# Patient Record
Sex: MALE | Employment: OTHER | URBAN - METROPOLITAN AREA
[De-identification: names, ages, dates, MRNs, and addresses within clinical notes are randomized per-mention and may not be internally consistent; named-entity substitution may affect disease eponyms.]

---

## 2017-01-24 DIAGNOSIS — G43.009 MIGRAINE WITHOUT AURA AND WITHOUT STATUS MIGRAINOSUS, NOT INTRACTABLE: ICD-10-CM

## 2017-01-24 DIAGNOSIS — G23.1 PSP (PROGRESSIVE SUPRANUCLEAR PALSY) (H): Primary | ICD-10-CM

## 2017-01-25 RX ORDER — OLMESARTAN MEDOXOMIL 20 MG/1
TABLET ORAL
Qty: 45 TABLET | Refills: 0 | Status: SHIPPED | OUTPATIENT
Start: 2017-01-25 | End: 2017-02-01

## 2017-01-26 ENCOUNTER — OFFICE VISIT (OUTPATIENT)
Dept: NEUROLOGY | Facility: CLINIC | Age: 67
End: 2017-01-26

## 2017-01-26 ENCOUNTER — OFFICE VISIT (OUTPATIENT)
Dept: NEUROPSYCHOLOGY | Facility: CLINIC | Age: 67
End: 2017-01-26

## 2017-01-26 ENCOUNTER — INFUSION THERAPY VISIT (OUTPATIENT)
Dept: INFUSION THERAPY | Facility: CLINIC | Age: 67
End: 2017-01-26
Attending: PSYCHIATRY & NEUROLOGY
Payer: COMMERCIAL

## 2017-01-26 VITALS — HEIGHT: 71 IN | BODY MASS INDEX: 27.3 KG/M2 | WEIGHT: 195 LBS

## 2017-01-26 DIAGNOSIS — G23.1 PSP (PROGRESSIVE SUPRANUCLEAR PALSY) (H): Primary | ICD-10-CM

## 2017-01-26 DIAGNOSIS — Z00.6 EXAMINATION OF PARTICIPANT IN CLINICAL TRIAL: Primary | ICD-10-CM

## 2017-01-26 DIAGNOSIS — G23.1 PSP (PROGRESSIVE SUPRANUCLEAR PALSY) (H): ICD-10-CM

## 2017-01-26 DIAGNOSIS — R13.10 DYSPHAGIA, UNSPECIFIED TYPE: ICD-10-CM

## 2017-01-26 PROCEDURE — 96365 THER/PROPH/DIAG IV INF INIT: CPT | Mod: ZF

## 2017-01-26 NOTE — PROGRESS NOTES
Research visit.     Answers for HPI/ROS submitted by the patient on 1/25/2017   General Symptoms: No  Skin Symptoms: No  HENT Symptoms: No  EYE SYMPTOMS: No  HEART SYMPTOMS: No  LUNG SYMPTOMS: No  INTESTINAL SYMPTOMS: No  URINARY SYMPTOMS: No  REPRODUCTIVE SYMPTOMS: No  SKELETAL SYMPTOMS: No  BLOOD SYMPTOMS: No  NERVOUS SYSTEM SYMPTOMS: No  MENTAL HEALTH SYMPTOMS: No

## 2017-01-26 NOTE — PROGRESS NOTES
Nursing Note  Jimmy Patrick presents today to [facility] for:   Chief Complaint   Patient presents with     Infusion     study [id number] (IDS# [id number])     During today's [facility] appointment, orders from Dr. Rani Bates  were completed.  Frequency: monthly    Progress note:  Patient identification verified by name and date of birth.  Assessment completed.  Vitals recorded in Doc Flowsheets.  Patient was provided with education regarding infusion and possible side effects.  Patient verbalized understanding.      needed: No  Premedications: were not ordered.  Infusion Rates: infusion given over approximately 1 hour. Started at 1215 and stopped at 1315  Approximate appt length: 1.15  Labs: were NOT DRAWN  Vascular access: peripheral IV placed today.  Treatment Conditions: None  Patient tolerated infusion: well.    Discharge Plan:   Follow up plan of care with: ongoing infusions at [facility].  Discharge instructions were reviewed with patient.  Patient/representative verbalized understanding of discharge instructions and all questions answered.  Patient discharged from [facility] in stable condition.    Trina Finch RN        /80 mmHg  Pulse 78  Temp(Src) 97  F (36.1  C) (Tympanic)  Resp 18  SpO2 99%

## 2017-01-26 NOTE — Clinical Note
1/26/2017       RE: Jimmy Patrick  [address]  [facility] [place] [facility] [place] [address]     Dear Colleague,    Thank you for referring your patient, Jimmy Patrick, to the [facility] NEUROLOGY at [facility]. Please see a copy of my visit note below.      Research visit.     Answers for HPI/ROS submitted by the patient on 1/25/2017   General Symptoms: No  Skin Symptoms: No  HENT Symptoms: No  EYE SYMPTOMS: No  HEART SYMPTOMS: No  LUNG SYMPTOMS: No  INTESTINAL SYMPTOMS: No  URINARY SYMPTOMS: No  REPRODUCTIVE SYMPTOMS: No  SKELETAL SYMPTOMS: No  BLOOD SYMPTOMS: No  NERVOUS SYSTEM SYMPTOMS: No  MENTAL HEALTH SYMPTOMS: No        Again, thank you for allowing me to participate in the care of your patient.      Sincerely,    Paulo Saravia MD

## 2017-01-26 NOTE — Clinical Note
1/26/2017      RE: Jimmy Patrick  [address]  [place] [address]         Research visit.     Answers for HPI/ROS submitted by the patient on 1/25/2017   General Symptoms: No  Skin Symptoms: No  HENT Symptoms: No  EYE SYMPTOMS: No  HEART SYMPTOMS: No  LUNG SYMPTOMS: No  INTESTINAL SYMPTOMS: No  URINARY SYMPTOMS: No  REPRODUCTIVE SYMPTOMS: No  SKELETAL SYMPTOMS: No  BLOOD SYMPTOMS: No  NERVOUS SYSTEM SYMPTOMS: No  MENTAL HEALTH SYMPTOMS: No          Paulo Saravia MD

## 2017-01-27 ENCOUNTER — MYC MEDICAL ADVICE (OUTPATIENT)
Dept: CARDIOLOGY | Facility: CLINIC | Age: 67
End: 2017-01-27

## 2017-01-27 DIAGNOSIS — G43.009 MIGRAINE WITHOUT AURA AND WITHOUT STATUS MIGRAINOSUS, NOT INTRACTABLE: ICD-10-CM

## 2017-01-27 DIAGNOSIS — G23.1 PSP (PROGRESSIVE SUPRANUCLEAR PALSY) (H): Primary | ICD-10-CM

## 2017-01-27 DIAGNOSIS — I10 HTN (HYPERTENSION): ICD-10-CM

## 2017-01-27 LAB — INTERPRETATION ECG - MUSE: NORMAL

## 2017-01-30 NOTE — PROGRESS NOTES
The participant completed the neuropsychology evaluation for the study with [facility] number [id number]. This included one hour of psychometrist time.    Measures administered:    Repeatable Battery for the Assessment of Neuropsychological Status (RBANS)  Phonemic Fluency  Letter Number Sequencing Test  Color Trails Test

## 2017-02-01 ENCOUNTER — TELEPHONE (OUTPATIENT)
Dept: CARDIOLOGY | Facility: CLINIC | Age: 67
End: 2017-02-01

## 2017-02-01 PROBLEM — I10 HTN (HYPERTENSION): Status: ACTIVE | Noted: 2017-02-01

## 2017-02-01 RX ORDER — OLMESARTAN MEDOXOMIL 20 MG/1
20 TABLET ORAL DAILY
Qty: 90 TABLET | Refills: 3 | Status: SHIPPED | OUTPATIENT
Start: 2017-02-01 | End: 2017-02-21

## 2017-02-01 NOTE — TELEPHONE ENCOUNTER
Patient calling with questions about My Chart message from this morning. He states that in the past he had been taking a whole tablet of Benicar and it was not enough. Asked him to monitor his BP and pulse for a week or so and call or send readings by My Chart and Dr Gutiérrez will decide if another medication is needed. Asked him to also check with [facility]. It shows received in his chart but want him to check since he is using it for the first time.

## 2017-02-02 VITALS
DIASTOLIC BLOOD PRESSURE: 82 MMHG | HEART RATE: 78 BPM | RESPIRATION RATE: 18 BRPM | OXYGEN SATURATION: 99 % | TEMPERATURE: 97 F | SYSTOLIC BLOOD PRESSURE: 161 MMHG

## 2017-02-14 DIAGNOSIS — I10 HTN (HYPERTENSION): ICD-10-CM

## 2017-02-14 LAB
ANION GAP SERPL CALCULATED.3IONS-SCNC: 9 MMOL/L (ref 3–14)
BUN SERPL-MCNC: 16 MG/DL (ref 7–30)
CALCIUM SERPL-MCNC: 8.8 MG/DL (ref 8.5–10.1)
CHLORIDE SERPL-SCNC: 102 MMOL/L (ref 94–109)
CO2 SERPL-SCNC: 31 MMOL/L (ref 20–32)
CREAT SERPL-MCNC: 1 MG/DL (ref 0.66–1.25)
GFR SERPL CREATININE-BSD FRML MDRD: 75 ML/MIN/1.7M2
GLUCOSE SERPL-MCNC: 108 MG/DL (ref 70–99)
POTASSIUM SERPL-SCNC: 4 MMOL/L (ref 3.4–5.3)
SODIUM SERPL-SCNC: 142 MMOL/L (ref 133–144)

## 2017-02-14 PROCEDURE — 80048 BASIC METABOLIC PNL TOTAL CA: CPT | Performed by: INTERNAL MEDICINE

## 2017-02-14 PROCEDURE — 36415 COLL VENOUS BLD VENIPUNCTURE: CPT | Performed by: INTERNAL MEDICINE

## 2017-02-17 ENCOUNTER — TELEPHONE (OUTPATIENT)
Dept: CARDIOLOGY | Facility: CLINIC | Age: 67
End: 2017-02-17

## 2017-02-17 ENCOUNTER — TELEPHONE (OUTPATIENT)
Dept: NURSING | Facility: CLINIC | Age: 67
End: 2017-02-17

## 2017-02-17 DIAGNOSIS — G43.009 MIGRAINE WITHOUT AURA AND WITHOUT STATUS MIGRAINOSUS, NOT INTRACTABLE: ICD-10-CM

## 2017-02-17 DIAGNOSIS — G23.1 PSP (PROGRESSIVE SUPRANUCLEAR PALSY) (H): ICD-10-CM

## 2017-02-17 NOTE — TELEPHONE ENCOUNTER
[facility] Call Center    Phone Message    Name of Caller: Jimmy    Phone Number: Home number on file [phone] (home)    Best time to return call: any    May a detailed message be left on voicemail: yes    Relation to patient: Self    Reason for Call: Other: Patient states he is returning a results phone call. Please advise.     Action Taken: Message routed to:  Adult Clinics: Cardiology p [phone]

## 2017-02-17 NOTE — TELEPHONE ENCOUNTER
"Called and spoke with patient. Informed of lab results. He says swelling is getting better.   Pulse is generally in the 80's, rises during the day. BP is \"troublesome\"  140/90's. BP lower in the mornings, around 130/80's. Takes Benicar with dinner. No other medications for blood pressure.  Is worried that his BP is too high and seems interested in increasing the Benicar.  Advised him I would send the message to Dr Gutiérrez and call him with recommendations.   "

## 2017-02-21 ENCOUNTER — OFFICE VISIT (OUTPATIENT)
Dept: PEDIATRICS | Facility: CLINIC | Age: 67
End: 2017-02-21
Payer: COMMERCIAL

## 2017-02-21 VITALS
HEART RATE: 84 BPM | TEMPERATURE: 97.9 F | OXYGEN SATURATION: 98 % | HEIGHT: 71 IN | SYSTOLIC BLOOD PRESSURE: 130 MMHG | BODY MASS INDEX: 27.3 KG/M2 | WEIGHT: 195 LBS | DIASTOLIC BLOOD PRESSURE: 83 MMHG

## 2017-02-21 DIAGNOSIS — G23.1 PSP (PROGRESSIVE SUPRANUCLEAR PALSY) (H): Primary | ICD-10-CM

## 2017-02-21 PROCEDURE — 99213 OFFICE O/P EST LOW 20 MIN: CPT | Performed by: INTERNAL MEDICINE

## 2017-02-21 RX ORDER — OLMESARTAN MEDOXOMIL 20 MG/1
40 TABLET ORAL DAILY
Qty: 90 TABLET | Refills: 3 | COMMUNITY
Start: 2017-02-21 | End: 2017-06-13

## 2017-02-21 NOTE — TELEPHONE ENCOUNTER
Date: 2/21/2017    Time of Call: 2:34 PM       Ordering provider: Dr Gutiérrez    Order:  He can increase it to 40 if he wishes. Watch for orthostatic symptoms.     Order received by: ANN MARIE Soria     Follow-up/additional notes: Left message on personal voice mail. Told him to watch for dizziness or lightheadedness especially with changing positions. Asked him to monitor his BP and call back or send a message on My Chart.

## 2017-02-21 NOTE — PROGRESS NOTES
SUBJECTIVE:                                                    Jimmy Patrick is a 66 year old male who presents to clinic today for the following health issues:      New Patient/Transfer of Care    Pt. Is present today to establish care with a primary provider.    HPI: This patient recently moved here from [place] and is on a research study forhis  progressive supranuclearpalsy    PMH:   Patient Active Problem List   Diagnosis     Transient paralysis of limb     Migraine without aura     Speech disturbance     PSP (progressive supranuclear palsy) (H)     Fluttering heart     Leg swelling     Varicose veins     Headache     Memory loss     Urinary urgency     Blurred vision     Double vision     Tinnitus     Sinus disorder     Epistaxis     DISK (aka Displacement of intervertebral disc, site unspecified, without myelopathy)     HTN (hypertension)     Past Surgical History   Procedure Laterality Date     H ablation atrial flutter         Social History   Substance Use Topics     Smoking status: Never Smoker     Smokeless tobacco: Not on file     Alcohol use No     Family History   Problem Relation Age of Onset     CANCER Father          Current Outpatient Prescriptions   Medication Sig Dispense Refill     olmesartan (BENICAR) 20 MG tablet Take 1 tablet (20 mg) by mouth daily 90 tablet 3     amantadine (SYMMETREL) 100 MG capsule Take 1 capsule (100 mg) by mouth 3 times daily 6am,11am and 4pm 270 capsule 3     ZOLMitriptan (ZOMIG) 5 MG tablet Take 1 tablet (5 mg) by mouth at onset of headache for migraine 30 tablet 5     adapalene (DIFFERIN) 0.1 % gel As per patient. 45 g 11     nortriptyline (PAMELOR) 10 MG capsule Take 3 capsules (30 mg) by mouth At Bedtime 270 capsule 3     acetaminophen-caffeine (EXCEDRIN TENSION HEADACHE) 500-65 MG TABS Take 1 tablet by mouth every 6 hours as needed for mild pain       rivastigmine (EXELON) 4.6 MG/24HR Place 1 patch onto the skin daily 90 patch 3     Multiple  "Vitamins-Minerals (CENTRUM SILVER) per tablet Take 1 tablet by mouth daily 30 tablet      aspirin (BABY ASPIRIN) 81 MG chewable tablet Take 81 mg by mouth daily        cholecalciferol (VITAMIN D-3 SUPER STRENGTH) 2000 UNITS tablet Take 1 tablet by mouth        COMPRESSION STOCKINGS 2 each daily (Patient not taking: Reported on 2/21/2017) 4 each 3     Allergies   Allergen Reactions     Other [Seasonal Allergies]      environmental     Codeine Other (See Comments) and Rash     GI upset       ROS:  C: NEGATIVE for fever, chills, change in weight  E/M: NEGATIVE for ear, mouth and throat problems  R: NEGATIVE for significant cough or SOB  CV: NEGATIVE for chest pain, palpitations or peripheral edema    OBJECTIVE:                                                    /83 (BP Location: Left arm, Patient Position: Chair, Cuff Size: Adult Regular)  Pulse 84  Temp 97.9  F (36.6  C) (Tympanic)  Ht 5' 11\" (1.803 m)  Wt 195 lb (88.5 kg)  SpO2 98%  BMI 27.2 kg/m2  Body mass index is 27.2 kg/(m^2).     GENERAL: frail and elderly  NECK: no adenopathy, no asymmetry, masses, or scars and thyroid normal to palpation  RESP: lungs clear to auscultation - no rales, rhonchi or wheezes  CV: regular rates and rhythm, normal S1 S2, no S3 or S4 and no murmur, click or rub    Diagnostic Test Results:  none      ASSESSMENT/PLAN:                                                    PSP (progressive supranuclear palsy) (H)    Management per neurology. He is currently on a research study            Will call or return to clinic if worsening or symptoms not improving as discussed.      Manuel Cain MD  [facility]    "

## 2017-02-21 NOTE — MR AVS SNAPSHOT
After Visit Summary   2/21/2017    Jimmy Patrick    MRN: [id number]           Patient Information     Date Of Birth          1950        Visit Information        Provider Department      2/21/2017 10:40 AM Manuel Cain MD [facility]        Today's Diagnoses     PSP (progressive supranuclear palsy) (H)    -  1       Follow-ups after your visit        Your next 10 appointments already scheduled     Feb 23, 2017 10:30 AM CST   (Arrive by 10:15 AM)   Return Movement Disorder with Paulo Saravia MD   [facility] Neurology ([facility])    [address] [place] [address]  [address] [facility] [id number] [phone]            Feb 23, 2017 11:00 AM CST   LAB with UC LAB   [facility] Lab ([facility])    [address] [place] [address]  [address] [facility] [id number] [phone]           Patient must bring picture ID.  Patient should be prepared to give a urine specimen  Please do not eat 10-12 hours before your appointment if you are coming in fasting for labs on lipids, cholesterol, or glucose (sugar).  Pregnant women should follow their Care Team instructions. Water with medications is okay. Do not drink coffee or other fluids.   If you have concerns about taking  your medications, please ask at office or if scheduling via [employer], send a message by clicking on Secure Messaging, Message Your Care Team.            Feb 23, 2017 12:00 PM CST   Infusion 60 with UC SPEC INFUSION, UC 50 ATC   [facility] Special[facility] Procedure ([facility])    [address] [facility]  [address] [facility] [id number] [phone]            Feb 28, 2017  2:00 PM CST   RETURN NEURO with Umberto Villafana MD   [facility] ([facility])    [address]  [address] [facility] [address]  [place] [address]   [phone]            Mar 23, 2017 10:30 AM CDT   (Arrive by 10:15  AM)   Return Movement Disorder with Paulo Saravia MD   [facility] Neurology ([facility])    [address] [place] [address]  [address] [facility] [id number] [phone]            Mar 23, 2017 11:00 AM CDT   LAB with UC LAB   [facility] Lab ([facility])    [address] [place] [id number] [phone]           Patient must bring picture ID.  Patient should be prepared to give a urine specimen  Please do not eat 10-12 hours before your appointment if you are coming in fasting for labs on lipids, cholesterol, or glucose (sugar).  Pregnant women should follow their Care Team instructions. Water with medications is okay. Do not drink coffee or other fluids.   If you have concerns about taking  your medications, please ask at office or if scheduling via [employer], send a message by clicking on Secure Messaging, Message Your Care Team.            Mar 23, 2017 12:00 PM CDT   Infusion 60 with UC SPEC INFUSION, UC 50 ATC   [facility] ([facility])    [address] [place] [id number] [phone]            Apr 05, 2017  8:00 AM CDT   Return Visit with Clint Gutiérrez MD   [facility] ([facility]    [address] [place] [address] [phone]              Who to contact     If you have questions or need follow up information about today's clinic visit or your schedule please contact [facility] directly at [phone].  Normal or non-critical lab and imaging results will be communicated to you by MyChart, letter or phone within 4 business days after the clinic has received the results. If you do not hear from us within 7 days, please contact the clinic through MyChart or phone. If you have a critical or abnormal lab result, we will notify you by phone as soon as  "possible.  Submit refill requests through [employer] or call your pharmacy and they will forward the refill request to us. Please allow 3 business days for your refill to be completed.          Additional Information About Your Visit        [employer] Information     [employer] gives you secure access to your electronic health record. If you see a primary care provider, you can also send messages to your care team and make appointments. If you have questions, please call your primary care clinic.  If you do not have a primary care provider, please call [phone] and they will assist you.      [employer] is an electronic gateway that provides easy, online access to your medical records. With [employer], you can request a clinic appointment, read your test results, renew a prescription or communicate with your care team.     To access your existing account, please contact your [facility] or call [phone] for assistance.        Care EveryWhere ID     This is your Care EveryWhere ID. This could be used by other organizations to access your [place] medical records  [id number]        Your Vitals Were     Pulse Temperature Height Pulse Oximetry BMI (Body Mass Index)       84 97.9  F (36.6  C) (Tympanic) 5' 11\" (1.803 m) 98% 27.2 kg/m2        Blood Pressure from Last 3 Encounters:   02/21/17 130/83   01/26/17 161/82   12/29/16 139/90    Weight from Last 3 Encounters:   02/21/17 195 lb (88.5 kg)   01/26/17 195 lb (88.5 kg)   12/19/16 194 lb 4.8 oz (88.1 kg)              Today, you had the following     No orders found for display       Primary Care Provider    [facility]       No address on file        Thank you!     Thank you for choosing [facility]  for your care. Our goal is always to provide you with excellent care. Hearing back from our patients is one way we can continue to improve our services. Please take a few minutes to complete the written survey " that you may receive in the mail after your visit with us. Thank you!             Your Updated Medication List - Protect others around you: Learn how to safely use, store and throw away your medicines at www.disposemymeds.org.          This list is accurate as of: 2/21/17 11:17 AM.  Always use your most recent med list.                   Brand Name Dispense Instructions for use    acetaminophen-caffeine 500-65 MG Tabs    EXCEDRIN TENSION HEADACHE     Take 1 tablet by mouth every 6 hours as needed for mild pain       adapalene 0.1 % gel    DIFFERIN    45 g    As per patient.       amantadine 100 MG capsule    SYMMETREL    270 capsule    Take 1 capsule (100 mg) by mouth 3 times daily 6am,11am and 4pm       BABY ASPIRIN 81 MG chewable tablet   Generic drug:  aspirin      Take 81 mg by mouth daily       CENTRUM SILVER per tablet     30 tablet    Take 1 tablet by mouth daily       COMPRESSION STOCKINGS     4 each    2 each daily       nortriptyline 10 MG capsule    PAMELOR    270 capsule    Take 3 capsules (30 mg) by mouth At Bedtime       olmesartan 20 MG tablet    BENICAR    90 tablet    Take 1 tablet (20 mg) by mouth daily       rivastigmine 4.6 MG/24HR 24 hr patch    EXELON    90 patch    Place 1 patch onto the skin daily       Vitamin D-3 Super Strength 2000 UNITS tablet   Generic drug:  cholecalciferol      Take 1 tablet by mouth       ZOLMitriptan 5 MG tablet    ZOMIG    30 tablet    Take 1 tablet (5 mg) by mouth at onset of headache for migraine

## 2017-02-21 NOTE — NURSING NOTE
"Chief Complaint   Patient presents with     Establish Care       Initial /83 (BP Location: Left arm, Patient Position: Chair, Cuff Size: Adult Regular)  Pulse 84  Temp 97.9  F (36.6  C) (Tympanic)  Ht 5' 11\" (1.803 m)  Wt 195 lb (88.5 kg)  SpO2 98%  BMI 27.2 kg/m2 Estimated body mass index is 27.2 kg/(m^2) as calculated from the following:    Height as of this encounter: 5' 11\" (1.803 m).    Weight as of this encounter: 195 lb (88.5 kg).  Medication Reconciliation: complete       Rukhsana Acosta CMA    "

## 2017-02-23 ENCOUNTER — OFFICE VISIT (OUTPATIENT)
Dept: NEUROLOGY | Facility: CLINIC | Age: 67
End: 2017-02-23

## 2017-02-23 ENCOUNTER — INFUSION THERAPY VISIT (OUTPATIENT)
Dept: INFUSION THERAPY | Facility: CLINIC | Age: 67
End: 2017-02-23
Attending: PSYCHIATRY & NEUROLOGY
Payer: COMMERCIAL

## 2017-02-23 VITALS
OXYGEN SATURATION: 99 % | SYSTOLIC BLOOD PRESSURE: 130 MMHG | DIASTOLIC BLOOD PRESSURE: 72 MMHG | TEMPERATURE: 98 F | HEART RATE: 83 BPM | RESPIRATION RATE: 18 BRPM

## 2017-02-23 DIAGNOSIS — G23.1 PSP (PROGRESSIVE SUPRANUCLEAR PALSY) (H): Primary | ICD-10-CM

## 2017-02-23 DIAGNOSIS — G43.009 MIGRAINE WITHOUT AURA AND WITHOUT STATUS MIGRAINOSUS, NOT INTRACTABLE: ICD-10-CM

## 2017-02-23 DIAGNOSIS — G23.1 PSP (PROGRESSIVE SUPRANUCLEAR PALSY) (H): ICD-10-CM

## 2017-02-23 PROCEDURE — 96365 THER/PROPH/DIAG IV INF INIT: CPT

## 2017-02-23 RX ORDER — AMANTADINE HYDROCHLORIDE 100 MG/1
100 CAPSULE, GELATIN COATED ORAL 3 TIMES DAILY
Qty: 270 CAPSULE | Refills: 3 | Status: SHIPPED | OUTPATIENT
Start: 2017-02-23 | End: 2018-01-01

## 2017-02-23 RX ORDER — ZOLMITRIPTAN 5 MG/1
5 TABLET, FILM COATED ORAL
Qty: 30 TABLET | Refills: 5 | Status: SHIPPED | OUTPATIENT
Start: 2017-02-23 | End: 2018-01-01

## 2017-02-23 RX ORDER — RIVASTIGMINE 4.6 MG/24H
1 PATCH, EXTENDED RELEASE TRANSDERMAL DAILY
Qty: 90 PATCH | Refills: 3 | Status: SHIPPED | OUTPATIENT
Start: 2017-02-23 | End: 2017-02-23

## 2017-02-23 RX ORDER — RIVASTIGMINE 4.6 MG/24H
1 PATCH, EXTENDED RELEASE TRANSDERMAL DAILY
Qty: 90 PATCH | Refills: 3 | COMMUNITY
Start: 2017-02-23 | End: 2018-01-01

## 2017-02-23 RX ORDER — RIVASTIGMINE 4.6 MG/24H
1 PATCH, EXTENDED RELEASE TRANSDERMAL DAILY
Qty: 30 PATCH | Refills: 11 | Status: SHIPPED | OUTPATIENT
Start: 2017-02-23 | End: 2017-02-23

## 2017-02-23 RX ADMIN — Medication 2100 MG: at 12:27

## 2017-02-23 NOTE — MR AVS SNAPSHOT
After Visit Summary   2/23/2017    Jimmy Patrick    MRN: [id number]           Patient Information     Date Of Birth          1950        Visit Information        Provider Department      2/23/2017 12:00 PM UC 50 ATC; UC SPEC INFUSION [facility]        Today's Diagnoses     PSP (progressive supranuclear palsy) (H)    -  1       Follow-ups after your visit        Your next 10 appointments already scheduled     Feb 28, 2017  2:00 PM CST   RETURN NEURO with Umberto Villafana MD   [facility] Clinic ([facility])    [address]  [address]  [facility]  [facility] [address]   [phone]            Mar 01, 2017  3:00 PM CST   New Visit with Paulo Tellez MD   [facility] ([facility])    [address] [place] [address] [id number] [phone]            Mar 23, 2017 10:30 AM CDT   (Arrive by 10:15 AM)   Return Movement Disorder with Paulo Saravia MD   [facility] Neurology [facility])    [address] [place] [address] [id number] [phone]            Mar 23, 2017 11:00 AM CDT   LAB with HAI LAB   [facility])    [address] [place] [address] [id number] [phone]           Patient must bring picture ID.  Patient should be prepared to give a urine specimen  Please do not eat 10-12 hours before your appointment if you are coming in fasting for labs on lipids, cholesterol, or glucose (sugar).  Pregnant women should follow their Care Team instructions. Water with medications is okay. Do not drink coffee or other fluids.   If you have concerns about taking  your medications, please ask at office or if scheduling via [employer], send a message by clicking on Secure Messaging, Message Your Care Team.            Mar 23, 2017 12:00 PM CDT   Infusion 60 with UC SPEC INFUSION, UC 50  ATC   [facility] Specialty [facility] Procedure ([facility])    [address] [place] [address]  2nd [facility] [phone] [phone]            Apr 05, 2017  8:00 AM CDT   Return Visit with Clint Gutiérrez MD   [facility] ([facility])    [address] [place] [address]   [phone]            Apr 20, 2017  9:30 AM CDT   (Arrive by 9:15 AM)   Return Movement Disorder with Paulo Saravia MD   [facility] Neurology ([facility])    [address] [place] [address]  3rd [facility] [phone]            Apr 20, 2017 10:30 AM CDT   (Arrive by 10:15 AM)   ecg with  CV EKG   [facility] Care ([facility])    [address] [place] [address]  [phone]               Apr 20, 2017 11:00 AM CDT   LAB with  LAB   [facility] Lab ([facility])    [address] [place] [address] [id number] [phone]           Patient must bring picture ID.  Patient should be prepared to give a urine specimen  Please do not eat 10-12 hours before your appointment if you are coming in fasting for labs on lipids, cholesterol, or glucose (sugar).  Pregnant women should follow their Care Team instructions. Water with medications is okay. Do not drink coffee or other fluids.   If you have concerns about taking  your medications, please ask at office or if scheduling via trinket, send a message by clicking on Secure Messaging, Message Your Care Team.              Who to contact     If you have questions or need follow up information about today's clinic visit or your schedule please contact [facility] SPECIALTY AND PROCEDURE directly at [phone].  Normal or non-critical lab and imaging results will be communicated to you by MyChart, letter or phone within 4 business days after the clinic has received the results.  If you do not hear from us within 7 days, please contact the clinic through [employer] or phone. If you have a critical or abnormal lab result, we will notify you by phone as soon as possible.  Submit refill requests through [employer] or call your pharmacy and they will forward the refill request to us. Please allow 3 business days for your refill to be completed.          Additional Information About Your Visit        [employer] Information     [employer] gives you secure access to your electronic health record. If you see a primary care provider, you can also send messages to your care team and make appointments. If you have questions, please call your primary care clinic.  If you do not have a primary care provider, please call [phone] and they will assist you.        Care EveryWhere ID     This is your Care EveryWhere ID. This could be used by other organizations to access your [facility] medical records  [id number]        Your Vitals Were     Pulse Temperature Respirations Pulse Oximetry          83 98  F (36.7  C) (Oral) 18 99%         Blood Pressure from Last 3 Encounters:   02/23/17 130/72   02/21/17 130/83   01/26/17 161/82    Weight from Last 3 Encounters:   02/21/17 88.5 kg (195 lb)   01/26/17 88.5 kg (195 lb)   12/19/16 88.1 kg (194 lb 4.8 oz)              We Performed the Following     MD Instruction for Therapy Plan          Today's Medication Changes          These changes are accurate as of: 2/23/17  4:30 PM.  If you have any questions, ask your nurse or doctor.               Start taking these medicines.        Dose/Directions    EXELON 4.6 MG/24HR 24 hr patch   Used for:  PSP (progressive supranuclear palsy) (H)   Generic drug:  rivastigmine   Started by:  Paulo Saravia MD        Dose:  1 patch   Place 1 patch onto the skin daily   Quantity:  90 patch   Refills:  3            Where to get your medicines      These medications were sent to [facility] [address]  [address] [address]     Phone:  [phone]     amantadine 100 MG capsule    ZOLMitriptan 5 MG tablet                Primary Care Provider    [facility]       No address on file        Thank you!     Thank you for choosing [facility] SPECIALTY [facility] PROCEDURE  for your care. Our goal is always to provide you with excellent care. Hearing back from our patients is one way we can continue to improve our services. Please take a few minutes to complete the written survey that you may receive in the mail after your visit with us. Thank you!             Your Updated Medication List - Protect others around you: Learn how to safely use, store and throw away your medicines at www.disposemymeds.org.          This list is accurate as of: 2/23/17  4:30 PM.  Always use your most recent med list.                   Brand Name Dispense Instructions for use    acetaminophen-caffeine 500-65 MG Tabs    EXCEDRIN TENSION HEADACHE     Take 1 tablet by mouth every 6 hours as needed for mild pain       adapalene 0.1 % gel    DIFFERIN    45 g    As per patient.       amantadine 100 MG capsule    SYMMETREL    270 capsule    Take 1 capsule (100 mg) by mouth 3 times daily 6am,11am and 4pm       BABY ASPIRIN 81 MG chewable tablet   Generic drug:  aspirin      Take 81 mg by mouth daily       BENICAR 20 MG tablet   Generic drug:  olmesartan     90 tablet    Take 2 tablets (40 mg) by mouth daily       CENTRUM SILVER per tablet     30 tablet    Take 1 tablet by mouth daily       COMPRESSION STOCKINGS     4 each    2 each daily       EXELON 4.6 MG/24HR 24 hr patch   Generic drug:  rivastigmine     90 patch    Place 1 patch onto the skin daily       nortriptyline 10 MG capsule    PAMELOR    270 capsule    Take 3 capsules (30 mg) by mouth At Bedtime       Vitamin D-3 Super Strength 2000 UNITS tablet   Generic drug:  cholecalciferol      Take 1 tablet by mouth        ZOLMitriptan 5 MG tablet    ZOMIG    30 tablet    Take 1 tablet (5 mg) by mouth at onset of headache for migraine

## 2017-02-23 NOTE — Clinical Note
2/23/2017       RE: Jimmy Patrick  [address]  [place] [address]     Dear Colleague,    Thank you for referring your patient, Jimmy Patrick, to the [facility] NEUROLOGY at [facility]. Please see a copy of my visit note below.    No notes on file    Again, thank you for allowing me to participate in the care of your patient.      Sincerely,    Paulo Saravia MD

## 2017-02-23 NOTE — PROGRESS NOTES
Research visit      Answers for HPI/ROS submitted by the patient on 2/15/2017   General Symptoms: No  Skin Symptoms: No  HENT Symptoms: No  EYE SYMPTOMS: No  HEART SYMPTOMS: No  LUNG SYMPTOMS: No  INTESTINAL SYMPTOMS: No  URINARY SYMPTOMS: No  REPRODUCTIVE SYMPTOMS: No  SKELETAL SYMPTOMS: No  BLOOD SYMPTOMS: No  NERVOUS SYSTEM SYMPTOMS: No  MENTAL HEALTH SYMPTOMS: No

## 2017-02-23 NOTE — PROGRESS NOTES
Nursing Note  Jimmy Patrick [facility]:   Chief Complaint   Patient presents with     Infusion     study [id number] (IDS# [id number]) IV infusion     During [facility]'s [facility] appointment, orders from Dr. Rani Bates  were completed.  Frequency: monthly    Progress note:  Patient identification verified by name and date of birth.  Assessment completed.  Vitals recorded in Doc Flowsheets.  Patient was provided with education regarding infusion and possible side effects.  Patient verbalized understanding.      needed: No  Premedications: were not ordered.  Infusion Rates: infusion given over approximately 1 hour.  Approximate appt length: 1.5   Labs: were not drawn this visit`  Vascular access: peripheral IV placed today.  Treatment Conditions: None  Patient tolerated infusion: well.    Discharge Plan:   Follow up plan of care with: ongoing infusions at [facility].  Discharge instructions were reviewed with patient.  Patient/representative verbalized understanding of discharge instructions and all questions answered.  [facility] discharged [facility] in stable condition.    Trina Barroso RN    Administrations This Visit     study [id number] (IDS# [id number]) IV infusion 2,100 mg 210 mL     Admin Date Action Dose Rate Route Administered By          02/23/2017 New Bag 2100 mg 210 mL/hr Intravenous Trina Barroso RN                         /72  Pulse 83  Temp 98  F (36.7  C) (Oral)  Resp 18  SpO2 99%

## 2017-02-23 NOTE — MR AVS SNAPSHOT
After Visit Summary   2/23/2017    Jimmy Patrick    MRN: [id number]           Patient Information     Date Of Birth          1950        Visit Information        Provider Department      2/23/2017 10:30 AM Paulo Saravia MD [facility] Neurology        Today's Diagnoses     PSP (progressive supranuclear palsy)        Migraine without aura and without status migrainosus, not intractable           Follow-ups after your visit        Follow-up notes from your care team     Return if symptoms worsen or fail to improve.      Your next 10 appointments already scheduled     Feb 23, 2017 11:00 AM CST   LAB with UC LAB   [facility] Lab ([facility])    [address] [place] [address] [id number] [phone]           Patient must bring picture ID.  Patient should be prepared to give a urine specimen  Please do not eat 10-12 hours before your appointment if you are coming in fasting for labs on lipids, cholesterol, or glucose (sugar).  Pregnant women should follow their Care Team instructions. Water with medications is okay. Do not drink coffee or other fluids.   If you have concerns about taking  your medications, please ask at office or if scheduling via barcoo, send a message by clicking on Secure Messaging, Message Your Care Team.            Feb 23, 2017 12:00 PM CST   Infusion 60 with UC SPEC INFUSION, UC 50 ATC   [facility] Special[facility] Procedure ([facility])    [address] [place] [address] [phone] [phone]            Feb 28, 2017  2:00 PM CST   RETURN NEURO with Umberto Villafana MD   Eye Clinic ([facility])    [address]  [address] 9a  [facility] [address]   [phone]            Mar 01, 2017  3:00 PM CST   New Visit with Paulo Tellez MD   [facility] ([facility])    [address] [place] [address]  N  [facility] [address]   [phone]            Mar 23, 2017 10:30 AM CDT   (Arrive by 10:15 AM)   Return Movement Disorder with aPulo Saravia MD   [facility] Neurology ([facility])    [address] [place] [address]  3rd [facility] [address] [phone]            Mar 23, 2017 11:00 AM CDT   LAB with UC LAB   [facility] Lab ([facility])    [address] [place] [address]  1st [facility] [id number] [phone]           Patient must bring picture ID.  Patient should be prepared to give a urine specimen  Please do not eat 10-12 hours before your appointment if you are coming in fasting for labs on lipids, cholesterol, or glucose (sugar).  Pregnant women should follow their Care Team instructions. Water with medications is okay. Do not drink coffee or other fluids.   If you have concerns about taking  your medications, please ask at office or if scheduling via [employer], send a message by clicking on Secure Messaging, Message Your Care Team.            Mar 23, 2017 12:00 PM CDT   Infusion 60 with UC SPEC INFUSION, UC 50 ATC   [facility] ([facility])    [address] [place] [address]  2nd [facility] [address] [phone]            Apr 05, 2017  8:00 AM CDT   Return Visit with Clint Gutiérrez MD   [facility])    [address] [place] [id number] [phone]              Who to contact     Please call your clinic at [phone] to:    Ask questions about your health    Make or cancel appointments    Discuss your medicines    Learn about your test results    Speak to your doctor   If you have compliments or concerns about an experience at your clinic, or if you wish to file a complaint, please contact [facility] Patient Relations at [phone] or email us at  [email or web address]         Additional Information About Your Visit        [email or web address] Information     [employer] gives you secure access to your electronic health record. If you see a primary care provider, you can also send messages to your care team and make appointments. If you have questions, please call your primary care clinic.  If you do not have a primary care provider, please call [phone] and they will assist you.      [employer] is an electronic gateway that provides easy, online access to your medical records. With [employer], you can request a clinic appointment, read your test results, renew a prescription or communicate with your care team.     To access your existing account, please contact your [facility] or call [phone] for assistance.        Care EveryWhere ID     This is your Care EveryWhere ID. This could be used by other organizations to access your [facility] medical records  [id number]         Blood Pressure from Last 3 Encounters:   02/21/17 130/83   01/26/17 161/82   12/29/16 139/90    Weight from Last 3 Encounters:   02/21/17 88.5 kg (195 lb)   01/26/17 88.5 kg (195 lb)   12/19/16 88.1 kg (194 lb 4.8 oz)              Today, you had the following     No orders found for display         Today's Medication Changes          These changes are accurate as of: 2/23/17 10:47 AM.  If you have any questions, ask your nurse or doctor.               Start taking these medicines.        Dose/Directions    EXELON 4.6 MG/24HR 24 hr patch   Used for:  PSP (progressive supranuclear palsy) (H)   Generic drug:  rivastigmine   Started by:  Paulo Saravia MD        Dose:  1 patch   Place 1 patch onto the skin daily   Quantity:  90 patch   Refills:  3            Where to get your medicines      These medications were sent to [facility] [address]  [address] [facility] [address]     Phone:  [phone]      amantadine 100 MG capsule    ZOLMitriptan 5 MG tablet                Primary Care Provider    [facility]       No address on file        Thank you!     Thank you for choosing [facility] NEUROLOGY  for your care. Our goal is always to provide you with excellent care. Hearing back from our patients is one way we can continue to improve our services. Please take a few minutes to complete the written survey that you may receive in the mail after your visit with us. Thank you!             Your Updated Medication List - Protect others around you: Learn how to safely use, store and throw away your medicines at www.disposemymeds.org.          This list is accurate as of: 2/23/17 10:47 AM.  Always use your most recent med list.                   Brand Name Dispense Instructions for use    acetaminophen-caffeine 500-65 MG Tabs    EXCEDRIN TENSION HEADACHE     Take 1 tablet by mouth every 6 hours as needed for mild pain       adapalene 0.1 % gel    DIFFERIN    45 g    As per patient.       amantadine 100 MG capsule    SYMMETREL    270 capsule    Take 1 capsule (100 mg) by mouth 3 times daily 6am,11am and 4pm       BABY ASPIRIN 81 MG chewable tablet   Generic drug:  aspirin      Take 81 mg by mouth daily       BENICAR 20 MG tablet   Generic drug:  olmesartan     90 tablet    Take 2 tablets (40 mg) by mouth daily       CENTRUM SILVER per tablet     30 tablet    Take 1 tablet by mouth daily       COMPRESSION STOCKINGS     4 each    2 each daily       EXELON 4.6 MG/24HR 24 hr patch   Generic drug:  rivastigmine     90 patch    Place 1 patch onto the skin daily       nortriptyline 10 MG capsule    PAMELOR    270 capsule    Take 3 capsules (30 mg) by mouth At Bedtime       Vitamin D-3 Super Strength 2000 UNITS tablet   Generic drug:  cholecalciferol      Take 1 tablet by mouth       ZOLMitriptan 5 MG tablet    ZOMIG    30 tablet    Take 1 tablet (5 mg) by mouth at onset of headache for migraine

## 2017-02-28 ENCOUNTER — OFFICE VISIT (OUTPATIENT)
Dept: OPHTHALMOLOGY | Facility: CLINIC | Age: 67
End: 2017-02-28
Attending: OPHTHALMOLOGY
Payer: COMMERCIAL

## 2017-02-28 DIAGNOSIS — H53.2 DIPLOPIA: Primary | ICD-10-CM

## 2017-02-28 DIAGNOSIS — G23.1 PSP (PROGRESSIVE SUPRANUCLEAR PALSY) (H): ICD-10-CM

## 2017-02-28 PROCEDURE — 92060 SENSORIMOTOR EXAMINATION: CPT | Mod: ZF | Performed by: OPHTHALMOLOGY

## 2017-02-28 PROCEDURE — 99215 OFFICE O/P EST HI 40 MIN: CPT | Mod: ZF

## 2017-02-28 ASSESSMENT — REFRACTION_MANIFEST
OS_SPHERE: -8.00
OS_AXIS: 005
OD_CYLINDER: +0.50
OS_CYLINDER: +1.25
OS_ADD: +2.75
OD_AXIS: 140
OD_ADD: +2.75
OD_SPHERE: -6.25

## 2017-02-28 ASSESSMENT — CUP TO DISC RATIO
OD_RATIO: 0.1
OS_RATIO: 0.1

## 2017-02-28 ASSESSMENT — VISUAL ACUITY
OS_CC+: -2
OD_CC: 20/25
METHOD: SNELLEN - LINEAR
OD_CC+: -2
CORRECTION_TYPE: GLASSES
OS_CC: 20/25

## 2017-02-28 ASSESSMENT — EXTERNAL EXAM - RIGHT EYE: OD_EXAM: NORMAL

## 2017-02-28 ASSESSMENT — TONOMETRY
IOP_METHOD: TONOPEN
OD_IOP_MMHG: 15
OS_IOP_MMHG: 16

## 2017-02-28 ASSESSMENT — CONF VISUAL FIELD
OD_NORMAL: 1
OS_NORMAL: 1

## 2017-02-28 ASSESSMENT — EXTERNAL EXAM - LEFT EYE: OS_EXAM: NORMAL

## 2017-02-28 ASSESSMENT — SLIT LAMP EXAM - LIDS
COMMENTS: PTOSIS, MEIBOMIAN GLAND DYSFUNCTION
COMMENTS: PTOSIS, MEIBOMIAN GLAND DYSFUNCTION

## 2017-02-28 NOTE — PROGRESS NOTES
Assessment & Plan     Jimmy Patrick is a 66 year old male with the following diagnoses:   1. Diplopia    2. PSP (progressive supranuclear palsy) (H)       Mr. Henning reports that he has difficulty going downstairs. He currently has progressive glasses for distance and another pair of glasses for reading.     I believe part of his complaint is that he is using the lower part of the lens (reading) when he is looking down added to that balance issues and eye motility deficit secondary to PSD.     We discussed different options, and he decided to try bifocals and I think this is reasonable.     Follow up as needed for worsening symptoms.         Attending Physician Attestation:  I have seen and examined this patient.  I have confirmed and edited as necessary the chief complaint(s), history of present illness, review of systems, relevant history, and examination findings as documented by others.  I have personally reviewed the relevant tests, images, and reports as documented above.  I have confirmed and edited as necessary the assessment and plan and agree with this note.  - Umberto Villafana MD 4:10 PM 2/28/2017

## 2017-02-28 NOTE — LETTER
2017         RE:  :  MRN: Jimmy Patrick  1950  [id number]     Dear Dr. Saravia,    Your patient, Jimmy Patrick, returned for neuro-ophthalmic follow up. My assessment and plan are below.  For further details, please see my attached clinic note.          Assessment & Plan     Jimmy Patrick is a 66 year old male with the following diagnoses:   1. Diplopia    2. PSP (progressive supranuclear palsy) (H)       Mr. Henning reports that he has difficulty going downstairs. He currently has progressive glasses for distance and another pair of glasses for reading.     I believe part of his complaint is that he is using the lower part of the lens (reading) when he is looking down added to that balance issues and eye motility deficit secondary to PSD.     We discussed different options, and he decided to try bifocals and I think this is reasonable.     Follow up as needed for worsening symptoms.        Again, thank you for allowing me to participate in the care of your patient.      Sincerely,    Umberto Villafana MD  Professor, Neuro-Ophthalmology  Department of Ophthalmology and Visual Neurosciences  [facility]      CC: Paulo Saravia MD  [facility]  [address]  [facility] [address]  VIA In Basket     Emperatriz Candelaria, PhD [address] [facility] [address]  [facility] [address]  VIA In Basket     Jade Peterson RN  VIA In Basket

## 2017-02-28 NOTE — MR AVS SNAPSHOT
After Visit Summary   2/28/2017    Jimmy Patrick    MRN: [id number]           Patient Information     Date Of Birth          1950        Visit Information        Provider Department      2/28/2017 2:00 PM Umberto [facility]af[facility]        Today's Diagnoses     Diplopia    -  1    PSP (progressive supranuclear palsy) (H)           Follow-ups after your visit        Your next 10 appointments already scheduled     Mar 01, 2017  3:00 PM CST   New Visit with Paulo Tellez MD   [facility])    [address] [place] [phone]            Mar 23, 2017 10:30 AM CDT   (Arrive by 10:15 AM)   Return Movement Disorder with Paulo Saravia MD   [facility] Neurology [facility])    [address] [place] [address] [id number] [phone]            Mar 23, 2017 11:00 AM CDT   LAB with UC LAB   [facility] Lab ([facility])    [address] [place] [id number] [phone]           Patient must bring picture ID.  Patient should be prepared to give a urine specimen  Please do not eat 10-12 hours before your appointment if you are coming in fasting for labs on lipids, cholesterol, or glucose (sugar).  Pregnant women should follow their Care Team instructions. Water with medications is okay. Do not drink coffee or other fluids.   If you have concerns about taking  your medications, please ask at office or if scheduling via [employer], send a message by clicking on Secure Messaging, Message Your Care Team.            Mar 23, 2017 12:00 PM CDT   Infusion 60 with UC SPEC INFUSION, UC 50 ATC   [facility] Specialty [facility] Procedure ([facility])    [address] [place] [address] [phone]            Apr 05, 2017  8:00 AM CDT   Return Visit with Clint Juárez  MD Marla   [facility]    [id number] [place] [phone]            Apr 20, 2017  9:30 AM CDT   (Arrive by 9:15 AM)   Return Movement Disorder with Paulo Saravia MD   [facility] Neurology ([facility])    [address] [place] [address]  3rd [facility] [phone]            Apr 20, 2017 10:30 AM CDT   (Arrive by 10:15 AM)   ecg with  CV EKG   [facility] Heart Care ([facility])    [address] [place] [address]  [phone]               Apr 20, 2017 11:00 AM CDT   LAB with  LAB   [facility] Lab ([facility])    [address] [place] [address] [phone]           Patient must bring picture ID.  Patient should be prepared to give a urine specimen  Please do not eat 10-12 hours before your appointment if you are coming in fasting for labs on lipids, cholesterol, or glucose (sugar).  Pregnant women should follow their Care Team instructions. Water with medications is okay. Do not drink coffee or other fluids.   If you have concerns about taking  your medications, please ask at office or if scheduling via [employer], send a message by clicking on Secure Messaging, Message Your Care Team.            Apr 20, 2017 12:00 PM CDT   Infusion 60 with  SPEC INFUSION   [facility] [place] Specialty and Procedure ([facility])    [address] [place] [address]  2nd [facility] [address] [phone]              Who to contact     Please call your clinic at [phone] to:    Ask questions about your health    Make or cancel appointments    Discuss your medicines    Learn about your test results    Speak to your doctor   If you have compliments or concerns about an experience at your clinic, or if you wish to file a complaint, please contact [facility] Patient  Relations at [phone] or email us at [email or web address]         Additional Information About Your Visit        [employer] Information     [employer] gives you secure access to your electronic health record. If you see a primary care provider, you can also send messages to your care team and make appointments. If you have questions, please call your primary care clinic.  If you do not have a primary care provider, please call [phone] and they will assist you.      [employer] is an electronic gateway that provides easy, online access to your medical records. With [employer], you can request a clinic appointment, read your test results, renew a prescription or communicate with your care team.     To access your existing account, please contact your [facility] or call [phone] for assistance.        Care EveryWhere ID     This is your Care EveryWhere ID. This could be used by other organizations to access your [facility] medical records  [id number]         Blood Pressure from Last 3 Encounters:   02/23/17 130/72   02/21/17 130/83   01/26/17 161/82    Weight from Last 3 Encounters:   02/21/17 88.5 kg (195 lb)   01/26/17 88.5 kg (195 lb)   12/19/16 88.1 kg (194 lb 4.8 oz)              We Performed the Following     Sensorimotor        Primary Care Provider    [facility]       No address on file        Thank you!     Thank you for choosing [facility]  for your care. Our goal is always to provide you with excellent care. Hearing back from our patients is one way we can continue to improve our services. Please take a few minutes to complete the written survey that you may receive in the mail after your visit with us. Thank you!             Your Updated Medication List - Protect others around you: Learn how to safely use, store and throw away your medicines at www.disposemymeds.org.          This list is accurate as of: 2/28/17  4:11 PM.  Always use your  most recent med list.                   Brand Name Dispense Instructions for use    acetaminophen-caffeine 500-65 MG Tabs    EXCEDRIN TENSION HEADACHE     Take 1 tablet by mouth every 6 hours as needed for mild pain       adapalene 0.1 % gel    DIFFERIN    45 g    As per patient.       amantadine 100 MG capsule    SYMMETREL    270 capsule    Take 1 capsule (100 mg) by mouth 3 times daily 6am,11am and 4pm       BABY ASPIRIN 81 MG chewable tablet   Generic drug:  aspirin      Take 81 mg by mouth daily       BENICAR 20 MG tablet   Generic drug:  olmesartan     90 tablet    Take 2 tablets (40 mg) by mouth daily       CENTRUM SILVER per tablet     30 tablet    Take 1 tablet by mouth daily       COMPRESSION STOCKINGS     4 each    2 each daily       EXELON 4.6 MG/24HR 24 hr patch   Generic drug:  rivastigmine     90 patch    Place 1 patch onto the skin daily       nortriptyline 10 MG capsule    PAMELOR    270 capsule    Take 3 capsules (30 mg) by mouth At Bedtime       Vitamin D-3 Super Strength 2000 UNITS tablet   Generic drug:  cholecalciferol      Take 1 tablet by mouth       ZOLMitriptan 5 MG tablet    ZOMIG    30 tablet    Take 1 tablet (5 mg) by mouth at onset of headache for migraine

## 2017-03-01 ENCOUNTER — OFFICE VISIT (OUTPATIENT)
Dept: DERMATOLOGY | Facility: CLINIC | Age: 67
End: 2017-03-01
Payer: COMMERCIAL

## 2017-03-01 DIAGNOSIS — D22.70 MULTIPLE BENIGN NEVI OF UPPER AND LOWER EXTREMITIES, AND TRUNK: ICD-10-CM

## 2017-03-01 DIAGNOSIS — D22.60 MULTIPLE BENIGN NEVI OF UPPER AND LOWER EXTREMITIES, AND TRUNK: ICD-10-CM

## 2017-03-01 DIAGNOSIS — D18.01 CHERRY ANGIOMA: ICD-10-CM

## 2017-03-01 DIAGNOSIS — L70.0 ACNE VULGARIS: Primary | ICD-10-CM

## 2017-03-01 DIAGNOSIS — L82.1 SK (SEBORRHEIC KERATOSIS): ICD-10-CM

## 2017-03-01 DIAGNOSIS — D23.71 DERMATOFIBROMA OF LOWER LEG, RIGHT: ICD-10-CM

## 2017-03-01 DIAGNOSIS — L72.0 EIC (EPIDERMAL INCLUSION CYST): ICD-10-CM

## 2017-03-01 DIAGNOSIS — L11.1 GROVER'S DISEASE: ICD-10-CM

## 2017-03-01 DIAGNOSIS — D23.9 CELLULAR BLUE NEVUS: ICD-10-CM

## 2017-03-01 DIAGNOSIS — D22.5 MULTIPLE BENIGN NEVI OF UPPER AND LOWER EXTREMITIES, AND TRUNK: ICD-10-CM

## 2017-03-01 PROCEDURE — 99203 OFFICE O/P NEW LOW 30 MIN: CPT | Performed by: DERMATOLOGY

## 2017-03-01 RX ORDER — ADAPALENE 45 G/G
GEL TOPICAL
Qty: 135 G | Refills: 3 | Status: SHIPPED | OUTPATIENT
Start: 2017-03-01 | End: 2018-01-01

## 2017-03-01 ASSESSMENT — PAIN SCALES - GENERAL: PAINLEVEL: NO PAIN (0)

## 2017-03-01 NOTE — PATIENT INSTRUCTIONS
Differin 0.1% gel is available over the counter now. YOu can definitely find it online on [employer], [employer], [employer]. IF the insurance company pushes back, it is available online.

## 2017-03-01 NOTE — MR AVS SNAPSHOT
After Visit Summary   3/1/2017    Jimmy Patrick    MRN: [id number]           Patient Information     Date Of Birth          1950        Visit Information        Provider Department      3/1/2017 3:00 PM Paulo Tellez MD [facility]        Today's Diagnoses     Acne vulgaris    -  1    Cherry angioma        Sean's disease        SK (seborrheic keratosis)        EIC (epidermal inclusion cyst)        Multiple benign nevi of upper and lower extremities, and trunk        Cellular blue nevus          Care Instructions    Differin 0.1% gel is available over the counter now. YOu can definitely find it online on [employer], [employer], [employer]. IF the insurance company pushes back, it is available online.        Follow-ups after your visit        Your next 10 appointments already scheduled     Mar 23, 2017 10:30 AM CDT   (Arrive by 10:15 AM)   Return Movement Disorder with Paulo Saravia MD   [facility] Neurology ([facility])    [address] [place] [address] [phone]            Mar 23, 2017 11:00 AM CDT   LAB with  LAB   [facility] Lab ([facility])    [address] [place] [phone]           Patient must bring picture ID.  Patient should be prepared to give a urine specimen  Please do not eat 10-12 hours before your appointment if you are coming in fasting for labs on lipids, cholesterol, or glucose (sugar).  Pregnant women should follow their Care Team instructions. Water with medications is okay. Do not drink coffee or other fluids.   If you have concerns about taking  your medications, please ask at office or if scheduling via [employer], send a message by clicking on Secure Messaging, Message Your Care Team.            Mar 23, 2017 12:00 PM CDT   Infusion 60 with  SPEC INFUSION, UC 50 ATC   [facility] Specialty and  Procedure ([facility])    [address] [place] [address]  2nd [facility] [phone]            Apr 05, 2017  8:00 AM CDT   Return Visit with Clint Gutiérrez MD   [facility] ([facility])    [id number] [place] [phone]   [phone]            Apr 20, 2017  9:30 AM CDT   (Arrive by 9:15 AM)   Return Movement Disorder with Paulo Saravia MD   [facility] Neurology ([facility])    [address] [place] [phone]            Apr 20, 2017 10:30 AM CDT   (Arrive by 10:15 AM)   ecg with  CV EKG   [facility] ([facility])    [address] [place] [address]  [phone]               Apr 20, 2017 11:00 AM CDT   LAB with  LAB   [facility] Lab ([facility])    [address] [place] [phone]           Patient must bring picture ID.  Patient should be prepared to give a urine specimen  Please do not eat 10-12 hours before your appointment if you are coming in fasting for labs on lipids, cholesterol, or glucose (sugar).  Pregnant women should follow their Care Team instructions. Water with medications is okay. Do not drink coffee or other fluids.   If you have concerns about taking  your medications, please ask at office or if scheduling via [employer], send a message by clicking on Secure Messaging, Message Your Care Team.            Apr 20, 2017 12:00 PM CDT   Infusion 60 with UC SPEC INFUSION, UC 50 ATC   [facility] ([facility])    [address] [place] [phone]              Who to contact     If you have questions or need follow up information about today's clinic visit or your schedule please contact [facility] directly at  [phone].  Normal or non-critical lab and imaging results will be communicated to you by [employer], letter or phone within 4 business days after the clinic has received the results. If you do not hear from us within 7 days, please contact the clinic through [employer] or phone. If you have a critical or abnormal lab result, we will notify you by phone as soon as possible.  Submit refill requests through eReceipts or call your pharmacy and they will forward the refill request to us. Please allow 3 business days for your refill to be completed.          Additional Information About Your Visit        [employer] Information     eReceipts gives you secure access to your electronic health record. If you see a primary care provider, you can also send messages to your care team and make appointments. If you have questions, please call your primary care clinic.  If you do not have a primary care provider, please call [phone] and they will assist you.      eReceipts is an electronic gateway that provides easy, online access to your medical records. With eReceipts, you can request a clinic appointment, read your test results, renew a prescription or communicate with your care team.     To access your existing account, please contact your [facility] or call [phone] for assistance.        Care EveryWhere ID     This is your Care EveryWhere ID. This could be used by other organizations to access your [facility] medical records  [id number]         Blood Pressure from Last 3 Encounters:   02/23/17 130/72   02/21/17 130/83   01/26/17 161/82    Weight from Last 3 Encounters:   02/21/17 195 lb (88.5 kg)   01/26/17 195 lb (88.5 kg)   12/19/16 194 lb 4.8 oz (88.1 kg)              Today, you had the following     No orders found for display         Today's Medication Changes          These changes are accurate as of: 3/1/17  3:36 PM.  If you have any questions, ask your nurse or doctor.               These  medicines have changed or have updated prescriptions.        Dose/Directions    * adapalene 0.1 % gel   Commonly known as:  DIFFERIN   This may have changed:  Another medication with the same name was added. Make sure you understand how and when to take each.        As per patient.   Quantity:  45 g   Refills:  11       * adapalene 0.1 % gel   Commonly known as:  DIFFERIN   This may have changed:  You were already taking a medication with the same name, and this prescription was added. Make sure you understand how and when to take each.   Used for:  Acne vulgaris        Apply to the face at night.   Quantity:  135 g   Refills:  3       * Notice:  This list has 2 medication(s) that are the same as other medications prescribed for you. Read the directions carefully, and ask your doctor or other care provider to review them with you.         Where to get your medicines      Call your pharmacy to confirm that your medication is ready for pickup. It may take up to 24 hours for them to receive the prescription. If the prescription is not ready within 3 business days, please contact your clinic or your provider.     We will let you know when these medications are ready. If you don't hear back within 3 business days, please contact us.     adapalene 0.1 % gel                Primary Care Provider    [facility]       No address on file        Thank you!     Thank you for choosing [facility]  for your care. Our goal is always to provide you with excellent care. Hearing back from our patients is one way we can continue to improve our services. Please take a few minutes to complete the written survey that you may receive in the mail after your visit with us. Thank you!             Your Updated Medication List - Protect others around you: Learn how to safely use, store and throw away your medicines at www.disposemymeds.org.          This list is accurate as of: 3/1/17  3:36 PM.  Always  use your most recent med list.                   Brand Name Dispense Instructions for use    acetaminophen-caffeine 500-65 MG Tabs    EXCEDRIN TENSION HEADACHE     Take 1 tablet by mouth every 6 hours as needed for mild pain       * adapalene 0.1 % gel    DIFFERIN    45 g    As per patient.       * adapalene 0.1 % gel    DIFFERIN    135 g    Apply to the face at night.       amantadine 100 MG capsule    SYMMETREL    270 capsule    Take 1 capsule (100 mg) by mouth 3 times daily 6am,11am and 4pm       BABY ASPIRIN 81 MG chewable tablet   Generic drug:  aspirin      Take 81 mg by mouth daily       BENICAR 20 MG tablet   Generic drug:  olmesartan     90 tablet    Take 2 tablets (40 mg) by mouth daily       CENTRUM SILVER per tablet     30 tablet    Take 1 tablet by mouth daily       COMPRESSION STOCKINGS     4 each    2 each daily       EXELON 4.6 MG/24HR 24 hr patch   Generic drug:  rivastigmine     90 patch    Place 1 patch onto the skin daily       nortriptyline 10 MG capsule    PAMELOR    270 capsule    Take 3 capsules (30 mg) by mouth At Bedtime       Vitamin D-3 Super Strength 2000 UNITS tablet   Generic drug:  cholecalciferol      Take 1 tablet by mouth       ZOLMitriptan 5 MG tablet    ZOMIG    30 tablet    Take 1 tablet (5 mg) by mouth at onset of headache for migraine       * Notice:  This list has 2 medication(s) that are the same as other medications prescribed for you. Read the directions carefully, and ask your doctor or other care provider to review them with you.

## 2017-03-01 NOTE — PROGRESS NOTES
[facility] Dermatology Note      Dermatology Problem List:  1. Acne vulgaris: previously prescribed. Unclear if insurance will cover. 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    Last TBSE: 3/1/17    Encounter Date: Mar 1, 2017    CC:  Chief Complaint   Patient presents with     Derm Problem     skin check, 2-3 bumps on back, and left forearm and acne renew adaplene script          History of Present Illness:  Mr. Jimmy Patrick is a 66 year old male who presents for evaluation of lump on back and acne. Pt presents with his wife. Today, the pt reports they would like refill of Adapalene 0.1% gel. Pt uses the adapalene BID. He is concerned for a few moles on his back. No additional skin concerns.     Pt had a hx of [place]'s [place] state. He got a topical medicine and the rash cleared. Pt says he doesn't have any of those issues now. He has a hx of Progressive Suprnuclear Palsy (PSP).    Pt previously seen Dr. Florian See once but changed insurances so they changed derm doctors.    Past Medical History:   Patient Active Problem List   Diagnosis     Transient paralysis of limb     Migraine without aura     Speech disturbance     PSP (progressive supranuclear palsy) (H)     Fluttering heart     Leg swelling     Varicose veins     Headache     Memory loss     Urinary urgency     Blurred vision     Double vision     Tinnitus     Sinus disorder     Epistaxis     DISK (aka Displacement of intervertebral disc, site unspecified, without myelopathy)     HTN (hypertension)     Impairment of balance     Signs and symptoms involving cognition     Fatigue     Migraine without status migrainosus, not intractable     Palpitations     Parkinsonism (H)     Progressive supranuclear ophthalmoplegia (H)     Speech dysfunction     Past Medical History   Diagnosis Date     Blurred vision 8/11/2014      DISK (aka Displacement of intervertebral disc, site unspecified, without myelopathy) 8/11/2014     Displacement of cervical intervertebral disc without myelopathy (HERNIATED DISK) 8/11/2014     Double vision 8/11/2014     Epistaxis 8/11/2014     Fluttering heart 8/11/2014     Headache 8/11/2014     Leg swelling 8/11/2014     Memory loss 8/11/2014     PSP (progressive supranuclear palsy) (H) 8/11/2014     Sinus disorder 8/11/2014     Tinnitus 8/11/2014     Urinary urgency 8/11/2014     Varicose veins 8/11/2014     Past Surgical History   Procedure Laterality Date     H ablation atrial flutter         Social History:  The patient is retired. The patient denies use of tanning beds. He denies consumption of alcoholic beverages and is a nonsmoker.     Family History:  There is no family history of skin cancer. There is a family hx of eczema and Hay fever.     Medications:  Current Outpatient Prescriptions   Medication Sig Dispense Refill     amantadine (SYMMETREL) 100 MG capsule Take 1 capsule (100 mg) by mouth 3 times daily 6am,11am and 4pm 270 capsule 3     ZOLMitriptan (ZOMIG) 5 MG tablet Take 1 tablet (5 mg) by mouth at onset of headache for migraine 30 tablet 5     rivastigmine (EXELON) 4.6 MG/24HR 24 hr patch Place 1 patch onto the skin daily 90 patch 3     olmesartan (BENICAR) 20 MG tablet Take 2 tablets (40 mg) by mouth daily 90 tablet 3     adapalene (DIFFERIN) 0.1 % gel As per patient. 45 g 11     nortriptyline (PAMELOR) 10 MG capsule Take 3 capsules (30 mg) by mouth At Bedtime 270 capsule 3     acetaminophen-caffeine (EXCEDRIN TENSION HEADACHE) 500-65 MG TABS Take 1 tablet by mouth every 6 hours as needed for mild pain       COMPRESSION STOCKINGS 2 each daily 4 each 3     Multiple Vitamins-Minerals (CENTRUM SILVER) per tablet Take 1 tablet by mouth daily 30 tablet      aspirin (BABY ASPIRIN) 81 MG chewable tablet Take 81 mg by mouth daily        cholecalciferol (VITAMIN D-3 SUPER STRENGTH) 2000 UNITS tablet  Take 1 tablet by mouth          Allergies   Allergen Reactions     Other [Seasonal Allergies]      environmental     Codeine Other (See Comments) and Rash     GI upset       Review of Systems:  -Skin/Heme New Pt: The patient denies frequent sun exposure. The patient denies excessive scarring or problems healing except as per HPI. The patient denies excessive bleeding. Pt had a mole removed.   -Constitutional: The patient is otherwise feeling well.     Physical exam:  Vitals: There were no vitals taken for this visit.  GEN: This is a well-nourished, well developed male in no acute distress  NEURO: Alert and oriented  PSYCH: in a pleasant mood, appropriate affect  SKIN: Total skin excluding the undergarment areas was performed. The exam included the head/face, neck, both arms, chest, back, abdomen, both legs, digits and/or nails.   -Multiple regular brown pigmented macules and papules are identified on the back.   -There are bright red dome shaped papules scattered on the back.   -There are waxy stuck on tan to brown papules on the back and arms.  -Cyst on the posterior neck about 1 cm in diameter and on the back.   -Scattered pink minute papule on upper chest and upper back.   -There are b/l bronze colored lacy patches on the legs   -On the right distal popliteal flavia is a firm pink papule with positive dimple sign.   -Blue nevus on the left lower eyelid   -No other lesions of concern on areas examined.   Extremities: 1+ pitting edema on bilateral legs     Impression/Plan:  1. Acne vulgaris: pt is pretty clear today. Warned that new insurance may not cover retinoid. Differin 0.1% gel is now OTC. Will order but likely won't be covered. Pt should use it qhs instead of BID.    Continue Adapalene (DIFFERIN) 0.1% gel qhs.  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 Not symptomatic. No tx requested.  3. Cysts of skin back and posterior neck: Benign nature, no treatment   4. Benign Neoplasms including the seborrheic keratosis,  Cherry angiomas, Dermatofibroma     Benign nature was discussed. No further intervention required at this time.   5. Blue nevus on the left lower eyelid: Benign nature discussed.   6. Multiple clinically benign nevi on the back    Benign nature was discussed. No further intervention required at this time.   7. Xerosis of skin    Discussed consistent and daily use of gentle moisturizers, Preferable cream rather than lotion.       Follow-up in 2 years, earlier for new or changing lesions.       Staff Involved:  Scribe/Staff      Scribe Disclosure:   I, Anastacio Webb, am serving as a scribe to document services personally performed by Dr. Paulo Tellez, based on data collection and the provider's statements to me.     Provider Disclosure:   I have reviewed the documentation recorded by the scribe and have edited it as needed. I have personally performed the services documented here and the documentation accurately represents those services and the decisions made by me.     Paulo Tellez MD, MS    Department of Dermatology  [facility]: Phone: [phone], Fax:[phone]  [facility]: Phone: [phone], Fax: [phone]

## 2017-03-01 NOTE — LETTER
3/1/2017       RE: Jimmy Patrick  [address] [address]     Dear Colleague,    Thank you for referring your patient, Jimmy Patrick, to the [facility]. Please see a copy of my visit note below.    [facility] Dermatology Note      Dermatology Problem List:  1. Acne vulgaris: previously prescribed. Unclear if insurance will cover. 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    Last TBSE: 3/1/17    Encounter Date: Mar 1, 2017    CC:  Chief Complaint   Patient presents with     Derm Problem     skin check, 2-3 bumps on back, and left forearm and acne renew adaplene script          History of Present Illness:  Mr. Jimmy Patrick is a 66 year old male who presents for evaluation of lump on back and acne. Pt presents with his wife. Today, the pt reports they would like refill of Adapalene 0.1% gel. Pt uses the adapalene BID. He is concerned for a few moles on his back. No additional skin concerns.     Pt had a hx of [place]'s diagnosed previously out a state. He got a topical medicine and the rash cleared. Pt says he doesn't have any of those issues now. He has a hx of Progressive Suprnuclear Palsy (PSP).    Pt previously seen Dr. Florian See once but changed insurances so they changed derm doctors.    Past Medical History:   Patient Active Problem List   Diagnosis     Transient paralysis of limb     Migraine without aura     Speech disturbance     PSP (progressive supranuclear palsy) (H)     Fluttering heart     Leg swelling     Varicose veins     Headache     Memory loss     Urinary urgency     Blurred vision     Double vision     Tinnitus     Sinus disorder     Epistaxis     DISK (aka Displacement of intervertebral disc, site unspecified, without myelopathy)     HTN (hypertension)     Impairment of balance      Signs and symptoms involving cognition     Fatigue     Migraine without status migrainosus, not intractable     Palpitations     Parkinsonism (H)     Progressive supranuclear ophthalmoplegia (H)     Speech dysfunction     Past Medical History   Diagnosis Date     Blurred vision 8/11/2014     DISK (aka Displacement of intervertebral disc, site unspecified, without myelopathy) 8/11/2014     Displacement of cervical intervertebral disc without myelopathy (HERNIATED DISK) 8/11/2014     Double vision 8/11/2014     Epistaxis 8/11/2014     Fluttering heart 8/11/2014     Headache 8/11/2014     Leg swelling 8/11/2014     Memory loss 8/11/2014     PSP (progressive supranuclear palsy) (H) 8/11/2014     Sinus disorder 8/11/2014     Tinnitus 8/11/2014     Urinary urgency 8/11/2014     Varicose veins 8/11/2014     Past Surgical History   Procedure Laterality Date     H ablation atrial flutter         Social History:  The patient is retired. The patient denies use of tanning beds. He denies consumption of alcoholic beverages and is a nonsmoker.     Family History:  There is no family history of skin cancer. There is a family hx of eczema and Hay fever.     Medications:  Current Outpatient Prescriptions   Medication Sig Dispense Refill     amantadine (SYMMETREL) 100 MG capsule Take 1 capsule (100 mg) by mouth 3 times daily 6am,11am and 4pm 270 capsule 3     ZOLMitriptan (ZOMIG) 5 MG tablet Take 1 tablet (5 mg) by mouth at onset of headache for migraine 30 tablet 5     rivastigmine (EXELON) 4.6 MG/24HR 24 hr patch Place 1 patch onto the skin daily 90 patch 3     olmesartan (BENICAR) 20 MG tablet Take 2 tablets (40 mg) by mouth daily 90 tablet 3     adapalene (DIFFERIN) 0.1 % gel As per patient. 45 g 11     nortriptyline (PAMELOR) 10 MG capsule Take 3 capsules (30 mg) by mouth At Bedtime 270 capsule 3     acetaminophen-caffeine (EXCEDRIN TENSION HEADACHE) 500-65 MG TABS Take 1 tablet by mouth every 6 hours as needed for mild pain        COMPRESSION STOCKINGS 2 each daily 4 each 3     Multiple Vitamins-Minerals (CENTRUM SILVER) per tablet Take 1 tablet by mouth daily 30 tablet      aspirin (BABY ASPIRIN) 81 MG chewable tablet Take 81 mg by mouth daily        cholecalciferol (VITAMIN D-3 SUPER STRENGTH) 2000 UNITS tablet Take 1 tablet by mouth          Allergies   Allergen Reactions     Other [Seasonal Allergies]      environmental     Codeine Other (See Comments) and Rash     GI upset       Review of Systems:  -Skin/Heme New Pt: The patient denies frequent sun exposure. The patient denies excessive scarring or problems healing except as per HPI. The patient denies excessive bleeding. Pt had a mole removed.   -Constitutional: The patient is otherwise feeling well.     Physical exam:  Vitals: There were no vitals taken for this visit.  GEN: This is a well-nourished, well developed male in no acute distress  NEURO: Alert and oriented  PSYCH: in a pleasant mood, appropriate affect  SKIN: Total skin excluding the undergarment areas was performed. The exam included the head/face, neck, both arms, chest, back, abdomen, both legs, digits and/or nails.   -Multiple regular brown pigmented macules and papules are identified on the back.   -There are bright red dome shaped papules scattered on the back.   -There are waxy stuck on tan to brown papules on the back and arms.  -Cyst on the posterior neck about 1 cm in diameter and on the back.   -Scattered pink minute papule on upper chest and upper back.   -There are b/l bronze colored lacy patches on the legs   -On the right distal popliteal flavia is a firm pink papule with positive dimple sign.   -Blue nevus on the left lower eyelid   -No other lesions of concern on areas examined.   Extremities: 1+ pitting edema on bilateral legs     Impression/Plan:  1. Acne vulgaris: pt is pretty clear today. Warned that new insurance may not cover retinoid. Differin 0.1% gel is now OTC. Will order but likely won't be  covered. Pt should use it qhs instead of BID.    Continue Adapalene (DIFFERIN) 0.1% gel qhs.  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 Not symptomatic. No tx requested.  3. Cysts of skin back and posterior neck: Benign nature, no treatment   4. Benign Neoplasms including the seborrheic keratosis, Cherry angiomas, Dermatofibroma     Benign nature was discussed. No further intervention required at this time.   5. Blue nevus on the left lower eyelid: Benign nature discussed.   6. Multiple clinically benign nevi on the back    Benign nature was discussed. No further intervention required at this time.   7. Xerosis of skin    Discussed consistent and daily use of gentle moisturizers, Preferable cream rather than lotion.       Follow-up in 2 years, earlier for new or changing lesions.       Staff Involved:  Scribe/Staff      Scribe Disclosure:   I, Anastacio Webb, am serving as a scribe to document services personally performed by Dr. Paulo Tellez, based on data collection and the provider's statements to me.     Provider Disclosure:   I have reviewed the documentation recorded by the scribe and have edited it as needed. I have personally performed the services documented here and the documentation accurately represents those services and the decisions made by me.     Paulo Tellez MD, MS    Department of Dermatology  [facility]: Phone: [phone], Fax:[phone]  [facility]: Phone: [phone], Fax: [phone]

## 2017-03-01 NOTE — NURSING NOTE
Dermatology Rooming Note    Jimmy Patrick's goals for this visit include:   Chief Complaint   Patient presents with     Derm Problem     skin check, 2-3 bumps on back, and left forearm and acne renew adaplene script        Is a scribe okay for this visit:YES    Are records needed for this visit(If yes, obtain release of information): No,      Vitals: There were no vitals taken for this visit.    Referring Provider:  Referred Self, MD  No address on file

## 2017-03-23 ENCOUNTER — INFUSION THERAPY VISIT (OUTPATIENT)
Dept: INFUSION THERAPY | Facility: CLINIC | Age: 67
End: 2017-03-23
Attending: PSYCHIATRY & NEUROLOGY
Payer: COMMERCIAL

## 2017-03-23 ENCOUNTER — OFFICE VISIT (OUTPATIENT)
Dept: NEUROLOGY | Facility: CLINIC | Age: 67
End: 2017-03-23

## 2017-03-23 VITALS
DIASTOLIC BLOOD PRESSURE: 77 MMHG | HEART RATE: 85 BPM | SYSTOLIC BLOOD PRESSURE: 134 MMHG | OXYGEN SATURATION: 99 % | TEMPERATURE: 96.2 F | RESPIRATION RATE: 18 BRPM

## 2017-03-23 DIAGNOSIS — F80.0 ARTICULATION DISORDER: ICD-10-CM

## 2017-03-23 DIAGNOSIS — G23.1 PSP (PROGRESSIVE SUPRANUCLEAR PALSY) (H): Primary | ICD-10-CM

## 2017-03-23 PROCEDURE — 96365 THER/PROPH/DIAG IV INF INIT: CPT

## 2017-03-23 RX ADMIN — Medication 2100 MG: at 12:27

## 2017-03-23 NOTE — PROGRESS NOTES
Nursing Note  Jimmy Patrick [facility] for:   Chief Complaint   Patient presents with     Infusion     study [id number] (IDS# [id number]) IV infusion 2,100 mg      During today's [facility] appointment, orders from Dr. Rani Bates  were completed.  Frequency: monthly    Progress note:  Patient identification verified by name and date of birth.  Assessment completed.  Vitals recorded in Doc Flowsheets.  Patient was provided with education regarding infusion and possible side effects.  Patient verbalized understanding.      needed: No  Premedications: were not ordered.  Infusion Rates: infusion given over approximately 1 hour.  Approximate appt length: 1.25 hours   Labs: were not drawn this visit  Vascular access: peripheral IV placed today.  Treatment Conditions: None  Patient tolerated infusion: well.    Discharge Plan:   Follow up plan of care with: ongoing infusions at [facility].  Discharge instructions were reviewed with patient.  Patient/representative verbalized understanding of discharge instructions and all questions answered.  Patient discharged from [facility] in stable condition.    Trina Barroso RN    Administrations This Visit     study [id number] (IDS# [id number]) IV infusion 2,100 mg 210 mL     Admin Date Action Dose Rate Route Administered By          03/23/2017 New Bag 2100 mg 210 mL/hr Intravenous Trina Barroso RN                         /80  Pulse 86  Temp 96.2  F (35.7  C) (Oral)  Resp 18  SpO2 99%

## 2017-03-23 NOTE — PROGRESS NOTES
Research visit.   Answers for HPI/ROS submitted by the patient on 3/22/2017   General Symptoms: No  Skin Symptoms: No  HENT Symptoms: No  EYE SYMPTOMS: No  HEART SYMPTOMS: No  LUNG SYMPTOMS: No  INTESTINAL SYMPTOMS: No  URINARY SYMPTOMS: No  REPRODUCTIVE SYMPTOMS: No  SKELETAL SYMPTOMS: No  BLOOD SYMPTOMS: No  NERVOUS SYSTEM SYMPTOMS: No  MENTAL HEALTH SYMPTOMS: No

## 2017-03-23 NOTE — MR AVS SNAPSHOT
After Visit Summary   3/23/2017    Jimmy Patrick    MRN: [id number]           Patient Information     Date Of Birth          1950        Visit Information        Provider Department      3/23/2017 12:00 PM UC 50 ATC; UC SPEC INFUSION [facility]        Today's Diagnoses     PSP (progressive supranuclear palsy) (H)    -  1       Follow-ups after your visit        Your next 10 appointments already scheduled     Apr 05, 2017  8:00 AM CDT   Return Visit with Clint Gutiérrez MD   [facility]    [address] [place] [id number]   [phone]            Apr 20, 2017  9:30 AM CDT   (Arrive by 9:15 AM)   Return Movement Disorder with Paulo Saravia MD   [facility])    [address] [place] [address] [phone]            Apr 20, 2017 10:30 AM CDT   (Arrive by 10:15 AM)   ecg with  CV EKG   [facility] ([facility])    [address] [place] [address]  [phone]               Apr 20, 2017 11:00 AM CDT   LAB with  LAB   [facility] ([facility])    [address] [place] [id number] [phone]           Patient must bring picture ID.  Patient should be prepared to give a urine specimen  Please do not eat 10-12 hours before your appointment if you are coming in fasting for labs on lipids, cholesterol, or glucose (sugar).  Pregnant women should follow their Care Team instructions. Water with medications is okay. Do not drink coffee or other fluids.   If you have concerns about taking  your medications, please ask at office or if scheduling via [employer], send a message by clicking on Secure Messaging, Message Your Care Team.            Apr 20, 2017 12:00 PM CDT   Infusion 60 with UC SPEC INFUSION, UC 50 ATC   [facility] ([facility])    [address]  2nd [address]  [place] [address]   [phone]            May 16, 2017 11:00 AM CDT   Infusion 60 with UC SPEC INFUSION, UC 49 ATC   [facility] Specialty and Procedure ([facility])    [address]  [address]nd Floor  [place] [address]   [phone]            Wallace 15, 2017 10:30 AM CDT   (Arrive by 10:15 AM)   Return Movement Disorder with Paulo Saravia MD   [facility] ([facility])    [address]  3rd Floor  [place] [address]   [phone]            Wallace 15, 2017 11:00 AM CDT   Infusion 60 with UC SPEC INFUSION   [facility] Procedure ([facility])    [address]  [address] [facility] [address]   [phone]              Who to contact     If you have questions or need follow up information about today's clinic visit or your schedule please contact [facility] SPECIALTY AND PROCEDURE directly at [phone].  Normal or non-critical lab and imaging results will be communicated to you by MyChart, letter or phone within 4 business days after the clinic has received the results. If you do not hear from us within 7 days, please contact the clinic through [email or web address] or phone. If you have a critical or abnormal lab result, we will notify you by phone as soon as possible.  Submit refill requests through [employer] or call your pharmacy and they will forward the refill request to us. Please allow 3 business days for your refill to be completed.          Additional Information About Your Visit        [email or web address] Information     [employer] gives you secure access to your electronic health record. If you see a primary care provider, you can also send messages to your care team and make appointments. If you have questions,  please call your primary care clinic.  If you do not have a primary care provider, please call [phone] and they will assist you.        Care EveryWhere ID     This is your Care EveryWhere ID. This could be used by other organizations to access your [facility] medical records  [id number]        Your Vitals Were     Pulse Temperature Respirations Pulse Oximetry          85 96.2  F (35.7  C) (Oral) 18 99%         Blood Pressure from Last 3 Encounters:   03/23/17 134/77   02/23/17 130/72   02/21/17 130/83    Weight from Last 3 Encounters:   02/21/17 88.5 kg (195 lb)   01/26/17 88.5 kg (195 lb)   12/19/16 88.1 kg (194 lb 4.8 oz)              We Performed the Following     MD Instruction for Therapy Plan        Primary Care Provider    [facility]       No address on file        Thank you!     Thank you for choosing [facility]ED[facility]  for your care. Our goal is always to provide you with excellent care. Hearing back from our patients is one way we can continue to improve our services. Please take a few minutes to complete the written survey that you may receive in the mail after your visit with us. Thank you!             Your Updated Medication List - Protect others around you: Learn how to safely use, store and throw away your medicines at www.disposemymeds.org.          This list is accurate as of: 3/23/17  3:11 PM.  Always use your most recent med list.                   Brand Name Dispense Instructions for use    acetaminophen-caffeine 500-65 MG Tabs    EXCEDRIN TENSION HEADACHE     Take 1 tablet by mouth every 6 hours as needed for mild pain       * adapalene 0.1 % gel    DIFFERIN    45 g    As per patient.       * adapalene 0.1 % gel    DIFFERIN    135 g    Apply to the face at night.       amantadine 100 MG capsule    SYMMETREL    270 capsule    Take 1 capsule (100 mg) by mouth 3 times daily 6am,11am and 4pm       BABY ASPIRIN 81 MG chewable  tablet   Generic drug:  aspirin      Take 81 mg by mouth daily       BENICAR 20 MG tablet   Generic drug:  olmesartan     90 tablet    Take 2 tablets (40 mg) by mouth daily       CENTRUM SILVER per tablet     30 tablet    Take 1 tablet by mouth daily       COMPRESSION STOCKINGS     4 each    2 each daily       EXELON 4.6 MG/24HR 24 hr patch   Generic drug:  rivastigmine     90 patch    Place 1 patch onto the skin daily       nortriptyline 10 MG capsule    PAMELOR    270 capsule    Take 3 capsules (30 mg) by mouth At Bedtime       Vitamin D-3 Super Strength 2000 UNITS tablet   Generic drug:  cholecalciferol      Take 1 tablet by mouth       ZOLMitriptan 5 MG tablet    ZOMIG    30 tablet    Take 1 tablet (5 mg) by mouth at onset of headache for migraine       * Notice:  This list has 2 medication(s) that are the same as other medications prescribed for you. Read the directions carefully, and ask your doctor or other care provider to review them with you.

## 2017-03-23 NOTE — MR AVS SNAPSHOT
After Visit Summary   3/23/2017    Jimmy Patrick    MRN: [id number]           Patient Information     Date Of Birth          1950        Visit Information        Provider Department      3/23/2017 10:30 AM Paulo Saravia MD [facility]        Today's Diagnoses     PSP (progressive supranuclear palsy) (H)    -  1       Follow-ups after your visit        Follow-up notes from your care team     Return if symptoms worsen or fail to improve.      Your next 10 appointments already scheduled     Mar 23, 2017 10:30 AM CDT   (Arrive by 10:15 AM)   Return Movement Disorder with Paulo Saravia MD   [facility] ([facility])    [address] [place] [address]  3rd [facility] [id number] [phone]            Mar 23, 2017 11:00 AM CDT   LAB with UC LAB   [facility] ([facility])    [address] [place] [address] [id number] [phone]           Patient must bring picture ID.  Patient should be prepared to give a urine specimen  Please do not eat 10-12 hours before your appointment if you are coming in fasting for labs on lipids, cholesterol, or glucose (sugar).  Pregnant women should follow their Care Team instructions. Water with medications is okay. Do not drink coffee or other fluids.   If you have concerns about taking  your medications, please ask at office or if scheduling via [employer], send a message by clicking on Secure Messaging, Message Your Care Team.            Mar 23, 2017 12:00 PM CDT   Infusion 60 with UC SPEC INFUSION, UC 50 ATC   [facility] ([facility])    [address] [place] [address]  2nd [facility] [id number] [phone]            Apr 05, 2017  8:00 AM CDT   Return Visit with Clint Gutiérrez MD   [facility] ([facility])    [id number] [place] [phone]    [phone]            Apr 20, 2017  9:30 AM CDT   (Arrive by 9:15 AM)   Return Movement Disorder with Paulo Saravia MD   [facility] ([facility])    [address] [place] [phone]            Apr 20, 2017 10:30 AM CDT   (Arrive by 10:15 AM)   ecg with  CV EKG   [facility] ([facility])    [address] [place] [address]  [phone]               Apr 20, 2017 11:00 AM CDT   LAB with  LAB   [facility] ([facility])    [address] [place] [id number] [phone]           Patient must bring picture ID.  Patient should be prepared to give a urine specimen  Please do not eat 10-12 hours before your appointment if you are coming in fasting for labs on lipids, cholesterol, or glucose (sugar).  Pregnant women should follow their Care Team instructions. Water with medications is okay. Do not drink coffee or other fluids.   If you have concerns about taking  your medications, please ask at office or if scheduling via [employer], send a message by clicking on Secure Messaging, Message Your Care Team.            Apr 20, 2017 12:00 PM CDT   Infusion 60 with  SPEC INFUSION, UC 50 ATC   [facility] ([facility])    [address] [place] [address] [phone]              Who to contact     Please call your clinic at [phone] to:    Ask questions about your health    Make or cancel appointments    Discuss your medicines    Learn about your test results    Speak to your doctor   If you have compliments or concerns about an experience at your clinic, or if you wish to file a complaint, please contact [facility] Patient Relations at [phone] or email us at [email or web address]         Additional Information About Your  Visit        [employer] Information     NER[employer] gives you secure access to your electronic health record. If you see a primary care provider, you can also send messages to your care team and make appointments. If you have questions, please call your primary care clinic.  If you do not have a primary care provider, please call [phone] and they will assist you.      NERI is an electronic gateway that provides easy, online access to your medical records. With [employer], you can request a clinic appointment, read your test results, renew a prescription or communicate with your care team.     To access your existing account, please contact your [facility] or call [phone] for assistance.        Care EveryWhere ID     This is your Care EveryWhere ID. This could be used by other organizations to access your [place] medical records  [id number]         Blood Pressure from Last 3 Encounters:   02/23/17 130/72   02/21/17 130/83   01/26/17 161/82    Weight from Last 3 Encounters:   02/21/17 88.5 kg (195 lb)   01/26/17 88.5 kg (195 lb)   12/19/16 88.1 kg (194 lb 4.8 oz)              Today, you had the following     No orders found for display       Primary Care Provider    [facility]       No address on file        Thank you!     Thank you for choosing [facility]  for your care. Our goal is always to provide you with excellent care. Hearing back from our patients is one way we can continue to improve our services. Please take a few minutes to complete the written survey that you may receive in the mail after your visit with us. Thank you!             Your Updated Medication List - Protect others around you: Learn how to safely use, store and throw away your medicines at www.disposemymeds.org.          This list is accurate as of: 3/23/17 10:27 AM.  Always use your most recent med list.                   Brand Name Dispense Instructions for use     acetaminophen-caffeine 500-65 MG Tabs    EXCEDRIN TENSION HEADACHE     Take 1 tablet by mouth every 6 hours as needed for mild pain       * adapalene 0.1 % gel    DIFFERIN    45 g    As per patient.       * adapalene 0.1 % gel    DIFFERIN    135 g    Apply to the face at night.       amantadine 100 MG capsule    SYMMETREL    270 capsule    Take 1 capsule (100 mg) by mouth 3 times daily 6am,11am and 4pm       BABY ASPIRIN 81 MG chewable tablet   Generic drug:  aspirin      Take 81 mg by mouth daily       BENICAR 20 MG tablet   Generic drug:  olmesartan     90 tablet    Take 2 tablets (40 mg) by mouth daily       CENTRUM SILVER per tablet     30 tablet    Take 1 tablet by mouth daily       COMPRESSION STOCKINGS     4 each    2 each daily       EXELON 4.6 MG/24HR 24 hr patch   Generic drug:  rivastigmine     90 patch    Place 1 patch onto the skin daily       nortriptyline 10 MG capsule    PAMELOR    270 capsule    Take 3 capsules (30 mg) by mouth At Bedtime       Vitamin D-3 Super Strength 2000 UNITS tablet   Generic drug:  cholecalciferol      Take 1 tablet by mouth       ZOLMitriptan 5 MG tablet    ZOMIG    30 tablet    Take 1 tablet (5 mg) by mouth at onset of headache for migraine       * Notice:  This list has 2 medication(s) that are the same as other medications prescribed for you. Read the directions carefully, and ask your doctor or other care provider to review them with you.

## 2017-04-04 ENCOUNTER — TRANSFERRED RECORDS (OUTPATIENT)
Dept: HEALTH INFORMATION MANAGEMENT | Facility: CLINIC | Age: 67
End: 2017-04-04

## 2017-04-05 ENCOUNTER — OFFICE VISIT (OUTPATIENT)
Dept: CARDIOLOGY | Facility: CLINIC | Age: 67
End: 2017-04-05
Payer: COMMERCIAL

## 2017-04-05 ENCOUNTER — TELEPHONE (OUTPATIENT)
Dept: CARDIOLOGY | Facility: CLINIC | Age: 67
End: 2017-04-05

## 2017-04-05 VITALS
HEART RATE: 92 BPM | OXYGEN SATURATION: 100 % | WEIGHT: 196.5 LBS | BODY MASS INDEX: 27.41 KG/M2 | DIASTOLIC BLOOD PRESSURE: 81 MMHG | SYSTOLIC BLOOD PRESSURE: 130 MMHG

## 2017-04-05 DIAGNOSIS — I10 ESSENTIAL HYPERTENSION: Primary | ICD-10-CM

## 2017-04-05 DIAGNOSIS — R00.2 PALPITATIONS: ICD-10-CM

## 2017-04-05 DIAGNOSIS — Z86.79 S/P ABLATION OF ATRIAL FLUTTER: ICD-10-CM

## 2017-04-05 DIAGNOSIS — Z98.890 S/P ABLATION OF ATRIAL FLUTTER: ICD-10-CM

## 2017-04-05 PROCEDURE — 99214 OFFICE O/P EST MOD 30 MIN: CPT | Performed by: INTERNAL MEDICINE

## 2017-04-05 RX ORDER — OLMESARTAN MEDOXOMIL 40 MG/1
40 TABLET ORAL DAILY
Qty: 30 TABLET | Refills: 0 | Status: SHIPPED | OUTPATIENT
Start: 2017-04-05 | End: 2017-06-13 | Stop reason: DRUGHIGH

## 2017-04-05 RX ORDER — OLMESARTAN MEDOXOMIL 40 MG/1
40 TABLET ORAL DAILY
Qty: 30 TABLET | Refills: 0 | Status: SHIPPED | OUTPATIENT
Start: 2017-04-05 | End: 2017-04-05

## 2017-04-05 ASSESSMENT — PAIN SCALES - GENERAL: PAINLEVEL: NO PAIN (0)

## 2017-04-05 NOTE — MR AVS SNAPSHOT
After Visit Summary   4/5/2017    Jimmy Patrick    MRN: [id number]           Patient Information     Date Of Birth          1950        Visit Information        Provider Department      4/5/2017 8:00 AM Clint Gutiérrez MD [facility]        Today's Diagnoses     Essential hypertension    -  1      Care Instructions      The following is a summary of your office visit:    Medications started today:none    Medications stopped today:none    Medication dose change: none    Nurse contact information: Amy Reyes RN  Cardiology Care Coordinator  [phone] Phone  [phone] Fax    Appointments made today: 6 month follow up    Patient instructions:none      If you have had any blood work, imaging or other testing completed we will be in touch within 1-2 weeks regarding the results. If you have any questions, concerns or need to schedule a follow up, please contact us at [phone]. If you are needing refills please contact your pharmacy. For urgent after hour care please call the [facility] at [phone] or the [facility] at [phone] and ask to speak to the cardiologist on call.    It was a pleasure meeting with you today. Please let us know if there is anything else we can do for you so that we can be sure you are leaving completely satisfied with your care experience.     Your Cardiology Team at [facility]  RN Care Coordinator: Amy Raymond                  Follow-ups after your visit        Your next 10 appointments already scheduled     Apr 20, 2017  9:30 AM CDT   (Arrive by 9:15 AM)   Return Movement Disorder with Paulo Saravia MD   [facility] ([facility])    [address] [place] [phone]            Apr 20, 2017 10:30 AM CDT   (Arrive by 10:15 AM)   ecg with  CV EKG   [facility] ([facility])    [address] [place] [address]  [phone]               Apr 20, 2017 11:00 AM CDT   LAB with UC LAB   [facility] ([facility])    [address] [place] [phone]           Patient must bring picture ID.  Patient should be prepared to give a urine specimen  Please do not eat 10-12 hours before your appointment if you are coming in fasting for labs on lipids, cholesterol, or glucose (sugar).  Pregnant women should follow their Care Team instructions. Water with medications is okay. Do not drink coffee or other fluids.   If you have concerns about taking  your medications, please ask at office or if scheduling via [employer], send a message by clicking on Secure Messaging, Message Your Care Team.            Apr 20, 2017 12:00 PM CDT   Infusion 60 with UC SPEC INFUSION, UC 50 ATC   [facility] ([facility])    [address] [place] [phone]            May 16, 2017 11:00 AM CDT   Infusion 60 with UC SPEC INFUSION, UC 49 ATC   [facility] ([facility])    [address] [place] [phone]            Wallace 15, 2017 10:30 AM CDT   (Arrive by 10:15 AM)   Return Movement Disorder with Paulo Saravia MD   [facility] ([facility])    [address] [place] [phone]            Wallace 15, 2017 11:00 AM CDT   Infusion 60 with UC SPEC INFUSION, UC 47 ATC   [facility] ([facility])    [address] [place] [phone]              Who to contact     If you have questions or need follow up information about today's clinic visit or your schedule  please contact [facility] directly at [phone].  Normal or non-critical lab and imaging results will be communicated to you by [employer], letter or phone within 4 business days after the clinic has received the results. If you do not hear from us within 7 days, please contact the clinic through [employer] or phone. If you have a critical or abnormal lab result, we will notify you by phone as soon as possible.  Submit refill requests through [employer] or call your pharmacy and they will forward the refill request to us. Please allow 3 business days for your refill to be completed.          Additional Information About Your Visit        [employer] Information     [employer] gives you secure access to your electronic health record. If you see a primary care provider, you can also send messages to your care team and make appointments. If you have questions, please call your primary care clinic.  If you do not have a primary care provider, please call [phone] and they will assist you.      [employer] is an electronic gateway that provides easy, online access to your medical records. With [employer], you can request a clinic appointment, read your test results, renew a prescription or communicate with your care team.     To access your existing account, please contact your [facility] or call [phone] for assistance.        Care EveryWhere ID     This is your Care EveryWhere ID. This could be used by other organizations to access your [place] medical records  [id number]        Your Vitals Were     Pulse Pulse Oximetry BMI (Body Mass Index)             92 100% 27.41 kg/m2          Blood Pressure from Last 3 Encounters:   04/05/17 130/81   03/23/17 134/77   02/23/17 130/72    Weight from Last 3 Encounters:   04/05/17 89.1 kg (196 lb 8 oz)   02/21/17 88.5 kg (195 lb)   01/26/17 88.5 kg (195 lb)              Today, you had the following     No orders found for display          Today's Medication Changes          These changes are accurate as of: 4/5/17  8:34 AM.  If you have any questions, ask your nurse or doctor.               These medicines have changed or have updated prescriptions.        Dose/Directions    * BENICAR 20 MG tablet   This may have changed:  Another medication with the same name was added. Make sure you understand how and when to take each.   Used for:  PSP (progressive supranuclear palsy) (H), Migraine without aura and without status migrainosus, not intractable   Generic drug:  olmesartan   Changed by:  Clint Gutiérrez MD        Dose:  40 mg   Take 2 tablets (40 mg) by mouth daily   Quantity:  90 tablet   Refills:  3       * olmesartan 40 MG tablet   Commonly known as:  BENICAR   This may have changed:  You were already taking a medication with the same name, and this prescription was added. Make sure you understand how and when to take each.   Used for:  Essential hypertension   Changed by:  Clint Gutiérrez MD        Dose:  40 mg   Take 1 tablet (40 mg) by mouth daily   Quantity:  30 tablet   Refills:  0       * Notice:  This list has 2 medication(s) that are the same as other medications prescribed for you. Read the directions carefully, and ask your doctor or other care provider to review them with you.         Where to get your medicines      Some of these will need a paper prescription and others can be bought over the counter.  Ask your nurse if you have questions.     Bring a paper prescription for each of these medications     olmesartan 40 MG tablet                Primary Care Provider    [facility]       No address on file        Thank you!     Thank you for choosing [facility]  for your care. Our goal is always to provide you with excellent care. Hearing back from our patients is one way we can continue to improve our services. Please take a few minutes to complete the written survey that you may  receive in the mail after your visit with us. Thank you!             Your Updated Medication List - Protect others around you: Learn how to safely use, store and throw away your medicines at www.disposemymeds.org.          This list is accurate as of: 4/5/17  8:34 AM.  Always use your most recent med list.                   Brand Name Dispense Instructions for use    acetaminophen-caffeine 500-65 MG Tabs    EXCEDRIN TENSION HEADACHE     Take 1 tablet by mouth every 6 hours as needed for mild pain       adapalene 0.1 % gel    DIFFERIN    135 g    Apply to the face at night.       amantadine 100 MG capsule    SYMMETREL    270 capsule    Take 1 capsule (100 mg) by mouth 3 times daily 6am,11am and 4pm       BABY ASPIRIN 81 MG chewable tablet   Generic drug:  aspirin      Take 81 mg by mouth daily       * BENICAR 20 MG tablet   Generic drug:  olmesartan     90 tablet    Take 2 tablets (40 mg) by mouth daily       * olmesartan 40 MG tablet    BENICAR    30 tablet    Take 1 tablet (40 mg) by mouth daily       CENTRUM SILVER per tablet     30 tablet    Take 1 tablet by mouth daily       COMPRESSION STOCKINGS     4 each    2 each daily       EXELON 4.6 MG/24HR 24 hr patch   Generic drug:  rivastigmine     90 patch    Place 1 patch onto the skin daily       nortriptyline 10 MG capsule    PAMELOR    270 capsule    Take 3 capsules (30 mg) by mouth At Bedtime       Vitamin D-3 Super Strength 2000 UNITS tablet   Generic drug:  cholecalciferol      Take 1 tablet by mouth       ZOLMitriptan 5 MG tablet    ZOMIG    30 tablet    Take 1 tablet (5 mg) by mouth at onset of headache for migraine       * Notice:  This list has 2 medication(s) that are the same as other medications prescribed for you. Read the directions carefully, and ask your doctor or other care provider to review them with you.

## 2017-04-05 NOTE — NURSING NOTE
"Jimmy Patrick's goals for this visit include:   Chief Complaint   Patient presents with     RECHECK     Follow up afib       He requests these members of his care team be copied on today's visit information: PCP    [facility]    Referring Provider:  No referring provider defined for this encounter.    Chief Complaint   Patient presents with     RECHECK     Follow up afib       Initial /81 (BP Location: Left arm, Patient Position: Chair, Cuff Size: Adult Regular)  Pulse 92  Wt 89.1 kg (196 lb 8 oz)  SpO2 100%  BMI 27.41 kg/m2 Estimated body mass index is 27.41 kg/(m^2) as calculated from the following:    Height as of 2/21/17: 1.803 m (5' 11\").    Weight as of this encounter: 89.1 kg (196 lb 8 oz).  Medication Reconciliation: complete    Medication Refills: Edin Mayen, [facility]    "

## 2017-04-05 NOTE — LETTER
4/5/2017      RE: Jimmy Patrick  [address] [address]       Dear Colleague,    Thank you for the opportunity to participate in the care of your patient, Jimmy Patrick, at the [facility] at [facility]. Please see a copy of my visit note below.          Clinical Cardiac Electrophysiology    Chief Complaint: atrial flutter    HPI: I was happy to see Mr. Patrick in consultation for the above at the request of Dr. Saravia.    Mr. Patrick recently moved to [place] from [place]. He reports a history of atrial flutter treated with ablation in 2013. He had palpitations associated with the atrial flutter. He reports no history of atrial fibrillation. He was on oral anticoagulation for approximately a year and then switched to an aspirin. He reports no palpitations since the ablation.     He takes diltiazem for hypertension and Benicar, primarily for his migraines.     [id number]: he reports no significant palpitations. He feels his PSP has gotten a little worse.    [id number]: I usually see Mr. Patrick in  but due to scheduling issues he was seen in [place] today. He reports doing well from a cardiac standpoint. He has had no palpitations. He denies any recent change in his exertional abilities, no chest pain/discomfort, no unusual dyspnea on exertion, no syncope, near syncope and no increase in ankle edema.    [id number] Interval history: He reports no palpitations. He has no heart concerns today except his BP. His resting BP measurements at home are below his goal of 140/90 but he is concerned that his BP goes up when he exercises. He does have some lightheadedness when he stands. He has not had syncope.     He denies any chest pain/discomfort, increased dyspnea on exertion or symptoms of heart failure.    Current Outpatient Prescriptions   Medication Sig Dispense Refill     adapalene (DIFFERIN) 0.1 % gel Apply to the face at  night. 135 g 3     amantadine (SYMMETREL) 100 MG capsule Take 1 capsule (100 mg) by mouth 3 times daily 6am,11am and 4pm 270 capsule 3     ZOLMitriptan (ZOMIG) 5 MG tablet Take 1 tablet (5 mg) by mouth at onset of headache for migraine 30 tablet 5     rivastigmine (EXELON) 4.6 MG/24HR 24 hr patch Place 1 patch onto the skin daily 90 patch 3     olmesartan (BENICAR) 20 MG tablet Take 2 tablets (40 mg) by mouth daily 90 tablet 3     nortriptyline (PAMELOR) 10 MG capsule Take 3 capsules (30 mg) by mouth At Bedtime 270 capsule 3     acetaminophen-caffeine (EXCEDRIN TENSION HEADACHE) 500-65 MG TABS Take 1 tablet by mouth every 6 hours as needed for mild pain       Multiple Vitamins-Minerals (CENTRUM SILVER) per tablet Take 1 tablet by mouth daily 30 tablet      aspirin (BABY ASPIRIN) 81 MG chewable tablet Take 81 mg by mouth daily        cholecalciferol (VITAMIN D-3 SUPER STRENGTH) 2000 UNITS tablet Take 1 tablet by mouth        COMPRESSION STOCKINGS 2 each daily (Patient not taking: Reported on 4/5/2017) 4 each 3       Past Medical History:   Diagnosis Date     Blurred vision 8/11/2014     DISK (aka Displacement of intervertebral disc, site unspecified, without myelopathy) 8/11/2014     Displacement of cervical intervertebral disc without myelopathy (HERNIATED DISK) 8/11/2014     Double vision 8/11/2014     Epistaxis 8/11/2014     Fluttering heart 8/11/2014     Headache 8/11/2014     Leg swelling 8/11/2014     Memory loss 8/11/2014     PSP (progressive supranuclear palsy) (H) 8/11/2014     Sinus disorder 8/11/2014     Tinnitus 8/11/2014     Urinary urgency 8/11/2014     Varicose veins 8/11/2014       Past Surgical History:   Procedure Laterality Date     H ABLATION ATRIAL FLUTTER         Family History   Problem Relation Age of Onset     CANCER Father        Social History   Substance Use Topics     Smoking status: Never Smoker     Smokeless tobacco: Not on file     Alcohol use No       Allergies   Allergen Reactions      Other [Seasonal Allergies]      environmental     Codeine Other (See Comments) and Rash     GI upset         ROS:  he has not had any recent falls, urgency but no incontinence, no constipation, the remaining ten system review is negative     Physical Examination:  Vitals: /81 (BP Location: Left arm, Patient Position: Chair, Cuff Size: Adult Regular)  Pulse 92  Wt 89.1 kg (196 lb 8 oz)  SpO2 100%  BMI 27.41 kg/m2  BMI= Body mass index is 27.41 kg/(m^2).    GENERAL APPEARANCE: thin, alert and no distress  HEENT: no icterus  NECK: JVP is not visible  CHEST: lungs clear to auscultation  CARDIOVASCULAR: regular rhythm, normal S1S2, no murmur or gallop, precordium quiet  EXTREMITIES: mild ankle edema      Laboratory:    Results for KARUNA NELSON (MRN [id number]) as of 2017 12:24   Ref. Range 2017 10:37   Sodium Latest Ref Range: 133 - 144 mmol/L 142   Potassium Latest Ref Range: 3.4 - 5.3 mmol/L 4.0   Chloride Latest Ref Range: 94 - 109 mmol/L 102   Carbon Dioxide Latest Ref Range: 20 - 32 mmol/L 31   Urea Nitrogen Latest Ref Range: 7 - 30 mg/dL 16   Creatinine Latest Ref Range: 0.66 - 1.25 mg/dL 1.00   GFR Estimate Latest Ref Range: >60 mL/min/1.7m2 75       Previous studies reviewed today:    [facility]  Echocardiography Laboratory  [address] [place] [address]        Name: KARUNA NELSON  MRN: [id number]  : 1950  Study Date: 2016 03:08 PM  Age: 65 yrs  Gender: Male  Patient Location: [facility]  Reason For Study: , Essential (primary) hypertension, Edema, unspecified  Ordering Physician: SARTHAK BYRD  Referring Physician: SARTHAK BYRD  Performed By: Lakisha Bunn     BSA: 2.1 m2  Height: 72 in  Weight: 188 lb  HR: 79  BP: 132/82 mmHg  ______________________________________________________________________________        Procedure  Echocardiogram with two-dimensional, color and spectral Doppler  performed.  ______________________________________________________________________________     Interpretation Summary     Global and regional left ventricular function is normal with an EF of 55-60%.  Global right ventricular function is normal.    Assessment and recommendations:    1) S/P ablation for atrial flutter - no clinical recurrence    2) Hypertension - at goal, we discussed that it is normal for BP to increase with exercise.    3) Ankle edema - normal systolic function, diastolic dysfunction noted on echocardiogram, unable to tolerate compression stockings. We discussed adding a diuretic but he is concerned about his difficulty drinking and risk of increased orthostatic dizziness.    - elevation when possible  - ACE wraps are sometimes tolerated better than compression stockings if he wishes to try that      I appreciate the chance to help with Mr. Patrick's care. Please let me know if you have any questions or concerns.    Clint Gutiérrez MD    Please do not hesitate to contact me if you have any questions/concerns.     Sincerely,     Clint Gutiérrez MD

## 2017-04-05 NOTE — PROGRESS NOTES
Clinical Cardiac Electrophysiology    Chief Complaint: atrial flutter    HPI: I was happy to see Mr. Patrick in consultation for the above at the request of Dr. Saravia.    Mr. Patrick recently moved to [place] from [place]. He reports a history of atrial flutter treated with ablation in 2013. He had palpitations associated with the atrial flutter. He reports no history of atrial fibrillation. He was on oral anticoagulation for approximately a year and then switched to an aspirin. He reports no palpitations since the ablation.     He takes diltiazem for hypertension and Benicar, primarily for his migraines.     [id number]: he reports no significant palpitations. He feels his PSP has gotten a little worse.    [id number]: I usually see Mr. Patrick in  but due to scheduling issues he was seen in [place] today. He reports doing well from a cardiac standpoint. He has had no palpitations. He denies any recent change in his exertional abilities, no chest pain/discomfort, no unusual dyspnea on exertion, no syncope, near syncope and no increase in ankle edema.    [id number] Interval history: He reports no palpitations. He has no heart concerns today except his BP. His resting BP measurements at home are below his goal of 140/90 but he is concerned that his BP goes up when he exercises. He does have some lightheadedness when he stands. He has not had syncope.     He denies any chest pain/discomfort, increased dyspnea on exertion or symptoms of heart failure.    Current Outpatient Prescriptions   Medication Sig Dispense Refill     adapalene (DIFFERIN) 0.1 % gel Apply to the face at night. 135 g 3     amantadine (SYMMETREL) 100 MG capsule Take 1 capsule (100 mg) by mouth 3 times daily 6am,11am and 4pm 270 capsule 3     ZOLMitriptan (ZOMIG) 5 MG tablet Take 1 tablet (5 mg) by mouth at onset of headache for migraine 30 tablet 5     rivastigmine (EXELON) 4.6 MG/24HR 24 hr patch Place 1 patch onto the skin daily  90 patch 3     olmesartan (BENICAR) 20 MG tablet Take 2 tablets (40 mg) by mouth daily 90 tablet 3     nortriptyline (PAMELOR) 10 MG capsule Take 3 capsules (30 mg) by mouth At Bedtime 270 capsule 3     acetaminophen-caffeine (EXCEDRIN TENSION HEADACHE) 500-65 MG TABS Take 1 tablet by mouth every 6 hours as needed for mild pain       Multiple Vitamins-Minerals (CENTRUM SILVER) per tablet Take 1 tablet by mouth daily 30 tablet      aspirin (BABY ASPIRIN) 81 MG chewable tablet Take 81 mg by mouth daily        cholecalciferol (VITAMIN D-3 SUPER STRENGTH) 2000 UNITS tablet Take 1 tablet by mouth        COMPRESSION STOCKINGS 2 each daily (Patient not taking: Reported on 4/5/2017) 4 each 3       Past Medical History:   Diagnosis Date     Blurred vision 8/11/2014     DISK (aka Displacement of intervertebral disc, site unspecified, without myelopathy) 8/11/2014     Displacement of cervical intervertebral disc without myelopathy (HERNIATED DISK) 8/11/2014     Double vision 8/11/2014     Epistaxis 8/11/2014     Fluttering heart 8/11/2014     Headache 8/11/2014     Leg swelling 8/11/2014     Memory loss 8/11/2014     PSP (progressive supranuclear palsy) (H) 8/11/2014     Sinus disorder 8/11/2014     Tinnitus 8/11/2014     Urinary urgency 8/11/2014     Varicose veins 8/11/2014       Past Surgical History:   Procedure Laterality Date     H ABLATION ATRIAL FLUTTER         Family History   Problem Relation Age of Onset     CANCER Father        Social History   Substance Use Topics     Smoking status: Never Smoker     Smokeless tobacco: Not on file     Alcohol use No       Allergies   Allergen Reactions     Other [Seasonal Allergies]      environmental     Codeine Other (See Comments) and Rash     GI upset         ROS:  he has not had any recent falls, urgency but no incontinence, no constipation, the remaining ten system review is negative [id number]    Physical Examination:  Vitals: /81 (BP Location: Left arm, Patient  Position: Chair, Cuff Size: Adult Regular)  Pulse 92  Wt 89.1 kg (196 lb 8 oz)  SpO2 100%  BMI 27.41 kg/m2  BMI= Body mass index is 27.41 kg/(m^2).    GENERAL APPEARANCE: thin, alert and no distress  HEENT: no icterus  NECK: JVP is not visible  CHEST: lungs clear to auscultation  CARDIOVASCULAR: regular rhythm, normal S1S2, no murmur or gallop, precordium quiet  EXTREMITIES: mild ankle edema      Laboratory:    Results for KARUNA NELSON (MRN [id number]) as of 2017 12:24   Ref. Range 2017 10:37   Sodium Latest Ref Range: 133 - 144 mmol/L 142   Potassium Latest Ref Range: 3.4 - 5.3 mmol/L 4.0   Chloride Latest Ref Range: 94 - 109 mmol/L 102   Carbon Dioxide Latest Ref Range: 20 - 32 mmol/L 31   Urea Nitrogen Latest Ref Range: 7 - 30 mg/dL 16   Creatinine Latest Ref Range: 0.66 - 1.25 mg/dL 1.00   GFR Estimate Latest Ref Range: >60 mL/min/1.7m2 75       Previous studies reviewed today:    [facility]  Echocardiography Laboratory  [address] [place] [address]        Name: KARUNA NELSON  MRN: [id number]  : 1950  Study Date: 2016 03:08 PM  Age: 65 yrs  Gender: Male  Patient Location: [facility]  Reason For Study: , Essential (primary) hypertension, Edema, unspecified  Ordering Physician: SARTHAK BYRD  Referring Physician: SARTHAK BYRD  Performed By: Lakisha Bunn     BSA: 2.1 m2  Height: 72 in  Weight: 188 lb  HR: 79  BP: 132/82 mmHg  ______________________________________________________________________________        Procedure  Echocardiogram with two-dimensional, color and spectral Doppler performed.  ______________________________________________________________________________     Interpretation Summary     Global and regional left ventricular function is normal with an EF of 55-60%.  Global right ventricular function is normal.    Assessment and recommendations:    1) S/P ablation for atrial flutter - no clinical recurrence    2)  Hypertension - at goal, we discussed that it is normal for BP to increase with exercise.    3) Ankle edema - normal systolic function, diastolic dysfunction noted on echocardiogram, unable to tolerate compression stockings. We discussed adding a diuretic but he is concerned about his difficulty drinking and risk of increased orthostatic dizziness.    - elevation when possible  - ACE wraps are sometimes tolerated better than compression stockings if he wishes to try that      I appreciate the chance to help with Mr. Patrick's care. Please let me know if you have any questions or concerns.    Clint Gutiérrez MD

## 2017-04-05 NOTE — PATIENT INSTRUCTIONS
The following is a summary of your office visit:    Medications started today:none    Medications stopped today:none    Medication dose change: none    Nurse contact information: Amy Reyes RN  Cardiology Care Coordinator  [phone] Phone  [phone] Fax    Appointments made today: 6 month follow up    Patient instructions:none      If you have had any blood work, imaging or other testing completed we will be in touch within 1-2 weeks regarding the results. If you have any questions, concerns or need to schedule a follow up, please contact us at [phone]. If you are needing refills please contact your pharmacy. For urgent after hour care please call the [facility] at [phone] or the [facility] at [phone] and ask to speak to the cardiologist on call.    It was a pleasure meeting with you today. Please let us know if there is anything else we can do for you so that we can be sure you are leaving completely satisfied with your care experience.     Your Cardiology Team at [facility]  RN Care Coordinator: Amy  CMA: Mandi

## 2017-04-05 NOTE — TELEPHONE ENCOUNTER
Message routed to Cardiology/Dr. Clint Gutiérrez. Regarding olmesartan medication and dosage.    Thuy Grant CMA

## 2017-04-05 NOTE — TELEPHONE ENCOUNTER
Spoke to pharmacist at [facility] and informed patient can take 20 mg twice a day instead of 40 mg daily.

## 2017-04-05 NOTE — TELEPHONE ENCOUNTER
[facility] Call Center    Phone Message    Name of Caller: Suraj    Phone Number: Other phone number:  [phone]*    Best time to return call: ANY    May a detailed message be left on voicemail: yes    Relation to patient: Pharmacy    Reason for Call: Other: Patient states he takes one 20mg tablet of olmesartan (BENICAR) 40 MG tablet [[id number]] (Order [id number])    in morning and one at night they just need a verbal to confirm     Action Taken: Message routed to:  Primary Care p [phone]

## 2017-04-17 DIAGNOSIS — G43.009 MIGRAINE WITHOUT AURA AND WITHOUT STATUS MIGRAINOSUS, NOT INTRACTABLE: ICD-10-CM

## 2017-04-17 DIAGNOSIS — G43.009 MIGRAINE WITHOUT AURA AND WITHOUT STATUS MIGRAINOSUS, NOT INTRACTABLE: Primary | ICD-10-CM

## 2017-04-17 RX ORDER — NORTRIPTYLINE HCL 10 MG
30 CAPSULE ORAL AT BEDTIME
Qty: 270 CAPSULE | Refills: 0 | Status: SHIPPED | OUTPATIENT
Start: 2017-04-17 | End: 2018-01-01

## 2017-04-17 RX ORDER — NORTRIPTYLINE HCL 10 MG
30 CAPSULE ORAL AT BEDTIME
Qty: 270 CAPSULE | Refills: 1 | OUTPATIENT
Start: 2017-04-17

## 2017-04-20 ENCOUNTER — INFUSION THERAPY VISIT (OUTPATIENT)
Dept: INFUSION THERAPY | Facility: CLINIC | Age: 67
End: 2017-04-20
Attending: PSYCHIATRY & NEUROLOGY
Payer: COMMERCIAL

## 2017-04-20 ENCOUNTER — OFFICE VISIT (OUTPATIENT)
Dept: NEUROLOGY | Facility: CLINIC | Age: 67
End: 2017-04-20

## 2017-04-20 ENCOUNTER — OFFICE VISIT (OUTPATIENT)
Dept: NEUROPSYCHOLOGY | Facility: CLINIC | Age: 67
End: 2017-04-20

## 2017-04-20 VITALS
SYSTOLIC BLOOD PRESSURE: 138 MMHG | HEART RATE: 84 BPM | DIASTOLIC BLOOD PRESSURE: 75 MMHG | TEMPERATURE: 98 F | RESPIRATION RATE: 18 BRPM

## 2017-04-20 DIAGNOSIS — G23.1 PSP (PROGRESSIVE SUPRANUCLEAR PALSY) (H): ICD-10-CM

## 2017-04-20 DIAGNOSIS — Z00.6 EXAMINATION OF PARTICIPANT IN CLINICAL TRIAL: Primary | ICD-10-CM

## 2017-04-20 DIAGNOSIS — G23.1 PSP (PROGRESSIVE SUPRANUCLEAR PALSY) (H): Primary | ICD-10-CM

## 2017-04-20 PROCEDURE — 96365 THER/PROPH/DIAG IV INF INIT: CPT

## 2017-04-20 RX ADMIN — Medication 2100 MG: at 12:38

## 2017-04-20 NOTE — PATIENT INSTRUCTIONS
Dear Jimmy Patrick    Thank you for choosing [facility] ([facility]) for your infusion.  The following information is a summary of our appointment as well as important reminders.           We look forward in seeing you on your next appointment here at [facility].  Please don t hesitate to call us at [phone] to reschedule any of your appointments or to speak with one of the [facility] registered nurses.  It was a pleasure taking care of you today.    Sincerely,  Debi Zamora, ANN MARIE  [facility]  [address] [place] [address]  [id number]  Phone:  [phone]

## 2017-04-20 NOTE — PROGRESS NOTES
Research  Visit  Answers for HPI/ROS submitted by the patient on 4/18/2017   General Symptoms: No  Skin Symptoms: No  HENT Symptoms: No  EYE SYMPTOMS: No  HEART SYMPTOMS: No  LUNG SYMPTOMS: No  INTESTINAL SYMPTOMS: No  URINARY SYMPTOMS: No  REPRODUCTIVE SYMPTOMS: No  SKELETAL SYMPTOMS: No  BLOOD SYMPTOMS: No  NERVOUS SYSTEM SYMPTOMS: No  MENTAL HEALTH SYMPTOMS: No

## 2017-04-20 NOTE — PROGRESS NOTES
Nursing Note  Jimmy Patrick [facility] for:   Chief Complaint   Patient presents with     Infusion     Study infusion     During today's [facility] appointment, orders from Dr. Paulo Saravia were completed.  Frequency: monthly    Progress note:  Patient identification verified by name and date of birth.  Assessment completed.  Vitals recorded in Doc Flowsheets.  Patient was provided with education regarding infusion and possible side effects.  Patient verbalized understanding.      needed: No  Premedications: were not ordered.  Infusion Rates: infusion given over approximately 1 hour.  Approximate Infusion length:1 hours.   Labs: were not ordered for this appointment.  Vascular access: peripheral IV placed today.  Treatment Conditions: non-applicable.  Patient tolerated infusion: well.        Discharge Plan:   Follow up plan of care with: ongoing infusions at [facility].  Discharge instructions were reviewed with patient.  Patient/representative verbalized understanding of discharge instructions and all questions answered.  Patient discharged from [facility] in stable condition.    Debi Zamora RN    Administrations This Visit     study [id number] (IDS# [id number]) IV infusion 2,100 mg 210 mL     Admin Date Action Dose Rate Route Administered By          04/20/2017 New Bag 2100 mg 210 mL/hr Intravenous Debi Zamora RN                         BP (!) 154/99  Pulse 81  Temp 98  F (36.7  C) (Tympanic)  Resp 18

## 2017-04-20 NOTE — LETTER
4/20/2017      RE: Jimmy Patrick  [address]  [facility] [address]       Research  Visit  Answers for HPI/ROS submitted by the patient on 4/18/2017   General Symptoms: No  Skin Symptoms: No  HENT Symptoms: No  EYE SYMPTOMS: No  HEART SYMPTOMS: No  LUNG SYMPTOMS: No  INTESTINAL SYMPTOMS: No  URINARY SYMPTOMS: No  REPRODUCTIVE SYMPTOMS: No  SKELETAL SYMPTOMS: No  BLOOD SYMPTOMS: No  NERVOUS SYSTEM SYMPTOMS: No  MENTAL HEALTH SYMPTOMS: No      Paulo Saravia MD

## 2017-04-20 NOTE — Clinical Note
4/20/2017       RE: Jimmy Patrick  [address]  [place] [address]     Dear Colleague,    Thank you for referring your patient, Jimmy Patrick, to the [facility]UR[facility] at [facility]. Please see a copy of my visit note below.    Research  Visit  Answers for HPI/ROS submitted by the patient on 4/18/2017   General Symptoms: No  Skin Symptoms: No  HENT Symptoms: No  EYE SYMPTOMS: No  HEART SYMPTOMS: No  LUNG SYMPTOMS: No  INTESTINAL SYMPTOMS: No  URINARY SYMPTOMS: No  REPRODUCTIVE SYMPTOMS: No  SKELETAL SYMPTOMS: No  BLOOD SYMPTOMS: No  NERVOUS SYSTEM SYMPTOMS: No  MENTAL HEALTH SYMPTOMS: No      Again, thank you for allowing me to participate in the care of your patient.      Sincerely,    Paulo Saravia MD

## 2017-04-20 NOTE — MR AVS SNAPSHOT
After Visit Summary   4/20/2017    Jimmy Patrick    MRN: [id number]           Patient Information     Date Of Birth          1950        Visit Information        Provider Department      4/20/2017 12:00 PM UC 50 ATC; UC SPEC INFUSION [facility] Specialty and Procedure        Today's Diagnoses     PSP (progressive supranuclear palsy) (H)    -  1      Care Instructions    Dear Jimmy Patrick    Thank you for choosing [facility] ([facility]) for your infusion.  The following information is a summary of our appointment as well as important reminders.           We look forward in seeing you on your next appointment here at [facility].  Please don t hesitate to call us at [phone] to reschedule any of your appointments or to speak with one of the [facility] registered nurses.  It was a pleasure taking care of you today.    Sincerely,  Debi Zamora, RN  [facility]  Specialty Infusion & Procedure Center  [address] [place] [address]  [address]  Phone:  [phone]          Follow-ups after your visit        Your next 10 appointments already scheduled     May 16, 2017 11:00 AM CDT   Infusion 60 with UC SPEC INFUSION, UC 49 ATC   [facility] Specialty and Procedure ([facility] [place])    [address] [place] [address]  2nd [facility] [address] [phone]            Wallace 15, 2017 10:30 AM CDT   (Arrive by 10:15 AM)   Return Movement Disorder with Paulo Saravia MD   [facility] Neurology ([facility] [place])    [address] [place] [address]  3rd [facility] [address] [phone]            Wallace 15, 2017 11:00 AM CDT   Infusion 60 with UC SPEC INFUSION, UC 47 ATC   [facility] [place] Specialty and Procedure ([facility])    [address] [place] [address]  2nd [facility] [address]    [phone]            Jul 13, 2017 10:30 AM CDT   (Arrive by 10:15 AM)   ecg with  CV EKG   [facility] Heart Care ([facility])    [address] [place] [address]  [phone]               Jul 13, 2017 11:00 AM CDT   LAB with UC LAB   [facility] Lab ([facility])    [address] [place] [id number] [phone]           Patient must bring picture ID.  Patient should be prepared to give a urine specimen  Please do not eat 10-12 hours before your appointment if you are coming in fasting for labs on lipids, cholesterol, or glucose (sugar).  Pregnant women should follow their Care Team instructions. Water with medications is okay. Do not drink coffee or other fluids.   If you have concerns about taking  your medications, please ask at office or if scheduling via [employer], send a message by clicking on Secure Messaging, Message Your Care Team.            Jul 13, 2017 12:00 PM CDT   Infusion 60 with UC SPEC INFUSION, UC 50 ATC   [facility] Specialty and Procedure ([facility])    [address] [place] [phone] [phone]            Aug 10, 2017 10:30 AM CDT   (Arrive by 10:15 AM)   Return Movement Disorder with Paulo Saravia MD   [facility] Neurology ([facility])    [address] [place] [id number] [phone]              Who to contact     If you have questions or need follow up information about today's clinic visit or your schedule please contact [facility] SPECIALTY AND PROCEDURE directly at [phone].  Normal or non-critical lab and imaging results will be communicated to you by MyChart, letter or phone within 4 business days after the clinic has received the results. If you do not hear from us within 7 days, please contact the clinic through MyChart or phone. If you have a  critical or abnormal lab result, we will notify you by phone as soon as possible.  Submit refill requests through [employer] or call your pharmacy and they will forward the refill request to us. Please allow 3 business days for your refill to be completed.          Additional Information About Your Visit        [employer] Information     [employer] gives you secure access to your electronic health record. If you see a primary care provider, you can also send messages to your care team and make appointments. If you have questions, please call your primary care clinic.  If you do not have a primary care provider, please call [phone] and they will assist you.        Care EveryWhere ID     This is your Care EveryWhere ID. This could be used by other organizations to access your [place] medical records  [id number]        Your Vitals Were     Pulse Temperature Respirations             81 98  F (36.7  C) (Tympanic) 18          Blood Pressure from Last 3 Encounters:   04/20/17 (!) 154/99   04/05/17 130/81   03/23/17 134/77    Weight from Last 3 Encounters:   04/05/17 89.1 kg (196 lb 8 oz)   02/21/17 88.5 kg (195 lb)   01/26/17 88.5 kg (195 lb)              Today, you had the following     No orders found for display       Primary Care Provider    [facility]       No address on file        Thank you!     Thank you for choosing [facility] SPECIALTY AND PROCEDURE  for your care. Our goal is always to provide you with excellent care. Hearing back from our patients is one way we can continue to improve our services. Please take a few minutes to complete the written survey that you may receive in the mail after your visit with us. Thank you!             Your Updated Medication List - Protect others around you: Learn how to safely use, store and throw away your medicines at www.disposemymeds.org.          This list is accurate as of: 4/20/17  1:36 PM.  Always use your most recent med  list.                   Brand Name Dispense Instructions for use    acetaminophen-caffeine 500-65 MG Tabs    EXCEDRIN TENSION HEADACHE     Take 1 tablet by mouth every 6 hours as needed for mild pain       adapalene 0.1 % gel    DIFFERIN    135 g    Apply to the face at night.       amantadine 100 MG capsule    SYMMETREL    270 capsule    Take 1 capsule (100 mg) by mouth 3 times daily 6am,11am and 4pm       BABY ASPIRIN 81 MG chewable tablet   Generic drug:  aspirin      Take 81 mg by mouth daily       * BENICAR 20 MG tablet   Generic drug:  olmesartan     90 tablet    Take 2 tablets (40 mg) by mouth daily       * olmesartan 40 MG tablet    BENICAR    30 tablet    Take 1 tablet (40 mg) by mouth daily       CENTRUM SILVER per tablet     30 tablet    Take 1 tablet by mouth daily       COMPRESSION STOCKINGS     4 each    2 each daily       EXELON 4.6 MG/24HR 24 hr patch   Generic drug:  rivastigmine     90 patch    Place 1 patch onto the skin daily       * nortriptyline 10 MG capsule    PAMELOR    90 capsule    Take 3 capsules (30 mg) by mouth At Bedtime       * nortriptyline 10 MG capsule    PAMELOR    270 capsule    Take 3 capsules (30 mg) by mouth At Bedtime       Vitamin D-3 Super Strength 2000 UNITS tablet   Generic drug:  cholecalciferol      Take 1 tablet by mouth       ZOLMitriptan 5 MG tablet    ZOMIG    30 tablet    Take 1 tablet (5 mg) by mouth at onset of headache for migraine       * Notice:  This list has 4 medication(s) that are the same as other medications prescribed for you. Read the directions carefully, and ask your doctor or other care provider to review them with you.

## 2017-04-20 NOTE — MR AVS SNAPSHOT
After Visit Summary   4/20/2017    Jimmy Patrick    MRN: [id number]           Patient Information     Date Of Birth          1950        Visit Information        Provider Department      4/20/2017 9:30 AM Paulo Saravia MD [facility] Neurology        Today's Diagnoses     PSP (progressive supranuclear palsy) (H)    -  1       Follow-ups after your visit        Follow-up notes from your care team     Return if symptoms worsen or fail to improve.      Your next 10 appointments already scheduled     Apr 20, 2017 11:00 AM CDT   LAB with UC LAB   [facility] Lab ([facility])    [address] [place] [phone] [phone]           Patient must bring picture ID.  Patient should be prepared to give a urine specimen  Please do not eat 10-12 hours before your appointment if you are coming in fasting for labs on lipids, cholesterol, or glucose (sugar).  Pregnant women should follow their Care Team instructions. Water with medications is okay. Do not drink coffee or other fluids.   If you have concerns about taking  your medications, please ask at office or if scheduling via [employer], send a message by clicking on Secure Messaging, Message Your Care Team.            Apr 20, 2017 12:00 PM CDT   Infusion 60 with UC SPEC INFUSION, UC 50 ATC   [facility] Specialty and Procedure ([facility])    [address] [place] [phone] [phone]            May 16, 2017 11:00 AM CDT   Infusion 60 with UC SPEC INFUSION, UC 49 ATC   [facility] Specialty and Procedure ([facility])    [address] [place] [phone] [phone]            Wallace 15, 2017 10:30 AM CDT   (Arrive by 10:15 AM)   Return Movement Disorder with Paulo Saravia MD   [facility] Neurology ([facility])    [address] [place] [address]  [address]  [address] [facility] [address] [phone]            Wallace 15, 2017 11:00 AM CDT   Infusion 60 with UC SPEC INFUSION, UC 47 ATC   [facility] Specialty and Procedure ([facility])    [address] [facility] [address]  [address] [facility] [address] [phone]            Jul 13, 2017 10:30 AM CDT   (Arrive by 10:15 AM)   ecg with UC CV EKG   [facility] Heart Care ([facility])    [address]  3rd [facility]  [address]               Jul 13, 2017 11:00 AM CDT   LAB with UC LAB   [facility] Lab ([facility])    [address]  [address] [facility] [id number] [phone]           Patient must bring picture ID.  Patient should be prepared to give a urine specimen  Please do not eat 10-12 hours before your appointment if you are coming in fasting for labs on lipids, cholesterol, or glucose (sugar).  Pregnant women should follow their Care Team instructions. Water with medications is okay. Do not drink coffee or other fluids.   If you have concerns about taking  your medications, please ask at office or if scheduling via [employer], send a message by clicking on Secure Messaging, Message Your Care Team.              Who to contact     Please call your clinic at [phone] to:    Ask questions about your health    Make or cancel appointments    Discuss your medicines    Learn about your test results    Speak to your doctor   If you have compliments or concerns about an experience at your clinic, or if you wish to file a complaint, please contact [facility] Patient Relations at [phone] or email us at [email or web address]         Additional Information About Your Visit        [employer] Information     [employer] gives you secure access to your electronic health record. If you see a primary care provider, you can also send messages to your care team and make  appointments. If you have questions, please call your primary care clinic.  If you do not have a primary care provider, please call [phone] and they will assist you.      [employer] is an electronic gateway that provides easy, online access to your medical records. With [employer], you can request a clinic appointment, read your test results, renew a prescription or communicate with your care team.     To access your existing account, please contact your [facility] or call [phone] for assistance.        Care EveryWhere ID     This is your Care EveryWhere ID. This could be used by other organizations to access your [place] medical records  [id number]         Blood Pressure from Last 3 Encounters:   04/05/17 130/81   03/23/17 134/77   02/23/17 130/72    Weight from Last 3 Encounters:   04/05/17 89.1 kg (196 lb 8 oz)   02/21/17 88.5 kg (195 lb)   01/26/17 88.5 kg (195 lb)              Today, you had the following     No orders found for display       Primary Care Provider    [facility]       No address on file        Thank you!     Thank you for choosing [facility] NEUROLOGY  for your care. Our goal is always to provide you with excellent care. Hearing back from our patients is one way we can continue to improve our services. Please take a few minutes to complete the written survey that you may receive in the mail after your visit with us. Thank you!             Your Updated Medication List - Protect others around you: Learn how to safely use, store and throw away your medicines at www.disposemymeds.org.          This list is accurate as of: 4/20/17 10:30 AM.  Always use your most recent med list.                   Brand Name Dispense Instructions for use    acetaminophen-caffeine 500-65 MG Tabs    EXCEDRIN TENSION HEADACHE     Take 1 tablet by mouth every 6 hours as needed for mild pain       adapalene 0.1 % gel    DIFFERIN    135 g    Apply to the face at  night.       amantadine 100 MG capsule    SYMMETREL    270 capsule    Take 1 capsule (100 mg) by mouth 3 times daily 6am,11am and 4pm       BABY ASPIRIN 81 MG chewable tablet   Generic drug:  aspirin      Take 81 mg by mouth daily       * BENICAR 20 MG tablet   Generic drug:  olmesartan     90 tablet    Take 2 tablets (40 mg) by mouth daily       * olmesartan 40 MG tablet    BENICAR    30 tablet    Take 1 tablet (40 mg) by mouth daily       CENTRUM SILVER per tablet     30 tablet    Take 1 tablet by mouth daily       COMPRESSION STOCKINGS     4 each    2 each daily       EXELON 4.6 MG/24HR 24 hr patch   Generic drug:  rivastigmine     90 patch    Place 1 patch onto the skin daily       * nortriptyline 10 MG capsule    PAMELOR    90 capsule    Take 3 capsules (30 mg) by mouth At Bedtime       * nortriptyline 10 MG capsule    PAMELOR    270 capsule    Take 3 capsules (30 mg) by mouth At Bedtime       Vitamin D-3 Super Strength 2000 UNITS tablet   Generic drug:  cholecalciferol      Take 1 tablet by mouth       ZOLMitriptan 5 MG tablet    ZOMIG    30 tablet    Take 1 tablet (5 mg) by mouth at onset of headache for migraine       * Notice:  This list has 4 medication(s) that are the same as other medications prescribed for you. Read the directions carefully, and ask your doctor or other care provider to review them with you.

## 2017-04-20 NOTE — MR AVS SNAPSHOT
After Visit Summary   4/20/2017    Jimmy Patrick    MRN: [id number]           Patient Information     Date Of Birth          1950        Visit Information        Provider Department      4/20/2017 8:00 AM Emperatriz Candelaria, PhD [facility] Neuropsychology        Today's Diagnoses     Examination of participant in clinical trial    -  1       Follow-ups after your visit        Your next 10 appointments already scheduled     May 16, 2017 11:00 AM CDT   Infusion 60 with UC SPEC INFUSION, UC 49 ATC   [facility] Specialty and Procedure ([facility])    [address] [address]   [phone]            Wallace 15, 2017 10:30 AM CDT   (Arrive by 10:15 AM)   Return Movement Disorder with Paulo Saravia MD   [facility] Neurology ([facility])    [address] [place] [address]   [phone]            Wallace 15, 2017 11:00 AM CDT   Infusion 60 with UC SPEC INFUSION, UC 47 ATC   [facility] Specialty and Procedure ([facility])    [address] [place] [address]   [phone]            Jul 13, 2017 10:30 AM CDT   (Arrive by 10:15 AM)   ecg with  CV EKG   [facility] [place] X[facility] ([facility])    [address] [place] [address]   [phone]            Jul 13, 2017 11:00 AM CDT   LAB with  LAB   [facility] Lab ([facility])    [address] [place] [address]   [phone]           Patient must bring picture ID.  Patient should be prepared to give a urine specimen  Please do not eat 10-12 hours before your appointment if you are coming in fasting for labs on lipids, cholesterol, or glucose (sugar).  Pregnant women should follow their Care Team instructions. Water with medications is  okay. Do not drink coffee or other fluids.   If you have concerns about taking  your medications, please ask at office or if scheduling via [employer], send a message by clicking on Secure Messaging, Message Your Care Team.            Jul 13, 2017 12:00 PM CDT   Infusion 60 with UC SPEC INFUSION, UC 50 ATC   [facility] [place] Specialty and Procedure ([facility] [place])    [address] [facility] [address]  2nd Floor  [facility] [address] [phone]            Aug 10, 2017 10:30 AM CDT   (Arrive by 10:15 AM)   Return Movement Disorder with Paulo Saravia MD   [facility] Neurology ([facility])    [address] [facility]  3rd Floor  [facility] [address] [phone]              Who to contact     Please call your clinic at [phone] to:    Ask questions about your health    Make or cancel appointments    Discuss your medicines    Learn about your test results    Speak to your doctor   If you have compliments or concerns about an experience at your clinic, or if you wish to file a complaint, please contact [facility] Patient Relations at [phone] or email us at [email or web address]         Additional Information About Your Visit        [employer] Information     [employer] gives you secure access to your electronic health record. If you see a primary care provider, you can also send messages to your care team and make appointments. If you have questions, please call your primary care clinic.  If you do not have a primary care provider, please call [phone] and they will assist you.      [employer] is an electronic gateway that provides easy, online access to your medical records. With [employer], you can request a clinic appointment, read your test results, renew a prescription or communicate with your care team.     To access your existing account, please contact your [facility] or call  [phone] for assistance.        Care EveryWhere ID     This is your Care EveryWhere ID. This could be used by other organizations to access your [place] medical records  [id number]         Blood Pressure from Last 3 Encounters:   04/20/17 138/75   04/05/17 130/81   03/23/17 134/77    Weight from Last 3 Encounters:   04/05/17 89.1 kg (196 lb 8 oz)   02/21/17 88.5 kg (195 lb)   01/26/17 88.5 kg (195 lb)              We Performed the Following     NEUROPSYCH TESTING BY [facility]        Primary Care Provider    [facility]       No address on file        Thank you!     Thank you for choosing [facility] NEUROPSYCHOLOGY  for your care. Our goal is always to provide you with excellent care. Hearing back from our patients is one way we can continue to improve our services. Please take a few minutes to complete the written survey that you may receive in the mail after your visit with us. Thank you!             Your Updated Medication List - Protect others around you: Learn how to safely use, store and throw away your medicines at www.disposemymeds.org.          This list is accurate as of: 4/20/17 11:59 PM.  Always use your most recent med list.                   Brand Name Dispense Instructions for use    acetaminophen-caffeine 500-65 MG Tabs    EXCEDRIN TENSION HEADACHE     Take 1 tablet by mouth every 6 hours as needed for mild pain       adapalene 0.1 % gel    DIFFERIN    135 g    Apply to the face at night.       amantadine 100 MG capsule    SYMMETREL    270 capsule    Take 1 capsule (100 mg) by mouth 3 times daily 6am,11am and 4pm       BABY ASPIRIN 81 MG chewable tablet   Generic drug:  aspirin      Take 81 mg by mouth daily       * BENICAR 20 MG tablet   Generic drug:  olmesartan     90 tablet    Take 2 tablets (40 mg) by mouth daily       * olmesartan 40 MG tablet    BENICAR    30 tablet    Take 1 tablet (40 mg) by mouth daily       CENTRUM SILVER per tablet     30 tablet    Take 1 tablet by  mouth daily       COMPRESSION STOCKINGS     4 each    2 each daily       EXELON 4.6 MG/24HR 24 hr patch   Generic drug:  rivastigmine     90 patch    Place 1 patch onto the skin daily       * nortriptyline 10 MG capsule    PAMELOR    90 capsule    Take 3 capsules (30 mg) by mouth At Bedtime       * nortriptyline 10 MG capsule    PAMELOR    270 capsule    Take 3 capsules (30 mg) by mouth At Bedtime       Vitamin D-3 Super Strength 2000 UNITS tablet   Generic drug:  cholecalciferol      Take 1 tablet by mouth       ZOLMitriptan 5 MG tablet    ZOMIG    30 tablet    Take 1 tablet (5 mg) by mouth at onset of headache for migraine       * Notice:  This list has 4 medication(s) that are the same as other medications prescribed for you. Read the directions carefully, and ask your doctor or other care provider to review them with you.

## 2017-04-21 LAB — INTERPRETATION ECG - MUSE: NORMAL

## 2017-04-28 NOTE — PROGRESS NOTES
The participant completed the neuropsychology evaluation for the study with [facility] number [id number]. This included one hour of psychometrist time.    Measures administered:    Repeatable Battery for the Assessment of Neuropsychological Status (RBANS)  Phonemic Fluency  Letter Number Sequencing Test  Color Trails Test

## 2017-05-10 ENCOUNTER — TELEPHONE (OUTPATIENT)
Dept: NEUROLOGY | Facility: CLINIC | Age: 67
End: 2017-05-10

## 2017-05-10 DIAGNOSIS — G23.1 PSP (PROGRESSIVE SUPRANUCLEAR PALSY) (H): Primary | ICD-10-CM

## 2017-05-10 DIAGNOSIS — R90.89 ABNORMAL BRAIN MRI: ICD-10-CM

## 2017-05-10 DIAGNOSIS — G93.9 BRAIN LESION: ICD-10-CM

## 2017-05-10 NOTE — TELEPHONE ENCOUNTER
Left message for patient to call back to either me or imaging scheduling and get appt for MRI scheduled this week (see message below).    Darla Severin-Brown, LPN

## 2017-05-10 NOTE — TELEPHONE ENCOUNTER
----- Message from Victorina Mathews sent at 5/10/2017 12:30 PM CDT -----  Regarding: RE: brain mri  Hello,    I contacted the family. We would like to have him get the MRI THIS WEEK.    He is scheduled to get an infusion on Tuesday 16May, so need the results before that. Machelle, can you please work with the family in scheduling this?      ----- Message -----     From: Paulo Saravia MD     Sent: 5/10/2017   7:05 AM       To: Darla Severin-Brown, LPN, Victorina Mathews  Subject: brain mri                                        Will probably need a brain mri scheduled with and without contrast for this [place] patient.    Had a possible abnormal research brain mri    Victorina Mathews will be calling patient and family but will need a clinical scan set up and i put in a future order    Here is the research read     Left hemicerebellum, tentorium, and transverse sinus, no adjacent mass effect. May represent a small meningioma versus cavernoma versus normal variant arachnoid granulation. Recommend MRI with contrast and coronal planes.

## 2017-05-11 ENCOUNTER — TELEPHONE (OUTPATIENT)
Dept: NEUROLOGY | Facility: CLINIC | Age: 67
End: 2017-05-11

## 2017-05-11 DIAGNOSIS — F40.240 CLAUSTROPHOBIA: Primary | ICD-10-CM

## 2017-05-11 DIAGNOSIS — G23.1 PSP (PROGRESSIVE SUPRANUCLEAR PALSY) (H): Primary | ICD-10-CM

## 2017-05-11 RX ORDER — LORAZEPAM 1 MG/1
TABLET ORAL
Qty: 2 TABLET | Refills: 0 | Status: SHIPPED | OUTPATIENT
Start: 2017-05-11 | End: 2017-07-28

## 2017-05-11 RX ORDER — DIAZEPAM 5 MG
TABLET ORAL
Qty: 3 TABLET | Refills: 0 | Status: SHIPPED | OUTPATIENT
Start: 2017-05-11 | End: 2017-07-28

## 2017-05-11 NOTE — TELEPHONE ENCOUNTER
Dr Saravia,  Please writer a letter of medical necessity in regards to the Diazepam. The insurance company is denying this request.  Beba Morton RN

## 2017-05-11 NOTE — TELEPHONE ENCOUNTER
ELECTRONIC PRIOR AUTHORIZATION DENIED    Medication: diazepam (VALIUM) 5 MG tablet- EPA Denied    Denial Date: 5/11/2017    Denial Rational: Called plan @ [phone] and rep states diagnosis is not within the coverage criteria.  If you feel there is additional information related to this case that might affect this decision and an appeal is desired, please submit a written letter of medical necessity to our PA Team.          Appeal Information:    Plan is faxing over the denial letter with the appeal information

## 2017-05-12 ENCOUNTER — RADIANT APPOINTMENT (OUTPATIENT)
Dept: MRI IMAGING | Facility: CLINIC | Age: 67
End: 2017-05-12
Attending: PSYCHIATRY & NEUROLOGY
Payer: COMMERCIAL

## 2017-05-12 DIAGNOSIS — R90.89 ABNORMAL BRAIN MRI: ICD-10-CM

## 2017-05-12 DIAGNOSIS — G23.1 PSP (PROGRESSIVE SUPRANUCLEAR PALSY) (H): ICD-10-CM

## 2017-05-12 DIAGNOSIS — G93.9 BRAIN LESION: ICD-10-CM

## 2017-05-12 LAB
CREAT BLD-MCNC: 1.1 MG/DL (ref 0.66–1.25)
GFR SERPL CREATININE-BSD FRML MDRD: 67 ML/MIN/1.7M2

## 2017-05-12 PROCEDURE — 82565 ASSAY OF CREATININE: CPT | Performed by: FAMILY MEDICINE

## 2017-05-12 PROCEDURE — 36415 COLL VENOUS BLD VENIPUNCTURE: CPT | Performed by: FAMILY MEDICINE

## 2017-05-12 PROCEDURE — A9585 GADOBUTROL INJECTION: HCPCS | Performed by: RADIOLOGY

## 2017-05-12 PROCEDURE — 70553 MRI BRAIN STEM W/O & W/DYE: CPT | Performed by: RADIOLOGY

## 2017-05-12 RX ORDER — GADOBUTROL 604.72 MG/ML
10 INJECTION INTRAVENOUS ONCE
Status: COMPLETED | OUTPATIENT
Start: 2017-05-12 | End: 2017-05-12

## 2017-05-12 RX ADMIN — GADOBUTROL 9 ML: 604.72 INJECTION INTRAVENOUS at 14:27

## 2017-05-12 NOTE — TELEPHONE ENCOUNTER
Per Dr. Saravia:    See if they can get ativan   Should be covered instead of valium   He needs it now for his mri scan not after i write a letter.   See if the  [facility] can give it free if his insurance does not cover.   It is 1 or 2 tabs.        Ativan not covered either.    I called [facility] and they are not able to give valium for free but are able to cash it for 5 dollars.  Verbal RX given    Pt informed and will arrive 1/2 hr early to take medication.    Darla Severin-Brown, LPN

## 2017-05-16 ENCOUNTER — INFUSION THERAPY VISIT (OUTPATIENT)
Dept: INFUSION THERAPY | Facility: CLINIC | Age: 67
End: 2017-05-16
Attending: PSYCHIATRY & NEUROLOGY
Payer: COMMERCIAL

## 2017-05-16 VITALS
DIASTOLIC BLOOD PRESSURE: 83 MMHG | OXYGEN SATURATION: 98 % | TEMPERATURE: 97.2 F | RESPIRATION RATE: 18 BRPM | HEART RATE: 79 BPM | SYSTOLIC BLOOD PRESSURE: 150 MMHG

## 2017-05-16 DIAGNOSIS — G23.1 PSP (PROGRESSIVE SUPRANUCLEAR PALSY) (H): Primary | ICD-10-CM

## 2017-05-16 DIAGNOSIS — R13.10 DYSPHAGIA, UNSPECIFIED TYPE: ICD-10-CM

## 2017-05-16 RX ADMIN — Medication 2100 MG: at 11:17

## 2017-05-16 NOTE — PATIENT INSTRUCTIONS
Dear Jimmy Patrick    Thank you for choosing [facility] ([facility]) for your infusion.  The following information is a summary of our appointment as well as important reminders.           We look forward in seeing you on your next appointment here at [facility].  Please don t hesitate to call us at [phone] to reschedule any of your appointments or to speak with one of the [facility] registered nurses.  It was a pleasure taking care of you today.    Sincerely,  Goldie Worley RN  [facility]  Specialty Infusion & Procedure Center  [address] [place] [address]  [address]  Phone:  [phone]

## 2017-05-16 NOTE — PROGRESS NOTES
Nursing Note  Jimmy Patrick [facility] for:   Chief Complaint   Patient presents with     Infusion     study drug     During today's [facility] appointment, orders from Dr. Paulo Saravia were completed.  Frequency: monthly    Progress note:  Patient identification verified by name and date of birth.  Assessment completed.  Vitals recorded in Doc Flowsheets.  Patient was provided with education regarding infusion and possible side effects.  Patient verbalized understanding.      needed: No  Premedications: were not ordered.  Infusion Rates: infusion given over approximately 1 hour.  Approximate Infusion length:1 hours.   Labs: were not ordered for this appointment.  Vascular access: peripheral IV placed today.  Treatment Conditions: non-applicable.  Patient tolerated infusion: well.    Administrations This Visit     study [id number] (IDS# [id number]) IV infusion 2,100 mg 210 mL     Admin Date Action Dose Rate Route Administered By          05/16/2017 New Bag 2100 mg 210 mL/hr Intravenous Goldie Worley RN                           Discharge Plan:   Follow up plan of care with: ongoing infusions at [facility].  Discharge instructions were reviewed with patient.  Patient/representative verbalized understanding of discharge instructions and all questions answered.  Patient discharged from [facility] in stable condition.    Goldie Worley RN        /90 (BP Location: Right arm)  Pulse 76  Temp 97.2  F (36.2  C) (Oral)  Resp 18  SpO2 98%

## 2017-05-16 NOTE — MR AVS SNAPSHOT
After Visit Summary   5/16/2017    Jimmy Patrick    MRN: [id number]           Patient Information     Date Of Birth          1950        Visit Information        Provider Department      5/16/2017 11:15 AM UC 49 ATC; UC SPEC INFUSION [facility] Specialty and Procedure        Today's Diagnoses     PSP (progressive supranuclear palsy) (H)    -  1      Care Instructions    Dear Jimmy Patrick    Thank you for choosing [facility] ([facility] for your infusion.  The following information is a summary of our appointment as well as important reminders.           We look forward in seeing you on your next appointment here at [facility].  Please don t hesitate to call us at [phone] to reschedule any of your appointments or to speak with one of the [facility] registered nurses.  It was a pleasure taking care of you today.    Sincerely,  Goldie Worley, RN  [facility]  Specialty Infusion & Procedure Center  [address] [place] [address]  [address]  Phone:  [phone]          Follow-ups after your visit        Your next 10 appointments already scheduled     Wallace 15, 2017 10:30 AM CDT   (Arrive by 10:15 AM)   Return Movement Disorder with Paulo Saravia MD   [facility] Neurology ([facility] [place])    [address]  3rd Floor  [facility] [address] [phone]            Wallace 15, 2017 11:00 AM CDT   Infusion 60 with UC SPEC INFUSION, UC 47 ATC   [facility] Specialty and Procedure ([facility] [place])    [address]  2nd Floor  [facility] [address] [phone]            Jul 13, 2017 10:30 AM CDT   (Arrive by 10:15 AM)   ecg with UC CV EKG   [facility] [place] Xray ([facility])    [address]  1st Floor  [facility] [address] [phone]            Jul 13,  2017 11:00 AM CDT   LAB with UC LAB   [facility] Lab ([facility])    [address] [place] [id number] [phone]           Patient must bring picture ID.  Patient should be prepared to give a urine specimen  Please do not eat 10-12 hours before your appointment if you are coming in fasting for labs on lipids, cholesterol, or glucose (sugar).  Pregnant women should follow their Care Team instructions. Water with medications is okay. Do not drink coffee or other fluids.   If you have concerns about taking  your medications, please ask at office or if scheduling via [employer], send a message by clicking on Secure Messaging, Message Your Care Team.            Jul 13, 2017 12:00 PM CDT   Infusion 60 with UC SPEC INFUSION, UC 50 ATC   [facility] Specialty and Procedure ([facility])    [address] [place] [id number] [phone]            Aug 10, 2017 10:30 AM CDT   (Arrive by 10:15 AM)   Return Movement Disorder with Paulo Saravia MD   [facility] Neurology ([facility])    [address] [place] [id number] [phone]            Aug 10, 2017 11:00 AM CDT   Infusion 60 with UC SPEC INFUSION, UC 49 ATC   [facility] Specialty and Procedure ([facility])    [address] [place] [id number] [phone]              Future tests that were ordered for you today     Open Future Orders        Priority Expected Expires Ordered    PHYSICAL THERAPY REFERRAL Routine  5/16/2018 5/16/2017    SPEECH THERAPY REFERRAL Routine  5/16/2018 5/16/2017            Who to contact     If you have questions or need follow up information about today's clinic visit or your schedule please contact [facility] SPECIALTY AND PROCEDURE directly at [phone].  Normal or  non-critical lab and imaging results will be communicated to you by [employer], letter or phone within 4 business days after the clinic has received the results. If you do not hear from us within 7 days, please contact the clinic through [employer] or phone. If you have a critical or abnormal lab result, we will notify you by phone as soon as possible.  Submit refill requests through [employer] or call your pharmacy and they will forward the refill request to us. Please allow 3 business days for your refill to be completed.          Additional Information About Your Visit        [employer] Information     [employer] gives you secure access to your electronic health record. If you see a primary care provider, you can also send messages to your care team and make appointments. If you have questions, please call your primary care clinic.  If you do not have a primary care provider, please call [phone] and they will assist you.        Care EveryWhere ID     This is your Care EveryWhere ID. This could be used by other organizations to access your [place] medical records  [id number]        Your Vitals Were     Pulse Temperature Respirations Pulse Oximetry          79 97.2  F (36.2  C) (Oral) 18 98%         Blood Pressure from Last 3 Encounters:   05/16/17 150/83   04/20/17 138/75   04/05/17 130/81    Weight from Last 3 Encounters:   04/05/17 89.1 kg (196 lb 8 oz)   02/21/17 88.5 kg (195 lb)   01/26/17 88.5 kg (195 lb)              Today, you had the following     No orders found for display       Primary Care Provider    [facility]       No address on file        Thank you!     Thank you for choosing [facility] SPECIALTY AND PROCEDURE  for your care. Our goal is always to provide you with excellent care. Hearing back from our patients is one way we can continue to improve our services. Please take a few minutes to complete the written survey that you may receive in the mail after  your visit with us. Thank you!             Your Updated Medication List - Protect others around you: Learn how to safely use, store and throw away your medicines at www.disposemymeds.org.          This list is accurate as of: 5/16/17 12:32 PM.  Always use your most recent med list.                   Brand Name Dispense Instructions for use    acetaminophen-caffeine 500-65 MG Tabs    EXCEDRIN TENSION HEADACHE     Take 1 tablet by mouth every 6 hours as needed for mild pain       adapalene 0.1 % gel    DIFFERIN    135 g    Apply to the face at night.       amantadine 100 MG capsule    SYMMETREL    270 capsule    Take 1 capsule (100 mg) by mouth 3 times daily 6am,11am and 4pm       BABY ASPIRIN 81 MG chewable tablet   Generic drug:  aspirin      Take 81 mg by mouth daily       * BENICAR 20 MG tablet   Generic drug:  olmesartan     90 tablet    Take 2 tablets (40 mg) by mouth daily       * olmesartan 40 MG tablet    BENICAR    30 tablet    Take 1 tablet (40 mg) by mouth daily       CENTRUM SILVER per tablet     30 tablet    Take 1 tablet by mouth daily       COMPRESSION STOCKINGS     4 each    2 each daily       diazepam 5 MG tablet    VALIUM    3 tablet    Take 1 tablet 30 minutes prior to the mri scan. May repeat x 1.       EXELON 4.6 MG/24HR 24 hr patch   Generic drug:  rivastigmine     90 patch    Place 1 patch onto the skin daily       LORazepam 1 MG tablet    ATIVAN    2 tablet    Take 30 minutes prior to MRI scan. May repeat x 1 if needed but 1mg should be enough.  Do not operate a vehicle after taking this medication       * nortriptyline 10 MG capsule    PAMELOR    90 capsule    Take 3 capsules (30 mg) by mouth At Bedtime       * nortriptyline 10 MG capsule    PAMELOR    270 capsule    Take 3 capsules (30 mg) by mouth At Bedtime       Vitamin D-3 Super Strength 2000 UNITS tablet   Generic drug:  cholecalciferol      Take 1 tablet by mouth       ZOLMitriptan 5 MG tablet    ZOMIG    30 tablet    Take 1 tablet (5  mg) by mouth at onset of headache for migraine       * Notice:  This list has 4 medication(s) that are the same as other medications prescribed for you. Read the directions carefully, and ask your doctor or other care provider to review them with you.

## 2017-05-16 NOTE — MR AVS SNAPSHOT
After Visit Summary   5/16/2017    Jimmy Patrick    MRN: [id number]           Patient Information     Date Of Birth          1950        Visit Information        Provider Department      5/16/2017 11:00 AM  49 ATC; [facility] Specialty and Procedure        Today's Diagnoses     PSP (progressive supranuclear palsy) (H)    -  1    Dysphagia, unspecified type           Follow-ups after your visit        Additional Services     PHYSICAL THERAPY REFERRAL       *This therapy referral will be filtered to a centralized scheduling office at [facility] and the patient will receive a call to schedule an appointment at a [facility] location most convenient for them. *     [facility] provides Physical Therapy evaluation and treatment and many specialty services across the [facility] system.  If requesting a specialty program, please choose from the list below.    If you have not heard from the scheduling office within 2 business days, please call [phone] for all locations, with the exception of [place], please call [phone].  Treatment: Evaluation & Treatment  Special Instructions/Modalities: evalaute and treat  Special Programs: None    Please be aware that coverage of these services is subject to the terms and limitations of your health insurance plan.  Call member services at your health plan with any benefit or coverage questions.      **Note to Provider:  If you are referring outside of [facility] for the therapy appointment, please list the name of the location in the  special instructions  above, print the referral and give to the patient to schedule the appointment.            SPEECH THERAPY REFERRAL       *This therapy referral will be filtered to a centralized scheduling office at [facility] and the patient will receive a call to schedule an appointment at a [facility] location most  convenient for them. *     [facility] provides Speech Therapy evaluation and treatment and many specialty services across the [facility] system.  If requesting a specialty program, please choose from the list below.  If you have not heard from the scheduling office within 2 business days, please call [phone] for all locations, with the exception of [place], please call [phone].       Treatment: Evaluation & Treatment  Speech Treatment Diagnosis: Problems With Voice Production and swallowing  Special Instructions: evaluate and treat  Special Programs: Voice and video swallow study    Please be aware that coverage of these services is subject to the terms and limitations of your health insurance plan.  Call member services at your health plan with any benefit or coverage questions.      **Note to Provider:  If you are referring outside of [facility] for the therapy appointment, please list the name of the location in the  special instructions  above, print the referral and give to the patient to schedule the appointment.                  Your next 10 appointments already scheduled     May 16, 2017 11:00 AM CDT   Infusion 60 with UC SPEC INFUSION, UC 49 ATC   [facility] Specialty and Procedure ([facility])    [address] [place] [address]   [phone]            Wallace 15, 2017 10:30 AM CDT   (Arrive by 10:15 AM)   Return Movement Disorder with Paulo Saravia MD   [facility] Neurology ([facility])    [address] [place] [id number]   [phone]            Wallace 15, 2017 11:00 AM CDT   Infusion 60 with UC SPEC INFUSION, UC 47 ATC   [facility] Specialty and Procedure ([facility])    [address] [place] [address]   [phone]            Jul 13, 2017 10:30 AM CDT   (Arrive by 10:15 AM)    ecg with  CV EKG   [facility] [place] Xray ([facility])    [address]  [address]st [facility] [id number] [phone]            Jul 13, 2017 11:00 AM CDT   LAB with UC LAB   [facility] Lab ([facility])    [address] [place] [address]  [address] [facility] [id number] [phone]           Patient must bring picture ID.  Patient should be prepared to give a urine specimen  Please do not eat 10-12 hours before your appointment if you are coming in fasting for labs on lipids, cholesterol, or glucose (sugar).  Pregnant women should follow their Care Team instructions. Water with medications is okay. Do not drink coffee or other fluids.   If you have concerns about taking  your medications, please ask at office or if scheduling via [employer], send a message by clicking on Secure Messaging, Message Your Care Team.            Jul 13, 2017 12:00 PM CDT   Infusion 60 with  SPEC INFUSION, UC 50 ATC   [facility] Specialty and Procedure ([facility])    [address] [place] [address]  [address] [facility] [id number] [phone]            Aug 10, 2017 10:30 AM CDT   (Arrive by 10:15 AM)   Return Movement Disorder with Paulo Saravia MD   [facility] Neurology ([facility])    [address] [place] [address]  [address]rd [facility] [id number] [phone]              Future tests that were ordered for you today     Open Future Orders        Priority Expected Expires Ordered    PHYSICAL THERAPY REFERRAL Routine  5/16/2018 5/16/2017    SPEECH THERAPY REFERRAL Routine  5/16/2018 5/16/2017            Who to contact     If you have questions or need follow up information about today's clinic visit or your schedule please contact [facility] SPECIALTY AND PROCEDURE directly at [phone].  Normal or non-critical lab and imaging results will be communicated to you by [employer],  letter or phone within 4 business days after the clinic has received the results. If you do not hear from us within 7 days, please contact the clinic through [employer] or phone. If you have a critical or abnormal lab result, we will notify you by phone as soon as possible.  Submit refill requests through [employer] or call your pharmacy and they will forward the refill request to us. Please allow 3 business days for your refill to be completed.          Additional Information About Your Visit        [employer] [employer] Information     [employer] gives you secure access to your electronic health record. If you see a primary care provider, you can also send messages to your care team and make appointments. If you have questions, please call your primary care clinic.  If you do not have a primary care provider, please call [phone] and they will assist you.        Care EveryWhere ID     This is your Care EveryWhere ID. This could be used by other organizations to access your [place] medical records  [id number]         Blood Pressure from Last 3 Encounters:   04/20/17 138/75   04/05/17 130/81   03/23/17 134/77    Weight from Last 3 Encounters:   04/05/17 89.1 kg (196 lb 8 oz)   02/21/17 88.5 kg (195 lb)   01/26/17 88.5 kg (195 lb)               Primary Care Provider    [facility]       No address on file        Thank you!     Thank you for choosing [facility] SPECIALTY AND PROCEDURE  for your care. Our goal is always to provide you with excellent care. Hearing back from our patients is one way we can continue to improve our services. Please take a few minutes to complete the written survey that you may receive in the mail after your visit with us. Thank you!             Your Updated Medication List - Protect others around you: Learn how to safely use, store and throw away your medicines at www.disposemymeds.org.          This list is accurate as of: 5/16/17 10:10 AM.  Always use your  most recent med list.                   Brand Name Dispense Instructions for use    acetaminophen-caffeine 500-65 MG Tabs    EXCEDRIN TENSION HEADACHE     Take 1 tablet by mouth every 6 hours as needed for mild pain       adapalene 0.1 % gel    DIFFERIN    135 g    Apply to the face at night.       amantadine 100 MG capsule    SYMMETREL    270 capsule    Take 1 capsule (100 mg) by mouth 3 times daily 6am,11am and 4pm       BABY ASPIRIN 81 MG chewable tablet   Generic drug:  aspirin      Take 81 mg by mouth daily       * BENICAR 20 MG tablet   Generic drug:  olmesartan     90 tablet    Take 2 tablets (40 mg) by mouth daily       * olmesartan 40 MG tablet    BENICAR    30 tablet    Take 1 tablet (40 mg) by mouth daily       CENTRUM SILVER per tablet     30 tablet    Take 1 tablet by mouth daily       COMPRESSION STOCKINGS     4 each    2 each daily       diazepam 5 MG tablet    VALIUM    3 tablet    Take 1 tablet 30 minutes prior to the mri scan. May repeat x 1.       EXELON 4.6 MG/24HR 24 hr patch   Generic drug:  rivastigmine     90 patch    Place 1 patch onto the skin daily       LORazepam 1 MG tablet    ATIVAN    2 tablet    Take 30 minutes prior to MRI scan. May repeat x 1 if needed but 1mg should be enough.  Do not operate a vehicle after taking this medication       * nortriptyline 10 MG capsule    PAMELOR    90 capsule    Take 3 capsules (30 mg) by mouth At Bedtime       * nortriptyline 10 MG capsule    PAMELOR    270 capsule    Take 3 capsules (30 mg) by mouth At Bedtime       Vitamin D-3 Super Strength 2000 UNITS tablet   Generic drug:  cholecalciferol      Take 1 tablet by mouth       ZOLMitriptan 5 MG tablet    ZOMIG    30 tablet    Take 1 tablet (5 mg) by mouth at onset of headache for migraine       * Notice:  This list has 4 medication(s) that are the same as other medications prescribed for you. Read the directions carefully, and ask your doctor or other care provider to review them with you.

## 2017-05-16 NOTE — PROGRESS NOTES
Pt/speech ordered  Had brain mri that shows unchanged meningioma  Study visit today  Paulo mora md

## 2017-05-17 DIAGNOSIS — G23.1 PSP (PROGRESSIVE SUPRANUCLEAR PALSY) (H): Primary | ICD-10-CM

## 2017-05-18 DIAGNOSIS — G23.1 PSP (PROGRESSIVE SUPRANUCLEAR PALSY) (H): Primary | ICD-10-CM

## 2017-05-18 DIAGNOSIS — D32.9 MENINGIOMA (H): ICD-10-CM

## 2017-06-13 DIAGNOSIS — G43.009 MIGRAINE WITHOUT AURA AND WITHOUT STATUS MIGRAINOSUS, NOT INTRACTABLE: ICD-10-CM

## 2017-06-13 DIAGNOSIS — G23.1 PSP (PROGRESSIVE SUPRANUCLEAR PALSY) (H): ICD-10-CM

## 2017-06-13 RX ORDER — OLMESARTAN MEDOXOMIL 20 MG/1
40 TABLET ORAL DAILY
Qty: 60 TABLET | Refills: 3 | Status: SHIPPED | OUTPATIENT
Start: 2017-06-13 | End: 2017-06-13

## 2017-06-13 RX ORDER — OLMESARTAN MEDOXOMIL 20 MG/1
40 TABLET ORAL DAILY
Qty: 180 TABLET | Refills: 3 | Status: SHIPPED | OUTPATIENT
Start: 2017-06-13 | End: 2018-01-01

## 2017-06-13 NOTE — TELEPHONE ENCOUNTER
Pt requested 20 mg tablet scripts be sent for benicar (2 tab daily)  Resent- [facility], 90 day supply to [employer] [facility]

## 2017-06-15 ENCOUNTER — OFFICE VISIT (OUTPATIENT)
Dept: NEUROLOGY | Facility: CLINIC | Age: 67
End: 2017-06-15

## 2017-06-15 ENCOUNTER — INFUSION THERAPY VISIT (OUTPATIENT)
Dept: INFUSION THERAPY | Facility: CLINIC | Age: 67
End: 2017-06-15
Attending: PSYCHIATRY & NEUROLOGY
Payer: COMMERCIAL

## 2017-06-15 VITALS
OXYGEN SATURATION: 98 % | RESPIRATION RATE: 14 BRPM | DIASTOLIC BLOOD PRESSURE: 91 MMHG | SYSTOLIC BLOOD PRESSURE: 150 MMHG | HEART RATE: 80 BPM | TEMPERATURE: 98.2 F

## 2017-06-15 VITALS
WEIGHT: 195 LBS | HEIGHT: 72 IN | BODY MASS INDEX: 26.41 KG/M2 | DIASTOLIC BLOOD PRESSURE: 90 MMHG | HEART RATE: 93 BPM | SYSTOLIC BLOOD PRESSURE: 156 MMHG | OXYGEN SATURATION: 100 %

## 2017-06-15 DIAGNOSIS — G23.1 PSP (PROGRESSIVE SUPRANUCLEAR PALSY) (H): Primary | ICD-10-CM

## 2017-06-15 PROCEDURE — 96365 THER/PROPH/DIAG IV INF INIT: CPT

## 2017-06-15 RX ADMIN — Medication 2100 MG: at 11:28

## 2017-06-15 ASSESSMENT — PAIN SCALES - GENERAL: PAINLEVEL: NO PAIN (0)

## 2017-06-15 NOTE — PATIENT INSTRUCTIONS
Dear Jimmy Patrick    Thank you for choosing [facility] ([facility]) for your infusion.  The following information is a summary of our appointment as well as important reminders.          We look forward in seeing you on your next appointment here at [facility].  Please don t hesitate to call us at [phone] to reschedule any of your appointments or to speak with one of the [facility] registered nurses.  It was a pleasure taking care of you today.    Sincerely,  Debi Zamora, ANN MARIE  [facility]  Specialty Infusion & Procedure Center  [address] [place] [address]  [address]  Phone:  [phone]

## 2017-06-15 NOTE — PROGRESS NOTES
Answers for HPI/ROS submitted by the patient on 6/14/2017   General Symptoms: No  Skin Symptoms: No  HENT Symptoms: No  EYE SYMPTOMS: No  HEART SYMPTOMS: No  LUNG SYMPTOMS: No  INTESTINAL SYMPTOMS: No  URINARY SYMPTOMS: No  REPRODUCTIVE SYMPTOMS: No  SKELETAL SYMPTOMS: No  BLOOD SYMPTOMS: No  NERVOUS SYSTEM SYMPTOMS: No  MENTAL HEALTH SYMPTOMS: No

## 2017-06-15 NOTE — LETTER
6/15/2017      RE: Jimmy Patrick  [address] [facility] [address]       Answers for HPI/ROS submitted by the patient on 6/14/2017   General Symptoms: No  Skin Symptoms: No  HENT Symptoms: No  EYE SYMPTOMS: No  HEART SYMPTOMS: No  LUNG SYMPTOMS: No  INTESTINAL SYMPTOMS: No  URINARY SYMPTOMS: No  REPRODUCTIVE SYMPTOMS: No  SKELETAL SYMPTOMS: No  BLOOD SYMPTOMS: No  NERVOUS SYSTEM SYMPTOMS: No  MENTAL HEALTH SYMPTOMS: No      Paulo Saravia MD

## 2017-06-15 NOTE — PROGRESS NOTES
Nursing Note  Jimmy Patrick [facility] for:   Chief Complaint   Patient presents with     Infusion     Study Med infusion     During today's [facility] appointment, orders from Dr. Paulo Saravia were completed.  Frequency: monthly    Progress note:  Patient identification verified by name and date of birth.  Assessment completed.  Vitals recorded in Doc Flowsheets.  Patient was provided with education regarding infusion and possible side effects.  Patient verbalized understanding.      needed: No  Premedications: were not ordered.  Infusion Rates: infusion given over approximately 1 hour.  Approximate Infusion length:1 hours.   Labs: were not ordered for this appointment.  Vascular access: peripheral IV placed today.  Treatment Conditions: non-applicable.  Patient tolerated infusion: well.          Discharge Plan:   Follow up plan of care with: ongoing infusions at [facility].  Discharge instructions were reviewed with patient.  Patient/representative verbalized understanding of discharge instructions and all questions answered.  Patient discharged [facility] in stable condition.    Debi Zamora RN    Administrations This Visit     study [id number] (IDS# [id number]) IV infusion 2,100 mg 210 mL     Admin Date Action Dose Rate Route Administered By          06/15/2017 New Bag 2100 mg 210 mL/hr Intravenous Debi Zamora RN                         /90  Pulse 82  Temp 98.2  F (36.8  C) (Tympanic)  Resp 14  SpO2 98%

## 2017-06-15 NOTE — MR AVS SNAPSHOT
After Visit Summary   6/15/2017    Jimmy Patrick    MRN: [id number]           Patient Information     Date Of Birth          1950        Visit Information        Provider Department      6/15/2017 10:30 AM Paulo Saravia MD [facility]        Today's Diagnoses     PSP (progressive supranuclear palsy) (H)    -  1      Care Instructions    Research visit          Follow-ups after your visit        Your next 10 appointments already scheduled     Jun 26, 2017 11:00 AM CDT   Video Swallow with SH SLP OP VFSS   [facility] ([facility])    [address]  [facility] [id number] [phone]            Jun 26, 2017 11:00 AM CDT   XR VIDEO SPEECH EVALUATION WITH ESOPHAGRAM with SHXR5   [facility] ([facility])    [address] [id number] [phone]           PPlease bring a list of your current medicines to your exam. (Include vitamins, minerals and over-the-counter medicines.) Leave your valuables at home.  Tell the doctor if there is a chance you could be pregnant.  Adults: You do not need to do anything special for this exam.  Pediatric Nothing by mouth 3 hours prior.  Please call the Imaging Department at your exam site with any questions.            Jul 13, 2017  9:30 AM CDT   (Arrive by 9:15 AM)   Return Movement Disorder with Paulo Saravia MD   [facility] ([facility])    [address] [place] [address]  3rd [facility] [address] [phone]            Jul 13, 2017 10:30 AM CDT   (Arrive by 10:15 AM)   ecg with  CV EKG   [facility] [place] [facility] ([facility])    [address] [place] [address] [phone]            Jul 13, 2017 11:00 AM CDT   LAB with  LAB   [facility])    [address] [place] [address]    [phone]           Patient must bring picture ID.  Patient should be prepared to give a urine specimen  Please do not eat 10-12 hours before your appointment if you are coming in fasting for labs on lipids, cholesterol, or glucose (sugar).  Pregnant women should follow their Care Team instructions. Water with medications is okay. Do not drink coffee or other fluids.   If you have concerns about taking  your medications, please ask at office or if scheduling via [employer], send a message by clicking on Secure Messaging, Message Your Care Team.            Jul 13, 2017 12:00 PM CDT   Infusion 60 with UC SPEC INFUSION, UC 50 ATC   [facility] Specialty and Procedure ([facility])    [address]  [address]nd [facility] [address] [phone]            Aug 10, 2017 10:30 AM CDT   (Arrive by 10:15 AM)   Return Movement Disorder with Paulo Saravia MD   [facility] Neurology ([facility])    [address] [place] [address] [phone]            Aug 10, 2017 11:00 AM CDT   Infusion 60 with UC SPEC INFUSION, UC 49 ATC   [facility] Special[facility] Procedure ([facility])    [address] [address] [phone]              Who to contact     Please call your clinic at [phone] to:    Ask questions about your health    Make or cancel appointments    Discuss your medicines    Learn about your test results    Speak to your doctor   If you have compliments or concerns about an experience at your clinic, or if you wish to file a complaint, please contact [facility] Patient Relations at [phone] or email us at [email or web address]         Additional Information About Your Visit        [employer] Information     [employer] gives you secure access to your electronic health record. If you see a  primary care provider, you can also send messages to your care team and make appointments. If you have questions, please call your primary care clinic.  If you do not have a primary care provider, please call [phone] and they will assist you.      [employer] is an electronic gateway that provides easy, online access to your medical records. With [employer], you can request a clinic appointment, read your test results, renew a prescription or communicate with your care team.     To access your existing account, please contact your [facility] or call [phone] for assistance.        Care EveryWhere ID     This is your Care EveryWhere ID. This could be used by other organizations to access your [facility] medical records  [id number]        Your Vitals Were     Pulse Height Pulse Oximetry BMI (Body Mass Index)          93 1.829 m (6') 100% 26.45 kg/m2         Blood Pressure from Last 3 Encounters:   06/15/17 152/90   06/15/17 156/90   05/16/17 150/83    Weight from Last 3 Encounters:   06/15/17 88.5 kg (195 lb)   04/05/17 89.1 kg (196 lb 8 oz)   02/21/17 88.5 kg (195 lb)              Today, you had the following     No orders found for display       Primary Care Provider    [facility]       No address on file        Thank you!     Thank you for choosing [facility] NEUROLOGY  for your care. Our goal is always to provide you with excellent care. Hearing back from our patients is one way we can continue to improve our services. Please take a few minutes to complete the written survey that you may receive in the mail after your visit with us. Thank you!             Your Updated Medication List - Protect others around you: Learn how to safely use, store and throw away your medicines at www.disposemymeds.org.          This list is accurate as of: 6/15/17 11:45 AM.  Always use your most recent med list.                   Brand Name Dispense Instructions for use     acetaminophen-caffeine 500-65 MG Tabs    EXCEDRIN TENSION HEADACHE     Take 1 tablet by mouth every 6 hours as needed for mild pain       adapalene 0.1 % gel    DIFFERIN    135 g    Apply to the face at night.       amantadine 100 MG capsule    SYMMETREL    270 capsule    Take 1 capsule (100 mg) by mouth 3 times daily 6am,11am and 4pm       BABY ASPIRIN 81 MG chewable tablet   Generic drug:  aspirin      Take 81 mg by mouth daily       CENTRUM SILVER per tablet     30 tablet    Take 1 tablet by mouth daily       COMPRESSION STOCKINGS     4 each    2 each daily       diazepam 5 MG tablet    VALIUM    3 tablet    Take 1 tablet 30 minutes prior to the mri scan. May repeat x 1.       EXELON 4.6 MG/24HR 24 hr patch   Generic drug:  rivastigmine     90 patch    Place 1 patch onto the skin daily       LORazepam 1 MG tablet    ATIVAN    2 tablet    Take 30 minutes prior to MRI scan. May repeat x 1 if needed but 1mg should be enough.  Do not operate a vehicle after taking this medication       * nortriptyline 10 MG capsule    PAMELOR    90 capsule    Take 3 capsules (30 mg) by mouth At Bedtime       * nortriptyline 10 MG capsule    PAMELOR    270 capsule    Take 3 capsules (30 mg) by mouth At Bedtime       olmesartan 20 MG tablet    BENICAR    180 tablet    Take 2 tablets (40 mg) by mouth daily       Vitamin D-3 Super Strength 2000 UNITS tablet   Generic drug:  cholecalciferol      Take 1 tablet by mouth       ZOLMitriptan 5 MG tablet    ZOMIG    30 tablet    Take 1 tablet (5 mg) by mouth at onset of headache for migraine       * Notice:  This list has 2 medication(s) that are the same as other medications prescribed for you. Read the directions carefully, and ask your doctor or other care provider to review them with you.

## 2017-06-15 NOTE — MR AVS SNAPSHOT
After Visit Summary   6/15/2017    Jimmy Patrick    MRN: [id number]           Patient Information     Date Of Birth          1950        Visit Information        Provider Department      6/15/2017 11:00 AM UC 47 ATC; UC SPEC [facility] [place] Special[facility] Procedure        Today's Diagnoses     PSP (progressive supranuclear palsy) (H)    -  1      Care Instructions    Dear Jimmy Patrick    Thank you for choosing [facility] ([facility]) for your infusion.  The following information is a summary of our appointment as well as important reminders.          We look forward in seeing you on your next appointment here at [facility].  Please don t hesitate to call us at [phone] to reschedule any of your appointments or to speak with one of the [facility] registered nurses.  It was a pleasure taking care of you today.    Sincerely,  Debi Zamora, RN  [facility]  [facility]  [address] [place]  [address]  Phone:  [phone]            Follow-ups after your visit        Your next 10 appointments already scheduled     Jun 26, 2017 11:00 AM CDT   Video Swallow with SH SLP OP VFSS   [facility] ([facility])    [address] [place] [address] [address]  [facility] [id number] [phone]            Jun 26, 2017 11:00 AM CDT   XR VIDEO SPEECH EVALUATION WITH ESOPHAGRAM with SHXR5   [facility] ([facility])    [address] [facility] [id number] [phone]           PPlease bring a list of your current medicines to your exam. (Include vitamins, minerals and over-the-counter medicines.) Leave your valuables at home.  Tell the doctor if there is a chance you could be pregnant.  Adults: You do not need to do anything special for this exam.  Pediatric Nothing by mouth 3 hours prior.   Please call the Imaging Department at your exam site with any questions.            Jul 13, 2017  9:30 AM CDT   (Arrive by 9:15 AM)   Return Movement Disorder with Paulo Saravia MD   [facility] Neurology ([facility])    [address] [place] [address]  [address] [facility] [address]   [phone]            Jul 13, 2017 10:30 AM CDT   (Arrive by 10:15 AM)   ecg with  CV EKG   [facility] [place] [facility] ([facility])    [address] [place] [address] [phone]            Jul 13, 2017 11:00 AM CDT   LAB with  LAB   [facility] Lab ([facility])    [address] [place] [id number] [phone]           Patient must bring picture ID.  Patient should be prepared to give a urine specimen  Please do not eat 10-12 hours before your appointment if you are coming in fasting for labs on lipids, cholesterol, or glucose (sugar).  Pregnant women should follow their Care Team instructions. Water with medications is okay. Do not drink coffee or other fluids.   If you have concerns about taking  your medications, please ask at office or if scheduling via [employer], send a message by clicking on Secure Messaging, Message Your Care Team.            Jul 13, 2017 12:00 PM CDT   Infusion 60 with UC SPEC INFUSION, UC 50 ATC   [facility] [place] Specialty and Procedure ([facility])    [address] [place] [address]   [phone]            Aug 10, 2017 10:30 AM CDT   (Arrive by 10:15 AM)   Return Movement Disorder with Paulo Saravia MD   [facility] Neurology ([facility])    [address] [facility]  [address] [facility] [address]   [phone]            Aug 10, 2017 11:00 AM CDT   Infusion 60 with UC SPEC INFUSION, UC 49 ATC   [facility] Specialty and Procedure ([facility])    [address] [facility]  [address] [facility] [address] [phone]              Who to contact     If you have questions or need follow up information about today's clinic visit or your schedule please contact [facility] SPECIALTY AND PROCEDURE directly at [phone].  Normal or non-critical lab and imaging results will be communicated to you by MyChart, letter or phone within 4 business days after the clinic has received the results. If you do not hear from us within 7 days, please contact the clinic through [employer] or phone. If you have a critical or abnormal lab result, we will notify you by phone as soon as possible.  Submit refill requests through [employer] or call your pharmacy and they will forward the refill request to us. Please allow 3 business days for your refill to be completed.          Additional Information About Your Visit        [employer] Information     [employer] gives you secure access to your electronic health record. If you see a primary care provider, you can also send messages to your care team and make appointments. If you have questions, please call your primary care clinic.  If you do not have a primary care provider, please call [phone] and they will assist you.        Care EveryWhere ID     This is your Care EveryWhere ID. This could be used by other organizations to access your [place] medical records  [id number]        Your Vitals Were     Pulse Temperature Respirations Pulse Oximetry          80 98.2  F (36.8  C) (Tympanic) 14 98%         Blood Pressure from Last 3 Encounters:   06/15/17 (!) 150/91   06/15/17 156/90   05/16/17 150/83    Weight from Last 3 Encounters:   06/15/17 88.5 kg (195 lb)   04/05/17 89.1 kg (196 lb 8 oz)   02/21/17 88.5 kg (195 lb)              Today, you had the following     No orders found for display       Primary Care Provider    [facility]       No address on file        Thank  you!     Thank you for choosing [facility] SPECIALTY AND PROCEDURE  for your care. Our goal is always to provide you with excellent care. Hearing back from our patients is one way we can continue to improve our services. Please take a few minutes to complete the written survey that you may receive in the mail after your visit with us. Thank you!             Your Updated Medication List - Protect others around you: Learn how to safely use, store and throw away your medicines at www.disposemymeds.org.          This list is accurate as of: 6/15/17  1:01 PM.  Always use your most recent med list.                   Brand Name Dispense Instructions for use    acetaminophen-caffeine 500-65 MG Tabs    EXCEDRIN TENSION HEADACHE     Take 1 tablet by mouth every 6 hours as needed for mild pain       adapalene 0.1 % gel    DIFFERIN    135 g    Apply to the face at night.       amantadine 100 MG capsule    SYMMETREL    270 capsule    Take 1 capsule (100 mg) by mouth 3 times daily 6am,11am and 4pm       BABY ASPIRIN 81 MG chewable tablet   Generic drug:  aspirin      Take 81 mg by mouth daily       CENTRUM SILVER per tablet     30 tablet    Take 1 tablet by mouth daily       COMPRESSION STOCKINGS     4 each    2 each daily       diazepam 5 MG tablet    VALIUM    3 tablet    Take 1 tablet 30 minutes prior to the mri scan. May repeat x 1.       EXELON 4.6 MG/24HR 24 hr patch   Generic drug:  rivastigmine     90 patch    Place 1 patch onto the skin daily       LORazepam 1 MG tablet    ATIVAN    2 tablet    Take 30 minutes prior to MRI scan. May repeat x 1 if needed but 1mg should be enough.  Do not operate a vehicle after taking this medication       * nortriptyline 10 MG capsule    PAMELOR    90 capsule    Take 3 capsules (30 mg) by mouth At Bedtime       * nortriptyline 10 MG capsule    PAMELOR    270 capsule    Take 3 capsules (30 mg) by mouth At Bedtime       olmesartan 20 MG tablet    BENICAR    180  tablet    Take 2 tablets (40 mg) by mouth daily       Vitamin D-3 Super Strength 2000 UNITS tablet   Generic drug:  cholecalciferol      Take 1 tablet by mouth       ZOLMitriptan 5 MG tablet    ZOMIG    30 tablet    Take 1 tablet (5 mg) by mouth at onset of headache for migraine       * Notice:  This list has 2 medication(s) that are the same as other medications prescribed for you. Read the directions carefully, and ask your doctor or other care provider to review them with you.

## 2017-06-15 NOTE — Clinical Note
6/15/2017       RE: Jimmy Patrick  [address]  [facility] [address]     Dear Colleague,    Thank you for referring your patient, Jimmy Patrick, to the [facility] NEUROLOGY at [facility]. Please see a copy of my visit note below.    Answers for HPI/ROS submitted by the patient on 6/14/2017   General Symptoms: No  Skin Symptoms: No  HENT Symptoms: No  EYE SYMPTOMS: No  HEART SYMPTOMS: No  LUNG SYMPTOMS: No  INTESTINAL SYMPTOMS: No  URINARY SYMPTOMS: No  REPRODUCTIVE SYMPTOMS: No  SKELETAL SYMPTOMS: No  BLOOD SYMPTOMS: No  NERVOUS SYSTEM SYMPTOMS: No  MENTAL HEALTH SYMPTOMS: No      Again, thank you for allowing me to participate in the care of your patient.      Sincerely,    Paulo Saravia MD

## 2017-06-21 ENCOUNTER — CARE COORDINATION (OUTPATIENT)
Dept: CARDIOLOGY | Facility: CLINIC | Age: 67
End: 2017-06-21

## 2017-06-21 DIAGNOSIS — I10 HTN (HYPERTENSION): Primary | ICD-10-CM

## 2017-06-21 RX ORDER — AMLODIPINE BESYLATE 2.5 MG/1
2.5 TABLET ORAL DAILY
Qty: 90 TABLET | Refills: 3 | Status: SHIPPED | OUTPATIENT
Start: 2017-06-21 | End: 2018-01-01

## 2017-06-21 NOTE — PROGRESS NOTES
Date: 6/21/2017    Time of Call: 11:50 AM     Diagnosis:  hypertension     [ TORB ] Ordering provider: Dr Gutiérrez  Order:   Start amlodipine 2.5 mg daily at hs     Order received by: balbina de santiago rn      Follow-up/additional notes: order placed, script sent msg sent to pt

## 2017-06-26 ENCOUNTER — HOSPITAL ENCOUNTER (OUTPATIENT)
Dept: SPEECH THERAPY | Facility: CLINIC | Age: 67
Discharge: HOME | End: 2017-06-26
Attending: PSYCHIATRY & NEUROLOGY | Admitting: PSYCHIATRY & NEUROLOGY
Payer: COMMERCIAL

## 2017-06-26 ENCOUNTER — HOSPITAL ENCOUNTER (OUTPATIENT)
Dept: GENERAL RADIOLOGY | Facility: CLINIC | Age: 67
End: 2017-06-26
Attending: PSYCHIATRY & NEUROLOGY
Payer: COMMERCIAL

## 2017-06-26 DIAGNOSIS — R47.02 DYSPHASIA: ICD-10-CM

## 2017-06-26 PROCEDURE — G8998 SWALLOW D/C STATUS: HCPCS | Mod: GN,CN | Performed by: SPEECH-LANGUAGE PATHOLOGIST

## 2017-06-26 PROCEDURE — 74230 X-RAY XM SWLNG FUNCJ C+: CPT

## 2017-06-26 PROCEDURE — G8996 SWALLOW CURRENT STATUS: HCPCS | Mod: GN,CN | Performed by: SPEECH-LANGUAGE PATHOLOGIST

## 2017-06-26 PROCEDURE — 40000211 ZZHC STATISTIC SLP  DEPARTMENT VISIT: Performed by: SPEECH-LANGUAGE PATHOLOGIST

## 2017-06-26 PROCEDURE — 92611 MOTION FLUOROSCOPY/SWALLOW: CPT | Mod: GN | Performed by: SPEECH-LANGUAGE PATHOLOGIST

## 2017-06-26 PROCEDURE — G8997 SWALLOW GOAL STATUS: HCPCS | Mod: GN,CN | Performed by: SPEECH-LANGUAGE PATHOLOGIST

## 2017-06-26 RX ORDER — BARIUM SULFATE 400 MG/ML
30 SUSPENSION ORAL ONCE
Status: COMPLETED | OUTPATIENT
Start: 2017-06-26 | End: 2017-06-26

## 2017-06-26 RX ADMIN — BARIUM SULFATE 20 ML: 400 SUSPENSION ORAL at 11:24

## 2017-06-26 RX ADMIN — BARIUM SULFATE 10 ML: 400 SUSPENSION ORAL at 11:24

## 2017-06-26 NOTE — PROGRESS NOTES
[facility]          OUTPATIENT SWALLOW  EVALUATION  PLAN OF TREATMENT FOR OUTPATIENT REHABILITATION  (COMPLETE FOR INITIAL CLAIMS ONLY)  Patient's Last Name, First Name, M.I.  YOB: 1950  Jimmy Patrick     Provider's Name   [facility]   Medical Record No.  [id number]     Start of Care Date:  06/26/17   Onset Date:  04/25/14 (Diagnosis)   Type:     ___PT   ____OT  ___X_SLP Medical Diagnosis:  Dysphagia     Treatment Diagnosis:    Visits from SOC:  1     _________________________________________________________________________________  Plan of Treatment/Functional Goals:                           Goals   1.                             2.                             3.                             4.                             5.                            6.                              7.                              8.                                      Yenny Arredondo, SLP       I CERTIFY THE NEED FOR THESE SERVICES FURNISHED UNDER        THIS PLAN OF TREATMENT AND WHILE UNDER MY CARE     (Physician co-signature of this document indicates review and certification of the therapy plan).                  Certification date from: 06/26/17 Certification date to: 06/26/17          Referring Physician: Dr. Paulo Saravia    Initial Assessment        See Epic Evaluation Start Of Care Date: 06/26/17

## 2017-06-26 NOTE — PROGRESS NOTES
06/26/17 1452   General Information   Type Of Visit Initial   Start Of Care Date 06/26/17   Referring Physician Dr. Paulo Saravia   Orders Other   Orders Comment Video swallow study   Medical Diagnosis Dysphagia   Onset Of Illness/injury Or Date Of Surgery 04/25/14  (Diagnosis)   Precautions/limitations No Known Precautions/limitations   Pertinent History of Current Problem/OT: Additional Occupational Profile Info Patient is a very pleasant 66 year old male, who was referred for a video swallow study by Dr. Paulo Saravia MD, secondary to increased difficulty swallowing, due to progressive supra nuclear palsy. He is currently being followed by speech therapy at the [facility].     Respiratory Status Room air   Prior Level Of Function Swallowing   Prior Level Of Function Comment Consumes a soft regular diet with thin liquids at home.    Patient/family Goals To continue an oral diet as tolerated.    General Information Comments Patient is here today with his wife. His speech is dysarthric with hypophonia in conversation.    VFSS Evaluation   VFSS Additional Documentation Yes   VFSS Eval: Radiology   Radiologist Dr. Rodrigues   Views Taken left lateral   Physical Location of Procedure [facility]   VFSS Eval: Thin Liquid Texture Trial   Mode of Presentation, Thin Liquid cup;self-fed   Order of Presentation 1   Preparatory Phase Poor bolus control;Holding   Oral Phase, Thin Liquid Poor AP movement;Premature pharyngeal entry   Pharyngeal Phase, Thin Liquid Delayed swallow reflex;Residue in valleculae   Rosenbek's Penetration Aspiration Scale: Thin Liquid Trial Results 8 - contrast passes glottis, visible subglottic residue remains, absent patient response (aspiration)   Response to Aspiration absent response, silent aspiration   Diagnostic Statement Manuel aspiration before and during the swallow due to poor coordination, delay and no epiglottic inversion.    VFSS Eval: Nectar Thick Liquid Texture Trial   Mode of  Presentation, Nectar cup;self-fed   Order of Presentation 2 3   Preparatory Phase Holding;Poor bolus control   Oral Phase, Nectar Poor AP movement;Premature pharyngeal entry   Pharyngeal Phase, Nectar Delayed swallow reflex;Residue in valleculae   Rosenbek's Penetration Aspiration Scale: Nectar-Thick Liquid Trial Results 8 - contrast passes glottis, visible subglottic residue remains, absent patient response (aspiration)   Response to Aspiration, Nectar absent response, silent aspiration   Successful Strategies Trialed During Procedure, Nectar chin tuck  (Not effective.)   Diagnostic Statement Deep laryngeal penetration during the swallow vai the cup and silent aspiration with a chin tuck.    VFSS Eval: Honey Thick Texture Trial   Mode of Presentation, Honey cup;self-fed   Order of Presentation 4 5   Preparatory Phase Holding;Poor bolus control   Oral Phase, Honey Poor AP movement;Residue in oral cavity;Premature pharyngeal entry   Pharyngeal Phase, Honey Residue in valleculae;Delayed swallow reflex   Rosenbek's Penetration Aspiration Scale: Honey Trial Results 5 - contrast contacts vocal cords, visible residue remains (penetration)   Response to Aspiration absent response, silent aspiration   Diagnostic Statement Deep laryngeal penetation to the bottom of the cords with eventual aspiration after on the residue.    VFSS Eval: Puree Solid Texture Trial   Mode of Presentation, Puree spoon;self-fed   Order of Presentation 6   Preparatory Phase Holding;Poor bolus control   Oral Phase, Puree Poor AP movement;Residue in oral cavity;Premature pharyngeal entry   Pharyngeal Phase, Puree Delayed swallow reflex;Residue in valleculae;Pharyngeal wall coating   Rosenbek's Penetration Aspiration Scale: Puree Food Trial Results 3 - contrast remains above the vocal cords, visible residue remains (penetration)   Diagnostic Statement Mild to moderate laryngeal penetration into the vestibule. A cue to cough hard expelled the contrast  out but seconda swallow did not clear the residue. Line of extension for the pyriforms to the valleculae.    Swallow Eval: Clinical Impressions   Skilled Criteria for Therapy Intervention Skilled criteria met.  Treatment indicated.   Functional Assessment Scale (FAS) 1   Dysphagia Outcome Severity Scale (ANTHONY) Level 1 - ANTHONY   Diet texture recommendations NPO  (However, patient wishes to continue PO at this time.)   Recommended Feeding/Eating Techniques alternate between small bites and sips of food/liquid;maintain upright posture during/after eating for 30 mins;small sips/bites;check mouth frequently for oral residue/pocketing   Anticipated Discharge Disposition home w/ outpatient services   Risks and Benefits of Treatment have been explained. Yes   Patient, family and/or staff in agreement with Plan of Care Yes   Clinical Impression Comments Mr. Patrick presents with severe oral and pharyngeal dysphagia, due to progressive supra nuclear palsy. Deficits include; holding of the bolus, incoordination of the timing of the swallow, premature entry, delay and limited to no epiglottic inversion and contraction. These dysfunctions resulted in holding of the bolus with thin liquids. There was laryngeal elevation before the swallow ( incomplete swallow) and then the bolus would move posteriorly and spill into the pharynx resulting in clarissa silent aspiration before and during the swallow. The same occurred with nectar thick liquids by the cup, but with deep laryngeal penetration of the vestibule. A chin tuck resulted in clarissa silent aspiration. Honey thick liquids there was deep laryngeal penetration to the cords with no cough response and eventual aspiration on the residue material. Reduced AP movement of the bolus with pudding, premature entry, very limited to no epiglottic inversion with moderate residue that spilled into the laryngeal vestibule. Cue to cough hard he was able to expel it from the vestibule and swallow,  but did not clear. There was contrast that remained in front of the open airway and on the post pharyngeal wall. Mr. Patrick is at a high risk for aspiration due his current deficits. Education was provided after the study to Mr. Patrick and his wife regarding the results. Recommend: 1. NPO and consider alternate means of nutrition and hydration. 2. Consult MD regarding his wishes for TF verses oral intake despite aspiration. 3. If he wishes to continue PO intake, then nectar thick liquids by spoon and pureed textures implementing a swallow hard, cough swallow due to silent aspiration. Free water protocol to maintain hydration. 4. Follow up with his speech therapist at [facility] as an OP.    Total Session Time   Total Session Time 45   Total Evaluation Time 45   Therapy Certification   Certification date from 06/26/17   Certification date to 06/26/17   Medical Diagnosis Dysphagia   Certification I certify the need for these services furnished under this plan of treatment and while under my care.  (Physician co-signature of this document indicates review and certification of the therapy plan).   SLP Medicare Only G-code   G-code Swallowing   Swallowing   Swallowing:  Current Status , Goal , Discharge -Hjjz Only-Modifier the same for all G-codes CN: 100% impairment   Swallowing: Current  & Discharge Modifier Rationale-Eval Only Clinical judgement with PO trials.

## 2017-07-10 DIAGNOSIS — G43.009 MIGRAINE WITHOUT AURA AND WITHOUT STATUS MIGRAINOSUS, NOT INTRACTABLE: ICD-10-CM

## 2017-07-10 RX ORDER — NORTRIPTYLINE HCL 10 MG
30 CAPSULE ORAL AT BEDTIME
Qty: 270 CAPSULE | Refills: 2 | Status: SHIPPED | OUTPATIENT
Start: 2017-07-10 | End: 2017-10-18

## 2017-07-13 ENCOUNTER — OFFICE VISIT (OUTPATIENT)
Dept: NEUROPSYCHOLOGY | Facility: CLINIC | Age: 67
End: 2017-07-13

## 2017-07-13 ENCOUNTER — OFFICE VISIT (OUTPATIENT)
Dept: NEUROLOGY | Facility: CLINIC | Age: 67
End: 2017-07-13

## 2017-07-13 ENCOUNTER — INFUSION THERAPY VISIT (OUTPATIENT)
Dept: INFUSION THERAPY | Facility: CLINIC | Age: 67
End: 2017-07-13
Attending: PSYCHIATRY & NEUROLOGY
Payer: COMMERCIAL

## 2017-07-13 VITALS
DIASTOLIC BLOOD PRESSURE: 87 MMHG | HEART RATE: 85 BPM | RESPIRATION RATE: 16 BRPM | TEMPERATURE: 97.7 F | SYSTOLIC BLOOD PRESSURE: 147 MMHG | OXYGEN SATURATION: 100 %

## 2017-07-13 DIAGNOSIS — Z00.6 EXAMINATION OF PARTICIPANT IN CLINICAL TRIAL: Primary | ICD-10-CM

## 2017-07-13 DIAGNOSIS — G23.1 PSP (PROGRESSIVE SUPRANUCLEAR PALSY) (H): Primary | ICD-10-CM

## 2017-07-13 DIAGNOSIS — G23.1 PSP (PROGRESSIVE SUPRANUCLEAR PALSY) (H): ICD-10-CM

## 2017-07-13 PROCEDURE — 96365 THER/PROPH/DIAG IV INF INIT: CPT

## 2017-07-13 RX ADMIN — Medication 2100 MG: at 12:28

## 2017-07-13 ASSESSMENT — ENCOUNTER SYMPTOMS
DIZZINESS: 1
EYE IRRITATION: 1
NUMBNESS: 1
MEMORY LOSS: 1
SPUTUM PRODUCTION: 1
COUGH: 1
COUGH DISTURBING SLEEP: 1
DYSURIA: 0
HEMATURIA: 0
SYNCOPE: 0
PALPITATIONS: 0
EYE WATERING: 1
HEADACHES: 1
PARALYSIS: 0
HEMOPTYSIS: 0
TACHYCARDIA: 0
RECTAL BLEEDING: 0
FLANK PAIN: 0
SLEEP DISTURBANCES DUE TO BREATHING: 0
CLAUDICATION: 1
TINGLING: 0
LOSS OF CONSCIOUSNESS: 0
DISTURBANCES IN COORDINATION: 1
SPEECH CHANGE: 1
LEG SWELLING: 1
EYE REDNESS: 1
JAUNDICE: 0
DOUBLE VISION: 1
HEARTBURN: 0
INSOMNIA: 1
WEAKNESS: 1
DIARRHEA: 0
DEPRESSION: 1
SEIZURES: 0
EYE PAIN: 0
BOWEL INCONTINENCE: 0
ABDOMINAL PAIN: 0
VOMITING: 0
RECTAL PAIN: 0
HYPOTENSION: 0
DIFFICULTY URINATING: 1
RESPIRATORY PAIN: 0
EXERCISE INTOLERANCE: 0
TREMORS: 0
DECREASED CONCENTRATION: 1
NERVOUS/ANXIOUS: 1
DYSPNEA ON EXERTION: 1
CONSTIPATION: 1
ORTHOPNEA: 0
PANIC: 0
LIGHT-HEADEDNESS: 0
POSTURAL DYSPNEA: 0
BLOATING: 1
WHEEZING: 0
LEG PAIN: 1
SHORTNESS OF BREATH: 1
SNORES LOUDLY: 0
BLOOD IN STOOL: 0
HYPERTENSION: 1
NAUSEA: 0

## 2017-07-13 NOTE — MR AVS SNAPSHOT
After Visit Summary   7/13/2017    Jimmy Patrick    MRN: [id number]           Patient Information     Date Of Birth          1950        Visit Information        Provider Department      7/13/2017 9:30 AM Paulo Saravia MD [facility]        Today's Diagnoses     PSP (progressive supranuclear palsy) (H)    -  1      Care Instructions    research          Follow-ups after your visit        Your next 10 appointments already scheduled     Jul 13, 2017 10:30 AM CDT   (Arrive by 10:15 AM)   ecg with  CV EKG   [facility] [place] [facility] ([facility])    [address] [place] [address]  1st [facility] [id number] [phone]            Jul 13, 2017 11:00 AM CDT   LAB with  LAB   [facility] ([facility])    [address] [place] [id number] [phone]           Patient must bring picture ID.  Patient should be prepared to give a urine specimen  Please do not eat 10-12 hours before your appointment if you are coming in fasting for labs on lipids, cholesterol, or glucose (sugar).  Pregnant women should follow their Care Team instructions. Water with medications is okay. Do not drink coffee or other fluids.   If you have concerns about taking  your medications, please ask at office or if scheduling via [employer], send a message by clicking on Secure Messaging, Message Your Care Team.            Jul 13, 2017 12:00 PM CDT   Infusion 60 with  SPEC INFUSION, UC 50 ATC   [facility] ([facility])    [address] [place] [address]  [address] [facility] [id number] [phone]            Jul 19, 2017  8:15 AM CDT   (Arrive by 8:00 AM)   RETURN NEURO with Umberto Villafana MD   [facility] ([facility])    [address] [place] [address]  4th [facility] [id number] [phone]            Jul 28, 2017   3:20 PM CDT   PHYSICAL with Evelina Morse MD   [facility]    [id number] [place] [id number]   [phone]            Aug 10, 2017 10:30 AM CDT   (Arrive by 10:15 AM)   Return Movement Disorder with Paulo Saravia MD   [facility] ([facility] [place])    [address]  3rd Floor  [facility] [id number] [phone]            Aug 10, 2017 11:00 AM CDT   Infusion 60 with UC SPEC INFUSION, UC 49 ATC   [facility] [place] [facility] Procedure ([facility] [place])    [address]  2nd Floor  [facility] [id number] [phone]            Sep 07, 2017 10:30 AM CDT   (Arrive by 10:15 AM)   Return Movement Disorder with Paulo Saravia MD   [facility] ([facility] [place])    [address]  [address]rd [facility] [id number] [phone]              Who to contact     Please call your clinic at [phone] to:    Ask questions about your health    Make or cancel appointments    Discuss your medicines    Learn about your test results    Speak to your doctor   If you have compliments or concerns about an experience at your clinic, or if you wish to file a complaint, please contact [facility] Patient Relations at [phone] or email us at [email or web address]         Additional Information About Your Visit        [email or web address] Information     [employer] gives you secure access to your electronic health record. If you see a primary care provider, you can also send messages to your care team and make appointments. If you have questions, please call your primary care clinic.  If you do not have a primary care provider, please call [phone] and they will assist you.      [employer] is an electronic gateway that provides easy, online access to your medical records. With [employer], you can request a clinic  appointment, read your test results, renew a prescription or communicate with your care team.     To access your existing account, please contact your [facility] or call [phone] for assistance.        Care EveryWhere ID     This is your Care EveryWhere ID. This could be used by other organizations to access your [facility] medical records  [id number]         Blood Pressure from Last 3 Encounters:   06/15/17 (!) 150/91   06/15/17 156/90   05/16/17 150/83    Weight from Last 3 Encounters:   06/15/17 88.5 kg (195 lb)   04/05/17 89.1 kg (196 lb 8 oz)   02/21/17 88.5 kg (195 lb)              Today, you had the following     No orders found for display       Primary Care Provider    [facility]       No address on file        Equal Access to Services     LEDY STEELE : Hadii robin Granado, waosmar luqadaha, qaybta kaalmada higinio, gely heller . So [facility] [phone].    ATENCIÓN: Si habla español, tiene a langford disposición servicios gratuitos de asistencia lingüística. Jeroame al [phone].    We comply with applicable federal civil rights laws and [place] laws. We do not discriminate on the basis of race, color, national origin, age, disability sex, sexual orientation or gender identity.            Thank you!     Thank you for choosing [facility]  for your care. Our goal is always to provide you with excellent care. Hearing back from our patients is one way we can continue to improve our services. Please take a few minutes to complete the written survey that you may receive in the mail after your visit with us. Thank you!             Your Updated Medication List - Protect others around you: Learn how to safely use, store and throw away your medicines at www.disposemymeds.org.          This list is accurate as of: 7/13/17 10:18 AM.  Always use your most recent med list.                   Brand Name Dispense  Instructions for use Diagnosis    acetaminophen-caffeine 500-65 MG Tabs    EXCEDRIN TENSION HEADACHE     Take 1 tablet by mouth every 6 hours as needed for mild pain        adapalene 0.1 % gel    DIFFERIN    135 g    Apply to the face at night.    Acne vulgaris       amantadine 100 MG capsule    SYMMETREL    270 capsule    Take 1 capsule (100 mg) by mouth 3 times daily 6am,11am and 4pm    PSP (progressive supranuclear palsy) (H)       amLODIPine 2.5 MG tablet    NORVASC    90 tablet    Take 1 tablet (2.5 mg) by mouth daily In evening.    HTN (hypertension)       BABY ASPIRIN 81 MG chewable tablet   Generic drug:  aspirin      Take 81 mg by mouth daily        CENTRUM SILVER per tablet     30 tablet    Take 1 tablet by mouth daily        COMPRESSION STOCKINGS     4 each    2 each daily    Edema, Diastolic dysfunction       diazepam 5 MG tablet    VALIUM    3 tablet    Take 1 tablet 30 minutes prior to the mri scan. May repeat x 1.    PSP (progressive supranuclear palsy) (H)       EXELON 4.6 MG/24HR 24 hr patch   Generic drug:  rivastigmine     90 patch    Place 1 patch onto the skin daily    PSP (progressive supranuclear palsy) (H)       LORazepam 1 MG tablet    ATIVAN    2 tablet    Take 30 minutes prior to MRI scan. May repeat x 1 if needed but 1mg should be enough.  Do not operate a vehicle after taking this medication    Claustrophobia       * nortriptyline 10 MG capsule    PAMELOR    270 capsule    Take 3 capsules (30 mg) by mouth At Bedtime    Migraine without aura and without status migrainosus, not intractable       * nortriptyline 10 MG capsule    PAMELOR    270 capsule    Take 3 capsules (30 mg) by mouth At Bedtime    Migraine without aura and without status migrainosus, not intractable       olmesartan 20 MG tablet    BENICAR    180 tablet    Take 2 tablets (40 mg) by mouth daily    PSP (progressive supranuclear palsy) (H), Migraine without aura and without status migrainosus, not intractable       Vitamin  D-3 Super Strength 2000 UNITS tablet   Generic drug:  cholecalciferol      Take 1 tablet by mouth        ZOLMitriptan 5 MG tablet    ZOMIG    30 tablet    Take 1 tablet (5 mg) by mouth at onset of headache for migraine    PSP (progressive supranuclear palsy) (H), Migraine without aura and without status migrainosus, not intractable       * Notice:  This list has 2 medication(s) that are the same as other medications prescribed for you. Read the directions carefully, and ask your doctor or other care provider to review them with you.

## 2017-07-13 NOTE — PROGRESS NOTES
Research visit  Answers for HPI/ROS submitted by the patient on 7/13/2017   General Symptoms: No  Skin Symptoms: No  HENT Symptoms: No  EYE SYMPTOMS: Yes  HEART SYMPTOMS: Yes  LUNG SYMPTOMS: Yes  INTESTINAL SYMPTOMS: Yes  URINARY SYMPTOMS: Yes  REPRODUCTIVE SYMPTOMS: No  SKELETAL SYMPTOMS: No  BLOOD SYMPTOMS: No  NERVOUS SYSTEM SYMPTOMS: Yes  MENTAL HEALTH SYMPTOMS: Yes  Eye pain: No  Vision loss: Yes  Dry eyes: Yes  Watery eyes: Yes  Eye bulging: No  Double vision: Yes  Flashing of lights: No  Spots: No  Floaters: Yes  Redness: Yes  Crossed eyes: No  Tunnel Vision: No  Yellowing of eyes: No  Eye irritation: Yes  Cough: Yes  Sputum or phlegm: Yes  Coughing up blood: No  Difficulty breating or shortness of breath: Yes  Snoring: No  Wheezing: No  Difficulty breathing on exertion: Yes  Respiratory pain: No  Nighttime Cough: Yes  Difficulty breathing when lying flat: No  Chest pain or pressure: No  Fast or irregular heartbeat: No  Pain in legs with walking: Yes  Swelling in feet or ankles: Yes  Trouble breathing while lying down: No  Fingers or Toes appear blue: Yes  High blood pressure: Yes  Low blood pressure: No  Fainting: No  Murmurs: No  Chest pain on exertion: Yes  Chest pain at rest: No  Cramping pain in leg during exercise: Yes  Pacemaker: No  Varicose veins: No  Edema or swelling: Yes  Fast heart beat: No  Wake up at night with shortness of breath: No  Heart flutters: No  Light-headedness: No  Exercise intolerance: No  Heart burn or indigestion: No  Nausea: No  Vomiting: No  Abdominal pain: No  Bloating: Yes  Constipation: Yes  Diarrhea: No  Blood in stool: No  Black stools: No  Rectal or Anal pain: No  Fecal incontinence: No  Rectal bleeding: No  Yellowing of skin or eyes: No  Vomit with blood: No  Change in stools: No  Hemorrhoids: No  Trouble holding urine or incontinence: Yes  Pain or burning: No  Trouble starting or stopping: Yes  Increased frequency of urination: No  Blood in urine: No  Decreased  frequency of urination: No  Frequent nighttime urination: No  Flank pain: No  Difficulty emptying bladder: Yes  Trouble with coordination: Yes  Dizziness or trouble with balance: Yes  Fainting or black-out spells: No  Memory loss: Yes  Headache: Yes  Seizures: No  Speech problems: Yes  Tingling: No  Tremor: No  Weakness: Yes  Difficulty walking: Yes  Paralysis: No  Numbness: Yes  Nervous or Anxious: Yes  Depression: Yes  Trouble sleeping: Yes  Trouble thinking or concentrating: Yes  Mood changes: No  Panic attacks: No

## 2017-07-13 NOTE — MR AVS SNAPSHOT
After Visit Summary   7/13/2017    Jimmy Patrick    MRN: [id number]           Patient Information     Date Of Birth          1950        Visit Information        Provider Department      7/13/2017 8:00 AM Emperatriz Candelaria, PhD [facility] Neuropsychology        Today's Diagnoses     Examination of participant in clinical trial    -  1       Follow-ups after your visit        Your next 10 appointments already scheduled     Jul 28, 2017  3:20 PM CDT   PHYSICAL with Evelina Morse MD   [facility] ([facility]    [address] [place] [address]   [phone]            Aug 10, 2017 10:30 AM CDT   (Arrive by 10:15 AM)   Return Movement Disorder with Paulo Saravia MD   [facility] Neurology ([facility])    [address] [place] [address]  3rd [facility] [id number] [phone]            Aug 10, 2017 11:00 AM CDT   Infusion 60 with UC SPEC INFUSION, UC 49 ATC   [facility] ([facility])    [address] [place] [id number] [phone]            Sep 07, 2017 10:30 AM CDT   (Arrive by 10:15 AM)   Return Movement Disorder with Paulo Saravia MD   [facility] Neurology ([facility])    [address] [place] [address]  3rd [facility] [id number] [phone]            Sep 07, 2017 11:00 AM CDT   Infusion 60 with UC SPEC INFUSION, UC 41 ATC   [facility] ([facility])    [address] [place] [address]  2nd [facility] [id number] [phone]            Oct 04, 2017  8:00 AM CDT   (Arrive by 7:45 AM)   XR LUMBAR PUNCTURE SPINAL TAP DIAGNOSTIC with UCXR3, UC IMAGING NURSE, UC NEURO RAD   [facility] Xray ([facility])    [address] [place] [address]    [phone]           Follow your care team s guidelines for eating and drinking.  You may need to stop taking certain medicines before this exam. If so, we will tell you in advance.  Tell your care team:   If you have any allergies.   If there s a chance you may be pregnant.              Who to contact     Please call your clinic at [phone] to:    Ask questions about your health    Make or cancel appointments    Discuss your medicines    Learn about your test results    Speak to your doctor   If you have compliments or concerns about an experience at your clinic, or if you wish to file a complaint, please contact [facility] Patient Relations at [phone] or email us at [email or web address]         Additional Information About Your Visit        [employer] Information     [employer] gives you secure access to your electronic health record. If you see a primary care provider, you can also send messages to your care team and make appointments. If you have questions, please call your primary care clinic.  If you do not have a primary care provider, please call [phone] and they will assist you.      [employer] is an electronic gateway that provides easy, online access to your medical records. With [employer], you can request a clinic appointment, read your test results, renew a prescription or communicate with your care team.     To access your existing account, please contact your [facility] or call [phone] for assistance.        Care EveryWhere ID     This is your Care EveryWhere ID. This could be used by other organizations to access your [place] medical records  [id number]         Blood Pressure from Last 3 Encounters:   07/13/17 147/87   06/15/17 (!) 150/91   06/15/17 156/90    Weight from Last 3 Encounters:   06/15/17 88.5 kg (195 lb)   04/05/17 89.1 kg (196 lb 8 oz)   02/21/17 88.5 kg (195 lb)              We Performed the Following      NEUROPSYCH TESTING BY [facility]        Primary Care Provider    [facility]       No address on file        Equal Access to Services     LEDY IVETTE : Hadii aad ku hadgrantlaurie Randaali, wacésarda antadaha, qaybta kalisandroda allyhimajocelynn, gely vivar rachaelharry toscanonilsa malissanilessri lynch. So [facility] [phone].    ATENCIÓN: Si habla español, tiene a langford disposición servicios gratuitos de asistencia lingüística. Kiel al [phone].    We comply with applicable federal civil rights laws and [place] laws. We do not discriminate on the basis of race, color, national origin, age, disability sex, sexual orientation or gender identity.            Thank you!     Thank you for choosing [facility] NEUROPSYCHOLOGY  for your care. Our goal is always to provide you with excellent care. Hearing back from our patients is one way we can continue to improve our services. Please take a few minutes to complete the written survey that you may receive in the mail after your visit with us. Thank you!             Your Updated Medication List - Protect others around you: Learn how to safely use, store and throw away your medicines at www.disposemymeds.org.          This list is accurate as of: 7/13/17 11:59 PM.  Always use your most recent med list.                   Brand Name Dispense Instructions for use Diagnosis    acetaminophen-caffeine 500-65 MG Tabs    EXCEDRIN TENSION HEADACHE     Take 1 tablet by mouth every 6 hours as needed for mild pain        adapalene 0.1 % gel    DIFFERIN    135 g    Apply to the face at night.    Acne vulgaris       amantadine 100 MG capsule    SYMMETREL    270 capsule    Take 1 capsule (100 mg) by mouth 3 times daily 6am,11am and 4pm    PSP (progressive supranuclear palsy) (H)       amLODIPine 2.5 MG tablet    NORVASC    90 tablet    Take 1 tablet (2.5 mg) by mouth daily In evening.    HTN (hypertension)       BABY ASPIRIN 81 MG chewable tablet   Generic drug:  aspirin      Take 81 mg by mouth daily         CENTRUM SILVER per tablet     30 tablet    Take 1 tablet by mouth daily        COMPRESSION STOCKINGS     4 each    2 each daily    Edema, Diastolic dysfunction       diazepam 5 MG tablet    VALIUM    3 tablet    Take 1 tablet 30 minutes prior to the mri scan. May repeat x 1.    PSP (progressive supranuclear palsy) (H)       EXELON 4.6 MG/24HR 24 hr patch   Generic drug:  rivastigmine     90 patch    Place 1 patch onto the skin daily    PSP (progressive supranuclear palsy) (H)       LORazepam 1 MG tablet    ATIVAN    2 tablet    Take 30 minutes prior to MRI scan. May repeat x 1 if needed but 1mg should be enough.  Do not operate a vehicle after taking this medication    Claustrophobia       * nortriptyline 10 MG capsule    PAMELOR    270 capsule    Take 3 capsules (30 mg) by mouth At Bedtime    Migraine without aura and without status migrainosus, not intractable       * nortriptyline 10 MG capsule    PAMELOR    270 capsule    Take 3 capsules (30 mg) by mouth At Bedtime    Migraine without aura and without status migrainosus, not intractable       olmesartan 20 MG tablet    BENICAR    180 tablet    Take 2 tablets (40 mg) by mouth daily    PSP (progressive supranuclear palsy) (H), Migraine without aura and without status migrainosus, not intractable       Vitamin D-3 Super Strength 2000 UNITS tablet   Generic drug:  cholecalciferol      Take 1 tablet by mouth        ZOLMitriptan 5 MG tablet    ZOMIG    30 tablet    Take 1 tablet (5 mg) by mouth at onset of headache for migraine    PSP (progressive supranuclear palsy) (H), Migraine without aura and without status migrainosus, not intractable       * Notice:  This list has 2 medication(s) that are the same as other medications prescribed for you. Read the directions carefully, and ask your doctor or other care provider to review them with you.

## 2017-07-13 NOTE — PATIENT INSTRUCTIONS
Received awake and oriented x 1, pleasantly confused, [place], follows simple command, due med given and offered fluids, call light within reach, bed alarm in placed, o2 in placed, q 2 turn, kept skin dry and clean from incontinence, needs attended, will continue to monitor.   research

## 2017-07-13 NOTE — PROGRESS NOTES
Nursing Note  Jimmy Patrick [facility] for:   Chief Complaint   Patient presents with     Infusion     study [id number] (IDS# [id number]) IV infusion     During today's [facility] appointment, orders from Dr. Paulo Saravia were completed.  Frequency: monthly    Progress note:  Patient identification verified by name and date of birth.  Assessment completed.  Vitals recorded in Doc Flowsheets.  Patient was provided with education regarding infusion and possible side effects.  Patient verbalized understanding.      needed: No  Premedications: were not ordered.  Infusion Rates: infusion given over approximately 1 hour.  Approximate Infusion length:1 hours.   Labs: were not ordered for this appointment.  Vascular access: peripheral IV placed today.  Treatment Conditions: non-applicable.  Patient tolerated infusion: well.      Discharge Plan:   Follow up plan of care with: ongoing infusions at [facility].  Discharge instructions were reviewed with patient.  Patient/representative verbalized understanding of discharge instructions and all questions answered.  Patient discharged from [facility] in stable condition.    Trina Barroso RN    Administrations This Visit     study [id number] (IDS# [id number]) IV infusion 2,100 mg 210 mL     Admin Date Action Dose Rate Route Administered By          07/13/2017 New Bag 2100 mg 210 mL/hr Intravenous Trina Barroso RN                         /89  Pulse 75  Temp 97.7  F (36.5  C) (Tympanic)  Resp 16  SpO2 100%

## 2017-07-14 LAB — INTERPRETATION ECG - MUSE: NORMAL

## 2017-07-19 ENCOUNTER — OFFICE VISIT (OUTPATIENT)
Dept: OPHTHALMOLOGY | Facility: CLINIC | Age: 67
End: 2017-07-19

## 2017-07-19 DIAGNOSIS — H04.123 DRY EYE SYNDROME, BILATERAL: Primary | ICD-10-CM

## 2017-07-19 DIAGNOSIS — G23.1 PSP (PROGRESSIVE SUPRANUCLEAR PALSY) (H): ICD-10-CM

## 2017-07-19 RX ORDER — CYCLOSPORINE 0.5 MG/ML
1 EMULSION OPHTHALMIC 2 TIMES DAILY
Qty: 1 BOX | Refills: 11 | Status: SHIPPED | OUTPATIENT
Start: 2017-07-19 | End: 2017-10-18

## 2017-07-19 RX ORDER — CYCLOSPORINE 0.5 MG/ML
1 EMULSION OPHTHALMIC 2 TIMES DAILY
Qty: 3 BOX | Refills: 3 | Status: SHIPPED | OUTPATIENT
Start: 2017-07-19 | End: 2017-07-19

## 2017-07-19 RX ORDER — CYCLOSPORINE 0.5 MG/ML
1 EMULSION OPHTHALMIC 2 TIMES DAILY
Qty: 3 BOX | Refills: 3 | Status: SHIPPED | OUTPATIENT
Start: 2017-07-19 | End: 2017-10-18

## 2017-07-19 ASSESSMENT — TONOMETRY
OS_IOP_MMHG: 14
OD_IOP_MMHG: 14
IOP_METHOD: ICARE

## 2017-07-19 ASSESSMENT — REFRACTION_MANIFEST
OD_SPHERE: -6.25
OS_CYLINDER: +1.25
OS_SPHERE: -8.00
OD_AXIS: 140
OS_ADD: +2.75
OD_CYLINDER: +0.50
OS_AXIS: 005
OD_ADD: +2.75

## 2017-07-19 ASSESSMENT — REFRACTION_WEARINGRX
SPECS_TYPE: BI-FOCAL
OD_SPHERE: -6.25
SPECS_TYPE: PAL
OS_ADD: +2.75
OS_ADD: +2.75
OS_CYLINDER: +1.25
OS_AXIS: 164
OS_SPHERE: -8.00
OD_AXIS: 136
OS_SPHERE: -8.75
OS_CYLINDER: +1.75
OD_ADD: +2.75
OD_AXIS: 140
OS_AXIS: 005
OD_CYLINDER: +1.75
OD_SPHERE: -7.00
OD_ADD: +2.75
OD_VPRISM: 3 BU
OD_CYLINDER: +0.50

## 2017-07-19 ASSESSMENT — VISUAL ACUITY
OD_CC+: +2
METHOD: SNELLEN - LINEAR
OD_CC: J1+
OD_CC: 20/40
OS_CC: J1+
OS_CC: 20/40
OS_CC+: +2
CORRECTION_TYPE: GLASSES

## 2017-07-19 ASSESSMENT — SLIT LAMP EXAM - LIDS
COMMENTS: PTOSIS, MEIBOMIAN GLAND DYSFUNCTION
COMMENTS: PTOSIS, MEIBOMIAN GLAND DYSFUNCTION

## 2017-07-19 ASSESSMENT — EXTERNAL EXAM - LEFT EYE: OS_EXAM: NORMAL

## 2017-07-19 ASSESSMENT — CONF VISUAL FIELD
METHOD: COUNTING FINGERS
OD_NORMAL: 1
OS_NORMAL: 1

## 2017-07-19 ASSESSMENT — EXTERNAL EXAM - RIGHT EYE: OD_EXAM: NORMAL

## 2017-07-19 ASSESSMENT — CUP TO DISC RATIO
OS_RATIO: 0.1
OD_RATIO: 0.1

## 2017-07-19 NOTE — PATIENT INSTRUCTIONS
You are sensitive to the light.  There is generally no known cause for this.  There is a specialized tint called FL-41 that blocks a certain wavelength of light known to cause light sensitivity. Your eyeglass lenses can be tinted with this at just about any eyeglass store but not all stores carry this tint.  You should call before visiting the store.  The store may try to substitute with a tint that they have in stock, but I would recommend against it.  There is also a company that will sell FL-41 tinted lenses online at [employer] or [employer].      Generally speaking, when you are at home, try not to wear sunglasses inside, especially the evenings.  The more you wear them, the less tolerant of light you become.  This does not apply to the FL-41 lenses since they are not that dark.  There are also smart LED light bulbs that can be controlled from your smart phone.  They can be made any color and any brightness.  You may find that there is a particular color and brightness that helps you.      There are tinted contact lenses.  These can be made using the FL-41 tint or another color.  They are very custom made and so they tend to cost hundreds of dollars.

## 2017-07-19 NOTE — MR AVS SNAPSHOT
After Visit Summary   7/19/2017    Jimmy Patrick    MRN: [id number]           Patient Information     Date Of Birth          1950        Visit Information        Provider Department      7/19/2017 8:15 AM Umberto Villafana MD [facility] Ophthalmology        Today's Diagnoses     Dry eye syndrome, bilateral    -  1      Care Instructions    You are sensitive to the light.  There is generally no known cause for this.  There is a specialized tint called FL-41 that blocks a certain wavelength of light known to cause light sensitivity. Your eyeglass lenses can be tinted with this at just about any eyeglass store but not all stores carry this tint.  You should call before visiting the store.  The store may try to substitute with a tint that they have in stock, but I would recommend against it.  There is also a company that will sell FL-41 tinted lenses online at [employer] or [employer].      Generally speaking, when you are at home, try not to wear sunglasses inside, especially the evenings.  The more you wear them, the less tolerant of light you become.  This does not apply to the FL-41 lenses since they are not that dark.  There are also smart LED light bulbs that can be controlled from your smart phone.  They can be made any color and any brightness.  You may find that there is a particular color and brightness that helps you.      There are tinted contact lenses.  These can be made using the FL-41 tint or another color.  They are very custom made and so they tend to cost hundreds of dollars.                Follow-ups after your visit        Your next 10 appointments already scheduled     Jul 28, 2017  3:20 PM CDT   PHYSICAL with Evelina Morse MD   [facility] ([facility])    [address] [place] [address]   [phone]            Aug 10, 2017 10:30 AM CDT   (Arrive by 10:15 AM)   Return Movement Disorder with Paulo Olivas  MD Rani   [facility] Neurology ([facility])    [address] [facility] [address]  3rd Floor  [facility] [address]   [phone]            Aug 10, 2017 11:00 AM CDT   Infusion 60 with UC SPEC INFUSION, UC 49 ATC   [facility] ([facility])    [address]  2nd Floor  [facility] [address]   [phone]            Sep 07, 2017 10:30 AM CDT   (Arrive by 10:15 AM)   Return Movement Disorder with Paulo Saravia MD   [facility]rology ([facility])    [address]  3rd Floor  [facility] [address]   [phone]            Sep 07, 2017 11:00 AM CDT   Infusion 60 with UC SPEC INFUSION, UC 41 ATC   [facility] ([facility])    [address]  2nd Floor  [facility] [address]   [phone]            Oct 04, 2017  8:00 AM CDT   (Arrive by 7:45 AM)   XR LUMBAR PUNCTURE SPINAL TAP DIAGNOSTIC with HAIXR3, HAI IMAGING NURSE, HAI NEURO RAD   [facility] [place] X[facility] ([facility])    [address] [place] [address]  1st Floor  [facility] [address] [phone]           Follow your care team s guidelines for eating and drinking.  You may need to stop taking certain medicines before this exam. If so, we will tell you in advance.  Tell your care team:   If you have any allergies.   If there s a chance you may be pregnant.              Who to contact     Please call your clinic at [phone] to:    Ask questions about your health    Make or cancel appointments    Discuss your medicines    Learn about your test results    Speak to your doctor   If you have compliments or concerns about an experience at your clinic, or if you wish to file a complaint, please contact [facility] Patient Relations at [phone] or email us at [email or web address]          Additional Information About Your Visit        [employer] Information     [employer] gives you secure access to your electronic health record. If you see a primary care provider, you can also send messages to your care team and make appointments. If you have questions, please call your primary care clinic.  If you do not have a primary care provider, please call [phone] and they will assist you.      [employer] is an electronic gateway that provides easy, online access to your medical records. With [employer], you can request a clinic appointment, read your test results, renew a prescription or communicate with your care team.     To access your existing account, please contact your [facility] or call [phone] for assistance.        Care EveryWhere ID     This is your Care EveryWhere ID. This could be used by other organizations to access your [place] medical records  [id number]         Blood Pressure from Last 3 Encounters:   07/13/17 147/87   06/15/17 (!) 150/91   06/15/17 156/90    Weight from Last 3 Encounters:   06/15/17 88.5 kg (195 lb)   04/05/17 89.1 kg (196 lb 8 oz)   02/21/17 88.5 kg (195 lb)              Today, you had the following     No orders found for display         Today's Medication Changes          These changes are accurate as of: 7/19/17  9:16 AM.  If you have any questions, ask your nurse or doctor.               Start taking these medicines.        Dose/Directions    * cycloSPORINE 0.05 % ophthalmic emulsion   Commonly known as:  RESTASIS   Used for:  Dry eye syndrome, bilateral   Started by:  Umberto Villafana MD        Dose:  1 drop   Place 1 drop into both eyes 2 times daily   Quantity:  1 Box   Refills:  11       * cycloSPORINE 0.05 % ophthalmic emulsion   Commonly known as:  RESTASIS   Used for:  Dry eye syndrome, bilateral   Started by:  Umberto Villafana MD        Dose:  1 drop   Place 1 drop into both eyes 2 times daily   Quantity:  3 Box    Refills:  3       * Notice:  This list has 2 medication(s) that are the same as other medications prescribed for you. Read the directions carefully, and ask your doctor or other care provider to review them with you.         Where to get your medicines      These medications were sent to [id number] - [facility] [address] [address]  [address] [address], [facility] [address]     Phone:  [phone]     cycloSPORINE 0.05 % ophthalmic emulsion         Some of these will need a paper prescription and others can be bought over the counter.  Ask your nurse if you have questions.     Bring a paper prescription for each of these medications     cycloSPORINE 0.05 % ophthalmic emulsion                Primary Care Provider    [facility]       No address on file        Equal Access to Services     LEDY STEELE : Berny Granado, waosmar garcia, qazulma kaalmajocelynn sylvester, gely lynch. So [facility] [phone].    ATENCIÓN: Si habla español, tiene a langford disposición servicios gratuitos de asistencia lingüística. LlThe [facility] [phone].    We comply with applicable federal civil rights laws and [place] laws. We do not discriminate on the basis of race, color, national origin, age, disability sex, sexual orientation or gender identity.            Thank you!     Thank you for choosing [facility] OPHTHALMOLOGY  for your care. Our goal is always to provide you with excellent care. Hearing back from our patients is one way we can continue to improve our services. Please take a few minutes to complete the written survey that you may receive in the mail after your visit with us. Thank you!             Your Updated Medication List - Protect others around you: Learn how to safely use, store and throw away your medicines at www.disposemymeds.org.          This list is accurate as of: 7/19/17  9:16 AM.  Always use your most recent med list.                    Brand Name Dispense Instructions for use Diagnosis    acetaminophen-caffeine 500-65 MG Tabs    EXCEDRIN TENSION HEADACHE     Take 1 tablet by mouth every 6 hours as needed for mild pain        adapalene 0.1 % gel    DIFFERIN    135 g    Apply to the face at night.    Acne vulgaris       amantadine 100 MG capsule    SYMMETREL    270 capsule    Take 1 capsule (100 mg) by mouth 3 times daily 6am,11am and 4pm    PSP (progressive supranuclear palsy) (H)       amLODIPine 2.5 MG tablet    NORVASC    90 tablet    Take 1 tablet (2.5 mg) by mouth daily In evening.    HTN (hypertension)       BABY ASPIRIN 81 MG chewable tablet   Generic drug:  aspirin      Take 81 mg by mouth daily        CENTRUM SILVER per tablet     30 tablet    Take 1 tablet by mouth daily        COMPRESSION STOCKINGS     4 each    2 each daily    Edema, Diastolic dysfunction       * cycloSPORINE 0.05 % ophthalmic emulsion    RESTASIS    1 Box    Place 1 drop into both eyes 2 times daily    Dry eye syndrome, bilateral       * cycloSPORINE 0.05 % ophthalmic emulsion    RESTASIS    3 Box    Place 1 drop into both eyes 2 times daily    Dry eye syndrome, bilateral       diazepam 5 MG tablet    VALIUM    3 tablet    Take 1 tablet 30 minutes prior to the mri scan. May repeat x 1.    PSP (progressive supranuclear palsy) (H)       EXELON 4.6 MG/24HR 24 hr patch   Generic drug:  rivastigmine     90 patch    Place 1 patch onto the skin daily    PSP (progressive supranuclear palsy) (H)       LORazepam 1 MG tablet    ATIVAN    2 tablet    Take 30 minutes prior to MRI scan. May repeat x 1 if needed but 1mg should be enough.  Do not operate a vehicle after taking this medication    Claustrophobia       * nortriptyline 10 MG capsule    PAMELOR    270 capsule    Take 3 capsules (30 mg) by mouth At Bedtime    Migraine without aura and without status migrainosus, not intractable       * nortriptyline 10 MG capsule    PAMELOR    270 capsule    Take 3 capsules (30  mg) by mouth At Bedtime    Migraine without aura and without status migrainosus, not intractable       olmesartan 20 MG tablet    BENICAR    180 tablet    Take 2 tablets (40 mg) by mouth daily    PSP (progressive supranuclear palsy) (H), Migraine without aura and without status migrainosus, not intractable       Vitamin D-3 Super Strength 2000 UNITS tablet   Generic drug:  cholecalciferol      Take 1 tablet by mouth        ZOLMitriptan 5 MG tablet    ZOMIG    30 tablet    Take 1 tablet (5 mg) by mouth at onset of headache for migraine    PSP (progressive supranuclear palsy) (H), Migraine without aura and without status migrainosus, not intractable       * Notice:  This list has 4 medication(s) that are the same as other medications prescribed for you. Read the directions carefully, and ask your doctor or other care provider to review them with you.

## 2017-07-19 NOTE — PROGRESS NOTES
Assessment & Plan     Jimmy Patrick is a 66 year old male with the following diagnoses:   1. Dry eye syndrome, bilateral    2. PSP (progressive supranuclear palsy) (H)       He has progressive supranuclear palsy and this looks like it has worsened.  He has monocular double vision secondary to Dry eye syndrome.  Will give restasis and continue artificial tear drops.  He has significant photophobia. Will ask him to use FL-41 tint.  He asks about tinted windshield. We discussed it and he will wait until after he tries the FL-41 tint.             Attending Physician Attestation:  Complete documentation of historical and exam elements from today's encounter can be found in the full encounter summary report (not reduplicated in this progress note).  I personally obtained the chief complaint(s) and history of present illness.  I confirmed and edited as necessary the review of systems, past medical/surgical history, family history, social history, and examination findings as documented by others; and I examined the patient myself.  I personally reviewed the relevant tests, images, and reports as documented above.  I formulated and edited as necessary the assessment and plan and discussed the findings and management plan with the patient and family. - Umberto Villafana MD

## 2017-07-19 NOTE — NURSING NOTE
Chief Complaints and History of Present Illnesses   Patient presents with     Follow Up For     Diplopia     HPI    Affected eye(s):  Both   Symptoms:     Blurred vision   Double vision      Frequency:  Constant       Do you have eye pain now?:  No      Comments:  Pt states here to follow up- feels gls are not working as well. Vision is not very clear, most trouble with seeing stairs. Pt states continues to have double vision- Prism was only put on reading gls and when wearing those there is no double vision. No pain in eyes.    Mark Rivera COT 8:04 AM July 19, 2017

## 2017-07-21 NOTE — PROGRESS NOTES
The participant completed the neuropsychology evaluation for the study with [facility] number [id number]. This included one hour of psychometrist time.    Measures administered:    MoCA  Repeatable Battery for the Assessment of Neuropsychological Status (RBANS)  Phonemic Fluency  Letter Number Sequencing Test  Color Trails Test

## 2017-07-24 ENCOUNTER — TELEPHONE (OUTPATIENT)
Dept: NEUROLOGY | Facility: CLINIC | Age: 67
End: 2017-07-24

## 2017-07-24 DIAGNOSIS — G20.A1 PARALYSIS AGITANS (H): Primary | ICD-10-CM

## 2017-07-24 DIAGNOSIS — G23.1 PSP (PROGRESSIVE SUPRANUCLEAR PALSY) (H): ICD-10-CM

## 2017-07-24 DIAGNOSIS — G23.1 PSP (PROGRESSIVE SUPRANUCLEAR PALSY) (H): Primary | ICD-10-CM

## 2017-07-24 DIAGNOSIS — F80.0 ARTICULATION DISORDER: ICD-10-CM

## 2017-07-24 NOTE — TELEPHONE ENCOUNTER
[facility] Call Center    Phone Message    Name of Caller: TE MIMS    Phone Number: FAX: [phone]    Best time to return call: Please fax order for OT    May a detailed message be left on voicemail: yes    Reason for Call: Other: Need OT order faxed     Action Taken: Message routed to:  Adult Clinics: Neurology p [id number]

## 2017-07-25 NOTE — TELEPHONE ENCOUNTER
The pt is wanting to go to the [facility] program at [facility]. Request for orders faxed to Dr Saravia to review and sign.  Beba Morton RN

## 2017-07-28 ENCOUNTER — OFFICE VISIT (OUTPATIENT)
Dept: PEDIATRICS | Facility: CLINIC | Age: 67
End: 2017-07-28
Payer: COMMERCIAL

## 2017-07-28 VITALS
SYSTOLIC BLOOD PRESSURE: 134 MMHG | BODY MASS INDEX: 26.49 KG/M2 | OXYGEN SATURATION: 97 % | DIASTOLIC BLOOD PRESSURE: 80 MMHG | WEIGHT: 195.6 LBS | TEMPERATURE: 96.6 F | HEIGHT: 72 IN | HEART RATE: 84 BPM

## 2017-07-28 DIAGNOSIS — R47.9 SPEECH DYSFUNCTION: ICD-10-CM

## 2017-07-28 DIAGNOSIS — Z86.79 S/P ABLATION OF ATRIAL FLUTTER: ICD-10-CM

## 2017-07-28 DIAGNOSIS — Z11.59 NEED FOR HEPATITIS C SCREENING TEST: ICD-10-CM

## 2017-07-28 DIAGNOSIS — Z98.890 S/P ABLATION OF ATRIAL FLUTTER: ICD-10-CM

## 2017-07-28 DIAGNOSIS — I10 ESSENTIAL HYPERTENSION: ICD-10-CM

## 2017-07-28 DIAGNOSIS — Z23 NEED FOR PROPHYLACTIC VACCINATION AGAINST STREPTOCOCCUS PNEUMONIAE (PNEUMOCOCCUS): ICD-10-CM

## 2017-07-28 DIAGNOSIS — G43.009 MIGRAINE WITHOUT AURA AND WITHOUT STATUS MIGRAINOSUS, NOT INTRACTABLE: ICD-10-CM

## 2017-07-28 DIAGNOSIS — Z00.00 ROUTINE GENERAL MEDICAL EXAMINATION AT A HEALTH CARE FACILITY: Primary | ICD-10-CM

## 2017-07-28 DIAGNOSIS — Z13.6 CARDIOVASCULAR SCREENING; LDL GOAL LESS THAN 160: ICD-10-CM

## 2017-07-28 DIAGNOSIS — G20.C PARKINSONISM, UNSPECIFIED PARKINSONISM TYPE (H): ICD-10-CM

## 2017-07-28 DIAGNOSIS — Z23 NEED FOR PROPHYLACTIC VACCINATION WITH TETANUS-DIPHTHERIA (TD): ICD-10-CM

## 2017-07-28 DIAGNOSIS — Z13.6 SCREENING FOR ABDOMINAL AORTIC ANEURYSM: ICD-10-CM

## 2017-07-28 DIAGNOSIS — Z13.1 SCREENING FOR DIABETES MELLITUS (DM): ICD-10-CM

## 2017-07-28 DIAGNOSIS — G23.1 PROGRESSIVE SUPRANUCLEAR OPHTHALMOPLEGIA (H): ICD-10-CM

## 2017-07-28 DIAGNOSIS — Z12.11 SCREEN FOR COLON CANCER: ICD-10-CM

## 2017-07-28 DIAGNOSIS — Z12.5 SCREENING FOR PROSTATE CANCER: ICD-10-CM

## 2017-07-28 PROCEDURE — G0439 PPPS, SUBSEQ VISIT: HCPCS | Performed by: FAMILY MEDICINE

## 2017-07-28 NOTE — PROGRESS NOTES
SUBJECTIVE:   Jimmy Patrick is a 66 year old male who presents for Preventive Visit.    Are you in the first 12 months of your Medicare Part B coverage?  No    Annual Exam:  Getting at least 3 servings of Calcium per day:: Yes  Bi-annual eye exam:: Yes  Dental care twice a year:: Yes  Sleep apnea or symptoms of sleep apnea:: Daytime drowsiness  Diet:: Regular (no restrictions)  Frequency of exercise:: 6-7 days/week  Taking medications regularly:: Yes  Medication side effects:: None  Additional concerns today:: YES  PHQ-2 Score: 3  Duration of exercise:: Greater than 60 minutes    COGNITIVE SCREEN  1) Repeat 3 items (Banana, Sunrise, Chair)    2) Clock draw: NORMAL  3) 3 item recall: Recalls 3 objects  Results: NORMAL clock, 1-2 items recalled: COGNITIVE IMPAIRMENT LESS LIKELY    Mini-CogTM Copyright JANAY Amaya. Licensed by the author for use in [facility]; reprinted with permission (rosanne@[email or web address]). All rights reserved.                  Reviewed and updated as needed this visit by clinical staff  Allergies  Meds         Reviewed and updated as needed this visit by Provider        Social History   Substance Use Topics     Smoking status: Never Smoker     Smokeless tobacco: Not on file     Alcohol use No       The patient does not drink >3 drinks per day nor >7 drinks per week.    Today's PHQ-2 Score:   PHQ-2 ( 1999 [employer]) 7/28/2017 7/28/2017   Q1: Little interest or pleasure in doing things 0 2   Q2: Feeling down, depressed or hopeless 0 1   PHQ-2 Score 0 3   Q1: Little interest or pleasure in doing things - -   Q2: Feeling down, depressed or hopeless - -   PHQ-2 Score - -         Do you feel safe in your environment - Yes    Do you have a Health Care Directive?: Yes: Patient states has Advance Directive and will bring in a copy to clinic.    Current providers sharing in care for this patient include:   Patient Care Team:  [facility]  [facility]  (Clinic)  Paulo Saravia MD as Referring Physician (Neurology)  Emperatriz Candelaria, PhD LP as MD (Psychology)  Jade Peterson, RN as Nurse Coordinator (Neurology)      Hearing impairment: No    Ability to successfully perform activities of daily living: No, needs assistance with: transportation, bathing and medications     Fall risk:  Fallen 2 or more times in the past year?: Yes  Any fall with injury in the past year?: No  Timed Up and Go Test (>13.5 is fall risk; contact physician) : 6      Home safety:  none identified      The following health maintenance items are reviewed in Epic and correct as of today:  Health Maintenance   Topic Date Due     LIPID MONITORING Q1 YEAR  11/17/1951     MICROALBUMIN Q1 YEAR  11/17/1951     MIGRAINE ACTION PLAN  11/17/1968     TETANUS IMMUNIZATION (SYSTEM ASSIGNED)  11/17/1968     HEPATITIS C SCREENING  11/17/1968     COLON CANCER SCREEN (SYSTEM ASSIGNED)  11/17/2000     ADVANCE DIRECTIVE PLANNING Q5 YRS  11/17/2005     FALL RISK ASSESSMENT  11/17/2015     PNEUMOCOCCAL (1 of 2 - PCV13) 11/17/2015     AORTIC ANEURYSM SCREENING (SYSTEM ASSIGNED)  11/17/2015     INFLUENZA VACCINE (SYSTEM ASSIGNED)  09/01/2017     BMP Q1 YR  02/14/2018     Labs reviewed in EPIC  BP Readings from Last 3 Encounters:   07/28/17 134/80   07/13/17 147/87   06/15/17 (!) 150/91    Wt Readings from Last 3 Encounters:   07/28/17 195 lb 9.6 oz (88.7 kg)   06/15/17 195 lb (88.5 kg)   04/05/17 196 lb 8 oz (89.1 kg)                  Patient Active Problem List   Diagnosis     Transient paralysis of limb     Migraine without aura     Speech disturbance     PSP (progressive supranuclear palsy) (H)     Fluttering heart     Leg swelling     Varicose veins     Headache     Memory loss     Urinary urgency     Blurred vision     Double vision     Tinnitus     Sinus disorder     Epistaxis     DISK (aka Displacement of intervertebral disc, site unspecified, without myelopathy)     Impairment of balance     Signs  and symptoms involving cognition     Fatigue     Migraine without status migrainosus, not intractable     Palpitations     Parkinsonism (H)     Progressive supranuclear ophthalmoplegia (H)     Speech dysfunction     Cellular blue nevus     S/P ablation of atrial flutter     Migraine without aura and without status migrainosus, not intractable     Parkinsonism, unspecified Parkinsonism type (H)     Essential hypertension     CARDIOVASCULAR SCREENING; LDL GOAL LESS THAN 160     Past Surgical History:   Procedure Laterality Date     H ABLATION ATRIAL FLUTTER         Social History   Substance Use Topics     Smoking status: Never Smoker     Smokeless tobacco: Not on file     Alcohol use No     Family History   Problem Relation Age of Onset     CANCER Father          Current Outpatient Prescriptions   Medication Sig Dispense Refill     cycloSPORINE (RESTASIS) 0.05 % ophthalmic emulsion Place 1 drop into both eyes 2 times daily 1 Box 11     cycloSPORINE (RESTASIS) 0.05 % ophthalmic emulsion Place 1 drop into both eyes 2 times daily 3 Box 3     nortriptyline (PAMELOR) 10 MG capsule Take 3 capsules (30 mg) by mouth At Bedtime 270 capsule 2     amLODIPine (NORVASC) 2.5 MG tablet Take 1 tablet (2.5 mg) by mouth daily In evening. 90 tablet 3     olmesartan (BENICAR) 20 MG tablet Take 2 tablets (40 mg) by mouth daily 180 tablet 3     nortriptyline (PAMELOR) 10 MG capsule Take 3 capsules (30 mg) by mouth At Bedtime 270 capsule 0     adapalene (DIFFERIN) 0.1 % gel Apply to the face at night. 135 g 3     amantadine (SYMMETREL) 100 MG capsule Take 1 capsule (100 mg) by mouth 3 times daily 6am,11am and 4pm 270 capsule 3     rivastigmine (EXELON) 4.6 MG/24HR 24 hr patch Place 1 patch onto the skin daily 90 patch 3     acetaminophen-caffeine (EXCEDRIN TENSION HEADACHE) 500-65 MG TABS Take 1 tablet by mouth every 6 hours as needed for mild pain       COMPRESSION STOCKINGS 2 each daily 4 each 3     Multiple Vitamins-Minerals (CENTRUM  SILVER) per tablet Take 1 tablet by mouth daily 30 tablet      aspirin (BABY ASPIRIN) 81 MG chewable tablet Take 81 mg by mouth daily        cholecalciferol (VITAMIN D-3 SUPER STRENGTH) 2000 UNITS tablet Take 1 tablet by mouth        ZOLMitriptan (ZOMIG) 5 MG tablet Take 1 tablet (5 mg) by mouth at onset of headache for migraine (Patient not taking: Reported on 7/28/2017) 30 tablet 5     Allergies   Allergen Reactions     Other [Seasonal Allergies]      environmental     Codeine Other (See Comments) and Rash     GI upset     Recent Labs   Lab Test  05/12/17   1410  02/14/17   1037   CR   --   1.00   GFRESTIMATED  67  75   GFRESTBLACK  81  >90   GFR Calc     POTASSIUM   --   4.0              Pneumonia Vaccine:Adults age 65+ who received Pneumovax (PPSV23) at 65 years or older: Should be given PCV13 > 1 year after their most recent PPSV23    ROS:  C: NEGATIVE for fever, chills, change in weight  INTEGUMENTARY/SKIN: NEGATIVE for worrisome rashes, moles or lesions  EYES: Blurred vision and double vision from underlying PSP  E/M: NEGATIVE for ear, mouth and throat problems  R: NEGATIVE for significant cough or SOB  CV: NEGATIVE for chest pain, palpitations or peripheral edema  CV: History of hypertension  GI: History of swallowing difficulty from PSP  : negative for dysuria, hematuria, decreased urinary stream, erectile dysfunction  MUSCULOSKELETAL: NEGATIVE for significant arthralgias or myalgia  NEURO: History of parkinsonism  ENDOCRINE: NEGATIVE for temperature intolerance, skin/hair changes  HEME/ALLERGY/IMMUNE: NEGATIVE for bleeding problems  PSYCHIATRIC: NEGATIVE for changes in mood or affect    OBJECTIVE:   /80  Pulse 84  Temp 96.6  F (35.9  C) (Oral)  Ht 6' (1.829 m)  Wt 195 lb 9.6 oz (88.7 kg)  SpO2 97%  BMI 26.53 kg/m2 Estimated body mass index is 26.53 kg/(m^2) as calculated from the following:    Height as of this encounter: 6' (1.829 m).    Weight as of this encounter: 195 lb  9.6 oz (88.7 kg).  EXAM:   GENERAL: healthy, alert and no distress  EYES: Eyes grossly normal to inspection, PERRL and conjunctivae and sclerae normal  HENT: ear canals and TM's normal, nose and mouth without ulcers or lesions  NECK: no adenopathy, no asymmetry, masses, or scars and thyroid normal to palpation  RESP: lungs clear to auscultation - no rales, rhonchi or wheezes  CV: regular rate and rhythm, normal S1 S2, no S3 or S4, no murmur, click or rub, no peripheral edema and peripheral pulses strong  ABDOMEN: soft, nontender, no hepatosplenomegaly, no masses and bowel sounds normal   (male): normal male genitalia without lesions or urethral discharge, no hernia  RECTAL: normal sphincter tone, no rectal masses, prostate normal size, smooth, nontender without nodules or masses  MS: no gross musculoskeletal defects noted, Grade 1+ nonpitting edema of both lower legs  SKIN: no suspicious lesions or rashes  NEURO: Normal strength and tone, sensory exam grossly normal, mentation intact and speech -slow  PSYCH: mentation appears normal, affect normal/bright    ASSESSMENT / PLAN:   1. Routine general medical examination at a health care facility  : Discussed on regular exercises, healthy eating, self testicular exams  and routine dental checks.  - Fecal colorectal cancer screen FIT; Future  - US aorta medicare aaa screening; Future  - TDAP VACCINE (ADACEL); Future  - PNEUMOCOCCAL CONJ VACCINE 13 VALENT IM; Future  - Prostate spec antigen screen; Future    2. Screen for colon cancer    - Fecal colorectal cancer screen FIT; Future    3. Need for hepatitis C screening test    - **Hepatitis C Screen Reflex to RNA FUTURE anytime; Future    4. Need for prophylactic vaccination against Streptococcus pneumoniae (pneumococcus)  Patient will check with neurologist before getting immunizations due to the current enrollment with the  clinical drug trial for his underlying neurological disorder    - PNEUMOCOCCAL CONJ VACCINE 13  VALENT IM; Future    5. Need for prophylactic vaccination with tetanus-diphtheria (TD)  as above    - TDAP VACCINE (ADACEL); Future    6. Screening for abdominal aortic aneurysm    -  aorta medicare aaa screening; Future    7. Migraine without aura and without status migrainosus, not intractable  Patient is on nortriptyline 10:30 milligrams daily for migraine prevention and Zomig for episodic migraine headaches    8. S/P ablation of atrial flutter  Is currently doing well    9. Speech dysfunction  On speech therapy    10. Progressive supranuclear ophthalmoplegia (H)  Continue neurology follow-up as scheduled    11. Parkinsonism, unspecified Parkinsonism type (H)  as above      12. Essential hypertension  Blood pressure is at goal,on amlodipine 2.5 mg daily,Benicar 40 mg daily  Will follow low salt diet, weight loss and regular exercises.    - CBC with platelets differential; Future  - **Comprehensive metabolic panel FUTURE anytime; Future  - **Albumin Random Urine Quant FUTURE anytime; Future  - TSH with free T4 reflex; Future    13. CARDIOVASCULAR SCREENING; LDL GOAL LESS THAN 160    - **Lipid panel reflex to direct LDL FUTURE anytime; Future    14. Screening for diabetes mellitus (DM)    - **Comprehensive metabolic panel FUTURE anytime; Future  - JUST IN CASE; Future    15. Screening for prostate cancer    - Prostate spec antigen screen; Future    End of Life Planning:  Patient currently has an advanced directive: No.  I have verified the patient's ablity to prepare an advanced directive/make health care decisions.  Literature was provided to assist patient in preparing an advanced directive.    COUNSELING:  Reviewed preventive health counseling, as reflected in patient instructions  Special attention given to:       Consider AAA screening for ages 65-75 and smoking history       Regular exercise       Healthy diet/nutrition       Vision screening       Hearing screening       Dental care       Hepatitis C  screening       Colon cancer screening       Prostate cancer screening       Osteoporosis Prevention/Bone Health       The ASCVD Risk score ([place]hu HORTON Jr, et al., 2013) failed to calculate for the following reasons:    Cannot find a previous HDL lab    Cannot find a previous total cholesterol lab        Estimated body mass index is 26.53 kg/(m^2) as calculated from the following:    Height as of this encounter: 6' (1.829 m).    Weight as of this encounter: 195 lb 9.6 oz (88.7 kg).     reports that he has never smoked. He does not have any smokeless tobacco history on file.        Appropriate preventive services were discussed with this patient, including applicable screening as appropriate for cardiovascular disease, diabetes, osteopenia/osteoporosis, and glaucoma.  As appropriate for age/gender, discussed screening for colorectal cancer, prostate cancer, breast cancer, and cervical cancer. Checklist reviewing preventive services available has been given to the patient.    Reviewed patients plan of care and provided an AVS. The Intermediate Care Plan ( asthma action plan, low back pain action plan, and migraine action plan) for Jimmy meets the Care Plan requirement. This Care Plan has been established and reviewed with the Patient.    Counseling Resources:  ATP IV Guidelines  Pooled Cohorts Equation Calculator  Breast Cancer Risk Calculator  FRAX Risk Assessment  ICSI Preventive Guidelines  Dietary Guidelines for Americans, 2010  USDA's MyPlate  ASA Prophylaxis  Lung CA Screening    Evelina Morse MD  [facility] CenterAnswers for HPI/ROS submitted by the patient on 7/28/2017     Chart documentation done in part with Dragon Voice recognition Software. Although reviewed after completion, some word and grammatical error may remain.

## 2017-07-28 NOTE — PATIENT INSTRUCTIONS
Get the FIT kit form lab today  Schedule for fasting labs in 1 week  Check with your neurologist before getting the immunisations   Schedule for AAA screening ultrasound      Preventive Health Recommendations:       Male Ages 65 and over    Yearly exam:             See your health care provider every year in order to  o   Review health changes.   o   Discuss preventive care.    o   Review your medicines if your doctor has prescribed any.    Talk with your health care provider about whether you should have a test to screen for prostate cancer (PSA).    Every 3 years, have a diabetes test (fasting glucose). If you are at risk for diabetes, you should have this test more often.    Every 5 years, have a cholesterol test. Have this test more often if you are at risk for high cholesterol or heart disease.     Every 10 years, have a colonoscopy. Or, have a yearly FIT test (stool test). These exams will check for colon cancer.    Talk to with your health care provider about screening for Abdominal Aortic Aneurysm if you have a family history of AAA or have a history of smoking.  Shots:     Get a flu shot each year.     Get a tetanus shot every 10 years.     Talk to your doctor about your pneumonia vaccines. There are now two you should receive - Pneumovax (PPSV 23) and Prevnar (PCV 13).    Talk to your doctor about a shingles vaccine.     Talk to your doctor about the hepatitis B vaccine.  Nutrition:     Eat at least 5 servings of fruits and vegetables each day.     Eat whole-grain bread, whole-wheat pasta and brown rice instead of white grains and rice.     Talk to your doctor about Calcium and Vitamin D.   Lifestyle    Exercise for at least 150 minutes a week (30 minutes a day, 5 days a week). This will help you control your weight and prevent disease.     Limit alcohol to one drink per day.     No smoking.     Wear sunscreen to prevent skin cancer.     See your dentist every six months for an exam and cleaning.     See  your eye doctor every 1 to 2 years to screen for conditions such as glaucoma, macular degeneration and cataracts.

## 2017-07-28 NOTE — NURSING NOTE
Chief Complaint   Patient presents with     Physical       Initial /80  Pulse 84  Temp 96.6  F (35.9  C) (Oral)  Ht 6' (1.829 m)  Wt 195 lb 9.6 oz (88.7 kg)  SpO2 97%  BMI 26.53 kg/m2 Estimated body mass index is 26.53 kg/(m^2) as calculated from the following:    Height as of this encounter: 6' (1.829 m).    Weight as of this encounter: 195 lb 9.6 oz (88.7 kg).  Medication Reconciliation: complete     Rukhsana Acosta, CMA

## 2017-07-28 NOTE — MR AVS SNAPSHOT
After Visit Summary   7/28/2017    Jimmy Patrick    MRN: [id number]           Patient Information     Date Of Birth          1950        Visit Information        Provider Department      7/28/2017 3:20 PM Evelina Morse MD [facility]        Today's Diagnoses     Routine general medical examination at a health care facility    -  1    Screen for colon cancer        Need for hepatitis C screening test        Need for prophylactic vaccination against Streptococcus pneumoniae (pneumococcus)        Need for prophylactic vaccination with tetanus-diphtheria (TD)        Screening for abdominal aortic aneurysm          Care Instructions      Get the FIT kit form lab today  Schedule for fasting labs in 1 week  Check with your neurologist before getting the immunisations   Schedule for AAA screening ultrasound      Preventive Health Recommendations:       Male Ages 65 and over    Yearly exam:             See your health care provider every year in order to  o   Review health changes.   o   Discuss preventive care.    o   Review your medicines if your doctor has prescribed any.    Talk with your health care provider about whether you should have a test to screen for prostate cancer (PSA).    Every 3 years, have a diabetes test (fasting glucose). If you are at risk for diabetes, you should have this test more often.    Every 5 years, have a cholesterol test. Have this test more often if you are at risk for high cholesterol or heart disease.     Every 10 years, have a colonoscopy. Or, have a yearly FIT test (stool test). These exams will check for colon cancer.    Talk to with your health care provider about screening for Abdominal Aortic Aneurysm if you have a family history of AAA or have a history of smoking.  Shots:     Get a flu shot each year.     Get a tetanus shot every 10 years.     Talk to your doctor about your pneumonia vaccines. There are now two you should receive -  Pneumovax (PPSV 23) and Prevnar (PCV 13).    Talk to your doctor about a shingles vaccine.     Talk to your doctor about the hepatitis B vaccine.  Nutrition:     Eat at least 5 servings of fruits and vegetables each day.     Eat whole-grain bread, whole-wheat pasta and brown rice instead of white grains and rice.     Talk to your doctor about Calcium and Vitamin D.   Lifestyle    Exercise for at least 150 minutes a week (30 minutes a day, 5 days a week). This will help you control your weight and prevent disease.     Limit alcohol to one drink per day.     No smoking.     Wear sunscreen to prevent skin cancer.     See your dentist every six months for an exam and cleaning.     See your eye doctor every 1 to 2 years to screen for conditions such as glaucoma, macular degeneration and cataracts.          Follow-ups after your visit        Your next 10 appointments already scheduled     Aug 10, 2017 10:30 AM CDT   (Arrive by 10:15 AM)   Return Movement Disorder with Paulo Saravia MD   [facility] Neurology ([facility])    [address] [place] [address]   [phone]            Aug 10, 2017 11:00 AM CDT   Infusion 60 with UC SPEC INFUSION, UC 49 ATC   [facility] ([facility])    [address] [place] [address]   [phone]            Sep 07, 2017 10:30 AM CDT   (Arrive by 10:15 AM)   Return Movement Disorder with Paulo Saravia MD   [facility] Neurology ([facility])    [address] [place] [address]   [phone]            Sep 07, 2017 11:00 AM CDT   Infusion 60 with UC SPEC INFUSION, UC 41 ATC   [facility] ([facility])    [address] [place] [address]   [phone]            Oct 04, 2017  8:00 AM CDT    (Arrive by 7:45 AM)   XR LUMBAR PUNCTURE SPINAL TAP DIAGNOSTIC with HAIXR3, HAI IMAGING NURSE, UC NEURO RAD   [facility]ray ([facility])    [address]  [address] [facility] [address] [phone]           Follow your care team s guidelines for eating and drinking.  You may need to stop taking certain medicines before this exam. If so, we will tell you in advance.  Tell your care team:   If you have any allergies.   If there s a chance you may be pregnant.            Oct 04, 2017  9:00 AM CDT   Return Visit with Clint Gutiérrez MD   [facility] ([facility])    [address] [address] [place] [address] [phone]              Future tests that were ordered for you today     Open Future Orders        Priority Expected Expires Ordered    US aorta medicare aaa screening Radiology After Discharge  10/26/2017 7/28/2017    Fecal colorectal cancer screen FIT Routine 8/18/2017 10/20/2017 7/28/2017            Who to contact     If you have questions or need follow up information about today's clinic visit or your schedule please contact [facility] directly at [phone].  Normal or non-critical lab and imaging results will be communicated to you by MyChart, letter or phone within 4 business days after the clinic has received the results. If you do not hear from us within 7 days, please contact the clinic through [employer] or phone. If you have a critical or abnormal lab result, we will notify you by phone as soon as possible.  Submit refill requests through [employer] or call your pharmacy and they will forward the refill request to us. Please allow 3 business days for your refill to be completed.          Additional Information About Your Visit        [employer] Information     [employer] gives you secure access to your electronic health record. If you see a primary care provider, you can also send messages to  your care team and make appointments. If you have questions, please call your primary care clinic.  If you do not have a primary care provider, please call [phone] and they will assist you.      anywayanyday is an electronic gateway that provides easy, online access to your medical records. With anywayanyday, you can request a clinic appointment, read your test results, renew a prescription or communicate with your care team.     To access your existing account, please contact your [facility] or call [phone] for assistance.        Care EveryWhere ID     This is your Care EveryWhere ID. This could be used by other organizations to access your [place] medical records  [id number]        Your Vitals Were     Pulse Temperature Height Pulse Oximetry BMI (Body Mass Index)       84 96.6  F (35.9  C) (Oral) 6' (1.829 m) 97% 26.53 kg/m2        Blood Pressure from Last 3 Encounters:   07/28/17 134/80   07/13/17 147/87   06/15/17 (!) 150/91    Weight from Last 3 Encounters:   07/28/17 195 lb 9.6 oz (88.7 kg)   06/15/17 195 lb (88.5 kg)   04/05/17 196 lb 8 oz (89.1 kg)               Primary Care Provider    [facility]       No address on file        Equal Access to Services     LEDY STEELE AH: Hadii robin ku hadasho Soomaali, waaxda luqadaha, qaybta kaalmada adeegyada, waxay idiin hayemmanueln ally lynch. So [facility] [phone].    ATENCIÓN: Si habla español, tiene a langford disposición servicios gratuitos de asistencia lingüística. Llame al [phone].    We comply with applicable federal civil rights laws and [place] laws. We do not discriminate on the basis of race, color, national origin, age, disability sex, sexual orientation or gender identity.            Thank you!     Thank you for choosing [facility]  for your care. Our goal is always to provide you with excellent care. Hearing back from our patients is one way we can continue to improve our  services. Please take a few minutes to complete the written survey that you may receive in the mail after your visit with us. Thank you!             Your Updated Medication List - Protect others around you: Learn how to safely use, store and throw away your medicines at www.disposemymeds.org.          This list is accurate as of: 7/28/17  4:20 PM.  Always use your most recent med list.                   Brand Name Dispense Instructions for use Diagnosis    acetaminophen-caffeine 500-65 MG Tabs    EXCEDRIN TENSION HEADACHE     Take 1 tablet by mouth every 6 hours as needed for mild pain        adapalene 0.1 % gel    DIFFERIN    135 g    Apply to the face at night.    Acne vulgaris       amantadine 100 MG capsule    SYMMETREL    270 capsule    Take 1 capsule (100 mg) by mouth 3 times daily 6am,11am and 4pm    PSP (progressive supranuclear palsy) (H)       amLODIPine 2.5 MG tablet    NORVASC    90 tablet    Take 1 tablet (2.5 mg) by mouth daily In evening.    HTN (hypertension)       BABY ASPIRIN 81 MG chewable tablet   Generic drug:  aspirin      Take 81 mg by mouth daily        CENTRUM SILVER per tablet     30 tablet    Take 1 tablet by mouth daily        COMPRESSION STOCKINGS     4 each    2 each daily    Edema, Diastolic dysfunction       * cycloSPORINE 0.05 % ophthalmic emulsion    RESTASIS    1 Box    Place 1 drop into both eyes 2 times daily    Dry eye syndrome, bilateral       * cycloSPORINE 0.05 % ophthalmic emulsion    RESTASIS    3 Box    Place 1 drop into both eyes 2 times daily    Dry eye syndrome, bilateral       EXELON 4.6 MG/24HR 24 hr patch   Generic drug:  rivastigmine     90 patch    Place 1 patch onto the skin daily    PSP (progressive supranuclear palsy) (H)       * nortriptyline 10 MG capsule    PAMELOR    270 capsule    Take 3 capsules (30 mg) by mouth At Bedtime    Migraine without aura and without status migrainosus, not intractable       * nortriptyline 10 MG capsule    PAMELOR    270 capsule     Take 3 capsules (30 mg) by mouth At Bedtime    Migraine without aura and without status migrainosus, not intractable       olmesartan 20 MG tablet    BENICAR    180 tablet    Take 2 tablets (40 mg) by mouth daily    PSP (progressive supranuclear palsy) (H), Migraine without aura and without status migrainosus, not intractable       Vitamin D-3 Super Strength 2000 UNITS tablet   Generic drug:  cholecalciferol      Take 1 tablet by mouth        ZOLMitriptan 5 MG tablet    ZOMIG    30 tablet    Take 1 tablet (5 mg) by mouth at onset of headache for migraine    PSP (progressive supranuclear palsy) (H), Migraine without aura and without status migrainosus, not intractable       * Notice:  This list has 4 medication(s) that are the same as other medications prescribed for you. Read the directions carefully, and ask your doctor or other care provider to review them with you.

## 2017-08-07 DIAGNOSIS — G23.1 PSP (PROGRESSIVE SUPRANUCLEAR PALSY) (H): Primary | ICD-10-CM

## 2017-08-10 ENCOUNTER — INFUSION THERAPY VISIT (OUTPATIENT)
Dept: INFUSION THERAPY | Facility: CLINIC | Age: 67
End: 2017-08-10
Attending: PSYCHIATRY & NEUROLOGY
Payer: COMMERCIAL

## 2017-08-10 ENCOUNTER — OFFICE VISIT (OUTPATIENT)
Dept: NEUROLOGY | Facility: CLINIC | Age: 67
End: 2017-08-10

## 2017-08-10 VITALS — HEIGHT: 72 IN | BODY MASS INDEX: 26.49 KG/M2 | WEIGHT: 195.55 LBS

## 2017-08-10 VITALS
SYSTOLIC BLOOD PRESSURE: 146 MMHG | RESPIRATION RATE: 18 BRPM | DIASTOLIC BLOOD PRESSURE: 83 MMHG | OXYGEN SATURATION: 98 % | TEMPERATURE: 96.3 F | HEART RATE: 81 BPM

## 2017-08-10 DIAGNOSIS — G23.1 PSP (PROGRESSIVE SUPRANUCLEAR PALSY) (H): Primary | ICD-10-CM

## 2017-08-10 PROCEDURE — 96365 THER/PROPH/DIAG IV INF INIT: CPT

## 2017-08-10 RX ADMIN — Medication 2100 MG: at 11:24

## 2017-08-10 NOTE — LETTER
8/10/2017       RE: Jimmy Patrick  [address]  [place] [address]     Dear Colleague,    Thank you for referring your patient, Jimmy Patrick, to the [facility] NEUROLOGY at [facility]. Please see a copy of my visit note below.    No notes on file    Again, thank you for allowing me to participate in the care of your patient.      Sincerely,    Paulo Saravia MD

## 2017-08-10 NOTE — MR AVS SNAPSHOT
After Visit Summary   8/10/2017    Jimmy Patrick    MRN: [id number]           Patient Information     Date Of Birth          1950        Visit Information        Provider Department      8/10/2017 10:30 AM Paulo Saravia MD [facility]        Today's Diagnoses     PSP (progressive supranuclear palsy) (H)    -  1       Follow-ups after your visit        Follow-up notes from your care team     Return if symptoms worsen or fail to improve.      Your next 10 appointments already scheduled     Sep 07, 2017 10:30 AM CDT   (Arrive by 10:15 AM)   Return Movement Disorder with Paulo Saravia MD   [facility]rology ([email or web address] [place])    [address]  3rd Floor  [place] [facility] [address]   [phone]            Sep 07, 2017 11:00 AM CDT   Infusion 60 with UC SPEC INFUSION, UC 41 ATC   [facility] [place] [facility] ([facility])    [address]  2nd Floor  [facility] [id number] [phone]            Oct 04, 2017  8:00 AM CDT   (Arrive by 7:45 AM)   XR LUMBAR PUNCTURE SPINAL TAP DIAGNOSTIC with UCXR3, UC IMAGING NURSE, UC NEURO RAD   [facility] ([facility])    [address]  1st Floor  [facility] [id number] [phone]           Follow your care team s guidelines for eating and drinking.  You may need to stop taking certain medicines before this exam. If so, we will tell you in advance.  Tell your care team:   If you have any allergies.   If there s a chance you may be pregnant.            Oct 04, 2017  9:00 AM CDT   Return Visit with Clint Gutiérrez MD   [facility] ([facility])    [address] [place] [address]   [phone]            Oct 05, 2017  9:30 AM CDT   (Arrive by 9:15 AM)   Return Movement Disorder with Paulo Saravia MD   [facility] ([email or web address])    [address]  3rd [facility] [address] [phone]            Oct 05, 2017 10:30 AM CDT   (Arrive by 10:15 AM)   ecg with  CV EKG   [facility] Xray ([facility])    [address] [place] [address]  [address]st [facility] [address] [phone]            Oct 05, 2017 11:00 AM CDT   LAB with  LAB   [facility] ([facility])    [address] [place] [id number] [phone]           Patient must bring picture ID. Patient should be prepared to give a urine specimen  Please do not eat 10-12 hours before your appointment if you are coming in fasting for labs on lipids, cholesterol, or glucose (sugar). Pregnant women should follow their Care Team instructions. Water with medications is okay. Do not drink coffee or other fluids. If you have concerns about taking  your medications, please ask at office or if scheduling via LOAG, send a message by clicking on Secure Messaging, Message Your Care Team.              Who to contact     Please call your clinic at [phone] to:    Ask questions about your health    Make or cancel appointments    Discuss your medicines    Learn about your test results    Speak to your doctor   If you have compliments or concerns about an experience at your clinic, or if you wish to file a complaint, please contact [facility] Patient Relations at [phone] or email us at [email or web address]         Additional Information About Your Visit        LOAG Information     LOAG gives you secure access to your electronic health record. If you see a primary care provider, you can also send messages to your care team and make appointments. If you have questions, please call your primary care clinic.  If you do not have a primary care provider, please call [phone] and they will assist you.      LOAG is an electronic  gateway that provides easy, online access to your medical records. With [employer], you can request a clinic appointment, read your test results, renew a prescription or communicate with your care team.     To access your existing account, please contact your [facility] or call [phone] for assistance.        Care EveryWhere ID     This is your Care EveryWhere ID. This could be used by other organizations to access your [place] medical records  [id number]        Your Vitals Were     Height BMI (Body Mass Index)                1.829 m (6') 26.52 kg/m2           Blood Pressure from Last 3 Encounters:   07/28/17 134/80   07/13/17 147/87   06/15/17 (!) 150/91    Weight from Last 3 Encounters:   08/10/17 88.7 kg (195 lb 8.8 oz)   07/28/17 88.7 kg (195 lb 9.6 oz)   06/15/17 88.5 kg (195 lb)              Today, you had the following     No orders found for display       Primary Care Provider    [facility]       No address on file        Equal Access to Services     LEDY STEELE : Hadii robin ku hadasho Soomaali, waaxda luqadaha, qaybta kaalmada adeegyajocelynn, gely heller . So [facility] [phone].    ATENCIÓN: Si habla español, tiene a langford disposición servicios gratuitos de asistencia lingüística. Llame al [phone].    We comply with applicable federal civil rights laws and [place] laws. We do not discriminate on the basis of race, color, national origin, age, disability sex, sexual orientation or gender identity.            Thank you!     Thank you for choosing [facility] NEUROLOGY  for your care. Our goal is always to provide you with excellent care. Hearing back from our patients is one way we can continue to improve our services. Please take a few minutes to complete the written survey that you may receive in the mail after your visit with us. Thank you!             Your Updated Medication List - Protect others around you: Learn how to  safely use, store and throw away your medicines at www.disposemymeds.org.          This list is accurate as of: 8/10/17 11:07 AM.  Always use your most recent med list.                   Brand Name Dispense Instructions for use Diagnosis    acetaminophen-caffeine 500-65 MG Tabs    EXCEDRIN TENSION HEADACHE     Take 1 tablet by mouth every 6 hours as needed for mild pain        adapalene 0.1 % gel    DIFFERIN    135 g    Apply to the face at night.    Acne vulgaris       amantadine 100 MG capsule    SYMMETREL    270 capsule    Take 1 capsule (100 mg) by mouth 3 times daily 6am,11am and 4pm    PSP (progressive supranuclear palsy) (H)       amLODIPine 2.5 MG tablet    NORVASC    90 tablet    Take 1 tablet (2.5 mg) by mouth daily In evening.    HTN (hypertension)       BABY ASPIRIN 81 MG chewable tablet   Generic drug:  aspirin      Take 81 mg by mouth daily        CENTRUM SILVER per tablet     30 tablet    Take 1 tablet by mouth daily        COMPRESSION STOCKINGS     4 each    2 each daily    Edema, Diastolic dysfunction       * cycloSPORINE 0.05 % ophthalmic emulsion    RESTASIS    1 Box    Place 1 drop into both eyes 2 times daily    Dry eye syndrome, bilateral       * cycloSPORINE 0.05 % ophthalmic emulsion    RESTASIS    3 Box    Place 1 drop into both eyes 2 times daily    Dry eye syndrome, bilateral       EXELON 4.6 MG/24HR 24 hr patch   Generic drug:  rivastigmine     90 patch    Place 1 patch onto the skin daily    PSP (progressive supranuclear palsy) (H)       * nortriptyline 10 MG capsule    PAMELOR    270 capsule    Take 3 capsules (30 mg) by mouth At Bedtime    Migraine without aura and without status migrainosus, not intractable       * nortriptyline 10 MG capsule    PAMELOR    270 capsule    Take 3 capsules (30 mg) by mouth At Bedtime    Migraine without aura and without status migrainosus, not intractable       olmesartan 20 MG tablet    BENICAR    180 tablet    Take 2 tablets (40 mg) by mouth daily     PSP (progressive supranuclear palsy) (H), Migraine without aura and without status migrainosus, not intractable       Vitamin D-3 Super Strength 2000 UNITS tablet   Generic drug:  cholecalciferol      Take 1 tablet by mouth        ZOLMitriptan 5 MG tablet    ZOMIG    30 tablet    Take 1 tablet (5 mg) by mouth at onset of headache for migraine    PSP (progressive supranuclear palsy) (H), Migraine without aura and without status migrainosus, not intractable       * Notice:  This list has 4 medication(s) that are the same as other medications prescribed for you. Read the directions carefully, and ask your doctor or other care provider to review them with you.

## 2017-08-10 NOTE — MR AVS SNAPSHOT
After Visit Summary   8/10/2017    Jimmy Patrick    MRN: [id number]           Patient Information     Date Of Birth          1950        Visit Information        Provider Department      8/10/2017 11:00 AM UC 49 ATC; UC SPEC INFUSION [facility] Procedure        Today's Diagnoses     PSP (progressive supranuclear palsy) (H)    -  1       Follow-ups after your visit        Your next 10 appointments already scheduled     Sep 07, 2017 10:30 AM CDT   (Arrive by 10:15 AM)   Return Movement Disorder with Paulo Saravia MD   [facility] Neurology ([facility])    [address] [place] [address]  3rd [facility] [id number] [phone]            Sep 07, 2017 11:00 AM CDT   Infusion 60 with UC SPEC INFUSION, UC 41 ATC   [facility] Specialty and Procedure ([facility])    [address] [place] [address]  2nd Floor  [facility] [id number] [phone]            Oct 04, 2017  8:00 AM CDT   (Arrive by 7:45 AM)   XR LUMBAR PUNCTURE SPINAL TAP DIAGNOSTIC with HAIXR3, HAI IMAGING NURSE, UC NEURO RAD   [facility] Xray ([facility])    [address] [place] [address]  1st Floor  [facility] [id number] [phone]           Follow your care team s guidelines for eating and drinking.  You may need to stop taking certain medicines before this exam. If so, we will tell you in advance.  Tell your care team:   If you have any allergies.   If there s a chance you may be pregnant.            Oct 04, 2017  9:00 AM CDT   Return Visit with Clint Gutiérrez MD   [facility] ([facility])    [address] [place] [address]   [phone]            Oct 05, 2017  9:30 AM CDT   (Arrive by 9:15 AM)   Return Movement Disorder with Paulo Saravia MD   [facility] ([facility])    [address] [place] [address]  [facility] [id number] [phone]            Oct 05, 2017 10:30 AM CDT   (Arrive by 10:15 AM)   ecg with  CV EKG   [facility] ([facility])    [address] [address] [phone]            Oct 05, 2017 11:00 AM CDT   LAB with  LAB   [facility] ([facility])    [address] [place] [id number] [phone]           Patient must bring picture ID. Patient should be prepared to give a urine specimen  Please do not eat 10-12 hours before your appointment if you are coming in fasting for labs on lipids, cholesterol, or glucose (sugar). Pregnant women should follow their Care Team instructions. Water with medications is okay. Do not drink coffee or other fluids. If you have concerns about taking  your medications, please ask at office or if scheduling via [employer], send a message by clicking on Secure Messaging, Message Your Care Team.              Who to contact     If you have questions or need follow up information about today's clinic visit or your schedule please contact [facility] SPECIALTY AND PROCEDURE directly at [phone].  Normal or non-critical lab and imaging results will be communicated to you by [employer], letter or phone within 4 business days after the clinic has received the results. If you do not hear from us within 7 days, please contact the clinic through [employer] or phone. If you have a critical or abnormal lab result, we will notify you by phone as soon as possible.  Submit refill requests through [employer]d or call your pharmacy and they will forward the refill request to us. Please allow 3 business days for your refill to be completed.          Additional Information About Your Visit        SyMynd Information     [employer] gives you secure access to your electronic health record. If you see a primary care provider, you can also  send messages to your care team and make appointments. If you have questions, please call your primary care clinic.  If you do not have a primary care provider, please call [phone] and they will assist you.        Care EveryWhere ID     This is your Care EveryWhere ID. This could be used by other organizations to access your [place] medical records  [id number]        Your Vitals Were     Pulse Temperature Respirations Pulse Oximetry          81 96.3  F (35.7  C) (Oral) 18 98%         Blood Pressure from Last 3 Encounters:   08/10/17 146/83   07/28/17 134/80   07/13/17 147/87    Weight from Last 3 Encounters:   08/10/17 88.7 kg (195 lb 8.8 oz)   07/28/17 88.7 kg (195 lb 9.6 oz)   06/15/17 88.5 kg (195 lb)              Today, you had the following     No orders found for display       Primary Care Provider    [facility]       No address on file        Equal Access to Services     LEDY STEELE : Hadii aad ku hadasho Sojovanny, waaxda luqadaha, qaybta kaalmada adenilsayajocelynn, gely heller . So [facility] [phone].    ATENCIÓN: Si habla español, tiene a langford disposición servicios gratuitos de asistencia lingüística. Llame al [phone].    We comply with applicable federal civil rights laws and [place] laws. We do not discriminate on the basis of race, color, national origin, age, disability sex, sexual orientation or gender identity.            Thank you!     Thank you for choosing [facility] SPECIALTY AND PROCEDURE  for your care. Our goal is always to provide you with excellent care. Hearing back from our patients is one way we can continue to improve our services. Please take a few minutes to complete the written survey that you may receive in the mail after your visit with us. Thank you!             Your Updated Medication List - Protect others around you: Learn how to safely use, store and throw away your medicines at  www.disposemymeds.org.          This list is accurate as of: 8/10/17  1:03 PM.  Always use your most recent med list.                   Brand Name Dispense Instructions for use Diagnosis    acetaminophen-caffeine 500-65 MG Tabs    EXCEDRIN TENSION HEADACHE     Take 1 tablet by mouth every 6 hours as needed for mild pain        adapalene 0.1 % gel    DIFFERIN    135 g    Apply to the face at night.    Acne vulgaris       amantadine 100 MG capsule    SYMMETREL    270 capsule    Take 1 capsule (100 mg) by mouth 3 times daily 6am,11am and 4pm    PSP (progressive supranuclear palsy) (H)       amLODIPine 2.5 MG tablet    NORVASC    90 tablet    Take 1 tablet (2.5 mg) by mouth daily In evening.    HTN (hypertension)       BABY ASPIRIN 81 MG chewable tablet   Generic drug:  aspirin      Take 81 mg by mouth daily        CENTRUM SILVER per tablet     30 tablet    Take 1 tablet by mouth daily        COMPRESSION STOCKINGS     4 each    2 each daily    Edema, Diastolic dysfunction       * cycloSPORINE 0.05 % ophthalmic emulsion    RESTASIS    1 Box    Place 1 drop into both eyes 2 times daily    Dry eye syndrome, bilateral       * cycloSPORINE 0.05 % ophthalmic emulsion    RESTASIS    3 Box    Place 1 drop into both eyes 2 times daily    Dry eye syndrome, bilateral       EXELON 4.6 MG/24HR 24 hr patch   Generic drug:  rivastigmine     90 patch    Place 1 patch onto the skin daily    PSP (progressive supranuclear palsy) (H)       * nortriptyline 10 MG capsule    PAMELOR    270 capsule    Take 3 capsules (30 mg) by mouth At Bedtime    Migraine without aura and without status migrainosus, not intractable       * nortriptyline 10 MG capsule    PAMELOR    270 capsule    Take 3 capsules (30 mg) by mouth At Bedtime    Migraine without aura and without status migrainosus, not intractable       olmesartan 20 MG tablet    BENICAR    180 tablet    Take 2 tablets (40 mg) by mouth daily    PSP (progressive supranuclear palsy) (H), Migraine  without aura and without status migrainosus, not intractable       Vitamin D-3 Super Strength 2000 UNITS tablet   Generic drug:  cholecalciferol      Take 1 tablet by mouth        ZOLMitriptan 5 MG tablet    ZOMIG    30 tablet    Take 1 tablet (5 mg) by mouth at onset of headache for migraine    PSP (progressive supranuclear palsy) (H), Migraine without aura and without status migrainosus, not intractable       * Notice:  This list has 4 medication(s) that are the same as other medications prescribed for you. Read the directions carefully, and ask your doctor or other care provider to review them with you.

## 2017-08-10 NOTE — PROGRESS NOTES
Nursing Note  Jimmy Patrick [facility]:   Chief Complaint   Patient presents with     Infusion     BMS study     During today's [facility] appointment, orders from Dr. Paulo Saravia were completed.  Frequency: monthly    Patient reports feeling the same as last month however he feels he is moving slower.     Progress note:  Patient identification verified by name and date of birth.  Assessment completed.  Vitals recorded in Doc Flowsheets.  Patient was provided with education regarding infusion and possible side effects.  Patient verbalized understanding.      needed: No  Premedications: were not ordered.  Infusion Rates: infusion given over approximately 1 hour.  Approximate Infusion length:1 hours.   Labs: were not ordered for this appointment.  Vascular access: peripheral IV placed today.  Treatment Conditions: non-applicable.  Patient tolerated infusion: well.    Administrations This Visit     study [id number] (IDS# [id number]) IV infusion 2,100 mg 210 mL     Admin Date Action Dose Rate Route Administered By          08/10/2017 New Bag 2100 mg 210 mL/hr Intravenous Goldie Worley, ANN MARIE                           Discharge Plan:   Follow up plan of care with: ongoing infusions at [facility].  Discharge instructions were reviewed with patient.  Patient/representative verbalized understanding of discharge instructions and all questions answered.  Patient discharged [facility] in stable condition.    Goldie Worley RN        /88  Pulse 80  Temp 96.3  F (35.7  C) (Oral)  Resp 18  SpO2 98%

## 2017-08-10 NOTE — LETTER
8/10/2017      RE: Jimmy Patrick  [address] [facility] [place] [facility] [address]       Research visit  Paulo Saravia MD

## 2017-08-11 ENCOUNTER — CARE COORDINATION (OUTPATIENT)
Dept: CARDIOLOGY | Facility: CLINIC | Age: 67
End: 2017-08-11

## 2017-08-11 NOTE — PROGRESS NOTES
Date: 8/11/2017    Time of Call: 11:31 AM     Diagnosis:  HTN, see my chart message. Pt b/p prior to and after infusion >140/90     [ TORB ] Ordering provider: Dr Gutiérrez   Order:   If b/p at home are running <140/90- do not change anything  If b/p at home are running > 140/90- increase amlodipine to 5 mg at hs.     Order received by: balbina de santiago rn      Follow-up/additional notes:   My chart message sent in follow up

## 2017-09-07 ENCOUNTER — OFFICE VISIT (OUTPATIENT)
Dept: NEUROLOGY | Facility: CLINIC | Age: 67
End: 2017-09-07

## 2017-09-07 ENCOUNTER — INFUSION THERAPY VISIT (OUTPATIENT)
Dept: INFUSION THERAPY | Facility: CLINIC | Age: 67
End: 2017-09-07
Attending: PSYCHIATRY & NEUROLOGY
Payer: COMMERCIAL

## 2017-09-07 VITALS
TEMPERATURE: 96.6 F | HEART RATE: 79 BPM | RESPIRATION RATE: 18 BRPM | SYSTOLIC BLOOD PRESSURE: 140 MMHG | DIASTOLIC BLOOD PRESSURE: 79 MMHG

## 2017-09-07 VITALS — WEIGHT: 197 LBS | BODY MASS INDEX: 26.72 KG/M2

## 2017-09-07 DIAGNOSIS — G23.1 PSP (PROGRESSIVE SUPRANUCLEAR PALSY) (H): Primary | ICD-10-CM

## 2017-09-07 DIAGNOSIS — R20.9 DISTURBANCE OF SKIN SENSATION: ICD-10-CM

## 2017-09-07 RX ADMIN — Medication 2100 MG: at 11:20

## 2017-09-07 ASSESSMENT — PAIN SCALES - GENERAL: PAINLEVEL: NO PAIN (0)

## 2017-09-07 NOTE — MR AVS SNAPSHOT
After Visit Summary   9/7/2017    Jimmy Patrick    MRN: [id number]           Patient Information     Date Of Birth          1950        Visit Information        Provider Department      9/7/2017 10:30 AM Paulo Saravia MD [facility] Neurology        Today's Diagnoses     PSP (progressive supranuclear palsy) (H)    -  1       Follow-ups after your visit        Your next 10 appointments already scheduled     Sep 07, 2017 10:30 AM CDT   (Arrive by 10:15 AM)   Return Movement Disorder with Paulo Saravia MD   [facility] Neurology ([facility])    [address] [facility]  3rd Floor  [facility] [address]   [phone]            Sep 07, 2017 11:00 AM CDT   Infusion 60 with  SPEC INFUSION, UC 41 ATC   [facility] [place] Special[facility] ([facility])    [address] [place] [address]  2nd Floor  [facility] [address]   [phone]            Oct 04, 2017  8:00 AM CDT   (Arrive by 7:45 AM)   XR LUMBAR PUNCTURE SPINAL TAP DIAGNOSTIC with HAIXR3,  IMAGING NURSE,  NEURO RAD   [facility] [place] [facility] ([facility])    [address] [place] [address]  1st Floor  [facility] [address] [phone]           For nerve root injection, please send or bring copies of any MRIs or other scans you have had.  Bring a list of your current medicines to your exam. (Include vitamins, minerals and over-the-counter medicines.) Leave your valuables at home.  Plan to have someone drive you home afterward.  Stop taking the following medicines (but talk to your doctor first):   If you take blood thinners, you may need to stop taking them a few days before treatment. Talk to your doctor before stopping these medicines.Stop taking Coumadin (warfarin) 3 days before treatment. Restart the day after treatment.   If you take Plavix, Ticlid, Pletal or Persantine, please ask your doctor if you should stop these medicines. You may  need extra tests on the morning of your scan.   If you take Xarelto (Rivaroxaban), you may need to stop taking it 24 hours before treatment. Talk to your doctor before stopping this medicine. Restart the day after treatment.  You may take your other medicines as normal.  Stop all food and drink (including water) 3 hours before your test or treatment.  Please tell the doctor:   If you are allergic to X-ray dye (contrast fluid).   If you may be pregnant.  Injections take about 30 to 45 minutes. Most people spend up to 2 hours in the clinic or hospital.  Please call the Imaging Department at your exam site with any questions.            Oct 04, 2017  9:00 AM CDT   Return Visit with Clint Gutiérrez MD   [facility] ([facility])    [address] [place] [address]   [phone]            Oct 05, 2017  8:00 AM CDT   (Arrive by 7:45 AM)   Cognitive Assessment with Emperatriz Candelaria, PhD [facility] Neuropsychology ([facility])    [address] [place] [address] [phone]            Oct 05, 2017  9:30 AM CDT   (Arrive by 9:15 AM)   Return Movement Disorder with Paulo Saravia MD   [facility] Neurology ([facility])    [address] [place] [address] [phone]            Oct 05, 2017 10:30 AM CDT   (Arrive by 10:15 AM)   ecg with  CV EKG   [facility] Xray ([facility])    [address] [place] [address] [phone]              Who to contact     Please call your clinic at [phone] to:    Ask questions about your health    Make or cancel appointments    Discuss your medicines    Learn about your test results    Speak to your doctor   If you have compliments or concerns about an experience at your clinic, or if you wish to file a complaint, please contact [facility]  Physicians Patient Relations at [phone] or email us at [email or web address]         Additional Information About Your Visit        Hyl[employer] Information     [employer] gives you secure access to your electronic health record. If you see a primary care provider, you can also send messages to your care team and make appointments. If you have questions, please call your primary care clinic.  If you do not have a primary care provider, please call [phone] and they will assist you.      [employer] is an electronic gateway that provides easy, online access to your medical records. With [employer], you can request a clinic appointment, read your test results, renew a prescription or communicate with your care team.     To access your existing account, please contact your [facility] or call [phone] for assistance.        Care EveryWhere ID     This is your Care EveryWhere ID. This could be used by other organizations to access your [place] medical records  [id number]        Your Vitals Were     BMI (Body Mass Index)                   26.72 kg/m2            Blood Pressure from Last 3 Encounters:   08/10/17 146/83   07/28/17 134/80   07/13/17 147/87    Weight from Last 3 Encounters:   09/07/17 89.4 kg (197 lb)   08/10/17 88.7 kg (195 lb 8.8 oz)   07/28/17 88.7 kg (195 lb 9.6 oz)              Today, you had the following     No orders found for display       Primary Care Provider    [facility]       No address on file        Equal Access to Services     LEDY STEELE : Hadii robin smitho Sojovanny, waaxda luqadaha, qaybta kaalmada higinio, gely lynch. So [facility] [phone].    ATENCIÓN: Si habla español, tiene a langford disposición servicios gratuitos de asistencia lingüística. Llame al [phone].    We comply with applicable federal civil rights laws and [place] laws. We do not discriminate on the basis of race, color,  national origin, age, disability sex, sexual orientation or gender identity.            Thank you!     Thank you for choosing [facility] NEUROLOGY  for your care. Our goal is always to provide you with excellent care. Hearing back from our patients is one way we can continue to improve our services. Please take a few minutes to complete the written survey that you may receive in the mail after your visit with us. Thank you!             Your Updated Medication List - Protect others around you: Learn how to safely use, store and throw away your medicines at www.disposemymeds.org.          This list is accurate as of: 9/7/17 10:26 AM.  Always use your most recent med list.                   Brand Name Dispense Instructions for use Diagnosis    acetaminophen-caffeine 500-65 MG Tabs    EXCEDRIN TENSION HEADACHE     Take 1 tablet by mouth every 6 hours as needed for mild pain        adapalene 0.1 % gel    DIFFERIN    135 g    Apply to the face at night.    Acne vulgaris       amantadine 100 MG capsule    SYMMETREL    270 capsule    Take 1 capsule (100 mg) by mouth 3 times daily 6am,11am and 4pm    PSP (progressive supranuclear palsy) (H)       amLODIPine 2.5 MG tablet    NORVASC    90 tablet    Take 1 tablet (2.5 mg) by mouth daily In evening.    HTN (hypertension)       BABY ASPIRIN 81 MG chewable tablet   Generic drug:  aspirin      Take 81 mg by mouth daily        CENTRUM SILVER per tablet     30 tablet    Take 1 tablet by mouth daily        COMPRESSION STOCKINGS     4 each    2 each daily    Edema, Diastolic dysfunction       * cycloSPORINE 0.05 % ophthalmic emulsion    RESTASIS    1 Box    Place 1 drop into both eyes 2 times daily    Dry eye syndrome, bilateral       * cycloSPORINE 0.05 % ophthalmic emulsion    RESTASIS    3 Box    Place 1 drop into both eyes 2 times daily    Dry eye syndrome, bilateral       EXELON 4.6 MG/24HR 24 hr patch   Generic drug:  rivastigmine     90 patch    Place 1 patch onto the skin daily     PSP (progressive supranuclear palsy) (H)       * nortriptyline 10 MG capsule    PAMELOR    270 capsule    Take 3 capsules (30 mg) by mouth At Bedtime    Migraine without aura and without status migrainosus, not intractable       * nortriptyline 10 MG capsule    PAMELOR    270 capsule    Take 3 capsules (30 mg) by mouth At Bedtime    Migraine without aura and without status migrainosus, not intractable       olmesartan 20 MG tablet    BENICAR    180 tablet    Take 2 tablets (40 mg) by mouth daily    PSP (progressive supranuclear palsy) (H), Migraine without aura and without status migrainosus, not intractable       Vitamin D-3 Super Strength 2000 UNITS tablet   Generic drug:  cholecalciferol      Take 1 tablet by mouth        ZOLMitriptan 5 MG tablet    ZOMIG    30 tablet    Take 1 tablet (5 mg) by mouth at onset of headache for migraine    PSP (progressive supranuclear palsy) (H), Migraine without aura and without status migrainosus, not intractable       * Notice:  This list has 4 medication(s) that are the same as other medications prescribed for you. Read the directions carefully, and ask your doctor or other care provider to review them with you.

## 2017-09-07 NOTE — MR AVS SNAPSHOT
After Visit Summary   9/7/2017    Jimmy Patrick    MRN: [id number]           Patient Information     Date Of Birth          1950        Visit Information        Provider Department      9/7/2017 11:00 AM HAI 41 ATC;  SPEC INFUSION [facility] Specialty and Procedure        Today's Diagnoses     PSP (progressive supranuclear palsy) (H)    -  1       Follow-ups after your visit        Your next 10 appointments already scheduled     Oct 04, 2017  8:00 AM CDT   (Arrive by 7:45 AM)   XR LUMBAR PUNCTURE SPINAL TAP DIAGNOSTIC with HAIXR3, HAI IMAGING NURSE,  NEURO [facility] Xray ([facility])    [address]  1st Floor  [facility] [address] [phone]           For nerve root injection, please send or bring copies of any MRIs or other scans you have had.  Bring a list of your current medicines to your exam. (Include vitamins, minerals and over-the-counter medicines.) Leave your valuables at home.  Plan to have someone drive you home afterward.  Stop taking the following medicines (but talk to your doctor first):   If you take blood thinners, you may need to stop taking them a few days before treatment. Talk to your doctor before stopping these medicines.Stop taking Coumadin (warfarin) 3 days before treatment. Restart the day after treatment.   If you take Plavix, Ticlid, Pletal or Persantine, please ask your doctor if you should stop these medicines. You may need extra tests on the morning of your scan.   If you take Xarelto (Rivaroxaban), you may need to stop taking it 24 hours before treatment. Talk to your doctor before stopping this medicine. Restart the day after treatment.  You may take your other medicines as normal.  Stop all food and drink (including water) 3 hours before your test or treatment.  Please tell the doctor:   If you are allergic to X-ray dye (contrast fluid).   If you may be pregnant.   Injections take about 30 to 45 minutes. Most people spend up to 2 hours in the clinic or hospital.  Please call the Imaging Department at your exam site with any questions.            Oct 04, 2017  9:00 AM CDT   Return Visit with Clint Gutiérrez MD   [facility] ([facility])    [id number] [place] [address]   [phone]            Oct 05, 2017  8:00 AM CDT   (Arrive by 7:45 AM)   Cognitive Assessment with Emperatriz Candelaria, PhD [facility] Neuropsychology ([facility])    [address] [address] [phone]            Oct 05, 2017  9:30 AM CDT   (Arrive by 9:15 AM)   Return Movement Disorder with Paulo Saravia MD   [facility] ([facility])    [address] [place] [address] [phone]            Oct 05, 2017 10:30 AM CDT   (Arrive by 10:15 AM)   ecg with  CV EKG   [facility] [place] [facility] ([facility])    [address] [place] [address] [phone]            Oct 05, 2017 11:00 AM CDT   LAB with  LAB   [facility] ([facility])    [address] [place] [address] [phone]           Patient must bring picture ID. Patient should be prepared to give a urine specimen  Please do not eat 10-12 hours before your appointment if you are coming in fasting for labs on lipids, cholesterol, or glucose (sugar). Pregnant women should follow their Care Team instructions. Water with medications is okay. Do not drink coffee or other fluids. If you have concerns about taking  your medications, please ask at office or if scheduling via [employer], send a message by clicking on Secure Messaging, Message Your Care Team.            Oct 05, 2017 12:00 PM CDT   Infusion 60 with UC SPEC INFUSION, UC 50 ATC   [facility] Specialty [facility] Procedure ([facility])    [address]  2nd Floor  [facility] [address] [phone]              Future tests that were ordered for you today     Open Future Orders        Priority Expected Expires Ordered    Vitamin B12 Routine  9/7/2018 9/7/2017    Methylmalonic acid Routine  9/7/2018 9/7/2017    Folate RBC Routine  9/7/2018 9/7/2017    Paraneoplastic antibody Routine  9/7/2018 9/7/2017    Erythrocyte sedimentation rate auto Routine  9/7/2018 9/7/2017    CRP inflammation Routine  9/7/2018 9/7/2017    Glucose Routine  9/7/2018 9/7/2017    EMG Routine  9/7/2018 9/7/2017            Who to contact     If you have questions or need follow up information about today's clinic visit or your schedule please contact [facility] [place] SPECIALTY AND PROCEDURE directly at [phone].  Normal or non-critical lab and imaging results will be communicated to you by [employer], letter or phone within 4 business days after the clinic has received the results. If you do not hear from us within 7 days, please contact the clinic through Yipp[employer] or phone. If you have a critical or abnormal lab result, we will notify you by phone as soon as possible.  Submit refill requests through [employer] or call your pharmacy and they will forward the refill request to us. Please allow 3 business days for your refill to be completed.          Additional Information About Your Visit        [employer] Information     [employer] gives you secure access to your electronic health record. If you see a primary care provider, you can also send messages to your care team and make appointments. If you have questions, please call your primary care clinic.  If you do not have a primary care provider, please call [phone] and they will assist you.        Care EveryWhere ID     This is your Care EveryWhere ID. This could be used by other organizations to access your [facility]  records  [id number]        Your Vitals Were     Pulse Temperature Respirations             79 96.6  F (35.9  C) (Tympanic) 18          Blood Pressure from Last 3 Encounters:   09/07/17 140/79   08/10/17 146/83   07/28/17 134/80    Weight from Last 3 Encounters:   09/07/17 89.4 kg (197 lb)   08/10/17 88.7 kg (195 lb 8.8 oz)   07/28/17 88.7 kg (195 lb 9.6 oz)              Today, you had the following     No orders found for display       Primary Care Provider    [facility]       No address on file        Equal Access to Services     MATEO STEELE : Hadii robin Granado, emerita garcia, azam kaalmajocelynn sylvester, gely heller . So [facility] [phone].    ATENCIÓN: Si habla español, tiene a langford disposición servicios gratuitos de asistencia lingüística. Llame al [phone].    We comply with applicable federal civil rights laws and [place] laws. We do not discriminate on the basis of race, color, national origin, age, disability sex, sexual orientation or gender identity.            Thank you!     Thank you for choosing [facility] SPECIALTY AND PROCEDURE  for your care. Our goal is always to provide you with excellent care. Hearing back from our patients is one way we can continue to improve our services. Please take a few minutes to complete the written survey that you may receive in the mail after your visit with us. Thank you!             Your Updated Medication List - Protect others around you: Learn how to safely use, store and throw away your medicines at www.disposemymeds.org.          This list is accurate as of: 9/7/17  2:32 PM.  Always use your most recent med list.                   Brand Name Dispense Instructions for use Diagnosis    acetaminophen-caffeine 500-65 MG Tabs    EXCEDRIN TENSION HEADACHE     Take 1 tablet by mouth every 6 hours as needed for mild pain        adapalene 0.1 % gel    DIFFERIN    135 g    Apply  to the face at night.    Acne vulgaris       amantadine 100 MG capsule    SYMMETREL    270 capsule    Take 1 capsule (100 mg) by mouth 3 times daily 6am,11am and 4pm    PSP (progressive supranuclear palsy) (H)       amLODIPine 2.5 MG tablet    NORVASC    90 tablet    Take 1 tablet (2.5 mg) by mouth daily In evening.    HTN (hypertension)       BABY ASPIRIN 81 MG chewable tablet   Generic drug:  aspirin      Take 81 mg by mouth daily        CENTRUM SILVER per tablet     30 tablet    Take 1 tablet by mouth daily        COMPRESSION STOCKINGS     4 each    2 each daily    Edema, Diastolic dysfunction       * cycloSPORINE 0.05 % ophthalmic emulsion    RESTASIS    1 Box    Place 1 drop into both eyes 2 times daily    Dry eye syndrome, bilateral       * cycloSPORINE 0.05 % ophthalmic emulsion    RESTASIS    3 Box    Place 1 drop into both eyes 2 times daily    Dry eye syndrome, bilateral       EXELON 4.6 MG/24HR 24 hr patch   Generic drug:  rivastigmine     90 patch    Place 1 patch onto the skin daily    PSP (progressive supranuclear palsy) (H)       * nortriptyline 10 MG capsule    PAMELOR    270 capsule    Take 3 capsules (30 mg) by mouth At Bedtime    Migraine without aura and without status migrainosus, not intractable       * nortriptyline 10 MG capsule    PAMELOR    270 capsule    Take 3 capsules (30 mg) by mouth At Bedtime    Migraine without aura and without status migrainosus, not intractable       olmesartan 20 MG tablet    BENICAR    180 tablet    Take 2 tablets (40 mg) by mouth daily    PSP (progressive supranuclear palsy) (H), Migraine without aura and without status migrainosus, not intractable       Vitamin D-3 Super Strength 2000 UNITS tablet   Generic drug:  cholecalciferol      Take 1 tablet by mouth        ZOLMitriptan 5 MG tablet    ZOMIG    30 tablet    Take 1 tablet (5 mg) by mouth at onset of headache for migraine    PSP (progressive supranuclear palsy) (H), Migraine without aura and without status  migrainosus, not intractable       * Notice:  This list has 4 medication(s) that are the same as other medications prescribed for you. Read the directions carefully, and ask your doctor or other care provider to review them with you.

## 2017-09-07 NOTE — LETTER
9/7/2017       RE: Jimmy Patrick  [address]  [place] [facility] [place] [address]     Dear Colleague,    Thank you for referring your patient, Jimmy Patrick, to the [facility] NEUROLOGY at [facility]. Please see a copy of my visit note below.    Research visit      Numbness in toes  Blood work ordered  emg future study placed.       Again, thank you for allowing me to participate in the care of your patient.      Sincerely,    Paulo Saravia MD

## 2017-09-07 NOTE — PROGRESS NOTES
Nursing Note  Jimmy Patrick [facility] for:   Chief Complaint   Patient presents with     Infusion     study [id number] (IDS# [id number]) IV infusion 2,100 mg      During today's [facility] appointment, orders from Dr. Paulo Saravia were completed.  Frequency: monthly    Progress note:  Patient identification verified by name and date of birth.  Assessment completed.  Vitals recorded in Doc Flowsheets.  Patient was provided with education regarding infusion and possible side effects.  Patient verbalized understanding.      needed: No  Premedications: were not ordered.  Infusion Rates: infusion given over approximately 1 hour.  Approximate Infusion length:1 hours.   Labs: were not ordered for this appointment.  Vascular access: peripheral IV placed today.  Treatment Conditions: non-applicable.  Patient tolerated infusion: well.      Discharge Plan:   Follow up plan of care with: ongoing infusions at [facility].  Discharge instructions were reviewed with patient.  Patient/representative verbalized understanding of discharge instructions and all questions answered.  Patient discharged from [facility] in stable condition.    Trina Barroso RN    Administrations This Visit     study [id number] (IDS# [id number]) IV infusion 2,100 mg 210 mL     Admin Date Action Dose Rate Route Administered By          09/07/2017 New Bag 2100 mg 210 mL/hr Intravenous Trina Barroso RN                         /79  Pulse 79  Temp 96.6  F (35.9  C) (Tympanic)  Resp 18

## 2017-09-25 PROCEDURE — 82274 ASSAY TEST FOR BLOOD FECAL: CPT | Performed by: FAMILY MEDICINE

## 2017-09-29 DIAGNOSIS — Z00.00 ROUTINE GENERAL MEDICAL EXAMINATION AT A HEALTH CARE FACILITY: ICD-10-CM

## 2017-09-29 DIAGNOSIS — Z12.11 SCREEN FOR COLON CANCER: ICD-10-CM

## 2017-09-29 LAB — HEMOCCULT STL QL IA: NEGATIVE

## 2017-10-02 NOTE — PROGRESS NOTES
Hi Raymond,  Your stool test results for colon cancer screening is negative, this is reassuring.   Let us recheck this next year.   Let me know if you have any questions. Take care.  Evelina Morse MD

## 2017-10-02 NOTE — PROGRESS NOTES
Research visit    Long term care requirements consistently would need 2/6 of the following to qualify.    Eating  Dressing  Bathing  toileting  Continence  Transferring     He has developed difficulties in the various domains that require prompting to transfer safely - has to be reminded to get up and down in chairs. He has also had problems with dressing. He needs monitoring of his eating because of his choking risk. He does not have incontinence problems. He can bathe with intermittent attention for safety.   [facility] having long term care involvement as they require 3 months of impairment before their services kick in  Paulo mora md   October 5, 2017

## 2017-10-04 ENCOUNTER — RESULTS ONLY (OUTPATIENT)
Dept: LAB | Age: 67
End: 2017-10-04

## 2017-10-04 LAB
APPEARANCE CSF: CLEAR
COLOR CSF: COLORLESS
GLUCOSE CSF-MCNC: 61 MG/DL (ref 40–70)
PROT CSF-MCNC: 46 MG/DL (ref 15–60)
RBC # CSF MANUAL: 7 /UL (ref 0–2)
TUBE # CSF: 3 #
WBC # CSF MANUAL: 1 /UL (ref 0–5)

## 2017-10-05 ENCOUNTER — OFFICE VISIT (OUTPATIENT)
Dept: NEUROLOGY | Facility: CLINIC | Age: 67
End: 2017-10-05

## 2017-10-05 ENCOUNTER — OFFICE VISIT (OUTPATIENT)
Dept: NEUROPSYCHOLOGY | Facility: CLINIC | Age: 67
End: 2017-10-05

## 2017-10-05 ENCOUNTER — INFUSION THERAPY VISIT (OUTPATIENT)
Dept: INFUSION THERAPY | Facility: CLINIC | Age: 67
End: 2017-10-05
Attending: PSYCHIATRY & NEUROLOGY
Payer: COMMERCIAL

## 2017-10-05 VITALS
SYSTOLIC BLOOD PRESSURE: 157 MMHG | RESPIRATION RATE: 16 BRPM | DIASTOLIC BLOOD PRESSURE: 86 MMHG | HEART RATE: 83 BPM | TEMPERATURE: 97.9 F | OXYGEN SATURATION: 100 %

## 2017-10-05 VITALS
HEART RATE: 82 BPM | SYSTOLIC BLOOD PRESSURE: 145 MMHG | BODY MASS INDEX: 26.29 KG/M2 | DIASTOLIC BLOOD PRESSURE: 86 MMHG | HEIGHT: 72 IN | WEIGHT: 194.1 LBS

## 2017-10-05 DIAGNOSIS — G23.1 PSP (PROGRESSIVE SUPRANUCLEAR PALSY) (H): ICD-10-CM

## 2017-10-05 DIAGNOSIS — G23.1 PSP (PROGRESSIVE SUPRANUCLEAR PALSY) (H): Primary | ICD-10-CM

## 2017-10-05 DIAGNOSIS — Z00.6 EXAMINATION OF PARTICIPANT IN CLINICAL TRIAL: Primary | ICD-10-CM

## 2017-10-05 DIAGNOSIS — R20.9 DISTURBANCE OF SKIN SENSATION: ICD-10-CM

## 2017-10-05 LAB
CRP SERPL-MCNC: <2.9 MG/L (ref 0–8)
ERYTHROCYTE [SEDIMENTATION RATE] IN BLOOD BY WESTERGREN METHOD: 2 MM/H (ref 0–20)
GLUCOSE SERPL-MCNC: 92 MG/DL (ref 70–99)
VIT B12 SERPL-MCNC: 699 PG/ML (ref 193–986)

## 2017-10-05 PROCEDURE — 96365 THER/PROPH/DIAG IV INF INIT: CPT

## 2017-10-05 RX ADMIN — Medication 2100 MG: at 12:25

## 2017-10-05 ASSESSMENT — PAIN SCALES - GENERAL: PAINLEVEL: NO PAIN (0)

## 2017-10-05 NOTE — MR AVS SNAPSHOT
After Visit Summary   10/5/2017    Jimmy Patrick    MRN: [id number]           Patient Information     Date Of Birth          1950        Visit Information        Provider Department      10/5/2017 12:00 PM UC 50 ATC; UC SPEC INFUSION [facility] Specialty and Procedure        Today's Diagnoses     PSP (progressive supranuclear palsy) (H)    -  1       Follow-ups after your visit        Your next 10 appointments already scheduled     Oct 18, 2017  2:00 PM CDT   Return Visit with Clint Gutiérrez MD   [facility] ([facility])    [address] [place] [address]   [phone]            Nov 02, 2017 10:30 AM CDT   (Arrive by 10:15 AM)   Return Movement Disorder with Paulo Saravia MD   [facility]rology ([facility])    [address] [place] [address]   [phone]            Nov 02, 2017 12:00 PM CDT   Infusion 60 with UC SPEC INFUSION, UC 50 ATC   [facility] Specialty and Procedure ([facility])    [address] [place] [address]   [phone]            Nov 30, 2017 10:30 AM CST   (Arrive by 10:15 AM)   Return Movement Disorder with Paulo Saravia MD   [facility] ([facility])    [address] [place] [id number] [phone]            Nov 30, 2017 12:00 PM CST   Infusion 60 with UC SPEC INFUSION, UC 50 ATC   [facility] Specialty and Procedure ([facility])    [address] [place] [address]   [phone]            Dec 21, 2017  8:00 AM CST   (Arrive by 7:45 AM)   Cognitive Assessment with Emperatriz Candelaria, PhD [facility] Neuropsychology ([facility])    [address] [place] [address]    [phone]            Dec 21, 2017  9:30 AM CST   (Arrive by 9:15 AM)   Return Movement Disorder with Paulo Saravia MD   [facility] ([facility])    [address]  3rd Floor  [facility] [address] [phone]            Dec 21, 2017 10:40 AM CST   (Arrive by 10:25 AM)   ecg with  CV EKG   [facility] [place] [facility] ([facility] [place])    [address]  1st Floor  [facility] [address] [phone]              Who to contact     If you have questions or need follow up information about today's clinic visit or your schedule please contact [facility] SPECIALTY AND PROCEDURE directly at [phone].  Normal or non-critical lab and imaging results will be communicated to you by [employer], letter or phone within 4 business days after the clinic has received the results. If you do not hear from us within 7 days, please contact the clinic through [employer] or phone. If you have a critical or abnormal lab result, we will notify you by phone as soon as possible.  Submit refill requests through [employer] or call your pharmacy and they will forward the refill request to us. Please allow 3 business days for your refill to be completed.          Additional Information About Your Visit        [employer] Information     [employer] gives you secure access to your electronic health record. If you see a primary care provider, you can also send messages to your care team and make appointments. If you have questions, please call your primary care clinic.  If you do not have a primary care provider, please call [phone] and they will assist you.        Care EveryWhere ID     This is your Care EveryWhere ID. This could be used by other organizations to access your [facility] medical records  [id number]        Your Vitals Were     Pulse Temperature Respirations Pulse Oximetry          83 97.9  F (36.6  C) (Oral) 16 100%         Blood  Pressure from Last 3 Encounters:   10/05/17 157/86   10/05/17 145/86   10/04/17 (!) 166/96    Weight from Last 3 Encounters:   10/05/17 88 kg (194 lb 1.6 oz)   09/07/17 89.4 kg (197 lb)   08/10/17 88.7 kg (195 lb 8.8 oz)              Today, you had the following     No orders found for display       Primary Care Provider Office Phone # Fax #    Evelina Morse [phone]       [address]  [facility] [place] [facility] [address]        Equal Access to Services     [facility]: Hadii aad ku hadasho Soomaali, waaxda luqadaha, qaybta kaalmada adenilsayada, gely heller . So [facility] [place] [phone].    ATENCIÓN: Si habla español, tiene a langford disposición servicios gratuitos de asistencia lingüística. Ll[facility] [phone].    We comply with applicable federal civil rights laws and [place] laws. We do not discriminate on the basis of race, color, national origin, age, disability, sex, sexual orientation, or gender identity.            Thank you!     Thank you for choosing [facility] SPECIALTY AND PROCEDURE  for your care. Our goal is always to provide you with excellent care. Hearing back from our patients is one way we can continue to improve our services. Please take a few minutes to complete the written survey that you may receive in the mail after your visit with us. Thank you!             Your Updated Medication List - Protect others around you: Learn how to safely use, store and throw away your medicines at www.disposemymeds.org.          This list is accurate as of: 10/5/17  2:23 PM.  Always use your most recent med list.                   Brand Name Dispense Instructions for use Diagnosis    acetaminophen-caffeine 500-65 MG Tabs    EXCEDRIN TENSION HEADACHE     Take 1 tablet by mouth every 6 hours as needed for mild pain        adapalene 0.1 % gel    DIFFERIN    135 g    Apply to the face at night.    Acne vulgaris       amantadine 100 MG capsule    SYMMETREL     270 capsule    Take 1 capsule (100 mg) by mouth 3 times daily 6am,11am and 4pm    PSP (progressive supranuclear palsy) (H)       amLODIPine 2.5 MG tablet    NORVASC    90 tablet    Take 1 tablet (2.5 mg) by mouth daily In evening.    HTN (hypertension)       BABY ASPIRIN 81 MG chewable tablet   Generic drug:  aspirin      Take 81 mg by mouth daily        CENTRUM SILVER per tablet     30 tablet    Take 1 tablet by mouth daily        COMPRESSION STOCKINGS     4 each    2 each daily    Edema, Diastolic dysfunction       * cycloSPORINE 0.05 % ophthalmic emulsion    RESTASIS    1 Box    Place 1 drop into both eyes 2 times daily    Dry eye syndrome, bilateral       * cycloSPORINE 0.05 % ophthalmic emulsion    RESTASIS    3 Box    Place 1 drop into both eyes 2 times daily    Dry eye syndrome, bilateral       EXELON 4.6 MG/24HR 24 hr patch   Generic drug:  rivastigmine     90 patch    Place 1 patch onto the skin daily    PSP (progressive supranuclear palsy) (H)       * nortriptyline 10 MG capsule    PAMELOR    270 capsule    Take 3 capsules (30 mg) by mouth At Bedtime    Migraine without aura and without status migrainosus, not intractable       * nortriptyline 10 MG capsule    PAMELOR    270 capsule    Take 3 capsules (30 mg) by mouth At Bedtime    Migraine without aura and without status migrainosus, not intractable       olmesartan 20 MG tablet    BENICAR    180 tablet    Take 2 tablets (40 mg) by mouth daily    PSP (progressive supranuclear palsy) (H), Migraine without aura and without status migrainosus, not intractable       Vitamin D-3 Super Strength 2000 UNITS tablet   Generic drug:  cholecalciferol      Take 1 tablet by mouth        ZOLMitriptan 5 MG tablet    ZOMIG    30 tablet    Take 1 tablet (5 mg) by mouth at onset of headache for migraine    PSP (progressive supranuclear palsy) (H), Migraine without aura and without status migrainosus, not intractable       * Notice:  This list has 4 medication(s) that are  the same as other medications prescribed for you. Read the directions carefully, and ask your doctor or other care provider to review them with you.

## 2017-10-05 NOTE — MR AVS SNAPSHOT
After Visit Summary   10/5/2017    Jimmy Patrick    MRN: [id number]           Patient Information     Date Of Birth          1950        Visit Information        Provider Department      10/5/2017 9:30 AM Paulo Saravia MD [facility] Neurology        Today's Diagnoses     PSP (progressive supranuclear palsy) (H)    -  1       Follow-ups after your visit        Your next 10 appointments already scheduled     Oct 05, 2017 10:30 AM CDT   (Arrive by 10:15 AM)   ecg with  CV EKG   [facility] [place] [facility] ([facility])    [address] [place] [address] [id number] [phone]            Oct 05, 2017 11:00 AM CDT   LAB with  LAB   [facility] Lab ([facility])    [address] [place] [address] [id number] [phone]           Patient must bring picture ID. Patient should be prepared to give a urine specimen  Please do not eat 10-12 hours before your appointment if you are coming in fasting for labs on lipids, cholesterol, or glucose (sugar). Pregnant women should follow their Care Team instructions. Water with medications is okay. Do not drink coffee or other fluids. If you have concerns about taking  your medications, please ask at office or if scheduling via Cubiclehart, send a message by clicking on Secure Messaging, Message Your Care Team.            Oct 05, 2017 12:00 PM CDT   Infusion 60 with  SPEC INFUSION, UC 50 ATC   [facility] Special[facility] ([facility])    [address] [place] [address] [id number] [phone]            Oct 18, 2017  2:00 PM CDT   Return Visit with Clint Gutiérrez MD   [facility] ([facility])    [address] [place] [address]   [phone]            Nov 02, 2017 10:30 AM CDT   (Arrive by 10:15 AM)   Return Movement Disorder with Paulo Olivas  MD Rani   [facility] Neurology ([facility])    [address] [facility] [address]  [address]rd [facility] [address]   [phone]            Nov 02, 2017 12:00 PM CDT   Infusion 60 with UC SPEC INFUSION, UC 50 ATC   [facility] Specialty and Procedure ([facility])    [address]  [address] [facility] [address]   [phone]            Nov 30, 2017 10:30 AM CST   (Arrive by 10:15 AM)   Return Movement Disorder with Paulo Saravia MD   [facility] Neurology ([facility])    [address] [facility]  [address]rd [facility] [address] [phone]            Nov 30, 2017 12:00 PM CST   Infusion 60 with UC SPEC INFUSION   [facility] Specialty and Procedure ([facility])    [address] [address] [phone]              Who to contact     Please call your clinic at [phone] to:    Ask questions about your health    Make or cancel appointments    Discuss your medicines    Learn about your test results    Speak to your doctor   If you have compliments or concerns about an experience at your clinic, or if you wish to file a complaint, please contact [facility] Patient Relations at [phone] or email us at [email or web address]         Additional Information About Your Visit        Blueleafhart Information     [employer] gives you secure access to your electronic health record. If you see a primary care provider, you can also send messages to your care team and make appointments. If you have questions, please call your primary care clinic.  If you do not have a primary care provider, please call [phone] and they will assist you.      M[employer] is an electronic gateway that provides easy, online access to your medical records. With [employer], you can request a clinic appointment, read your test  results, renew a prescription or communicate with your care team.     To access your existing account, please contact your [facility] or call [phone] for assistance.        Care EveryWhere ID     This is your Care EveryWhere ID. This could be used by other organizations to access your [place] medical records  [id number]        Your Vitals Were     Pulse Height BMI (Body Mass Index)             82 1.829 m (6') 26.32 kg/m2          Blood Pressure from Last 3 Encounters:   10/05/17 145/86   10/04/17 (!) 166/96   09/07/17 140/79    Weight from Last 3 Encounters:   10/05/17 88 kg (194 lb 1.6 oz)   09/07/17 89.4 kg (197 lb)   08/10/17 88.7 kg (195 lb 8.8 oz)              Today, you had the following     No orders found for display       Primary Care Provider Office Phone # Fax #    Evelina Morse [phone]       [address]  [facility] [address]        Equal Access to Services     [facility]: Hadii aad ku hadasho Soomaali, waaxda luqadaha, qaybta kaalmada adeegyada, waxay cb haysindhu heller . So [facility] [phone].    ATENCIÓN: Si habla español, tiene a langford disposición servicios gratuitos de asistencia lingüística. Llame al [phone].    We comply with applicable federal civil rights laws and [place] laws. We do not discriminate on the basis of race, color, national origin, age, disability, sex, sexual orientation, or gender identity.            Thank you!     Thank you for choosing [facility] NEUROLOGY  for your care. Our goal is always to provide you with excellent care. Hearing back from our patients is one way we can continue to improve our services. Please take a few minutes to complete the written survey that you may receive in the mail after your visit with us. Thank you!             Your Updated Medication List - Protect others around you: Learn how to safely use, store and throw away your medicines at www.disposemymeds.org.           This list is accurate as of: 10/5/17 10:15 AM.  Always use your most recent med list.                   Brand Name Dispense Instructions for use Diagnosis    acetaminophen-caffeine 500-65 MG Tabs    EXCEDRIN TENSION HEADACHE     Take 1 tablet by mouth every 6 hours as needed for mild pain        adapalene 0.1 % gel    DIFFERIN    135 g    Apply to the face at night.    Acne vulgaris       amantadine 100 MG capsule    SYMMETREL    270 capsule    Take 1 capsule (100 mg) by mouth 3 times daily 6am,11am and 4pm    PSP (progressive supranuclear palsy) (H)       amLODIPine 2.5 MG tablet    NORVASC    90 tablet    Take 1 tablet (2.5 mg) by mouth daily In evening.    HTN (hypertension)       BABY ASPIRIN 81 MG chewable tablet   Generic drug:  aspirin      Take 81 mg by mouth daily        CENTRUM SILVER per tablet     30 tablet    Take 1 tablet by mouth daily        COMPRESSION STOCKINGS     4 each    2 each daily    Edema, Diastolic dysfunction       * cycloSPORINE 0.05 % ophthalmic emulsion    RESTASIS    1 Box    Place 1 drop into both eyes 2 times daily    Dry eye syndrome, bilateral       * cycloSPORINE 0.05 % ophthalmic emulsion    RESTASIS    3 Box    Place 1 drop into both eyes 2 times daily    Dry eye syndrome, bilateral       EXELON 4.6 MG/24HR 24 hr patch   Generic drug:  rivastigmine     90 patch    Place 1 patch onto the skin daily    PSP (progressive supranuclear palsy) (H)       * nortriptyline 10 MG capsule    PAMELOR    270 capsule    Take 3 capsules (30 mg) by mouth At Bedtime    Migraine without aura and without status migrainosus, not intractable       * nortriptyline 10 MG capsule    PAMELOR    270 capsule    Take 3 capsules (30 mg) by mouth At Bedtime    Migraine without aura and without status migrainosus, not intractable       olmesartan 20 MG tablet    BENICAR    180 tablet    Take 2 tablets (40 mg) by mouth daily    PSP (progressive supranuclear palsy) (H), Migraine without aura and without  status migrainosus, not intractable       Vitamin D-3 Super Strength 2000 UNITS tablet   Generic drug:  cholecalciferol      Take 1 tablet by mouth        ZOLMitriptan 5 MG tablet    ZOMIG    30 tablet    Take 1 tablet (5 mg) by mouth at onset of headache for migraine    PSP (progressive supranuclear palsy) (H), Migraine without aura and without status migrainosus, not intractable       * Notice:  This list has 4 medication(s) that are the same as other medications prescribed for you. Read the directions carefully, and ask your doctor or other care provider to review them with you.

## 2017-10-05 NOTE — MR AVS SNAPSHOT
After Visit Summary   10/5/2017    Jimmy Patrick    MRN: [id number]           Patient Information     Date Of Birth          1950        Visit Information        Provider Department      10/5/2017 8:00 AM Emperatriz Candelaria, PhD LP [facility] Neuropsychology        Today's Diagnoses     Examination of participant in clinical trial    -  1       Follow-ups after your visit        Your next 10 appointments already scheduled     Oct 18, 2017  2:00 PM CDT   Return Visit with Clint Gutiérrez MD   [facility] ([facility])    [address] [place] [address]   [phone]            Nov 02, 2017 10:30 AM CDT   (Arrive by 10:15 AM)   Return Movement Disorder with Paulo Saravia MD   [facility] Neurology ([facility])    [address] [place] [address]   [phone]            Nov 02, 2017 12:00 PM CDT   Infusion 60 with UC SPEC INFUSION, UC 50 ATC   [facility] Specialty and Procedure ([facility])    [address] [place] [address]   [phone]            Nov 30, 2017 10:30 AM CST   (Arrive by 10:15 AM)   Return Movement Disorder with Paulo Saravia MD   [facility] Neurology ([facility])    [address] [place] [address]   [phone]            Nov 30, 2017 12:00 PM CST   Infusion 60 with UC SPEC INFUSION, UC 50 ATC   [facility] Specialty and Procedure ([facility])    [address] [place] [address]   [phone]            Dec 21, 2017  8:00 AM CST   (Arrive by 7:45 AM)   Cognitive Assessment with Emperatriz Candelaria, PhD [facility] Neuropsychology ([facility])    [address] [place] [address]   [phone]            Dec  21, 2017  9:30 AM CST   (Arrive by 9:15 AM)   Return Movement Disorder with Paulo Saravia MD   [facility] Neurology ([facility] [place])    [address]  3rd Floor  [facility] [id number] [phone]            Dec 21, 2017 10:40 AM CST   (Arrive by 10:25 AM)   ecg with  CV EKG   [facility] Xray ([facility] [place])    [address]  1st Floor  [facility] [phone] [phone]              Who to contact     Please call your clinic at [phone] to:    Ask questions about your health    Make or cancel appointments    Discuss your medicines    Learn about your test results    Speak to your doctor   If you have compliments or concerns about an experience at your clinic, or if you wish to file a complaint, please contact [facility] Patient Relations at [phone] or email us at [email or web address]         Additional Information About Your Visit        enVeridhart Information     [employer] gives you secure access to your electronic health record. If you see a primary care provider, you can also send messages to your care team and make appointments. If you have questions, please call your primary care clinic.  If you do not have a primary care provider, please call [phone] and they will assist you.      [employer] is an electronic gateway that provides easy, online access to your medical records. With [employer], you can request a clinic appointment, read your test results, renew a prescription or communicate with your care team.     To access your existing account, please contact your [facility] or call [phone] for assistance.        Care EveryWhere ID     This is your Care EveryWhere ID. This could be used by other organizations to access your [place] medical records  [id number]         Blood Pressure from Last 3 Encounters:   10/05/17 157/86   10/05/17 145/86    10/04/17 (!) 166/96    Weight from Last 3 Encounters:   10/05/17 88 kg (194 lb 1.6 oz)   09/07/17 89.4 kg (197 lb)   08/10/17 88.7 kg (195 lb 8.8 oz)              We Performed the Following     NEUROPSYCH TESTING BY [facility]        Primary Care Provider Office Phone # Fax #    Evelina Morse [phone] [address]  [facility] [address]        Equal Access to Services     [facility] : Hadii aad ku hadasho Soomaali, waaxda luqadaha, qaybta kaalmada adeegyada, waxay idiin hayaan adeeg vinicio heller . So [facility] [phone].    ATENCIÓN: Si habla español, tiene a langford disposición servicios gratuitos de asistencia lingüística. Llame al [phone].    We comply with applicable federal civil rights laws and [place] laws. We do not discriminate on the basis of race, color, national origin, age, disability, sex, sexual orientation, or gender identity.            Thank you!     Thank you for choosing [facility] NEUROPSYCHOLOGY  for your care. Our goal is always to provide you with excellent care. Hearing back from our patients is one way we can continue to improve our services. Please take a few minutes to complete the written survey that you may receive in the mail after your visit with us. Thank you!             Your Updated Medication List - Protect others around you: Learn how to safely use, store and throw away your medicines at www.disposemymeds.org.          This list is accurate as of: 10/5/17 11:59 PM.  Always use your most recent med list.                   Brand Name Dispense Instructions for use Diagnosis    acetaminophen-caffeine 500-65 MG Tabs    EXCEDRIN TENSION HEADACHE     Take 1 tablet by mouth every 6 hours as needed for mild pain        adapalene 0.1 % gel    DIFFERIN    135 g    Apply to the face at night.    Acne vulgaris       amantadine 100 MG capsule    SYMMETREL    270 capsule    Take 1 capsule (100 mg) by mouth 3 times daily 6am,11am and 4pm    PSP (progressive  supranuclear palsy) (H)       amLODIPine 2.5 MG tablet    NORVASC    90 tablet    Take 1 tablet (2.5 mg) by mouth daily In evening.    HTN (hypertension)       BABY ASPIRIN 81 MG chewable tablet   Generic drug:  aspirin      Take 81 mg by mouth daily        CENTRUM SILVER per tablet     30 tablet    Take 1 tablet by mouth daily        COMPRESSION STOCKINGS     4 each    2 each daily    Edema, Diastolic dysfunction       * cycloSPORINE 0.05 % ophthalmic emulsion    RESTASIS    1 Box    Place 1 drop into both eyes 2 times daily    Dry eye syndrome, bilateral       * cycloSPORINE 0.05 % ophthalmic emulsion    RESTASIS    3 Box    Place 1 drop into both eyes 2 times daily    Dry eye syndrome, bilateral       EXELON 4.6 MG/24HR 24 hr patch   Generic drug:  rivastigmine     90 patch    Place 1 patch onto the skin daily    PSP (progressive supranuclear palsy) (H)       * nortriptyline 10 MG capsule    PAMELOR    270 capsule    Take 3 capsules (30 mg) by mouth At Bedtime    Migraine without aura and without status migrainosus, not intractable       * nortriptyline 10 MG capsule    PAMELOR    270 capsule    Take 3 capsules (30 mg) by mouth At Bedtime    Migraine without aura and without status migrainosus, not intractable       olmesartan 20 MG tablet    BENICAR    180 tablet    Take 2 tablets (40 mg) by mouth daily    PSP (progressive supranuclear palsy) (H), Migraine without aura and without status migrainosus, not intractable       Vitamin D-3 Super Strength 2000 UNITS tablet   Generic drug:  cholecalciferol      Take 1 tablet by mouth        ZOLMitriptan 5 MG tablet    ZOMIG    30 tablet    Take 1 tablet (5 mg) by mouth at onset of headache for migraine    PSP (progressive supranuclear palsy) (H), Migraine without aura and without status migrainosus, not intractable       * Notice:  This list has 4 medication(s) that are the same as other medications prescribed for you. Read the directions carefully, and ask your doctor  or other care provider to review them with you.

## 2017-10-05 NOTE — PROGRESS NOTES
Nursing Note  Jimmy Patrick [facility] for:   Chief Complaint   Patient presents with     Infusion     study [id number] (IDS# [id number]) IV infusion 2,100 mg 210 mL     During today's [facility] appointment, orders from Dr. Paluo Saravia were completed.  Frequency: monthly    Progress note:  Patient identification verified by name and date of birth.  Assessment completed.  Vitals recorded in Doc Flowsheets.  Patient was provided with education regarding infusion and possible side effects.  Patient verbalized understanding.      needed: No  Premedications: were not ordered.  Infusion Rates: infusion given over approximately 1 hour.  Approximate Infusion length:1 hours.   Labs: were not ordered for this appointment.  Vascular access: peripheral IV placed today.  Treatment Conditions: non-applicable.  Patient tolerated infusion: well.      Discharge Plan:   Follow up plan of care with: ongoing infusions at [facility].  Discharge instructions were reviewed with patient.  Patient/representative verbalized understanding of discharge instructions and all questions answered.  Patient discharged from [facility] in stable condition.    Trina Barroso RN    Administrations This Visit     study [id number] (IDS# [id number]) IV infusion 2,100 mg 210 mL     Admin Date Action Dose Rate Route Administered By          10/05/2017 New Bag 2100 mg 210 mL/hr Intravenous Trina Barroso RN                         /89  Pulse 91  Temp 97.9  F (36.6  C) (Oral)  Resp 16  SpO2 100%

## 2017-10-05 NOTE — LETTER
10/5/2017      RE: Jimmy Patrick  [address]  [place] [address]         Research visit    Long term care requirements consistently would need 2/6 of the following to qualify.    Eating  Dressing  Bathing  toileting  Continence  Transferring     He has developed difficulties in the various domains that require prompting to transfer safely - has to be reminded to get up and down in chairs. He has also had problems with dressing. He needs monitoring of his eating because of his choking risk. He does not have incontinence problems. He can bathe with intermittent attention for safety.   [place] having long term care involvement as they require 3 months of impairment before their services kick in  Paulo mora md   October 5, 2017

## 2017-10-06 LAB
FOLATE RBC-MCNC: NORMAL NG/ML
HCT VFR BLD CALC: 49.5 %
INTERPRETATION ECG - MUSE: NORMAL

## 2017-10-06 NOTE — PROGRESS NOTES
The participant completed the neuropsychology evaluation for the study with [facility] number [id number]. This included one hour of psychometrist time.    Measures administered:    MoCA  Repeatable Battery for the Assessment of Neuropsychological Status (RBANS)  Phonemic Fluency  Letter Number Sequencing Test  Color Trails Test

## 2017-10-07 LAB — METHYLMALONATE SERPL-SCNC: 0.19 UMOL/L (ref 0–0.4)

## 2017-10-10 LAB — PNP ABY SERUM: NORMAL

## 2017-10-18 ENCOUNTER — OFFICE VISIT (OUTPATIENT)
Dept: CARDIOLOGY | Facility: CLINIC | Age: 67
End: 2017-10-18
Attending: INTERNAL MEDICINE
Payer: COMMERCIAL

## 2017-10-18 VITALS
SYSTOLIC BLOOD PRESSURE: 144 MMHG | BODY MASS INDEX: 26.64 KG/M2 | OXYGEN SATURATION: 98 % | DIASTOLIC BLOOD PRESSURE: 84 MMHG | HEART RATE: 87 BPM | WEIGHT: 196.4 LBS

## 2017-10-18 DIAGNOSIS — I10 ESSENTIAL HYPERTENSION: ICD-10-CM

## 2017-10-18 DIAGNOSIS — I48.3 TYPICAL ATRIAL FLUTTER (H): ICD-10-CM

## 2017-10-18 PROCEDURE — 99213 OFFICE O/P EST LOW 20 MIN: CPT | Performed by: INTERNAL MEDICINE

## 2017-10-18 RX ORDER — HYDROCHLOROTHIAZIDE 12.5 MG/1
12.5 TABLET ORAL DAILY
Qty: 30 TABLET | Refills: 3 | Status: SHIPPED | OUTPATIENT
Start: 2017-10-18 | End: 2018-01-01

## 2017-10-18 NOTE — PROGRESS NOTES
Clinical Cardiac Electrophysiology    Chief Complaint: atrial flutter    HPI: I was happy to see Mr. Patrick in consultation for the above at the request of Dr. Saravia.    Mr. Patrick recently moved to [place] from [place]. He reports a history of atrial flutter treated with ablation in 2013. He had palpitations associated with the atrial flutter. He reports no history of atrial fibrillation. He was on oral anticoagulation for approximately a year and then switched to an aspirin. He reports no palpitations since the ablation.     He takes diltiazem for hypertension and Benicar, primarily for his migraines.     [id number]: he reports no significant palpitations. He feels his PSP has gotten a little worse.    [id number]: I usually see Mr. Patrick in  but due to scheduling issues he was seen in [place] today. He reports doing well from a cardiac standpoint. He has had no palpitations. He denies any recent change in his exertional abilities, no chest pain/discomfort, no unusual dyspnea on exertion, no syncope, near syncope and no increase in ankle edema.    [id number]: He reports no palpitations. He has no heart concerns today except his BP. His resting BP measurements at home are below his goal of 140/90 but he is concerned that his BP goes up when he exercises. He does have some lightheadedness when he stands. He has not had syncope.     He denies any chest pain/discomfort, increased dyspnea on exertion or symptoms of heart failure.    [id number] Interval History: He had his BP machine checked with us today and it is measuring a bit lower than ours. The edema is about the same. His PSP is slowly progressing.    Current Outpatient Prescriptions   Medication Sig Dispense Refill     amLODIPine (NORVASC) 2.5 MG tablet Take 1 tablet (2.5 mg) by mouth daily In evening. 90 tablet 3     olmesartan (BENICAR) 20 MG tablet Take 2 tablets (40 mg) by mouth daily 180 tablet 3     nortriptyline (PAMELOR) 10 MG  capsule Take 3 capsules (30 mg) by mouth At Bedtime 270 capsule 0     adapalene (DIFFERIN) 0.1 % gel Apply to the face at night. 135 g 3     amantadine (SYMMETREL) 100 MG capsule Take 1 capsule (100 mg) by mouth 3 times daily 6am,11am and 4pm 270 capsule 3     ZOLMitriptan (ZOMIG) 5 MG tablet Take 1 tablet (5 mg) by mouth at onset of headache for migraine 30 tablet 5     rivastigmine (EXELON) 4.6 MG/24HR 24 hr patch Place 1 patch onto the skin daily 90 patch 3     acetaminophen-caffeine (EXCEDRIN TENSION HEADACHE) 500-65 MG TABS Take 1 tablet by mouth every 6 hours as needed for mild pain       Multiple Vitamins-Minerals (CENTRUM SILVER) per tablet Take 1 tablet by mouth daily 30 tablet      aspirin (BABY ASPIRIN) 81 MG chewable tablet Take 81 mg by mouth daily        cholecalciferol (VITAMIN D-3 SUPER STRENGTH) 2000 UNITS tablet Take 1 tablet by mouth        [DISCONTINUED] nortriptyline (PAMELOR) 10 MG capsule Take 3 capsules (30 mg) by mouth At Bedtime 270 capsule 2       Past Medical History:   Diagnosis Date     Blurred vision 8/11/2014     DISK (aka Displacement of intervertebral disc, site unspecified, without myelopathy) 8/11/2014     Displacement of cervical intervertebral disc without myelopathy (HERNIATED DISK) 8/11/2014     Double vision 8/11/2014     Epistaxis 8/11/2014     Fluttering heart 8/11/2014     Headache 8/11/2014     Leg swelling 8/11/2014     Memory loss 8/11/2014     PSP (progressive supranuclear palsy) (H) 8/11/2014     Sinus disorder 8/11/2014     Tinnitus 8/11/2014     Urinary urgency 8/11/2014     Varicose veins 8/11/2014       Past Surgical History:   Procedure Laterality Date     H ABLATION ATRIAL FLUTTER         Family History   Problem Relation Age of Onset     CANCER Father        Social History   Substance Use Topics     Smoking status: Never Smoker     Smokeless tobacco: Never Used     Alcohol use No       Allergies   Allergen Reactions     Other [Seasonal Allergies]       environmental     Codeine Other (See Comments) and Rash     GI upset         ROS:  he has not had any recent falls, urgency but no incontinence, no constipation, the remaining ten system review is negative [id number]/woa    Physical Examination:  Vitals: /84 (BP Location: Left arm, Patient Position: Chair, Cuff Size: Adult Regular)  Pulse 87  Wt 89.1 kg (196 lb 6.4 oz)  SpO2 98%  BMI 26.64 kg/m2  BMI= Body mass index is 26.64 kg/(m^2).    GENERAL APPEARANCE: thin, alert and no distress  HEENT: no icterus  NECK: JVP is not visible  CHEST: lungs clear to auscultation  CARDIOVASCULAR: regular rhythm, normal S1S2, no murmur or gallop, precordium quiet  EXTREMITIES: mild ankle edema      Laboratory:    BMP ordered.    Previous studies reviewed today:    [facility]  Echocardiography Laboratory  [address]  [place] [address]        Name: KARUNA NELSON  MRN: [id number]  : 1950  Study Date: 2016 03:08 PM  Age: 65 yrs  Gender: Male  Patient Location: [facility]  Reason For Study: , Essential (primary) hypertension, Edema, unspecified  Ordering Physician: SARTHAK BYRD  Referring Physician: SARTHAK BYRD  Performed By: Lakisha Bunn     BSA: 2.1 m2  Height: 72 in  Weight: 188 lb  HR: 79  BP: 132/82 mmHg  ______________________________________________________________________________        Procedure  Echocardiogram with two-dimensional, color and spectral Doppler performed.  ______________________________________________________________________________     Interpretation Summary     Global and regional left ventricular function is normal with an EF of 55-60%.  Global right ventricular function is normal.    Assessment and recommendations:    1) S/P ablation for atrial flutter - no clinical recurrence    2) Hypertension - BP is above goal    - will add a low dose of HCTZ  - BMP 7-10 days     3) Ankle edema -    - elevation when possible      I appreciate the  chance to help with Mr. Patrick's care. Please let me know if you have any questions or concerns.    Clint Gutiérrez MD

## 2017-10-18 NOTE — MR AVS SNAPSHOT
After Visit Summary   10/18/2017    Jimmy Patrick    MRN: [id number]           Patient Information     Date Of Birth          1950        Visit Information        Provider Department      10/18/2017 2:00 PM Clint Gutiérrez MD [facility]        Today's Diagnoses     Essential hypertension        Typical atrial flutter (H)           Follow-ups after your visit        Follow-up notes from your care team     Return in about 3 months (around 1/18/2018).      Your next 10 appointments already scheduled     Nov 02, 2017 10:30 AM CDT   (Arrive by 10:15 AM)   Return Movement Disorder with Paulo Saravia MD   [facility] Neurology ([facility])    [address]  3rd Floor  [facility] [id number] [phone]            Nov 02, 2017 12:00 PM CDT   Infusion 60 with UC SPEC INFUSION, UC 50 ATC   [facility] Advanced Treatment [place] [facility] ([facility])    [address]  2nd Floor  [facility] [id number] [phone]            Nov 03, 2017 10:00 AM CDT   LAB with LAB FIRST FLOOR [facility] ([facility])    [address] [place] [id number] [phone]           Patient must bring picture ID. Patient should be prepared to give a urine specimen  Please do not eat 10-12 hours before your appointment if you are coming in fasting for labs on lipids, cholesterol, or glucose (sugar). Pregnant women should follow their Care Team instructions. Water with medications is okay. Do not drink coffee or other fluids. If you have concerns about taking  your medications, please ask at office or if scheduling via [facility], send a message by clicking on Secure Messaging, Message Your Care Team.            Nov 30, 2017 10:30 AM CST   (Arrive by 10:15 AM)   Return Movement Disorder with Paulo Saravia MD   [facility] Neurology ([facility])    [address] [place] [address]   [phone]            Nov 30, 2017 12:00 PM CST   Infusion 60 with UC SPEC INFUSION, UC 50 ATC   [facility] Advanced Treatment [place] [facility] ([facility])    [address] [place] [address]   [phone]            Dec 21, 2017  8:00 AM CST   (Arrive by 7:45 AM)   Cognitive Assessment with Emperatriz Candelaria, PhD LP   [facility] Neuropsychology ([facility])    [address] [place] [address]   [phone]            Dec 21, 2017  9:30 AM CST   (Arrive by 9:15 AM)   Return Movement Disorder with Paulo Saravia MD   [facility] Neurology ([facility])    [address] [place] [address]   [phone]            Dec 21, 2017 10:40 AM CST   (Arrive by 10:25 AM)   ecg with  CV EKG   [facility] Xray ([facility])    [address] [place] [address]   [phone]              Future tests that were ordered for you today     Open Future Orders        Priority Expected Expires Ordered    **Basic metabolic panel FUTURE 14d Routine 10/25/2017 11/1/2017 10/18/2017            Who to contact     If you have questions or need follow up information about today's clinic visit or your schedule please contact [facility] directly at [phone].  Normal or non-critical lab and imaging results will be communicated to you by MyChart, letter or phone within 4 business days after the clinic has received the results. If you do not hear from us within 7 days, please contact the clinic through MyChart or phone. If you have a critical or abnormal lab result, we will notify you by phone as soon as possible.  Submit refill requests through [employer] or call your pharmacy and they will forward the refill request to us.  Please allow 3 business days for your refill to be completed.          Additional Information About Your Visit        [employer] Information     [employer] gives you secure access to your electronic health record. If you see a primary care provider, you can also send messages to your care team and make appointments. If you have questions, please call your primary care clinic.  If you do not have a primary care provider, please call [phone] and they will assist you.      [employer] is an electronic gateway that provides easy, online access to your medical records. With [employer], you can request a clinic appointment, read your test results, renew a prescription or communicate with your care team.     To access your existing account, please contact your [facility] or call [phone] for assistance.        Care EveryWhere ID     This is your Care EveryWhere ID. This could be used by other organizations to access your [facility] medical records  [id number]        Your Vitals Were     Pulse Pulse Oximetry BMI (Body Mass Index)             87 98% 26.64 kg/m2          Blood Pressure from Last 3 Encounters:   10/18/17 144/84   10/05/17 157/86   10/05/17 145/86    Weight from Last 3 Encounters:   10/18/17 89.1 kg (196 lb 6.4 oz)   10/05/17 88 kg (194 lb 1.6 oz)   09/07/17 89.4 kg (197 lb)              We Performed the Following     Follow-Up with Electrophysiologist          Today's Medication Changes          These changes are accurate as of: 10/18/17  2:37 PM.  If you have any questions, ask your nurse or doctor.               Start taking these medicines.        Dose/Directions    hydrochlorothiazide 12.5 MG Tabs tablet   Used for:  Essential hypertension   Started by:  Clint Gutiérrez MD        Dose:  12.5 mg   Take 1 tablet (12.5 mg) by mouth daily   Quantity:  30 tablet   Refills:  3            Where to get your medicines      These medications were sent to [id number] -  [place], [place] - [address] [address]  [address] [address], [facility] [address]     Phone:  [phone]     hydrochlorothiazide 12.5 MG Tabs tablet                Primary Care Provider Office Phone # Fax #    Evelina Morse [phone] [address]  [facility] [address]        Equal Access to Services     [facility] : Hadii aad ku hadasho Soomaali, waaxda luqadaha, qaybta kaalmada adeegyada, waxay idiin hayaan adeeg kharash la'aan ah. So [facility] [phone].    ATENCIÓN: Si ruthy briceno, tiene a langford disposición servicios gratuitos de asistencia lingüística. Llame al [phone].    We comply with applicable federal civil rights laws and [place] laws. We do not discriminate on the basis of race, color, national origin, age, disability, sex, sexual orientation, or gender identity.            Thank you!     Thank you for choosing [facility] [place]  for your care. Our goal is always to provide you with excellent care. Hearing back from our patients is one way we can continue to improve our services. Please take a few minutes to complete the written survey that you may receive in the mail after your visit with us. Thank you!             Your Updated Medication List - Protect others around you: Learn how to safely use, store and throw away your medicines at www.disposemymeds.org.          This list is accurate as of: 10/18/17  2:37 PM.  Always use your most recent med list.                   Brand Name Dispense Instructions for use Diagnosis    acetaminophen-caffeine 500-65 MG Tabs    EXCEDRIN TENSION HEADACHE     Take 1 tablet by mouth every 6 hours as needed for mild pain        adapalene 0.1 % gel    DIFFERIN    135 g    Apply to the face at night.    Acne vulgaris       amantadine 100 MG capsule    SYMMETREL    270 capsule    Take 1 capsule (100 mg) by mouth 3 times daily 6am,11am and 4pm    PSP (progressive supranuclear palsy) (H)       amLODIPine 2.5 MG tablet     NORVASC    90 tablet    Take 1 tablet (2.5 mg) by mouth daily In evening.    HTN (hypertension)       BABY ASPIRIN 81 MG chewable tablet   Generic drug:  aspirin      Take 81 mg by mouth daily        CENTRUM SILVER per tablet     30 tablet    Take 1 tablet by mouth daily        EXELON 4.6 MG/24HR 24 hr patch   Generic drug:  rivastigmine     90 patch    Place 1 patch onto the skin daily    PSP (progressive supranuclear palsy) (H)       hydrochlorothiazide 12.5 MG Tabs tablet     30 tablet    Take 1 tablet (12.5 mg) by mouth daily    Essential hypertension       nortriptyline 10 MG capsule    PAMELOR    270 capsule    Take 3 capsules (30 mg) by mouth At Bedtime    Migraine without aura and without status migrainosus, not intractable       olmesartan 20 MG tablet    BENICAR    180 tablet    Take 2 tablets (40 mg) by mouth daily    PSP (progressive supranuclear palsy) (H), Migraine without aura and without status migrainosus, not intractable       Vitamin D-3 Super Strength 2000 UNITS tablet   Generic drug:  cholecalciferol      Take 1 tablet by mouth        ZOLMitriptan 5 MG tablet    ZOMIG    30 tablet    Take 1 tablet (5 mg) by mouth at onset of headache for migraine    PSP (progressive supranuclear palsy) (H), Migraine without aura and without status migrainosus, not intractable

## 2017-10-27 ENCOUNTER — TELEPHONE (OUTPATIENT)
Dept: NEUROLOGY | Facility: CLINIC | Age: 67
End: 2017-10-27

## 2017-10-27 DIAGNOSIS — G23.1 PSP (PROGRESSIVE SUPRANUCLEAR PALSY) (H): Primary | ICD-10-CM

## 2017-10-27 NOTE — TELEPHONE ENCOUNTER
Social Work Brief Intervention  [facility]    Data/Intervention:    Patient Name:  Jimmy Patrick  /Age:  1950 (66 year old)    Visit Type: telephone  Referral Source: Jade Peterson RN  Reason for Referral:  Using LTC policy    Collaborated With:    Denis  Patient Concerns/Issues:   Pt has a LTC insurance policy and is considering getting it activated. Discussed the usual process which she was aware of. Reviewed his care needs which she is currently managing but she is anticipating she will need more assistance. He does not want a feeding tube or to have his food pureed. He is choking on food. She is aware that she needs to contact the LTC insurance and arrange for a home assessment. That is the first step to activate his policy. She has a list of home care agencies from a  at [facility]. She attends a support group there.   They are working on his health care directive. Discussed also the POLST for the future.  Provided info re [employer] with fall detector.  Her goal is to keep him cared for at home for as along as possible    Intervention/Education/Resources Provided:  Discussed self-care. She sings in the choir.   Info re [employer]  Info re hiring home care givers  Info re LTC activation    Assessment/Plan:  Wife doing well dealing with the demands of care giving and anticipating need for increased services as his condition worsens. Provided some info to help her get started. Encouraged her to contact me as needed in the future.     Provided patient/family with contact information and availability to follow up as needed.

## 2017-10-31 NOTE — PROGRESS NOTES
Research visit      Answers for HPI/ROS submitted by the patient on 10/30/2017   General Symptoms: No  Skin Symptoms: No  HENT Symptoms: No  EYE SYMPTOMS: No  HEART SYMPTOMS: No  LUNG SYMPTOMS: No  INTESTINAL SYMPTOMS: No  URINARY SYMPTOMS: No  REPRODUCTIVE SYMPTOMS: No  SKELETAL SYMPTOMS: No  BLOOD SYMPTOMS: No  NERVOUS SYSTEM SYMPTOMS: No  MENTAL HEALTH SYMPTOMS: No    1. Nose bleed  2. Device to help for people with choking  3. Vital stim    slp therapy

## 2017-11-02 ENCOUNTER — INFUSION THERAPY VISIT (OUTPATIENT)
Dept: INFUSION THERAPY | Facility: CLINIC | Age: 67
End: 2017-11-02
Attending: PSYCHIATRY & NEUROLOGY
Payer: COMMERCIAL

## 2017-11-02 ENCOUNTER — OFFICE VISIT (OUTPATIENT)
Dept: NEUROLOGY | Facility: CLINIC | Age: 67
End: 2017-11-02

## 2017-11-02 VITALS
RESPIRATION RATE: 20 BRPM | OXYGEN SATURATION: 100 % | SYSTOLIC BLOOD PRESSURE: 133 MMHG | DIASTOLIC BLOOD PRESSURE: 79 MMHG | TEMPERATURE: 97.7 F | HEART RATE: 78 BPM

## 2017-11-02 DIAGNOSIS — G23.1 PSP (PROGRESSIVE SUPRANUCLEAR PALSY) (H): Primary | ICD-10-CM

## 2017-11-02 PROCEDURE — 96365 THER/PROPH/DIAG IV INF INIT: CPT

## 2017-11-02 RX ADMIN — Medication 2100 MG: at 12:07

## 2017-11-02 NOTE — MR AVS SNAPSHOT
After Visit Summary   11/2/2017    Jimmy Patrick    MRN: [id number]           Patient Information     Date Of Birth          1950        Visit Information        Provider Department      11/2/2017 12:00 PM UC 50 ATC; UC SPEC INFUSION [facility] Specialty and Procedure        Today's Diagnoses     PSP (progressive supranuclear palsy) (H)    -  1      Care Instructions    Dear Jimmy Patrick    Thank you for choosing [facility]fusion [facility] ([facility]) for your infusion of [id number].  The following information is a summary of our appointment as well as important reminders.      We look forward in seeing you on your next appointment here at [facility].  Please don t hesitate to call us at [phone] to reschedule any of your appointments or to speak with one of the [facility] registered nurses.  It was a pleasure taking care of you today.    Sincerely,    [facility]  Specialty Infusion & Procedure Center  [address] [place] [address]  [address]  Phone:  [phone]            Follow-ups after your visit        Your next 10 appointments already scheduled     Nov 03, 2017 10:00 AM CDT   LAB with LAB FIRST FLOOR [facility] ([facility])    [id number] [place] [address] [phone]           Please do not eat 10-12 hours before your appointment if you are coming in fasting for labs on lipids, cholesterol, or glucose (sugar). This does not apply to pregnant women. Water, hot tea and black coffee (with nothing added) are okay. Do not drink other fluids, diet soda or chew gum.            Nov 30, 2017 10:30 AM CST   (Arrive by 10:15 AM)   Return Movement Disorder with Paulo Saravia MD   [facility] ([facility])    [address]  3rd [facility] [address] [phone]            Nov 30,  2017 12:00 PM CST   Infusion 60 with UC SPEC INFUSION, UC 50 ATC   [facility] Specialty and Procedure ([facility])    [address]  2nd [facility] [address]   [phone]            Dec 21, 2017  8:00 AM CST   (Arrive by 7:45 AM)   Cognitive Assessment with Emperatriz Candelaria, PhD [facility] Neuropsychology ([facility])    [address] [place] [address]  3rd [facility] [address]   [phone]            Dec 21, 2017  9:30 AM CST   (Arrive by 9:15 AM)   Return Movement Disorder with Paulo Saravia MD   [facility] Neurology ([facility])    [address] [place] [address]  3rd [facility] [id number] [phone]            Dec 21, 2017 10:40 AM CST   (Arrive by 10:25 AM)   ecg with  CV EKG   [facility] [place] [facility] ([facility])    [address] [place] [address]  1st [facility] [address]   [phone]            Dec 21, 2017 11:00 AM CST   LAB with  LAB   [facility] Lab ([facility])    [address] [place] [address] [phone]           Please do not eat 10-12 hours before your appointment if you are coming in fasting for labs on lipids, cholesterol, or glucose (sugar). This does not apply to pregnant women. Water, hot tea and black coffee (with nothing added) are okay. Do not drink other fluids, diet soda or chew gum.            Dec 21, 2017 12:00 PM CST   Infusion 60 with UC SPEC INFUSION, UC 50 ATC   [facility] Specialty and Procedure ([facility])    [address] [place] [address]  2nd [facility] [address] [phone]              Who to contact     If you have questions or need follow up information about today's clinic visit or your schedule please contact [facility] SPECIALTY AND PROCEDURE directly at  [phone].  Normal or non-critical lab and imaging results will be communicated to you by MyChart, letter or phone within 4 business days after the clinic has received the results. If you do not hear from us within 7 days, please contact the clinic through [employer] or phone. If you have a critical or abnormal lab result, we will notify you by phone as soon as possible.  Submit refill requests through [employer] or call your pharmacy and they will forward the refill request to us. Please allow 3 business days for your refill to be completed.          Additional Information About Your Visit        [employer] Information     [employer] gives you secure access to your electronic health record. If you see a primary care provider, you can also send messages to your care team and make appointments. If you have questions, please call your primary care clinic.  If you do not have a primary care provider, please call [phone] and they will assist you.        Care EveryWhere ID     This is your Care EveryWhere ID. This could be used by other organizations to access your [place] medical records  [id number]        Your Vitals Were     Pulse Temperature Respirations Pulse Oximetry          78 97.7  F (36.5  C) (Oral) 20 100%         Blood Pressure from Last 3 Encounters:   11/02/17 (P) 138/77   10/18/17 144/84   10/05/17 157/86    Weight from Last 3 Encounters:   10/18/17 89.1 kg (196 lb 6.4 oz)   10/05/17 88 kg (194 lb 1.6 oz)   09/07/17 89.4 kg (197 lb)              Today, you had the following     No orders found for display       Primary Care Provider Office Phone # Fax #    Evelina Morse [phone]       [address]  [facility] [place] [address]        Equal Access to Services     MATEO STEELE : Berny Granado, emerita garcia, azam sylvester, gely lynch. So [facility] [phone].    ATENCIÓN: Si habla español, tiene a langford disposición servicios gratuitos  de asistencia lingüística. Kiel shetty [phone].    We comply with applicable federal civil rights laws and [place] laws. We do not discriminate on the basis of race, color, national origin, age, disability, sex, sexual orientation, or gender identity.            Thank you!     Thank you for choosing [facility] SPECIALTY AND PROCEDURE  for your care. Our goal is always to provide you with excellent care. Hearing back from our patients is one way we can continue to improve our services. Please take a few minutes to complete the written survey that you may receive in the mail after your visit with us. Thank you!             Your Updated Medication List - Protect others around you: Learn how to safely use, store and throw away your medicines at www.disposemymeds.org.          This list is accurate as of: 11/2/17  1:29 PM.  Always use your most recent med list.                   Brand Name Dispense Instructions for use Diagnosis    acetaminophen-caffeine 500-65 MG Tabs    EXCEDRIN TENSION HEADACHE     Take 1 tablet by mouth every 6 hours as needed for mild pain        adapalene 0.1 % gel    DIFFERIN    135 g    Apply to the face at night.    Acne vulgaris       amantadine 100 MG capsule    SYMMETREL    270 capsule    Take 1 capsule (100 mg) by mouth 3 times daily 6am,11am and 4pm    PSP (progressive supranuclear palsy) (H)       amLODIPine 2.5 MG tablet    NORVASC    90 tablet    Take 1 tablet (2.5 mg) by mouth daily In evening.    HTN (hypertension)       BABY ASPIRIN 81 MG chewable tablet   Generic drug:  aspirin      Take 81 mg by mouth daily        CENTRUM SILVER per tablet     30 tablet    Take 1 tablet by mouth daily        EXELON 4.6 MG/24HR 24 hr patch   Generic drug:  rivastigmine     90 patch    Place 1 patch onto the skin daily    PSP (progressive supranuclear palsy) (H)       hydrochlorothiazide 12.5 MG Tabs tablet     30 tablet    Take 1 tablet (12.5 mg) by mouth daily     Essential hypertension       nortriptyline 10 MG capsule    PAMELOR    270 capsule    Take 3 capsules (30 mg) by mouth At Bedtime    Migraine without aura and without status migrainosus, not intractable       olmesartan 20 MG tablet    BENICAR    180 tablet    Take 2 tablets (40 mg) by mouth daily    PSP (progressive supranuclear palsy) (H), Migraine without aura and without status migrainosus, not intractable       Vitamin D-3 Super Strength 2000 UNITS tablet   Generic drug:  cholecalciferol      Take 1 tablet by mouth        ZOLMitriptan 5 MG tablet    ZOMIG    30 tablet    Take 1 tablet (5 mg) by mouth at onset of headache for migraine    PSP (progressive supranuclear palsy) (H), Migraine without aura and without status migrainosus, not intractable

## 2017-11-02 NOTE — PROGRESS NOTES
Nursing Note  Jimmy Patrick [facility] for:   Chief Complaint   Patient presents with     Infusion     study [id number] (IDS# [id number]) IV infusion 2,100 mg 210 mL     During today's [facility] appointment, orders from Dr. Paulo Saravia were completed.  Frequency: Q 4 weeks    Progress note:  Patient identification verified by name and date of birth.  Assessment completed.  Vitals recorded in Doc Flowsheets.  Patient was provided with education regarding infusion and possible side effects.  Patient verbalized understanding.      needed: No  Premedications: were not ordered.  Infusion Rates: infusion given over approximately 1 hour.  Approximate Infusion length:1 hours.   Labs: were not ordered for this appointment.  Vascular access: peripheral IV placed today.  Treatment Conditions: non-applicable. Patient assessed by Dr. Saravia prior to infusion and deemed appropriate to proceed.   Patient tolerated infusion: well.  Fabi, research coordinator present during beginning of infusion.     Discharge Plan:   Follow up plan of care with: ongoing infusions at [facility].  Discharge instructions were reviewed with patient.  Patient/representative verbalized understanding of discharge instructions and all questions answered.  Patient discharged from [facility] in stable condition.    Herminia Jin RN     Administrations This Visit     study [id number] (IDS# [id number]) IV infusion 2,100 mg 210 mL     Admin Date Action Dose Rate Route Administered By          11/02/2017 New Bag 2100 mg 210 mL/hr Intravenous Herminia Jin RN                           /79 (BP Location: Left arm)  Pulse 78  Temp 97.7  F (36.5  C) (Oral)  Resp 20  SpO2 100%

## 2017-11-02 NOTE — PATIENT INSTRUCTIONS
Dear Jimmy Patrick    Thank you for choosing [facility] [place] [facility] Specialty Infusion and Procedure Center ([facility]) for your infusion of [id number].  The following information is a summary of our appointment as well as important reminders.      We look forward in seeing you on your next appointment here at [facility].  Please don t hesitate to call us at [phone] to reschedule any of your appointments or to speak with one of the [facility] registered nurses.  It was a pleasure taking care of you today.    Sincerely,    [facility] [place] [facility]  Specialty Infusion & Procedure Center  [address] [place] [address]  [address]  Phone:  [phone]

## 2017-11-02 NOTE — MR AVS SNAPSHOT
"              After Visit Summary   11/2/2017    Jimmy Patrick    MRN: [id number]           Patient Information     Date Of Birth          1950        Visit Information        Provider Department      11/2/2017 10:30 AM Paulo Saravia MD [facility] Neurology        Today's Diagnoses     PSP (progressive supranuclear palsy) (H)    -  1       Follow-ups after your visit        Additional Services     SPEECH THERAPY REFERRAL       *This therapy referral will be filtered to a centralized scheduling office at [facility] and the patient will receive a call to schedule an appointment at a [place] location most convenient for them. *     [facility] provides Speech Therapy evaluation and treatment and many specialty services across the [place] system.  If requesting a specialty program, please choose from the list below.  If you have not heard from the scheduling office within 2 business days, please call [phone] for all locations, with the exception of [place], please call [phone].       Treatment: Evaluation & Treatment  Speech Treatment Diagnosis: Problems With Voice Production  Special Instructions: dysphagia  Special Programs: dysphagia    Interested in learning more about vital stim and a device to help for choking.     Please be aware that coverage of these services is subject to the terms and limitations of your health insurance plan.  Call member services at your health plan with any benefit or coverage questions.      **Note to Provider:  If you are referring outside of [place] for the therapy appointment, please list the name of the location in the \"special instructions\" above, print the referral and give to the patient to schedule the appointment.                  Your next 10 appointments already scheduled     Nov 02, 2017 12:00 PM CDT   Infusion 60 with UC SPEC INFUSION, UC 50 ATC   [facility] Special[facility] Procedure ([facility])    [address] [place] [address]   [phone]            Nov 03, 2017 10:00 AM CDT   LAB with LAB FIRST FLOOR [facility] ([facility])    [address] [address] [phone]           Please do not eat 10-12 hours before your appointment if you are coming in fasting for labs on lipids, cholesterol, or glucose (sugar). This does not apply to pregnant women. Water, hot tea and black coffee (with nothing added) are okay. Do not drink other fluids, diet soda or chew gum.            Nov 30, 2017 10:30 AM CST   (Arrive by 10:15 AM)   Return Movement Disorder with Paulo Saravia MD   [facility] Neurology ([facility])    [address] [place] [address]   [phone]            Nov 30, 2017 12:00 PM CST   Infusion 60 with UC SPEC INFUSION, UC 50 ATC   [facility] [place] Specialty and Procedure ([facility])    [address] [place] [address]   [phone]            Dec 21, 2017  8:00 AM CST   (Arrive by 7:45 AM)   Cognitive Assessment with Emperatriz Candelaria, PhD [facility] Neuropsychology ([facility])    [address] [place] [address]   [phone]            Dec 21, 2017  9:30 AM CST   (Arrive by 9:15 AM)   Return Movement Disorder with Paulo Saravia MD   [facility] Neurology ([facility])    [address] [place] [address]   [phone]            Dec 21, 2017 10:40 AM CST   (Arrive by 10:25 AM)   ecg with UC CV EKG   [facility] ([facility])    [address] [place] [address]   [phone]            Dec 21, 2017 11:00 AM CST   LAB with UC LAB   [facility] ([facility])    [address] [address] [phone]           Please do not eat 10-12 hours before your appointment if you are coming in fasting for labs on lipids, cholesterol, or glucose (sugar). This does not apply to pregnant women. Water, hot tea and black coffee (with nothing added) are okay. Do not drink other fluids, diet soda or chew gum.              Who to contact     Please call your clinic at [phone] to:    Ask questions about your health    Make or cancel appointments    Discuss your medicines    Learn about your test results    Speak to your doctor   If you have compliments or concerns about an experience at your clinic, or if you wish to file a complaint, please contact [facility] Patient Relations at [phone] or email us at [email or web address]         Additional Information About Your Visit        [employer] Information     [employer] gives you secure access to your electronic health record. If you see a primary care provider, you can also send messages to your care team and make appointments. If you have questions, please call your primary care clinic.  If you do not have a primary care provider, please call [phone] and they will assist you.      [employer] is an electronic gateway that provides easy, online access to your medical records. With [employer], you can request a clinic appointment, read your test results, renew a prescription or communicate with your care team.     To access your existing account, please contact your [facility] or call [phone] for assistance.        Care EveryWhere ID     This is your Care EveryWhere ID. This could be used by other organizations to access your [place] medical records  [id number]         Blood Pressure from Last 3 Encounters:   10/18/17 144/84   10/05/17 157/86   10/05/17 145/86    Weight from Last 3 Encounters:   10/18/17 89.1 kg (196  lb 6.4 oz)   10/05/17 88 kg (194 lb 1.6 oz)   09/07/17 89.4 kg (197 lb)              We Performed the Following     SPEECH THERAPY REFERRAL        Primary Care Provider Office Phone # Fax #    Evelina Morse [phone] [address]  [facility] [address]        Equal Access to Services     [facility]: Hadii aad ku hadasho Soomaali, waaxda luqadaha, qaybta kaalmada adeegyada, waxay idiin hayaan adeeg kharash la'aan . So [facility] [phone].    ATENCIÓN: Si habla kaity, tiene a langford disposición servicios gratuitos de asistencia lingüística. Llame al [phone].    We comply with applicable federal civil rights laws and [place] laws. We do not discriminate on the basis of race, color, national origin, age, disability, sex, sexual orientation, or gender identity.            Thank you!     Thank you for choosing [facility] NEUROLOGY  for your care. Our goal is always to provide you with excellent care. Hearing back from our patients is one way we can continue to improve our services. Please take a few minutes to complete the written survey that you may receive in the mail after your visit with us. Thank you!             Your Updated Medication List - Protect others around you: Learn how to safely use, store and throw away your medicines at www.disposemymeds.org.          This list is accurate as of: 11/2/17 10:52 AM.  Always use your most recent med list.                   Brand Name Dispense Instructions for use Diagnosis    acetaminophen-caffeine 500-65 MG Tabs    EXCEDRIN TENSION HEADACHE     Take 1 tablet by mouth every 6 hours as needed for mild pain        adapalene 0.1 % gel    DIFFERIN    135 g    Apply to the face at night.    Acne vulgaris       amantadine 100 MG capsule    SYMMETREL    270 capsule    Take 1 capsule (100 mg) by mouth 3 times daily 6am,11am and 4pm    PSP (progressive supranuclear palsy) (H)       amLODIPine 2.5 MG tablet    NORVASC    90 tablet    Take 1  tablet (2.5 mg) by mouth daily In evening.    HTN (hypertension)       BABY ASPIRIN 81 MG chewable tablet   Generic drug:  aspirin      Take 81 mg by mouth daily        CENTRUM SILVER per tablet     30 tablet    Take 1 tablet by mouth daily        EXELON 4.6 MG/24HR 24 hr patch   Generic drug:  rivastigmine     90 patch    Place 1 patch onto the skin daily    PSP (progressive supranuclear palsy) (H)       hydrochlorothiazide 12.5 MG Tabs tablet     30 tablet    Take 1 tablet (12.5 mg) by mouth daily    Essential hypertension       nortriptyline 10 MG capsule    PAMELOR    270 capsule    Take 3 capsules (30 mg) by mouth At Bedtime    Migraine without aura and without status migrainosus, not intractable       olmesartan 20 MG tablet    BENICAR    180 tablet    Take 2 tablets (40 mg) by mouth daily    PSP (progressive supranuclear palsy) (H), Migraine without aura and without status migrainosus, not intractable       Vitamin D-3 Super Strength 2000 UNITS tablet   Generic drug:  cholecalciferol      Take 1 tablet by mouth        ZOLMitriptan 5 MG tablet    ZOMIG    30 tablet    Take 1 tablet (5 mg) by mouth at onset of headache for migraine    PSP (progressive supranuclear palsy) (H), Migraine without aura and without status migrainosus, not intractable

## 2017-11-03 DIAGNOSIS — I10 ESSENTIAL HYPERTENSION: ICD-10-CM

## 2017-11-03 LAB
ANION GAP SERPL CALCULATED.3IONS-SCNC: 6 MMOL/L (ref 3–14)
BUN SERPL-MCNC: 22 MG/DL (ref 7–30)
CALCIUM SERPL-MCNC: 8.8 MG/DL (ref 8.5–10.1)
CHLORIDE SERPL-SCNC: 106 MMOL/L (ref 94–109)
CO2 SERPL-SCNC: 28 MMOL/L (ref 20–32)
CREAT SERPL-MCNC: 1.1 MG/DL (ref 0.66–1.25)
GFR SERPL CREATININE-BSD FRML MDRD: 67 ML/MIN/1.7M2
GLUCOSE SERPL-MCNC: 111 MG/DL (ref 70–99)
POTASSIUM SERPL-SCNC: 3.9 MMOL/L (ref 3.4–5.3)
SODIUM SERPL-SCNC: 140 MMOL/L (ref 133–144)

## 2017-11-03 PROCEDURE — 36415 COLL VENOUS BLD VENIPUNCTURE: CPT | Performed by: INTERNAL MEDICINE

## 2017-11-03 PROCEDURE — 80048 BASIC METABOLIC PNL TOTAL CA: CPT | Performed by: INTERNAL MEDICINE

## 2017-11-28 ENCOUNTER — TRANSFERRED RECORDS (OUTPATIENT)
Dept: HEALTH INFORMATION MANAGEMENT | Facility: CLINIC | Age: 67
End: 2017-11-28

## 2017-11-30 ENCOUNTER — OFFICE VISIT (OUTPATIENT)
Dept: NEUROLOGY | Facility: CLINIC | Age: 67
End: 2017-11-30

## 2017-11-30 ENCOUNTER — INFUSION THERAPY VISIT (OUTPATIENT)
Dept: INFUSION THERAPY | Facility: CLINIC | Age: 67
End: 2017-11-30
Attending: PSYCHIATRY & NEUROLOGY
Payer: COMMERCIAL

## 2017-11-30 VITALS
OXYGEN SATURATION: 98 % | RESPIRATION RATE: 20 BRPM | DIASTOLIC BLOOD PRESSURE: 77 MMHG | HEART RATE: 84 BPM | TEMPERATURE: 97.6 F | SYSTOLIC BLOOD PRESSURE: 130 MMHG

## 2017-11-30 DIAGNOSIS — G23.1 PSP (PROGRESSIVE SUPRANUCLEAR PALSY) (H): Primary | ICD-10-CM

## 2017-11-30 DIAGNOSIS — G20.C PARKINSONISM, UNSPECIFIED PARKINSONISM TYPE (H): Primary | ICD-10-CM

## 2017-11-30 DIAGNOSIS — G23.1 PSP (PROGRESSIVE SUPRANUCLEAR PALSY) (H): ICD-10-CM

## 2017-11-30 PROCEDURE — 96365 THER/PROPH/DIAG IV INF INIT: CPT

## 2017-11-30 RX ADMIN — Medication 2100 MG: at 12:18

## 2017-11-30 NOTE — PROGRESS NOTES
Nursing Note  Jimmy Patrick [facility] for:   Chief Complaint   Patient presents with     Infusion     study [id number] (IDS# [id number]) IV infusion 2,100 mg 210 mL     During today's [facility] appointment, orders from Dr. Paulo Saravia were completed.  Frequency: monthly    Progress note:  Patient identification verified by name and date of birth.  Assessment completed.  Vitals recorded in Doc Flowsheets.  Patient was provided with education regarding infusion and possible side effects.  Patient verbalized understanding.      needed: No  Premedications: were not ordered.  Infusion Rates: infusion given over approximately 1 hour.  Approximate Infusion length:1 hours.   Labs: were not ordered for this appointment.  Vascular access: peripheral IV placed today.  Treatment Conditions: non-applicable.  Patient tolerated infusion: well.    Discharge Plan:   Follow up plan of care with: ongoing infusions at [facility].  Discharge instructions were reviewed with patient.  Patient/representative verbalized understanding of discharge instructions and all questions answered.  Patient discharged from [facility] in stable condition.    Trina Barroso RN    Administrations This Visit     study [id number] (IDS# [id number]) IV infusion 2,100 mg 210 mL     Admin Date Action Dose Rate Route Administered By          11/30/2017 New Bag 2100 mg 210 mL/hr Intravenous Trina Barroso RN                         /77  Pulse 84  Temp 97.6  F (36.4  C) (Tympanic)  Resp 20  SpO2 98%

## 2017-11-30 NOTE — MR AVS SNAPSHOT
After Visit Summary   11/30/2017    Jimmy Patrick    MRN: [id number]           Patient Information     Date Of Birth          1950        Visit Information        Provider Department      11/30/2017 12:00 PM UC 50 ATC; UC SPEC INFUSION [facility] Specialty and Procedure        Today's Diagnoses     Parkinsonism, unspecified Parkinsonism type (H)    -  1    PSP (progressive supranuclear palsy) (H)           Follow-ups after your visit        Your next 10 appointments already scheduled     Dec 21, 2017  8:00 AM CST   (Arrive by 7:45 AM)   Cognitive Assessment with Emperatriz Candelaria, PhD [facility] Neuropsychology ([facility])    [address]  3rd [facility] [id number] [phone]            Dec 21, 2017  9:30 AM CST   (Arrive by 9:15 AM)   Return Movement Disorder with Paulo Saravia MD   [facility] Neurology ([facility])    [address] [place] [address]  3rd [facility] [id number] [phone]            Dec 21, 2017 10:40 AM CST   (Arrive by 10:25 AM)   ecg with  CV EKG   [facility] Imaging [place] Xray ([facility])    [address] [place] [address]  1st [facility] [id number] [phone]            Dec 21, 2017 11:00 AM CST   LAB with  LAB   [facility] Lab ([facility])    [address] [place] [id number] [phone]           Please do not eat 10-12 hours before your appointment if you are coming in fasting for labs on lipids, cholesterol, or glucose (sugar). This does not apply to pregnant women. Water, hot tea and black coffee (with nothing added) are okay. Do not drink other fluids, diet soda or chew gum.            Dec 21, 2017 12:00 PM CST   Infusion 60 with UC SPEC INFUSION, UC 50 ATC   [facility] Specialty and Procedure ([facility])    [address] [place] [address]  [facility]  2nd [facility] [address]   [phone]            Jan 17, 2018  1:00 PM CST   Return Visit with Clint Gutiérrez MD   [facility] ([facility])    [address] [place] [address]   [phone]            Jan 25, 2018 10:30 AM CST   (Arrive by 10:15 AM)   Return Movement Disorder with Paulo Saravia MD   [facility] Neurology ([facility])    [address] [place] [address] [phone]            Jan 25, 2018 11:00 AM CST   Infusion 60 with UC SPEC INFUSION, UC 49 ATC   [facility] Specialty and Procedure ([facility])    [address] [place] [id number] [phone]              Who to contact     If you have questions or need follow up information about today's clinic visit or your schedule please contact [facility] SPECIALTY AND PROCEDURE directly at [phone].  Normal or non-critical lab and imaging results will be communicated to you by PayAllieshart, letter or phone within 4 business days after the clinic has received the results. If you do not hear from us within 7 days, please contact the clinic through Te[employer] Got or phone. If you have a critical or abnormal lab result, we will notify you by phone as soon as possible.  Submit refill requests through [employer] or call your pharmacy and they will forward the refill request to us. Please allow 3 business days for your refill to be completed.          Additional Information About Your Visit        [employer] Information     [employer] gives you secure access to your electronic health record. If you see a primary care provider, you can also send messages to your care team and make appointments. If you have questions, please call your primary care clinic.  If you do not have a primary care provider, please call [phone] and they will assist you.         Care EveryWhere ID     This is your Care EveryWhere ID. This could be used by other organizations to access your [facility] medical records  [id number]        Your Vitals Were     Pulse Temperature Respirations Pulse Oximetry          84 97.6  F (36.4  C) (Tympanic) 20 98%         Blood Pressure from Last 3 Encounters:   11/30/17 130/77   11/02/17 (P) 138/77   10/18/17 144/84    Weight from Last 3 Encounters:   10/18/17 89.1 kg (196 lb 6.4 oz)   10/05/17 88 kg (194 lb 1.6 oz)   09/07/17 89.4 kg (197 lb)              Today, you had the following     No orders found for display       Primary Care Provider Office Phone # Fax #    Evelina Morse [phone] [address]  [facility] [address]        Equal Access to Services     [facility]: Hadii aad ku hadasho Soomaali, waaxda luqadaha, qaybta kaalmada adeegyada, gely heller . So [facility] [phone].    ATENCIÓN: Si habla español, tiene a langford disposición servicios gratuitos de asistencia lingüística. Llame al [phone].    We comply with applicable federal civil rights laws and [place] laws. We do not discriminate on the basis of race, color, national origin, age, disability, sex, sexual orientation, or gender identity.            Thank you!     Thank you for choosing [facility] SPECIAL[facility] PROCEDURE  for your care. Our goal is always to provide you with excellent care. Hearing back from our patients is one way we can continue to improve our services. Please take a few minutes to complete the written survey that you may receive in the mail after your visit with us. Thank you!             Your Updated Medication List - Protect others around you: Learn how to safely use, store and throw away your medicines at www.disposemymeds.org.          This list is accurate as of: 11/30/17  2:43 PM.  Always use your most recent med list.                   Brand Name Dispense Instructions  for use Diagnosis    acetaminophen-caffeine 500-65 MG Tabs    EXCEDRIN TENSION HEADACHE     Take 1 tablet by mouth every 6 hours as needed for mild pain        adapalene 0.1 % gel    DIFFERIN    135 g    Apply to the face at night.    Acne vulgaris       amantadine 100 MG capsule    SYMMETREL    270 capsule    Take 1 capsule (100 mg) by mouth 3 times daily 6am,11am and 4pm    PSP (progressive supranuclear palsy) (H)       amLODIPine 2.5 MG tablet    NORVASC    90 tablet    Take 1 tablet (2.5 mg) by mouth daily In evening.    HTN (hypertension)       BABY ASPIRIN 81 MG chewable tablet   Generic drug:  aspirin      Take 81 mg by mouth daily        CENTRUM SILVER per tablet     30 tablet    Take 1 tablet by mouth daily        EXELON 4.6 MG/24HR 24 hr patch   Generic drug:  rivastigmine     90 patch    Place 1 patch onto the skin daily    PSP (progressive supranuclear palsy) (H)       hydrochlorothiazide 12.5 MG Tabs tablet     30 tablet    Take 1 tablet (12.5 mg) by mouth daily    Essential hypertension       nortriptyline 10 MG capsule    PAMELOR    270 capsule    Take 3 capsules (30 mg) by mouth At Bedtime    Migraine without aura and without status migrainosus, not intractable       olmesartan 20 MG tablet    BENICAR    180 tablet    Take 2 tablets (40 mg) by mouth daily    PSP (progressive supranuclear palsy) (H), Migraine without aura and without status migrainosus, not intractable       Vitamin D-3 Super Strength 2000 UNITS tablet   Generic drug:  cholecalciferol      Take 1 tablet by mouth        ZOLMitriptan 5 MG tablet    ZOMIG    30 tablet    Take 1 tablet (5 mg) by mouth at onset of headache for migraine    PSP (progressive supranuclear palsy) (H), Migraine without aura and without status migrainosus, not intractable

## 2017-11-30 NOTE — MR AVS SNAPSHOT
After Visit Summary   11/30/2017    Jimmy Patrick    MRN: [id number]           Patient Information     Date Of Birth          1950        Visit Information        Provider Department      11/30/2017 10:30 AM Paulo Saravia MD [facility] Neurology        Today's Diagnoses     PSP (progressive supranuclear palsy) (H)    -  1       Follow-ups after your visit        Your next 10 appointments already scheduled     Nov 30, 2017 12:00 PM CST   Infusion 60 with  SPEC INFUSION, UC 50 ATC   [facility] [place] Specialty and Procedure ([facility])    [address] [place] [address]   [phone]            Dec 21, 2017  8:00 AM CST   (Arrive by 7:45 AM)   Cognitive Assessment with Emperatriz Candelaria, PhD [facility] Neuropsychology ([facility])    [address] [place] [address]   [phone]            Dec 21, 2017  9:30 AM CST   (Arrive by 9:15 AM)   Return Movement Disorder with Paulo Saravia MD   [facility] Neurology ([facility])    [address] [place] [address]   [phone]            Dec 21, 2017 10:40 AM CST   (Arrive by 10:25 AM)   ecg with  CV EKG   [facility] Imaging [place] Xray ([facility])    [address] [place] [address]   [phone]            Dec 21, 2017 11:00 AM CST   LAB with  LAB   [facility] Lab ([facility])    [address] [place] [address]   [phone]           Please do not eat 10-12 hours before your appointment if you are coming in fasting for labs on lipids, cholesterol, or glucose (sugar). This does not apply to pregnant women. Water, hot tea and black coffee (with nothing added) are okay. Do not drink other fluids, diet soda or chew gum.            Dec 21, 2017 12:00 PM CST    Infusion 60 with UC SPEC INFUSION, UC 50 ATC   [facility] Specialty and Procedure ([facility])    [address] [facility]  2nd Floor  [facility] [id number] [phone]            Jan 17, 2018  1:00 PM CST   Return Visit with Clint Gutiérrez MD   [facility] ([facility])    [address] [place] [address]   [phone]            Jan 25, 2018 10:30 AM CST   (Arrive by 10:15 AM)   Return Movement Disorder with Paulo Saravia MD   [facility] Neurology ([facility])    [address] [facility]  3rd Floor  [facility] [address] [phone]              Who to contact     Please call your clinic at [phone] to:    Ask questions about your health    Make or cancel appointments    Discuss your medicines    Learn about your test results    Speak to your doctor   If you have compliments or concerns about an experience at your clinic, or if you wish to file a complaint, please contact [facility] Patient Relations at [phone] or email us at [email or web address]         Additional Information About Your Visit        [employer] Information     [employer] gives you secure access to your electronic health record. If you see a primary care provider, you can also send messages to your care team and make appointments. If you have questions, please call your primary care clinic.  If you do not have a primary care provider, please call [phone] and they will assist you.      [employer] is an electronic gateway that provides easy, online access to your medical records. With [employer], you can request a clinic appointment, read your test results, renew a prescription or communicate with your care team.     To access your existing account, please contact your [facility] Clinic or call [phone] for assistance.        Care EveryWhere ID      This is your Care EveryWhere ID. This could be used by other organizations to access your [facility] medical records  [id number]         Blood Pressure from Last 3 Encounters:   11/02/17 (P) 138/77   10/18/17 144/84   10/05/17 157/86    Weight from Last 3 Encounters:   10/18/17 89.1 kg (196 lb 6.4 oz)   10/05/17 88 kg (194 lb 1.6 oz)   09/07/17 89.4 kg (197 lb)              Today, you had the following     No orders found for display       Primary Care Provider Office Phone # Fax #    Evelina Morse [phone] [address]  [facility] [place] [address]        Equal Access to Services     [facility]ATE[facility] STEE[facility] : Hadii robin harper hadasho Soomaali, waaxda luqadaha, qaybta kaalmada adeegyada, waxfabby heller . So [facility] [phone].    ATENCIÓN: Si habla español, tiene a langford disposición servicios gratuitos de asistencia lingüística. LlTri[facility] [phone].    We comply with applicable federal civil rights laws and [place] laws. We do not discriminate on the basis of race, color, national origin, age, disability, sex, sexual orientation, or gender identity.            Thank you!     Thank you for choosing [facility] NEUROLOGY  for your care. Our goal is always to provide you with excellent care. Hearing back from our patients is one way we can continue to improve our services. Please take a few minutes to complete the written survey that you may receive in the mail after your visit with us. Thank you!             Your Updated Medication List - Protect others around you: Learn how to safely use, store and throw away your medicines at www.disposemymeds.org.          This list is accurate as of: 11/30/17 10:45 AM.  Always use your most recent med list.                   Brand Name Dispense Instructions for use Diagnosis    acetaminophen-caffeine 500-65 MG Tabs    EXCEDRIN TENSION HEADACHE     Take 1 tablet by mouth every 6 hours as needed for mild pain        adapalene 0.1 % gel     DIFFERIN    135 g    Apply to the face at night.    Acne vulgaris       amantadine 100 MG capsule    SYMMETREL    270 capsule    Take 1 capsule (100 mg) by mouth 3 times daily 6am,11am and 4pm    PSP (progressive supranuclear palsy) (H)       amLODIPine 2.5 MG tablet    NORVASC    90 tablet    Take 1 tablet (2.5 mg) by mouth daily In evening.    HTN (hypertension)       BABY ASPIRIN 81 MG chewable tablet   Generic drug:  aspirin      Take 81 mg by mouth daily        CENTRUM SILVER per tablet     30 tablet    Take 1 tablet by mouth daily        EXELON 4.6 MG/24HR 24 hr patch   Generic drug:  rivastigmine     90 patch    Place 1 patch onto the skin daily    PSP (progressive supranuclear palsy) (H)       hydrochlorothiazide 12.5 MG Tabs tablet     30 tablet    Take 1 tablet (12.5 mg) by mouth daily    Essential hypertension       nortriptyline 10 MG capsule    PAMELOR    270 capsule    Take 3 capsules (30 mg) by mouth At Bedtime    Migraine without aura and without status migrainosus, not intractable       olmesartan 20 MG tablet    BENICAR    180 tablet    Take 2 tablets (40 mg) by mouth daily    PSP (progressive supranuclear palsy) (H), Migraine without aura and without status migrainosus, not intractable       Vitamin D-3 Super Strength 2000 UNITS tablet   Generic drug:  cholecalciferol      Take 1 tablet by mouth        ZOLMitriptan 5 MG tablet    ZOMIG    30 tablet    Take 1 tablet (5 mg) by mouth at onset of headache for migraine    PSP (progressive supranuclear palsy) (H), Migraine without aura and without status migrainosus, not intractable

## 2017-12-21 ENCOUNTER — INFUSION THERAPY VISIT (OUTPATIENT)
Dept: INFUSION THERAPY | Facility: CLINIC | Age: 67
End: 2017-12-21
Attending: PSYCHIATRY & NEUROLOGY
Payer: COMMERCIAL

## 2017-12-21 ENCOUNTER — OFFICE VISIT (OUTPATIENT)
Dept: NEUROPSYCHOLOGY | Facility: CLINIC | Age: 67
End: 2017-12-21
Payer: COMMERCIAL

## 2017-12-21 ENCOUNTER — OFFICE VISIT (OUTPATIENT)
Dept: NEUROLOGY | Facility: CLINIC | Age: 67
End: 2017-12-21
Payer: COMMERCIAL

## 2017-12-21 ENCOUNTER — APPOINTMENT (OUTPATIENT)
Dept: LAB | Facility: CLINIC | Age: 67
End: 2017-12-21
Payer: COMMERCIAL

## 2017-12-21 VITALS
OXYGEN SATURATION: 98 % | SYSTOLIC BLOOD PRESSURE: 145 MMHG | HEIGHT: 72 IN | HEART RATE: 87 BPM | RESPIRATION RATE: 20 BRPM | TEMPERATURE: 97.9 F | WEIGHT: 195.1 LBS | BODY MASS INDEX: 26.43 KG/M2 | DIASTOLIC BLOOD PRESSURE: 89 MMHG

## 2017-12-21 VITALS
HEART RATE: 81 BPM | SYSTOLIC BLOOD PRESSURE: 129 MMHG | TEMPERATURE: 97.5 F | RESPIRATION RATE: 14 BRPM | DIASTOLIC BLOOD PRESSURE: 73 MMHG

## 2017-12-21 DIAGNOSIS — G23.1 PSP (PROGRESSIVE SUPRANUCLEAR PALSY) (H): ICD-10-CM

## 2017-12-21 DIAGNOSIS — Z00.6 EXAMINATION OF PARTICIPANT IN CLINICAL TRIAL: Primary | ICD-10-CM

## 2017-12-21 DIAGNOSIS — G20.C PARKINSONISM, UNSPECIFIED PARKINSONISM TYPE (H): Primary | ICD-10-CM

## 2017-12-21 DIAGNOSIS — G23.1 PSP (PROGRESSIVE SUPRANUCLEAR PALSY) (H): Primary | ICD-10-CM

## 2017-12-21 PROCEDURE — 96365 THER/PROPH/DIAG IV INF INIT: CPT

## 2017-12-21 RX ADMIN — Medication 2100 MG: at 12:32

## 2017-12-21 ASSESSMENT — PAIN SCALES - GENERAL: PAINLEVEL: NO PAIN (0)

## 2017-12-21 NOTE — LETTER
12/21/2017     RE: Jimmy Patrick  [address]  [place] [address]     Dear Colleague,    Thank you for referring your patient, Jimmy Patrick, to the [facility] at [facility]. Please see a copy of my visit note below.    Referral to palliative care clinic and psychology.  Paulo saravia md     Again, thank you for allowing me to participate in the care of your patient.      Sincerely,    Paulo Saravia MD

## 2017-12-21 NOTE — PATIENT INSTRUCTIONS
Dear Jimmy Patrick    Thank you for choosing [facility] ([facility]) for your infusion.  The following information is a summary of our appointment as well as important reminders.      We look forward in seeing you on your next appointment here at [facility].  Please don t hesitate to call us at [phone] to reschedule any of your appointments or to speak with one of the [facility] registered nurses.  It was a pleasure taking care of you today.    Sincerely,    [facility]  Specialty Infusion & Procedure Center  [address] [place] [address]  [id number]  Phone:  [phone]

## 2017-12-21 NOTE — PROGRESS NOTES
Nursing Note  Jimmy Patrick presents today [facility] for:   Chief Complaint   Patient presents with     Infusion     study     During today's [facility] appointment, orders from Dr. Paulo Saravia were completed.  Frequency: monthly    Progress note:  Patient identification verified by name and date of birth.  Assessment completed.  Vitals recorded in Doc Flowsheets.  Patient was provided with education regarding infusion and possible side effects.  Patient verbalized understanding.      needed: No  Premedications: were not ordered.  Infusion Rates: infusion given over approximately 1 hour.  Labs: were not ordered for this appointment.  Vascular access: peripheral IV placed today.  Treatment Conditions: non-applicable.  Patient tolerated infusion: well.    Discharge Plan:   Follow up plan of care with: ongoing infusions at [facility].  Discharge instructions were reviewed with patient.  Patient/representative verbalized understanding of discharge instructions and all questions answered.  Patient discharged from [facility] in stable condition.    FAVIOLA LOVING, ANN MARIE    Administrations This Visit     study [id number] (IDS# [id number]) IV infusion 2,100 mg 210 mL     Admin Date Action Dose Rate Route Administered By          12/21/2017 New Bag 2100 mg 210 mL/hr Intravenous Nancy Guerrier RN                         /73  Pulse 81  Temp 97.5  F (36.4  C) (Oral)  Resp 14

## 2017-12-21 NOTE — MR AVS SNAPSHOT
After Visit Summary   12/21/2017    Jimmy Patrick    MRN: [id number]           Patient Information     Date Of Birth          1950        Visit Information        Provider Department      12/21/2017 8:00 AM Emperatriz Candelaria, PhD RADHA [facility] Neuropsychology        Today's Diagnoses     Examination of participant in clinical trial    -  1       Follow-ups after your visit        Your next 10 appointments already scheduled     Jan 17, 2018  1:00 PM CST   Return Visit with Clint Gutiérrez MD   [facility] ([facility])    [address] [place] [address]   [phone]            Jan 25, 2018 10:30 AM CST   (Arrive by 10:15 AM)   Return Movement Disorder with Paulo Saravia MD   [facility] Neurology ([facility])    [address] [place] [address] [phone]            Jan 25, 2018 11:00 AM CST   Infusion 60 with UC SPEC INFUSION, UC 49 ATC   [facility] Specialty and Procedure ([facility])    [address] [place] [id number] [phone]            Feb 22, 2018 10:30 AM CST   (Arrive by 10:15 AM)   Return Movement Disorder with Paulo Saravia MD   [facility] Neurology ([facility])    [address] [place] [address] [phone]            Feb 22, 2018 11:00 AM CST   (Arrive by 10:00 AM)   Infusion 60 with UC SPEC INFUSION, UC 49 ATC   [facility] Specialty and Procedure ([facility])    [address] [place] [id number] [phone]            Mar 22, 2018  8:00 AM CDT   (Arrive by 7:45 AM)   Cognitive Assessment with Emperatriz Candelaria, PhD RADHA   [facility] Neuropsychology ([facility])    [address] [place] [address]    [phone]            Mar 22, 2018  9:30 AM CDT   (Arrive by 9:15 AM)   Return Movement Disorder with Paulo Saravia MD   [facility] Neurology ([facility])    [address]  3rd Floor  [facility] [address] [phone]            Mar 22, 2018 10:40 AM CDT   (Arrive by 10:25 AM)   ecg with  CV EKG   [facility] Xray ([facility])    [address]  1st Floor  [facility] [address] [phone]              Who to contact     Please call your clinic at [phone] to:    Ask questions about your health    Make or cancel appointments    Discuss your medicines    Learn about your test results    Speak to your doctor   If you have compliments or concerns about an experience at your clinic, or if you wish to file a complaint, please contact [facility] Patient Relations at [phone] or email us at [email or web address]         Additional Information About Your Visit        Dis[employer] Information     [employer] gives you secure access to your electronic health record. If you see a primary care provider, you can also send messages to your care team and make appointments. If you have questions, please call your primary care clinic.  If you do not have a primary care provider, please call [phone] and they will assist you.      [employer] is an electronic gateway that provides easy, online access to your medical records. With [employer], you can request a clinic appointment, read your test results, renew a prescription or communicate with your care team.     To access your existing account, please contact your [facility] or call [phone] for assistance.        Care EveryWhere ID     This is your Care EveryWhere ID. This could be used by other organizations to access your [place] medical records  [id number]         Blood Pressure from Last 3 Encounters:   12/21/17  129/73   12/21/17 145/89   11/30/17 130/77    Weight from Last 3 Encounters:   12/21/17 88.5 kg (195 lb 1.6 oz)   10/18/17 89.1 kg (196 lb 6.4 oz)   10/05/17 88 kg (194 lb 1.6 oz)              We Performed the Following     NEUROPSYCH TESTING BY [facility]        Primary Care Provider Office Phone # Fax #    Evelina Morse [phone] [address]  [facility] [address]        Equal Access to Services     [facility]: Hadii aad ku hadasho Soomaali, waaxda luqadaha, qaybta kaalmada adeegyajocelynn, gely heller . So [facility] [phone].    ATENCIÓN: Si habla español, tiene a langford disposición servicios gratuitos de asistencia lingüística. [facility] [phone].    We comply with applicable federal civil rights laws and [place] laws. We do not discriminate on the basis of race, color, national origin, age, disability, sex, sexual orientation, or gender identity.            Thank you!     Thank you for choosing [facility] [place] [facility] NEUROPSYCHOLOGY  for your care. Our goal is always to provide you with excellent care. Hearing back from our patients is one way we can continue to improve our services. Please take a few minutes to complete the written survey that you may receive in the mail after your visit with us. Thank you!             Your Updated Medication List - Protect others around you: Learn how to safely use, store and throw away your medicines at www.disposemymeds.org.          This list is accurate as of: 12/21/17 11:59 PM.  Always use your most recent med list.                   Brand Name Dispense Instructions for use Diagnosis    acetaminophen-caffeine 500-65 MG Tabs    EXCEDRIN TENSION HEADACHE     Take 1 tablet by mouth every 6 hours as needed for mild pain        adapalene 0.1 % gel    DIFFERIN    135 g    Apply to the face at night.    Acne vulgaris       amantadine 100 MG capsule    SYMMETREL    270 capsule    Take 1 capsule (100 mg) by mouth 3 times daily 6am,11am and  4pm    PSP (progressive supranuclear palsy) (H)       amLODIPine 2.5 MG tablet    NORVASC    90 tablet    Take 1 tablet (2.5 mg) by mouth daily In evening.    HTN (hypertension)       BABY ASPIRIN 81 MG chewable tablet   Generic drug:  aspirin      Take 81 mg by mouth daily        CENTRUM SILVER per tablet     30 tablet    Take 1 tablet by mouth daily        EXELON 4.6 MG/24HR 24 hr patch   Generic drug:  rivastigmine     90 patch    Place 1 patch onto the skin daily    PSP (progressive supranuclear palsy) (H)       hydrochlorothiazide 12.5 MG Tabs tablet     30 tablet    Take 1 tablet (12.5 mg) by mouth daily    Essential hypertension       nortriptyline 10 MG capsule    PAMELOR    270 capsule    Take 3 capsules (30 mg) by mouth At Bedtime    Migraine without aura and without status migrainosus, not intractable       olmesartan 20 MG tablet    BENICAR    180 tablet    Take 2 tablets (40 mg) by mouth daily    PSP (progressive supranuclear palsy) (H), Migraine without aura and without status migrainosus, not intractable       Vitamin D-3 Super Strength 2000 UNITS tablet   Generic drug:  cholecalciferol      Take 1 tablet by mouth        ZOLMitriptan 5 MG tablet    ZOMIG    30 tablet    Take 1 tablet (5 mg) by mouth at onset of headache for migraine    PSP (progressive supranuclear palsy) (H), Migraine without aura and without status migrainosus, not intractable

## 2017-12-21 NOTE — MR AVS SNAPSHOT
"              After Visit Summary   12/21/2017    Jimmy Patrick    MRN: [id number]           Patient Information     Date Of Birth          1950        Visit Information        Provider Department      12/21/2017 9:30 AM Paulo Saravia MD [facility] Neurology        Today's Diagnoses     PSP (progressive supranuclear palsy) (H)    -  1       Follow-ups after your visit        Additional Services     OT Referral       *This therapy referral will be filtered to a centralized scheduling office at [facility] and the patient will receive a call to schedule an appointment at a [facility] location most convenient for them. *     [facility] provides Occupational Therapy evaluation and treatment and many specialty services across the [facility] system.  If requesting a specialty program, please choose from the list below.    If you have not heard from the scheduling office within 2 business days, please call [phone] for all locations, with the exception of [place], please call [phone].     Treatment: Evaluation & Treatment  Special Instructions/Modalities: ot   Special Programs: psp    Please be aware that coverage of these services is subject to the terms and limitations of your health insurance plan.  Call member services at your health plan with any benefit or coverage questions.      **Note to Provider:  If you are referring outside of [facility] for the therapy appointment, please list the name of the location in the \"special instructions\" above, print the referral and give to the patient to schedule the appointment.            PALLIATIVE CARE REFERRAL       Your provider has referred you to Palliative Care Services.    To schedule an Outpatient Palliative Care Referral appointment, please call: [facility] [phone]   " [email or web address].    Please be aware that coverage of these services is subject to the terms and limitations of your health insurance plan.  Call member services at your health plan with any benefit or coverage questions.      Please bring the following with you to your appointment:    (1) Any X-Rays, CTs or MRIs which have been performed.  Contact the facility where they were done to arrange for  prior to your scheduled appointment.   (2) If you have recently seen a provider outside of the [facility], please bring your most recent clinic note and/or imaging results  (3) List of current medications - please bring ALL of the medications that you are currently taking (in their original bottles) to your appointment  (4) This referral request  (5) Any documents/labs given to you for this referral    Services Requested: Consult and make recommendations, Evaluate and treat symptoms (including writing prescriptions) and Explore goals of care    Please complete the following questions:  1. What is patient's life-limiting diagnosis? psp  2. What is the reason for the referral? Evaluate. Begin end of life discussions, etc.   3. What is the patient's prognosis? poor    Palliative Care Definition:    Palliative Care is specialized medical care for people with serious illness.  This type of care is focused on providing patients with relief from symptoms, pain and stresses of serious illness - whatever the diagnosis may be.  The goal of Palliative Care is to improve quality of life for both the patient and the family.  Palliative Care is provided by a team of doctors, nurses and other specialists who work with the patient's other doctors to provide an extra layer of support.  Palliative Care is appropriate at any age and at any stage in a serious illness, and can be provided together with curative treatment.             PSYCHOLOGY REFERRAL       Your provider has referred you to: psychology      Please be aware that coverage of these services is subject to the terms and limitations of your health insurance plan.  Call member services at your health plan with any benefit or coverage questions.      Please bring the following to your appointment:    >>   Any x-rays, CTs or MRIs which have been performed.  Contact the facility where they were done to arrange for  prior to your scheduled appointment.   >>   List of current medications   >>   This referral request   >>   Any documents/labs given to you for this referral                  Your next 10 appointments already scheduled     Dec 21, 2017 11:00 AM CST   LAB with UC LAB   [facility] ([facility])    [address] [place] [address]  [address] [facility] [id number] [phone]           Please do not eat 10-12 hours before your appointment if you are coming in fasting for labs on lipids, cholesterol, or glucose (sugar). This does not apply to pregnant women. Water, hot tea and black coffee (with nothing added) are okay. Do not drink other fluids, diet soda or chew gum.            Dec 21, 2017 12:00 PM CST   Infusion 60 with UC SPEC INFUSION, UC 50 ATC   [facility] Specialty and Procedure ([facility])    [address] [place] [address]  [address] [facility] [phone]            Jan 17, 2018  1:00 PM CST   Return Visit with Clint Gutiérrez MD   [facility] ([facility])    [address] [place] [address]   [phone]            Jan 25, 2018 10:30 AM CST   (Arrive by 10:15 AM)   Return Movement Disorder with Paulo Saravia MD   [facility] Neurology ([facility])    [address] [place] [address]  [address] [facility] [id number] [phone]            Jan 25, 2018 11:00 AM CST   Infusion 60 with UC SPEC INFUSION, UC 49  ATC   [facility] Specialty and Procedure ([facility])    [address]  2nd Floor  [facility] [address] [phone]            Feb 22, 2018 10:30 AM CST   (Arrive by 10:15 AM)   Return Movement Disorder with Paulo Saravia MD   [facility] Neurology ([facility])    [address]  3rd Floor  [facility] [address] [phone]            Feb 22, 2018 11:00 AM CST   (Arrive by 10:00 AM)   Infusion 60 with UC SPEC INFUSION, UC 49 ATC   [facility] Specialty and Procedure ([facility])    [address]  [address]nd [facility] [phone] [phone]              Who to contact     Please call your clinic at [phone] to:    Ask questions about your health    Make or cancel appointments    Discuss your medicines    Learn about your test results    Speak to your doctor   If you have compliments or concerns about an experience at your clinic, or if you wish to file a complaint, please contact [facility] Patient Relations at [phone] or email us at [email or web address]         Additional Information About Your Visit        [employer] Information     [employer] gives you secure access to your electronic health record. If you see a primary care provider, you can also send messages to your care team and make appointments. If you have questions, please call your primary care clinic.  If you do not have a primary care provider, please call [phone] and they will assist you.      [employer] is an electronic gateway that provides easy, online access to your medical records. With [employer] you can request a clinic appointment, read your test results, renew a prescription or communicate with your care team.     To access your existing account, please contact your [facility] or call [phone] for  assistance.        Care EveryWhere ID     This is your Care EveryWhere ID. This could be used by other organizations to access your [place] medical records  [id number]        Your Vitals Were     Pulse Temperature Respirations Height Pulse Oximetry BMI (Body Mass Index)    87 97.9  F (36.6  C) 20 1.829 m (6') 98% 26.46 kg/m2       Blood Pressure from Last 3 Encounters:   12/21/17 145/89   11/30/17 130/77   11/02/17 (P) 138/77    Weight from Last 3 Encounters:   12/21/17 88.5 kg (195 lb 1.6 oz)   10/18/17 89.1 kg (196 lb 6.4 oz)   10/05/17 88 kg (194 lb 1.6 oz)              We Performed the Following     OT Referral     PALLIATIVE CARE REFERRAL     PSYCHOLOGY REFERRAL        Primary Care Provider Office Phone # Fax #    Evelina Morse [phone]       [address]  [facility] [address]        Equal Access to Services     [facility]: Hadii aad ku hadasho Soomaali, waaxda luqadaha, qaybta kaalmada adeegyada, waxay idiin hayaan adeeg kharasri la'sindhu . So [facility] [phone].    ATENCIÓN: Si habla español, tiene a langford disposición servicios gratuitos de asistencia lingüística. [facility] [phone].    We comply with applicable federal civil rights laws and [place] laws. We do not discriminate on the basis of race, color, national origin, age, disability, sex, sexual orientation, or gender identity.            Thank you!     Thank you for choosing [facility] NEUROLOGY  for your care. Our goal is always to provide you with excellent care. Hearing back from our patients is one way we can continue to improve our services. Please take a few minutes to complete the written survey that you may receive in the mail after your visit with us. Thank you!             Your Updated Medication List - Protect others around you: Learn how to safely use, store and throw away your medicines at www.disposemymeds.org.          This list is accurate as of: 12/21/17 10:44 AM.  Always use your most recent med list.                    Brand Name Dispense Instructions for use Diagnosis    acetaminophen-caffeine 500-65 MG Tabs    EXCEDRIN TENSION HEADACHE     Take 1 tablet by mouth every 6 hours as needed for mild pain        adapalene 0.1 % gel    DIFFERIN    135 g    Apply to the face at night.    Acne vulgaris       amantadine 100 MG capsule    SYMMETREL    270 capsule    Take 1 capsule (100 mg) by mouth 3 times daily 6am,11am and 4pm    PSP (progressive supranuclear palsy) (H)       amLODIPine 2.5 MG tablet    NORVASC    90 tablet    Take 1 tablet (2.5 mg) by mouth daily In evening.    HTN (hypertension)       BABY ASPIRIN 81 MG chewable tablet   Generic drug:  aspirin      Take 81 mg by mouth daily        CENTRUM SILVER per tablet     30 tablet    Take 1 tablet by mouth daily        EXELON 4.6 MG/24HR 24 hr patch   Generic drug:  rivastigmine     90 patch    Place 1 patch onto the skin daily    PSP (progressive supranuclear palsy) (H)       hydrochlorothiazide 12.5 MG Tabs tablet     30 tablet    Take 1 tablet (12.5 mg) by mouth daily    Essential hypertension       nortriptyline 10 MG capsule    PAMELOR    270 capsule    Take 3 capsules (30 mg) by mouth At Bedtime    Migraine without aura and without status migrainosus, not intractable       olmesartan 20 MG tablet    BENICAR    180 tablet    Take 2 tablets (40 mg) by mouth daily    PSP (progressive supranuclear palsy) (H), Migraine without aura and without status migrainosus, not intractable       Vitamin D-3 Super Strength 2000 UNITS tablet   Generic drug:  cholecalciferol      Take 1 tablet by mouth        ZOLMitriptan 5 MG tablet    ZOMIG    30 tablet    Take 1 tablet (5 mg) by mouth at onset of headache for migraine    PSP (progressive supranuclear palsy) (H), Migraine without aura and without status migrainosus, not intractable

## 2017-12-21 NOTE — NURSING NOTE
Chief Complaint   Patient presents with     RECHECK     [facility]- MOVEMENT DISORDER/ RESEARCH STUDY     Alton Miranda, CMA

## 2017-12-21 NOTE — MR AVS SNAPSHOT
After Visit Summary   12/21/2017    Jimmy Patrick    MRN: [id number]           Patient Information     Date Of Birth          1950        Visit Information        Provider Department      12/21/2017 12:00 PM UC 50 ATC; UC SPEC INFUSION [facility] [place] Specialty [facility] Procedure        Today's Diagnoses     Parkinsonism, unspecified Parkinsonism type (H)    -  1    PSP (progressive supranuclear palsy) (H)          Care Instructions    Dear Jimmy Patrick    Thank you for choosing [facility] ([facility]) for your infusion.  The following information is a summary of our appointment as well as important reminders.      We look forward in seeing you on your next appointment here at [facility].  Please don t hesitate to call us at [phone] to reschedule any of your appointments or to speak with one of the [facility] registered nurses.  It was a pleasure taking care of you today.    Sincerely,    [facility]  Specialty Infusion & Procedure Center  [address] [place] [address]  [address]  Phone:  [phone]            Follow-ups after your visit        Your next 10 appointments already scheduled     Jan 17, 2018  1:00 PM CST   Return Visit with Clint Gutiérrez MD   [facility]    [address] [place] [address] [phone]            Jan 25, 2018 10:30 AM CST   (Arrive by 10:15 AM)   Return Movement Disorder with Paulo Saravia MD   [facility] Neurology ([facility] [place])    [address] [place] [address]  [address] [facility] [address] [phone]            Jan 25, 2018 11:00 AM CST   Infusion 60 with UC SPEC INFUSION, UC 49 ATC   [facility] Specialty and Procedure ([facility] [place])    [address] [facility]  [address] [facility] [address] [phone]             Feb 22, 2018 10:30 AM CST   (Arrive by 10:15 AM)   Return Movement Disorder with Paulo Saravia MD   [facility] Neurology ([facility])    [address] [place] [address]  3rd [facility] [address]   [phone]            Feb 22, 2018 11:00 AM CST   (Arrive by 10:00 AM)   Infusion 60 with UC SPEC INFUSION, UC 49 ATC   [facility] Specialty and Procedure ([facility])    [address] [place] [address]  [address]nd [facility] [address] [phone]            Mar 22, 2018  8:00 AM CDT   (Arrive by 7:45 AM)   Cognitive Assessment with Emperatriz Candelaria, PhD LP   [facility] Neuropsychology ([facility])    [address] [place] [address]  3rd [facility] [address] [phone]            Mar 22, 2018  9:30 AM CDT   (Arrive by 9:15 AM)   Return Movement Disorder with Paulo Saravia MD   [facility] Neurology ([facility])    [address] [place] [address]  3rd [facility] [address]   [phone]            Mar 22, 2018 10:40 AM CDT   (Arrive by 10:25 AM)   ecg with  CV EKG   [facility] Xray ([facility])    [address] [place] [address]  1st [facility] [address] [phone]              Who to contact     If you have questions or need follow up information about today's clinic visit or your schedule please contact [facility] SPECIALTY AND PROCEDURE directly at [phone].  Normal or non-critical lab and imaging results will be communicated to you by MyChart, letter or phone within 4 business days after the clinic has received the results. If you do not hear from us within 7 days, please contact the clinic through MyChart or phone. If you have a critical or abnormal lab result, we will notify you by phone as soon as possible.  Submit refill requests through [employer] or call your pharmacy and they will forward the refill  request to us. Please allow 3 business days for your refill to be completed.          Additional Information About Your Visit        MyChart Information     [employer] gives you secure access to your electronic health record. If you see a primary care provider, you can also send messages to your care team and make appointments. If you have questions, please call your primary care clinic.  If you do not have a primary care provider, please call [phone] and they will assist you.        Care EveryWhere ID     This is your Care EveryWhere ID. This could be used by other organizations to access your [facility] medical records  [id number]        Your Vitals Were     Pulse Temperature Respirations             81 97.5  F (36.4  C) (Oral) 14          Blood Pressure from Last 3 Encounters:   12/21/17 129/73   12/21/17 145/89   11/30/17 130/77    Weight from Last 3 Encounters:   12/21/17 88.5 kg (195 lb 1.6 oz)   10/18/17 89.1 kg (196 lb 6.4 oz)   10/05/17 88 kg (194 lb 1.6 oz)              Today, you had the following     No orders found for display       Primary Care Provider Office Phone # Fax #    Evelina Morse [phone]       [address]  [facility] [address]        Equal Access to Services     LEDY STEELE : Hadii aad ku hadasho Soomaali, waaxda luqadaha, qaybta kaalmada adeegyada, gely vivar hayemmanueln ally lynch. So [facility] [phone].    ATENCIÓN: Si habla español, tiene a langford disposición servicios gratuitos de asistencia lingüística. Llame al [phone].    We comply with applicable federal civil rights laws and [place] laws. We do not discriminate on the basis of race, color, national origin, age, disability, sex, sexual orientation, or gender identity.            Thank you!     Thank you for choosing [facility] SPECIALTY AND PROCEDURE  for your care. Our goal is always to provide you with excellent care. Hearing back from our patients is one way we  can continue to improve our services. Please take a few minutes to complete the written survey that you may receive in the mail after your visit with us. Thank you!             Your Updated Medication List - Protect others around you: Learn how to safely use, store and throw away your medicines at www.disposemymeds.org.          This list is accurate as of: 12/21/17  2:45 PM.  Always use your most recent med list.                   Brand Name Dispense Instructions for use Diagnosis    acetaminophen-caffeine 500-65 MG Tabs    EXCEDRIN TENSION HEADACHE     Take 1 tablet by mouth every 6 hours as needed for mild pain        adapalene 0.1 % gel    DIFFERIN    135 g    Apply to the face at night.    Acne vulgaris       amantadine 100 MG capsule    SYMMETREL    270 capsule    Take 1 capsule (100 mg) by mouth 3 times daily 6am,11am and 4pm    PSP (progressive supranuclear palsy) (H)       amLODIPine 2.5 MG tablet    NORVASC    90 tablet    Take 1 tablet (2.5 mg) by mouth daily In evening.    HTN (hypertension)       BABY ASPIRIN 81 MG chewable tablet   Generic drug:  aspirin      Take 81 mg by mouth daily        CENTRUM SILVER per tablet     30 tablet    Take 1 tablet by mouth daily        EXELON 4.6 MG/24HR 24 hr patch   Generic drug:  rivastigmine     90 patch    Place 1 patch onto the skin daily    PSP (progressive supranuclear palsy) (H)       hydrochlorothiazide 12.5 MG Tabs tablet     30 tablet    Take 1 tablet (12.5 mg) by mouth daily    Essential hypertension       nortriptyline 10 MG capsule    PAMELOR    270 capsule    Take 3 capsules (30 mg) by mouth At Bedtime    Migraine without aura and without status migrainosus, not intractable       olmesartan 20 MG tablet    BENICAR    180 tablet    Take 2 tablets (40 mg) by mouth daily    PSP (progressive supranuclear palsy) (H), Migraine without aura and without status migrainosus, not intractable       Vitamin D-3 Super Strength 2000 UNITS tablet   Generic drug:   cholecalciferol      Take 1 tablet by mouth        ZOLMitriptan 5 MG tablet    ZOMIG    30 tablet    Take 1 tablet (5 mg) by mouth at onset of headache for migraine    PSP (progressive supranuclear palsy) (H), Migraine without aura and without status migrainosus, not intractable

## 2017-12-22 LAB — INTERPRETATION ECG - MUSE: NORMAL

## 2017-12-22 NOTE — PROGRESS NOTES
The participant completed the neuropsychology evaluation for the study [id number]. This included one hour of psychometrist time.    Measures administered:    MoCA  Repeatable Battery for the Assessment of Neuropsychological Status (RBANS)  Phonemic Fluency  Letter Number Sequencing Test  Color Trails Test

## 2018-01-01 ENCOUNTER — OFFICE VISIT (OUTPATIENT)
Dept: CARDIOLOGY | Facility: CLINIC | Age: 68
End: 2018-01-01
Payer: COMMERCIAL

## 2018-01-01 ENCOUNTER — INFUSION THERAPY VISIT (OUTPATIENT)
Dept: INFUSION THERAPY | Facility: CLINIC | Age: 68
End: 2018-01-01
Attending: PSYCHIATRY & NEUROLOGY
Payer: COMMERCIAL

## 2018-01-01 ENCOUNTER — NURSING HOME VISIT (OUTPATIENT)
Dept: GERIATRICS | Facility: CLINIC | Age: 68
End: 2018-01-01
Payer: COMMERCIAL

## 2018-01-01 ENCOUNTER — THERAPY VISIT (OUTPATIENT)
Dept: PHYSICAL THERAPY | Facility: CLINIC | Age: 68
End: 2018-01-01
Payer: COMMERCIAL

## 2018-01-01 ENCOUNTER — MEDICAL CORRESPONDENCE (OUTPATIENT)
Dept: HEALTH INFORMATION MANAGEMENT | Facility: CLINIC | Age: 68
End: 2018-01-01

## 2018-01-01 ENCOUNTER — OFFICE VISIT (OUTPATIENT)
Dept: PEDIATRICS | Facility: CLINIC | Age: 68
End: 2018-01-01
Payer: COMMERCIAL

## 2018-01-01 ENCOUNTER — OFFICE VISIT (OUTPATIENT)
Dept: NEUROLOGY | Facility: CLINIC | Age: 68
End: 2018-01-01
Payer: COMMERCIAL

## 2018-01-01 ENCOUNTER — DOCUMENTATION ONLY (OUTPATIENT)
Dept: LAB | Facility: CLINIC | Age: 68
End: 2018-01-01

## 2018-01-01 ENCOUNTER — TELEPHONE (OUTPATIENT)
Dept: ONCOLOGY | Facility: CLINIC | Age: 68
End: 2018-01-01

## 2018-01-01 ENCOUNTER — DOCUMENTATION ONLY (OUTPATIENT)
Dept: SPIRITUAL SERVICES | Facility: CLINIC | Age: 68
End: 2018-01-01

## 2018-01-01 ENCOUNTER — DOCUMENTATION ONLY (OUTPATIENT)
Dept: CARE COORDINATION | Facility: CLINIC | Age: 68
End: 2018-01-01

## 2018-01-01 ENCOUNTER — TELEPHONE (OUTPATIENT)
Dept: FAMILY MEDICINE | Facility: CLINIC | Age: 68
End: 2018-01-01

## 2018-01-01 ENCOUNTER — TELEPHONE (OUTPATIENT)
Dept: CARDIOLOGY | Facility: CLINIC | Age: 68
End: 2018-01-01

## 2018-01-01 ENCOUNTER — OFFICE VISIT (OUTPATIENT)
Dept: UROLOGY | Facility: CLINIC | Age: 68
End: 2018-01-01
Payer: COMMERCIAL

## 2018-01-01 ENCOUNTER — MYC MEDICAL ADVICE (OUTPATIENT)
Dept: PEDIATRICS | Facility: CLINIC | Age: 68
End: 2018-01-01

## 2018-01-01 ENCOUNTER — OFFICE VISIT (OUTPATIENT)
Dept: ORTHOPEDICS | Facility: CLINIC | Age: 68
End: 2018-01-01
Payer: COMMERCIAL

## 2018-01-01 ENCOUNTER — ONCOLOGY VISIT (OUTPATIENT)
Dept: ONCOLOGY | Facility: CLINIC | Age: 68
End: 2018-01-01
Payer: COMMERCIAL

## 2018-01-01 ENCOUNTER — OFFICE VISIT (OUTPATIENT)
Dept: OPHTHALMOLOGY | Facility: CLINIC | Age: 68
End: 2018-01-01
Attending: OPHTHALMOLOGY
Payer: COMMERCIAL

## 2018-01-01 ENCOUNTER — OFFICE VISIT (OUTPATIENT)
Dept: FAMILY MEDICINE | Facility: CLINIC | Age: 68
End: 2018-01-01
Payer: COMMERCIAL

## 2018-01-01 ENCOUNTER — DOCUMENTATION ONLY (OUTPATIENT)
Dept: NEUROLOGY | Facility: CLINIC | Age: 68
End: 2018-01-01

## 2018-01-01 ENCOUNTER — TELEPHONE (OUTPATIENT)
Dept: PEDIATRICS | Facility: CLINIC | Age: 68
End: 2018-01-01

## 2018-01-01 ENCOUNTER — CARE COORDINATION (OUTPATIENT)
Dept: ONCOLOGY | Facility: CLINIC | Age: 68
End: 2018-01-01

## 2018-01-01 ENCOUNTER — MYC MEDICAL ADVICE (OUTPATIENT)
Dept: NEUROLOGY | Facility: CLINIC | Age: 68
End: 2018-01-01

## 2018-01-01 ENCOUNTER — OFFICE VISIT (OUTPATIENT)
Dept: NEUROPSYCHOLOGY | Facility: CLINIC | Age: 68
End: 2018-01-01
Payer: COMMERCIAL

## 2018-01-01 ENCOUNTER — RADIANT APPOINTMENT (OUTPATIENT)
Dept: GENERAL RADIOLOGY | Facility: CLINIC | Age: 68
End: 2018-01-01
Attending: PHYSICIAN ASSISTANT
Payer: COMMERCIAL

## 2018-01-01 ENCOUNTER — OFFICE VISIT (OUTPATIENT)
Dept: ORTHOPEDICS | Facility: CLINIC | Age: 68
End: 2018-01-01
Attending: FAMILY MEDICINE
Payer: COMMERCIAL

## 2018-01-01 ENCOUNTER — DISCHARGE SUMMARY NURSING HOME (OUTPATIENT)
Dept: GERIATRICS | Facility: CLINIC | Age: 68
End: 2018-01-01
Payer: COMMERCIAL

## 2018-01-01 ENCOUNTER — TELEPHONE (OUTPATIENT)
Dept: NEUROLOGY | Facility: CLINIC | Age: 68
End: 2018-01-01

## 2018-01-01 ENCOUNTER — PATIENT OUTREACH (OUTPATIENT)
Dept: CARE COORDINATION | Facility: CLINIC | Age: 68
End: 2018-01-01

## 2018-01-01 ENCOUNTER — APPOINTMENT (OUTPATIENT)
Dept: LAB | Facility: CLINIC | Age: 68
End: 2018-01-01
Payer: COMMERCIAL

## 2018-01-01 ENCOUNTER — TELEPHONE (OUTPATIENT)
Dept: PHARMACY | Facility: OTHER | Age: 68
End: 2018-01-01

## 2018-01-01 ENCOUNTER — ALLIED HEALTH/NURSE VISIT (OUTPATIENT)
Dept: PHARMACY | Facility: CLINIC | Age: 68
End: 2018-01-01
Payer: COMMERCIAL

## 2018-01-01 ENCOUNTER — RADIANT APPOINTMENT (OUTPATIENT)
Dept: CT IMAGING | Facility: CLINIC | Age: 68
End: 2018-01-01
Attending: FAMILY MEDICINE
Payer: COMMERCIAL

## 2018-01-01 ENCOUNTER — TRANSFERRED RECORDS (OUTPATIENT)
Dept: HEALTH INFORMATION MANAGEMENT | Facility: CLINIC | Age: 68
End: 2018-01-01

## 2018-01-01 ENCOUNTER — PATIENT OUTREACH (OUTPATIENT)
Dept: NEUROLOGY | Facility: CLINIC | Age: 68
End: 2018-01-01

## 2018-01-01 ENCOUNTER — OFFICE VISIT (OUTPATIENT)
Dept: DERMATOLOGY | Facility: CLINIC | Age: 68
End: 2018-01-01
Payer: COMMERCIAL

## 2018-01-01 ENCOUNTER — MYC MEDICAL ADVICE (OUTPATIENT)
Dept: FAMILY MEDICINE | Facility: CLINIC | Age: 68
End: 2018-01-01

## 2018-01-01 VITALS
OXYGEN SATURATION: 97 % | SYSTOLIC BLOOD PRESSURE: 110 MMHG | TEMPERATURE: 97.6 F | WEIGHT: 172 LBS | BODY MASS INDEX: 23.3 KG/M2 | RESPIRATION RATE: 12 BRPM | HEART RATE: 99 BPM | HEIGHT: 72 IN | DIASTOLIC BLOOD PRESSURE: 72 MMHG

## 2018-01-01 VITALS
WEIGHT: 189.7 LBS | WEIGHT: 190.1 LBS | BODY MASS INDEX: 25.75 KG/M2 | SYSTOLIC BLOOD PRESSURE: 123 MMHG | OXYGEN SATURATION: 97 % | HEIGHT: 72 IN | RESPIRATION RATE: 24 BRPM | HEART RATE: 78 BPM | BODY MASS INDEX: 25.7 KG/M2 | TEMPERATURE: 98.2 F | OXYGEN SATURATION: 95 % | SYSTOLIC BLOOD PRESSURE: 129 MMHG | DIASTOLIC BLOOD PRESSURE: 77 MMHG | TEMPERATURE: 98.1 F | HEIGHT: 72 IN | RESPIRATION RATE: 24 BRPM | HEART RATE: 81 BPM | DIASTOLIC BLOOD PRESSURE: 82 MMHG

## 2018-01-01 VITALS
SYSTOLIC BLOOD PRESSURE: 134 MMHG | RESPIRATION RATE: 16 BRPM | HEART RATE: 88 BPM | OXYGEN SATURATION: 99 % | TEMPERATURE: 98 F | DIASTOLIC BLOOD PRESSURE: 65 MMHG

## 2018-01-01 VITALS
HEART RATE: 98 BPM | RESPIRATION RATE: 18 BRPM | DIASTOLIC BLOOD PRESSURE: 74 MMHG | OXYGEN SATURATION: 97 % | TEMPERATURE: 97.3 F | SYSTOLIC BLOOD PRESSURE: 104 MMHG

## 2018-01-01 VITALS
RESPIRATION RATE: 18 BRPM | TEMPERATURE: 97.8 F | HEART RATE: 88 BPM | OXYGEN SATURATION: 95 % | BODY MASS INDEX: 24.28 KG/M2 | SYSTOLIC BLOOD PRESSURE: 113 MMHG | DIASTOLIC BLOOD PRESSURE: 74 MMHG | WEIGHT: 179 LBS

## 2018-01-01 VITALS
WEIGHT: 194.2 LBS | HEIGHT: 72 IN | OXYGEN SATURATION: 98 % | HEART RATE: 89 BPM | DIASTOLIC BLOOD PRESSURE: 82 MMHG | SYSTOLIC BLOOD PRESSURE: 121 MMHG | BODY MASS INDEX: 26.3 KG/M2 | RESPIRATION RATE: 20 BRPM | TEMPERATURE: 98.1 F

## 2018-01-01 VITALS
OXYGEN SATURATION: 97 % | HEART RATE: 93 BPM | RESPIRATION RATE: 16 BRPM | DIASTOLIC BLOOD PRESSURE: 69 MMHG | SYSTOLIC BLOOD PRESSURE: 136 MMHG | TEMPERATURE: 97.4 F

## 2018-01-01 VITALS
DIASTOLIC BLOOD PRESSURE: 78 MMHG | WEIGHT: 178.8 LBS | RESPIRATION RATE: 16 BRPM | HEART RATE: 85 BPM | OXYGEN SATURATION: 94 % | SYSTOLIC BLOOD PRESSURE: 125 MMHG | BODY MASS INDEX: 24.25 KG/M2 | TEMPERATURE: 98.1 F

## 2018-01-01 VITALS
BODY MASS INDEX: 24.28 KG/M2 | WEIGHT: 179 LBS | OXYGEN SATURATION: 96 % | SYSTOLIC BLOOD PRESSURE: 126 MMHG | RESPIRATION RATE: 18 BRPM | HEART RATE: 90 BPM | DIASTOLIC BLOOD PRESSURE: 78 MMHG | TEMPERATURE: 97.6 F

## 2018-01-01 VITALS
RESPIRATION RATE: 18 BRPM | SYSTOLIC BLOOD PRESSURE: 150 MMHG | HEART RATE: 100 BPM | OXYGEN SATURATION: 97 % | WEIGHT: 179 LBS | TEMPERATURE: 97.3 F | BODY MASS INDEX: 24.28 KG/M2 | DIASTOLIC BLOOD PRESSURE: 87 MMHG

## 2018-01-01 VITALS
BODY MASS INDEX: 23.33 KG/M2 | HEART RATE: 95 BPM | DIASTOLIC BLOOD PRESSURE: 78 MMHG | RESPIRATION RATE: 18 BRPM | WEIGHT: 172 LBS | OXYGEN SATURATION: 97 % | SYSTOLIC BLOOD PRESSURE: 117 MMHG

## 2018-01-01 VITALS
TEMPERATURE: 97.5 F | SYSTOLIC BLOOD PRESSURE: 109 MMHG | OXYGEN SATURATION: 96 % | DIASTOLIC BLOOD PRESSURE: 73 MMHG | HEART RATE: 95 BPM | RESPIRATION RATE: 22 BRPM

## 2018-01-01 VITALS
SYSTOLIC BLOOD PRESSURE: 116 MMHG | HEART RATE: 58 BPM | DIASTOLIC BLOOD PRESSURE: 69 MMHG | BODY MASS INDEX: 26.26 KG/M2 | OXYGEN SATURATION: 98 % | WEIGHT: 193.6 LBS

## 2018-01-01 VITALS — HEART RATE: 97 BPM | DIASTOLIC BLOOD PRESSURE: 67 MMHG | SYSTOLIC BLOOD PRESSURE: 97 MMHG | RESPIRATION RATE: 16 BRPM

## 2018-01-01 VITALS
RESPIRATION RATE: 18 BRPM | SYSTOLIC BLOOD PRESSURE: 131 MMHG | DIASTOLIC BLOOD PRESSURE: 76 MMHG | HEART RATE: 91 BPM | OXYGEN SATURATION: 96 %

## 2018-01-01 VITALS
SYSTOLIC BLOOD PRESSURE: 122 MMHG | HEART RATE: 82 BPM | BODY MASS INDEX: 23.99 KG/M2 | DIASTOLIC BLOOD PRESSURE: 79 MMHG | OXYGEN SATURATION: 99 % | WEIGHT: 177.1 LBS | HEIGHT: 72 IN

## 2018-01-01 VITALS
BODY MASS INDEX: 24.25 KG/M2 | TEMPERATURE: 97.8 F | HEART RATE: 86 BPM | WEIGHT: 178.8 LBS | RESPIRATION RATE: 18 BRPM | OXYGEN SATURATION: 95 % | DIASTOLIC BLOOD PRESSURE: 82 MMHG | SYSTOLIC BLOOD PRESSURE: 115 MMHG

## 2018-01-01 VITALS
HEART RATE: 81 BPM | DIASTOLIC BLOOD PRESSURE: 81 MMHG | SYSTOLIC BLOOD PRESSURE: 133 MMHG | OXYGEN SATURATION: 99 % | TEMPERATURE: 98.3 F | RESPIRATION RATE: 18 BRPM

## 2018-01-01 VITALS
BODY MASS INDEX: 23.73 KG/M2 | WEIGHT: 175 LBS | DIASTOLIC BLOOD PRESSURE: 71 MMHG | SYSTOLIC BLOOD PRESSURE: 100 MMHG | OXYGEN SATURATION: 98 % | HEART RATE: 86 BPM

## 2018-01-01 VITALS — WEIGHT: 194.4 LBS | HEIGHT: 72 IN | BODY MASS INDEX: 26.33 KG/M2

## 2018-01-01 VITALS
SYSTOLIC BLOOD PRESSURE: 116 MMHG | BODY MASS INDEX: 23.79 KG/M2 | WEIGHT: 175.4 LBS | TEMPERATURE: 97.8 F | DIASTOLIC BLOOD PRESSURE: 75 MMHG | HEART RATE: 88 BPM | OXYGEN SATURATION: 98 %

## 2018-01-01 VITALS — OXYGEN SATURATION: 94 % | SYSTOLIC BLOOD PRESSURE: 120 MMHG | HEART RATE: 88 BPM | DIASTOLIC BLOOD PRESSURE: 70 MMHG

## 2018-01-01 VITALS
OXYGEN SATURATION: 98 % | DIASTOLIC BLOOD PRESSURE: 70 MMHG | HEART RATE: 88 BPM | TEMPERATURE: 98.1 F | WEIGHT: 176.5 LBS | BODY MASS INDEX: 23.94 KG/M2 | SYSTOLIC BLOOD PRESSURE: 115 MMHG

## 2018-01-01 VITALS
DIASTOLIC BLOOD PRESSURE: 81 MMHG | OXYGEN SATURATION: 97 % | BODY MASS INDEX: 23.21 KG/M2 | HEIGHT: 72 IN | WEIGHT: 171.38 LBS | HEART RATE: 82 BPM | SYSTOLIC BLOOD PRESSURE: 121 MMHG | RESPIRATION RATE: 16 BRPM

## 2018-01-01 VITALS
HEART RATE: 83 BPM | TEMPERATURE: 97.5 F | RESPIRATION RATE: 14 BRPM | DIASTOLIC BLOOD PRESSURE: 67 MMHG | OXYGEN SATURATION: 97 % | SYSTOLIC BLOOD PRESSURE: 129 MMHG

## 2018-01-01 VITALS
WEIGHT: 190.1 LBS | DIASTOLIC BLOOD PRESSURE: 82 MMHG | HEIGHT: 72 IN | TEMPERATURE: 98.2 F | HEART RATE: 78 BPM | OXYGEN SATURATION: 95 % | BODY MASS INDEX: 25.75 KG/M2 | SYSTOLIC BLOOD PRESSURE: 129 MMHG | RESPIRATION RATE: 24 BRPM

## 2018-01-01 VITALS
RESPIRATION RATE: 18 BRPM | SYSTOLIC BLOOD PRESSURE: 126 MMHG | DIASTOLIC BLOOD PRESSURE: 78 MMHG | WEIGHT: 179 LBS | OXYGEN SATURATION: 97 % | BODY MASS INDEX: 24.28 KG/M2 | HEART RATE: 80 BPM | TEMPERATURE: 97.6 F

## 2018-01-01 VITALS
SYSTOLIC BLOOD PRESSURE: 114 MMHG | OXYGEN SATURATION: 98 % | WEIGHT: 192.7 LBS | HEART RATE: 62 BPM | DIASTOLIC BLOOD PRESSURE: 72 MMHG | BODY MASS INDEX: 26.13 KG/M2

## 2018-01-01 VITALS
DIASTOLIC BLOOD PRESSURE: 62 MMHG | RESPIRATION RATE: 18 BRPM | SYSTOLIC BLOOD PRESSURE: 120 MMHG | HEART RATE: 80 BPM | OXYGEN SATURATION: 98 % | TEMPERATURE: 97.7 F

## 2018-01-01 VITALS
BODY MASS INDEX: 24.25 KG/M2 | RESPIRATION RATE: 18 BRPM | SYSTOLIC BLOOD PRESSURE: 120 MMHG | TEMPERATURE: 98.2 F | HEART RATE: 86 BPM | WEIGHT: 178.8 LBS | DIASTOLIC BLOOD PRESSURE: 84 MMHG | OXYGEN SATURATION: 96 %

## 2018-01-01 VITALS
SYSTOLIC BLOOD PRESSURE: 123 MMHG | TEMPERATURE: 97.8 F | RESPIRATION RATE: 18 BRPM | DIASTOLIC BLOOD PRESSURE: 79 MMHG | HEART RATE: 90 BPM | OXYGEN SATURATION: 96 %

## 2018-01-01 VITALS — SYSTOLIC BLOOD PRESSURE: 129 MMHG | DIASTOLIC BLOOD PRESSURE: 79 MMHG | HEART RATE: 88 BPM

## 2018-01-01 VITALS — DIASTOLIC BLOOD PRESSURE: 86 MMHG | SYSTOLIC BLOOD PRESSURE: 137 MMHG | HEART RATE: 87 BPM | HEIGHT: 72 IN

## 2018-01-01 VITALS
WEIGHT: 171.6 LBS | BODY MASS INDEX: 23.27 KG/M2 | HEART RATE: 82 BPM | DIASTOLIC BLOOD PRESSURE: 81 MMHG | SYSTOLIC BLOOD PRESSURE: 121 MMHG

## 2018-01-01 VITALS
RESPIRATION RATE: 16 BRPM | HEART RATE: 82 BPM | SYSTOLIC BLOOD PRESSURE: 116 MMHG | TEMPERATURE: 96.1 F | DIASTOLIC BLOOD PRESSURE: 69 MMHG

## 2018-01-01 VITALS
BODY MASS INDEX: 25.73 KG/M2 | SYSTOLIC BLOOD PRESSURE: 118 MMHG | HEIGHT: 72 IN | WEIGHT: 190 LBS | DIASTOLIC BLOOD PRESSURE: 70 MMHG

## 2018-01-01 VITALS — OXYGEN SATURATION: 99 % | DIASTOLIC BLOOD PRESSURE: 81 MMHG | HEART RATE: 88 BPM | SYSTOLIC BLOOD PRESSURE: 125 MMHG

## 2018-01-01 DIAGNOSIS — M54.50 ACUTE LEFT-SIDED LOW BACK PAIN WITHOUT SCIATICA: ICD-10-CM

## 2018-01-01 DIAGNOSIS — R60.0 BILATERAL LEG EDEMA: Primary | ICD-10-CM

## 2018-01-01 DIAGNOSIS — R47.9 SPEECH DYSFUNCTION: ICD-10-CM

## 2018-01-01 DIAGNOSIS — G23.1 PROGRESSIVE SUPRANUCLEAR PALSY (H): Primary | ICD-10-CM

## 2018-01-01 DIAGNOSIS — Z86.79 S/P ABLATION OF ATRIAL FLUTTER: ICD-10-CM

## 2018-01-01 DIAGNOSIS — Z13.6 CARDIOVASCULAR SCREENING; LDL GOAL LESS THAN 160: ICD-10-CM

## 2018-01-01 DIAGNOSIS — G23.1 PROGRESSIVE SUPRANUCLEAR OPHTHALMOPLEGIA (H): ICD-10-CM

## 2018-01-01 DIAGNOSIS — G43.009 MIGRAINE WITHOUT AURA AND WITHOUT STATUS MIGRAINOSUS, NOT INTRACTABLE: ICD-10-CM

## 2018-01-01 DIAGNOSIS — K59.00 CONSTIPATION, UNSPECIFIED CONSTIPATION TYPE: ICD-10-CM

## 2018-01-01 DIAGNOSIS — G23.1 PSP (PROGRESSIVE SUPRANUCLEAR PALSY) (H): Primary | ICD-10-CM

## 2018-01-01 DIAGNOSIS — G23.1 PSP (PROGRESSIVE SUPRANUCLEAR PALSY) (H): ICD-10-CM

## 2018-01-01 DIAGNOSIS — K59.01 SLOW TRANSIT CONSTIPATION: ICD-10-CM

## 2018-01-01 DIAGNOSIS — Z00.01 ENCOUNTER FOR GENERAL ADULT MEDICAL EXAMINATION WITH ABNORMAL FINDINGS: Primary | ICD-10-CM

## 2018-01-01 DIAGNOSIS — S12.100D CLOSED DISPLACED FRACTURE OF SECOND CERVICAL VERTEBRA WITH ROUTINE HEALING, UNSPECIFIED FRACTURE MORPHOLOGY, SUBSEQUENT ENCOUNTER: ICD-10-CM

## 2018-01-01 DIAGNOSIS — R41.9 COGNITIVE COMPLAINTS: ICD-10-CM

## 2018-01-01 DIAGNOSIS — S12.101A CLOSED NONDISPLACED FRACTURE OF SECOND CERVICAL VERTEBRA, UNSPECIFIED FRACTURE MORPHOLOGY, INITIAL ENCOUNTER (H): ICD-10-CM

## 2018-01-01 DIAGNOSIS — R13.10 DYSPHAGIA, UNSPECIFIED TYPE: ICD-10-CM

## 2018-01-01 DIAGNOSIS — G47.01 INSOMNIA DUE TO MEDICAL CONDITION: ICD-10-CM

## 2018-01-01 DIAGNOSIS — K59.09 CHRONIC CONSTIPATION: ICD-10-CM

## 2018-01-01 DIAGNOSIS — F43.21 GRIEF: ICD-10-CM

## 2018-01-01 DIAGNOSIS — M51.26 DISPLACEMENT OF LUMBAR INTERVERTEBRAL DISC WITHOUT MYELOPATHY: ICD-10-CM

## 2018-01-01 DIAGNOSIS — I10 ESSENTIAL HYPERTENSION: ICD-10-CM
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
## 2018-01-01 DIAGNOSIS — G62.9 NEUROPATHY: ICD-10-CM

## 2018-01-01 DIAGNOSIS — G23.1 PROGRESSIVE SUPRANUCLEAR PALSY (H): ICD-10-CM

## 2018-01-01 DIAGNOSIS — S12.191A OTHER CLOSED NONDISPLACED FRACTURE OF SECOND CERVICAL VERTEBRA, INITIAL ENCOUNTER (H): Primary | ICD-10-CM

## 2018-01-01 DIAGNOSIS — D22.9 MULTIPLE NEVI: ICD-10-CM

## 2018-01-01 DIAGNOSIS — Z98.890 S/P ABLATION OF ATRIAL FLUTTER: ICD-10-CM

## 2018-01-01 DIAGNOSIS — Z00.6 EXAMINATION OF PARTICIPANT IN CLINICAL TRIAL: Primary | ICD-10-CM

## 2018-01-01 DIAGNOSIS — Z74.09 IMPAIRED FUNCTIONAL MOBILITY, BALANCE, GAIT, AND ENDURANCE: ICD-10-CM

## 2018-01-01 DIAGNOSIS — Z13.6 CARDIOVASCULAR SCREENING; LDL GOAL LESS THAN 160: Primary | ICD-10-CM

## 2018-01-01 DIAGNOSIS — S12.101A CLOSED NONDISPLACED FRACTURE OF SECOND CERVICAL VERTEBRA, UNSPECIFIED FRACTURE MORPHOLOGY, INITIAL ENCOUNTER (H): Primary | ICD-10-CM

## 2018-01-01 DIAGNOSIS — G20.C PARKINSONISM, UNSPECIFIED PARKINSONISM TYPE (H): Primary | ICD-10-CM

## 2018-01-01 DIAGNOSIS — G90.3 NEUROGENIC ORTHOSTATIC HYPOTENSION (H): ICD-10-CM

## 2018-01-01 DIAGNOSIS — H66.90 ACUTE OTITIS MEDIA, UNSPECIFIED OTITIS MEDIA TYPE: ICD-10-CM

## 2018-01-01 DIAGNOSIS — F43.20 ADJUSTMENT DISORDER, UNSPECIFIED TYPE: Primary | ICD-10-CM

## 2018-01-01 DIAGNOSIS — Z23 NEED FOR SHINGLES VACCINE: ICD-10-CM

## 2018-01-01 DIAGNOSIS — M54.16 LUMBAR RADICULOPATHY: Primary | ICD-10-CM

## 2018-01-01 DIAGNOSIS — N39.41 URGE INCONTINENCE OF URINE: ICD-10-CM

## 2018-01-01 DIAGNOSIS — G90.3 NEUROGENIC ORTHOSTATIC HYPOTENSION (H): Primary | ICD-10-CM

## 2018-01-01 DIAGNOSIS — S12.191D OTHER CLOSED NONDISPLACED FRACTURE OF SECOND CERVICAL VERTEBRA WITH ROUTINE HEALING, SUBSEQUENT ENCOUNTER: ICD-10-CM

## 2018-01-01 DIAGNOSIS — G23.1 PROGRESSIVE SUPRANUCLEAR OPHTHALMOPLEGIA (H): Primary | ICD-10-CM

## 2018-01-01 DIAGNOSIS — S12.100D CLOSED DISPLACED FRACTURE OF SECOND CERVICAL VERTEBRA WITH ROUTINE HEALING, UNSPECIFIED FRACTURE MORPHOLOGY, SUBSEQUENT ENCOUNTER: Primary | ICD-10-CM

## 2018-01-01 DIAGNOSIS — H66.90 AOM (ACUTE OTITIS MEDIA): ICD-10-CM

## 2018-01-01 DIAGNOSIS — H65.92 OME (OTITIS MEDIA WITH EFFUSION), LEFT: Primary | ICD-10-CM

## 2018-01-01 DIAGNOSIS — S12.091S OTHER CLOSED NONDISPLACED FRACTURE OF FIRST CERVICAL VERTEBRA, SEQUELA: ICD-10-CM

## 2018-01-01 DIAGNOSIS — Z23 NEED FOR PROPHYLACTIC VACCINATION AGAINST STREPTOCOCCUS PNEUMONIAE (PNEUMOCOCCUS): ICD-10-CM

## 2018-01-01 DIAGNOSIS — L72.0 EIC (EPIDERMAL INCLUSION CYST): Primary | ICD-10-CM

## 2018-01-01 DIAGNOSIS — D18.01 CHERRY ANGIOMA: ICD-10-CM

## 2018-01-01 DIAGNOSIS — L82.1 SEBORRHEIC KERATOSES: ICD-10-CM

## 2018-01-01 DIAGNOSIS — R10.84 ABDOMINAL PAIN, GENERALIZED: ICD-10-CM

## 2018-01-01 DIAGNOSIS — Z51.5 ENCOUNTER FOR PALLIATIVE CARE: ICD-10-CM

## 2018-01-01 DIAGNOSIS — R05.9 COUGH: Primary | ICD-10-CM

## 2018-01-01 DIAGNOSIS — R05.3 CHRONIC COUGH: ICD-10-CM

## 2018-01-01 DIAGNOSIS — Z78.9 TAKES DIETARY SUPPLEMENTS: ICD-10-CM

## 2018-01-01 DIAGNOSIS — G43.909 MIGRAINE WITHOUT STATUS MIGRAINOSUS, NOT INTRACTABLE, UNSPECIFIED MIGRAINE TYPE: ICD-10-CM

## 2018-01-01 DIAGNOSIS — K59.00 CONSTIPATION, UNSPECIFIED CONSTIPATION TYPE: Primary | ICD-10-CM

## 2018-01-01 DIAGNOSIS — Z71.81 SPIRITUAL OR RELIGIOUS COUNSELING: Primary | ICD-10-CM

## 2018-01-01 DIAGNOSIS — M54.5 CHRONIC BILATERAL LOW BACK PAIN, WITH SCIATICA PRESENCE UNSPECIFIED: ICD-10-CM

## 2018-01-01 DIAGNOSIS — H61.23 BILATERAL IMPACTED CERUMEN: ICD-10-CM

## 2018-01-01 DIAGNOSIS — S12.191D OTHER CLOSED NONDISPLACED FRACTURE OF SECOND CERVICAL VERTEBRA WITH ROUTINE HEALING, SUBSEQUENT ENCOUNTER: Primary | ICD-10-CM

## 2018-01-01 DIAGNOSIS — K59.09 OTHER CONSTIPATION: ICD-10-CM

## 2018-01-01 DIAGNOSIS — S12.100D: ICD-10-CM

## 2018-01-01 DIAGNOSIS — G89.29 CHRONIC BILATERAL LOW BACK PAIN, WITH SCIATICA PRESENCE UNSPECIFIED: ICD-10-CM

## 2018-01-01 DIAGNOSIS — G89.29 CHRONIC BILATERAL LOW BACK PAIN, WITH SCIATICA PRESENCE UNSPECIFIED: Primary | ICD-10-CM

## 2018-01-01 DIAGNOSIS — S12.100S CLOSED DISPLACED FRACTURE OF SECOND CERVICAL VERTEBRA, UNSPECIFIED FRACTURE MORPHOLOGY, SEQUELA: Primary | ICD-10-CM

## 2018-01-01 DIAGNOSIS — G20.C PARKINSONISM, UNSPECIFIED PARKINSONISM TYPE (H): ICD-10-CM

## 2018-01-01 DIAGNOSIS — F80.0 ARTICULATION DISORDER: ICD-10-CM

## 2018-01-01 DIAGNOSIS — M54.12 CERVICAL NEURALGIA: ICD-10-CM

## 2018-01-01 DIAGNOSIS — R39.9 LOWER URINARY TRACT SYMPTOMS (LUTS): ICD-10-CM

## 2018-01-01 DIAGNOSIS — Z91.89 AT HIGH RISK FOR ASPIRATION: ICD-10-CM

## 2018-01-01 DIAGNOSIS — M54.5 CHRONIC BILATERAL LOW BACK PAIN, WITH SCIATICA PRESENCE UNSPECIFIED: Primary | ICD-10-CM

## 2018-01-01 DIAGNOSIS — L70.0 ACNE VULGARIS: ICD-10-CM

## 2018-01-01 DIAGNOSIS — L81.4 LENTIGINES: ICD-10-CM

## 2018-01-01 DIAGNOSIS — J30.2 CHRONIC SEASONAL ALLERGIC RHINITIS, UNSPECIFIED TRIGGER: Primary | ICD-10-CM

## 2018-01-01 DIAGNOSIS — K59.09 OTHER CONSTIPATION: Primary | ICD-10-CM

## 2018-01-01 DIAGNOSIS — M54.50 ACUTE LEFT-SIDED LOW BACK PAIN WITHOUT SCIATICA: Primary | ICD-10-CM

## 2018-01-01 DIAGNOSIS — Z12.11 SCREENING FOR COLON CANCER: ICD-10-CM

## 2018-01-01 DIAGNOSIS — M54.2 CERVICALGIA: ICD-10-CM

## 2018-01-01 LAB
ANION GAP SERPL CALCULATED.3IONS-SCNC: 6 MMOL/L (ref 3–14)
ANION GAP SERPL CALCULATED.3IONS-SCNC: 7 MMOL/L (ref 3–14)
BUN SERPL-MCNC: 18 MG/DL (ref 7–30)
BUN SERPL-MCNC: 21 MG/DL (ref 7–30)
CALCIUM SERPL-MCNC: 9.2 MG/DL (ref 8.5–10.1)
CALCIUM SERPL-MCNC: 9.6 MG/DL (ref 8.5–10.1)
CHLORIDE SERPL-SCNC: 103 MMOL/L (ref 94–109)
CHLORIDE SERPL-SCNC: 105 MMOL/L (ref 94–109)
CHOLEST SERPL-MCNC: 165 MG/DL
CO2 SERPL-SCNC: 30 MMOL/L (ref 20–32)
CO2 SERPL-SCNC: 31 MMOL/L (ref 20–32)
CREAT SERPL-MCNC: 0.86 MG/DL (ref 0.66–1.25)
CREAT SERPL-MCNC: 0.98 MG/DL (ref 0.66–1.25)
DEPRECATED CALCIDIOL+CALCIFEROL SERPL-MC: 53 UG/L (ref 20–75)
ERYTHROCYTE [DISTWIDTH] IN BLOOD BY AUTOMATED COUNT: 12.2 % (ref 10–15)
GFR SERPL CREATININE-BSD FRML MDRD: 76 ML/MIN/1.7M2
GFR SERPL CREATININE-BSD FRML MDRD: 88 ML/MIN/1.7M2
GLUCOSE SERPL-MCNC: 87 MG/DL (ref 70–99)
GLUCOSE SERPL-MCNC: 95 MG/DL (ref 70–99)
HCT VFR BLD AUTO: 49.9 % (ref 40–53)
HDLC SERPL-MCNC: 39 MG/DL
HGB BLD-MCNC: 16.8 G/DL (ref 13.3–17.7)
INTERPRETATION ECG - MUSE: NORMAL
LDLC SERPL CALC-MCNC: 103 MG/DL
MCH RBC QN AUTO: 29.9 PG (ref 26.5–33)
MCHC RBC AUTO-ENTMCNC: 33.7 G/DL (ref 31.5–36.5)
MCV RBC AUTO: 89 FL (ref 78–100)
NONHDLC SERPL-MCNC: 126 MG/DL
NT-PROBNP SERPL-MCNC: 71 PG/ML (ref 0–125)
PLATELET # BLD AUTO: 224 10E9/L (ref 150–450)
POTASSIUM SERPL-SCNC: 4 MMOL/L (ref 3.4–5.3)
POTASSIUM SERPL-SCNC: 4 MMOL/L (ref 3.4–5.3)
RBC # BLD AUTO: 5.61 10E12/L (ref 4.4–5.9)
SODIUM SERPL-SCNC: 140 MMOL/L (ref 133–144)
SODIUM SERPL-SCNC: 142 MMOL/L (ref 133–144)
TRIGL SERPL-MCNC: 116 MG/DL
WBC # BLD AUTO: 6.6 10E9/L (ref 4–11)

## 2018-01-01 PROCEDURE — 97140 MANUAL THERAPY 1/> REGIONS: CPT | Mod: GP | Performed by: PHYSICAL THERAPIST

## 2018-01-01 PROCEDURE — 51798 US URINE CAPACITY MEASURE: CPT | Performed by: UROLOGY

## 2018-01-01 PROCEDURE — 97530 THERAPEUTIC ACTIVITIES: CPT | Mod: GP | Performed by: PHYSICAL THERAPY ASSISTANT

## 2018-01-01 PROCEDURE — 99214 OFFICE O/P EST MOD 30 MIN: CPT | Performed by: INTERNAL MEDICINE

## 2018-01-01 PROCEDURE — G8979 MOBILITY GOAL STATUS: HCPCS | Mod: GP | Performed by: PHYSICAL THERAPIST

## 2018-01-01 PROCEDURE — G0463 HOSPITAL OUTPT CLINIC VISIT: HCPCS | Mod: 25,ZF | Performed by: TECHNICIAN/TECHNOLOGIST

## 2018-01-01 PROCEDURE — 99205 OFFICE O/P NEW HI 60 MIN: CPT | Performed by: HOSPITALIST

## 2018-01-01 PROCEDURE — 80048 BASIC METABOLIC PNL TOTAL CA: CPT | Performed by: FAMILY MEDICINE

## 2018-01-01 PROCEDURE — 97163 PT EVAL HIGH COMPLEX 45 MIN: CPT | Mod: GP | Performed by: PHYSICAL THERAPIST

## 2018-01-01 PROCEDURE — 99605 MTMS BY PHARM NP 15 MIN: CPT | Performed by: PHARMACIST

## 2018-01-01 PROCEDURE — 72131 CT LUMBAR SPINE W/O DYE: CPT | Performed by: RADIOLOGY

## 2018-01-01 PROCEDURE — 99213 OFFICE O/P EST LOW 20 MIN: CPT | Performed by: PHYSICIAN ASSISTANT

## 2018-01-01 PROCEDURE — 99214 OFFICE O/P EST MOD 30 MIN: CPT | Performed by: FAMILY MEDICINE

## 2018-01-01 PROCEDURE — 97110 THERAPEUTIC EXERCISES: CPT | Mod: GP | Performed by: PHYSICAL THERAPIST

## 2018-01-01 PROCEDURE — 96365 THER/PROPH/DIAG IV INF INIT: CPT

## 2018-01-01 PROCEDURE — 99606 MTMS BY PHARM EST 15 MIN: CPT | Performed by: PHARMACIST

## 2018-01-01 PROCEDURE — 99215 OFFICE O/P EST HI 40 MIN: CPT | Performed by: HOSPITALIST

## 2018-01-01 PROCEDURE — 97110 THERAPEUTIC EXERCISES: CPT | Mod: GP | Performed by: PHYSICAL THERAPY ASSISTANT

## 2018-01-01 PROCEDURE — 99309 SBSQ NF CARE MODERATE MDM 30: CPT | Performed by: NURSE PRACTITIONER

## 2018-01-01 PROCEDURE — G8978 MOBILITY CURRENT STATUS: HCPCS | Mod: GP | Performed by: PHYSICAL THERAPIST

## 2018-01-01 PROCEDURE — 96150 ZZHC HEALTH & BEHAVIOR ASSESS INITIAL, EA 15MIN: CPT | Performed by: SOCIAL WORKER

## 2018-01-01 PROCEDURE — 99204 OFFICE O/P NEW MOD 45 MIN: CPT | Mod: 25 | Performed by: UROLOGY

## 2018-01-01 PROCEDURE — 92015 DETERMINE REFRACTIVE STATE: CPT | Mod: ZF | Performed by: TECHNICIAN/TECHNOLOGIST

## 2018-01-01 PROCEDURE — 99607 MTMS BY PHARM ADDL 15 MIN: CPT | Performed by: PHARMACIST

## 2018-01-01 PROCEDURE — 99214 OFFICE O/P EST MOD 30 MIN: CPT | Performed by: NURSE PRACTITIONER

## 2018-01-01 PROCEDURE — 69209 REMOVE IMPACTED EAR WAX UNI: CPT | Performed by: FAMILY MEDICINE

## 2018-01-01 PROCEDURE — 99213 OFFICE O/P EST LOW 20 MIN: CPT | Performed by: FAMILY MEDICINE

## 2018-01-01 PROCEDURE — 99316 NF DSCHRG MGMT 30 MIN+: CPT | Performed by: NURSE PRACTITIONER

## 2018-01-01 PROCEDURE — 10060 I&D ABSCESS SIMPLE/SINGLE: CPT | Performed by: PHYSICIAN ASSISTANT

## 2018-01-01 PROCEDURE — 80048 BASIC METABOLIC PNL TOTAL CA: CPT | Performed by: NURSE PRACTITIONER

## 2018-01-01 PROCEDURE — 72125 CT NECK SPINE W/O DYE: CPT | Performed by: RADIOLOGY

## 2018-01-01 PROCEDURE — 90732 PPSV23 VACC 2 YRS+ SUBQ/IM: CPT | Performed by: FAMILY MEDICINE

## 2018-01-01 PROCEDURE — 97140 MANUAL THERAPY 1/> REGIONS: CPT | Mod: GP | Performed by: PHYSICAL THERAPY ASSISTANT

## 2018-01-01 PROCEDURE — 36415 COLL VENOUS BLD VENIPUNCTURE: CPT | Performed by: FAMILY MEDICINE

## 2018-01-01 PROCEDURE — 80061 LIPID PANEL: CPT | Performed by: FAMILY MEDICINE

## 2018-01-01 PROCEDURE — 83880 ASSAY OF NATRIURETIC PEPTIDE: CPT | Performed by: NURSE PRACTITIONER

## 2018-01-01 PROCEDURE — 82306 VITAMIN D 25 HYDROXY: CPT | Performed by: INTERNAL MEDICINE

## 2018-01-01 PROCEDURE — G0009 ADMIN PNEUMOCOCCAL VACCINE: HCPCS | Performed by: FAMILY MEDICINE

## 2018-01-01 PROCEDURE — 99212 OFFICE O/P EST SF 10 MIN: CPT | Performed by: PSYCHIATRY & NEUROLOGY

## 2018-01-01 PROCEDURE — 99310 SBSQ NF CARE HIGH MDM 45: CPT | Performed by: NURSE PRACTITIONER

## 2018-01-01 PROCEDURE — 99214 OFFICE O/P EST MOD 30 MIN: CPT | Mod: 25 | Performed by: PHYSICIAN ASSISTANT

## 2018-01-01 PROCEDURE — 36415 COLL VENOUS BLD VENIPUNCTURE: CPT | Performed by: INTERNAL MEDICINE

## 2018-01-01 PROCEDURE — 85027 COMPLETE CBC AUTOMATED: CPT | Performed by: NURSE PRACTITIONER

## 2018-01-01 PROCEDURE — G0439 PPPS, SUBSEQ VISIT: HCPCS | Performed by: FAMILY MEDICINE

## 2018-01-01 PROCEDURE — 99213 OFFICE O/P EST LOW 20 MIN: CPT | Performed by: NURSE PRACTITIONER

## 2018-01-01 PROCEDURE — 74019 RADEX ABDOMEN 2 VIEWS: CPT | Mod: FY

## 2018-01-01 PROCEDURE — 36415 COLL VENOUS BLD VENIPUNCTURE: CPT | Performed by: NURSE PRACTITIONER

## 2018-01-01 RX ORDER — NORTRIPTYLINE HYDROCHLORIDE 10 MG/5ML
30 SOLUTION ORAL AT BEDTIME
Qty: 473 ML | Refills: 11 | Status: SHIPPED | OUTPATIENT
Start: 2018-01-01 | End: 2018-01-01

## 2018-01-01 RX ORDER — TRAMADOL HYDROCHLORIDE 100 MG/1
100 TABLET, EXTENDED RELEASE ORAL
Qty: 30 TABLET | Refills: 5 | Status: SHIPPED | OUTPATIENT
Start: 2018-01-01 | End: 2018-01-01

## 2018-01-01 RX ORDER — TRAMADOL HYDROCHLORIDE 50 MG/1
TABLET ORAL
Qty: 150 TABLET | Refills: 0 | OUTPATIENT
Start: 2018-01-01

## 2018-01-01 RX ORDER — RIVASTIGMINE 4.6 MG/24H
PATCH, EXTENDED RELEASE TRANSDERMAL
Qty: 90 PATCH | Refills: 3 | Status: SHIPPED | OUTPATIENT
Start: 2018-01-01 | End: 2018-01-01

## 2018-01-01 RX ORDER — BISACODYL 10 MG
10 SUPPOSITORY, RECTAL RECTAL EVERY OTHER DAY
COMMUNITY
End: 2018-01-01

## 2018-01-01 RX ORDER — LOPERAMIDE HCL 2 MG
2 CAPSULE ORAL EVERY 4 HOURS PRN
COMMUNITY
End: 2018-01-01

## 2018-01-01 RX ORDER — LIDOCAINE 50 MG/G
OINTMENT TOPICAL PRN
Status: CANCELLED | OUTPATIENT
Start: 2018-01-01

## 2018-01-01 RX ORDER — AMOXICILLIN 875 MG
875 TABLET ORAL 2 TIMES DAILY
Qty: 20 TABLET | Refills: 0 | Status: SHIPPED | OUTPATIENT
Start: 2018-01-01 | End: 2018-01-01

## 2018-01-01 RX ORDER — AMANTADINE HYDROCHLORIDE 100 MG/1
100 CAPSULE, GELATIN COATED ORAL 3 TIMES DAILY
Qty: 270 CAPSULE | Refills: 3 | Status: SHIPPED | OUTPATIENT
Start: 2018-01-01 | End: 2018-01-01

## 2018-01-01 RX ORDER — TRAMADOL HYDROCHLORIDE 50 MG/1
25 TABLET ORAL
Qty: 90 TABLET | Refills: 0 | Status: SHIPPED | OUTPATIENT
Start: 2018-01-01 | End: 2018-01-01

## 2018-01-01 RX ORDER — METHOCARBAMOL 500 MG/1
500 TABLET, FILM COATED ORAL 4 TIMES DAILY
Qty: 120 TABLET | Refills: 5 | Status: SHIPPED | OUTPATIENT
Start: 2018-01-01 | End: 2018-01-01

## 2018-01-01 RX ORDER — ZOLMITRIPTAN 5 MG/1
5 TABLET, FILM COATED ORAL
Qty: 30 TABLET | Refills: 5 | Status: SHIPPED | OUTPATIENT
Start: 2018-01-01

## 2018-01-01 RX ORDER — NORTRIPTYLINE HCL 10 MG
CAPSULE ORAL
Qty: 270 CAPSULE | Refills: 3 | Status: SHIPPED | OUTPATIENT
Start: 2018-01-01 | End: 2018-01-01

## 2018-01-01 RX ORDER — BISACODYL 10 MG
10 SUPPOSITORY, RECTAL RECTAL EVERY OTHER DAY
Qty: 30 SUPPOSITORY | Status: CANCELLED | OUTPATIENT
Start: 2018-01-01

## 2018-01-01 RX ORDER — BISACODYL 10 MG
10 SUPPOSITORY, RECTAL RECTAL EVERY OTHER DAY
Qty: 30 SUPPOSITORY | Refills: 11 | Status: SHIPPED | OUTPATIENT
Start: 2018-01-01

## 2018-01-01 RX ORDER — AMOXICILLIN 500 MG/1
500 CAPSULE ORAL 3 TIMES DAILY
Qty: 30 CAPSULE | Refills: 0 | Status: SHIPPED | OUTPATIENT
Start: 2018-01-01 | End: 2018-01-01

## 2018-01-01 RX ORDER — AMOXICILLIN 250 MG
2 CAPSULE ORAL 2 TIMES DAILY
Qty: 360 TABLET | Refills: 3 | Status: SHIPPED | OUTPATIENT
Start: 2018-01-01 | End: 2018-01-01

## 2018-01-01 RX ORDER — SENNOSIDES 8.6 MG
2 TABLET ORAL 2 TIMES DAILY
Qty: 120 EACH | Refills: 11 | Status: SHIPPED | OUTPATIENT
Start: 2018-01-01 | End: 2018-01-01

## 2018-01-01 RX ORDER — ADAPALENE 45 G/G
GEL TOPICAL
Qty: 135 G | Refills: 3 | COMMUNITY
Start: 2018-01-01

## 2018-01-01 RX ORDER — TRAMADOL HYDROCHLORIDE 50 MG/1
25 TABLET ORAL
Qty: 150 TABLET | Refills: 0 | Status: SHIPPED | OUTPATIENT
Start: 2018-01-01 | End: 2018-01-01

## 2018-01-01 RX ORDER — FLUTICASONE PROPIONATE 50 MCG
1 SPRAY, SUSPENSION (ML) NASAL DAILY
Qty: 1 BOTTLE | Refills: 11 | COMMUNITY
Start: 2018-01-01 | End: 2018-01-01

## 2018-01-01 RX ORDER — AMOXICILLIN 250 MG
1 CAPSULE ORAL 2 TIMES DAILY
Qty: 90 TABLET | Refills: 6 | COMMUNITY
Start: 2018-01-01 | End: 2018-01-01

## 2018-01-01 RX ORDER — TRAMADOL HYDROCHLORIDE 50 MG/1
25 TABLET ORAL
Qty: 120 TABLET | Refills: 3 | Status: SHIPPED | OUTPATIENT
Start: 2018-01-01

## 2018-01-01 RX ORDER — OLMESARTAN MEDOXOMIL 20 MG/1
20 TABLET ORAL 2 TIMES DAILY
COMMUNITY
Start: 2018-01-01

## 2018-01-01 RX ORDER — MINERAL OIL AND WHITE PETROLATUM 30; 940 MG/G; MG/G
OINTMENT OPHTHALMIC
COMMUNITY
End: 2018-01-01

## 2018-01-01 RX ORDER — NORTRIPTYLINE HCL 10 MG
CAPSULE ORAL
Qty: 270 CAPSULE | Refills: 3 | Status: SHIPPED | OUTPATIENT
Start: 2018-01-01

## 2018-01-01 RX ORDER — SENNOSIDES 8.6 MG
2 TABLET ORAL 2 TIMES DAILY
Qty: 360 EACH | Refills: 3 | Status: SHIPPED | OUTPATIENT
Start: 2018-01-01 | End: 2018-01-01

## 2018-01-01 RX ORDER — LIDOCAINE 50 MG/G
OINTMENT TOPICAL 2 TIMES DAILY
Qty: 50 G | Refills: 11 | Status: SHIPPED | OUTPATIENT
Start: 2018-01-01 | End: 2018-01-01

## 2018-01-01 RX ORDER — POLYETHYLENE GLYCOL 3350 17 G/17G
1 POWDER, FOR SOLUTION ORAL DAILY
COMMUNITY
End: 2018-01-01

## 2018-01-01 RX ORDER — ZOLMITRIPTAN 5 MG/1
5 TABLET, FILM COATED ORAL
Qty: 30 TABLET | Refills: 5 | Status: SHIPPED | OUTPATIENT
Start: 2018-01-01 | End: 2018-01-01

## 2018-01-01 RX ORDER — AMLODIPINE BESYLATE 2.5 MG/1
2.5 TABLET ORAL DAILY
Qty: 90 TABLET | Refills: 3 | Status: SHIPPED | OUTPATIENT
Start: 2018-01-01 | End: 2018-01-01

## 2018-01-01 RX ORDER — POLYETHYLENE GLYCOL 3350 17 G/17G
1 POWDER, FOR SOLUTION ORAL DAILY PRN
Qty: 510 G | Refills: 1 | COMMUNITY
Start: 2018-01-01

## 2018-01-01 RX ORDER — TRAMADOL HYDROCHLORIDE 50 MG/1
25 TABLET ORAL
Qty: 120 TABLET | Refills: 0 | Status: SHIPPED | OUTPATIENT
Start: 2018-01-01 | End: 2018-01-01

## 2018-01-01 RX ORDER — OLMESARTAN MEDOXOMIL 20 MG/1
40 TABLET ORAL DAILY
Qty: 180 TABLET | Refills: 3 | Status: SHIPPED | OUTPATIENT
Start: 2018-01-01 | End: 2018-01-01

## 2018-01-01 RX ORDER — HYDROCHLOROTHIAZIDE 12.5 MG/1
12.5 TABLET ORAL DAILY
Qty: 90 TABLET | Refills: 3 | Status: SHIPPED | OUTPATIENT
Start: 2018-01-01

## 2018-01-01 RX ORDER — TRAMADOL HYDROCHLORIDE 50 MG/1
TABLET ORAL
Qty: 150 TABLET | Refills: 0 | COMMUNITY
Start: 2018-01-01

## 2018-01-01 RX ORDER — NORTRIPTYLINE HCL 10 MG
CAPSULE ORAL
Qty: 270 CAPSULE | Refills: 2 | Status: SHIPPED | OUTPATIENT
Start: 2018-01-01 | End: 2018-01-01

## 2018-01-01 RX ORDER — HYDROCHLOROTHIAZIDE 12.5 MG/1
12.5 TABLET ORAL DAILY
Qty: 90 TABLET | Refills: 3 | Status: SHIPPED | OUTPATIENT
Start: 2018-01-01 | End: 2018-01-01

## 2018-01-01 RX ORDER — ACETAMINOPHEN 500 MG
1000 TABLET ORAL 3 TIMES DAILY
Start: 2018-01-01

## 2018-01-01 RX ORDER — TRAMADOL HYDROCHLORIDE 50 MG/1
25 TABLET ORAL
Qty: 150 TABLET | Refills: 0 | OUTPATIENT
Start: 2018-01-01

## 2018-01-01 RX ORDER — RIVASTIGMINE 9.5 MG/24H
1 PATCH, EXTENDED RELEASE TRANSDERMAL DAILY
Qty: 90 PATCH | Refills: 3 | Status: SHIPPED | OUTPATIENT
Start: 2018-01-01

## 2018-01-01 RX ORDER — METHOCARBAMOL 500 MG/1
500 TABLET, FILM COATED ORAL 4 TIMES DAILY
Qty: 120 TABLET | Refills: 5 | OUTPATIENT
Start: 2018-01-01

## 2018-01-01 RX ORDER — AMOXICILLIN 400 MG/5ML
500 POWDER, FOR SUSPENSION ORAL 2 TIMES DAILY
COMMUNITY
Start: 2018-01-01 | End: 2018-01-01

## 2018-01-01 RX ORDER — SODIUM CHLORIDE FOR INHALATION 3 %
3 VIAL, NEBULIZER (ML) INHALATION
Qty: 180 ML | Refills: 3 | Status: SHIPPED | OUTPATIENT
Start: 2018-01-01

## 2018-01-01 RX ORDER — BISACODYL 10 MG
10 SUPPOSITORY, RECTAL RECTAL ONCE
COMMUNITY
End: 2018-01-01

## 2018-01-01 RX ORDER — SENNOSIDES 8.6 MG
1 TABLET ORAL 2 TIMES DAILY
Qty: 60 EACH | Refills: 11 | Status: SHIPPED | OUTPATIENT
Start: 2018-01-01 | End: 2018-01-01

## 2018-01-01 RX ORDER — AMOXICILLIN 400 MG/5ML
POWDER, FOR SUSPENSION ORAL
Refills: 0 | COMMUNITY
Start: 2018-01-01 | End: 2018-01-01

## 2018-01-01 RX ORDER — SODIUM CHLORIDE FOR INHALATION 3 %
3 VIAL, NEBULIZER (ML) INHALATION
Qty: 180 ML | Refills: 3 | Status: SHIPPED | OUTPATIENT
Start: 2018-01-01 | End: 2018-01-01

## 2018-01-01 RX ORDER — AMLODIPINE BESYLATE 2.5 MG/1
1 TABLET ORAL DAILY
COMMUNITY
Start: 2018-01-01

## 2018-01-01 RX ORDER — LORATADINE 10 MG/1
10 CAPSULE, LIQUID FILLED ORAL DAILY
Qty: 30 CAPSULE | COMMUNITY
Start: 2018-01-01

## 2018-01-01 RX ORDER — RIVASTIGMINE 9.5 MG/24H
1 PATCH, EXTENDED RELEASE TRANSDERMAL DAILY
Qty: 30 PATCH | Refills: 11 | Status: SHIPPED | OUTPATIENT
Start: 2018-01-01 | End: 2018-01-01

## 2018-01-01 RX ORDER — AMOXICILLIN 250 MG
1 CAPSULE ORAL DAILY PRN
Qty: 90 TABLET | Refills: 6 | Status: SHIPPED | OUTPATIENT
Start: 2018-01-01 | End: 2018-01-01

## 2018-01-01 RX ORDER — AMOXICILLIN 250 MG
2 CAPSULE ORAL 2 TIMES DAILY
Qty: 120 TABLET | Refills: 0 | Status: SHIPPED | OUTPATIENT
Start: 2018-01-01

## 2018-01-01 RX ORDER — LIDOCAINE 50 MG/G
1 PATCH TOPICAL EVERY 12 HOURS
COMMUNITY
End: 2018-01-01

## 2018-01-01 RX ORDER — ALBUTEROL SULFATE 0.83 MG/ML
2.5 SOLUTION RESPIRATORY (INHALATION) EVERY 6 HOURS PRN
Qty: 360 ML | Refills: 1 | Status: SHIPPED | OUTPATIENT
Start: 2018-01-01

## 2018-01-01 RX ORDER — TRAMADOL HYDROCHLORIDE 50 MG/1
25 TABLET ORAL
Qty: 10 TABLET | Refills: 0 | Status: CANCELLED | OUTPATIENT
Start: 2018-01-01

## 2018-01-01 RX ORDER — SENNOSIDES 8.6 MG
1 TABLET ORAL 2 TIMES DAILY
COMMUNITY
End: 2018-01-01

## 2018-01-01 RX ORDER — AMANTADINE HYDROCHLORIDE 100 MG/1
100 CAPSULE, GELATIN COATED ORAL 3 TIMES DAILY
Qty: 270 CAPSULE | Refills: 3 | Status: SHIPPED | OUTPATIENT
Start: 2018-01-01

## 2018-01-01 RX ORDER — TRAMADOL HYDROCHLORIDE 50 MG/1
25 TABLET ORAL
Qty: 10 TABLET | Refills: 0
Start: 2018-01-01 | End: 2018-01-01

## 2018-01-01 RX ORDER — AMANTADINE HYDROCHLORIDE 100 MG/1
CAPSULE, GELATIN COATED ORAL
Qty: 270 CAPSULE | Refills: 3 | Status: CANCELLED | OUTPATIENT
Start: 2018-01-01

## 2018-01-01 RX ORDER — LIDOCAINE 50 MG/G
PATCH TOPICAL
Qty: 30 PATCH | Refills: 5 | Status: SHIPPED | OUTPATIENT
Start: 2018-01-01 | End: 2018-01-01

## 2018-01-01 RX ORDER — AMOXICILLIN 250 MG
1 CAPSULE ORAL 2 TIMES DAILY
Qty: 90 TABLET | Refills: 6
Start: 2018-01-01 | End: 2018-01-01

## 2018-01-01 RX ORDER — AMOXICILLIN 250 MG
2 CAPSULE ORAL 2 TIMES DAILY PRN
COMMUNITY
Start: 2018-01-01 | End: 2018-01-01

## 2018-01-01 RX ORDER — TRAMADOL HYDROCHLORIDE 50 MG/1
25 TABLET ORAL EVERY 6 HOURS PRN
Qty: 56 TABLET | Refills: 0 | Status: SHIPPED | OUTPATIENT
Start: 2018-01-01 | End: 2018-01-01

## 2018-01-01 RX ORDER — BACLOFEN 10 MG/1
TABLET ORAL
Qty: 60 TABLET | Refills: 11 | Status: SHIPPED | OUTPATIENT
Start: 2018-01-01 | End: 2018-01-01 | Stop reason: ALTCHOICE

## 2018-01-01 RX ADMIN — Medication 2100 MG: at 12:25

## 2018-01-01 RX ADMIN — Medication 2100 MG: at 15:02

## 2018-01-01 RX ADMIN — Medication 2100 MG: at 12:17

## 2018-01-01 RX ADMIN — Medication 2100 MG: at 11:19

## 2018-01-01 RX ADMIN — Medication 2100 MG: at 12:34

## 2018-01-01 ASSESSMENT — REFRACTION_WEARINGRX
OD_AXIS: 125
OS_SPHERE: -7.25
OS_SPHERE: -8.50
OS_CYLINDER: +1.25
OS_AXIS: 166
OS_CYLINDER: +1.75
OD_ADD: +2.75
OD_AXIS: 125
OS_ADD: +2.75
SPECS_TYPE: PAL
OD_SPHERE: -5.75
OD_AXIS: 137
SPECS_TYPE: INTERMEDIATE
OD_SPHERE: -6.75
OS_CYLINDER: +1.50
OS_SPHERE: -6.50
OS_AXIS: 140
OS_AXIS: 145
OD_SPHERE: -4.25
OD_CYLINDER: +1.50
OD_CYLINDER: +1.50
SPECS_TYPE: READING
OD_CYLINDER: +1.00

## 2018-01-01 ASSESSMENT — REFRACTION_MANIFEST
OS_CYLINDER: +1.25
OD_AXIS: 130
OS_AXIS: 155
OS_AXIS: 150
OS_SPHERE: -5.00
OD_AXIS: 130
OS_CYLINDER: +1.25
OS_SPHERE: -8.00
OD_SPHERE: -4.00
OD_CYLINDER: +1.25
OD_SPHERE: -7.00
OD_CYLINDER: +1.25

## 2018-01-01 ASSESSMENT — PAIN SCALES - GENERAL
PAINLEVEL: NO PAIN (0)
PAINLEVEL: MILD PAIN (2)
PAINLEVEL: NO PAIN (0)
PAINLEVEL: MILD PAIN (2)
PAINLEVEL: MODERATE PAIN (5)
PAINLEVEL: NO PAIN (0)
PAINLEVEL: NO PAIN (1)
PAINLEVEL: MILD PAIN (2)
PAINLEVEL: NO PAIN (0)
PAINLEVEL: MODERATE PAIN (4)
PAINLEVEL: NO PAIN (0)
PAINLEVEL: MODERATE PAIN (4)
PAINLEVEL: MODERATE PAIN (4)
PAINLEVEL: MODERATE PAIN (5)
PAINLEVEL: NO PAIN (0)
PAINLEVEL: MODERATE PAIN (5)
PAINLEVEL: MODERATE PAIN (5)

## 2018-01-01 ASSESSMENT — ACTIVITIES OF DAILY LIVING (ADL)
I_NEED_ASSISTANCE_FOR_THE_FOLLOWING_DAILY_ACTIVITIES:: HOUSEWORK
I_NEED_ASSISTANCE_FOR_THE_FOLLOWING_DAILY_ACTIVITIES:: SHOPPING
CURRENT_FUNCTION: LAUNDRY REQUIRES ASSISTANCE
CURRENT_FUNCTION: SHOPPING REQUIRES ASSISTANCE
I_NEED_ASSISTANCE_FOR_THE_FOLLOWING_DAILY_ACTIVITIES:: MONEY MANAGEMENT
CURRENT_FUNCTION: BATHING REQUIRES ASSISTANCE
CURRENT_FUNCTION: HOUSEWORK REQUIRES ASSISTANCE
CURRENT_FUNCTION: MEDICATION ADMINISTRATION REQUIRES ASSISTANCE
I_NEED_ASSISTANCE_FOR_THE_FOLLOWING_DAILY_ACTIVITIES:: TELEPHONE USE
I_NEED_ASSISTANCE_FOR_THE_FOLLOWING_DAILY_ACTIVITIES:: MEDICATION ADMINISTRATION
CURRENT_FUNCTION: MONEY MANAGEMENT REQUIRES ASSISTANCE
CURRENT_FUNCTION: TRANSPORTATION REQUIRES ASSISTANCE
CURRENT_FUNCTION: PREPARING MEALS REQUIRES ASSISTANCE
CURRENT_FUNCTION: SHOPPING REQUIRES ASSISTANCE
I_NEED_ASSISTANCE_FOR_THE_FOLLOWING_DAILY_ACTIVITIES:: PREPARING MEALS
CURRENT_FUNCTION: TELEPHONE REQUIRES ASSISTANCE
CURRENT_FUNCTION: BATHING REQUIRES ASSISTANCE
CURRENT_FUNCTION: HOUSEWORK REQUIRES ASSISTANCE
I_NEED_ASSISTANCE_FOR_THE_FOLLOWING_DAILY_ACTIVITIES:: LAUNDRY
CURRENT_FUNCTION: TELEPHONE REQUIRES ASSISTANCE
CURRENT_FUNCTION: TRANSPORTATION REQUIRES ASSISTANCE
CURRENT_FUNCTION: MEDICATION ADMINISTRATION REQUIRES ASSISTANCE
CURRENT_FUNCTION: MONEY MANAGEMENT REQUIRES ASSISTANCE
I_NEED_ASSISTANCE_FOR_THE_FOLLOWING_DAILY_ACTIVITIES:: BATHING
I_NEED_ASSISTANCE_FOR_THE_FOLLOWING_DAILY_ACTIVITIES:: TRANSPORTATION
CURRENT_FUNCTION: LAUNDRY REQUIRES ASSISTANCE
CURRENT_FUNCTION: PREPARING MEALS REQUIRES ASSISTANCE

## 2018-01-01 ASSESSMENT — TONOMETRY
OS_IOP_MMHG: 17
IOP_METHOD: ICARE
OD_IOP_MMHG: 17

## 2018-01-01 ASSESSMENT — VISUAL ACUITY
CORRECTION_TYPE: GLASSES
OD_CC: 20/70
OS_CC: 20/70
METHOD: SNELLEN - SINGLE

## 2018-01-01 ASSESSMENT — CUP TO DISC RATIO
OD_RATIO: 0.2
OS_RATIO: 0.2

## 2018-01-01 ASSESSMENT — PATIENT HEALTH QUESTIONNAIRE - PHQ9
SUM OF ALL RESPONSES TO PHQ QUESTIONS 1-9: 18
10. IF YOU CHECKED OFF ANY PROBLEMS, HOW DIFFICULT HAVE THESE PROBLEMS MADE IT FOR YOU TO DO YOUR WORK, TAKE CARE OF THINGS AT HOME, OR GET ALONG WITH OTHER PEOPLE: EXTREMELY DIFFICULT
SUM OF ALL RESPONSES TO PHQ QUESTIONS 1-9: 18
SUM OF ALL RESPONSES TO PHQ QUESTIONS 1-9: 18

## 2018-01-01 ASSESSMENT — SLIT LAMP EXAM - LIDS
COMMENTS: NORMAL
COMMENTS: NORMAL

## 2018-01-03 NOTE — TELEPHONE ENCOUNTER
occupational therapy order and demographic sheet faxed to [facility] [phone] per message from call center. Right fax verified it went through.

## 2018-01-15 NOTE — TELEPHONE ENCOUNTER
[facility] Call Center    Phone Message    Name of Caller: Kriss    Phone Number: Cell number on file:    Telephone Information:   Mobile [phone]       Best time to return call: any    May a detailed message be left on voicemail: yes    Relation to patient: Other Name: Kriss  Relationship: wife  Is there legal documentation in chart to discuss information with this person: Yes. Legal Documentation is verified by ..      Reason for Call: Other: patient called [employer] insurance regarding getting a lift chair, patient's wife has some quesitons- please advise.     Action Taken: Message routed to:  Adult Clinics: Neurology p [id number]

## 2018-01-16 NOTE — TELEPHONE ENCOUNTER
I returned call to wife and we discussed the process of getting a lift chair. I answered questions to the best of my ability. Wife will going to wait until she does some further investigation on the lift chair stating they found one that they really like at [facility] that they get instead.  Wife will call back if she needs RX.  Machelle Stephen LPN

## 2018-01-17 NOTE — NURSING NOTE
Jimmy Patrick's goals for this visit include: atrial fib  He requests these members of his care team be copied on today's visit information: yes    PCP: Evelina Morse    Referring Provider:  No referring provider defined for this encounter.    Chief Complaint   Patient presents with     Atrial Fib       Initial /69 (BP Location: Left arm, Patient Position: Chair, Cuff Size: Adult Regular)  Pulse 58  Wt 87.8 kg (193 lb 9.6 oz)  SpO2 (!) 86%  BMI 26.26 kg/m2 Estimated body mass index is 26.26 kg/(m^2) as calculated from the following:    Height as of 12/21/17: 1.829 m (6').    Weight as of this encounter: 87.8 kg (193 lb 9.6 oz).  Medication Reconciliation: complete    Do you need any medication refills at today's visit? Yes 90 for mail order

## 2018-01-17 NOTE — LETTER
1/17/2018       RE: Jimmy Patrick  [address] [facility] [address]     Dear Colleague,    Thank you for referring your patient, Jimmy Patrick, to the [facility]. Please see a copy of my visit note below.          Clinical Cardiac Electrophysiology    Chief Complaint: atrial flutter    HPI: I was happy to see Mr. Patrick in consultation for the above at the request of Dr. Saravia.    Mr. Patrick recently moved to [place] from [place]. He reports a history of atrial flutter treated with ablation in 2013. He had palpitations associated with the atrial flutter. He reports no history of atrial fibrillation. He was on oral anticoagulation for approximately a year and then switched to an aspirin. He reports no palpitations since the ablation.     He takes diltiazem for hypertension and Benicar, primarily for his migraines.     [id number]: he reports no significant palpitations. He feels his PSP has gotten a little worse.    [id number]: I usually see Mr. Patrick in  but due to scheduling issues he was seen in [place] today. He reports doing well from a cardiac standpoint. He has had no palpitations. He denies any recent change in his exertional abilities, no chest pain/discomfort, no unusual dyspnea on exertion, no syncope, near syncope and no increase in ankle edema.    [id number]: He reports no palpitations. He has no heart concerns today except his BP. His resting BP measurements at home are below his goal of 140/90 but he is concerned that his BP goes up when he exercises. He does have some lightheadedness when he stands. He has not had syncope.     He denies any chest pain/discomfort, increased dyspnea on exertion or symptoms of heart failure.    [id number]: He had his BP machine checked with us today and it is measuring a bit lower than ours. The edema is about the same. His PSP is slowly progressing.    17Jan2018  Interval History: His BP has been better controlled. He has not had any atrial fibrillation that he is aware of. His initial RA sat was low. However, repeat on room air, after he warmed up, was fine.    Current Outpatient Prescriptions   Medication Sig Dispense Refill     amLODIPine (NORVASC) 2.5 MG tablet Take 1 tablet (2.5 mg) by mouth daily In evening. 90 tablet 3     hydrochlorothiazide 12.5 MG TABS tablet Take 1 tablet (12.5 mg) by mouth daily 90 tablet 3     olmesartan (BENICAR) 20 MG tablet Take 2 tablets (40 mg) by mouth daily 180 tablet 3     nortriptyline (PAMELOR) 10 MG capsule Take 3 capsules (30 mg) by mouth At Bedtime 270 capsule 0     adapalene (DIFFERIN) 0.1 % gel Apply to the face at night. 135 g 3     amantadine (SYMMETREL) 100 MG capsule Take 1 capsule (100 mg) by mouth 3 times daily 6am,11am and 4pm 270 capsule 3     ZOLMitriptan (ZOMIG) 5 MG tablet Take 1 tablet (5 mg) by mouth at onset of headache for migraine 30 tablet 5     rivastigmine (EXELON) 4.6 MG/24HR 24 hr patch Place 1 patch onto the skin daily 90 patch 3     acetaminophen-caffeine (EXCEDRIN TENSION HEADACHE) 500-65 MG TABS Take 1 tablet by mouth every 6 hours as needed for mild pain       Multiple Vitamins-Minerals (CENTRUM SILVER) per tablet Take 1 tablet by mouth daily 30 tablet      aspirin (BABY ASPIRIN) 81 MG chewable tablet Take 81 mg by mouth daily        cholecalciferol (VITAMIN D-3 SUPER STRENGTH) 2000 UNITS tablet Take 1 tablet by mouth          Past Medical History:   Diagnosis Date     Blurred vision 8/11/2014     DISK (aka Displacement of intervertebral disc, site unspecified, without myelopathy) 8/11/2014     Displacement of cervical intervertebral disc without myelopathy (HERNIATED DISK) 8/11/2014     Double vision 8/11/2014     Epistaxis 8/11/2014     Fluttering heart 8/11/2014     Headache 8/11/2014     Leg swelling 8/11/2014     Memory loss 8/11/2014     PSP (progressive supranuclear palsy) (H) 8/11/2014     Sinus  disorder 2014     Tinnitus 2014     Urinary urgency 2014     Varicose veins 2014       Past Surgical History:   Procedure Laterality Date     H ABLATION ATRIAL FLUTTER         Family History   Problem Relation Age of Onset     CANCER Father        Social History   Substance Use Topics     Smoking status: Never Smoker     Smokeless tobacco: Never Used     Alcohol use No       Allergies   Allergen Reactions     Other [Seasonal Allergies]      environmental     Codeine Other (See Comments) and Rash     GI upset         ROS:  he has not had any recent falls, he is much less active than in the past. [id number]    Physical Examination:  Vitals: /69 (BP Location: Left arm, Patient Position: Chair, Cuff Size: Adult Regular)  Pulse 58  Wt 87.8 kg (193 lb 9.6 oz)  SpO2 98%  BMI 26.26 kg/m2  BMI= Body mass index is 26.26 kg/(m^2).    GENERAL APPEARANCE: thin, alert and no distress  HEENT: no icterus  NECK: JVP is not visible  CHEST: lungs clear to auscultation  CARDIOVASCULAR: regular rhythm, normal S1S2, no murmur or gallop, precordium quiet  EXTREMITIES: mild ankle edema      Laboratory:    Results for KARUNA NELSON (MRN [id number]) as of 2018 12:51   Ref. Range 11/3/2017 10:09   Sodium Latest Ref Range: 133 - 144 mmol/L 140   Potassium Latest Ref Range: 3.4 - 5.3 mmol/L 3.9   Chloride Latest Ref Range: 94 - 109 mmol/L 106   Carbon Dioxide Latest Ref Range: 20 - 32 mmol/L 28   Urea Nitrogen Latest Ref Range: 7 - 30 mg/dL 22   Creatinine Latest Ref Range: 0.66 - 1.25 mg/dL 1.10   GFR Estimate Latest Ref Range: >60 mL/min/1.7m2 67       Previous studies reviewed today:    [facility]  Echocardiography Laboratory  [address] [place] [address]        Name: KARUNA NELSON  MRN: [id number]  : 1950  Study Date: 2016 03:08 PM  Age: 65 yrs  Gender: Male  Patient [facility]: [facility]  Reason For Study: , Essential (primary) hypertension,  Edema, unspecified  Ordering Physician: CLINT BYRD  Referring Physician: CLINT BYRD  Performed By: Lakisha Bunn     BSA: 2.1 m2  Height: 72 in  Weight: 188 lb  HR: 79  BP: 132/82 mmHg  ______________________________________________________________________________        Procedure  Echocardiogram with two-dimensional, color and spectral Doppler performed.  ______________________________________________________________________________     Interpretation Summary     Global and regional left ventricular function is normal with an EF of 55-60%.  Global right ventricular function is normal.    Assessment and recommendations:    1) S/P ablation for atrial flutter - no clinical recurrence    2) Hypertension - at goal    3) Ankle edema -    - elevation when possible    I appreciate the chance to help with Mr. Patrick's care.     Again, thank you for allowing me to participate in the care of your patient.      Sincerely,    Clint Byrd MD

## 2018-01-17 NOTE — PROGRESS NOTES
Clinical Cardiac Electrophysiology    Chief Complaint: atrial flutter    HPI: I was happy to see Mr. Patrick in consultation for the above at the request of Dr. Saravia.    Mr. Patrick recently moved to [place] from [place]. He reports a history of atrial flutter treated with ablation in 2013. He had palpitations associated with the atrial flutter. He reports no history of atrial fibrillation. He was on oral anticoagulation for approximately a year and then switched to an aspirin. He reports no palpitations since the ablation.     He takes diltiazem for hypertension and Benicar, primarily for his migraines.     [id number]: he reports no significant palpitations. He feels his PSP has gotten a little worse.    [id number]: I usually see Mr. Patrick in  but due to scheduling issues he was seen in [place] today. He reports doing well from a cardiac standpoint. He has had no palpitations. He denies any recent change in his exertional abilities, no chest pain/discomfort, no unusual dyspnea on exertion, no syncope, near syncope and no increase in ankle edema.    [id number]: He reports no palpitations. He has no heart concerns today except his BP. His resting BP measurements at home are below his goal of 140/90 but he is concerned that his BP goes up when he exercises. He does have some lightheadedness when he stands. He has not had syncope.     He denies any chest pain/discomfort, increased dyspnea on exertion or symptoms of heart failure.    [id number]: He had his BP machine checked with us today and it is measuring a bit lower than ours. The edema is about the same. His PSP is slowly progressing.    17Jan2018 Interval History: His BP has been better controlled. He has not had any atrial fibrillation that he is aware of. His initial RA sat was low. However, repeat on room air, after he warmed up, was fine.    Current Outpatient Prescriptions   Medication Sig Dispense Refill     amLODIPine (NORVASC) 2.5 MG  tablet Take 1 tablet (2.5 mg) by mouth daily In evening. 90 tablet 3     hydrochlorothiazide 12.5 MG TABS tablet Take 1 tablet (12.5 mg) by mouth daily 90 tablet 3     olmesartan (BENICAR) 20 MG tablet Take 2 tablets (40 mg) by mouth daily 180 tablet 3     nortriptyline (PAMELOR) 10 MG capsule Take 3 capsules (30 mg) by mouth At Bedtime 270 capsule 0     adapalene (DIFFERIN) 0.1 % gel Apply to the face at night. 135 g 3     amantadine (SYMMETREL) 100 MG capsule Take 1 capsule (100 mg) by mouth 3 times daily 6am,11am and 4pm 270 capsule 3     ZOLMitriptan (ZOMIG) 5 MG tablet Take 1 tablet (5 mg) by mouth at onset of headache for migraine 30 tablet 5     rivastigmine (EXELON) 4.6 MG/24HR 24 hr patch Place 1 patch onto the skin daily 90 patch 3     acetaminophen-caffeine (EXCEDRIN TENSION HEADACHE) 500-65 MG TABS Take 1 tablet by mouth every 6 hours as needed for mild pain       Multiple Vitamins-Minerals (CENTRUM SILVER) per tablet Take 1 tablet by mouth daily 30 tablet      aspirin (BABY ASPIRIN) 81 MG chewable tablet Take 81 mg by mouth daily        cholecalciferol (VITAMIN D-3 SUPER STRENGTH) 2000 UNITS tablet Take 1 tablet by mouth          Past Medical History:   Diagnosis Date     Blurred vision 8/11/2014     DISK (aka Displacement of intervertebral disc, site unspecified, without myelopathy) 8/11/2014     Displacement of cervical intervertebral disc without myelopathy (HERNIATED DISK) 8/11/2014     Double vision 8/11/2014     Epistaxis 8/11/2014     Fluttering heart 8/11/2014     Headache 8/11/2014     Leg swelling 8/11/2014     Memory loss 8/11/2014     PSP (progressive supranuclear palsy) (H) 8/11/2014     Sinus disorder 8/11/2014     Tinnitus 8/11/2014     Urinary urgency 8/11/2014     Varicose veins 8/11/2014       Past Surgical History:   Procedure Laterality Date     H ABLATION ATRIAL FLUTTER         Family History   Problem Relation Age of Onset     CANCER Father        Social History   Substance Use  Topics     Smoking status: Never Smoker     Smokeless tobacco: Never Used     Alcohol use No       Allergies   Allergen Reactions     Other [Seasonal Allergies]      environmental     Codeine Other (See Comments) and Rash     GI upset         ROS:  he has not had any recent falls, he is much less active than in the past.     Physical Examination:  Vitals: /69 (BP Location: Left arm, Patient Position: Chair, Cuff Size: Adult Regular)  Pulse 58  Wt 87.8 kg (193 lb 9.6 oz)  SpO2 98%  BMI 26.26 kg/m2  BMI= Body mass index is 26.26 kg/(m^2).    GENERAL APPEARANCE: thin, alert and no distress  HEENT: no icterus  NECK: JVP is not visible  CHEST: lungs clear to auscultation  CARDIOVASCULAR: regular rhythm, normal S1S2, no murmur or gallop, precordium quiet  EXTREMITIES: mild ankle edema      Laboratory:    Results for KARUNA NELSON (MRN [id number]) as of 2018 12:51   Ref. Range 11/3/2017 10:09   Sodium Latest Ref Range: 133 - 144 mmol/L 140   Potassium Latest Ref Range: 3.4 - 5.3 mmol/L 3.9   Chloride Latest Ref Range: 94 - 109 mmol/L 106   Carbon Dioxide Latest Ref Range: 20 - 32 mmol/L 28   Urea Nitrogen Latest Ref Range: 7 - 30 mg/dL 22   Creatinine Latest Ref Range: 0.66 - 1.25 mg/dL 1.10   GFR Estimate Latest Ref Range: >60 mL/min/1.7m2 67       Previous studies reviewed today:    [facility]  Echocardiography Laboratory  [address] [place] [address]        Name: KARUNA NELSON  MRN: [id number]  : 1950  Study Date: 2016 03:08 PM  Age: 65 yrs  Gender: Male  Patient Location: [facility]  Reason For Study: , Essential (primary) hypertension, Edema, unspecified  Ordering Physician: SARTHAK BYRD  Referring Physician: SARTHAK BYRD  Performed By: Lakisah Bunn     BSA: 2.1 m2  Height: 72 in  Weight: 188 lb  HR: 79  BP: 132/82  mmHg  ______________________________________________________________________________        Procedure  Echocardiogram with two-dimensional, color and spectral Doppler performed.  ______________________________________________________________________________     Interpretation Summary     Global and regional left ventricular function is normal with an EF of 55-60%.  Global right ventricular function is normal.    Assessment and recommendations:    1) S/P ablation for atrial flutter - no clinical recurrence    2) Hypertension - at goal    3) Ankle edema -    - elevation when possible    I appreciate the chance to help with Mr. Patrick's care.

## 2018-01-17 NOTE — LETTER
1/17/2018       RE: Jimmy Patrick  [address] [facility] [address]     Dear Colleague,    Thank you for referring your patient, Jimmy Patrick, to the [facility] at [facility]. Please see a copy of my visit note below.          Clinical Cardiac Electrophysiology    Chief Complaint: atrial flutter    HPI: I was happy to see Mr. Patrick in consultation for the above at the request of Dr. Saravia.    Mr. Patrick recently moved to [place] from [place]. He reports a history of atrial flutter treated with ablation in 2013. He had palpitations associated with the atrial flutter. He reports no history of atrial fibrillation. He was on oral anticoagulation for approximately a year and then switched to an aspirin. He reports no palpitations since the ablation.     He takes diltiazem for hypertension and Benicar, primarily for his migraines.     [id number]: he reports no significant palpitations. He feels his PSP has gotten a little worse.    [id number]: I usually see Mr. Patrick in  but due to scheduling issues he was seen in [place] today. He reports doing well from a cardiac standpoint. He has had no palpitations. He denies any recent change in his exertional abilities, no chest pain/discomfort, no unusual dyspnea on exertion, no syncope, near syncope and no increase in ankle edema.    [id number]: He reports no palpitations. He has no heart concerns today except his BP. His resting BP measurements at home are below his goal of 140/90 but he is concerned that his BP goes up when he exercises. He does have some lightheadedness when he stands. He has not had syncope.     He denies any chest pain/discomfort, increased dyspnea on exertion or symptoms of heart failure.    [id number]: He had his BP machine checked with us today and it is measuring a bit lower than ours. The edema is about the same. His PSP is slowly progressing.    17Jan2018  Interval History:     Current Outpatient Prescriptions   Medication Sig Dispense Refill     amLODIPine (NORVASC) 2.5 MG tablet Take 1 tablet (2.5 mg) by mouth daily In evening. 90 tablet 3     hydrochlorothiazide 12.5 MG TABS tablet Take 1 tablet (12.5 mg) by mouth daily 90 tablet 3     olmesartan (BENICAR) 20 MG tablet Take 2 tablets (40 mg) by mouth daily 180 tablet 3     nortriptyline (PAMELOR) 10 MG capsule Take 3 capsules (30 mg) by mouth At Bedtime 270 capsule 0     adapalene (DIFFERIN) 0.1 % gel Apply to the face at night. 135 g 3     amantadine (SYMMETREL) 100 MG capsule Take 1 capsule (100 mg) by mouth 3 times daily 6am,11am and 4pm 270 capsule 3     ZOLMitriptan (ZOMIG) 5 MG tablet Take 1 tablet (5 mg) by mouth at onset of headache for migraine 30 tablet 5     rivastigmine (EXELON) 4.6 MG/24HR 24 hr patch Place 1 patch onto the skin daily 90 patch 3     acetaminophen-caffeine (EXCEDRIN TENSION HEADACHE) 500-65 MG TABS Take 1 tablet by mouth every 6 hours as needed for mild pain       Multiple Vitamins-Minerals (CENTRUM SILVER) per tablet Take 1 tablet by mouth daily 30 tablet      aspirin (BABY ASPIRIN) 81 MG chewable tablet Take 81 mg by mouth daily        cholecalciferol (VITAMIN D-3 SUPER STRENGTH) 2000 UNITS tablet Take 1 tablet by mouth          Past Medical History:   Diagnosis Date     Blurred vision 8/11/2014     DISK (aka Displacement of intervertebral disc, site unspecified, without myelopathy) 8/11/2014     Displacement of cervical intervertebral disc without myelopathy (HERNIATED DISK) 8/11/2014     Double vision 8/11/2014     Epistaxis 8/11/2014     Fluttering heart 8/11/2014     Headache 8/11/2014     Leg swelling 8/11/2014     Memory loss 8/11/2014     PSP (progressive supranuclear palsy) (H) 8/11/2014     Sinus disorder 8/11/2014     Tinnitus 8/11/2014     Urinary urgency 8/11/2014     Varicose veins 8/11/2014       Past Surgical History:   Procedure Laterality Date     H ABLATION ATRIAL  FLUTTER         Family History   Problem Relation Age of Onset     CANCER Father        Social History   Substance Use Topics     Smoking status: Never Smoker     Smokeless tobacco: Never Used     Alcohol use No       Allergies   Allergen Reactions     Other [Seasonal Allergies]      environmental     Codeine Other (See Comments) and Rash     GI upset         ROS:  he has not had any recent falls, urgency but no incontinence, no constipation, the remaining ten system review is negative     Physical Examination:  Vitals: /69 (BP Location: Left arm, Patient Position: Chair, Cuff Size: Adult Regular)  Pulse 58  Wt 87.8 kg (193 lb 9.6 oz)  SpO2 98%  BMI 26.26 kg/m2  BMI= Body mass index is 26.26 kg/(m^2).    GENERAL APPEARANCE: thin, alert and no distress  HEENT: no icterus  NECK: JVP is not visible  CHEST: lungs clear to auscultation  CARDIOVASCULAR: regular rhythm, normal S1S2, no murmur or gallop, precordium quiet  EXTREMITIES: mild ankle edema      Laboratory:    BMP ordered.    Previous studies reviewed today:    [facility]  Echocardiography Laboratory  [address] [place] [address]        Name: KARUNA NELSON  MRN: [id number]  : 1950  Study Date: 2016 03:08 PM  Age: 65 yrs  Gender: Male  Patient Location: [facility]  Reason For Study: , Essential (primary) hypertension, Edema, unspecified  Ordering Physician: SARTHAK BYRD  Referring Physician: SARTHAK BYRD  Performed By: Lakisha Bunn     BSA: 2.1 m2  Height: 72 in  Weight: 188 lb  HR: 79  BP: 132/82 mmHg  ______________________________________________________________________________        Procedure  Echocardiogram with two-dimensional, color and spectral Doppler performed.  ______________________________________________________________________________     Interpretation Summary     Global and regional left ventricular function is normal with an EF of 55-60%.  Global right  ventricular function is normal.    Assessment and recommendations:    1) S/P ablation for atrial flutter - no clinical recurrence    2) Hypertension - BP is above goal    - will add a low dose of HCTZ  - BMP 7-10 days     3) Ankle edema -    - elevation when possible      I appreciate the chance to help with Mr. Patrick's care. Please let me know if you have any questions or concerns.    Clint Gutiérrez MD          Clinical Cardiac Electrophysiology    Chief Complaint: atrial flutter    HPI: I was happy to see Mr. Patrick in consultation for the above at the request of Dr. Saravia.    Mr. Patrick recently moved to [place] from [place]. He reports a history of atrial flutter treated with ablation in 2013. He had palpitations associated with the atrial flutter. He reports no history of atrial fibrillation. He was on oral anticoagulation for approximately a year and then switched to an aspirin. He reports no palpitations since the ablation.     He takes diltiazem for hypertension and Benicar, primarily for his migraines.     [id number]: he reports no significant palpitations. He feels his PSP has gotten a little worse.    [id number]: I usually see Mr. Patrick in  but due to scheduling issues he was seen in [place] today. He reports doing well from a cardiac standpoint. He has had no palpitations. He denies any recent change in his exertional abilities, no chest pain/discomfort, no unusual dyspnea on exertion, no syncope, near syncope and no increase in ankle edema.    [id number]: He reports no palpitations. He has no heart concerns today except his BP. His resting BP measurements at home are below his goal of 140/90 but he is concerned that his BP goes up when he exercises. He does have some lightheadedness when he stands. He has not had syncope.     He denies any chest pain/discomfort, increased dyspnea on exertion or symptoms of heart failure.    [id number] Interval History: He had his BP machine  checked with us today and it is measuring a bit lower than ours. The edema is about the same. His PSP is slowly progressing.    Current Outpatient Prescriptions   Medication Sig Dispense Refill     hydrochlorothiazide 12.5 MG TABS tablet Take 1 tablet (12.5 mg) by mouth daily 30 tablet 3     amLODIPine (NORVASC) 2.5 MG tablet Take 1 tablet (2.5 mg) by mouth daily In evening. 90 tablet 3     olmesartan (BENICAR) 20 MG tablet Take 2 tablets (40 mg) by mouth daily 180 tablet 3     nortriptyline (PAMELOR) 10 MG capsule Take 3 capsules (30 mg) by mouth At Bedtime 270 capsule 0     adapalene (DIFFERIN) 0.1 % gel Apply to the face at night. 135 g 3     amantadine (SYMMETREL) 100 MG capsule Take 1 capsule (100 mg) by mouth 3 times daily 6am,11am and 4pm 270 capsule 3     ZOLMitriptan (ZOMIG) 5 MG tablet Take 1 tablet (5 mg) by mouth at onset of headache for migraine 30 tablet 5     rivastigmine (EXELON) 4.6 MG/24HR 24 hr patch Place 1 patch onto the skin daily 90 patch 3     acetaminophen-caffeine (EXCEDRIN TENSION HEADACHE) 500-65 MG TABS Take 1 tablet by mouth every 6 hours as needed for mild pain       Multiple Vitamins-Minerals (CENTRUM SILVER) per tablet Take 1 tablet by mouth daily 30 tablet      aspirin (BABY ASPIRIN) 81 MG chewable tablet Take 81 mg by mouth daily        cholecalciferol (VITAMIN D-3 SUPER STRENGTH) 2000 UNITS tablet Take 1 tablet by mouth          Past Medical History:   Diagnosis Date     Blurred vision 8/11/2014     DISK (aka Displacement of intervertebral disc, site unspecified, without myelopathy) 8/11/2014     Displacement of cervical intervertebral disc without myelopathy (HERNIATED DISK) 8/11/2014     Double vision 8/11/2014     Epistaxis 8/11/2014     Fluttering heart 8/11/2014     Headache 8/11/2014     Leg swelling 8/11/2014     Memory loss 8/11/2014     PSP (progressive supranuclear palsy) (H) 8/11/2014     Sinus disorder 8/11/2014     Tinnitus 8/11/2014     Urinary urgency 8/11/2014      Varicose veins 2014       Past Surgical History:   Procedure Laterality Date     H ABLATION ATRIAL FLUTTER         Family History   Problem Relation Age of Onset     CANCER Father        Social History   Substance Use Topics     Smoking status: Never Smoker     Smokeless tobacco: Never Used     Alcohol use No       Allergies   Allergen Reactions     Other [Seasonal Allergies]      environmental     Codeine Other (See Comments) and Rash     GI upset         ROS:  he has not had any recent falls, urgency but no incontinence, no constipation, the remaining ten system review is negative [id number]    Physical Examination:  Vitals: /69 (BP Location: Left arm, Patient Position: Chair, Cuff Size: Adult Regular)  Pulse 58  Wt 87.8 kg (193 lb 9.6 oz)  SpO2 (!) 86%  BMI 26.26 kg/m2  BMI= Body mass index is 26.26 kg/(m^2).    /69 (BP Location: Left arm, Patient Position: Chair, Cuff Size: Adult Regular)  Pulse 58  Wt 87.8 kg (193 lb 9.6 oz)  SpO2 98%  BMI 26.26 kg/m2      GENERAL APPEARANCE: thin, alert and no distress  HEENT: no icterus  NECK: JVP is not visible  CHEST: lungs clear to auscultation  CARDIOVASCULAR: regular rhythm, normal S1S2, no murmur or gallop, precordium quiet  EXTREMITIES: mild ankle edema      Laboratory:    BMP ordered.    Previous studies reviewed today:    [facility]  Echocardiography Laboratory  [address] [place] [address]        Name: KARUNA NELSON  MRN: [id number]  : 1950  Study Date: 2016 03:08 PM  Age: 65 yrs  Gender: Male  Patient Location: [facility]  Reason For Study: , Essential (primary) hypertension, Edema, unspecified  Ordering Physician: SARTHAK BYRD  Referring Physician: SARTHAK BYRD  Performed By: Lakisha Bunn     BSA: 2.1 m2  Height: 72 in  Weight: 188 lb  HR: 79  BP: 132/82 mmHg  ______________________________________________________________________________        Procedure  Echocardiogram  with two-dimensional, color and spectral Doppler performed.  ______________________________________________________________________________     Interpretation Summary     Global and regional left ventricular function is normal with an EF of 55-60%.  Global right ventricular function is normal.    Assessment and recommendations:    1) S/P ablation for atrial flutter - no clinical recurrence    2) Hypertension - BP is above goal    - will add a low dose of HCTZ  - BMP 7-10 days     3) Ankle edema -    - elevation when possible      I appreciate the chance to help with Mr. Patrick's care. Please let me know if you have any questions or concerns.    Clint Gutiérrez MD    Again, thank you for allowing me to participate in the care of your patient.      Sincerely,    Clint Gutiérrez MD

## 2018-01-17 NOTE — PROGRESS NOTES
Clinical Cardiac Electrophysiology    Chief Complaint: atrial flutter    HPI: I was happy to see Mr. Patrick in consultation for the above at the request of Dr. Saravia.    Mr. Patrick recently moved to [place] from [place]. He reports a history of atrial flutter treated with ablation in 2013. He had palpitations associated with the atrial flutter. He reports no history of atrial fibrillation. He was on oral anticoagulation for approximately a year and then switched to an aspirin. He reports no palpitations since the ablation.     He takes diltiazem for hypertension and Benicar, primarily for his migraines.     [id number]: he reports no significant palpitations. He feels his PSP has gotten a little worse.    [id number]: I usually see Mr. Patrick in  but due to scheduling issues he was seen in [place] today. He reports doing well from a cardiac standpoint. He has had no palpitations. He denies any recent change in his exertional abilities, no chest pain/discomfort, no unusual dyspnea on exertion, no syncope, near syncope and no increase in ankle edema.    [id number]: He reports no palpitations. He has no heart concerns today except his BP. His resting BP measurements at home are below his goal of 140/90 but he is concerned that his BP goes up when he exercises. He does have some lightheadedness when he stands. He has not had syncope.     He denies any chest pain/discomfort, increased dyspnea on exertion or symptoms of heart failure.    [id number] Interval History: He had his BP machine checked with us today and it is measuring a bit lower than ours. The edema is about the same. His PSP is slowly progressing.    Current Outpatient Prescriptions   Medication Sig Dispense Refill     hydrochlorothiazide 12.5 MG TABS tablet Take 1 tablet (12.5 mg) by mouth daily 30 tablet 3     amLODIPine (NORVASC) 2.5 MG tablet Take 1 tablet (2.5 mg) by mouth daily In evening. 90 tablet 3     olmesartan (BENICAR) 20 MG  tablet Take 2 tablets (40 mg) by mouth daily 180 tablet 3     nortriptyline (PAMELOR) 10 MG capsule Take 3 capsules (30 mg) by mouth At Bedtime 270 capsule 0     adapalene (DIFFERIN) 0.1 % gel Apply to the face at night. 135 g 3     amantadine (SYMMETREL) 100 MG capsule Take 1 capsule (100 mg) by mouth 3 times daily 6am,11am and 4pm 270 capsule 3     ZOLMitriptan (ZOMIG) 5 MG tablet Take 1 tablet (5 mg) by mouth at onset of headache for migraine 30 tablet 5     rivastigmine (EXELON) 4.6 MG/24HR 24 hr patch Place 1 patch onto the skin daily 90 patch 3     acetaminophen-caffeine (EXCEDRIN TENSION HEADACHE) 500-65 MG TABS Take 1 tablet by mouth every 6 hours as needed for mild pain       Multiple Vitamins-Minerals (CENTRUM SILVER) per tablet Take 1 tablet by mouth daily 30 tablet      aspirin (BABY ASPIRIN) 81 MG chewable tablet Take 81 mg by mouth daily        cholecalciferol (VITAMIN D-3 SUPER STRENGTH) 2000 UNITS tablet Take 1 tablet by mouth          Past Medical History:   Diagnosis Date     Blurred vision 8/11/2014     DISK (aka Displacement of intervertebral disc, site unspecified, without myelopathy) 8/11/2014     Displacement of cervical intervertebral disc without myelopathy (HERNIATED DISK) 8/11/2014     Double vision 8/11/2014     Epistaxis 8/11/2014     Fluttering heart 8/11/2014     Headache 8/11/2014     Leg swelling 8/11/2014     Memory loss 8/11/2014     PSP (progressive supranuclear palsy) (H) 8/11/2014     Sinus disorder 8/11/2014     Tinnitus 8/11/2014     Urinary urgency 8/11/2014     Varicose veins 8/11/2014       Past Surgical History:   Procedure Laterality Date     H ABLATION ATRIAL FLUTTER         Family History   Problem Relation Age of Onset     CANCER Father        Social History   Substance Use Topics     Smoking status: Never Smoker     Smokeless tobacco: Never Used     Alcohol use No       Allergies   Allergen Reactions     Other [Seasonal Allergies]      environmental     Codeine Other  (See Comments) and Rash     GI upset         ROS:  he has not had any recent falls, urgency but no incontinence, no constipation, the remaining ten system review is negative [id number]/woa    Physical Examination:  Vitals: /69 (BP Location: Left arm, Patient Position: Chair, Cuff Size: Adult Regular)  Pulse 58  Wt 87.8 kg (193 lb 9.6 oz)  SpO2 (!) 86%  BMI 26.26 kg/m2  BMI= Body mass index is 26.26 kg/(m^2).    /69 (BP Location: Left arm, Patient Position: Chair, Cuff Size: Adult Regular)  Pulse 58  Wt 87.8 kg (193 lb 9.6 oz)  SpO2 98%  BMI 26.26 kg/m2      GENERAL APPEARANCE: thin, alert and no distress  HEENT: no icterus  NECK: JVP is not visible  CHEST: lungs clear to auscultation  CARDIOVASCULAR: regular rhythm, normal S1S2, no murmur or gallop, precordium quiet  EXTREMITIES: mild ankle edema      Laboratory:    BMP ordered.    Previous studies reviewed today:    [facility]  Echocardiography Laboratory  [address] [place] [address]        Name: KARUNA NELSON  MRN: [id number]  : 1950  Study Date: 2016 03:08 PM  Age: 65 yrs  Gender: Male  Patient Location: [facility]  Reason For Study: , Essential (primary) hypertension, Edema, unspecified  Ordering Physician: SARTHAK BYRD  Referring Physician: SARTHAK BYRD  Performed By: Lakisha Bunn     BSA: 2.1 m2  Height: 72 in  Weight: 188 lb  HR: 79  BP: 132/82 mmHg  ______________________________________________________________________________        Procedure  Echocardiogram with two-dimensional, color and spectral Doppler performed.  ______________________________________________________________________________     Interpretation Summary     Global and regional left ventricular function is normal with an EF of 55-60%.  Global right ventricular function is normal.    Assessment and recommendations:    1) S/P ablation for atrial flutter - no clinical recurrence    2) Hypertension - BP is  above goal    - will add a low dose of HCTZ  - BMP 7-10 days     3) Ankle edema -    - elevation when possible      I appreciate the chance to help with Mr. Patrick's care. Please let me know if you have any questions or concerns.    Clint Gutiérrez MD

## 2018-01-17 NOTE — MR AVS SNAPSHOT
After Visit Summary   1/17/2018    Jimmy Patrick    MRN: [id number]           Patient Information     Date Of Birth          1950        Visit Information        Provider Department      1/17/2018 1:00 PM Clint Gutiérrez MD [facility]        Today's Diagnoses     Essential hypertension        PSP (progressive supranuclear palsy)        Migraine without aura and without status migrainosus, not intractable           Follow-ups after your visit        Additional Services     Follow-Up with Electrophysiologist - 6 Months                 Your next 10 appointments already scheduled     Jan 25, 2018 10:30 AM CST   (Arrive by 10:15 AM)   Return Movement Disorder with Paulo Saravia MD   [facility] ([facility])    [address]  3rd Floor  [facility] [address]   [phone]            Jan 25, 2018 11:00 AM CST   Infusion 60 with UC SPEC INFUSION, UC 49 ATC   [facility] Special[facility] Procedure ([facility])    [address]  [address]  [facility] [address]   [phone]            Feb 22, 2018 10:30 AM CST   (Arrive by 10:15 AM)   Return Movement Disorder with Paulo Saravia MD   [facility] ([facility])    [address]  3rd Floor  [facility] [address]   [phone]            Feb 22, 2018 11:00 AM CST   (Arrive by 10:00 AM)   Infusion 60 with UC SPEC INFUSION, UC 49 ATC   [facility] Special[facility] Procedure ([facility])    [address]  [address]  [facility] [address]   [phone]            Mar 22, 2018  8:00 AM CDT   (Arrive by 7:45 AM)   Cognitive Assessment with Emperatriz Candelaria, PhD [facility] Neuropsychology ([facility])    [address]  3rd Floor  [facility] [address] [phone]            Mar 22,  2018  9:30 AM CDT   (Arrive by 9:15 AM)   Return Movement Disorder with Paulo Saravia MD   [facility] ([facility])    [address]  3rd Floor  [facility] [place] [address] [phone]            Mar 22, 2018 10:40 AM CDT   (Arrive by 10:25 AM)   ecg with  CV EKG   [facility] [place] [facility] ([facility])    [address]  1st Floor  [facility] [address] [phone]            Mar 22, 2018 11:00 AM CDT   LAB with  LAB   [facility] ([facility])    [address]  1st [facility] [address] [phone]           Please do not eat 10-12 hours before your appointment if you are coming in fasting for labs on lipids, cholesterol, or glucose (sugar). This does not apply to pregnant women. Water, hot tea and black coffee (with nothing added) are okay. Do not drink other fluids, diet soda or chew gum.              Future tests that were ordered for you today     Open Future Orders        Priority Expected Expires Ordered    Follow-Up with Electrophysiologist - 6 Months Routine 7/16/2018 10/14/2018 1/17/2018            Who to contact     If you have questions or need follow up information about today's clinic visit or your schedule please contact [facility] directly at [phone].  Normal or non-critical lab and imaging results will be communicated to you by MyChart, letter or phone within 4 business days after the clinic has received the results. If you do not hear from us within 7 days, please contact the clinic through [employer] or phone. If you have a critical or abnormal lab result, we will notify you by phone as soon as possible.  Submit refill requests through [employer] or call your pharmacy and they will forward the refill request to us. Please allow 3 business days for your refill to be completed.          Additional Information About Your Visit        [employer] Android POS  Information     [employer] gives you secure access to your electronic health record. If you see a primary care provider, you can also send messages to your care team and make appointments. If you have questions, please call your primary care clinic.  If you do not have a primary care provider, please call [phone] and they will assist you.      [employer] is an electronic gateway that provides easy, online access to your medical records. With [employer], you can request a clinic appointment, read your test results, renew a prescription or communicate with your care team.     To access your existing account, please contact your [facility] or call [phone] for assistance.        Care EveryWhere ID     This is your Care EveryWhere ID. This could be used by other organizations to access your [place] medical records  [id number]        Your Vitals Were     Pulse Pulse Oximetry BMI (Body Mass Index)             58 98% 26.26 kg/m2          Blood Pressure from Last 3 Encounters:   01/17/18 116/69   12/21/17 129/73   12/21/17 145/89    Weight from Last 3 Encounters:   01/17/18 87.8 kg (193 lb 9.6 oz)   12/21/17 88.5 kg (195 lb 1.6 oz)   10/18/17 89.1 kg (196 lb 6.4 oz)                 Where to get your medicines      These medications were sent to [facility] [address] [address]     Phone:  [phone]     amLODIPine 2.5 MG tablet    hydrochlorothiazide 12.5 MG Tabs tablet    olmesartan 20 MG tablet          Primary Care Provider Office Phone # Fax #    Evelina Morse [phone]       [address]  [facility] [address]        Equal Access to Services     MATEO STEELE AH: Hadii robin Granado, waaxda luqadaha, qaybta kaalmada higinio, gely lynch. So [facility] [phone].    ATENCIÓN: Si habla español, tiene a langford disposición servicios gratuitos de  asistencia lingüística. Kiel al [phone].    We comply with applicable federal civil rights laws and [place] laws. We do not discriminate on the basis of race, color, national origin, age, disability, sex, sexual orientation, or gender identity.            Thank you!     Thank you for choosing [facility]  for your care. Our goal is always to provide you with excellent care. Hearing back from our patients is one way we can continue to improve our services. Please take a few minutes to complete the written survey that you may receive in the mail after your visit with us. Thank you!             Your Updated Medication List - Protect others around you: Learn how to safely use, store and throw away your medicines at www.disposemymeds.org.          This list is accurate as of: 1/17/18  1:49 PM.  Always use your most recent med list.                   Brand Name Dispense Instructions for use Diagnosis    acetaminophen-caffeine 500-65 MG Tabs    EXCEDRIN TENSION HEADACHE     Take 1 tablet by mouth every 6 hours as needed for mild pain        adapalene 0.1 % gel    DIFFERIN    135 g    Apply to the face at night.    Acne vulgaris       amantadine 100 MG capsule    SYMMETREL    270 capsule    Take 1 capsule (100 mg) by mouth 3 times daily 6am,11am and 4pm    PSP (progressive supranuclear palsy) (H)       amLODIPine 2.5 MG tablet    NORVASC    90 tablet    Take 1 tablet (2.5 mg) by mouth daily In evening.    Essential hypertension       BABY ASPIRIN 81 MG chewable tablet   Generic drug:  aspirin      Take 81 mg by mouth daily        CENTRUM SILVER per tablet     30 tablet    Take 1 tablet by mouth daily        EXELON 4.6 MG/24HR 24 hr patch   Generic drug:  rivastigmine     90 patch    Place 1 patch onto the skin daily    PSP (progressive supranuclear palsy) (H)       hydrochlorothiazide 12.5 MG Tabs tablet     90 tablet    Take 1 tablet (12.5 mg) by mouth daily    Essential hypertension        nortriptyline 10 MG capsule    PAMELOR    270 capsule    Take 3 capsules (30 mg) by mouth At Bedtime    Migraine without aura and without status migrainosus, not intractable       olmesartan 20 MG tablet    BENICAR    180 tablet    Take 2 tablets (40 mg) by mouth daily    PSP (progressive supranuclear palsy) (H), Migraine without aura and without status migrainosus, not intractable       Vitamin D-3 Super Strength 2000 UNITS tablet   Generic drug:  cholecalciferol      Take 1 tablet by mouth        ZOLMitriptan 5 MG tablet    ZOMIG    30 tablet    Take 1 tablet (5 mg) by mouth at onset of headache for migraine    PSP (progressive supranuclear palsy) (H), Migraine without aura and without status migrainosus, not intractable

## 2018-01-29 NOTE — PROGRESS NOTES
Nursing Note  Jimmy Patrick [facility] for:   Chief Complaint   Patient presents with     Infusion     Study drug     During today's [facility] appointment, orders from Dr. Saravia were completed.  Frequency: monthly    Progress note:  Patient identification verified by name and date of birth.  Assessment completed.  Vitals recorded in Doc Flowsheets.  Patient was provided with education regarding infusion and possible side effects.  Patient verbalized understanding.      needed: No  Premedications: were not ordered.  Infusion Rates: infusion given over approximately 1 hour.  Labs: were not ordered for this appointment.  Vascular access: peripheral IV placed today.  Treatment Conditions: non-applicable.  Patient tolerated infusion: well.   present during entire visit.    Discharge Plan:   Follow up plan of care with: ongoing infusions at [facility].  Discharge instructions were reviewed with patient.  Patient/representative verbalized understanding of discharge instructions and all questions answered.  Patient discharged from [facility] in stable condition.    Rukhsana Horton RN    Administrations This Visit     study [id number] (IDS# [id number]) IV infusion 2,100 mg 210 mL     Admin Date Action Dose Rate Route Administered By          01/29/2018 New Bag 2100 mg 210 mL/hr Intravenous Rukhsana Horton RN                         /74 (BP Location: Left arm)  Pulse 85  Temp 97.5  F (36.4  C) (Oral)  Resp 14  SpO2 97%

## 2018-01-29 NOTE — NURSING NOTE
Chief Complaint   Patient presents with     RECHECK     P RETURN - MOVEMENT DISORDER     Kiara Gonzalez MA

## 2018-01-29 NOTE — MR AVS SNAPSHOT
After Visit Summary   1/29/2018    Jimmy Patrick    MRN: [id number]           Patient Information     Date Of Birth          1950        Visit Information        Provider Department      1/29/2018 1:00 PM Elissa Varela MD [facility]        Today's Diagnoses     Constipation, unspecified constipation type    -  1       Follow-ups after your visit        Your next 10 appointments already scheduled     Feb 22, 2018 10:30 AM CST   (Arrive by 10:15 AM)   Return Movement Disorder with Paulo Saravia MD   [facility] ([facility])    [address] [place] [address]  3rd [facility] [address]   [phone]            Feb 22, 2018 11:00 AM CST   (Arrive by 10:00 AM)   Infusion 60 with UC SPEC INFUSION, UC 49 ATC   [facility] [place] [facility] ([facility])    [address] [place] [address]  [address] [place] [address]   [phone]            Mar 22, 2018  8:00 AM CDT   (Arrive by 7:45 AM)   Cognitive Assessment with Emperatriz Candelaria, PhD [facility] Neuropsychology ([facility])    [address] [place] [address] [phone]            Mar 22, 2018  9:30 AM CDT   (Arrive by 9:15 AM)   Return Movement Disorder with Paulo Saravia MD   [facility] ([facility])    [address] [place] [address]   [phone]            Mar 22, 2018 10:40 AM CDT   (Arrive by 10:25 AM)   ecg with UC CV EKG   [facility] ([facility])    [address] [place] [address] [phone]            Mar 22, 2018 11:00 AM CDT   LAB with UC LAB   [facility] ([facility])    [address] [place] [address] [phone]           Please do not eat 10-12 hours before your appointment  if you are coming in fasting for labs on lipids, cholesterol, or glucose (sugar). This does not apply to pregnant women. Water, hot tea and black coffee (with nothing added) are okay. Do not drink other fluids, diet soda or chew gum.            Mar 22, 2018 12:00 PM CDT   Infusion 60 with UC SPEC INFUSION, UC 50 ATC   [facility] ([facility])    [address]  [address]  [facility] [id number] [phone]            Apr 19, 2018 10:30 AM CDT   (Arrive by 10:15 AM)   Return Movement Disorder with Paulo Saravia MD   [facility] ([facility])    [address]  3rd Floor  [facility] [address] [phone]              Who to contact     Please call your clinic at [phone] to:    Ask questions about your health    Make or cancel appointments    Discuss your medicines    Learn about your test results    Speak to your doctor   If you have compliments or concerns about an experience at your clinic, or if you wish to file a complaint, please contact [facility] Patient Relations at [phone] or email us at [email or web address]         Additional Information About Your Visit        [employer] Information     [employer] gives you secure access to your electronic health record. If you see a primary care provider, you can also send messages to your care team and make appointments. If you have questions, please call your primary care clinic.  If you do not have a primary care provider, please call [phone] and they will assist you.      [employer] is an electronic gateway that provides easy, online access to your medical records. With [employer], you can request a clinic appointment, read your test results, renew a prescription or communicate with your care team.     To access your existing account, please contact your [facility]  or call [phone] for assistance.        Care EveryWhere ID     This is your Care EveryWhere ID. This could be used by other organizations to access your [place] medical records  [id number]        Your Vitals Were     Height BMI (Body Mass Index)                1.829 m (6') 26.37 kg/m2           Blood Pressure from Last 3 Encounters:   01/29/18 129/67   01/17/18 116/69   12/21/17 129/73    Weight from Last 3 Encounters:   01/29/18 88.2 kg (194 lb 6.4 oz)   01/17/18 87.8 kg (193 lb 9.6 oz)   12/21/17 88.5 kg (195 lb 1.6 oz)              Today, you had the following     No orders found for display         Today's Medication Changes          These changes are accurate as of 1/29/18 11:59 PM.  If you have any questions, ask your nurse or doctor.               Start taking these medicines.        Dose/Directions    senna-docusate 8.6-50 MG per tablet   Commonly known as:  SENOKOT-S;PERICOLACE   Used for:  Constipation, unspecified constipation type   Started by:  Elissa Varela MD        Dose:  1 tablet   Take 1 tablet by mouth daily as needed for constipation   Quantity:  90 tablet   Refills:  6            Where to get your medicines      These medications were sent to [facility] [address] [place] [address]     Phone:  [phone]     senna-docusate 8.6-50 MG per tablet                Primary Care Provider Office Phone # Fax #    Evelina Morse [phone]       [address]  [facility] [address]        Equal Access to Services     [facility] HospitalMENA AH: Hadii robin Granado, brittanyda lumattie, qaybta kaalmagely murdock. So [facility] [phone].    ATENCIÓN: Si habla español, tiene a langford disposición servicios gratuitos de asistencia lingüística. Llame al [phone].    We comply with applicable federal civil rights laws and [place] laws. We do not discriminate on  the basis of race, color, national origin, age, disability, sex, sexual orientation, or gender identity.            Thank you!     Thank you for choosing [facility]UR[facility]  for your care. Our goal is always to provide you with excellent care. Hearing back from our patients is one way we can continue to improve our services. Please take a few minutes to complete the written survey that you may receive in the mail after your visit with us. Thank you!             Your Updated Medication List - Protect others around you: Learn how to safely use, store and throw away your medicines at www.disposemymeds.org.          This list is accurate as of 1/29/18 11:59 PM.  Always use your most recent med list.                   Brand Name Dispense Instructions for use Diagnosis    acetaminophen-caffeine 500-65 MG Tabs    EXCEDRIN TENSION HEADACHE     Take 1 tablet by mouth every 6 hours as needed for mild pain        adapalene 0.1 % gel    DIFFERIN    135 g    Apply to the face at night.    Acne vulgaris       amantadine 100 MG capsule    SYMMETREL    270 capsule    Take 1 capsule (100 mg) by mouth 3 times daily 6am,11am and 4pm    PSP (progressive supranuclear palsy) (H)       amLODIPine 2.5 MG tablet    NORVASC    90 tablet    Take 1 tablet (2.5 mg) by mouth daily In evening.    Essential hypertension       BABY ASPIRIN 81 MG chewable tablet   Generic drug:  aspirin      Take 81 mg by mouth daily        CENTRUM SILVER per tablet     30 tablet    Take 1 tablet by mouth daily        EXELON 4.6 MG/24HR 24 hr patch   Generic drug:  rivastigmine     90 patch    Place 1 patch onto the skin daily    PSP (progressive supranuclear palsy) (H)       hydrochlorothiazide 12.5 MG Tabs tablet     90 tablet    Take 1 tablet (12.5 mg) by mouth daily    Essential hypertension       nortriptyline 10 MG capsule    PAMELOR    270 capsule    Take 3 capsules (30 mg) by mouth At Bedtime    Migraine without aura and without status migrainosus, not  intractable       olmesartan 20 MG tablet    BENICAR    180 tablet    Take 2 tablets (40 mg) by mouth daily    PSP (progressive supranuclear palsy) (H)       senna-docusate 8.6-50 MG per tablet    SENOKOT-S;PERICOLACE    90 tablet    Take 1 tablet by mouth daily as needed for constipation    Constipation, unspecified constipation type       Vitamin D-3 Super Strength 2000 UNITS tablet   Generic drug:  cholecalciferol      Take 1 tablet by mouth        ZOLMitriptan 5 MG tablet    ZOMIG    30 tablet    Take 1 tablet (5 mg) by mouth at onset of headache for migraine    PSP (progressive supranuclear palsy) (H), Migraine without aura and without status migrainosus, not intractable

## 2018-01-29 NOTE — MR AVS SNAPSHOT
After Visit Summary   1/29/2018    Jimmy Patrick    MRN: [id number]           Patient Information     Date Of Birth          1950        Visit Information        Provider Department      1/29/2018 3:00 PM UC 48 ATC; UC SPEC INFUSION [facility] Specialty and Procedure        Today's Diagnoses     PSP (progressive supranuclear palsy) (H)    -  1       Follow-ups after your visit        Your next 10 appointments already scheduled     Feb 22, 2018 10:30 AM CST   (Arrive by 10:15 AM)   Return Movement Disorder with Paulo Saravia MD   [facility] Neurology ([facility])    [address]  [address] [facility] [address]   [phone]            Feb 22, 2018 11:00 AM CST   (Arrive by 10:00 AM)   Infusion 60 with UC SPEC INFUSION, UC 49 ATC   [facility] Specialty [facility] Procedure ([facility])    [address]  [address]  [facility] [address]   [phone]            Mar 22, 2018  8:00 AM CDT   (Arrive by 7:45 AM)   Cognitive Assessment with Emperatriz Candelaria, PhD [facility] Neuropsychology ([facility])    [address]  [address] [facility] [address] [phone]            Mar 22, 2018  9:30 AM CDT   (Arrive by 9:15 AM)   Return Movement Disorder with Paulo Saravia MD   [facility] Neurology ([facility])    [address] [place] [address]  [address] [facility] [address]   [phone]            Mar 22, 2018 10:40 AM CDT   (Arrive by 10:25 AM)   ecg with  CV EKG   [facility] [place] [facility] ([facility])    [address] [place] [address]  [address] [facility] [address]   [phone]            Mar 22, 2018 11:00 AM CDT   LAB with  LAB   [facility] Lab ([facility])    [address] [place] [id number] [phone]           Please do not  eat 10-12 hours before your appointment if you are coming in fasting for labs on lipids, cholesterol, or glucose (sugar). This does not apply to pregnant women. Water, hot tea and black coffee (with nothing added) are okay. Do not drink other fluids, diet soda or chew gum.            Mar 22, 2018 12:00 PM CDT   Infusion 60 with UC SPEC INFUSION, UC 50 ATC   [facility] Specialty [facility] Procedure ([email or web address] [place])    [address]  [address]  [facility] [address] [phone]            Apr 19, 2018 10:30 AM CDT   (Arrive by 10:15 AM)   Return Movement Disorder with Paulo Saravia MD   [facility]    [address]  3rd Floor  [facility] [address] [phone]              Who to contact     If you have questions or need follow up information about today's clinic visit or your schedule please contact [facility] SPECIALTY AND PROCEDURE directly at [phone].  Normal or non-critical lab and imaging results will be communicated to you by Locationaryhart, letter or phone within 4 business days after the clinic has received the results. If you do not hear from us within 7 days, please contact the clinic through Locationaryhart or phone. If you have a critical or abnormal lab result, we will notify you by phone as soon as possible.  Submit refill requests through [employer] or call your pharmacy and they will forward the refill request to us. Please allow 3 business days for your refill to be completed.          Additional Information About Your Visit        [employer] Information     [employer] gives you secure access to your electronic health record. If you see a primary care provider, you can also send messages to your care team and make appointments. If you have questions, please call your primary care clinic.  If you do not have a primary care provider, please call [phone] and they will assist  you.        Care EveryWhere ID     This is your Care EveryWhere ID. This could be used by other organizations to access your [facility] medical records  [id number]        Your Vitals Were     Pulse Temperature Respirations Pulse Oximetry          83 97.5  F (36.4  C) (Oral) 14 97%         Blood Pressure from Last 3 Encounters:   01/29/18 129/67   01/17/18 116/69   12/21/17 129/73    Weight from Last 3 Encounters:   01/29/18 88.2 kg (194 lb 6.4 oz)   01/17/18 87.8 kg (193 lb 9.6 oz)   12/21/17 88.5 kg (195 lb 1.6 oz)              Today, you had the following     No orders found for display         Today's Medication Changes          These changes are accurate as of 1/29/18  5:11 PM.  If you have any questions, ask your nurse or doctor.               Start taking these medicines.        Dose/Directions    senna-docusate 8.6-50 MG per tablet   Commonly known as:  SENOKOT-S;PERICOLACE   Used for:  Constipation, unspecified constipation type   Started by:  Elissa Varela MD        Dose:  1 tablet   Take 1 tablet by mouth daily as needed for constipation   Quantity:  90 tablet   Refills:  6            Where to get your medicines      These medications were sent to [facility] [address] [place] [address]     Phone:  [phone]     senna-docusate 8.6-50 MG per tablet                Primary Care Provider Office Phone # Fax #    Evelina Morse [phone] [address]  [facility] [address]        Equal Access to Services     LEDY STEELE : Berny Granado, emerita garcia, qaybta gely espinoza. So [facility] [phone].    ATENCIÓN: Si habla español, tiene a langford disposición servicios gratuitos de asistencia lingüística. Kiel al [phone].    We comply with applicable federal civil rights laws and [place] laws. We do not discriminate on the basis of  race, color, national origin, age, disability, sex, sexual orientation, or gender identity.            Thank you!     Thank you for choosing [facility] SPECIALTY AND PROCEDURE  for your care. Our goal is always to provide you with excellent care. Hearing back from our patients is one way we can continue to improve our services. Please take a few minutes to complete the written survey that you may receive in the mail after your visit with us. Thank you!             Your Updated Medication List - Protect others around you: Learn how to safely use, store and throw away your medicines at www.disposemymeds.org.          This list is accurate as of 1/29/18  5:11 PM.  Always use your most recent med list.                   Brand Name Dispense Instructions for use Diagnosis    acetaminophen-caffeine 500-65 MG Tabs    EXCEDRIN TENSION HEADACHE     Take 1 tablet by mouth every 6 hours as needed for mild pain        adapalene 0.1 % gel    DIFFERIN    135 g    Apply to the face at night.    Acne vulgaris       amantadine 100 MG capsule    SYMMETREL    270 capsule    Take 1 capsule (100 mg) by mouth 3 times daily 6am,11am and 4pm    PSP (progressive supranuclear palsy) (H)       amLODIPine 2.5 MG tablet    NORVASC    90 tablet    Take 1 tablet (2.5 mg) by mouth daily In evening.    Essential hypertension       BABY ASPIRIN 81 MG chewable tablet   Generic drug:  aspirin      Take 81 mg by mouth daily        CENTRUM SILVER per tablet     30 tablet    Take 1 tablet by mouth daily        EXELON 4.6 MG/24HR 24 hr patch   Generic drug:  rivastigmine     90 patch    Place 1 patch onto the skin daily    PSP (progressive supranuclear palsy) (H)       hydrochlorothiazide 12.5 MG Tabs tablet     90 tablet    Take 1 tablet (12.5 mg) by mouth daily    Essential hypertension       nortriptyline 10 MG capsule    PAMELOR    270 capsule    Take 3 capsules (30 mg) by mouth At Bedtime    Migraine without aura and without  status migrainosus, not intractable       olmesartan 20 MG tablet    BENICAR    180 tablet    Take 2 tablets (40 mg) by mouth daily    PSP (progressive supranuclear palsy) (H)       senna-docusate 8.6-50 MG per tablet    SENOKOT-S;PERICOLACE    90 tablet    Take 1 tablet by mouth daily as needed for constipation    Constipation, unspecified constipation type       Vitamin D-3 Super Strength 2000 UNITS tablet   Generic drug:  cholecalciferol      Take 1 tablet by mouth        ZOLMitriptan 5 MG tablet    ZOMIG    30 tablet    Take 1 tablet (5 mg) by mouth at onset of headache for migraine    PSP (progressive supranuclear palsy) (H), Migraine without aura and without status migrainosus, not intractable

## 2018-02-19 NOTE — PROGRESS NOTES
Diagnosis/Summary/Recommendations:    PATIENT: Jimmy Patrick  67 year old male     : 1950    POLA: 2018    Research visit      Medications            Acetaminophen-caffeine        Adapalene differin gel 1%        Amantadine symmetrel 100mg        Amlodipine norvasc 2.5        aspirin        Cholecalciferol vitami nd3        hydrocholecalciferol         Hydrochlorothiazide        MVI        Nortriptyline  Is it available in liquid       Olmestartan benicar        Rivastigmine exelon patch 4.6mg        Senna docusate Not taking       Zolmitriptan zomig As needed. Rare use                         History obtained from patient           Paulo Saravia MD     ______________________________________    Last visit date and details:           ______________________________________      Patient was asked about 14 Review of systems including changes in vision (dry eyes, double vision), hearing, heart, lungs, musculoskeletal, depression, anxiety, snoring, RBD, insomnia, urinary frequency, urinary urgency, constipation, swallowing problems, hematological, ID, allergies, skin problems: seborrhea, endocrinological: thyroid, diabetes, cholesterol; balance, weight changes, and other neurological problems and these were not significant at this time except for   Patient Active Problem List   Diagnosis     Transient paralysis of limb     Migraine without aura     Speech disturbance     PSP (progressive supranuclear palsy) (H)     Fluttering heart     Leg swelling     Varicose veins     Headache     Memory loss     Urinary urgency     Blurred vision     Double vision     Tinnitus     Sinus disorder     Epistaxis     DISK (aka Displacement of intervertebral disc, site unspecified, without myelopathy)     Impairment of balance     Signs and symptoms involving cognition     Fatigue     Migraine without status migrainosus, not intractable     Palpitations     Parkinsonism (H)     Progressive supranuclear  ophthalmoplegia (H)     Speech dysfunction     Cellular blue nevus     S/P ablation of atrial flutter     Migraine without aura and without status migrainosus, not intractable     Parkinsonism, unspecified Parkinsonism type (H)     Essential hypertension     CARDIOVASCULAR SCREENING; LDL GOAL LESS THAN 160          Allergies   Allergen Reactions     Other [Seasonal Allergies]      environmental     Codeine Other (See Comments) and Rash     GI upset     Past Surgical History:   Procedure Laterality Date     H ABLATION ATRIAL FLUTTER       Past Medical History:   Diagnosis Date     Blurred vision 8/11/2014     DISK (aka Displacement of intervertebral disc, site unspecified, without myelopathy) 8/11/2014     Displacement of cervical intervertebral disc without myelopathy (HERNIATED DISK) 8/11/2014     Double vision 8/11/2014     Epistaxis 8/11/2014     Fluttering heart 8/11/2014     Headache 8/11/2014     Leg swelling 8/11/2014     Memory loss 8/11/2014     PSP (progressive supranuclear palsy) (H) 8/11/2014     Sinus disorder 8/11/2014     Tinnitus 8/11/2014     Urinary urgency 8/11/2014     Varicose veins 8/11/2014     Social History     Social History     Marital status:      Spouse name: N/A     Number of children: N/A     Years of education: N/A     Occupational History     Not on file.     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    No alcohol    [employer]        Daughter in [facility]    Daughter in [facility]               Drug and lactation database from the [employer] of  Medicine:  http://toxnet.nlm.nih.gov/cgi-bin/sis/htmlgen?LACT      B/P: Data Unavailable, T: Data Unavailable, P: Data Unavailable, R: Data Unavailable 0 lbs 0 oz  There were no vitals taken for this visit., There is no height or weight on file to calculate BMI.  Medications and Vitals not listed above were documented in the cart and reviewed by me.     Current Outpatient Prescriptions   Medication Sig Dispense Refill     rivastigmine (EXELON) 4.6 MG/24HR 24 hr patch PLACE 1 PATCH ON THE SKIN DAILY 90 patch 3     senna-docusate (SENOKOT-S;PERICOLACE) 8.6-50 MG per tablet Take 1 tablet by mouth daily as needed for constipation 90 tablet 6     amLODIPine (NORVASC) 2.5 MG tablet Take 1 tablet (2.5 mg) by mouth daily In evening. 90 tablet 3     hydrochlorothiazide 12.5 MG TABS tablet Take 1 tablet (12.5 mg) by mouth daily 90 tablet 3     olmesartan (BENICAR) 20 MG tablet Take 2 tablets (40 mg) by mouth daily 180 tablet 3     nortriptyline (PAMELOR) 10 MG capsule Take 3 capsules (30 mg) by mouth At Bedtime 270 capsule 0     adapalene (DIFFERIN) 0.1 % gel Apply to the face at night. 135 g 3     amantadine (SYMMETREL) 100 MG capsule Take 1 capsule (100 mg) by mouth 3 times daily 6am,11am and 4pm 270 capsule 3     ZOLMitriptan (ZOMIG) 5 MG tablet Take 1 tablet (5 mg) by mouth at onset of headache for migraine 30 tablet 5     rivastigmine (EXELON) 4.6 MG/24HR 24 hr patch Place 1 patch onto the skin daily 90 patch 3     acetaminophen-caffeine (EXCEDRIN TENSION HEADACHE) 500-65 MG TABS Take 1 tablet by mouth every 6 hours as needed for mild pain       Multiple Vitamins-Minerals (CENTRUM SILVER) per tablet Take 1 tablet by mouth daily 30 tablet      aspirin (BABY ASPIRIN) 81 MG chewable tablet Take 81 mg by mouth daily        cholecalciferol (VITAMIN D-3 SUPER STRENGTH) 2000 UNITS tablet Take 1 tablet by mouth            Paulo Saravia MD

## 2018-02-22 NOTE — LETTER
2018      RE: Jimmy Patrick  [address]  [place] [address]     Diagnosis/Summary/Recommendations:    PATIENT: Jimmy Patrick  67 year old male     : 1950    POLA: 2018    Research visit      Medications                                              History obtained from patient     Paulo Saravia MD     ______________________________________    Last visit date and details:           ______________________________________      Patient was asked about 14 Review of systems including changes in vision (dry eyes, double vision), hearing, heart, lungs, musculoskeletal, depression, anxiety, snoring, RBD, insomnia, urinary frequency, urinary urgency, constipation, swallowing problems, hematological, ID, allergies, skin problems: seborrhea, endocrinological: thyroid, diabetes, cholesterol; balance, weight changes, and other neurological problems and these were not significant at this time except for   Patient Active Problem List   Diagnosis     Transient paralysis of limb     Migraine without aura     Speech disturbance     PSP (progressive supranuclear palsy) (H)     Fluttering heart     Leg swelling     Varicose veins     Headache     Memory loss     Urinary urgency     Blurred vision     Double vision     Tinnitus     Sinus disorder     Epistaxis     DISK (aka Displacement of intervertebral disc, site unspecified, without myelopathy)     Impairment of balance     Signs and symptoms involving cognition     Fatigue     Migraine without status migrainosus, not intractable     Palpitations     Parkinsonism (H)     Progressive supranuclear ophthalmoplegia (H)     Speech dysfunction     Cellular blue nevus     S/P ablation of atrial flutter     Migraine without aura and without status migrainosus, not intractable     Parkinsonism, unspecified Parkinsonism type (H)     Essential hypertension     CARDIOVASCULAR SCREENING; LDL GOAL LESS THAN 160          Allergies   Allergen Reactions      Other [Seasonal Allergies]      environmental     Codeine Other (See Comments) and Rash     GI upset     Past Surgical History:   Procedure Laterality Date     H ABLATION ATRIAL FLUTTER       Past Medical History:   Diagnosis Date     Blurred vision 8/11/2014     DISK (aka Displacement of intervertebral disc, site unspecified, without myelopathy) 8/11/2014     Displacement of cervical intervertebral disc without myelopathy (HERNIATED DISK) 8/11/2014     Double vision 8/11/2014     Epistaxis 8/11/2014     Fluttering heart 8/11/2014     Headache 8/11/2014     Leg swelling 8/11/2014     Memory loss 8/11/2014     PSP (progressive supranuclear palsy) (H) 8/11/2014     Sinus disorder 8/11/2014     Tinnitus 8/11/2014     Urinary urgency 8/11/2014     Varicose veins 8/11/2014     Social History     Social History     Marital status:      Spouse name: N/A     Number of children: N/A     Years of education: N/A     Occupational History     Not on file.     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  Medications and Vitals not listed above were documented in the cart and reviewed by me.     Current Outpatient Prescriptions   Medication Sig Dispense  Refill     rivastigmine (EXELON) 4.6 MG/24HR 24 hr patch PLACE 1 PATCH ON THE SKIN DAILY 90 patch 3     senna-docusate (SENOKOT-S;PERICOLACE) 8.6-50 MG per tablet Take 1 tablet by mouth daily as needed for constipation 90 tablet 6     amLODIPine (NORVASC) 2.5 MG tablet Take 1 tablet (2.5 mg) by mouth daily In evening. 90 tablet 3     hydrochlorothiazide 12.5 MG TABS tablet Take 1 tablet (12.5 mg) by mouth daily 90 tablet 3     olmesartan (BENICAR) 20 MG tablet Take 2 tablets (40 mg) by mouth daily 180 tablet 3     nortriptyline (PAMELOR) 10 MG capsule Take 3 capsules (30 mg) by mouth At Bedtime 270 capsule 0     adapalene (DIFFERIN) 0.1 % gel Apply to the face at night. 135 g 3     amantadine (SYMMETREL) 100 MG capsule Take 1 capsule (100 mg) by mouth 3 times daily 6am,11am and 4pm 270 capsule 3     ZOLMitriptan (ZOMIG) 5 MG tablet Take 1 tablet (5 mg) by mouth at onset of headache for migraine 30 tablet 5     rivastigmine (EXELON) 4.6 MG/24HR 24 hr patch Place 1 patch onto the skin daily 90 patch 3     acetaminophen-caffeine (EXCEDRIN TENSION HEADACHE) 500-65 MG TABS Take 1 tablet by mouth every 6 hours as needed for mild pain       Multiple Vitamins-Minerals (CENTRUM SILVER) per tablet Take 1 tablet by mouth daily 30 tablet      aspirin (BABY ASPIRIN) 81 MG chewable tablet Take 81 mg by mouth daily        cholecalciferol (VITAMIN D-3 SUPER STRENGTH) 2000 UNITS tablet Take 1 tablet by mouth        Paulo Saravia MD

## 2018-02-22 NOTE — MR AVS SNAPSHOT
After Visit Summary   2/22/2018    Jimmy Patrick    MRN: [id number]           Patient Information     Date Of Birth          1950        Visit Information        Provider Department      2/22/2018 11:00 AM UC 49 ATC; UC SPEC INFUSION [facility] Specialty [facility] Procedure        Today's Diagnoses     Parkinsonism, unspecified Parkinsonism type (H)    -  1    PSP (progressive supranuclear palsy) (H)           Follow-ups after your visit        Your next 10 appointments already scheduled     Mar 22, 2018  8:00 AM CDT   (Arrive by 7:45 AM)   Cognitive Assessment with Emperatriz Candelaria, PhD [facility] Neuropsychology ([facility])    [address] [id number] [phone]            Mar 22, 2018  9:30 AM CDT   (Arrive by 9:15 AM)   Return Movement Disorder with Paulo Saravia MD   [facility] Neurology ([facility])    [address] [place] [id number] [phone]            Mar 22, 2018 10:40 AM CDT   (Arrive by 10:25 AM)   ecg with  CV EKG   [facility] Imaging [place] Xray ([facility])    [address] [place] [id number] [phone]            Mar 22, 2018 11:00 AM CDT   LAB with  LAB   [facility] Lab ([facility])    [address] [place] [id number] [phone]           Please do not eat 10-12 hours before your appointment if you are coming in fasting for labs on lipids, cholesterol, or glucose (sugar). This does not apply to pregnant women. Water, hot tea and black coffee (with nothing added) are okay. Do not drink other fluids, diet soda or chew gum.            Mar 22, 2018 12:00 PM CDT   Infusion 60 with UC SPEC INFUSION, UC 50 ATC   [facility] Specialty [facility] Procedure ([facility])    [address] [place] [address]  [facility]  [address]  [facility] [address]   [phone]            Apr 19, 2018 10:30 AM CDT   (Arrive by 10:15 AM)   Return Movement Disorder with Paulo Saravia MD   [facility] Neurology ([facility])    [address] [place] [address]   [phone]            Apr 19, 2018 12:00 PM CDT   Infusion 60 with UC SPEC INFUSION, UC 50 ATC   [facility] Specialty and Procedure ([facility])    [address] [place] [address]  [address]  [facility] [address]   [phone]            May 17, 2018 10:30 AM CDT   (Arrive by 10:15 AM)   Return Movement Disorder with Paulo Saravia MD   [facility] Neurology ([facility])    [address] [place] [address]   [phone]              Who to contact     If you have questions or need follow up information about today's clinic visit or your schedule please contact [facility] SPECIALTY AND PROCEDURE directly at [phone].  Normal or non-critical lab and imaging results will be communicated to you by [employer], letter or phone within 4 business days after the clinic has received the results. If you do not hear from us within 7 days, please contact the clinic through [employer] or phone. If you have a critical or abnormal lab result, we will notify you by phone as soon as possible.  Submit refill requests through [facility] or call your pharmacy and they will forward the refill request to us. Please allow 3 business days for your refill to be completed.          Additional Information About Your Visit        [employer] Information     [facility] gives you secure access to your electronic health record. If you see a primary care provider, you can also send messages to your care team and make appointments. If you have questions, please call your primary care clinic.  If you do not have a primary care provider, please  call [phone] and they will assist you.        Care EveryWhere ID     This is your Care EveryWhere ID. This could be used by other organizations to access your [place] medical records  [id number]        Your Vitals Were     Pulse Temperature Respirations Pulse Oximetry          81 98.3  F (36.8  C) (Oral) 18 99%         Blood Pressure from Last 3 Encounters:   02/22/18 133/81   02/22/18 121/82   01/29/18 129/67    Weight from Last 3 Encounters:   02/22/18 88.1 kg (194 lb 3.2 oz)   01/29/18 88.2 kg (194 lb 6.4 oz)   01/17/18 87.8 kg (193 lb 9.6 oz)              Today, you had the following     No orders found for display         Today's Medication Changes          These changes are accurate as of 2/22/18 12:45 PM.  If you have any questions, ask your nurse or doctor.               These medicines have changed or have updated prescriptions.        Dose/Directions    * amantadine 100 MG capsule   Commonly known as:  SYMMETREL   This may have changed:  Another medication with the same name was added. Make sure you understand how and when to take each.   Used for:  PSP (progressive supranuclear palsy) (H)   Changed by:  Paulo Saravia MD        Dose:  100 mg   Take 1 capsule (100 mg) by mouth 3 times daily 6am,11am and 4pm   Quantity:  270 capsule   Refills:  3       * amantadine 50 MG/5ML syrup   Commonly known as:  SYMMETREL   This may have changed:  You were already taking a medication with the same name, and this prescription was added. Make sure you understand how and when to take each.   Used for:  PSP (progressive supranuclear palsy) (H)   Changed by:  Paulo Saravia MD        Dose:  100 mg   Take 10 mLs (100 mg) by mouth 3 times daily   Quantity:  473 mL   Refills:  11       * nortriptyline 10 MG capsule   Commonly known as:  PAMELOR   This may have changed:  Another medication with the same name was added. Make sure you understand how and when to take each.   Used for:  Migraine without aura and  without status migrainosus, not intractable   Changed by:  Paulo Saravia MD        Dose:  30 mg   Take 3 capsules (30 mg) by mouth At Bedtime   Quantity:  270 capsule   Refills:  0       * nortriptyline 10 MG/5ML solution   Commonly known as:  PAMELOR   This may have changed:  You were already taking a medication with the same name, and this prescription was added. Make sure you understand how and when to take each.   Used for:  PSP (progressive supranuclear palsy) (H)   Changed by:  Paulo Saravia MD        Dose:  30 mg   Take 15 mLs (30 mg) by mouth At Bedtime   Quantity:  473 mL   Refills:  11       rivastigmine 4.6 MG/24HR 24 hr patch   Commonly known as:  EXELON   This may have changed:  Another medication with the same name was removed. Continue taking this medication, and follow the directions you see here.   Used for:  PSP (progressive supranuclear palsy) (H)   Changed by:  Paulo Saravia MD        PLACE 1 PATCH ON THE SKIN DAILY   Quantity:  90 patch   Refills:  3       * Notice:  This list has 4 medication(s) that are the same as other medications prescribed for you. Read the directions carefully, and ask your doctor or other care provider to review them with you.         Where to get your medicines      Some of these will need a paper prescription and others can be bought over the counter.  Ask your nurse if you have questions.     Bring a paper prescription for each of these medications     amantadine 50 MG/5ML syrup    nortriptyline 10 MG/5ML solution                Primary Care Provider Office Phone # Fax #    Evelina Morse [phone]       [address]  [facility] [address]        Equal Access to Services     [facility]: Hadii aad meredith hadasho Sostacieali, waaxda luqadaha, qaybta kaalmada adeegyajocelynn, gely lynch. [facility] [phone].    ATENCIÓN: Si habla español, tiene a langford disposición servicios gratuitos de asistencia  lingüísticaRay Dyson al [phone].    We comply with applicable federal civil rights laws and [place] laws. We do not discriminate on the basis of race, color, national origin, age, disability, sex, sexual orientation, or gender identity.            Thank you!     Thank you for choosing [facility] SPECIALTY AND PROCEDURE  for your care. Our goal is always to provide you with excellent care. Hearing back from our patients is one way we can continue to improve our services. Please take a few minutes to complete the written survey that you may receive in the mail after your visit with us. Thank you!             Your Updated Medication List - Protect others around you: Learn how to safely use, store and throw away your medicines at www.disposemymeds.org.          This list is accurate as of 2/22/18 12:45 PM.  Always use your most recent med list.                   Brand Name Dispense Instructions for use Diagnosis    acetaminophen-caffeine 500-65 MG Tabs    EXCEDRIN TENSION HEADACHE     Take 1 tablet by mouth every 6 hours as needed for mild pain        adapalene 0.1 % gel    DIFFERIN    135 g    Apply to the face at night.    Acne vulgaris       * amantadine 100 MG capsule    SYMMETREL    270 capsule    Take 1 capsule (100 mg) by mouth 3 times daily 6am,11am and 4pm    PSP (progressive supranuclear palsy) (H)       * amantadine 50 MG/5ML syrup    SYMMETREL    473 mL    Take 10 mLs (100 mg) by mouth 3 times daily    PSP (progressive supranuclear palsy) (H)       amLODIPine 2.5 MG tablet    NORVASC    90 tablet    Take 1 tablet (2.5 mg) by mouth daily In evening.    Essential hypertension       BABY ASPIRIN 81 MG chewable tablet   Generic drug:  aspirin      Take 81 mg by mouth daily        CENTRUM SILVER per tablet     30 tablet    Take 1 tablet by mouth daily        hydrochlorothiazide 12.5 MG Tabs tablet     90 tablet    Take 1 tablet (12.5 mg) by mouth daily    Essential hypertension        * nortriptyline 10 MG capsule    PAMELOR    270 capsule    Take 3 capsules (30 mg) by mouth At Bedtime    Migraine without aura and without status migrainosus, not intractable       * nortriptyline 10 MG/5ML solution    PAMELOR    473 mL    Take 15 mLs (30 mg) by mouth At Bedtime    PSP (progressive supranuclear palsy) (H)       olmesartan 20 MG tablet    BENICAR    180 tablet    Take 2 tablets (40 mg) by mouth daily    PSP (progressive supranuclear palsy) (H)       rivastigmine 4.6 MG/24HR 24 hr patch    EXELON    90 patch    PLACE 1 PATCH ON THE SKIN DAILY    PSP (progressive supranuclear palsy) (H)       senna-docusate 8.6-50 MG per tablet    SENOKOT-S;PERICOLACE    90 tablet    Take 1 tablet by mouth daily as needed for constipation    Constipation, unspecified constipation type       Vitamin D-3 Super Strength 2000 UNITS tablet   Generic drug:  cholecalciferol      Take 1 tablet by mouth        ZOLMitriptan 5 MG tablet    ZOMIG    30 tablet    Take 1 tablet (5 mg) by mouth at onset of headache for migraine    PSP (progressive supranuclear palsy) (H), Migraine without aura and without status migrainosus, not intractable       * Notice:  This list has 4 medication(s) that are the same as other medications prescribed for you. Read the directions carefully, and ask your doctor or other care provider to review them with you.

## 2018-02-22 NOTE — Clinical Note
2/22/2018       RE: Jimmy Patrick  [address] [place]  [place] [address]     Dear Colleague,    Thank you for referring your patient, Jimmy Patrick, to the [facility] NEUROLOGY at [facility]. Please see a copy of my visit note below.    No notes on file    Again, thank you for allowing me to participate in the care of your patient.      Sincerely,    Paulo Saravia MD

## 2018-02-22 NOTE — MR AVS SNAPSHOT
After Visit Summary   2/22/2018    Jimmy Patrick    MRN: [id number]           Patient Information     Date Of Birth          1950        Visit Information        Provider Department      2/22/2018 10:30 AM Paulo Saravia MD [facility] Neurology        Today's Diagnoses     PSP (progressive supranuclear palsy) (H)    -  1       Follow-ups after your visit        Additional Services     MED THERAPY MANAGE REFERRAL       Your provider has referred you to: ronan kuhn  Reason for Referral: psp    The [place] [facility] Management department will contact you to schedule an appointment.  You may also schedule the appointment by calling [phone].  For [place] patients, please call [phone] to confirm/schedule your appointment on the next business day.    This service is designed to help you get the most from your medications.  A specially trained Pharmacist will work closely with you and your providers to solve any questions, concerns, issues or problems related to your medications.    Please bring all of your prescription and non-prescription medications (such as vitamins, over-the-counter medications, and herbals) or a detailed medication list to your appointment.    If you have a glucose meter or other home monitoring information, please also bring this to your appointment (i.e. blood glucose log, blood pressure log, pain log, etc.).                  Your next 10 appointments already scheduled     Mar 22, 2018  8:00 AM CDT   (Arrive by 7:45 AM)   Cognitive Assessment with Emperatriz Candelaria, PhD [facility] Neuropsychology ([facility])    [address] [place] [address]   [phone]            Mar 22, 2018  9:30 AM CDT   (Arrive by 9:15 AM)   Return Movement Disorder with Paulo Saravia MD   [facility] Neurology ([facility])    [address] [place] [address]  [place] [address]   [phone]            Mar 22, 2018 10:40 AM CDT   (Arrive by 10:25 AM)   ecg with UC CV EKG   [facility] Imaging [place] Xray ([facility])    [address]  [address] [facility] [place] [address]   [phone]            Mar 22, 2018 11:00 AM CDT   LAB with UC LAB   [facility] Lab ([facility])    [address] [place] [id number] [phone]           Please do not eat 10-12 hours before your appointment if you are coming in fasting for labs on lipids, cholesterol, or glucose (sugar). This does not apply to pregnant women. Water, hot tea and black coffee (with nothing added) are okay. Do not drink other fluids, diet soda or chew gum.            Mar 22, 2018 12:00 PM CDT   Infusion 60 with UC SPEC INFUSION, UC 50 ATC   [facility] Specialty and Procedure ([facility])    [address]  [address]  [facility] [address] [phone]            Apr 19, 2018 10:30 AM CDT   (Arrive by 10:15 AM)   Return Movement Disorder with Paulo Saravia MD   [facility] Neurology ([facility])    [address] [place] [address] [phone]            Apr 19, 2018 12:00 PM CDT   Infusion 60 with UC SPEC INFUSION, UC 50 ATC   [facility] Specialty and Procedure ([facility])    [address] [place] [address]  [address]  [facility] [address]   [phone]            May 17, 2018 10:30 AM CDT   (Arrive by 10:15 AM)   Return Movement Disorder with Paulo Saravia MD   [facility] Neurology ([facility])    [address] [place] [address] [phone]              Who to contact     Please call your clinic at [phone] to:    Ask questions about your health    Make or cancel appointments    Discuss your medicines    Learn about your  test results    Speak to your doctor            Additional Information About Your Visit        [employer] Information     [employer] gives you secure access to your electronic health record. If you see a primary care provider, you can also send messages to your care team and make appointments. If you have questions, please call your primary care clinic.  If you do not have a primary care provider, please call [phone] and they will assist you.      [employer] is an electronic gateway that provides easy, online access to your medical records. With [employer], you can request a clinic appointment, read your test results, renew a prescription or communicate with your care team.     To access your existing account, please contact your [facility] or call [phone] for assistance.        Care EveryWhere ID     This is your Care EveryWhere ID. This could be used by other organizations to access your [place] medical records  [id number]        Your Vitals Were     Pulse Temperature Respirations Height Pulse Oximetry BMI (Body Mass Index)    89 98.1  F (36.7  C) (Oral) 20 1.829 m (6') 98% 26.34 kg/m2       Blood Pressure from Last 3 Encounters:   02/22/18 121/82   01/29/18 129/67   01/17/18 116/69    Weight from Last 3 Encounters:   02/22/18 88.1 kg (194 lb 3.2 oz)   01/29/18 88.2 kg (194 lb 6.4 oz)   01/17/18 87.8 kg (193 lb 9.6 oz)              We Performed the Following     MED THERAPY MANAGE REFERRAL          Today's Medication Changes          These changes are accurate as of 2/22/18 11:00 AM.  If you have any questions, ask your nurse or doctor.               These medicines have changed or have updated prescriptions.        Dose/Directions    * amantadine 100 MG capsule   Commonly known as:  SYMMETREL   This may have changed:  Another medication with the same name was added. Make sure you understand how and when to take each.   Used for:  PSP (progressive supranuclear palsy) (H)   Changed  by:  Paulo Saravia MD        Dose:  100 mg   Take 1 capsule (100 mg) by mouth 3 times daily 6am,11am and 4pm   Quantity:  270 capsule   Refills:  3       * amantadine 50 MG/5ML syrup   Commonly known as:  SYMMETREL   This may have changed:  You were already taking a medication with the same name, and this prescription was added. Make sure you understand how and when to take each.   Used for:  PSP (progressive supranuclear palsy) (H)   Changed by:  Paulo Saravia MD        Dose:  100 mg   Take 10 mLs (100 mg) by mouth 3 times daily   Quantity:  473 mL   Refills:  11       * nortriptyline 10 MG capsule   Commonly known as:  PAMELOR   This may have changed:  Another medication with the same name was added. Make sure you understand how and when to take each.   Used for:  Migraine without aura and without status migrainosus, not intractable   Changed by:  Paulo Saravia MD        Dose:  30 mg   Take 3 capsules (30 mg) by mouth At Bedtime   Quantity:  270 capsule   Refills:  0       * nortriptyline 10 MG/5ML solution   Commonly known as:  PAMELOR   This may have changed:  You were already taking a medication with the same name, and this prescription was added. Make sure you understand how and when to take each.   Used for:  PSP (progressive supranuclear palsy) (H)   Changed by:  Paulo Saravia MD        Dose:  30 mg   Take 15 mLs (30 mg) by mouth At Bedtime   Quantity:  473 mL   Refills:  11       rivastigmine 4.6 MG/24HR 24 hr patch   Commonly known as:  EXELON   This may have changed:  Another medication with the same name was removed. Continue taking this medication, and follow the directions you see here.   Used for:  PSP (progressive supranuclear palsy) (H)   Changed by:  Paulo Saravia MD        PLACE 1 PATCH ON THE SKIN DAILY   Quantity:  90 patch   Refills:  3       * Notice:  This list has 4 medication(s) that are the same as other medications prescribed for you. Read the directions  carefully, and ask your doctor or other care provider to review them with you.         Where to get your medicines      Some of these will need a paper prescription and others can be bought over the counter.  Ask your nurse if you have questions.     Bring a paper prescription for each of these medications     amantadine 50 MG/5ML syrup    nortriptyline 10 MG/5ML solution                Primary Care Provider Office Phone # Fax #    Evelina Morse [phone] [address]  [facility] [address]        Equal Access to Services     [facility]: Hadii aad ku hadasho Soomaali, waaxda luqadaha, qaybta kaalmada adeegyada, waxfabby heller . So [facility] [phone].    ATENCIÓN: Si habla español, tiene a langford disposición servicios gratuitos de asistencia lingüística. [facility] [phone].    We comply with applicable federal civil rights laws and [place] laws. We do not discriminate on the basis of race, color, national origin, age, disability, sex, sexual orientation, or gender identity.            Thank you!     Thank you for choosing [facility] NEUROLOGY  for your care. Our goal is always to provide you with excellent care. Hearing back from our patients is one way we can continue to improve our services. Please take a few minutes to complete the written survey that you may receive in the mail after your visit with us. Thank you!             Your Updated Medication List - Protect others around you: Learn how to safely use, store and throw away your medicines at www.disposemymeds.org.          This list is accurate as of 2/22/18 11:00 AM.  Always use your most recent med list.                   Brand Name Dispense Instructions for use Diagnosis    acetaminophen-caffeine 500-65 MG Tabs    EXCEDRIN TENSION HEADACHE     Take 1 tablet by mouth every 6 hours as needed for mild pain        adapalene 0.1 % gel    DIFFERIN    135 g    Apply to the face at night.    Acne vulgaris        * amantadine 100 MG capsule    SYMMETREL    270 capsule    Take 1 capsule (100 mg) by mouth 3 times daily 6am,11am and 4pm    PSP (progressive supranuclear palsy) (H)       * amantadine 50 MG/5ML syrup    SYMMETREL    473 mL    Take 10 mLs (100 mg) by mouth 3 times daily    PSP (progressive supranuclear palsy) (H)       amLODIPine 2.5 MG tablet    NORVASC    90 tablet    Take 1 tablet (2.5 mg) by mouth daily In evening.    Essential hypertension       BABY ASPIRIN 81 MG chewable tablet   Generic drug:  aspirin      Take 81 mg by mouth daily        CENTRUM SILVER per tablet     30 tablet    Take 1 tablet by mouth daily        hydrochlorothiazide 12.5 MG Tabs tablet     90 tablet    Take 1 tablet (12.5 mg) by mouth daily    Essential hypertension       * nortriptyline 10 MG capsule    PAMELOR    270 capsule    Take 3 capsules (30 mg) by mouth At Bedtime    Migraine without aura and without status migrainosus, not intractable       * nortriptyline 10 MG/5ML solution    PAMELOR    473 mL    Take 15 mLs (30 mg) by mouth At Bedtime    PSP (progressive supranuclear palsy) (H)       olmesartan 20 MG tablet    BENICAR    180 tablet    Take 2 tablets (40 mg) by mouth daily    PSP (progressive supranuclear palsy) (H)       rivastigmine 4.6 MG/24HR 24 hr patch    EXELON    90 patch    PLACE 1 PATCH ON THE SKIN DAILY    PSP (progressive supranuclear palsy) (H)       senna-docusate 8.6-50 MG per tablet    SENOKOT-S;PERICOLACE    90 tablet    Take 1 tablet by mouth daily as needed for constipation    Constipation, unspecified constipation type       Vitamin D-3 Super Strength 2000 UNITS tablet   Generic drug:  cholecalciferol      Take 1 tablet by mouth        ZOLMitriptan 5 MG tablet    ZOMIG    30 tablet    Take 1 tablet (5 mg) by mouth at onset of headache for migraine    PSP (progressive supranuclear palsy) (H), Migraine without aura and without status migrainosus, not intractable       * Notice:  This list has 4  medication(s) that are the same as other medications prescribed for you. Read the directions carefully, and ask your doctor or other care provider to review them with you.

## 2018-02-22 NOTE — PROGRESS NOTES
Nursing Note  Jimmy Patrick presents today to [facility] for:   No chief complaint on file.    During today's [facility] appointment, orders from Dr. Paulo Saravia were completed.  Frequency: monthly    Progress note:  Patient identification verified by name and date of birth.  Assessment completed.  Vitals recorded in Doc Flowsheets.  Patient was provided with education regarding infusion and possible side effects.  Patient verbalized understanding.      needed: No  Premedications: were not ordered.  Infusion Rates: infusion given over approximately 1 hour.  Approximate Infusion length:1 hours.   Labs: were not ordered for this appointment.  Vascular access: peripheral IV placed today.  Treatment Conditions: non-applicable.  Patient tolerated infusion: well.    Discharge Plan:   Follow up plan of care with: ongoing infusions at [facility].  Discharge instructions were reviewed with patient.  Patient/representative verbalized understanding of discharge instructions and all questions answered.  Patient discharged from [facility] in stable condition.    Trina Barroso RN    Administrations This Visit     study [id number] ([id number]) (IDS# [id number]) IV infusion 2,100 mg 210 mL     Admin Date Action Dose Rate Route Administered By          02/22/2018 New Bag 2100 mg 210 mL/hr Intravenous Trina Barroso RN                         /81  Pulse 81  Temp 98.3  F (36.8  C) (Oral)  Resp 18  SpO2 99%

## 2018-02-23 NOTE — TELEPHONE ENCOUNTER
MTM referral from: [facility] visit (referral by provider)    MTM referral outreach attempt #1 on February 23, 2018 at 10:52 AM      Outcome: Patient scheduled for MTM appointment on 3/6    Alma Bashir MTM Coordinator

## 2018-03-06 NOTE — PROGRESS NOTES
"SUBJECTIVE/OBJECTIVE:                           Jimmy Patrick is a 67 year old male called for an initial visit for Medication Therapy Management.  He was referred to me from Dr. Saravia.  Wife, Kriss, is on the phone as well.      Chief Complaint: Initial MTM visit, would like a medication review    Allergies/ADRs: Reviewed in Epic   Tobacco: No tobacco use  Alcohol: Less than 1 beverage / month - a few sips of wine occasionally  Caffeine: 16-20 oz sodas/day  Activity: Somewhat mobile, uses a walker most of the time (U-step walker at home, a different one when out and about); exercise regimen (about an hour/day) - yoga/pilates/balance. If misses exercise for 3 days, starts to have back pain again. Also has a recumbent bike (made for PD) and rides it 15-20 min/day and a [employer]er. Doing PT off and on at [place].   PMH: Reviewed in Epic    Medication Adherence/Access:  Patient uses pill plastic bags (each bag/day) has assistance with medication administration: wife.  Patient takes medications 7 time(s) per day.  Per patient, misses medication 1 times per week (typically the amantadine)  Medication barriers: feelings of burden/being overwhelmed (many dosing times) and medication administration (liquid vs tabs/caps for patient to swallow)  The patient fills medications at [facility]: NO, fills medications at [facility]    Ear Infection: Patient reports being diagnosed with an ear infection over the weekend. He has been taking amoxicillin 500 mg twice daily for the past 4 days and will continue for a total of 10 days. Patient reports an improvement in his symptoms since the weekend.     Progressive Supranuclear Palsy: Current medications include amantadine 100 mg three times daily (6:30 am, 11:30 am, 4:30 pm), which he has been taking since his diagnosis in summer 2014. He tried Sinemet for a couple months but didn't feel it helped; in fact, it made him feel more dizzy and \"foggy\" and upset his stomach. " "Patient reports that he recently had an amantadine tablet get stuck in his throat so they recently got a prescription for liquid amantadine; however, their speech pathologist (Dr. Melchor at [facility]) recommends that he avoid thin liquids and actually take medications as a suspension or crushed in applesauce or pudding (thinned with milk). They have not tried the liquid form of amantadine yet due to this concern. Patient denies interference with sleep with amantadine. He reports that he has had hallucinations for the past 6 months; he sees animals and people. These are not scary but can be bothersome. He also has skin discoloration from amantadine. Cognition: currently taking Exelon 4.6 mg patch daily. This was started in 2014 and he noticed an \"instant\" improvement. He previously felt like he was \"in a fog.\" Recently he is concerned it is not as effective since he has had more confusion. Of note, patient is currently in research trials at the [facility] and so he comes to clinic monthly to receive an infusion.    Migraines: Currently taking nortriptyline 30 mg at bedtime as prophylactic agent. He has been taking this since February 2016 and has had very few migraines since. He has zolmitriptan 5 mg tablets on hand but can't remember the last time he took it. When he has mild tension-type headaches he will take an Excedrin tablet and that is typically enough to clear up the headache (twice per month typically). Patient thinks he was on a beta blocker in the past and believes it caused him to be suicidal.     Hypertension: Current medications include olmesartan 40 mg daily, amlodipine 2.5 mg daily, and hydrochlorothiazide 12.5 mg daily.  Patient does self-monitor BP but cannot recall what recent numbers have been. Patient follows with cardiology (every 6 months) due to having a \"heart flutter\" in 2013. Patient reports that he has had some leg swelling but this improved after starting hydrochlorothiazide in November (he felt " "like his feet were \"dead\"). Still has some swelling but not as bad. He does seem to have some dizziness when standing up from sitting.    Constipation: Not currently taking anything. Wife has Miralax and senna-docusate on-hand as recommended by Dr. Saravia but patient is very reluctant to take these due to fears of diarrhea. He is currently having 3 BM's per week (previously was 1 BM per week).     Vitamins/supplements: Currently taking a daily MVI and Vitamin D 2000 units daily. Patient reports that his diet is \"pretty balanced\" (eating fruits, vegetables, and milk) and would like to stop the MVI if possible since it is a large tablet and difficult to swallow. Reports that he will discuss this with his PCP at his annual wellness visit with his PCP in one month.     Current labs include:    BP Readings from Last 3 Encounters:   02/22/18 133/81   02/22/18 121/82   01/29/18 129/67     Last Basic Metabolic Panel:  Lab Results   Component Value Date     11/03/2017      Lab Results   Component Value Date    POTASSIUM 3.9 11/03/2017     Lab Results   Component Value Date    CHLORIDE 106 11/03/2017     Lab Results   Component Value Date    BUN 22 11/03/2017     Lab Results   Component Value Date    CR 1.10 11/03/2017     GFR Estimate   Date Value Ref Range Status   11/03/2017 67 >60 mL/min/1.7m2 Final     Comment:     Non  GFR Calc   05/12/2017 67 >60 mL/min/1.7m2 Final   02/14/2017 75 >60 mL/min/1.7m2 Final     Comment:     Non  GFR Calc     GFR Estimate If Black   Date Value Ref Range Status   11/03/2017 81 >60 mL/min/1.7m2 Final     Comment:      GFR Calc   05/12/2017 81 >60 mL/min/1.7m2 Final   02/14/2017 >90   GFR Calc   >60 mL/min/1.7m2 Final       ASSESSMENT:                             Current medications were reviewed today.     Medication Adherence: good, advised that patient reduce the number of dosing times per day    Ear Infection: Improving. " Patient to complete antibiotic course over the next 7 days.    Progressive Supranuclear Palsy: Needs improvement. Unclear benefit of amantadine but there are not really alternative options since Sinemet was also not effective. Patient's hallucinations may be due to amantadine so he should continue to monitor this and at some point may consider tapering off. As far as the swallowing issues, would defer to the speech specialist's recommendations to avoid thin liquids and would instead recommend swallowing small pills whole or crushing larger tablets and taking with applesauce or pudding.    Cognition: Needs improvement. Patient may benefit from higher dose of Exelon since his cognition continues to decline. He did notice an improvement when initially started on the patch so a higher dose may be even more effective.    Migraines: Needs improvement. Patient may be at risk of anticholinergic side effects with the TCA nortriptyline, as well as sedation and orthostatic hypotension. Nortriptyline has been very effective for his migraine prophylaxis and he is not interested in changing this therapy. Would recommend continued close monitoring of anticholinergic effects of the medication. Unfortunately he did not tolerate a beta blocker in the past and has been on a number of other antihypertensive therapies. Per Dr. Ramirez, he has not tried gabapentin or topiramate, so these may be alternative agents to consider in the future.    Hypertension: Needs improvement. Patient may benefit from discontinuation of amlodipine since he has experienced swelling in his feet/ankles and he seems to be having more orthostatic hypotension at home. He has responded well to hydrochlorothiazide so would not recommend eliminating this medication at this time.    Constipation: Needs improvement. Patient would benefit from taking a daily stool softener and using a gentle laxative as needed for constipation.    Vitamins/supplements: Stable. Could  consider discontinuing MVI.    PLAN:                            Recommendations for PCP/cardiology:    1. Consider stopping amlodipine. Will consult with cardiology first.  Per Dr. Gutiérrez in cardiology, will discontinue amlodipine and patient will monitor BP at home.    Recommendations for patient:  1. Start docusate 100 mg daily. May take senna/docusate one tablet or half-cap of Miralax as needed for constipation.  2. Consider discontinuing MVI due to difficulty swallowing.    Future considerations:  1. Consider stopping nortriptyline. Could consider a trial of gabapentin or topiramate if migraines return.  2. Consider stopping amantadine if hallucinations worsen.  3. Consider increasing the dose of Exelon.    I spent 60 minutes with this patient today. I offer these suggestions for consideration by the PCP. A copy of the visit note was provided to the patient's primary care provider.    Will follow up in one month: 4/19/18 when in clinic for f/u with Dr. Saravia.    I concur with the note as dictated above which reflects our joint assessment and plan.   Charlotte Mckeon, PharmD    The patient was sent via [employer] a summary of these recommendations as an after visit summary.     Gale Tapia, Pharm.D.  Medication Therapy Management Resident  Phone: [phone]  Pager: [phone]

## 2018-03-06 NOTE — PATIENT INSTRUCTIONS
"Recommendations from today's MTM visit:                                                    MTM (medication therapy management) is a service provided by a clinical pharmacist designed to help you get the most of out of your medicines.     Dear Raymond and Kriss,    It was great to talk with you last week!     I was able to talk with Dr. Gutiérrez and he agreed to try stopping the amlodipine to see if it improves your leg swelling and dizziness. We would like you to continue monitoring your blood pressure at home to ensure you're numbers don't go up to much. Please keep me updated!    Here were the other recommendations we discussed:  1. Start docusate 100 mg daily. May take senna/docusate one tablet or half-cap of Miralax as needed for constipation in addition to the daily docusate.  2. Consider discontinuing the multivitamin due to difficulty swallowing.  3. Follow the speech specialist's recommendations to take medications in applesauce or pudding for the time being. We can discuss switching back to the liquid options if needed. Keep me updated on how this is going too.     Future considerations:  1. Consider stopping nortriptyline. Could consider a trial of gabapentin or topiramate if migraines return.  2. Consider stopping amantadine if hallucinations worsen.  3. Consider increasing the dose of Exelon to see if this improves cognition.    Please let me know if you have any questions or if you'd like to make any of the other changes in the \"future considerations\" before your next appointment with Dr. Saravia and me on 4/19/18.     Next MTM visit: 4/19/18 at 10 am (Dr. Saravia at 10:30 am)    To schedule another MTM appointment, please call the clinic directly or you may call the MTM scheduling line at [phone] or toll-free at [phone].     My Clinical Pharmacist's contact information:                                                      It was a pleasure seeing you today!  Please feel free to contact me with any " questions or concerns you have.      Gale Tapia, Pharm.D.  Medication Therapy Management Resident  Phone: [phone]  Pager: [phone]    You may receive a survey about the MTM services you received.  I would appreciate your feedback to help me serve you better in the future. Please fill it out and return it when you can. Your comments will be anonymous.

## 2018-03-11 NOTE — PROGRESS NOTES
RE: MTM Follow up - BP recommendation  Received: 2 days ago       Clint Gutiérrez MD Roy, Natalie Theresa, RPH                     Sounds reasonable. Thanks            Previous Messages       ----- Message -----      From: Gale Tapia RPH      Sent: 3/9/2018   7:28 AM        To: Clint Gutiérrez MD   Subject: MTM Follow up - BP recommendation                 Dear Dr. Gutiérrez,     I met with Raymond this week for a medication therapy management (MTM) consult at the [facility]. Among other things, he continues to have some ankle swelling and dizziness upon standing (likely orthostasis due to his PSP condition). I know it's been difficult to control his BP in the past and HCTZ 12.5 mg has recently worked quite well. I'm wondering what you think of discontinuing the amlodipine 2.5 mg to see if his BP may remain relatively stable without it and may improve the swelling and perhaps orthostasis. He continues to take olmesartan 40 mg daily.     Let me know what you think.     Best,   Gale Tapia, Pharm.D.   Medication Therapy Management Resident   Phone: [phone]   Pager: [phone]

## 2018-03-22 NOTE — MR AVS SNAPSHOT
After Visit Summary   3/22/2018    Jimmy Patrick    MRN: [id number]           Patient Information     Date Of Birth          1950        Visit Information        Provider Department      3/22/2018 9:30 AM Paulo Saravia MD [facility]        Today's Diagnoses     PSP (progressive supranuclear palsy)    -  1    Acne vulgaris        Migraine without aura and without status migrainosus, not intractable           Follow-ups after your visit        Your next 10 appointments already scheduled     Mar 22, 2018 10:40 AM CDT   (Arrive by 10:25 AM)   ecg with  CV EKG   [facility] ([facility])    [address] [place] [address] [id number] [phone]            Mar 22, 2018 11:00 AM CDT   LAB with  LAB   [facility] ([facility])    [address] [place] [address] [id number] [phone]           Please do not eat 10-12 hours before your appointment if you are coming in fasting for labs on lipids, cholesterol, or glucose (sugar). This does not apply to pregnant women. Water, hot tea and black coffee (with nothing added) are okay. Do not drink other fluids, diet soda or chew gum.            Mar 22, 2018 12:00 PM CDT   Infusion 60 with UC SPEC INFUSION, UC 50 ATC   [facility] ([facility])    [address] [place] [address]  [address] [place] [address] [phone]            Apr 17, 2018 10:30 AM CDT   Return Visit with Rafi Ramirez MD   [facility] ([facility])    [address] [place] [address] [phone]            Apr 19, 2018 10:00 AM CDT   TELEMEDICINE with Gale Tapia RPH   [facility] [place] ([facility])    [address] [place] [address]  3rd [facility] [phone] [phone]           Note: this is  not an onsite visit; there is no need to come to the facility.            Apr 19, 2018 10:30 AM CDT   (Arrive by 10:15 AM)   Return Movement Disorder with Paulo Saravia MD   [facility] ([facility])    [address]  3rd Floor  [facility] [address] [phone]            Apr 19, 2018 12:00 PM CDT   Infusion 60 with UC SPEC INFUSION, UC 50 ATC   [facility] ([facility])    [address]  [address]  [facility] [address] [phone]            May 17, 2018 10:30 AM CDT   (Arrive by 10:15 AM)   Return Movement Disorder with Paulo Saravia MD   [facility] ([facility])    [address]  3rd [facility] [phone] [phone]              Who to contact     Please call your clinic at [phone] to:    Ask questions about your health    Make or cancel appointments    Discuss your medicines    Learn about your test results    Speak to your doctor            Additional Information About Your Visit        MEM[employer]Char[employer] Information     [employer] gives you secure access to your electronic health record. If you see a primary care provider, you can also send messages to your care team and make appointments. If you have questions, please call your primary care clinic.  If you do not have a primary care provider, please call [phone] and they will assist you.      [employer] is an electronic gateway that provides easy, online access to your medical records. With [employer], you can request a clinic appointment, read your test results, renew a prescription or communicate with your care team.     To access your existing account, please contact your [facility] or call [phone] for assistance.        Care EveryWhere ID     This is your Care EveryWhere ID. This could be used by other organizations to access your  [place] medical records  [id number]        Your Vitals Were     Pulse Height                87 1.829 m (6')           Blood Pressure from Last 3 Encounters:   03/22/18 137/86   02/22/18 133/81   02/22/18 121/82    Weight from Last 3 Encounters:   02/22/18 88.1 kg (194 lb 3.2 oz)   01/29/18 88.2 kg (194 lb 6.4 oz)   01/17/18 87.8 kg (193 lb 9.6 oz)              Today, you had the following     No orders found for display         Today's Medication Changes          These changes are accurate as of 3/22/18 10:08 AM.  If you have any questions, ask your nurse or doctor.               Start taking these medicines.        Dose/Directions    rivastigmine 9.5 MG/24HR 24 hr patch   Commonly known as:  EXELON   Used for:  PSP (progressive supranuclear palsy) (H)   Replaces:  rivastigmine 4.6 MG/24HR 24 hr patch   Started by:  Paulo Saravia MD        Dose:  1 patch   Place 1 patch onto the skin daily   Quantity:  30 patch   Refills:  11         These medicines have changed or have updated prescriptions.        Dose/Directions    adapalene 0.1 % gel   Commonly known as:  DIFFERIN   This may have changed:    - how to take this  - when to take this  - additional instructions   Used for:  Acne vulgaris        Apply to the face at night.   Quantity:  135 g   Refills:  3       amantadine 100 MG capsule   Commonly known as:  SYMMETREL   This may have changed:  Another medication with the same name was removed. Continue taking this medication, and follow the directions you see here.   Used for:  PSP (progressive supranuclear palsy) (H)   Changed by:  Paulo Saravia MD        Dose:  100 mg   Take 1 capsule (100 mg) by mouth 3 times daily 6am,11am and 4pm   Quantity:  270 capsule   Refills:  3       nortriptyline 10 MG capsule   Commonly known as:  PAMELOR   This may have changed:  See the new instructions.   Used for:  Migraine without aura and without status migrainosus, not intractable   Changed by:  Paulo Saravia  MD        TAKE 3 CAPSULES AT BEDTIME   Quantity:  270 capsule   Refills:  3         Stop taking these medicines if you haven't already. Please contact your care team if you have questions.     amoxicillin 400 MG/5ML suspension   Commonly known as:  AMOXIL   Stopped by:  Paulo Saravia MD           rivastigmine 4.6 MG/24HR 24 hr patch   Commonly known as:  EXELON   Replaced by:  rivastigmine 9.5 MG/24HR 24 hr patch   Stopped by:  Paulo Saravia MD                Where to get your medicines      These medications were sent to [facility] - [facility] [address] [place] [address]  [address] [facility] [address]     Phone:  [phone]     amantadine 100 MG capsule    nortriptyline 10 MG capsule    rivastigmine 9.5 MG/24HR 24 hr patch                Primary Care Provider Office Phone # Fax #    Evelina Morse [phone] [address]  [facility] [address]        Equal Access to Services     [facility] AH: Hadii aad ku hadasho Soomaali, waaxda luqadaha, qaybta kaalmada adeegyada, waxay idiin hayaan adeeg kharash la'emmanueln . So [facility] [phone].    ATENCIÓN: Si habla español, tiene a langford disposición servicios gratuitos de asistencia lingüística. [facility] [phone].    We comply with applicable federal civil rights laws and [place] laws. We do not discriminate on the basis of race, color, national origin, age, disability, sex, sexual orientation, or gender identity.            Thank you!     Thank you for choosing [facility] NEUROLOGY  for your care. Our goal is always to provide you with excellent care. Hearing back from our patients is one way we can continue to improve our services. Please take a few minutes to complete the written survey that you may receive in the mail after your visit with us. Thank you!             Your Updated Medication List - Protect others around you: Learn how to safely use, store and throw away your medicines at  www.disposemymeds.org.          This list is accurate as of 3/22/18 10:08 AM.  Always use your most recent med list.                   Brand Name Dispense Instructions for use Diagnosis    acetaminophen-caffeine 500-65 MG Tabs    EXCEDRIN TENSION HEADACHE     Take 1 tablet by mouth every 6 hours as needed for mild pain        adapalene 0.1 % gel    DIFFERIN    135 g    Apply to the face at night.    Acne vulgaris       amantadine 100 MG capsule    SYMMETREL    270 capsule    Take 1 capsule (100 mg) by mouth 3 times daily 6am,11am and 4pm    PSP (progressive supranuclear palsy) (H)       BABY ASPIRIN 81 MG chewable tablet   Generic drug:  aspirin      Take 81 mg by mouth daily        CENTRUM SILVER per tablet     30 tablet    Take 1 tablet by mouth daily        fluticasone 50 MCG/ACT spray    FLONASE    1 Bottle    Spray 1 spray into both nostrils daily        hydrochlorothiazide 12.5 MG Tabs tablet     90 tablet    Take 1 tablet (12.5 mg) by mouth daily    Essential hypertension       nortriptyline 10 MG capsule    PAMELOR    270 capsule    TAKE 3 CAPSULES AT BEDTIME    Migraine without aura and without status migrainosus, not intractable       olmesartan 20 MG tablet    BENICAR    180 tablet    Take 2 tablets (40 mg) by mouth daily    PSP (progressive supranuclear palsy) (H)       rivastigmine 9.5 MG/24HR 24 hr patch    EXELON    30 patch    Place 1 patch onto the skin daily    PSP (progressive supranuclear palsy) (H)       senna-docusate 8.6-50 MG per tablet    SENOKOT-S;PERICOLACE    90 tablet    Take 1 tablet by mouth daily as needed for constipation    Constipation, unspecified constipation type       Vitamin D-3 Super Strength 2000 UNITS tablet   Generic drug:  cholecalciferol      Take 1 tablet by mouth        ZOLMitriptan 5 MG tablet    ZOMIG    30 tablet    Take 1 tablet (5 mg) by mouth at onset of headache for migraine    PSP (progressive supranuclear palsy) (H), Migraine without aura and without status  migrainosus, not intractable

## 2018-03-22 NOTE — LETTER
3/22/2018      RE: Jimmy Patrick  [address]  [place] [facility] [address]       Research visit.    Medications updated  Will increase rivastigmine from 4.6 to 9.5mg  May go off flonase  Finished antibiotics  May stop the mvi due to size  Not using liquids due to swallowing problems so using capsules of most medications    Paulo Saravia MD

## 2018-03-22 NOTE — MR AVS SNAPSHOT
After Visit Summary   3/22/2018    Jimmy Patrick    MRN: [id number]           Patient Information     Date Of Birth          1950        Visit Information        Provider Department      3/22/2018 8:00 AM Emperatriz Candelaria, PhD [facility] Neuropsychology        Today's Diagnoses     Examination of participant in clinical trial    -  1       Follow-ups after your visit        Your next 10 appointments already scheduled     Apr 17, 2018 10:30 AM CDT   Return Visit with Rafi Ramirez MD   [facility] ([facility])    [address] [place] [address]   [phone]            Apr 19, 2018 10:00 AM CDT   [facility]    [address] [place] [phone] [phone]           Note: this is not an onsite visit; there is no need to come to the facility.            Apr 19, 2018 10:30 AM CDT   (Arrive by 10:15 AM)   Return Movement Disorder with Paulo Saravia MD   [facility] ([facility])    [address] [place] [address] [phone]            Apr 19, 2018 12:00 PM CDT   Infusion 60 with UC SPEC INFUSION, UC 50 ATC   [facility] ([facility])    [address] [place] [address]  [address]  [facility] [address] [phone]            May 02, 2018  1:15 PM CDT   New Visit with Nishi Oro MD   [facility] ([facility])    [address] [place] [address]   [phone]            May 17, 2018 10:30 AM CDT   (Arrive by 10:15 AM)   Return Movement Disorder with Paulo Saravia MD   [facility] ([facility])    [address] [place] [address]   [phone]            May  17, 2018 12:00 PM CDT   Infusion 60 with UC SPEC INFUSION, UC 50 ATC   [facility] ([facility])    [address]  [address]  [facility] [phone] [phone]            Jun 14, 2018  9:30 AM CDT   (Arrive by 9:15 AM)   Return Movement Disorder with Paulo Saravia MD   [facility] ([facility])    [address]  3rd Floor  [facility] [id number] [phone]              Who to contact     Please call your clinic at [phone] to:    Ask questions about your health    Make or cancel appointments    Discuss your medicines    Learn about your test results    Speak to your doctor            Additional Information About Your Visit        e[employer]eller[employer] Information     [employer] gives you secure access to your electronic health record. If you see a primary care provider, you can also send messages to your care team and make appointments. If you have questions, please call your primary care clinic.  If you do not have a primary care provider, please call [phone] and they will assist you.      [employer] is an electronic gateway that provides easy, online access to your medical records. With [employer], you can request a clinic appointment, read your test results, renew a prescription or communicate with your care team.     To access your existing account, please contact your [facility] or call [phone] for assistance.        Care EveryWhere ID     This is your Care EveryWhere ID. This could be used by other organizations to access your [place] medical records  [id number]         Blood Pressure from Last 3 Encounters:   03/22/18 134/65   03/22/18 137/86   02/22/18 133/81    Weight from Last 3 Encounters:   02/22/18 88.1 kg (194 lb 3.2 oz)   01/29/18 88.2 kg (194 lb 6.4 oz)   01/17/18 87.8 kg (193 lb 9.6 oz)              We Performed the Following     NEUROPSYCH  TESTING BY TECH          Today's Medication Changes          These changes are accurate as of 3/22/18 11:59 PM.  If you have any questions, ask your nurse or doctor.               Start taking these medicines.        Dose/Directions    rivastigmine 9.5 MG/24HR 24 hr patch   Commonly known as:  EXELON   Used for:  PSP (progressive supranuclear palsy) (H)   Replaces:  rivastigmine 4.6 MG/24HR 24 hr patch   Started by:  Paulo Saravia MD        Dose:  1 patch   Place 1 patch onto the skin daily   Quantity:  30 patch   Refills:  11         These medicines have changed or have updated prescriptions.        Dose/Directions    adapalene 0.1 % gel   Commonly known as:  DIFFERIN   This may have changed:    - how to take this  - when to take this  - additional instructions   Used for:  Acne vulgaris        Apply to the face at night.   Quantity:  135 g   Refills:  3       amantadine 100 MG capsule   Commonly known as:  SYMMETREL   This may have changed:  Another medication with the same name was removed. Continue taking this medication, and follow the directions you see here.   Used for:  PSP (progressive supranuclear palsy) (H)   Changed by:  Paulo Saravia MD        Dose:  100 mg   Take 1 capsule (100 mg) by mouth 3 times daily 6am,11am and 4pm   Quantity:  270 capsule   Refills:  3       nortriptyline 10 MG capsule   Commonly known as:  PAMELOR   This may have changed:  See the new instructions.   Used for:  Migraine without aura and without status migrainosus, not intractable   Changed by:  Paulo Saravia MD        TAKE 3 CAPSULES AT BEDTIME   Quantity:  270 capsule   Refills:  3         Stop taking these medicines if you haven't already. Please contact your care team if you have questions.     amoxicillin 400 MG/5ML suspension   Commonly known as:  AMOXIL   Stopped by:  Paulo Saravia MD           rivastigmine 4.6 MG/24HR 24 hr patch   Commonly known as:  EXELON   Replaced by:  rivastigmine 9.5  MG/24HR 24 hr patch   Stopped by:  Paulo Saravia MD                Where to get your medicines      These medications were sent to [facility] - [place], [place] - [address] [facility] [facility]  [address] [facility] [address]     Phone:  [phone]     amantadine 100 MG capsule    nortriptyline 10 MG capsule    rivastigmine 9.5 MG/24HR 24 hr patch                Primary Care Provider Office Phone # Fax #    Evelina Morse [phone] [address]  [facility] [address]        Equal Access to Services     [facility]: Hadii aad ku hadasho Soomaali, waaxda luqadaha, qaybta kaalmada adeegyada, waxay idiin hayaan adeeg vinicio heller . So [facility] [phone].    ATENCIÓN: Si habla español, tiene a langford disposición servicios gratuitos de asistencia lingüística. [facility] [phone].    We comply with applicable federal civil rights laws and [place] laws. We do not discriminate on the basis of race, color, national origin, age, disability, sex, sexual orientation, or gender identity.            Thank you!     Thank you for choosing [facility] NEUROPSYCHOLOGY  for your care. Our goal is always to provide you with excellent care. Hearing back from our patients is one way we can continue to improve our services. Please take a few minutes to complete the written survey that you may receive in the mail after your visit with us. Thank you!             Your Updated Medication List - Protect others around you: Learn how to safely use, store and throw away your medicines at www.disposemymeds.org.          This list is accurate as of 3/22/18 11:59 PM.  Always use your most recent med list.                   Brand Name Dispense Instructions for use Diagnosis    acetaminophen-caffeine 500-65 MG Tabs    EXCEDRIN TENSION HEADACHE     Take 1 tablet by mouth every 6 hours as needed for mild pain        adapalene 0.1 % gel    DIFFERIN    135 g    Apply to the face at night.    Acne  vulgaris       amantadine 100 MG capsule    SYMMETREL    270 capsule    Take 1 capsule (100 mg) by mouth 3 times daily 6am,11am and 4pm    PSP (progressive supranuclear palsy) (H)       BABY ASPIRIN 81 MG chewable tablet   Generic drug:  aspirin      Take 81 mg by mouth daily        CENTRUM SILVER per tablet     30 tablet    Take 1 tablet by mouth daily        fluticasone 50 MCG/ACT spray    FLONASE    1 Bottle    Spray 1 spray into both nostrils daily        hydrochlorothiazide 12.5 MG Tabs tablet     90 tablet    Take 1 tablet (12.5 mg) by mouth daily    Essential hypertension       nortriptyline 10 MG capsule    PAMELOR    270 capsule    TAKE 3 CAPSULES AT BEDTIME    Migraine without aura and without status migrainosus, not intractable       olmesartan 20 MG tablet    BENICAR    180 tablet    Take 2 tablets (40 mg) by mouth daily    PSP (progressive supranuclear palsy) (H)       rivastigmine 9.5 MG/24HR 24 hr patch    EXELON    30 patch    Place 1 patch onto the skin daily    PSP (progressive supranuclear palsy) (H)       senna-docusate 8.6-50 MG per tablet    SENOKOT-S;PERICOLACE    90 tablet    Take 1 tablet by mouth daily as needed for constipation    Constipation, unspecified constipation type       Vitamin D-3 Super Strength 2000 UNITS tablet   Generic drug:  cholecalciferol      Take 1 tablet by mouth        ZOLMitriptan 5 MG tablet    ZOMIG    30 tablet    Take 1 tablet (5 mg) by mouth at onset of headache for migraine    PSP (progressive supranuclear palsy) (H), Migraine without aura and without status migrainosus, not intractable

## 2018-03-22 NOTE — PROGRESS NOTES
Research visit.    Medications updated  Will increase rivastigmine from 4.6 to 9.5mg  May go off flonase  Finished antibiotics  May stop the mvi due to size  Not using liquids due to swallowing problems so using capsules of most medications    Paulo Saravia MD

## 2018-03-22 NOTE — Clinical Note
3/22/2018       RE: Jimmy Patrick  [address]  [facility] [address]     Dear Colleague,    Thank you for referring your patient, Jimmy Patrick, to the [facility] NEUROLOGY at [facility]. Please see a copy of my visit note below.    No notes on file    Again, thank you for allowing me to participate in the care of your patient.      Sincerely,    Paulo Saravia MD

## 2018-03-22 NOTE — MR AVS SNAPSHOT
After Visit Summary   3/22/2018    Jimmy Patrick    MRN: [id number]           Patient Information     Date Of Birth          1950        Visit Information        Provider Department      3/22/2018 12:00 PM UC 50 ATC; UC SPEC INFUSION [facility] Procedure        Today's Diagnoses     PSP (progressive supranuclear palsy) (H)    -  1    Parkinsonism, unspecified Parkinsonism type (H)           Follow-ups after your visit        Your next 10 appointments already scheduled     Apr 17, 2018 10:30 AM CDT   Return Visit with Rafi Ramirez MD   [facility] ([facility])    [address] [place] [address]   [phone]            Apr 19, 2018 10:00 AM CDT   TELEMEDICINE with Gale Tapia CHRISTAL   [facility] ([facility])    [address] [place] [id number] [phone]           Note: this is not an onsite visit; there is no need to come to the facility.            Apr 19, 2018 10:30 AM CDT   (Arrive by 10:15 AM)   Return Movement Disorder with Paulo Saravia MD   [facility] ([facility] [place])    [address] [phone] [phone]            Apr 19, 2018 12:00 PM CDT   Infusion 60 with UC SPEC INFUSION, UC 50 ATC   [facility] Procedure ([facility])    [address] [id number] [phone]            May 17, 2018 10:30 AM CDT   (Arrive by 10:15 AM)   Return Movement Disorder with Paulo Saravia MD   [facility] ([facility])    [address] [place] [id number] [phone]            May 17, 2018 12:00 PM CDT   Infusion 60 with UC SPEC INFUSION, UC 50 ATC   [facility] Specialty and  Procedure ([facility])    [address]  [address]  [facility] [id number] [phone]            Jun 14, 2018  9:30 AM CDT   (Arrive by 9:15 AM)   Return Movement Disorder with Paulo Saravia MD   [facility] ([facility])    [address] [facility] [address]  3rd Floor  [facility] [id number] [phone]            Jun 14, 2018 11:00 AM CDT   LAB with  LAB   [facility] ([facility])    [address] [facility]  1st Floor  [facility] [id number] [phone]           Please do not eat 10-12 hours before your appointment if you are coming in fasting for labs on lipids, cholesterol, or glucose (sugar). This does not apply to pregnant women. Water, hot tea and black coffee (with nothing added) are okay. Do not drink other fluids, diet soda or chew gum.              Who to contact     If you have questions or need follow up information about today's clinic visit or your schedule please contact [facility] SPECIALTY AND PROCEDURE directly at [phone].  Normal or non-critical lab and imaging results will be communicated to you by [employer], letter or phone within 4 business days after the clinic has received the results. If you do not hear from us within 7 days, please contact the clinic through [employer] or phone. If you have a critical or abnormal lab result, we will notify you by phone as soon as possible.  Submit refill requests through [employer] or call your pharmacy and they will forward the refill request to us. Please allow 3 business days for your refill to be completed.          Additional Information About Your Visit        Mediam[employer] Information     [employer] gives you secure access to your electronic health record. If you see a primary care provider, you can also send messages to your care team and make appointments. If you have questions, please call your primary care clinic.  If you do not have a  primary care provider, please call [phone] and they will assist you.        Care EveryWhere ID     This is your Care EveryWhere ID. This could be used by other organizations to access your [place] medical records  [id number]        Your Vitals Were     Pulse Temperature Respirations Pulse Oximetry          88 98  F (36.7  C) (Oral) 16 99%         Blood Pressure from Last 3 Encounters:   03/22/18 134/65   03/22/18 137/86   02/22/18 133/81    Weight from Last 3 Encounters:   02/22/18 88.1 kg (194 lb 3.2 oz)   01/29/18 88.2 kg (194 lb 6.4 oz)   01/17/18 87.8 kg (193 lb 9.6 oz)              Today, you had the following     No orders found for display         Today's Medication Changes          These changes are accurate as of 3/22/18  4:14 PM.  If you have any questions, ask your nurse or doctor.               Start taking these medicines.        Dose/Directions    rivastigmine 9.5 MG/24HR 24 hr patch   Commonly known as:  EXELON   Used for:  PSP (progressive supranuclear palsy) (H)   Replaces:  rivastigmine 4.6 MG/24HR 24 hr patch   Started by:  Paulo Saravia MD        Dose:  1 patch   Place 1 patch onto the skin daily   Quantity:  30 patch   Refills:  11         These medicines have changed or have updated prescriptions.        Dose/Directions    adapalene 0.1 % gel   Commonly known as:  DIFFERIN   This may have changed:    - how to take this  - when to take this  - additional instructions   Used for:  Acne vulgaris        Apply to the face at night.   Quantity:  135 g   Refills:  3       amantadine 100 MG capsule   Commonly known as:  SYMMETREL   This may have changed:  Another medication with the same name was removed. Continue taking this medication, and follow the directions you see here.   Used for:  PSP (progressive supranuclear palsy) (H)   Changed by:  Paulo Saravia MD        Dose:  100 mg   Take 1 capsule (100 mg) by mouth 3 times daily 6am,11am and 4pm   Quantity:  270 capsule   Refills:   3       nortriptyline 10 MG capsule   Commonly known as:  PAMELOR   This may have changed:  See the new instructions.   Used for:  Migraine without aura and without status migrainosus, not intractable   Changed by:  Paulo Saravia MD        TAKE 3 CAPSULES AT BEDTIME   Quantity:  270 capsule   Refills:  3         Stop taking these medicines if you haven't already. Please contact your care team if you have questions.     amoxicillin 400 MG/5ML suspension   Commonly known as:  AMOXIL   Stopped by:  Paulo Saravia MD           rivastigmine 4.6 MG/24HR 24 hr patch   Commonly known as:  EXELON   Replaced by:  rivastigmine 9.5 MG/24HR 24 hr patch   Stopped by:  Paulo Saravia MD                Where to get your medicines      These medications were sent to [facility] [address] [address]     Phone:  [phone]     amantadine 100 MG capsule    nortriptyline 10 MG capsule    rivastigmine 9.5 MG/24HR 24 hr patch                Primary Care Provider Office Phone # Fax #    Evelina Morse [phone]       [address]  [facility] [address]        Equal Access to Services     [facility]: Hadii aad ku hadasho Soomaali, waaxda luqadaha, qaybta kaalmada adeegyada, gely vivar hayemmanueln ally lynch. So [facility] [phone].    ATENCIÓN: Si habla español, tiene a langford disposición servicios gratuitos de asistencia lingüística. Llame al [phone].    We comply with applicable federal civil rights laws and [place] laws. We do not discriminate on the basis of race, color, national origin, age, disability, sex, sexual orientation, or gender identity.            Thank you!     Thank you for choosing [facility] SPECIALTY AND PROCEDURE  for your care. Our goal is always to provide you with excellent care. Hearing back from our patients is one way we can continue to improve our  services. Please take a few minutes to complete the written survey that you may receive in the mail after your visit with us. Thank you!             Your Updated Medication List - Protect others around you: Learn how to safely use, store and throw away your medicines at www.disposemymeds.org.          This list is accurate as of 3/22/18  4:14 PM.  Always use your most recent med list.                   Brand Name Dispense Instructions for use Diagnosis    acetaminophen-caffeine 500-65 MG Tabs    EXCEDRIN TENSION HEADACHE     Take 1 tablet by mouth every 6 hours as needed for mild pain        adapalene 0.1 % gel    DIFFERIN    135 g    Apply to the face at night.    Acne vulgaris       amantadine 100 MG capsule    SYMMETREL    270 capsule    Take 1 capsule (100 mg) by mouth 3 times daily 6am,11am and 4pm    PSP (progressive supranuclear palsy) (H)       BABY ASPIRIN 81 MG chewable tablet   Generic drug:  aspirin      Take 81 mg by mouth daily        CENTRUM SILVER per tablet     30 tablet    Take 1 tablet by mouth daily        fluticasone 50 MCG/ACT spray    FLONASE    1 Bottle    Spray 1 spray into both nostrils daily        hydrochlorothiazide 12.5 MG Tabs tablet     90 tablet    Take 1 tablet (12.5 mg) by mouth daily    Essential hypertension       nortriptyline 10 MG capsule    PAMELOR    270 capsule    TAKE 3 CAPSULES AT BEDTIME    Migraine without aura and without status migrainosus, not intractable       olmesartan 20 MG tablet    BENICAR    180 tablet    Take 2 tablets (40 mg) by mouth daily    PSP (progressive supranuclear palsy) (H)       rivastigmine 9.5 MG/24HR 24 hr patch    EXELON    30 patch    Place 1 patch onto the skin daily    PSP (progressive supranuclear palsy) (H)       senna-docusate 8.6-50 MG per tablet    SENOKOT-S;PERICOLACE    90 tablet    Take 1 tablet by mouth daily as needed for constipation    Constipation, unspecified constipation type       Vitamin D-3 Super Strength 2000 UNITS tablet    Generic drug:  cholecalciferol      Take 1 tablet by mouth        ZOLMitriptan 5 MG tablet    ZOMIG    30 tablet    Take 1 tablet (5 mg) by mouth at onset of headache for migraine    PSP (progressive supranuclear palsy) (H), Migraine without aura and without status migrainosus, not intractable

## 2018-03-22 NOTE — PROGRESS NOTES
Nursing Note  Jimmy Patrick [facility]:   Chief Complaint   Patient presents with     Infusion     STUDY [id number]     During [facility]'s [facility] appointment, orders from Dr. Paulo Saravia were completed.  Frequency: monthly    Progress note:  Patient identification verified by name and date of birth.  Assessment completed.  Vitals recorded in Doc Flowsheets.  Patient was provided with education regarding infusion and possible side effects.  Patient verbalized understanding.      needed: No  Premedications: were not ordered.  Infusion Rates: infusion given over approximately 1 hour.  Approximate Infusion length:1 hours.   Labs: were not ordered for this appointment.  Vascular access: peripheral IV placed today.  Treatment Conditions: non-applicable.  Patient tolerated infusion: well.    Discharge Plan:   Follow up plan of care with: ongoing infusions at [facility].  Discharge instructions were reviewed with patient.  Patient/representative verbalized understanding of discharge instructions and all questions answered.  Patient discharged [facility] in stable condition.    Trina Barroso RN    Administrations This Visit     study [id number] ([id number]) (IDS# [id number]) IV infusion 2,100 mg 210 mL     Admin Date Action Dose Rate Route Administered By          03/22/2018 New Bag 2100 mg 210 mL/hr Intravenous Trina Barroso RN                         /80 (BP Location: Left leg)  Pulse 80  Temp 98  F (36.7  C) (Oral)  Resp 18  SpO2 99%

## 2018-03-26 NOTE — PROGRESS NOTES
The participant completed the neuropsychology evaluation for the study [id number]. This included one hour of psychometrist time.    Measures administered:    MoCA  Repeatable Battery for the Assessment of Neuropsychological Status (RBANS)  Phonemic Fluency  Letter Number Sequencing Test  Color Trails Test      Emperatriz Candelaria, Ph.D., ABPP  Licensed Psychologist, LP [id number]  Board Certified in Clinical Neuropsychology

## 2018-04-13 NOTE — PROGRESS NOTES
Research visit.    They are deciding whether to slowly wean off the nortriptyline and if so they will doing it slowly and monitoring if he has recurrent headaches. For now you will be opening the capsules to facilitate swallowing and putting the medication in pudding.     He was started on rivastigmine 9.5mg a few weeks ago on a Thursday. No side effects. Not clear if better  Had been on rivastigmine 4.6mg    He has  Had more help in the home - has 3 hours.     He continues amantadine 3 hours per day x 5 days.     Went to [place] for observational study    They will increase his senokot from 1/day to 1 tab twice daily    He has not used miralax - discussed using this in applesauce      He will continue in the study    Paulo Saravia MD

## 2018-04-17 NOTE — NURSING NOTE
Jimmy Patrick's goals for this visit include: return  He requests these members of his care team be copied on today's visit information:     PCP: Evelina Morse    Referring Provider:  No referring provider defined for this encounter.    Chief Complaint   Patient presents with     RECHECK       Initial /72  Pulse 62  Wt 87.4 kg (192 lb 11.2 oz)  SpO2 98%  BMI 26.13 kg/m2 Estimated body mass index is 26.13 kg/(m^2) as calculated from the following:    Height as of 3/22/18: 1.829 m (6').    Weight as of this encounter: 87.4 kg (192 lb 11.2 oz).  Medication Reconciliation: complete    Do you need any medication refills at today's visit? y

## 2018-04-17 NOTE — MR AVS SNAPSHOT
After Visit Summary   4/17/2018    Jimmy Patrick    MRN: [id number]           Patient Information     Date Of Birth          1950        Visit Information        Provider Department      4/17/2018 10:30 AM Rafi Ramirez MD [facility]        Today's Diagnoses     Migraine without aura and without status migrainosus, not intractable           Follow-ups after your visit        Follow-up notes from your care team     Return in about 1 year (around 4/17/2019).      Your next 10 appointments already scheduled     Apr 19, 2018 10:00 AM CDT   TELEMEDICINE with Gale Tapia [facility] ([facility])    [address] [place] [id number] [phone]           Note: this is not an onsite visit; there is no need to come to the facility.            Apr 19, 2018 10:30 AM CDT   (Arrive by 10:15 AM)   Return Movement Disorder with Paulo Saravia MD   [facility] ([facility])    [address] [place] [phone] [phone]            Apr 19, 2018 12:00 PM CDT   Infusion 60 with UC SPEC INFUSION, UC 50 ATC   [facility] Specialty [facility] Procedure ([facility])    [address] [place] [address]  [address] [place] [id number] [phone]            May 02, 2018  1:15 PM CDT   New Visit with Nishi Oro MD   [facility]    [address] [place] [phone]            May 17, 2018 10:30 AM CDT   (Arrive by 10:15 AM)   Return Movement Disorder with Paulo Saravia MD   [facility] ([facility])    [address] [place] [phone] [phone]            May 17, 2018 12:00 PM CDT   Infusion 60 with UC SPEC INFUSION, UC 50 ATC   [facility] Specialty [facility] Procedure ([facility])    [address]  [address]  [facility] [address]   [phone]            Jun 14, 2018  9:30 AM CDT   (Arrive by 9:15 AM)   Return Movement Disorder with Paulo Saravia MD   [facility] ([facility])    [address]  3rd Floor  [facility] [address]   [phone]            Jun 14, 2018 11:00 AM CDT   LAB with  LAB   [facility])    [address] [facility]  1st Floor  [facility] [address]   [phone]           Please do not eat 10-12 hours before your appointment if you are coming in fasting for labs on lipids, cholesterol, or glucose (sugar). This does not apply to pregnant women. Water, hot tea and black coffee (with nothing added) are okay. Do not drink other fluids, diet soda or chew gum.              Who to contact     If you have questions or need follow up information about today's clinic visit or your schedule please contact [facility] directly at [phone].  Normal or non-critical lab and imaging results will be communicated to you by [employer], letter or phone within 4 business days after the clinic has received the results. If you do not hear from us within 7 days, please contact the clinic through [employer] or phone. If you have a critical or abnormal lab result, we will notify you by phone as soon as possible.  Submit refill requests through [employer] or call your pharmacy and they will forward the refill request to us. Please allow 3 business days for your refill to be completed.          Additional Information About Your Visit        [employer] Information     [employer] gives you secure access to your electronic health record. If you see a primary care provider, you can also send messages to your care team and make appointments. If you have questions, please call your primary care clinic.  If you do not have a primary  care provider, please call [phone] and they will assist you.      [employer] is an electronic gateway that provides easy, online access to your medical records. With [employer], you can request a clinic appointment, read your test results, renew a prescription or communicate with your care team.     To access your existing account, please contact your [facility] or call [phone] for assistance.        Care EveryWhere ID     This is your Care EveryWhere ID. This could be used by other organizations to access your [place] medical records  [id number]        Your Vitals Were     Pulse Pulse Oximetry BMI (Body Mass Index)             62 98% 26.13 kg/m2          Blood Pressure from Last 3 Encounters:   04/17/18 114/72   03/22/18 134/65   03/22/18 137/86    Weight from Last 3 Encounters:   04/17/18 87.4 kg (192 lb 11.2 oz)   02/22/18 88.1 kg (194 lb 3.2 oz)   01/29/18 88.2 kg (194 lb 6.4 oz)              Today, you had the following     No orders found for display         Today's Medication Changes          These changes are accurate as of 4/17/18 10:59 AM.  If you have any questions, ask your nurse or doctor.               These medicines have changed or have updated prescriptions.        Dose/Directions    olmesartan 20 MG tablet   Commonly known as:  BENICAR   This may have changed:    - how much to take  - when to take this   Used for:  PSP (progressive supranuclear palsy) (H)        Dose:  40 mg   Take 2 tablets (40 mg) by mouth daily   Quantity:  180 tablet   Refills:  3            Where to get your medicines      These medications were sent to [facility] - [address] [address]     Phone:  [phone]     nortriptyline 10 MG capsule                Primary Care Provider Office Phone # Fax #    Evelina Morse [phone] [address]  [facility] [address]        Equal  Access to Services     [facility]: Hadii robin harper maikel Granado, waaxda luqadaha, qaybta kaalmada allyhimagely cabezasnilessri lynch. So [facility] [phone].    ATENCIÓN: Si alfredola kaity, tiene a langford disposición servicios gratuitos de asistencia lingüística. Llame al [phone].    We comply with applicable federal civil rights laws and [place] laws. We do not discriminate on the basis of race, color, national origin, age, disability, sex, sexual orientation, or gender identity.            Thank you!     Thank you for choosing [facility]  for your care. Our goal is always to provide you with excellent care. Hearing back from our patients is one way we can continue to improve our services. Please take a few minutes to complete the written survey that you may receive in the mail after your visit with us. Thank you!             Your Updated Medication List - Protect others around you: Learn how to safely use, store and throw away your medicines at www.disposemymeds.org.          This list is accurate as of 4/17/18 10:59 AM.  Always use your most recent med list.                   Brand Name Dispense Instructions for use Diagnosis    acetaminophen-caffeine 500-65 MG Tabs    EXCEDRIN TENSION HEADACHE     Take 1 tablet by mouth every 6 hours as needed for mild pain        adapalene 0.1 % gel    DIFFERIN    135 g    Apply to the face at night.    Acne vulgaris       amantadine 100 MG capsule    SYMMETREL    270 capsule    Take 1 capsule (100 mg) by mouth 3 times daily 6am,11am and 4pm    PSP (progressive supranuclear palsy) (H)       amLODIPine 2.5 MG tablet    NORVASC     Take 1 tablet by mouth daily        BABY ASPIRIN 81 MG chewable tablet   Generic drug:  aspirin      Take 81 mg by mouth daily        CENTRUM SILVER per tablet     30 tablet    Take 1 tablet by mouth daily        hydrochlorothiazide 12.5 MG Tabs tablet     90 tablet    Take 1 tablet (12.5 mg) by mouth daily     Essential hypertension       nortriptyline 10 MG capsule    PAMELOR    270 capsule    TAKE 3 CAPSULES AT BEDTIME    Migraine without aura and without status migrainosus, not intractable       olmesartan 20 MG tablet    BENICAR    180 tablet    Take 2 tablets (40 mg) by mouth daily    PSP (progressive supranuclear palsy) (H)       rivastigmine 9.5 MG/24HR 24 hr patch    EXELON    30 patch    Place 1 patch onto the skin daily    PSP (progressive supranuclear palsy) (H)       senna-docusate 8.6-50 MG per tablet    SENOKOT-S;PERICOLACE    90 tablet    Take 1 tablet by mouth daily as needed for constipation    Constipation, unspecified constipation type       Vitamin D-3 Super Strength 2000 units tablet   Generic drug:  cholecalciferol      Take 1 tablet by mouth        ZOLMitriptan 5 MG tablet    ZOMIG    30 tablet    Take 1 tablet (5 mg) by mouth at onset of headache for migraine    PSP (progressive supranuclear palsy) (H), Migraine without aura and without status migrainosus, not intractable

## 2018-04-17 NOTE — PROGRESS NOTES
Visit Date:   2018      PRIMARY CARE PROVIDER:  Evelina Morse MD      HISTORY OF PRESENT ILLNESS:  This patient is seen in followup with migraine headache.  He also has progressive supranuclear palsy.  I last saw the patient a year ago.  He is here today with his wife, Aminata.  He is being treated with nortriptyline 30 mg at night.  He has not had any headaches in the last few months, which is quite an improvement.  He also takes the nortriptyline for sleep and that is quite helpful in that regard.  His progressive supranuclear palsy has worsened.  He is in a research study.      PHYSICAL EXAMINATION:   GENERAL:  The patient is cooperative and in no distress.   VITAL SIGNS:  His blood pressure is 114/72.  Visual acuity could not be tested nor could funduscopic exam be performed because he kept closing his eyes to the bright light.  He talks in a very soft voice and has a microphone with amplifier so he can be understood.  He has profound bradykinesia.      ASSESSMENT:   1.  Common migraine, responsive to nortriptyline.   2.  Progressive supranuclear palsy.      DISCUSSION:  The patient is seen with the above problem list.  The headaches have responded well to nortriptyline, and he also uses the medicine for insomnia to good effect.  At this point, I have recommended continuing the nortriptyline.  He has developed problems with swallowing.  They could open the capsule and just put the powder in the applesauce to see if that works as well.  After that they could try and reduce his dose of nortriptyline to 20 mg at night or even further.  If, however, the headaches come back or his insomnia once again becomes a problem, he should resume the 30 mg dose.  I will see him in followup in a year, or sooner if there are problems.         TIFF WEAVER MD             D: 2018   T: 2018   MT: LINDA      Name:     KARUNA NELSON   MRN:      [id number]        Account:      [id number]   :       1950           Visit Date:   04/17/2018      Document: [id number]

## 2018-04-17 NOTE — LETTER
4/17/2018         RE: Jimmy Patrick  [address] [address]        Dear Colleague,    Thank you for referring your patient, Jmimy Patrick, to the [facility]. Please see a copy of my visit note below.    Visit Date:   04/17/2018      PRIMARY CARE PROVIDER:  Evelina Morse MD      HISTORY OF PRESENT ILLNESS:  This patient is seen in followup with migraine headache.  He also has progressive supranuclear palsy.  I last saw the patient a year ago.  He is here today with his wife, Aminata.  He is being treated with nortriptyline 30 mg at night.  He has not had any headaches in the last few months, which is quite an improvement.  He also takes the nortriptyline for sleep and that is quite helpful in that regard.  His progressive supranuclear palsy has worsened.  He is in a research study.      PHYSICAL EXAMINATION:   GENERAL:  The patient is cooperative and in no distress.   VITAL SIGNS:  His blood pressure is 114/72.  Visual acuity could not be tested nor could funduscopic exam be performed because he kept closing his eyes to the bright light.  He talks in a very soft voice and has a microphone with amplifier so he can be understood.  He has profound bradykinesia.      ASSESSMENT:   1.  Common migraine, responsive to nortriptyline.   2.  Progressive supranuclear palsy.      DISCUSSION:  The patient is seen with the above problem list.  The headaches have responded well to nortriptyline, and he also uses the medicine for insomnia to good effect.  At this point, I have recommended continuing the nortriptyline.  He has developed problems with swallowing.  They could open the capsule and just put the powder in the applesauce to see if that works as well.  After that they could try and reduce his dose of nortriptyline to 20 mg at night or even further.  If, however, the headaches come back or his insomnia once again becomes a problem, he should resume the 30 mg dose.  I will  see him in followup in a year, or sooner if there are problems.         TIFF WEAVER MD             D: 2018   T: 2018   MT: LINDA      Name:     KARUNA NELSON   MRN:      [id number]        Account:      [id number]   :      1950           Visit Date:   2018      Document: [id number]        Again, thank you for allowing me to participate in the care of your patient.        Sincerely,        Tiff Weaver MD

## 2018-04-19 NOTE — LETTER
4/19/2018      RE: Jimmy Patrick  [address]  [place] [facility] [address]       Research visit.    They are deciding whether to slowly wean off the nortriptyline and if so they will doing it slowly and monitoring if he has recurrent headaches. For now you will be opening the capsules to facilitate swallowing and putting the medication in pudding.     He was started on rivastigmine 9.5mg a few weeks ago on a Thursday. No side effects. Not clear if better  Had been on rivastigmine 4.6mg    He has  Had more help in the home - has 3 hours.     He continues amantadine 3 hours per day x 5 days.     Went to [facility] for observational study    They will increase his senokot from 1/day to 1 tab twice daily    He has not used miralax - discussed using this in applesauce      He will continue in the study    Paulo Saravia MD

## 2018-04-19 NOTE — PROGRESS NOTES
Nursing Note  Jimmy Patrick presents today to [facility] for:   Chief Complaint   Patient presents with     Infusion     study [id number] ([id number]) (IDS# [id number]) IV infusion 2,100 mg 210 mL     During today's [facility] appointment, orders from Dr. Paulo Saravia were completed.  Frequency: monthly    Progress note:  Patient identification verified by name and date of birth.  Assessment completed.  Vitals recorded in Doc Flowsheets.  Patient was provided with education regarding infusion and possible side effects.  Patient verbalized understanding.      needed: No  Premedications: were not ordered.  Infusion Rates: infusion given over approximately 1 hour.  Approximate Infusion length:1 hours.   Labs: were not ordered for this appointment.  Vascular access: peripheral IV placed today.  Treatment Conditions: non-applicable.  Patient tolerated infusion: well.    Discharge Plan:   Follow up plan of care with: ongoing infusions at [facility].  Discharge instructions were reviewed with patient.  Patient/representative verbalized understanding of discharge instructions and all questions answered.  Patient discharged from [facility] in stable condition.    Trina Barroso RN    Administrations This Visit     study [id number] ([id number]) (IDS# [id number]) IV infusion 2,100 mg 210 mL     Admin Date Action Dose Rate Route Administered By          04/19/2018 New Bag 2100 mg 210 mL/hr Intravenous Trina Barroso RN                         /69 (BP Location: Left arm)  Pulse 86  Temp 97.7  F (36.5  C) (Oral)  Resp 18  SpO2 98%

## 2018-04-19 NOTE — PROGRESS NOTES
Chief Complaint   Patient presents with     Consult     [facility]- TELEMEDICINE     Alton Miranda, ALBERTO

## 2018-04-19 NOTE — PATIENT INSTRUCTIONS
Recommendations from today's MTM visit:                                                      1. You can increase the senna-docusate to one tablet twice a day.    2. We could switch the amantadine capsules to tablets that can be crushed.    3. Monitor for more dizziness and if your blood pressure continues to drop, we can stop the amlodipine.    4. You can stop taking the multivitamin since it is so large.    Next MTM visit: 1-3 months as needed    To schedule another MTM appointment, please call the clinic directly or you may call the MTM scheduling line at [phone] or toll-free at [phone].     My Clinical Pharmacist's contact information:                                                      It was a pleasure seeing you today!  Please feel free to contact me with any questions or concerns you have.      Gale Tapia, Pharm.D.  Medication Therapy Management Resident  Phone: [phone]  Pager: [phone]    You may receive a survey about the MTM services you received.  I would appreciate your feedback to help me serve you better in the future. Please fill it out and return it when you can. Your comments will be anonymous.

## 2018-04-19 NOTE — NURSING NOTE
Chief Complaint   Patient presents with     RECHECK     [facility]- MOVEMENT DISORDER F/U     Alton Miranda, ALBERTO

## 2018-04-19 NOTE — PROGRESS NOTES
"SUBJECTIVE/OBJECTIVE:                Jimmy Patrick is a 67 year old male coming in for a follow-up visit for Medication Therapy Management.  He was referred to me from Dr. Saravia. Wife, Kriss, is present for the visit.    Chief Complaint: Follow up from our visit on 3/6/18  Tobacco: No tobacco use  Alcohol: Less than 1 beverage / month    Medication Adherence/Access:  Patient uses pill box(es), uses reminders/alarms, and has assistance with medication administration. Since adjusting the timing of his medications, patient reports feeling less overwhelmed with medications. He has caregivers visit him three times per day.    Progressive Supranuclear Palsy: Currently taking amantadine 100 mg three times daily and rivastigmine 9.5 mg patch daily. Per recommendation from MT, rivastigmine was recently increased and patient feels this has \"made a difference\" in his cognition. Patient's wife asks about alternate dosage forms for amantadine. He was encouraged by speech pathologist to not use liquid medications, so he continues to take amantadine capsules. He is enrolled in a PSP study at the [facility].    Migraines: Currently taking nortriptyline 30 mg nightly. Patient recently met with Dr. Ramirez for migraine follow up and it was decided to continue nortriptyline since it has been so beneficial. Dr. Ramirez did say that they could try going down to 20 mg nightly and slowly try titrating off, but if he has breakthrough headaches to return to the 30 mg dose. He has zolmitriptan 5 mg and Excedrin OTC on hand for as-needed use, but these are used very rarely.     Hypertension: Current medications include olmesartan 40 mg daily, amlodipine 2.5 mg daily (moved to bedtime) and hydrochlorothiazide 12.5 mg daily. Patient did not discontinue amlodipine as previously recommended; however, patient's edema as recently been improved. He does wear compression stockings when available. Denies dizziness.    Constipation: " Currently taking senna-docusate one tablet daily. He has not noticed any changes in his bowel frequency after starting this. He has Miralax at home but never uses it. Patient reports he is hesitant to use it because he doesn't drink 8 oz of fluids at a time.    Vitamins: Patient continues to take a daily multivitamin but he would like to stop taking it because it is so difficult to swallow.    Current labs include:  BP Readings from Last 3 Encounters:   04/17/18 114/72   03/22/18 134/65   03/22/18 137/86     Last Basic Metabolic Panel:  Lab Results   Component Value Date     11/03/2017      Lab Results   Component Value Date    POTASSIUM 3.9 11/03/2017     Lab Results   Component Value Date    CHLORIDE 106 11/03/2017     Lab Results   Component Value Date    BUN 22 11/03/2017     Lab Results   Component Value Date    CR 1.10 11/03/2017     GFR Estimate   Date Value Ref Range Status   11/03/2017 67 >60 mL/min/1.7m2 Final     Comment:     Non  GFR Calc   05/12/2017 67 >60 mL/min/1.7m2 Final   02/14/2017 75 >60 mL/min/1.7m2 Final     Comment:     Non  GFR Calc     GFR Estimate If Black   Date Value Ref Range Status   11/03/2017 81 >60 mL/min/1.7m2 Final     Comment:      GFR Calc   05/12/2017 81 >60 mL/min/1.7m2 Final   02/14/2017 >90   GFR Calc   >60 mL/min/1.7m2 Final       ASSESSMENT:              Current medications were reviewed today as discussed above.      Medication Adherence: good, no issues identified    Progressive Supranuclear Palsy: Stable. Patient may benefit from tablet formulation of amantadine so that it could be crushed and added to applesauce or pudding.    Migraines: Stable. Patient may try tapering off nortriptyline. We previously discussed the cognitive risks associated with a TCA.    Hypertension: Stable. Informed patient that if he's noticing low blood pressures that we can discontinue amlodipine per his cardiologist   Marla, after previous MTM visit.    Constipation: Needs improvement. Patient may benefit from more frequent dosing of senna-docusate.    Vitamins: Needs improvement. Patient could consider discontinuing MVI since the pills are large and difficult to swallow.     PLAN:                  Recommendations for patient:  1. Consider increasing senna-docusate to one tablet twice daily.  2. Consider whether a switch from amantadine capsule to tablet would be beneficial for swallowing issues (does not want to change this today).   3. Continue monitoring for dizziness due to low blood pressure and swelling in legs; consider discontinuing amlodipine in the future if needed.  4. Consider stopping the Multivitamin.    I spent 30 minutes with this patient today. I offer these suggestions for consideration by the movement disorders specialist. A copy of the visit note was provided to the movement disorders specialist.    Will follow up in 3 months or sooner if needed.    I concur with the note as dictated above which reflects our joint assessment and plan.   Charlotte Mckeon PharmD    The patient was given a summary of these recommendations as an after visit summary.    Gale Tapia, Pharm.D.  Medication Therapy Management Resident  Phone: [phone]  Pager: [phone]

## 2018-04-19 NOTE — MR AVS SNAPSHOT
After Visit Summary   4/19/2018    Jimmy Patrick    MRN: [id number]           Patient Information     Date Of Birth          1950        Visit Information        Provider Department      4/19/2018 10:00 AM Gale Tapia Count includes the [facility] MTM        Care Instructions    Recommendations from today's MTM visit:                                                      1. You can increase the senna-docusate to one tablet twice a day.    2. We could switch the amantadine capsules to tablets that can be crushed.    3. Monitor for more dizziness and if your blood pressure continues to drop, we can stop the amlodipine.    4. You can stop taking the multivitamin since it is so large.    Next MTM visit: 1-3 months as needed    To schedule another MTM appointment, please call the clinic directly or you may call the MTM scheduling line at [phone] or toll-free at [phone].     My Clinical Pharmacist's contact information:                                                      It was a pleasure seeing you today!  Please feel free to contact me with any questions or concerns you have.      Gale Tapia, Pharm.D.  Medication Therapy Management Resident  Phone: [phone]  Pager: [phone]    You may receive a survey about the MTM services you received.  I would appreciate your feedback to help me serve you better in the future. Please fill it out and return it when you can. Your comments will be anonymous.              Follow-ups after your visit        Your next 10 appointments already scheduled     Apr 19, 2018 10:30 AM CDT   (Arrive by 10:15 AM)   Return Movement Disorder with Paulo Saravia MD   [facility] ([facility])    [address] [place] [address] [phone]            Apr 19, 2018 12:00 PM CDT   Infusion 60 with UC SPEC INFUSION, UC 50 ATC   [facility] (  [facility])    [address]  [address]  [place] [address]   [phone]            May 02, 2018  1:15 PM CDT   New Visit with Nishi Oro MD   [facility] ([facility])    [address] [place] [address]   [phone]            May 17, 2018 10:30 AM CDT   (Arrive by 10:15 AM)   Return Movement Disorder with Paulo Saravia MD   [facility] ([facility])    [address] [place] [address]  [address] [facility] [address]   [phone]            May 17, 2018 12:00 PM CDT   Infusion 60 with UC SPEC INFUSION, UC 50 ATC   [facility] ([facility])    [address] [place] [address]  [address]  [facility] [address]   [phone]            Jun 14, 2018  8:00 AM CDT   (Arrive by 7:45 AM)   Cognitive Assessment with Emperatriz Candelaria, PhD [facility] ([facility])    [address] [place] [address]  [address] [facility] [address]   [phone]            Jun 14, 2018  9:30 AM CDT   (Arrive by 9:15 AM)   Return Movement Disorder with Paulo Saravia MD   [facility] ([facility])    [address] [place] [address]  [address] [facility] [address]   [phone]            Jun 14, 2018 11:00 AM CDT   LAB with UC LAB   [facility] ([facility])    [address] [place] [address]  [address] [facility] [address]   [phone]           Please do not eat 10-12 hours before your appointment if you are coming in fasting for labs on lipids, cholesterol, or glucose (sugar). This does not apply to pregnant women. Water, hot tea and black coffee (with nothing added) are okay. Do not drink other fluids, diet soda or chew gum.              Who to contact     If you have questions or need follow up information about today's clinic visit or your schedule  please contact [facility] directly at [phone].  Normal or non-critical lab and imaging results will be communicated to you by MyChart, letter or phone within 4 business days after the clinic has received the results. If you do not hear from us within 7 days, please contact the clinic through [email or web address] or phone. If you have a critical or abnormal lab result, we will notify you by phone as soon as possible.  Submit refill requests through [facility] or call your pharmacy and they will forward the refill request to us. Please allow 3 business days for your refill to be completed.          Additional Information About Your Visit        [email or web address] Information     [facility] gives you secure access to your electronic health record. If you see a primary care provider, you can also send messages to your care team and make appointments. If you have questions, please call your primary care clinic.  If you do not have a primary care provider, please call [phone] and they will assist you.        Care EveryWhere ID     This is your Care EveryWhere ID. This could be used by other organizations to access your [place] medical records  [id number]        Your Vitals Were     Pulse Temperature Respirations Height Pulse Oximetry BMI (Body Mass Index)    78 98.2  F (36.8  C) 24 6' (1.829 m) 95% 25.78 kg/m2       Blood Pressure from Last 3 Encounters:   04/19/18 129/82   04/17/18 114/72   03/22/18 134/65    Weight from Last 3 Encounters:   04/19/18 190 lb 1.6 oz (86.2 kg)   04/17/18 192 lb 11.2 oz (87.4 kg)   02/22/18 194 lb 3.2 oz (88.1 kg)              Today, you had the following     No orders found for display         Today's Medication Changes          These changes are accurate as of 4/19/18 10:28 AM.  If you have any questions, ask your nurse or doctor.               These medicines have changed or have updated prescriptions.        Dose/Directions    olmesartan 20 MG tablet   Commonly known as:  BENICAR    This may have changed:    - how much to take  - when to take this   Used for:  PSP (progressive supranuclear palsy) (H)        Dose:  40 mg   Take 2 tablets (40 mg) by mouth daily   Quantity:  180 tablet   Refills:  3                Primary Care Provider Office Phone # Fax #    Evelian Morse [phone] [address]  [place] [address]        Equal Access to Services     MATEO STEELE : Hadii aad ku hadasho Soomaali, waaxda luqadaha, qaybta kaalmada adeegyada, waxay idiin hayaan adeeg sammysh lasheeba . So [facility] -[phone].    ATENCIÓN: Si alfredola espmishel, tiene a langford disposición servicios gratuitos de asistencia lingüística. Llame al [phone].    We comply with applicable federal civil rights laws and [place] laws. We do not discriminate on the basis of race, color, national origin, age, disability, sex, sexual orientation, or gender identity.            Thank you!     Thank you for choosing [facility]  for your care. Our goal is always to provide you with excellent care. Hearing back from our patients is one way we can continue to improve our services. Please take a few minutes to complete the written survey that you may receive in the mail after your visit with us. Thank you!             Your Updated Medication List - Protect others around you: Learn how to safely use, store and throw away your medicines at www.disposemymeds.org.          This list is accurate as of 4/19/18 10:28 AM.  Always use your most recent med list.                   Brand Name Dispense Instructions for use Diagnosis    acetaminophen-caffeine 500-65 MG Tabs    EXCEDRIN TENSION HEADACHE     Take 1 tablet by mouth every 6 hours as needed for mild pain        adapalene 0.1 % gel    DIFFERIN    135 g    Apply to the face at night.    Acne vulgaris       amantadine 100 MG capsule    SYMMETREL    270 capsule    Take 1 capsule (100 mg) by mouth 3 times daily 6am,11am and 4pm    PSP  (progressive supranuclear palsy) (H)       amLODIPine 2.5 MG tablet    NORVASC     Take 1 tablet by mouth daily        BABY ASPIRIN 81 MG chewable tablet   Generic drug:  aspirin      Take 81 mg by mouth daily        CENTRUM SILVER per tablet     30 tablet    Take 1 tablet by mouth daily        hydrochlorothiazide 12.5 MG Tabs tablet     90 tablet    Take 1 tablet (12.5 mg) by mouth daily    Essential hypertension       nortriptyline 10 MG capsule    PAMELOR    270 capsule    TAKE 3 CAPSULES AT BEDTIME    Migraine without aura and without status migrainosus, not intractable       olmesartan 20 MG tablet    BENICAR    180 tablet    Take 2 tablets (40 mg) by mouth daily    PSP (progressive supranuclear palsy) (H)       rivastigmine 9.5 MG/24HR 24 hr patch    EXELON    30 patch    Place 1 patch onto the skin daily    PSP (progressive supranuclear palsy) (H)       senna-docusate 8.6-50 MG per tablet    SENOKOT-S;PERICOLACE    90 tablet    Take 1 tablet by mouth daily as needed for constipation    Constipation, unspecified constipation type       Vitamin D-3 Super Strength 2000 units tablet   Generic drug:  cholecalciferol      Take 1 tablet by mouth        ZOLMitriptan 5 MG tablet    ZOMIG    30 tablet    Take 1 tablet (5 mg) by mouth at onset of headache for migraine    PSP (progressive supranuclear palsy) (H), Migraine without aura and without status migrainosus, not intractable

## 2018-04-19 NOTE — MR AVS SNAPSHOT
After Visit Summary   4/19/2018    Jimmy Patrick    MRN: [id number]           Patient Information     Date Of Birth          1950        Visit Information        Provider Department      4/19/2018 10:30 AM Paulo Saravia MD [facility]        Today's Diagnoses     Examination of participant in clinical trial    -  1    PSP (progressive supranuclear palsy)        Constipation, unspecified constipation type           Follow-ups after your visit        Your next 10 appointments already scheduled     Apr 19, 2018 12:00 PM CDT   Infusion 60 with UC SPEC INFUSION, UC 50 ATC   [facility] Specialty and Procedure ([facility] [place])    [address]  [address]  [facility] [address] [phone]            May 02, 2018  1:15 PM CDT   New Visit with Nishi Oro MD   [facility]    [address] [place] [address] [phone]            May 17, 2018 10:30 AM CDT   (Arrive by 10:15 AM)   Return Movement Disorder with Paulo Saravia MD   [facility] ([facility])    [address]  3rd Floor  [facility] [id number] [phone]            May 17, 2018 12:00 PM CDT   Infusion 60 with UC SPEC INFUSION, UC 50 ATC   [facility] Procedure ([facility])    [address]  [address]  [facility] [address] [phone]            Jun 14, 2018  8:00 AM CDT   (Arrive by 7:45 AM)   Cognitive Assessment with Emperatriz Candelaria, PhD [facility] ([facility])    [address]  3rd Floor  [facility] [id number] [phone]            Jun 14, 2018  9:30 AM CDT   (Arrive by 9:15 AM)   Return Movement Disorder with Paulo Saravia MD   [facility] ([facility])    [address] [id number] [phone]            Jun 14, 2018 11:00 AM CDT   LAB with  LAB   [facility] ([facility])    [address] [id number] [phone]           Please do not eat 10-12 hours before your appointment if you are coming in fasting for labs on lipids, cholesterol, or glucose (sugar). This does not apply to pregnant women. Water, hot tea and black coffee (with nothing added) are okay. Do not drink other fluids, diet soda or chew gum.            Jun 14, 2018 12:00 PM CDT   Infusion 60 with  SPEC INFUSION   [facility] [place] Specialty and Procedure ([facility])    [address]  [address]  [facility] [address] [phone]              Who to contact     Please call your clinic at [phone] to:    Ask questions about your health    Make or cancel appointments    Discuss your medicines    Learn about your test results    Speak to your doctor            Additional Information About Your Visit        "Hey, Neighbor!"[employer]Now[employer] Information     "[employer]" gives you secure access to your electronic health record. If you see a primary care provider, you can also send messages to your care team and make appointments. If you have questions, please call your primary care clinic.  If you do not have a primary care provider, please call [phone] and they will assist you.      "[employer]" is an electronic gateway that provides easy, online access to your medical records. With "[employer]", you can request a clinic appointment, read your test results, renew a prescription or communicate with your care team.     To access your existing account, please contact your [facility] or call [phone] for assistance.        Care EveryWhere ID     This is your Care EveryWhere ID. This could be used by other organizations to access your [facility]  records  [id number]        Your Vitals Were     Pulse Temperature Respirations Height Pulse Oximetry BMI (Body Mass Index)    78 98.2  F (36.8  C) (Oral) 24 1.829 m (6') 95% 25.78 kg/m2       Blood Pressure from Last 3 Encounters:   04/19/18 129/82   04/19/18 129/82   04/17/18 114/72    Weight from Last 3 Encounters:   04/19/18 86.2 kg (190 lb 1.6 oz)   04/19/18 86.2 kg (190 lb 1.6 oz)   04/17/18 87.4 kg (192 lb 11.2 oz)              Today, you had the following     No orders found for display         Today's Medication Changes          These changes are accurate as of 4/19/18 10:46 AM.  If you have any questions, ask your nurse or doctor.               These medicines have changed or have updated prescriptions.        Dose/Directions    senna-docusate 8.6-50 MG per tablet   Commonly known as:  SENOKOT-S;PERICOLACE   This may have changed:    - when to take this  - reasons to take this   Used for:  Constipation, unspecified constipation type   Changed by:  Paulo Saravia MD        Dose:  1 tablet   Take 1 tablet by mouth 2 times daily   Quantity:  90 tablet   Refills:  6            Where to get your medicines      These medications were sent to [facility] - [address] [address]     Phone:  [phone]     amantadine 100 MG capsule                Primary Care Provider Office Phone # Fax #    Evelina Morse [phone] [address]  [facility] [address]        Equal Access to Services     [facility] AH: Hadmikal Granado, wacésarda luqesaton, qaybta kaalgely rubalcava. So [facility] [phone].    ATENCIÓN: Si habla español, tiene a langford disposición servicios gratuitos de asistencia lingüística. Kiel al [phone].    We comply with applicable federal civil rights laws and [place] laws. We do not discriminate on the basis of race, color, national  origin, age, disability, sex, sexual orientation, or gender identity.            Thank you!     Thank you for choosing [facility] NEUROLOGY  for your care. Our goal is always to provide you with excellent care. Hearing back from our patients is one way we can continue to improve our services. Please take a few minutes to complete the written survey that you may receive in the mail after your visit with us. Thank you!             Your Updated Medication List - Protect others around you: Learn how to safely use, store and throw away your medicines at www.disposemymeds.org.          This list is accurate as of 4/19/18 10:46 AM.  Always use your most recent med list.                   Brand Name Dispense Instructions for use Diagnosis    acetaminophen-caffeine 500-65 MG Tabs    EXCEDRIN TENSION HEADACHE     Take 1 tablet by mouth every 6 hours as needed for mild pain        adapalene 0.1 % gel    DIFFERIN    135 g    Apply to the face at night.    Acne vulgaris       amantadine 100 MG capsule    SYMMETREL    270 capsule    Take 1 capsule (100 mg) by mouth 3 times daily 6am,11am and 4pm    PSP (progressive supranuclear palsy) (H)       amLODIPine 2.5 MG tablet    NORVASC     Take 1 tablet by mouth daily        BABY ASPIRIN 81 MG chewable tablet   Generic drug:  aspirin      Take 81 mg by mouth daily        BENICAR 20 MG tablet   Generic drug:  olmesartan      Take 1 tablet (20 mg) by mouth 2 times daily    PSP (progressive supranuclear palsy) (H)       CENTRUM SILVER per tablet     30 tablet    Take 1 tablet by mouth daily        hydrochlorothiazide 12.5 MG Tabs tablet     90 tablet    Take 1 tablet (12.5 mg) by mouth daily    Essential hypertension       nortriptyline 10 MG capsule    PAMELOR    270 capsule    TAKE 3 CAPSULES AT BEDTIME    Migraine without aura and without status migrainosus, not intractable       rivastigmine 9.5 MG/24HR 24 hr patch    EXELON    30 patch    Place 1 patch onto the skin daily    PSP  (progressive supranuclear palsy) (H)       senna-docusate 8.6-50 MG per tablet    SENOKOT-S;PERICOLACE    90 tablet    Take 1 tablet by mouth 2 times daily    Constipation, unspecified constipation type       Vitamin D-3 Super Strength 2000 units tablet   Generic drug:  cholecalciferol      Take 1 tablet by mouth        ZOLMitriptan 5 MG tablet    ZOMIG    30 tablet    Take 1 tablet (5 mg) by mouth at onset of headache for migraine    PSP (progressive supranuclear palsy) (H), Migraine without aura and without status migrainosus, not intractable

## 2018-04-19 NOTE — MR AVS SNAPSHOT
After Visit Summary   4/19/2018    Jimmy Patrick    MRN: [id number]           Patient Information     Date Of Birth          1950        Visit Information        Provider Department      4/19/2018 12:00 PM UC 50 ATC; UC SPEC INFUSION [facility] Specialty and Procedure        Today's Diagnoses     PSP (progressive supranuclear palsy) (H)    -  1       Follow-ups after your visit        Your next 10 appointments already scheduled     May 02, 2018  1:15 PM CDT   New Visit with Nishi Oro MD   [facility] ([facility])    [address] [place] [address]   [phone]            May 17, 2018 10:30 AM CDT   (Arrive by 10:15 AM)   Return Movement Disorder with Paulo Saravia MD   [facility] Neurology ([facility])    [address] [place] [address]   [phone]            May 17, 2018 12:00 PM CDT   Infusion 60 with UC SPEC INFUSION, UC 50 ATC   [facility] Procedure ([facility])    [address] [place] [address]   [phone]            Jun 14, 2018  8:00 AM CDT   (Arrive by 7:45 AM)   Cognitive Assessment with Emperatriz Candelaria, PhD [facility] Neuropsychology ([facility])    [address] [place] [address]   [phone]            Jun 14, 2018  9:30 AM CDT   (Arrive by 9:15 AM)   Return Movement Disorder with Paulo Saravia MD   [facility] Neurology ([facility])    [address] [place] [address]   [phone]            Jun 14, 2018 11:00 AM CDT   LAB with UC LAB   [facility] ([facility])    [address] [place] [address]   [phone]           Please do not eat 10-12 hours before your appointment  if you are coming in fasting for labs on lipids, cholesterol, or glucose (sugar). This does not apply to pregnant women. Water, hot tea and black coffee (with nothing added) are okay. Do not drink other fluids, diet soda or chew gum.            Jun 14, 2018 12:00 PM CDT   Infusion 60 with UC SPEC INFUSION, UC 50 ATC   [facility] Specialty and Procedure ([facility])    [address]  [address]  [facility] [address] [phone]            Jul 11, 2018 11:00 AM CDT   Return Visit with Clint Gutiérrez MD   [facility] ([facility])    [address] [place] [address] [phone]              Who to contact     If you have questions or need follow up information about today's clinic visit or your schedule please contact [facility] SPECIALTY AND PROCEDURE directly at [phone].  Normal or non-critical lab and imaging results will be communicated to you by [employer], letter or phone within 4 business days after the clinic has received the results. If you do not hear from us within 7 days, please contact the clinic through [employer] or phone. If you have a critical or abnormal lab result, we will notify you by phone as soon as possible.  Submit refill requests through [employer] or call your pharmacy and they will forward the refill request to us. Please allow 3 business days for your refill to be completed.          Additional Information About Your Visit        [employer] Information     [employer] gives you secure access to your electronic health record. If you see a primary care provider, you can also send messages to your care team and make appointments. If you have questions, please call your primary care clinic.  If you do not have a primary care provider, please call [phone] and they will assist you.        Care EveryWhere ID     This is your Care EveryWhere ID. This could be  used by other organizations to access your [facility] medical records  [id number]        Your Vitals Were     Pulse Temperature Respirations Pulse Oximetry          80 97.7  F (36.5  C) (Oral) 18 98%         Blood Pressure from Last 3 Encounters:   04/19/18 120/62   04/19/18 129/82   04/19/18 129/82    Weight from Last 3 Encounters:   04/19/18 86.2 kg (190 lb 1.6 oz)   04/19/18 86.2 kg (190 lb 1.6 oz)   04/17/18 87.4 kg (192 lb 11.2 oz)              Today, you had the following     No orders found for display         Today's Medication Changes          These changes are accurate as of 4/19/18  5:20 PM.  If you have any questions, ask your nurse or doctor.               These medicines have changed or have updated prescriptions.        Dose/Directions    senna-docusate 8.6-50 MG per tablet   Commonly known as:  SENOKOT-S;PERICOLACE   This may have changed:    - when to take this  - reasons to take this   Used for:  Constipation, unspecified constipation type   Changed by:  Paulo Saravia MD        Dose:  1 tablet   Take 1 tablet by mouth 2 times daily   Quantity:  90 tablet   Refills:  6            Where to get your medicines      These medications were sent to [facility] - [address] [address]     Phone:  [phone]     amantadine 100 MG capsule                Primary Care Provider Office Phone # Fax #    Evelina Morse [phone] [address]  [facility] [address]        Equal Access to Services     [facility] AH: Hadii aad ku hadasho Soomaali, waaxda luqadaha, qaybta kaalmada adeegyajocelynn, gely idicatalina lynch. So [facility] [phone].    ATENCIÓN: Si habla español, tiene a langford disposición servicios gratuitos de asistencia lingüística. Llame al [phone].    We comply with applicable federal civil rights laws and [place] laws. We do not discriminate on the basis of race, color,  national origin, age, disability, sex, sexual orientation, or gender identity.            Thank you!     Thank you for choosing [facility] SPECIALTY AND PROCEDURE  for your care. Our goal is always to provide you with excellent care. Hearing back from our patients is one way we can continue to improve our services. Please take a few minutes to complete the written survey that you may receive in the mail after your visit with us. Thank you!             Your Updated Medication List - Protect others around you: Learn how to safely use, store and throw away your medicines at www.disposemymeds.org.          This list is accurate as of 4/19/18  5:20 PM.  Always use your most recent med list.                   Brand Name Dispense Instructions for use Diagnosis    acetaminophen-caffeine 500-65 MG Tabs    EXCEDRIN TENSION HEADACHE     Take 1 tablet by mouth every 6 hours as needed for mild pain        adapalene 0.1 % gel    DIFFERIN    135 g    Apply to the face at night.    Acne vulgaris       amantadine 100 MG capsule    SYMMETREL    270 capsule    Take 1 capsule (100 mg) by mouth 3 times daily 6am,11am and 4pm    PSP (progressive supranuclear palsy) (H)       amLODIPine 2.5 MG tablet    NORVASC     Take 1 tablet by mouth daily        BABY ASPIRIN 81 MG chewable tablet   Generic drug:  aspirin      Take 81 mg by mouth daily        BENICAR 20 MG tablet   Generic drug:  olmesartan      Take 1 tablet (20 mg) by mouth 2 times daily    PSP (progressive supranuclear palsy) (H)       CENTRUM SILVER per tablet     30 tablet    Take 1 tablet by mouth daily        hydrochlorothiazide 12.5 MG Tabs tablet     90 tablet    Take 1 tablet (12.5 mg) by mouth daily    Essential hypertension       nortriptyline 10 MG capsule    PAMELOR    270 capsule    TAKE 3 CAPSULES AT BEDTIME    Migraine without aura and without status migrainosus, not intractable       rivastigmine 9.5 MG/24HR 24 hr patch    EXELON    30 patch     Place 1 patch onto the skin daily    PSP (progressive supranuclear palsy) (H)       senna-docusate 8.6-50 MG per tablet    SENOKOT-S;PERICOLACE    90 tablet    Take 1 tablet by mouth 2 times daily    Constipation, unspecified constipation type       Vitamin D-3 Super Strength 2000 units tablet   Generic drug:  cholecalciferol      Take 1 tablet by mouth        ZOLMitriptan 5 MG tablet    ZOMIG    30 tablet    Take 1 tablet (5 mg) by mouth at onset of headache for migraine    PSP (progressive supranuclear palsy) (H), Migraine without aura and without status migrainosus, not intractable

## 2018-05-02 NOTE — PATIENT INSTRUCTIONS
Patient Instructions      Expand All Collapse All    Thank you for coming into the Palliative Care Clinic today.     1. For pain:   - you can take acetaminophen 1000mg (Tylenol Extra strength) up to three times a day as needed for pain.        - don't take more than 4000mg in a day. Acetaminophen is also in a variety of over-the-counter medications as well as your migraine medication        2. Please make an appointment with CAROL Euceda. She is the palliative clinical . This will be to complete a health care directive and counseling/support    Please do not make any changes to your medication doses or schedules without calling us in advance.     Return to clinic in 2-4 weeks for a follow up. Please make the appointment with myself as well as CAROL Euceda      You can reach the Palliative Care Team during business hours at the following number:    - [phone]    To reach the Palliative Care Provider on-call After-hours or on holidays and weekends, call: [phone].  Please note that we are not able to provide pain medication refills on evenings or weekends.

## 2018-05-02 NOTE — LETTER
"    5/2/2018         RE: Jimmy Patrick  [address] [facility] [address]        Dear Colleague,    Thank you for referring your patient, Jimmy Patrick, to the [facility]. Please see a copy of my visit note below.    Palliative Care Outpatient Clinic Consultation Note    Patient:  Jimmy Patrick    This note was written using voice recognition. I did proof read, but might have missed some things. Please contact me for major errors.    Chief Complaint:   Jimmy Patrick 67 year old male who is presenting to the palliative medicine clinic today at the request of Dr Saravia for a palliative care consultation secondary to progressive supranuclear palsy.   The patient's primary care provider is:  Evelina Morse     Pt is here with his wife Kriss.  He uses a microphone device to enhance his speech.    History of Present Illness:  Medical History:  - progressive supranuclear palsy diagnosed in 2014  - migraine headaches   - AFib, HTN    I introduce the scope and practice of palliative care.     Jimmy describes how much his disease has affected his quality of life.  He states he feels \"trapped in this body\".  More precisely he is now very weak with repeated falls, has difficulties talking, his vision is poor and he has dysphagia.   He has diffuse pain, at this time does not want any medications.  He has tried NSAIDs, but developed epistaxis.  He has chronic back pain due to her fall in the past.  So far he has been managing this with exercises.  He is very concerned how this will develop when she is unable to do his exercises.    He has increasing difficulty swallowing, was seen by speech therapy and told to avoid thin liquids.  Currently he still feeds himself, but it's difficult and time-consuming.  He is very clear that he does not want a feeding tube.     Patient's Disease Understanding:  Very good.  He understands that this is a progressive disease, which will eventually " "date to his death.    Coping: Jimmy with significant progressive loss of function in multiple aspects of his life.  He tells me that laughter and humor very important for him, especially now.   He feels very supported by his wife and 2 daughters.  He attends a support group and his wife goes to caregiver support group.  Since both the daughters live in the area they moved here from [place].  Kriss and Raymond are glad to be near family, but have also lost their social network with the  move.    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  Hobbies: comedies  Spiritual Background: Gnosticism  Spiritual Concerns/Needs: they are looking for a Jewish to attend, haven't found a good fit since they came here from [place]    Social History   Substance Use Topics     Smoking status: Never Smoker     Smokeless tobacco: Never Used     Alcohol use No       Family History  Family History   Problem Relation Age of Onset     CANCER Father      Patient's Involvement with Prior History of Serious Illness in Family: His sister-in-law was diagnosed with MS since her 20's. She is now reportedly bedbound. Raymond states that when he feels overwhelmed and frustrated with his condition he tries to remind himself that she had to face similar losses at a much earlier stage in life.     Advance Care Planning:  Advance Directive:    He has not completed a health care directive yet, but is interested in doing so.   Health Care Agent Contact Information: He is going to appoint Kriss and likely their daughters as alternates    POLST:   He is quite certain that he wants to be DNR/DNI, he wants to \"go quickly\". We " will complete a POLST at the next visit.    Medications, current, pertinent:  Nortriptyline 30mg HS    Allergies   Allergen Reactions     Other [Seasonal Allergies]      environmental     Codeine Other (See Comments) and Rash     GI upset  GI upset     Past Medical History:   Diagnosis Date     Blurred vision 2014     Cancer (H) 1979; 's    Florin: father; grandparents (on father's side)     DISK (aka Displacement of intervertebral disc, site unspecified, without myelopathy) 2014     Displacement of cervical intervertebral disc without myelopathy (HERNIATED DISK) 2014     Double vision 2014     Epistaxis 2014     Fluttering heart 2014     Headache 2014     Leg swelling 2014     Memory loss 2014     Migraines     Ramila: karen     PSP (progressive supranuclear palsy) (H) 2014     Sinus disorder 2014     Tinnitus 2014     Urinary urgency 2014     Varicose veins 2014     Past Surgical History:   Procedure Laterality Date     BIOPSY      Dermatologist remoed skin mole (not cancerus)     CARDIAC SURGERY   ablation    Successful in eliminatng heart flutter     H ABLATION ATRIAL FLUTTER         REVIEW OF SYSTEMS:   ROS: 10 point ROS neg other than the symptoms noted above in the HPI     Physical Exam:  There were no vitals taken for this visit.    CONSTIT: awake, appears comfortable, decreased tone  EENT: MMM, EOMI, no icterus  RESP: reg, slightly increased effort, soft voice, more so towards the end of the visit  MSK:moves x4, generalized weakness  SKIN:  warm, no rash, no obvious lesions  NEURO: alert, oriented x3  PSYCH: flat affect, memory and thought process intact        Data Reviewed:  LABS: 11/3: GFR 67    EKG: QTc 440, NSR    : no record    Impressions:  Palliative Performance Score:  60  Decision Making Capacity:  intact      Recommendations & Counseliny/o male with progressive supranuclear palsy.     Pain: generalized,  likely in context of decreasing tone and mobility  - acetaminophen 1000mg tid prn    Advance Care Planning: will complete HCD and POLST at next visit.     Coping: they are facing the progressive loss of his function and ability to participate in activities they enjoy and had planned for their penitentiary. They are attending support groups and using humor.   - recommended to make an appointment with CAROL Euceda     Return to clinic in 2-4 weeks        Nishi Oro MD  Palliative Medicine  Pager [phone]        Again, thank you for allowing me to participate in the care of your patient.        Sincerely,        Nishi Oro MD

## 2018-05-02 NOTE — NURSING NOTE
Oncology Rooming Note    May 2, 2018 1:28 PM   Jimmy Patrick is a 67 year old male who presents for:    Chief Complaint   Patient presents with     Palliative     Initial Vitals: /81  Pulse 88  SpO2 99% Estimated body mass index is 25.78 kg/(m^2) as calculated from the following:    Height as of 4/19/18: 1.829 m (6').    Weight as of 4/19/18: 86.2 kg (190 lb 1.6 oz). There is no height or weight on file to calculate BSA.  Moderate Pain (5) Comment: Data Unavailable   No LMP for male patient.  Allergies reviewed: Yes  Medications reviewed: Yes    Medications: Medication refills not needed today.  Pharmacy name entered into [facility]:    [id number] - [place], [place] - [address] [address]  [facility] - [place], [place] - [address] [place] [address]        5 minutes for nursing intake (face to face time)     Yanni Chow LPN

## 2018-05-02 NOTE — PROGRESS NOTES
"Palliative Care Outpatient Clinic Consultation Note    Patient:  Jimmy Patrick    This note was written using voice recognition. I did proof read, but might have missed some things. Please contact me for major errors.    Chief Complaint:   Jimmy Patrick 67 year old male who is presenting to the palliative medicine clinic today at the request of Dr Saravia for a palliative care consultation secondary to progressive supranuclear palsy.   The patient's primary care provider is:  Evelina Morse     Pt is here with his wife Kriss.  He uses a microphone device to enhance his speech.    History of Present Illness:  Medical History:  - progressive supranuclear palsy diagnosed in 2014  - migraine headaches   - AFib, HTN    I introduce the scope and practice of palliative care.     Jimmy describes how much his disease has affected his quality of life.  He states he feels \"trapped in this body\".  More precisely he is now very weak with repeated falls, has difficulties talking, his vision is poor and he has dysphagia.   He has diffuse pain, at this time does not want any medications.  He has tried NSAIDs, but developed epistaxis.  He has chronic back pain due to her fall in the past.  So far he has been managing this with exercises.  He is very concerned how this will develop when she is unable to do his exercises.    He has increasing difficulty swallowing, was seen by speech therapy and told to avoid thin liquids.  Currently he still feeds himself, but it's difficult and time-consuming.  He is very clear that he does not want a feeding tube.     Patient's Disease Understanding:  Very good.  He understands that this is a progressive disease, which will eventually date to his death.    Coping: Jimmy with significant progressive loss of function in multiple aspects of his life.  He tells me that laughter and humor very important for him, especially now.   He feels very supported by his wife and 2 daughters.  He " "attends a support group and his wife goes to caregiver support group.  Since both the daughters live in the area they moved here from [place].  Kriss and Raymond are glad to be near family, but have also lost their social network with the  move.    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  Hobbies: comedies  Spiritual Background: Amish  Spiritual Concerns/Needs: they are looking for a Jewish to attend, haven't found a good fit since they came here from [place]    Social History   Substance Use Topics     Smoking status: Never Smoker     Smokeless tobacco: Never Used     Alcohol use No       Family History  Family History   Problem Relation Age of Onset     CANCER Father      Patient's Involvement with Prior History of Serious Illness in Family: His sister-in-law was diagnosed with MS since her 20's. She is now reportedly bedbound. Raymond states that when he feels overwhelmed and frustrated with his condition he tries to remind himself that she had to face similar losses at a much earlier stage in life.     Advance Care Planning:  Advance Directive:    He has not completed a health care directive yet, but is interested in doing so.   Health Care Agent Contact Information: He is going to appoint Kriss and likely their daughters as alternates    POLST:   He is quite certain that he wants to be DNR/DNI, he wants to \"go quickly\". We will complete a POLST at the next visit.    Medications, current, pertinent:  Nortriptyline 30mg HS    Allergies   Allergen Reactions     Other [Seasonal Allergies]      environmental     Codeine Other (See Comments) and Rash     GI upset  GI upset "     Past Medical History:   Diagnosis Date     Blurred vision 2014     Cancer (H) 1979; 1960's    Florin: father; grandparents (on father's side)     DISK (aka Displacement of intervertebral disc, site unspecified, without myelopathy) 2014     Displacement of cervical intervertebral disc without myelopathy (HERNIATED DISK) 2014     Double vision 2014     Epistaxis 2014     Fluttering heart 2014     Headache 2014     Leg swelling 2014     Memory loss 2014     Migraines     Ramila: karen     PSP (progressive supranuclear palsy) (H) 2014     Sinus disorder 2014     Tinnitus 2014     Urinary urgency 2014     Varicose veins 2014     Past Surgical History:   Procedure Laterality Date     BIOPSY      Dermatologist remoed skin mole (not cancerus)     CARDIAC SURGERY   ablation    Successful in eliminatng heart flutter     H ABLATION ATRIAL FLUTTER         REVIEW OF SYSTEMS:   ROS: 10 point ROS neg other than the symptoms noted above in the HPI     Physical Exam:  There were no vitals taken for this visit.    CONSTIT: awake, appears comfortable, decreased tone  EENT: MMM, EOMI, no icterus  RESP: reg, slightly increased effort, soft voice, more so towards the end of the visit  MSK:moves x4, generalized weakness  SKIN:  warm, no rash, no obvious lesions  NEURO: alert, oriented x3  PSYCH: flat affect, memory and thought process intact        Data Reviewed:  LABS: 11/3: GFR 67    EKG: QTc 440, NSR    : no record    Impressions:  Palliative Performance Score:  60  Decision Making Capacity:  intact      Recommendations & Counseliny/o male with progressive supranuclear palsy.     Pain: generalized, likely in context of decreasing tone and mobility  - acetaminophen 1000mg tid prn    Advance Care Planning: will complete HCD and POLST at next visit.     Coping: they are facing the progressive loss of his function and ability to participate in  activities they enjoy and had planned for their group home. They are attending support groups and using humor.   - recommended to make an appointment with CAROL Euceda     Return to clinic in 2-4 weeks        Nishi Oro MD  Palliative Medicine  Pager [phone]

## 2018-05-02 NOTE — MR AVS SNAPSHOT
After Visit Summary   5/2/2018    Jimmy Patrick    MRN: [id number]           Patient Information     Date Of Birth          1950        Visit Information        Provider Department      5/2/2018 1:15 PM Nishi Oro MD [facility]        Care Instructions    Patient Instructions      Expand All Collapse All    Thank you for coming into the Palliative Care Clinic today.     1. For pain:   - you can take acetaminophen 1000mg (Tylenol Extra strength) up to three times a day as needed for pain.        - don't take more than 4000mg in a day. Acetaminophen is also in a variety of over-the-counter medications as well as your migraine medication        2. Please make an appointment with CAROL Euceda. She is the palliative clinical . This will be to complete a health care directive and counseling/support    Please do not make any changes to your medication doses or schedules without calling us in advance.     Return to clinic in 2-4 weeks for a follow up. Please make the appointment with myself as well as CAROL Euceda      You can reach the Palliative Care Team during business hours at the following number:    - [phone]    To reach the Palliative Care Provider on-call After-hours or on holidays and weekends, call: [phone].  Please note that we are not able to provide pain medication refills on evenings or weekends.                     Follow-ups after your visit        Your next 10 appointments already scheduled     May 16, 2018 10:00 AM CDT   New Visit with Victorina Del Toro PA-C   [facility] ([facility]    [address] [id number] [phone]            May 17, 2018 10:30 AM CDT   (Arrive by 10:15 AM)   Return Movement Disorder with Paulo Saravia MD   [facility] ([facility])    [address] [place] [address]    3rd [facility] [address]   [phone]            May 17, 2018 12:00 PM CDT   Infusion 60 with UC SPEC INFUSION, UC 50 ATC   [facility] ([facility])    [address] [place] [address]   [phone]            May 31, 2018  9:15 AM CDT   Return Visit with Nishi Oro MD   [facility]    [address] [place] [address]   [phone]            May 31, 2018 11:00 AM CDT   Return Visit with CAROL Mcgarry   [facility])    [address] [place] [address]   [phone]            Jun 14, 2018  8:00 AM CDT   (Arrive by 7:45 AM)   Cognitive Assessment with Emperatriz Candelaria, PhD [facility] ([facility])    [address] [place] [address]   [phone]            Jun 14, 2018  9:30 AM CDT   (Arrive by 9:15 AM)   Return Movement Disorder with Paulo Saravia MD   [facility] ([facility])    [address] [place] [address]   [phone]            Jun 14, 2018 11:00 AM CDT   LAB with UC LAB   [facility] ([facility])    [address] [place] [address]   [phone]           Please do not eat 10-12 hours before your appointment if you are coming in fasting for labs on lipids, cholesterol, or glucose (sugar). This does not apply to pregnant women. Water, hot tea and black coffee (with nothing added) are okay. Do not drink other fluids, diet soda or chew gum.            Jun 14, 2018 12:00 PM CDT   Infusion 60 with UC SPEC INFUSION   [facility] ([facility])    [address] [place] [address]  [address]  [facility] [address] [phone]              Who to contact     If you have questions or need follow up information about today's clinic visit or your schedule please contact [facility] directly at [phone].  Normal or non-critical lab and imaging results will be communicated to you by MyChart, letter or phone within 4 business days after the clinic has received the results. If you do not hear from us within 7 days, please contact the clinic through opentabshart or phone. If you have a critical or abnormal lab result, we will notify you by phone as soon as possible.  Submit refill requests through [employer] or call your pharmacy and they will forward the refill request to us. Please allow 3 business days for your refill to be completed.          Additional Information About Your Visit        [employer] Information     [employer] gives you secure access to your electronic health record. If you see a primary care provider, you can also send messages to your care team and make appointments. If you have questions, please call your primary care clinic.  If you do not have a primary care provider, please call [phone] and they will assist you.      [employer] is an electronic gateway that provides easy, online access to your medical records. With [employer], you can request a clinic appointment, read your test results, renew a prescription or communicate with your care team.     To access your existing account, please contact your [facility] or call [phone] for assistance.        Care EveryWhere ID     This is your Care EveryWhere ID. This could be used by other organizations to access your [facility] medical records  [id number]        Your Vitals Were     Pulse Pulse Oximetry                88 99%           Blood Pressure from Last 3 Encounters:   05/02/18 125/81   04/19/18 120/62   04/19/18 129/82    Weight from Last 3 Encounters:   04/19/18 86.2 kg (190 lb 1.6  oz)   04/19/18 86.2 kg (190 lb 1.6 oz)   04/17/18 87.4 kg (192 lb 11.2 oz)              Today, you had the following     No orders found for display       Primary Care Provider Office Phone # Fax #    Evelina Morse [phone] [address]  [facility] [address]        Equal Access to Services     MATEO [facility] Center[facility] : Hadii aad ku hadasho Soomaali, waaxda luqadaha, qaybta kaalmada adeegyada, waxay idiin hayaan adeeg kharash laReggieaan . So [facility] [phone].    ATENCIÓN: Si ruthy briceno, tiene a langford disposición servicios gratuitos de asistencia lingüística. Llame al [phone].    We comply with applicable federal civil rights laws and [place] laws. We do not discriminate on the basis of race, color, national origin, age, disability, sex, sexual orientation, or gender identity.            Thank you!     Thank you for choosing [facility]  for your care. Our goal is always to provide you with excellent care. Hearing back from our patients is one way we can continue to improve our services. Please take a few minutes to complete the written survey that you may receive in the mail after your visit with us. Thank you!             Your Updated Medication List - Protect others around you: Learn how to safely use, store and throw away your medicines at www.disposemymeds.org.          This list is accurate as of 5/2/18  3:54 PM.  Always use your most recent med list.                   Brand Name Dispense Instructions for use Diagnosis    acetaminophen-caffeine 500-65 MG Tabs    EXCEDRIN TENSION HEADACHE     Take 1 tablet by mouth every 6 hours as needed for mild pain        adapalene 0.1 % gel    DIFFERIN    135 g    Apply to the face at night.    Acne vulgaris       amantadine 100 MG capsule    SYMMETREL    270 capsule    Take 1 capsule (100 mg) by mouth 3 times daily 6am,11am and 4pm    PSP (progressive supranuclear palsy) (H)       amLODIPine 2.5 MG tablet    NORVASC     Take 1  tablet by mouth daily        BABY ASPIRIN 81 MG chewable tablet   Generic drug:  aspirin      Take 81 mg by mouth daily        BENICAR 20 MG tablet   Generic drug:  olmesartan      Take 1 tablet (20 mg) by mouth 2 times daily    PSP (progressive supranuclear palsy) (H)       CENTRUM SILVER per tablet     30 tablet    Take 1 tablet by mouth daily        hydrochlorothiazide 12.5 MG Tabs tablet     90 tablet    Take 1 tablet (12.5 mg) by mouth daily    Essential hypertension       nortriptyline 10 MG capsule    PAMELOR    270 capsule    TAKE 3 CAPSULES AT BEDTIME    Migraine without aura and without status migrainosus, not intractable       rivastigmine 9.5 MG/24HR 24 hr patch    EXELON    30 patch    Place 1 patch onto the skin daily    PSP (progressive supranuclear palsy) (H)       senna-docusate 8.6-50 MG per tablet    SENOKOT-S;PERICOLACE    90 tablet    Take 1 tablet by mouth 2 times daily    Constipation, unspecified constipation type       Vitamin D-3 Super Strength 2000 units tablet   Generic drug:  cholecalciferol      Take 1 tablet by mouth        ZOLMitriptan 5 MG tablet    ZOMIG    30 tablet    Take 1 tablet (5 mg) by mouth at onset of headache for migraine    PSP (progressive supranuclear palsy) (H), Migraine without aura and without status migrainosus, not intractable

## 2018-05-02 NOTE — PROGRESS NOTES
SPIRITUAL HEALTH SERVICES  SPIRITUAL ASSESSMENT Progress Note  [facility]    PRIMARY FOCUS:     Emotional/spiritual/[employer]ist distress    Support for coping - Staff referral    ILLNESS CIRCUMSTANCES:   Reviewed documentation. Reflective conversation shared with danuta Patrick & his wife, Kriss, which integrated elements of illness and family narratives.     Context of Serious Illness/Symptom(s) - Progressive supranuclear palsy    Resources for Support - Wife, two daughters    DISTRESS:     Emotional/Spiritual/Existential Distress - trying to cope with a progressive, debilitating illness.    [employer]ist Distress - Unknown    Social/Cultural/Economic Distress - Pt and his wife moved here from [place] two years ago to be closer to their daughters which meant they were uprooted from their socialnetworks in [place].    SPIRITUAL/Zoroastrian COPING:     Christian/Carol - Taoist    Spiritual Practice(s) - Jehovah's witness, prayer -  provided prayer, communion, a prayer shawl and sang a verse of Amazing Freida.    Emotional/Relational/Existential Connections - Kriss is pt's primary caregiver.    GOALS OF CARE:    Goals of Care - Help pt manage his illness.    Meaning/Sense-Making - Unknown    PLAN:  will follow up on Wednesday, May 16 @ 2 pm.    Norman Miller M.Div., [facility]  Staff   Office tel: [phone]

## 2018-05-16 NOTE — PROGRESS NOTES
"SPIRITUAL HEALTH SERVICES  SPIRITUAL ASSESSMENT Progress Note  [facility]      (Follow up)  Rayomnd Patrick and his wife, Kriss, shared they had gone to [facility] for two Sunday services and would be contacting the  about membership.  Raymond indicated he was participating in clinical trials both with the hope they might be helpful to him and that the results might help others.   affirmed him for his desire to serve others.  He said one fleming to his coping was to think about others \"who have it worse\" such as his sister-in-law, Ange who has been dealing with Multiple Sclerosis for 25 yrs.   provided supportive listening, prayer, communion and sang a verse of The Servant Song   is available for pt/family needs.    Norman Miller M.Div., [facility]  Staff   Office tel: [phone]    "

## 2018-05-16 NOTE — MR AVS SNAPSHOT
After Visit Summary   5/16/2018    Jimmy Patrick    MRN: [id number]           Patient Information     Date Of Birth          1950        Visit Information        Provider Department      5/16/2018 10:00 AM Victorina Del Toro PA-C [facility]        Care Instructions    Wear a sunscreen with at least SPF 30 on your face, ears, neck and V of the chest daily. Wear sunscreen on other areas of the body if those areas are exposed to the sun throughout the day. Sunscreens can contain physical and/or chemical blockers. Physical blockers are less likely to clog pores, these include zinc oxide and titanium dioxide. Reapply every two hour and after swimming. Sunscreen examples include Neutrogena, CeraVe, Blue Lizard, Elta MD and many others.    UV radiation  UVA radiation remains constant throughout the day and throughout the year. It is a longer wavelength than UVB and therefore penetrates deeper into the skin leading to immediate and delayed tanning, photoaging, and skin cancer. 70-80% of UVA and UVB radiation occurs between the hours of 10am-2pm.  UVB radiation  UVB radiation causes the most harmful effects and is more significant during the summer months. However, snow and ice can reflect UVB radiation leading to skin damage during the winter months as well. UVB radiation is responsible for tanning, burning, inflammation, delayed erythema (pinkness), pigmentation (brown spots), and skin cancer.   Just because you do not burn or are not developing a tan does not mean that you are not damaging your skin. A 15 minute drive to and from work for 30 years an lead to chronic sun damage of the skin. It is important to wear a broad spectrum (both UVA and UVB) sunscreen EVERY day with at least 30 SPF. Apply to face, ears, neck and v of the chest as this is where most of our sun exposure is. Reapply sunscreen every two hours if you plan on being outside.   Thang Curtis.  Clinical Dermatology: A Color Guide to Diagnosis and Therapy. Elsevier, 2016.     Proper skin care from [facility]:    -Eliminate harsh soaps as they strip the natural oils from the skin, often resulting in dry itchy skin ( i.e. Dial, Zest, [place] Spring)  -Use mild soaps such as Cetaphil or Dove Sensitive Skin in the shower. You do not need to use soap on arms, legs, and trunk every time you shower unless visibly soiled.   -Avoid hot or cold showers.  -After showering, lightly dry off and apply moisturizing within 2-3 minutes. This will help trap moisture in the skin.   -Aggressive use of a moisturizer at least 1-2 times a day to the entire body (including -Vanicream, Cetaphil, Aquaphor or Cerave) and moisturize hands after every washing.  -We recommend using moisturizers that come in a tub that needs to be scooped out, not a pump. This has more of an oil base. It will hold moisture in your skin much better than a water base moisturizer. The above recommended are non-pore clogging.                     Follow-ups after your visit        Your next 10 appointments already scheduled     May 17, 2018 10:30 AM CDT   (Arrive by 10:15 AM)   Return Movement Disorder with Paulo Saravia MD   [facility] Neurology ([facility])    [address]  3rd Floor  [facility] [id number] [phone]            May 17, 2018 12:00 PM CDT   Infusion 60 with UC SPEC INFUSION, UC 50 ATC   [facility] Specialty and Procedure ([facility])    [address]  [address]  [facility] [id number] [phone]            May 22, 2018  8:00 AM CDT   RETURN NEURO with Umberto Villafana MD   [facility] ([facility])    [address]  [facility] [id number] [phone]            May 31, 2018  9:15 AM CDT   Return Visit with Nishi Oro MD   [facility]  ([facility])    [address] [place] [address]   [phone]            May 31, 2018 11:00 AM CDT   Return Visit with CAROL Mcgarry   [facility] ([facility])    [address] [place] [address]   [phone]            Jun 14, 2018  8:00 AM CDT   (Arrive by 7:45 AM)   Cognitive Assessment with Emperatriz Candelaria, PhD [facility] Neuropsychology ([facility])    [address]  3rd [facility] [address]   [phone]            Jun 14, 2018  9:30 AM CDT   (Arrive by 9:15 AM)   Return Movement Disorder with Paulo Saravia MD   [facility] Neurology ([facility])    [address] [place] [address]  3rd [facility] [address]   [phone]            Jun 14, 2018 11:00 AM CDT   LAB with HAI LAB   [facility] Lab ([facility])    [address] [place] [address]  [address]st [facility] [address] [phone]           Please do not eat 10-12 hours before your appointment if you are coming in fasting for labs on lipids, cholesterol, or glucose (sugar). This does not apply to pregnant women. Water, hot tea and black coffee (with nothing added) are okay. Do not drink other fluids, diet soda or chew gum.            Jun 14, 2018 12:00 PM CDT   Infusion 60 with  SPEC INFUSION   [facility] Specialty [facility] Procedure ([facility])    [address] [place] [address]  [address]  [facility] [address] [phone]              Who to contact     If you have questions or need follow up information about today's clinic visit or your schedule please contact [facility] directly at [phone].  Normal or non-critical lab and imaging results will be communicated to you by MyChart, letter or phone within 4 business days after the clinic has received the results. If you do not hear from us  "within 7 days, please contact the clinic through [facility] [employer] or phone. If you have a critical or abnormal lab result, we will notify you by phone as soon as possible.  Submit refill requests through [employer] or call your pharmacy and they will forward the refill request to us. Please allow 3 business days for your refill to be completed.          Additional Information About Your Visit        [facility] [employer] Information     [facility] [employer] gives you secure access to your electronic health record. If you see a primary care provider, you can also send messages to your care team and make appointments. If you have questions, please call your primary care clinic.  If you do not have a primary care provider, please call [phone] and they will assist you.        Care EveryWhere ID     This is your Care EveryWhere ID. This could be used by other organizations to access your [facility] medical records  [id number]        Your Vitals Were     Height BMI (Body Mass Index)                1.829 m (6' 0.01\") 25.76 kg/m2           Blood Pressure from Last 3 Encounters:   05/16/18 118/70   05/02/18 125/81   04/19/18 120/62    Weight from Last 3 Encounters:   05/16/18 86.2 kg (190 lb)   04/19/18 86.2 kg (190 lb 1.6 oz)   04/19/18 86.2 kg (190 lb 1.6 oz)              Today, you had the following     No orders found for display       Primary Care Provider Office Phone # Fax #    Evelina Morse [phone]       [address]  [facility] [address]        Equal Access to Services     [facility] Center[facility]NA AH: Hadii aad ku hadasho Soomaali, waaxda luqadaha, qaybta kaalmada adeegyada, gely heller . So [facility] [phone].    ATENCIÓN: Si habla español, tiene a langford disposición servicios gratuitos de asistencia lingüística. Llame al [phone].    We comply with applicable federal civil rights laws and [place] laws. We do not discriminate on the basis of race, color, national origin, age, disability, sex, sexual " orientation, or gender identity.            Thank you!     Thank you for choosing [facility]  for your care. Our goal is always to provide you with excellent care. Hearing back from our patients is one way we can continue to improve our services. Please take a few minutes to complete the written survey that you may receive in the mail after your visit with us. Thank you!             Your Updated Medication List - Protect others around you: Learn how to safely use, store and throw away your medicines at www.disposemymeds.org.          This list is accurate as of 5/16/18 11:05 AM.  Always use your most recent med list.                   Brand Name Dispense Instructions for use Diagnosis    acetaminophen-caffeine 500-65 MG Tabs    EXCEDRIN TENSION HEADACHE     Take 1 tablet by mouth every 6 hours as needed for mild pain        adapalene 0.1 % gel    DIFFERIN    135 g    Apply to the face at night.    Acne vulgaris       amantadine 100 MG capsule    SYMMETREL    270 capsule    Take 1 capsule (100 mg) by mouth 3 times daily 6am,11am and 4pm    PSP (progressive supranuclear palsy) (H)       amLODIPine 2.5 MG tablet    NORVASC     Take 1 tablet by mouth daily        BABY ASPIRIN 81 MG chewable tablet   Generic drug:  aspirin      Take 81 mg by mouth daily        BENICAR 20 MG tablet   Generic drug:  olmesartan      Take 1 tablet (20 mg) by mouth 2 times daily    PSP (progressive supranuclear palsy) (H)       CENTRUM SILVER per tablet     30 tablet    Take 1 tablet by mouth daily        hydrochlorothiazide 12.5 MG Tabs tablet     90 tablet    Take 1 tablet (12.5 mg) by mouth daily    Essential hypertension       nortriptyline 10 MG capsule    PAMELOR    270 capsule    TAKE 3 CAPSULES AT BEDTIME    Migraine without aura and without status migrainosus, not intractable       rivastigmine 9.5 MG/24HR 24 hr patch    EXELON    30 patch    Place 1 patch onto the skin daily    PSP (progressive supranuclear  palsy) (H)       senna-docusate 8.6-50 MG per tablet    SENOKOT-S;PERICOLACE    90 tablet    Take 1 tablet by mouth 2 times daily    Constipation, unspecified constipation type       Vitamin D-3 Super Strength 2000 units tablet   Generic drug:  cholecalciferol      Take 1 tablet by mouth        ZOLMitriptan 5 MG tablet    ZOMIG    30 tablet    Take 1 tablet (5 mg) by mouth at onset of headache for migraine    PSP (progressive supranuclear palsy) (H), Migraine without aura and without status migrainosus, not intractable

## 2018-05-16 NOTE — LETTER
5/16/2018         RE: Jimmy Patrick  [address] [address]        Dear Colleague,    Thank you for referring your patient, Jimmy Patrick, to the [facility]. Please see a copy of my visit note below.    HPI:  Jimmy Patrick is a 67 year old year old male patient here today for growing spot on back of neck   Duration: 5 years  Symptoms:  painful     Previous treatments: none     Alleviating/aggravating factors: none    Associated symptoms: none  Additional findings:none  Patient has no other skin complaints today.  Remainder of the HPI, Meds, PMH, Allergies, FH, and SH was reviewed in chart.    Pertinent Hx:   Progressive supranuclear palsy  Past Medical History:   Diagnosis Date     Blurred vision 8/11/2014     Cancer (H) 1979; 1960's    Florin: father; grandparents (on father's side)     DISK (aka Displacement of intervertebral disc, site unspecified, without myelopathy) 8/11/2014     Displacement of cervical intervertebral disc without myelopathy (HERNIATED DISK) 8/11/2014     Double vision 8/11/2014     Epistaxis 8/11/2014     Fluttering heart 8/11/2014     Headache 8/11/2014     Leg swelling 8/11/2014     Memory loss 8/11/2014     Migraines 2007    Ramila: karen     PSP (progressive supranuclear palsy) (H) 8/11/2014     Sinus disorder 8/11/2014     Tinnitus 8/11/2014     Urinary urgency 8/11/2014     Varicose veins 8/11/2014       Past Surgical History:   Procedure Laterality Date     BIOPSY  2016    Dermatologist remoed skin mole (not cancerus)     CARDIAC SURGERY  2013 ablation    Successful in eliminatng heart flutter     H ABLATION ATRIAL FLUTTER          Family History   Problem Relation Age of Onset     CANCER Father        Social History     Social History     Marital status:      Spouse name: N/A     Number of children: N/A     Years of education: N/A     Occupational History     Not on file.     Social History Main Topics     Smoking  status: Never Smoker     Smokeless tobacco: Never Used     Alcohol use No     Drug use: No     Sexual activity: Not Currently     Partners: Female     Other Topics Concern     Parent/Sibling W/ Cabg, Mi Or Angioplasty Before 65f 55m? 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 135 g 3     amantadine (SYMMETREL) 100 MG capsule Take 1 capsule (100 mg) by mouth 3 times daily 6am,11am and 4pm 270 capsule 3     amLODIPine (NORVASC) 2.5 MG tablet Take 1 tablet by mouth daily       aspirin (BABY ASPIRIN) 81 MG chewable tablet Take 81 mg by mouth daily        cholecalciferol (VITAMIN D-3 SUPER STRENGTH) 2000 UNITS tablet Take 1 tablet by mouth        hydrochlorothiazide 12.5 MG TABS tablet Take 1 tablet (12.5 mg) by mouth daily 90 tablet 3     Multiple Vitamins-Minerals (CENTRUM SILVER) per tablet Take 1 tablet by mouth daily 30 tablet      nortriptyline (PAMELOR) 10 MG capsule TAKE 3 CAPSULES AT BEDTIME 270 capsule 3     olmesartan (BENICAR) 20 MG tablet Take 1 tablet (20 mg) by mouth 2 times daily       rivastigmine (EXELON) 9.5 MG/24HR 24 hr patch Place 1 patch onto the skin daily 30 patch 11     senna-docusate (SENOKOT-S;PERICOLACE) 8.6-50 MG per tablet Take 1 tablet by mouth 2 times daily 90 tablet 6     ZOLMitriptan (ZOMIG) 5 MG tablet Take 1 tablet (5 mg) by mouth at onset of headache for migraine 30 tablet 5     No facility-administered encounter medications on file as of 5/16/2018.        Review Of Systems:  Skin: As  "above  Eyes: negative  Ears/Nose/Throat: negative  Respiratory: No shortness of breath, dyspnea on exertion, cough, or hemoptysis  Cardiovascular: negative  Gastrointestinal: negative  Genitourinary: negative  Musculoskeletal: negative  Neurologic: negative  Psychiatric: negative  Hematologic/Lymphatic/Immunologic: negative  Endocrine: negative      Objective:     /70  Ht 6' 0.01\" (1.829 m)  Wt 190 lb (86.2 kg)  BMI 25.76 kg/m2  Eyes: Conjunctivae/lids: Normal   ENT: Lips:  Normal  MSK: Normal  Cardiovascular: Peripheral edema none  Pulm: Breathing Normal  Neuro/Psych: Orientation: Normal; Mood/Affect: Normal, NAD, WDWN  Following areas examined:   Scalp, face, eyelids, lips, neck, chest, abdomen, back, buttocks, and R&L upper and lower extremities.      Findings:    1) Red smooth well-defined macules on trunk and extremities.  2) Brown, stuck-on scaly appearing papules on trunk and extremities.  3) Well circumscribed macules with symmetric color distribution on trunk and extremities.  4) Drummond WD smooth macules on face, neck, trunk, and extremities.  5) Pink inflamed 1.5cm nodule on base of neck        Assessment and Plan:  1) Cherry angiomas on trunk and extremities.  2) Seborrheic keratoses on trunk and extremities.  3) Benign nevi on trunk and extremities.  4) Lentigines on face, neck, trunk, and extremities.    I discussed the specifics of tumor, prognosis, and genetics of benign lesions.  I explained that treatment of these lesions would be purely cosmetic and not medically neccessary.  I discussed with patient different removal options including excision, cryotherapy, cautery and /or laser.      5) inflamed epidermal cyst 1.5cm on base of neck  Disc excision, IL Kenalog, and drainage. Cyst may recur.  I&D: Epidermal cyst was cleansed with surgical cleanser. It was infiltrated with buffered solution of 1% lidocaine with epinephrine. An incision was made with a number 11 blade over the top of the " incision and yellow keratinous foul smelling drainage with minimal blood was expressed.   Dressing was applied. Patient was discharged in satisfactory condition.  Signs and Symptoms of non-melanoma skin cancer and ABCDEs of melanoma reviewed with patient. Patient encouraged to perform monthly self skin exams. UV precautions reviewed with patient.     Follow up in 1 year for FBE      Again, thank you for allowing me to participate in the care of your patient.        Sincerely,        Victorina Del Toro PA-C

## 2018-05-16 NOTE — PATIENT INSTRUCTIONS
Wear a sunscreen with at least SPF 30 on your face, ears, neck and V of the chest daily. Wear sunscreen on other areas of the body if those areas are exposed to the sun throughout the day. Sunscreens can contain physical and/or chemical blockers. Physical blockers are less likely to clog pores, these include zinc oxide and titanium dioxide. Reapply every two hour and after swimming. Sunscreen examples include Neutrogena, CeraVe, Blue Lizard, Elta MD and many others.    UV radiation  UVA radiation remains constant throughout the day and throughout the year. It is a longer wavelength than UVB and therefore penetrates deeper into the skin leading to immediate and delayed tanning, photoaging, and skin cancer. 70-80% of UVA and UVB radiation occurs between the hours of 10am-2pm.  UVB radiation  UVB radiation causes the most harmful effects and is more significant during the summer months. However, snow and ice can reflect UVB radiation leading to skin damage during the winter months as well. UVB radiation is responsible for tanning, burning, inflammation, delayed erythema (pinkness), pigmentation (brown spots), and skin cancer.   Just because you do not burn or are not developing a tan does not mean that you are not damaging your skin. A 15 minute drive to and from work for 30 years an lead to chronic sun damage of the skin. It is important to wear a broad spectrum (both UVA and UVB) sunscreen EVERY day with at least 30 SPF. Apply to face, ears, neck and v of the chest as this is where most of our sun exposure is. Reapply sunscreen every two hours if you plan on being outside.   Thang Curtis. Clinical Dermatology: A Color Guide to Diagnosis and Therapy. Elsevier, 2016.     Proper skin care from [facility]:    -Eliminate harsh soaps as they strip the natural oils from the skin, often resulting in dry itchy skin ( i.e. Dial, Zest, [place] Spring)  -Use mild soaps such as Cetaphil or Dove Sensitive Skin in the  shower. You do not need to use soap on arms, legs, and trunk every time you shower unless visibly soiled.   -Avoid hot or cold showers.  -After showering, lightly dry off and apply moisturizing within 2-3 minutes. This will help trap moisture in the skin.   -Aggressive use of a moisturizer at least 1-2 times a day to the entire body (including -Vanicream, Cetaphil, Aquaphor or Cerave) and moisturize hands after every washing.  -We recommend using moisturizers that come in a tub that needs to be scooped out, not a pump. This has more of an oil base. It will hold moisture in your skin much better than a water base moisturizer. The above recommended are non-pore clogging.

## 2018-05-17 NOTE — MR AVS SNAPSHOT
After Visit Summary   5/17/2018    Jimmy Patrick    MRN: [id number]           Patient Information     Date Of Birth          1950        Visit Information        Provider Department      5/17/2018 10:30 AM Paulo Saravia MD [facility] Neurology        Today's Diagnoses     PSP (progressive supranuclear palsy)    -  1       Follow-ups after your visit        Your next 10 appointments already scheduled     May 17, 2018 12:00 PM CDT   Infusion 60 with UC SPEC INFUSION, UC 50 ATC   [facility] ([facility])    [address] [place] [address]  [address] [place] [id number] [phone]            May 22, 2018  8:00 AM CDT   RETURN NEURO with Umberto Villafana MD   [facility] ([facility])    [address]  [facility] [address]   [phone]            May 31, 2018  9:15 AM CDT   [facility]    [address] [place] [id number] [phone]            May 31, 2018 11:00 AM CDT   [facility]    [address] [place] [id number] [phone]            Jun 14, 2018  8:00 AM CDT   (Arrive by 7:45 AM)   Cognitive Assessment with Emperatriz Candelaria, PhD [facility] Neuropsychology ([facility])    [address] [place] [id number] [phone]            Jun 14, 2018  9:30 AM CDT   (Arrive by 9:15 AM)   Return Movement Disorder with Paulo Saravia MD   [facility] Neurology ([facility])    [address] [place] [id number] [phone]            Jun 14, 2018 11:00 AM CDT   LAB with [facility]   [facility] ([facility])     [address] [place] [address]  1st Floor  [facility] [id number] [phone]           Please do not eat 10-12 hours before your appointment if you are coming in fasting for labs on lipids, cholesterol, or glucose (sugar). This does not apply to pregnant women. Water, hot tea and black coffee (with nothing added) are okay. Do not drink other fluids, diet soda or chew gum.            Jun 14, 2018 12:00 PM CDT   Infusion 60 with UC SPEC INFUSION, UC 50 ATC   [facility] [place] [facility] ([facility])    [address]  [address]  [facility] [address] [phone]              Who to contact     Please call your clinic at [phone] to:    Ask questions about your health    Make or cancel appointments    Discuss your medicines    Learn about your test results    Speak to your doctor            Additional Information About Your Visit        Iizuu Information     Iizuu gives you secure access to your electronic health record. If you see a primary care provider, you can also send messages to your care team and make appointments. If you have questions, please call your primary care clinic.  If you do not have a primary care provider, please call [phone] and they will assist you.      Iizuu is an electronic gateway that provides easy, online access to your medical records. With Iizuu, you can request a clinic appointment, read your test results, renew a prescription or communicate with your care team.     To access your existing account, please contact your [facility] or call [phone] for assistance.        Care EveryWhere ID     This is your Care EveryWhere ID. This could be used by other organizations to access your [facility] medical records  [id number]        Your Vitals Were     Pulse Temperature Respirations Height Pulse Oximetry BMI (Body Mass Index)    81 98.1  F (36.7  C) (Oral) 24 1.829 m (6') 97%  25.73 kg/m2       Blood Pressure from Last 3 Encounters:   05/17/18 123/77   05/16/18 118/70   05/02/18 125/81    Weight from Last 3 Encounters:   05/17/18 86 kg (189 lb 11.2 oz)   05/16/18 86.2 kg (190 lb)   04/19/18 86.2 kg (190 lb 1.6 oz)              Today, you had the following     No orders found for display       Primary Care Provider Office Phone # Fax #    Evelina Morse [phone] [address]  [facility] [address]        Equal Access to Services     [facility]: Hadii robin harper hadazul Sojovanny, waaxda radha, qaybta kaalmada higinio, gely heller . So [facility] [phone].    ATENCIÓN: Si habla español, tiene a langford disposición servicios gratuitos de asistencia lingüística. [facility] [phone].    We comply with applicable federal civil rights laws and [place] laws. We do not discriminate on the basis of race, color, national origin, age, disability, sex, sexual orientation, or gender identity.            Thank you!     Thank you for choosing [facility] NEUROLOGY  for your care. Our goal is always to provide you with excellent care. Hearing back from our patients is one way we can continue to improve our services. Please take a few minutes to complete the written survey that you may receive in the mail after your visit with us. Thank you!             Your Updated Medication List - Protect others around you: Learn how to safely use, store and throw away your medicines at www.disposemymeds.org.          This list is accurate as of 5/17/18 10:38 AM.  Always use your most recent med list.                   Brand Name Dispense Instructions for use Diagnosis    acetaminophen-caffeine 500-65 MG Tabs    EXCEDRIN TENSION HEADACHE     Take 1 tablet by mouth every 6 hours as needed for mild pain        adapalene 0.1 % gel    DIFFERIN    135 g    Apply to the face at night.    Acne vulgaris       amantadine 100 MG capsule    SYMMETREL    270 capsule    Take  1 capsule (100 mg) by mouth 3 times daily 6am,11am and 4pm    PSP (progressive supranuclear palsy) (H)       amLODIPine 2.5 MG tablet    NORVASC     Take 1 tablet by mouth daily        BABY ASPIRIN 81 MG chewable tablet   Generic drug:  aspirin      Take 81 mg by mouth daily        BENICAR 20 MG tablet   Generic drug:  olmesartan      Take 1 tablet (20 mg) by mouth 2 times daily    PSP (progressive supranuclear palsy) (H)       CENTRUM SILVER per tablet     30 tablet    Take 1 tablet by mouth daily        hydrochlorothiazide 12.5 MG Tabs tablet     90 tablet    Take 1 tablet (12.5 mg) by mouth daily    Essential hypertension       nortriptyline 10 MG capsule    PAMELOR    270 capsule    TAKE 3 CAPSULES AT BEDTIME    Migraine without aura and without status migrainosus, not intractable       rivastigmine 9.5 MG/24HR 24 hr patch    EXELON    30 patch    Place 1 patch onto the skin daily    PSP (progressive supranuclear palsy) (H)       senna-docusate 8.6-50 MG per tablet    SENOKOT-S;PERICOLACE    90 tablet    Take 1 tablet by mouth 2 times daily    Constipation, unspecified constipation type       Vitamin D-3 Super Strength 2000 units tablet   Generic drug:  cholecalciferol      Take 1 tablet by mouth        ZOLMitriptan 5 MG tablet    ZOMIG    30 tablet    Take 1 tablet (5 mg) by mouth at onset of headache for migraine    PSP (progressive supranuclear palsy) (H), Migraine without aura and without status migrainosus, not intractable

## 2018-05-17 NOTE — PATIENT INSTRUCTIONS
Dear Jimmy Patrick    Thank you for choosing [facility] ([facility]) for your infusion.  The following information is a summary of our appointment as well as important reminders.          We look forward in seeing you on your next appointment here at [facility].  Please don t hesitate to call us at [phone] to reschedule any of your appointments or to speak with one of the [facility] registered nurses.  It was a pleasure taking care of you today.    Sincerely,    [facility]  Special[facility]fusion [facility] Procedure Center  [address] [place] [address]  [id number]  Phone:  [phone]

## 2018-05-17 NOTE — PROGRESS NOTES
HPI:  Jimmy Patrick is a 67 year old year old male patient here today for growing spot on back of neck   Duration: 5 years  Symptoms:  painful     Previous treatments: none     Alleviating/aggravating factors: none    Associated symptoms: none  Additional findings:none  Patient has no other skin complaints today.  Remainder of the HPI, Meds, PMH, Allergies, FH, and SH was reviewed in chart.    Pertinent Hx:   Progressive supranuclear palsy  Past Medical History:   Diagnosis Date     Blurred vision 8/11/2014     Cancer (H) 1979; 1960's    [place]: father; grandparents (on father's side)     DISK (aka Displacement of intervertebral disc, site unspecified, without myelopathy) 8/11/2014     Displacement of cervical intervertebral disc without myelopathy (HERNIATED DISK) 8/11/2014     Double vision 8/11/2014     Epistaxis 8/11/2014     Fluttering heart 8/11/2014     Headache 8/11/2014     Leg swelling 8/11/2014     Memory loss 8/11/2014     Migraines 2007    Ramila: karen     PSP (progressive supranuclear palsy) (H) 8/11/2014     Sinus disorder 8/11/2014     Tinnitus 8/11/2014     Urinary urgency 8/11/2014     Varicose veins 8/11/2014       Past Surgical History:   Procedure Laterality Date     BIOPSY  2016    Dermatologist remoed skin mole (not cancerus)     CARDIAC SURGERY  2013 ablation    Successful in eliminatng heart flutter     H ABLATION ATRIAL FLUTTER          Family History   Problem Relation Age of Onset     CANCER Father        Social History     Social History     Marital status:      Spouse name: N/A     Number of children: N/A     Years of education: N/A     Occupational History     Not on file.     Social History Main Topics     Smoking status: Never Smoker     Smokeless tobacco: Never Used     Alcohol use No     Drug use: No     Sexual activity: Not Currently     Partners: Female     Other Topics Concern     Parent/Sibling W/ Cabg, Mi Or Angioplasty Before 65f 55m? 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 135 g 3     amantadine (SYMMETREL) 100 MG capsule Take 1 capsule (100 mg) by mouth 3 times daily 6am,11am and 4pm 270 capsule 3     amLODIPine (NORVASC) 2.5 MG tablet Take 1 tablet by mouth daily       aspirin (BABY ASPIRIN) 81 MG chewable tablet Take 81 mg by mouth daily        cholecalciferol (VITAMIN D-3 SUPER STRENGTH) 2000 UNITS tablet Take 1 tablet by mouth        hydrochlorothiazide 12.5 MG TABS tablet Take 1 tablet (12.5 mg) by mouth daily 90 tablet 3     Multiple Vitamins-Minerals (CENTRUM SILVER) per tablet Take 1 tablet by mouth daily 30 tablet      nortriptyline (PAMELOR) 10 MG capsule TAKE 3 CAPSULES AT BEDTIME 270 capsule 3     olmesartan (BENICAR) 20 MG tablet Take 1 tablet (20 mg) by mouth 2 times daily       rivastigmine (EXELON) 9.5 MG/24HR 24 hr patch Place 1 patch onto the skin daily 30 patch 11     senna-docusate (SENOKOT-S;PERICOLACE) 8.6-50 MG per tablet Take 1 tablet by mouth 2 times daily 90 tablet 6     ZOLMitriptan (ZOMIG) 5 MG tablet Take 1 tablet (5 mg) by mouth at onset of headache for migraine 30 tablet 5     No facility-administered encounter medications on file as of 5/16/2018.        Review Of Systems:  Skin: As above  Eyes: negative  Ears/Nose/Throat: negative  Respiratory: No shortness of breath, dyspnea on exertion, cough, or hemoptysis  Cardiovascular: negative  Gastrointestinal: negative  Genitourinary: negative  Musculoskeletal: negative  Neurologic:  "negative  Psychiatric: negative  Hematologic/Lymphatic/Immunologic: negative  Endocrine: negative      Objective:     /70  Ht 6' 0.01\" (1.829 m)  Wt 190 lb (86.2 kg)  BMI 25.76 kg/m2  Eyes: Conjunctivae/lids: Normal   ENT: Lips:  Normal  MSK: Normal  Cardiovascular: Peripheral edema none  Pulm: Breathing Normal  Neuro/Psych: Orientation: Normal; Mood/Affect: Normal, NAD, WDWN  Following areas examined:   Scalp, face, eyelids, lips, neck, chest, abdomen, back, buttocks, and R&L upper and lower extremities.      Findings:    1) Red smooth well-defined macules on trunk and extremities.  2) Brown, stuck-on scaly appearing papules on trunk and extremities.  3) Well circumscribed macules with symmetric color distribution on trunk and extremities.  4) Drummond WD smooth macules on face, neck, trunk, and extremities.  5) Pink inflamed 1.5cm nodule on base of neck        Assessment and Plan:  1) Cherry angiomas on trunk and extremities.  2) Seborrheic keratoses on trunk and extremities.  3) Benign nevi on trunk and extremities.  4) Lentigines on face, neck, trunk, and extremities.    I discussed the specifics of tumor, prognosis, and genetics of benign lesions.  I explained that treatment of these lesions would be purely cosmetic and not medically neccessary.  I discussed with patient different removal options including excision, cryotherapy, cautery and /or laser.      5) inflamed epidermal cyst 1.5cm on base of neck  Disc excision, IL Kenalog, and drainage. Cyst may recur.  I&D: Epidermal cyst was cleansed with surgical cleanser. It was infiltrated with buffered solution of 1% lidocaine with epinephrine. An incision was made with a number 11 blade over the top of the incision and yellow keratinous foul smelling drainage with minimal blood was expressed.   Dressing was applied. Patient was discharged in satisfactory condition.  Signs and Symptoms of non-melanoma skin cancer and ABCDEs of melanoma reviewed with patient. " Patient encouraged to perform monthly self skin exams. UV precautions reviewed with patient.     Follow up in 1 year for FBE

## 2018-05-17 NOTE — PROGRESS NOTES
Nursing Note  Jimmy Patrick [facility] for:   Chief Complaint   Patient presents with     Infusion     Study BMS - [id number]     During today's [facility] appointment, orders from Dr. Paulo Saravia were completed.  Frequency: monthly    Progress note:  Patient identification verified by name and date of birth.  Assessment completed.  Vitals recorded in Doc Flowsheets.  Patient was provided with education regarding infusion and possible side effects.  Patient verbalized understanding.      needed: No  Premedications: were not ordered.  Infusion Rates: infusion given over approximately 1 hour.  Labs: were not ordered for this appointment.  Vascular access: peripheral IV placed today.  Treatment Conditions: non-applicable.  Patient tolerated infusion: well.        Discharge Plan:   Follow up plan of care with: ongoing infusions at [facility].  Discharge instructions were reviewed with patient.  Patient/representative verbalized understanding of discharge instructions and all questions answered.  Patient discharged from [facility] in stable condition.    Debi Zamora RN    Administrations This Visit     study [id number] ([id number]) (IDS# [id number]) IV infusion 2,100 mg 210 mL     Admin Date Action Dose Rate Route Administered By          05/17/2018 New Bag 2100 mg 210 mL/hr Intravenous Debi Zamora RN                         /75  Pulse 78  Temp 96.1  F (35.6  C) (Oral)  Resp 16

## 2018-05-17 NOTE — MR AVS SNAPSHOT
After Visit Summary   5/17/2018    Jimmy Patrick    MRN: [id number]           Patient Information     Date Of Birth          1950        Visit Information        Provider Department      5/17/2018 12:00 PM UC 50 ATC; UC SPEC INFUSION [facility] Specialty and Procedure        Today's Diagnoses     Parkinsonism, unspecified Parkinsonism type (H)    -  1    PSP (progressive supranuclear palsy) (H)          Care Instructions    Dear Jimmy Patrick    Thank you for choosing [facility] ([facility]) for your infusion.  The following information is a summary of our appointment as well as important reminders.          We look forward in seeing you on your next appointment here at [facility].  Please don t hesitate to call us at [phone] to reschedule any of your appointments or to speak with one of the [facility] registered nurses.  It was a pleasure taking care of you today.    Sincerely,    [facility]  Specialty Infusion & Procedure Center  [address] [place]  [address]  Phone:  [phone]            Follow-ups after your visit        Your next 10 appointments already scheduled     May 22, 2018  8:00 AM CDT   RETURN NEURO with Umberto Villafana MD   [facility] ([facility])    [address] [address] [phone]            May 31, 2018  9:15 AM CDT   Return Visit with Nishi Oro MD   [facility]    [address] [place] [id number] [phone]            May 31, 2018 11:00 AM CDT   Return Visit with CAROL Mcgarry   [facility] ([facility])    [address] [place] [id number] [phone]            Jun 14, 2018  8:00 AM CDT   (Arrive by 7:45 AM)   Cognitive  Assessment with Emperatriz Candelaria, PhD LP   [facility] Neuropsychology ([facility])    [address]  3rd Floor  [facility] [address] [phone]            Jun 14, 2018  9:30 AM CDT   (Arrive by 9:15 AM)   Return Movement Disorder with Paulo Saravia MD   [facility] Neurology ([facility])    [address]  3rd [facility] [id number] [phone]            Jun 14, 2018 11:00 AM CDT   LAB with UC LAB   [facility] Lab ([facility])    [address] [place] [address]  1st [facility] [id number] [phone]           Please do not eat 10-12 hours before your appointment if you are coming in fasting for labs on lipids, cholesterol, or glucose (sugar). This does not apply to pregnant women. Water, hot tea and black coffee (with nothing added) are okay. Do not drink other fluids, diet soda or chew gum.            Jun 14, 2018 12:00 PM CDT   Infusion 60 with UC SPEC INFUSION, UC 50 ATC   [facility] Special[facility] Procedure ([facility])    [address] [place] [address]  [address]  [facility] [id number] [phone]            Jul 11, 2018 11:00 AM CDT   [facility] Visit with Clint [facility]rez [facility]    [address] [place] [address]   [phone]            Jul 12, 2018  1:00 PM CDT   Infusion 60 with UC SPEC INFUSION   [facility] ([facility])    [address] [place] [address]  [address]  [facility] [id number] [phone]              Who to contact     If you have questions or need follow up information about today's clinic visit or your schedule please contact [facility] SPECIALTY AND PROCEDURE directly at [phone].  Normal or non-critical lab and imaging results will be communicated to you by  MyChart, letter or phone within 4 business days after the clinic has received the results. If you do not hear from us within 7 days, please contact the clinic through [employer] or phone. If you have a critical or abnormal lab result, we will notify you by phone as soon as possible.  Submit refill requests through [employer] or call your pharmacy and they will forward the refill request to us. Please allow 3 business days for your refill to be completed.          Additional Information About Your Visit        [employer] Information     [employer] gives you secure access to your electronic health record. If you see a primary care provider, you can also send messages to your care team and make appointments. If you have questions, please call your primary care clinic.  If you do not have a primary care provider, please call [phone] and they will assist you.        Care EveryWhere ID     This is your Care EveryWhere ID. This could be used by other organizations to access your [facility] medical records  [id number]        Your Vitals Were     Pulse Temperature Respirations             78 96.1  F (35.6  C) (Oral) 16          Blood Pressure from Last 3 Encounters:   05/17/18 127/75   05/17/18 123/77   05/16/18 118/70    Weight from Last 3 Encounters:   05/17/18 86 kg (189 lb 11.2 oz)   05/16/18 86.2 kg (190 lb)   04/19/18 86.2 kg (190 lb 1.6 oz)              Today, you had the following     No orders found for display       Primary Care Provider Office Phone # Fax #    Evelina Morse [phone]       [address]  [facility] [address]        Equal Access to Services     [facility]: Hadii aad ku hadasho Soomaali, waaxda luqadaha, qaybta kaalmada evertonegdavid, gely heller . So [facility] [phone].    ATENCIÓN: Si habla español, tiene a langford disposición servicios gratuitos de asistencia lingüística. Llame al [phone].    We comply with applicable federal civil rights laws and  [place] laws. We do not discriminate on the basis of race, color, national origin, age, disability, sex, sexual orientation, or gender identity.            Thank you!     Thank you for choosing [facility] SPECIALTY AND PROCEDURE  for your care. Our goal is always to provide you with excellent care. Hearing back from our patients is one way we can continue to improve our services. Please take a few minutes to complete the written survey that you may receive in the mail after your visit with us. Thank you!             Your Updated Medication List - Protect others around you: Learn how to safely use, store and throw away your medicines at www.disposemymeds.org.          This list is accurate as of 5/17/18  1:32 PM.  Always use your most recent med list.                   Brand Name Dispense Instructions for use Diagnosis    acetaminophen-caffeine 500-65 MG Tabs    EXCEDRIN TENSION HEADACHE     Take 1 tablet by mouth every 6 hours as needed for mild pain        adapalene 0.1 % gel    DIFFERIN    135 g    Apply to the face at night.    Acne vulgaris       amantadine 100 MG capsule    SYMMETREL    270 capsule    Take 1 capsule (100 mg) by mouth 3 times daily 6am,11am and 4pm    PSP (progressive supranuclear palsy) (H)       amLODIPine 2.5 MG tablet    NORVASC     Take 1 tablet by mouth daily        BABY ASPIRIN 81 MG chewable tablet   Generic drug:  aspirin      Take 81 mg by mouth daily        BENICAR 20 MG tablet   Generic drug:  olmesartan      Take 1 tablet (20 mg) by mouth 2 times daily    PSP (progressive supranuclear palsy) (H)       CENTRUM SILVER per tablet     30 tablet    Take 1 tablet by mouth daily        hydrochlorothiazide 12.5 MG Tabs tablet     90 tablet    Take 1 tablet (12.5 mg) by mouth daily    Essential hypertension       nortriptyline 10 MG capsule    PAMELOR    270 capsule    TAKE 3 CAPSULES AT BEDTIME    Migraine without aura and without status migrainosus, not  intractable       rivastigmine 9.5 MG/24HR 24 hr patch    EXELON    30 patch    Place 1 patch onto the skin daily    PSP (progressive supranuclear palsy) (H)       senna-docusate 8.6-50 MG per tablet    SENOKOT-S;PERICOLACE    90 tablet    Take 1 tablet by mouth 2 times daily    Constipation, unspecified constipation type       Vitamin D-3 Super Strength 2000 units tablet   Generic drug:  cholecalciferol      Take 1 tablet by mouth        ZOLMitriptan 5 MG tablet    ZOMIG    30 tablet    Take 1 tablet (5 mg) by mouth at onset of headache for migraine    PSP (progressive supranuclear palsy) (H), Migraine without aura and without status migrainosus, not intractable

## 2018-05-17 NOTE — NURSING NOTE
Chief Complaint   Patient presents with     RECHECK     [facility]- MOVEMENT DISORDER/ RESEARCH BY JIAN Miranda, CMA

## 2018-05-22 NOTE — NURSING NOTE
Chief Complaints and History of Present Illnesses   Patient presents with     Follow Up For     PSP     HPI    Symptoms:              Comments:  9 months follow up for PSP.     Patient states vision is always changing. Patient's sig other states they get new pair of glasses prescription that every time they fill it out, it no longer works and patient sees blurry vision only.   Patient states current glasses are 6 months old and no longer works.     KIMBERLEE Martinez 5/22/2018 8:15 AM

## 2018-05-22 NOTE — PROGRESS NOTES
Assessment & Plan     Jimmy Patrick is a 67 year old male with the following diagnoses:   1. PSP (progressive supranuclear palsy) (H)       Follow up progressive supranuclear palsy.  Last visit was July 2017.  Patient notes blurred vision both eyes and worsening photophobia.  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  He is falling more.       Visual acuity 20/70 both eyes.  Color vision 11/11 RIGHT eye and 10/11 LEFT eye.  Decreased blink rate.  nuclear sclerotic cataract  Both eyes.      It is my impression that patient's vision is blurred to dry eyes and nuclear sclerotic cataracts.  He has progressive supranuclear palsy and is getting worse.  Follow up with me as needed for worsening symptoms.             Attending Physician Attestation:  Complete documentation of historical and exam elements from today's encounter can be found in the full encounter summary report (not reduplicated in this progress note).  I personally obtained the chief complaint(s) and history of present illness.  I confirmed and edited as necessary the review of systems, past medical/surgical history, family history, social history, and examination findings as documented by others; and I examined the patient myself.  I personally reviewed the relevant tests, images, and reports as documented above.  I formulated and edited as necessary the assessment and plan and discussed the findings and management plan with the patient and family. - Umberto Villafana MD

## 2018-05-22 NOTE — MR AVS SNAPSHOT
After Visit Summary   5/22/2018    Jimmy Patrick    MRN: [id number]           Patient Information     Date Of Birth          1950        Visit Information        Provider Department      5/22/2018 8:00 AM [facility]mberto [facility]afana MD [facility]        Today's Diagnoses     PSP (progressive supranuclear palsy) (H)          Care Instructions    Refresh PM ointment at night           Follow-ups after your visit        Your next 10 appointments already scheduled     May 31, 2018  9:15 AM CDT   Return Visit with Nishi Oro MD   [facility]    [address] [place] [address]   [phone]            May 31, 2018 11:00 AM CDT   [facility]    [address] [place] [address] [phone]            Jun 14, 2018  8:00 AM CDT   (Arrive by 7:45 AM)   Cognitive Assessment with Emperatriz Candelaria, PhD [facility] Neuropsychology ([facility])    [address] [place] [address] [phone]            Jun 14, 2018  9:30 AM CDT   (Arrive by 9:15 AM)   Return Movement Disorder with Paulo Saravia MD   [facility] Neurology ([facility])    [address] [place] [address] [phone]            Jun 14, 2018 11:00 AM CDT   LAB with  LAB   [facility] Lab ([facility])    [address] [place] [address] [phone]           Please do not eat 10-12 hours before your appointment if you are coming in fasting for labs on lipids, cholesterol, or glucose (sugar). This does not apply to pregnant women. Water, hot tea and black coffee (with nothing added) are okay. Do not drink other fluids, diet soda or chew gum.            Jun 14, 2018 12:00 PM CDT   Infusion 60 with  UC SPEC INFUSION, UC 50 ATC   [facility] Specialty and Procedure ([facility])    [address]  [address]  [facility] [id number] [phone]            Jul 11, 2018 11:00 AM CDT   Return Visit with Clint Gutiérrez MD   [facility] ([facility])    [address] [place] [address]   [phone]            Jul 12, 2018  1:00 PM CDT   Infusion 60 with UC SPEC INFUSION, UC 45 ATC   [facility] Specialty and Procedure ([facility])    [address]  [address]  [facility] [address] [phone]              Who to contact     Please call your clinic at [phone] to:    Ask questions about your health    Make or cancel appointments    Discuss your medicines    Learn about your test results    Speak to your doctor            Additional Information About Your Visit        [employer] Information     [employer] gives you secure access to your electronic health record. If you see a primary care provider, you can also send messages to your care team and make appointments. If you have questions, please call your primary care clinic.  If you do not have a primary care provider, please call [phone] and they will assist you.      [employer] is an electronic gateway that provides easy, online access to your medical records. With [employer], you can request a clinic appointment, read your test results, renew a prescription or communicate with your care team.     To access your existing account, please contact your [facility] or call [phone] for assistance.        Care EveryWhere ID     This is your Care EveryWhere ID. This could be used by other organizations to access your [place] medical records  [id number]         Blood Pressure from Last 3 Encounters:   05/17/18 116/69   05/17/18 123/77   05/16/18 118/70    Weight from  Last 3 Encounters:   05/17/18 86 kg (189 lb 11.2 oz)   05/16/18 86.2 kg (190 lb)   04/19/18 86.2 kg (190 lb 1.6 oz)              We Performed the Following     DILATED FUNDUS EXAM     IOP Measurement     Refraction        Primary Care Provider Office Phone # Fax #    Evelina Morse [phone] [address]  [facility] [address]        Equal Access to Services     [facility] STEE[facility] : Hadii aad ku hadasho Soomaali, waaxda luqadaha, qaybta kaalmada adeegyada, waxay idiin hayaan adeeg kharash la'aan . So [facility] [phone].    ATENCIÓN: Si habla español, tiene a langford disposición servicios gratuitos de asistencia lingüística. [facility] [phone].    We comply with applicable federal civil rights laws and [place] laws. We do not discriminate on the basis of race, color, national origin, age, disability, sex, sexual orientation, or gender identity.            Thank you!     Thank you for choosing [facility]  for your care. Our goal is always to provide you with excellent care. Hearing back from our patients is one way we can continue to improve our services. Please take a few minutes to complete the written survey that you may receive in the mail after your visit with us. Thank you!             Your Updated Medication List - Protect others around you: Learn how to safely use, store and throw away your medicines at www.disposemymeds.org.          This list is accurate as of 5/22/18 11:00 AM.  Always use your most recent med list.                   Brand Name Dispense Instructions for use Diagnosis    acetaminophen-caffeine 500-65 MG Tabs    EXCEDRIN TENSION HEADACHE     Take 1 tablet by mouth every 6 hours as needed for mild pain        adapalene 0.1 % gel    DIFFERIN    135 g    Apply to the face at night.    Acne vulgaris       amantadine 100 MG capsule    SYMMETREL    270 capsule    Take 1 capsule (100 mg) by mouth 3 times daily 6am,11am and 4pm    PSP (progressive supranuclear palsy) (H)        amLODIPine 2.5 MG tablet    NORVASC     Take 1 tablet by mouth daily        BABY ASPIRIN 81 MG chewable tablet   Generic drug:  aspirin      Take 81 mg by mouth daily        BENICAR 20 MG tablet   Generic drug:  olmesartan      Take 1 tablet (20 mg) by mouth 2 times daily    PSP (progressive supranuclear palsy) (H)       CENTRUM SILVER per tablet     30 tablet    Take 1 tablet by mouth daily        hydrochlorothiazide 12.5 MG Tabs tablet     90 tablet    Take 1 tablet (12.5 mg) by mouth daily    Essential hypertension       nortriptyline 10 MG capsule    PAMELOR    270 capsule    TAKE 3 CAPSULES AT BEDTIME    Migraine without aura and without status migrainosus, not intractable       rivastigmine 9.5 MG/24HR 24 hr patch    EXELON    30 patch    Place 1 patch onto the skin daily    PSP (progressive supranuclear palsy) (H)       senna-docusate 8.6-50 MG per tablet    SENOKOT-S;PERICOLACE    90 tablet    Take 1 tablet by mouth 2 times daily    Constipation, unspecified constipation type       Vitamin D-3 Super Strength 2000 units tablet   Generic drug:  cholecalciferol      Take 1 tablet by mouth        ZOLMitriptan 5 MG tablet    ZOMIG    30 tablet    Take 1 tablet (5 mg) by mouth at onset of headache for migraine    PSP (progressive supranuclear palsy) (H), Migraine without aura and without status migrainosus, not intractable

## 2018-05-31 NOTE — PROGRESS NOTES
[facility]  PRIMARY CARE PROVIDER AND CLINIC:  Evelina Morse [address] / [place] [address]  Chief Complaint   Patient presents with     Hospital F/U     [facility] Medical Record Number:  [id number]    HPI:    Jimmy Patrick is a 67 year old  (1950),admitted to the [facility] from [facility].  Hospital stay 5/28/18 through 5/31/18.  Admitted to this facility for  rehab, medical management and nursing care.  HPI information obtained from: facility chart records, facility staff, patient report, [facility] chart review and family/first contact pt's wife who is present for visit report.  Current issues are:         Closed displaced fracture of second cervical vertebra with routine healing, unspecified fracture morphology, subsequent encounter  Fall, subsequent encounter  PSP (progressive supranuclear palsy) (H)  Progressive supranuclear ophthalmoplegia (H)  Impaired functional mobility, balance, gait, and endurance  Speech dysfunction  Dysphagia, unspecified type  Essential hypertension  Neuropathy  Migraine without aura and without status migrainosus, not intractable  Displacement of lumbar intervertebral disc without myelopathy  Slow transit constipation  Cognitive complaints     Patient is a 67 year old gentleman with PMH of progressive supranuclear palsy x 4 years with subsequent dysphagia, impaired vision (blurry, double vision), impaired balance, neuropathy in bilat feet, migraines who on 5/28/18 fell backwards from a chair and hit his head.  He declined going to the hospital at that time but later went in as the pain was worsening. He initially presented to [facility] and was transferred to [facility] after CT Angio showed (stable) minimally displaced bilat C2 laminar fractures.  Neurology was consulted and noted no collar ws needed, OK to be up with therapies and no f/u needed. He was transferred to [facility] for rehab,  medical management and nursing cares.  Unfortunately a discharge summary has not yet been completed so hospital complications are unknown at this time.      Today he and his wife are met in the therapy room just shortly after arrival to the TCU where he is working with Physical Therapy. He reports some constipation, 5/10 low back pain (acute on chronic) and 7/10 posterior neck pain.  Nursing is getting his analgesia medications filled from pharmacy.  He denies SOB, CP, heartburn, upset stomach.  His wife is his primary historian.        CODE STATUS/ADVANCE DIRECTIVES DISCUSSION:   CPR/Full code   Patient's living condition: lives with spouse    ALLERGIES:Other [seasonal allergies] and Codeine  PAST MEDICAL HISTORY:  has a past medical history of Blurred vision (8/11/2014); Cancer (H) (1979; 1960's); DISK (aka Displacement of intervertebral disc, site unspecified, without myelopathy) (8/11/2014); Displacement of cervical intervertebral disc without myelopathy (HERNIATED DISK) (8/11/2014); Double vision (8/11/2014); Epistaxis (8/11/2014); Fluttering heart (8/11/2014); Headache (8/11/2014); Leg swelling (8/11/2014); Memory loss (8/11/2014); Migraines (2007); PSP (progressive supranuclear palsy) (H) (8/11/2014); Sinus disorder (8/11/2014); Tinnitus (8/11/2014); Urinary urgency (8/11/2014); and Varicose veins (8/11/2014). He also has no past medical history of Basal cell carcinoma; Malignant melanoma (H); Skin cancer; or Squamous cell carcinoma.  PAST SURGICAL HISTORY:  has a past surgical history that includes EP Ablation atrial flutter; biopsy (2016); and Cardiac surgery (2013 ablation).  FAMILY HISTORY: family history includes CANCER in his father.  SOCIAL HISTORY:  reports that he has never smoked. He has never used smokeless tobacco. He reports that he does not drink alcohol or use illicit drugs.    Post Discharge Medication Reconciliation Status: discharge medications reconciled, continue medications without  change.  Current Outpatient Prescriptions   Medication Sig Dispense Refill     Acetaminophen (TYLENOL PO) Take 650 mg by mouth every 4 hours as needed for mild pain or fever       adapalene (DIFFERIN) 0.1 % gel Apply to the face at night. 135 g 3     amantadine (SYMMETREL) 100 MG capsule Take 1 capsule (100 mg) by mouth 3 times daily 6am,11am and 4pm 270 capsule 3     amLODIPine (NORVASC) 2.5 MG tablet Take 1 tablet by mouth daily       aspirin (BABY ASPIRIN) 81 MG chewable tablet Take 81 mg by mouth daily        cholecalciferol (VITAMIN D-3 SUPER STRENGTH) 2000 UNITS tablet Take 1 tablet by mouth        hydrochlorothiazide 12.5 MG TABS tablet Take 1 tablet (12.5 mg) by mouth daily 90 tablet 3     lidocaine (LIDODERM) 5 % Patch Place 1 patch onto the skin every 24 hours       loperamide (IMODIUM) 2 MG capsule Take 2 mg by mouth every 4 hours as needed for diarrhea       METHOCARBAMOL PO Take 500 mg by mouth 4 times daily       Multiple Vitamins-Minerals (CENTRUM SILVER) per tablet Take 1 tablet by mouth daily 30 tablet      nortriptyline (PAMELOR) 10 MG capsule TAKE 3 CAPSULES AT BEDTIME 270 capsule 3     olmesartan (BENICAR) 20 MG tablet Take 1 tablet (20 mg) by mouth 2 times daily       rivastigmine (EXELON) 9.5 MG/24HR 24 hr patch Place 1 patch onto the skin daily 30 patch 11     senna-docusate (SENOKOT-S;PERICOLACE) 8.6-50 MG per tablet Take 1 tablet by mouth 2 times daily 90 tablet 6     TRAMADOL HCL PO Take 25 mg by mouth every 4 hours as needed for moderate to severe pain       ZOLMitriptan (ZOMIG) 5 MG tablet Take 1 tablet (5 mg) by mouth at onset of headache for migraine 30 tablet 5       ROS:  10 point ROS of systems including Constitutional, Eyes, Respiratory, Cardiovascular, Gastroenterology, Genitourinary, Integumentary, Muscularskeletal, Psychiatric were all negative except for pertinent positives noted in my HPI.    Exam:  /79  Pulse 90  Temp 97.8  F (36.6  C)  Resp 18  SpO2 96%  GENERAL  APPEARANCE:  Alert, in mild distress related to pain and exertion with therapy, overall deconditioned  HEENT: decreased vision, dysarthric speech  RESP:  respiratory effort and palpation of chest normal, auscultation of lungs clear, diminished throughout, no respiratory distress, on RA  CV:  Palpation and auscultation of heart done , rate and rhythm regular, no murmur, no LE peripheral edema  ABDOMEN:  normal bowel sounds, soft, nontender, no hepatosplenomegaly or other masses  M/S:   Gait and station with W/C mobility, Digits and nails with some spastic/aerthritic changes, reduced muscle mass, extremely limited ROM of neck (likely d/t pain) with hypertrophic posterior cervicals, suboccipitals.   SKIN:  Inspection and Palpation of skin and subcutaneous tissue appearing intact  PSYCH:  insight and judgement, memory with mild impairment but difficult to assess fully , affect flat and mood difficult to assess, follows commands readily         Lab/Diagnostic data:    CBC (HGB,HCT,WBC,RBC,Platelet) (05/28/2018 8:12 PM)  CBC (HGB,HCT,WBC,RBC,Platelet) (05/28/2018 8:12 PM)   Component Value Ref Range   WBC 11.6 (H) 4.3 - 10.8 K/uL   RBC 4.91 4.60 - 6.20 M/uL   HEMOGLOBIN 14.6 14.0 - 18.0 gm/dL   HEMATOCRIT 42.2 40.0 - 54.0 %   MCV 86 80 - 100 fl   MCH 30 27 - 33 pg   MCHC 35 33 - 36 gm/dL   RDW 12.3 11.5 - 14.5 %   PLATELET COUNT 176 150 - 400 K/uL   MPV 9.9 6.5 - 12.0     Basic Metab Profile (05/28/2018 8:12 PM)  Basic Metab Profile (05/28/2018 8:12 PM)   Component Value Ref Range   SODIUM 138 136 - 145 mmol/L   POTASSIUM 3.8 3.5 - 5.1 mmol/L   CHLORIDE 103 98 - 112 mmol/L   CARBON DIOXIDE 27 21 - 32 mmol/L   BUN (UREA NITRO) 21 7 - 24 mg/dL   CREATININE 1.00 0.70 - 1.30 mg/dL   EST GFR (MDRD) >60 >60 mL/min   EST GFR IF AFRICAN AM >60 >60 mL/min   GLUCOSE 109 (H) 74 - 106 mg/dL   CALCIUM, SERUM 8.6 8.5 - 10.1 mg/dL   ANION GAP 8.0 0.0 - 15.0 mmol/L       ASSESSMENT/PLAN:  Closed displaced fracture of second cervical  vertebra with routine healing, unspecified fracture morphology, subsequent encounter  Fall, subsequent encounter  Patient fell backwards out of chair and hit his head  ST showing minimally displaced bilat C2 laminar fractures  Neurosurgery consulted and noted stable fractures, no collar needed, OK to be up with therapies and no f/u needed.   *awaiting Discharge Summary for full details    Analgesia with:  (new) Tylenol 650 mg q4 hours PRN  (new) lidocaine patch on for 12 hours, off for 12 hours  (new) robaxin 500 mg QID  (new) tramadol 25 mg po q4 hours PRN    Patient reporting acute pain in his neck, 7/10 today, and chronic low back pain from herniated discs, 5/10 pain.  His wife notes he has not had any pain medication since about 0930 this AM.  Nursing is working on obtaining his medications from pharmacy. Patient reports when he took the Tramadol last time (likely with Tylenol and Robaxin),pain 5-->1.     PLAN:  - Schedule Tylenol 1000 mg TID for base analgesia  - continue (new) lidocaine patch, robaxin, tramadol PRN  - monitor for pain control and titrate as needed  - Physical Therapy, Occupational Therapy for strengthening and ROM of neck as able, no restrictions per Neurosurgery (but am awaiting full DC Summary)    PSP (progressive supranuclear palsy) (H)  Progressive supranuclear ophthalmoplegia (H)  Impaired functional mobility, balance, gait, and endurance  Speech dysfunction  Dysphagia, Severe oropharyngeal phase  PSP x 4 years now  Patient's wife noting patient still very active, but PSP definitely progressing.   Deficits include:  Dysarthria (slow, slurred, mumbled speech)  Decreased vision (double, blurred vision)  Sensitivity to lights (wears hat and sunglasses inside)  Impaired coordination/balance  Dysphagia (eats/drinks slowly)  Per Care Everywhere SLP consult:  Per his wife, he has had two previous videofluoroscopic swallowing studies that have demonstrated aspiration with all consistencies, the  last one being in February of 2018. Compensatory strategies such as chin tuck were found not to be beneficial, per wife. Apparently, tube feedings were recommended at that time but the patient has chosen to continue po intake despite the known risk of aspiration    SLP consult noted patient takes whole pills in pudding, was coughing with liquids.  Patient/Pt's family have refused pureed foods and thickened liquids and know the risks involved but prefer patient to have QOL.     On PTA amantadine 100 mg TID (0600, 1100, 1600), PTA rivastigmine 9.5 mg/24 hour patch.   PTA Vitamin D and MV remain    PLAN:  - Physical Therapy, Occupational Therapy for balance, strengthening, ROM, gait, etc.   - Can ask family next visit if they would like SLP consult  - Continue PTA Amantadine and rivastigmine patch  - nursing for supportive cares    Essential hypertension  S/p ablation for atrial flutter  On PTA amlodipine 2.5 mg qPM, ASA 81 mg daily, HCTZ 12.5 mg daily, olmesartan 20 mg BID    BP today 123/79 today  HR 90 today  Patient denies CP, TRAN    PLAN:  - continue PTA amlodipine, olmesartan, ASA, HCTZ  - monitor for titration needs    Neuropathy  bilat feet  History of herniated lumbar discs (per patient)  On no meds at this time.  Will monitor for need.     Migraine without aura and without status migrainosus, not intractable  On PTA PRN Zolmitriptan 5 mg at onset of migraine  Will monitor.     Displacement of lumbar intervertebral disc without myelopathy  History of herniated lumbar discs, per patient  History of neuropathy  Patient reporting 5/10 LBP today    Analgesia with:  (new) Tylenol 650 mg q4 hours PRN  (new) robaxin 500 mg QID  (new) tramadol 25 mg po q4 hours PRN    PLAN:  - changing Tylenol to 1000 mg TID  - Physical Therapy, Occupational Therapy for strengthening, ROM, etc.     Slow transit constipation  PTA Senna 2 tab qHS now changed to Senna 1 tab BID.   Patient reporting some constipation  Discussion about  increase but patient's wife noting patient has had history of diarrhea with bowel meds so is leery.    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     PLAN:  - monitor sleep for titration needs.         Orders:  1. DC Tylenol prn  2. Tylenol 1000 mg po TID. Dx: Pain  3. Senna 1 tab po BID PRN. Dx: constipation  4. Patient preference in timing of medications (see paper chart).     Total time spent with patient visit at the skilled nursing facility was >35 minutes including patient visit and review of past records. Greater than 50% of total time spent with counseling and coordinating care due to patient visit, review of records in absence of discharge summary    Electronically signed by:  YULY Montiel CNP

## 2018-05-31 NOTE — LETTER
5/31/2018        RE: Jimmy Patrick  [address]  [facility] [address]        [facility]  PRIMARY CARE PROVIDER AND CLINIC:  Evelina Morse [address] / [facility] [address]  Chief Complaint   Patient presents with     Hospital F/U     [facility] Medical Record Number:  [id number]    HPI:    Jimmy Patrick is a 67 year old  (1950),admitted to the [facility] from [facility].  Hospital stay 5/28/18 through 5/31/18.  Admitted to this facility for  rehab, medical management and nursing care.  HPI information obtained from: facility chart records, facility staff, patient report, [facility] chart review and family/first contact pt's wife who is present for visit report.  Current issues are:         Closed displaced fracture of second cervical vertebra with routine healing, unspecified fracture morphology, subsequent encounter  Fall, subsequent encounter  PSP (progressive supranuclear palsy) (H)  Progressive supranuclear ophthalmoplegia (H)  Impaired functional mobility, balance, gait, and endurance  Speech dysfunction  Dysphagia, unspecified type  Essential hypertension  Neuropathy  Migraine without aura and without status migrainosus, not intractable  Displacement of lumbar intervertebral disc without myelopathy  Slow transit constipation  Cognitive complaints     Patient is a 67 year old gentleman with PMH of progressive supranuclear palsy x 4 years with subsequent dysphagia, impaired vision (blurry, double vision), impaired balance, neuropathy in bilat feet, migraines who on 5/28/18 fell backwards from a chair and hit his head.  He declined going to the hospital at that time but later went in as the pain was worsening. He initially presented to [facility] and was transferred to [facility] after CT Angio showed (stable) minimally displaced bilat C2 laminar fractures.  Neurology was consulted and noted no collar ws needed, OK  to be up with therapies and no f/u needed. He was transferred to [facility] for rehab, medical management and nursing cares.  Unfortunately a discharge summary has not yet been completed so hospital complications are unknown at this time.      Today he and his wife are met in the therapy room just shortly after arrival to the TCU where he is working with Physical Therapy. He reports some constipation, 5/10 low back pain (acute on chronic) and 7/10 posterior neck pain.  Nursing is getting his analgesia medications filled from pharmacy.  He denies SOB, CP, heartburn, upset stomach.  His wife is his primary historian.        CODE STATUS/ADVANCE DIRECTIVES DISCUSSION:   CPR/Full code   Patient's living condition: lives with spouse    ALLERGIES:Other [seasonal allergies] and Codeine  PAST MEDICAL HISTORY:  has a past medical history of Blurred vision (8/11/2014); Cancer (H) (1979; 1960's); DISK (aka Displacement of intervertebral disc, site unspecified, without myelopathy) (8/11/2014); Displacement of cervical intervertebral disc without myelopathy (HERNIATED DISK) (8/11/2014); Double vision (8/11/2014); Epistaxis (8/11/2014); Fluttering heart (8/11/2014); Headache (8/11/2014); Leg swelling (8/11/2014); Memory loss (8/11/2014); Migraines (2007); PSP (progressive supranuclear palsy) (H) (8/11/2014); Sinus disorder (8/11/2014); Tinnitus (8/11/2014); Urinary urgency (8/11/2014); and Varicose veins (8/11/2014). He also has no past medical history of Basal cell carcinoma; Malignant melanoma (H); Skin cancer; or Squamous cell carcinoma.  PAST SURGICAL HISTORY:  has a past surgical history that includes EP Ablation atrial flutter; biopsy (2016); and Cardiac surgery (2013 ablation).  FAMILY HISTORY: family history includes CANCER in his father.  SOCIAL HISTORY:  reports that he has never smoked. He has never used smokeless tobacco. He reports that he does not drink alcohol or use illicit drugs.    Post Discharge Medication  Reconciliation Status: discharge medications reconciled, continue medications without change.  Current Outpatient Prescriptions   Medication Sig Dispense Refill     Acetaminophen (TYLENOL PO) Take 650 mg by mouth every 4 hours as needed for mild pain or fever       adapalene (DIFFERIN) 0.1 % gel Apply to the face at night. 135 g 3     amantadine (SYMMETREL) 100 MG capsule Take 1 capsule (100 mg) by mouth 3 times daily 6am,11am and 4pm 270 capsule 3     amLODIPine (NORVASC) 2.5 MG tablet Take 1 tablet by mouth daily       aspirin (BABY ASPIRIN) 81 MG chewable tablet Take 81 mg by mouth daily        cholecalciferol (VITAMIN D-3 SUPER STRENGTH) 2000 UNITS tablet Take 1 tablet by mouth        hydrochlorothiazide 12.5 MG TABS tablet Take 1 tablet (12.5 mg) by mouth daily 90 tablet 3     lidocaine (LIDODERM) 5 % Patch Place 1 patch onto the skin every 24 hours       loperamide (IMODIUM) 2 MG capsule Take 2 mg by mouth every 4 hours as needed for diarrhea       METHOCARBAMOL PO Take 500 mg by mouth 4 times daily       Multiple Vitamins-Minerals (CENTRUM SILVER) per tablet Take 1 tablet by mouth daily 30 tablet      nortriptyline (PAMELOR) 10 MG capsule TAKE 3 CAPSULES AT BEDTIME 270 capsule 3     olmesartan (BENICAR) 20 MG tablet Take 1 tablet (20 mg) by mouth 2 times daily       rivastigmine (EXELON) 9.5 MG/24HR 24 hr patch Place 1 patch onto the skin daily 30 patch 11     senna-docusate (SENOKOT-S;PERICOLACE) 8.6-50 MG per tablet Take 1 tablet by mouth 2 times daily 90 tablet 6     TRAMADOL HCL PO Take 25 mg by mouth every 4 hours as needed for moderate to severe pain       ZOLMitriptan (ZOMIG) 5 MG tablet Take 1 tablet (5 mg) by mouth at onset of headache for migraine 30 tablet 5       ROS:  10 point ROS of systems including Constitutional, Eyes, Respiratory, Cardiovascular, Gastroenterology, Genitourinary, Integumentary, Muscularskeletal, Psychiatric were all negative except for pertinent positives noted in my  HPI.    Exam:  /79  Pulse 90  Temp 97.8  F (36.6  C)  Resp 18  SpO2 96%  GENERAL APPEARANCE:  Alert, in mild distress related to pain and exertion with therapy, overall deconditioned  HEENT: decreased vision, dysarthric speech  RESP:  respiratory effort and palpation of chest normal, auscultation of lungs clear, diminished throughout, no respiratory distress, on RA  CV:  Palpation and auscultation of heart done , rate and rhythm regular, no murmur, no LE peripheral edema  ABDOMEN:  normal bowel sounds, soft, nontender, no hepatosplenomegaly or other masses  M/S:   Gait and station with W/C mobility, Digits and nails with some spastic/aerthritic changes, reduced muscle mass, extremely limited ROM of neck (likely d/t pain) with hypertrophic posterior cervicals, suboccipitals.   SKIN:  Inspection and Palpation of skin and subcutaneous tissue appearing intact  PSYCH:  insight and judgement, memory with mild impairment but difficult to assess fully , affect flat and mood difficult to assess, follows commands readily         Lab/Diagnostic data:    CBC (HGB,HCT,WBC,RBC,Platelet) (05/28/2018 8:12 PM)  CBC (HGB,HCT,WBC,RBC,Platelet) (05/28/2018 8:12 PM)   Component Value Ref Range   WBC 11.6 (H) 4.3 - 10.8 K/uL   RBC 4.91 4.60 - 6.20 M/uL   HEMOGLOBIN 14.6 14.0 - 18.0 gm/dL   HEMATOCRIT 42.2 40.0 - 54.0 %   MCV 86 80 - 100 fl   MCH 30 27 - 33 pg   MCHC 35 33 - 36 gm/dL   RDW 12.3 11.5 - 14.5 %   PLATELET COUNT 176 150 - 400 K/uL   MPV 9.9 6.5 - 12.0     Basic Metab Profile (05/28/2018 8:12 PM)  Basic Metab Profile (05/28/2018 8:12 PM)   Component Value Ref Range   SODIUM 138 136 - 145 mmol/L   POTASSIUM 3.8 3.5 - 5.1 mmol/L   CHLORIDE 103 98 - 112 mmol/L   CARBON DIOXIDE 27 21 - 32 mmol/L   BUN (UREA NITRO) 21 7 - 24 mg/dL   CREATININE 1.00 0.70 - 1.30 mg/dL   EST GFR (MDRD) >60 >60 mL/min   EST GFR IF AFRICAN AM >60 >60 mL/min   GLUCOSE 109 (H) 74 - 106 mg/dL   CALCIUM, SERUM 8.6 8.5 - 10.1 mg/dL   ANION GAP 8.0  0.0 - 15.0 mmol/L       ASSESSMENT/PLAN:  Closed displaced fracture of second cervical vertebra with routine healing, unspecified fracture morphology, subsequent encounter  Fall, subsequent encounter  Patient fell backwards out of chair and hit his head  ST showing minimally displaced bilat C2 laminar fractures  Neurosurgery consulted and noted stable fractures, no collar needed, OK to be up with therapies and no f/u needed.   *awaiting Discharge Summary for full details    Analgesia with:  (new) Tylenol 650 mg q4 hours PRN  (new) lidocaine patch on for 12 hours, off for 12 hours  (new) robaxin 500 mg QID  (new) tramadol 25 mg po q4 hours PRN    Patient reporting acute pain in his neck, 7/10 today, and chronic low back pain from herniated discs, 5/10 pain.  His wife notes he has not had any pain medication since about 0930 this AM.  Nursing is working on obtaining his medications from pharmacy. Patient reports when he took the Tramadol last time (likely with Tylenol and Robaxin),pain 5-->1.     PLAN:  - Schedule Tylenol 1000 mg TID for base analgesia  - continue (new) lidocaine patch, robaxin, tramadol PRN  - monitor for pain control and titrate as needed  - Physical Therapy, Occupational Therapy for strengthening and ROM of neck as able, no restrictions per Neurosurgery (but am awaiting full DC Summary)    PSP (progressive supranuclear palsy) (H)  Progressive supranuclear ophthalmoplegia (H)  Impaired functional mobility, balance, gait, and endurance  Speech dysfunction  Dysphagia, Severe oropharyngeal phase  PSP x 4 years now  Patient's wife noting patient still very active, but PSP definitely progressing.   Deficits include:  Dysarthria (slow, slurred, mumbled speech)  Decreased vision (double, blurred vision)  Sensitivity to lights (wears hat and sunglasses inside)  Impaired coordination/balance  Dysphagia (eats/drinks slowly)  Per Care Everywhere SLP consult:  Per his wife, he has had two previous  videofluoroscopic swallowing studies that have demonstrated aspiration with all consistencies, the last one being in February of 2018. Compensatory strategies such as chin tuck were found not to be beneficial, per wife. Apparently, tube feedings were recommended at that time but the patient has chosen to continue po intake despite the known risk of aspiration    SLP consult noted patient takes whole pills in pudding, was coughing with liquids.  Patient/Pt's family have refused pureed foods and thickened liquids and know the risks involved but prefer patient to have QOL.     On PTA amantadine 100 mg TID (0600, 1100, 1600), PTA rivastigmine 9.5 mg/24 hour patch.   PTA Vitamin D and MV remain    PLAN:  - Physical Therapy, Occupational Therapy for balance, strengthening, ROM, gait, etc.   - Can ask family next visit if they would like SLP consult  - Continue PTA Amantadine and rivastigmine patch  - nursing for supportive cares    Essential hypertension  S/p ablation for atrial flutter  On PTA amlodipine 2.5 mg qPM, ASA 81 mg daily, HCTZ 12.5 mg daily, olmesartan 20 mg BID    BP today 123/79 today  HR 90 today  Patient denies CP, TRAN    PLAN:  - continue PTA amlodipine, olmesartan, ASA, HCTZ  - monitor for titration needs    Neuropathy  bilat feet  History of herniated lumbar discs (per patient)  On no meds at this time.  Will monitor for need.     Migraine without aura and without status migrainosus, not intractable  On PTA PRN Zolmitriptan 5 mg at onset of migraine  Will monitor.     Displacement of lumbar intervertebral disc without myelopathy  History of herniated lumbar discs, per patient  History of neuropathy  Patient reporting 5/10 LBP today    Analgesia with:  (new) Tylenol 650 mg q4 hours PRN  (new) robaxin 500 mg QID  (new) tramadol 25 mg po q4 hours PRN    PLAN:  - changing Tylenol to 1000 mg TID  - Physical Therapy, Occupational Therapy for strengthening, ROM, etc.     Slow transit constipation  PTA Senna 2  tab qHS now changed to Senna 1 tab BID.   Patient reporting some constipation  Discussion about increase but patient's wife noting patient has had history of diarrhea with bowel meds so is leery.    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     PLAN:  - monitor sleep for titration needs.         Orders:  1. DC Tylenol prn  2. Tylenol 1000 mg po TID. Dx: Pain  3. Senna 1 tab po BID PRN. Dx: constipation  4. Patient preference in timing of medications (see paper chart).     Total time spent with patient visit at the skilled nursing facility was >35 minutes including patient visit and review of past records. Greater than 50% of total time spent with counseling and coordinating care due to patient visit, review of records in absence of discharge summary    Electronically signed by:  YULY Montiel CNP                    Sincerely,        YULY Montiel CNP

## 2018-06-04 NOTE — LETTER
6/4/2018        RE: Jimmy Patrick  [address] [facility] [address]        [facility]    Chief Complaint   Patient presents with     custodial Acute       [facility] Medical Record Number:  [id number]    HPI:    Jimmy Patrick is a 67 year old  (1950), who is being seen today for an episodic care visit at [facility].  HPI information obtained from: facility chart records, facility staff, patient report, [facility] chart review and family/first contact pt's wife report.Today's concern is:     Closed displaced fracture of second cervical vertebra with routine healing, unspecified fracture morphology, subsequent encounter  Fall, subsequent encounter  PSP (progressive supranuclear palsy) (H)  Progressive supranuclear ophthalmoplegia (H)  Impaired functional mobility, balance, gait, and endurance  Speech dysfunction  Dysphagia, unspecified type  Essential hypertension  Neuropathy  Migraine without aura and without status migrainosus, not intractable  Displacement of lumbar intervertebral disc without myelopathy  Slow transit constipation  Cognitive complaints  Insomnia due to medical condition     Per Epic note/Hx:  Patient is a 67 year old gentleman with PMH of progressive supranuclear palsy x 4 years with subsequent dysphagia, impaired vision (blurry, double vision), impaired balance, neuropathy in bilat feet, migraines who on 5/28/18 fell backwards from a chair and hit his head.  He declined going to the hospital at that time but later went in as the pain was worsening. [place] [facility] [place] [facility] after CT Angio showed (stable) minimally displaced bilat C2 laminar fractures.  Neurology was consulted and noted no collar ws needed, OK to be up with therapies and no f/u needed. He was transferred to [facility] for rehab, medical management and nursing cares.      Today he and his wife are me in his TCU room at   [facility] where he reports improved pain in his neck, but only mildly and still reports he wishes it were better.  He reports his lumbar pain is tolerable.  His wife is also concerned about the anterior head carriage posture of his neck and long-term complications and restricted movement because of it.  He reports he wakes up in the middle of the night in pain and has trouble sleeping because of it. He also reports a lot of constipation.  He denies shortness of breath, CP, heartburn, upset stomach, diarrhea.    ALLERGIES: Other [seasonal allergies] and Codeine  Past Medical, Surgical, Family and Social History reviewed and updated in [facility].    Current Outpatient Prescriptions   Medication Sig Dispense Refill     acetaminophen (TYLENOL) 500 MG tablet Take 2 tablets (1,000 mg) by mouth 3 times daily       adapalene (DIFFERIN) 0.1 % gel Apply to the face at night. 135 g 3     amantadine (SYMMETREL) 100 MG capsule Take 1 capsule (100 mg) by mouth 3 times daily 6am,11am and 4pm 270 capsule 3     amLODIPine (NORVASC) 2.5 MG tablet Take 1 tablet by mouth daily       aspirin (BABY ASPIRIN) 81 MG chewable tablet Take 81 mg by mouth daily        cholecalciferol (VITAMIN D-3 SUPER STRENGTH) 2000 UNITS tablet Take 1 tablet by mouth        hydrochlorothiazide 12.5 MG TABS tablet Take 1 tablet (12.5 mg) by mouth daily 90 tablet 3     lidocaine (LIDODERM) 5 % Patch Place 1 patch onto the skin every 24 hours       loperamide (IMODIUM) 2 MG capsule Take 2 mg by mouth every 4 hours as needed for diarrhea       METHOCARBAMOL PO Take 500 mg by mouth 4 times daily       Multiple Vitamins-Minerals (CENTRUM SILVER) per tablet Take 1 tablet by mouth daily 30 tablet      nortriptyline (PAMELOR) 10 MG capsule TAKE 3 CAPSULES AT BEDTIME 270 capsule 3     olmesartan (BENICAR) 20 MG tablet Take 1 tablet (20 mg) by mouth 2 times daily       rivastigmine (EXELON) 9.5 MG/24HR 24 hr patch Place 1 patch onto the skin daily 30 patch 11      senna-docusate (SENOKOT-S;PERICOLACE) 8.6-50 MG per tablet Take 1 tablet by mouth 2 times daily And 1 tab BID PRN 90 tablet 6     TRAMADOL HCL PO Take 25 mg by mouth 3 times daily       TRAMADOL HCL PO Take 25 mg by mouth every 6 hours as needed for moderate to severe pain        ZOLMitriptan (ZOMIG) 5 MG tablet Take 1 tablet (5 mg) by mouth at onset of headache for migraine 30 tablet 5     Medications reviewed:  Medications reconciled to facility chart and changes were made to reflect current medications as identified as above med list. Below are the changes that were made:   Medications stopped since last EPIC medication reconciliation:   There are no discontinued medications.    Medications started since last [facility] medication reconciliation:  Orders Placed This Encounter   Medications     TRAMADOL HCL PO     Sig: Take 25 mg by mouth 3 times daily     REVIEW OF SYSTEMS:  10 point ROS of systems including Constitutional, Eyes, Respiratory, Cardiovascular, Gastroenterology, Genitourinary, Integumentary, Muscularskeletal, Psychiatric were all negative except for pertinent positives noted in my HPI.    Physical Exam:  /87  Pulse 100  Temp 97.3  F (36.3  C)  Resp 18  Wt 179 lb (81.2 kg)  SpO2 97%  BMI 24.28 kg/m2  GENERAL APPEARANCE:  Alert, in no distress, deconditioned from acute on chronic illness. Slow in speech, movement  RESP:  respiratory effort and palpation of chest normal, auscultation of lungs clear, no respiratory distress  CV:  Palpation and auscultation of heart done , rate and rhythm regular, no murmur, no LE peripheral edema  ABDOMEN:  normal bowel sounds, soft, nontender, no hepatosplenomegaly or other masses  M/S:   Gait and station ambulatory with walker if family/therapy is present, otherwise uses W/C for mobility, Digits and nails with arthritic changes and spacticity, reduced muscle mass, pronounced anterior head carriage with hypertonic suboccipitals, posterior cervicals,   SKIN:   Inspection and Palpation of skin and subcutaneous tissue pale, intact  PSYCH:  insight and judgement, memory seemingly intact , affect flat and mood per baseline, follows commands readily         Recent Labs:  CBC (HGB,HCT,WBC,RBC,Platelet) (05/28/2018 8:12 PM)       CBC (HGB,HCT,WBC,RBC,Platelet) (05/28/2018 8:12 PM)   Component Value Ref Range   WBC 11.6 (H) 4.3 - 10.8 K/uL   RBC 4.91 4.60 - 6.20 M/uL   HEMOGLOBIN 14.6 14.0 - 18.0 gm/dL   HEMATOCRIT 42.2 40.0 - 54.0 %   MCV 86 80 - 100 fl   MCH 30 27 - 33 pg   MCHC 35 33 - 36 gm/dL   RDW 12.3 11.5 - 14.5 %   PLATELET COUNT 176 150 - 400 K/uL   MPV 9.9 6.5 - 12.0      Basic Metab Profile (05/28/2018 8:12 PM)       Basic Metab Profile (05/28/2018 8:12 PM)   Component Value Ref Range   SODIUM 138 136 - 145 mmol/L   POTASSIUM 3.8 3.5 - 5.1 mmol/L   CHLORIDE 103 98 - 112 mmol/L   CARBON DIOXIDE 27 21 - 32 mmol/L   BUN (UREA NITRO) 21 7 - 24 mg/dL   CREATININE 1.00 0.70 - 1.30 mg/dL   EST GFR (MDRD) >60 >60 mL/min   EST GFR IF AFRICAN AM >60 >60 mL/min   GLUCOSE 109 (H) 74 - 106 mg/dL   CALCIUM, SERUM 8.6 8.5 - 10.1 mg/dL   ANION GAP 8.0 0.0 - 15.0 mmol/L               Assessment/Plan:  Closed displaced fracture of second cervical vertebra with routine healing, unspecified fracture morphology, subsequent encounter  Fall, subsequent encounter  Patient fell backwards out of chair and hit his head  ST showing minimally displaced bilat C2 laminar fractures, HCT negative  Neurosurgery consulted and noted stable fractures, no collar needed, OK to be up with therapies and no f/u needed.      Analgesia with:  (new) Tylenol 1000 mg TID  (new) lidocaine patch on for 12 hours, off for 12 hours  (new) robaxin 500 mg QID  (new) tramadol 25 mg po q4 hours PRN     Patient reporting pain is slightly better but still not well controlled.  Extensive discussion about dosing and frequency of Tramadol.  Patient reporting insomnia due to pain.  Seemingly at this time tramadol dosing seems  moderately effective, so will change frequency for round-the-clock coverage.  Patient's wife concerned about anterior head carriage with reduced AROM and long-term complications.  Discussion about pain control which will allow patient to move more freely, also discussion about massage therapy and heating pads, etc.     PLAN:  - continue (new) Tylenol 1000 mg TID  - continue (new) lidocaine patch, robaxin, tramadol PRN  - Change Tramadol to round-the-clock coverage (q4 hours and q4 hours PRN) and monitor for dosing efficacy   - monitor for pain control and titrate as needed  - Physical Therapy, Occupational Therapy for strengthening and ROM of neck as able, no restrictions per Neurosurgery      PSP (progressive supranuclear palsy) (H)  Progressive supranuclear ophthalmoplegia (H)  Impaired functional mobility, balance, gait, and endurance  Speech dysfunction  Dysphagia, Severe oropharyngeal phase  PSP x 4 years now  Patient's wife noting patient still very active, but PSP definitely progressing.   Deficits include:  Dysarthria (slow, slurred, mumbled speech)  Decreased vision (double, blurred vision)  Sensitivity to lights (wears hat and sunglasses inside)  Impaired coordination/balance  Dysphagia (eats/drinks slowly)  Per Care Everywhere SLP consult:  Per his wife, he has had two previous videofluoroscopic swallowing studies that have demonstrated aspiration with all consistencies, the last one being in February of 2018. Compensatory strategies such as chin tuck were found not to be beneficial, per wife. Apparently, tube feedings were recommended at that time but the patient has chosen to continue po intake despite the known risk of aspiration     SLP consult noted patient takes whole pills in pudding, was coughing with liquids.  Patient/Pt's family have refused pureed foods and thickened liquids and know the risks involved but prefer patient to have QOL. SLP following for swallowing and speech exercising.      On  PTA amantadine 100 mg TID (0600, 1100, 1600), PTA rivastigmine 9.5 mg/24 hour patch.   PTA Vitamin D and MV remain     PLAN:  - Physical Therapy, Occupational Therapy for balance, strengthening, ROM, gait, etc.   - SLP for swallowing and speech exercising.   - Continue PTA Amantadine and rivastigmine patch  - nursing for supportive cares     Essential hypertension  S/p ablation for atrial flutter  On PTA amlodipine 2.5 mg qPM, ASA 81 mg daily, HCTZ 12.5 mg daily, olmesartan 20 mg BID     BPs 120-140s/80s  HRs   Patient denies CP, HA, lightheadedness; also endorses pain (which may be elevating BP/HR)     PLAN:  - continue PTA amlodipine, olmesartan, ASA, HCTZ  - increase pain medication frequency  - monitor for titration needs     Neuropathy  bilat feet  History of herniated lumbar discs (per patient)  On no meds at this time.  Will monitor for need.      Migraine without aura and without status migrainosus, not intractable  On PTA PRN Zolmitriptan 5 mg at onset of migraine  No HA nor migraine while in TCU  Will monitor.      Displacement of lumbar intervertebral disc without myelopathy  History of herniated lumbar discs, per patient  History of neuropathy  Patient reporting tolerable LBP today.      Analgesia with:  (new) Tylenol 1000 mg TID  (new) robaxin 500 mg QID  (new) tramadol 25 mg po q4 hours PRN - changing today     PLAN:  - continue (new) Tylenol 1000 mg TID  - change Tramadol to 25 mg q4 hours and q4 hours PRN.   - Physical Therapy, Occupational Therapy for strengthening, ROM, etc.      Slow transit constipation  PTA Senna 2 tab qHS now changed to Senna 1 tab BID.   Patient reporting constipation; patient's wife noting several days  History of diarrhea so patient leery to take more laxatives, however; has not had BM in several days so will need to intervene more.      PLAN:  - bisacodyl supp today  - Senna S 2 tab now  - monitor BMs and can add more if needed.   - continue Senna S 1 tab BID and 1  tab BID PRN.       Cognitive complaints  History of progressive supranuclear palsy  No cognitive testing on file     PLAN:  - Occupational Therapy for cognitive testing     Insomnia due to medical condition (PSP)  On PTA nortriptyline 30 mg qHS  Patient's wife noting patient sleeps poorly, even despite nortriptyline.   Patient reporting sleeping poor d/t pain     PLAN:  - increase frequency of Tramadol for round-the-clock coverage  - monitor sleep for titration needs.          Orders:  1) Senna S 2 tabs po NOW. Dx: Constipation  2) Bisacodyl supp 10 mg NOW. Dx: Constipation  3) Massage Therapy 3x/week. Dx: PSP, Hypertonic posterior cervicals, suboccipitals; Anterior head carriage with pain + limited ROM.   4) D/C all current Tramadol orders.  5) Tramadol 25 mg (50 mg - 1/2 tab) po every 4 hours. Dx: Pain  6) Tramadol 25 mg (50 mg - 1/2 tab) po every 4 hours as needed. Quantity: 60. Refill: 2. Dx: Pain  7) Incentive Spirometer - encourage every 2 hours if able. Dx: PSP    Family also requesting the following orders:  1.) Cleanse face w/ Stridex pad bid (Morning & bedtime) for ache prevent; before applying Adapalene gel.  2.) Apply Adapalene gel 0.1% to face bid (Morning & bedtime) for ache prevention.  3.) Systane gtt to OU tid for dry eyes.  4.) Sytance oint to OU q HS for dry eyes      Total time spent with patient visit at the skilled nursing facility was >30 minutes including patient visit and discussion with pt's wife present. Greater than 50% of total time spent with counseling and coordinating care due to patient visit, coordiantion of care with nursing    Electronically signed by  YULY Montiel CNP                      Sincerely,        YULY Montiel CNP

## 2018-06-04 NOTE — PROGRESS NOTES
[facility]    Chief Complaint   Patient presents with     [facility] Acute       [facility] Record Number:  [id number]    HPI:    Jimmy Patrick is a 67 year old  (1950), who is being seen today for an episodic care visit at [facility].  HPI information obtained from: facility chart records, facility staff, patient report, [facility] chart review and family/first contact pt's wife report.Today's concern is:     Closed displaced fracture of second cervical vertebra with routine healing, unspecified fracture morphology, subsequent encounter  Fall, subsequent encounter  PSP (progressive supranuclear palsy) (H)  Progressive supranuclear ophthalmoplegia (H)  Impaired functional mobility, balance, gait, and endurance  Speech dysfunction  Dysphagia, unspecified type  Essential hypertension  Neuropathy  Migraine without aura and without status migrainosus, not intractable  Displacement of lumbar intervertebral disc without myelopathy  Slow transit constipation  Cognitive complaints  Insomnia due to medical condition     Per Epic note/Hx:  Patient is a 67 year old gentleman with PMH of progressive supranuclear palsy x 4 years with subsequent dysphagia, impaired vision (blurry, double vision), impaired balance, neuropathy in bilat feet, migraines who on 5/28/18 fell backwards from a chair and hit his head.  He declined going to the hospital at that time but later went in as the pain was worsening. He initially presented to [facility] and was transferred to [facility] [place] after [place] Angio showed (stable) minimally displaced bilat C2 laminar fractures.  Neurology was consulted and noted no collar ws needed, OK to be up with therapies and no f/u needed. He was transferred to [facility] for rehab, medical management and nursing cares.      Today he and his wife are me in his TCU room at [facility] where he reports improved pain in his neck, but only mildly and still reports he  wishes it were better.  He reports his lumbar pain is tolerable.  His wife is also concerned about the anterior head carriage posture of his neck and long-term complications and restricted movement because of it.  He reports he wakes up in the middle of the night in pain and has trouble sleeping because of it. He also reports a lot of constipation.  He denies shortness of breath, CP, heartburn, upset stomach, diarrhea.    ALLERGIES: Other [seasonal allergies] and Codeine  Past Medical, Surgical, Family and Social History reviewed and updated in [facility].    Current Outpatient Prescriptions   Medication Sig Dispense Refill     acetaminophen (TYLENOL) 500 MG tablet Take 2 tablets (1,000 mg) by mouth 3 times daily       adapalene (DIFFERIN) 0.1 % gel Apply to the face at night. 135 g 3     amantadine (SYMMETREL) 100 MG capsule Take 1 capsule (100 mg) by mouth 3 times daily 6am,11am and 4pm 270 capsule 3     amLODIPine (NORVASC) 2.5 MG tablet Take 1 tablet by mouth daily       aspirin (BABY ASPIRIN) 81 MG chewable tablet Take 81 mg by mouth daily        cholecalciferol (VITAMIN D-3 SUPER STRENGTH) 2000 UNITS tablet Take 1 tablet by mouth        hydrochlorothiazide 12.5 MG TABS tablet Take 1 tablet (12.5 mg) by mouth daily 90 tablet 3     lidocaine (LIDODERM) 5 % Patch Place 1 patch onto the skin every 24 hours       loperamide (IMODIUM) 2 MG capsule Take 2 mg by mouth every 4 hours as needed for diarrhea       METHOCARBAMOL PO Take 500 mg by mouth 4 times daily       Multiple Vitamins-Minerals (CENTRUM SILVER) per tablet Take 1 tablet by mouth daily 30 tablet      nortriptyline (PAMELOR) 10 MG capsule TAKE 3 CAPSULES AT BEDTIME 270 capsule 3     olmesartan (BENICAR) 20 MG tablet Take 1 tablet (20 mg) by mouth 2 times daily       rivastigmine (EXELON) 9.5 MG/24HR 24 hr patch Place 1 patch onto the skin daily 30 patch 11     senna-docusate (SENOKOT-S;PERICOLACE) 8.6-50 MG per tablet Take 1 tablet by mouth 2 times daily And 1  tab BID PRN 90 tablet 6     TRAMADOL HCL PO Take 25 mg by mouth 3 times daily       TRAMADOL HCL PO Take 25 mg by mouth every 6 hours as needed for moderate to severe pain        ZOLMitriptan (ZOMIG) 5 MG tablet Take 1 tablet (5 mg) by mouth at onset of headache for migraine 30 tablet 5     Medications reviewed:  Medications reconciled to facility chart and changes were made to reflect current medications as identified as above med list. Below are the changes that were made:   Medications stopped since last EPIC medication reconciliation:   There are no discontinued medications.    Medications started since last [facility] medication reconciliation:  Orders Placed This Encounter   Medications     TRAMADOL HCL PO     Sig: Take 25 mg by mouth 3 times daily     REVIEW OF SYSTEMS:  10 point ROS of systems including Constitutional, Eyes, Respiratory, Cardiovascular, Gastroenterology, Genitourinary, Integumentary, Muscularskeletal, Psychiatric were all negative except for pertinent positives noted in my HPI.    Physical Exam:  /87  Pulse 100  Temp 97.3  F (36.3  C)  Resp 18  Wt 179 lb (81.2 kg)  SpO2 97%  BMI 24.28 kg/m2  GENERAL APPEARANCE:  Alert, in no distress, deconditioned from acute on chronic illness. Slow in speech, movement  RESP:  respiratory effort and palpation of chest normal, auscultation of lungs clear, no respiratory distress  CV:  Palpation and auscultation of heart done , rate and rhythm regular, no murmur, no LE peripheral edema  ABDOMEN:  normal bowel sounds, soft, nontender, no hepatosplenomegaly or other masses  M/S:   Gait and station ambulatory with walker if family/therapy is present, otherwise uses W/C for mobility, Digits and nails with arthritic changes and spacticity, reduced muscle mass, pronounced anterior head carriage with hypertonic suboccipitals, posterior cervicals,   SKIN:  Inspection and Palpation of skin and subcutaneous tissue pale, intact  PSYCH:  insight and judgement,  memory seemingly intact , affect flat and mood per baseline, follows commands readily         Recent Labs:  CBC (HGB,HCT,WBC,RBC,Platelet) (05/28/2018 8:12 PM)       CBC (HGB,HCT,WBC,RBC,Platelet) (05/28/2018 8:12 PM)   Component Value Ref Range   WBC 11.6 (H) 4.3 - 10.8 K/uL   RBC 4.91 4.60 - 6.20 M/uL   HEMOGLOBIN 14.6 14.0 - 18.0 gm/dL   HEMATOCRIT 42.2 40.0 - 54.0 %   MCV 86 80 - 100 fl   MCH 30 27 - 33 pg   MCHC 35 33 - 36 gm/dL   RDW 12.3 11.5 - 14.5 %   PLATELET COUNT 176 150 - 400 K/uL   MPV 9.9 6.5 - 12.0      Basic Metab Profile (05/28/2018 8:12 PM)       Basic Metab Profile (05/28/2018 8:12 PM)   Component Value Ref Range   SODIUM 138 136 - 145 mmol/L   POTASSIUM 3.8 3.5 - 5.1 mmol/L   CHLORIDE 103 98 - 112 mmol/L   CARBON DIOXIDE 27 21 - 32 mmol/L   BUN (UREA NITRO) 21 7 - 24 mg/dL   CREATININE 1.00 0.70 - 1.30 mg/dL   EST GFR (MDRD) >60 >60 mL/min   EST GFR IF AFRICAN AM >60 >60 mL/min   GLUCOSE 109 (H) 74 - 106 mg/dL   CALCIUM, SERUM 8.6 8.5 - 10.1 mg/dL   ANION GAP 8.0 0.0 - 15.0 mmol/L               Assessment/Plan:  Closed displaced fracture of second cervical vertebra with routine healing, unspecified fracture morphology, subsequent encounter  Fall, subsequent encounter  Patient fell backwards out of chair and hit his head  ST showing minimally displaced bilat C2 laminar fractures, HCT negative  Neurosurgery consulted and noted stable fractures, no collar needed, OK to be up with therapies and no f/u needed.      Analgesia with:  (new) Tylenol 1000 mg TID  (new) lidocaine patch on for 12 hours, off for 12 hours  (new) robaxin 500 mg QID  (new) tramadol 25 mg po q4 hours PRN     Patient reporting pain is slightly better but still not well controlled.  Extensive discussion about dosing and frequency of Tramadol.  Patient reporting insomnia due to pain.  Seemingly at this time tramadol dosing seems moderately effective, so will change frequency for round-the-clock coverage.  Patient's wife concerned  about anterior head carriage with reduced AROM and long-term complications.  Discussion about pain control which will allow patient to move more freely, also discussion about massage therapy and heating pads, etc.     PLAN:  - continue (new) Tylenol 1000 mg TID  - continue (new) lidocaine patch, robaxin, tramadol PRN  - Change Tramadol to round-the-clock coverage (q4 hours and q4 hours PRN) and monitor for dosing efficacy   - monitor for pain control and titrate as needed  - Physical Therapy, Occupational Therapy for strengthening and ROM of neck as able, no restrictions per Neurosurgery      PSP (progressive supranuclear palsy) (H)  Progressive supranuclear ophthalmoplegia (H)  Impaired functional mobility, balance, gait, and endurance  Speech dysfunction  Dysphagia, Severe oropharyngeal phase  PSP x 4 years now  Patient's wife noting patient still very active, but PSP definitely progressing.   Deficits include:  Dysarthria (slow, slurred, mumbled speech)  Decreased vision (double, blurred vision)  Sensitivity to lights (wears hat and sunglasses inside)  Impaired coordination/balance  Dysphagia (eats/drinks slowly)  Per Care Everywhere SLP consult:  Per his wife, he has had two previous videofluoroscopic swallowing studies that have demonstrated aspiration with all consistencies, the last one being in February of 2018. Compensatory strategies such as chin tuck were found not to be beneficial, per wife. Apparently, tube feedings were recommended at that time but the patient has chosen to continue po intake despite the known risk of aspiration     SLP consult noted patient takes whole pills in pudding, was coughing with liquids.  Patient/Pt's family have refused pureed foods and thickened liquids and know the risks involved but prefer patient to have QOL. SLP following for swallowing and speech exercising.      On PTA amantadine 100 mg TID (0600, 1100, 1600), PTA rivastigmine 9.5 mg/24 hour patch.   PTA Vitamin D and  MV remain     PLAN:  - Physical Therapy, Occupational Therapy for balance, strengthening, ROM, gait, etc.   - SLP for swallowing and speech exercising.   - Continue PTA Amantadine and rivastigmine patch  - nursing for supportive cares     Essential hypertension  S/p ablation for atrial flutter  On PTA amlodipine 2.5 mg qPM, ASA 81 mg daily, HCTZ 12.5 mg daily, olmesartan 20 mg BID     BPs 120-140s/80s  HRs   Patient denies CP, HA, lightheadedness; also endorses pain (which may be elevating BP/HR)     PLAN:  - continue PTA amlodipine, olmesartan, ASA, HCTZ  - increase pain medication frequency  - monitor for titration needs     Neuropathy  bilat feet  History of herniated lumbar discs (per patient)  On no meds at this time.  Will monitor for need.      Migraine without aura and without status migrainosus, not intractable  On PTA PRN Zolmitriptan 5 mg at onset of migraine  No HA nor migraine while in TCU  Will monitor.      Displacement of lumbar intervertebral disc without myelopathy  History of herniated lumbar discs, per patient  History of neuropathy  Patient reporting tolerable LBP today.      Analgesia with:  (new) Tylenol 1000 mg TID  (new) robaxin 500 mg QID  (new) tramadol 25 mg po q4 hours PRN - changing today     PLAN:  - continue (new) Tylenol 1000 mg TID  - change Tramadol to 25 mg q4 hours and q4 hours PRN.   - Physical Therapy, Occupational Therapy for strengthening, ROM, etc.      Slow transit constipation  PTA Senna 2 tab qHS now changed to Senna 1 tab BID.   Patient reporting constipation; patient's wife noting several days  History of diarrhea so patient leery to take more laxatives, however; has not had BM in several days so will need to intervene more.      PLAN:  - bisacodyl supp today  - Senna S 2 tab now  - monitor BMs and can add more if needed.   - continue Senna S 1 tab BID and 1 tab BID PRN.       Cognitive complaints  History of progressive supranuclear palsy  No cognitive testing on  file     PLAN:  - Occupational Therapy for cognitive testing     Insomnia due to medical condition (PSP)  On PTA nortriptyline 30 mg qHS  Patient's wife noting patient sleeps poorly, even despite nortriptyline.   Patient reporting sleeping poor d/t pain     PLAN:  - increase frequency of Tramadol for round-the-clock coverage  - monitor sleep for titration needs.          Orders:  1) Senna S 2 tabs po NOW. Dx: Constipation  2) Bisacodyl supp 10 mg NOW. Dx: Constipation  3) Massage Therapy 3x/week. Dx: PSP, Hypertonic posterior cervicals, suboccipitals; Anterior head carriage with pain + limited ROM.   4) D/C all current Tramadol orders.  5) Tramadol 25 mg (50 mg - 1/2 tab) po every 4 hours. Dx: Pain  6) Tramadol 25 mg (50 mg - 1/2 tab) po every 4 hours as needed. Quantity: 60. Refill: 2. Dx: Pain  7) Incentive Spirometer - encourage every 2 hours if able. Dx: PSP    Family also requesting the following orders:  1.) Cleanse face w/ Stridex pad bid (Morning &amp; bedtime) for ache prevent; before applying Adapalene gel.  2.) Apply Adapalene gel 0.1% to face bid (Morning &amp; bedtime) for ache prevention.  3.) Systane gtt to OU tid for dry eyes.  4.) Sytance oint to OU q HS for dry eyes      Total time spent with patient visit at the skilled nursing facility was >30 minutes including patient visit and discussion with pt's wife present. Greater than 50% of total time spent with counseling and coordinating care due to patient visit, coordiantion of care with nursing    Electronically signed by  YULY Montiel CNP

## 2018-06-06 PROBLEM — S12.100A CLOSED FRACTURE OF SECOND CERVICAL VERTEBRA (H): Status: ACTIVE | Noted: 2018-01-01

## 2018-06-06 PROBLEM — Z71.89 ACP (ADVANCE CARE PLANNING): Status: ACTIVE | Noted: 2018-01-01
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
## 2018-06-06 NOTE — LETTER
6/6/2018        RE: Jimmy Patrick  [id number] [address] [id number]        [facility]    Chief Complaint   Patient presents with     jail Acute       [facility] Medical Record Number:  [id number]    HPI:    Jimmy Patrick is a 67 year old  (1950), who is being seen today for an episodic care visit at [facility].  HPI information obtained from: facility chart records, facility staff, patient report, [facility] chart review and family/first contact pt's wife report.Today's concern is:     Closed displaced fracture of second cervical vertebra with routine healing, unspecified fracture morphology, subsequent encounter  Fall, subsequent encounter  PSP (progressive supranuclear palsy) (H)  Progressive supranuclear ophthalmoplegia (H)  Impaired functional mobility, balance, gait, and endurance  Speech dysfunction  Dysphagia, unspecified type  Essential hypertension  Neuropathy  Migraine without aura and without status migrainosus, not intractable  Displacement of lumbar intervertebral disc without myelopathy  Slow transit constipation  Cognitive complaints  Insomnia due to medical condition  Urge incontinence of urine     Per Epic note/Hx:  Patient is a 67 year old gentleman with PMH of progressive supranuclear palsy x 4 years with subsequent dysphagia, impaired vision (blurry, double vision), impaired balance, neuropathy in bilat feet, migraines who on 5/28/18 fell backwards from a chair and hit his head.  He declined going to the hospital at that time but later went in as the pain was worsening. He initially presented to [facility] and was transferred to [facility] after CT Angio showed (stable) minimally displaced bilat C2 laminar fractures.  Neurology was consulted and noted no collar ws needed, OK to be up with therapies and no f/u needed. He was transferred to [facility] for rehab, medical management and nursing cares.      Today he and his wife  are met in his TCU room at [facility] where he reports slight improvement in his neck pain but still not appropriately controlled.  He continues to not move his neck and his wife is significantly concerned about his long-term prognosis if he continues to not move his neck.  He reports his LBP is tolerable at this time.  He also reports continued constipation but improving. He denies SOB, CP, lightheadedness, upset stomach or heartburn.        ALLERGIES: Other [seasonal allergies] and Codeine  Past Medical, Surgical, Family and Social History reviewed and updated in [facility].    Current Outpatient Prescriptions   Medication Sig Dispense Refill     acetaminophen (TYLENOL) 500 MG tablet Take 2 tablets (1,000 mg) by mouth 3 times daily       adapalene (DIFFERIN) 0.1 % gel Apply to Face topically every day and evening shift for Acne prevention 135 g 3     amantadine (SYMMETREL) 100 MG capsule Take 1 capsule (100 mg) by mouth 3 times daily 6am,11am and 4pm 270 capsule 3     amLODIPine (NORVASC) 2.5 MG tablet Take 1 tablet by mouth daily       aspirin (BABY ASPIRIN) 81 MG chewable tablet Take 81 mg by mouth daily        cholecalciferol (VITAMIN D-3 SUPER STRENGTH) 2000 UNITS tablet Take 1 tablet by mouth        hydrochlorothiazide 12.5 MG TABS tablet Take 1 tablet (12.5 mg) by mouth daily 90 tablet 3     lidocaine (LIDODERM) 5 % Patch Place 1 patch onto the skin every 24 hours       loperamide (IMODIUM) 2 MG capsule Take 2 mg by mouth every 4 hours as needed for diarrhea       METHOCARBAMOL PO Take 500 mg by mouth 4 times daily       Multiple Vitamins-Minerals (CENTRUM SILVER) per tablet Take 1 tablet by mouth daily 30 tablet      nortriptyline (PAMELOR) 10 MG capsule TAKE 3 CAPSULES AT BEDTIME 270 capsule 3     olmesartan (BENICAR) 20 MG tablet Take 1 tablet (20 mg) by mouth 2 times daily       polyethylene glycol 0.4%- propylene glycol 0.3% (SYSTANE ULTRA) 0.4-0.3 % SOLN ophthalmic solution Place 1 drop into both eyes 3  times daily       rivastigmine (EXELON) 9.5 MG/24HR 24 hr patch Place 1 patch onto the skin daily 30 patch 11     sennosides (SENOKOT) 8.6 MG tablet Take 1 tablet by mouth 2 times daily       TRAMADOL HCL PO Take 25 mg by mouth every 4 hours        TRAMADOL HCL PO Take 25 mg by mouth every 4 hours as needed for moderate to severe pain        White Petrolatum-Mineral Oil (SYSTANE NIGHTTIME) OINT Instill 1 ribbon in both eyes at bedtime for Dry eyes       ZOLMitriptan (ZOMIG) 5 MG tablet Take 1 tablet (5 mg) by mouth at onset of headache for migraine 30 tablet 5     Medications reviewed:  Medications reconciled to facility chart and changes were made to reflect current medications as identified as above med list. Below are the changes that were made:   Medications stopped since last EPIC medication reconciliation:   There are no discontinued medications.    Medications started since last [facility] medication reconciliation:  See previous notes    REVIEW OF SYSTEMS:  10 point ROS of systems including Constitutional, Eyes, Respiratory, Cardiovascular, Gastroenterology, Genitourinary, Integumentary, Muscularskeletal, Psychiatric were all negative except for pertinent positives noted in my HPI.    Physical Exam:  /78  Pulse 80  Temp 97.6  F (36.4  C)  Resp 18  Wt 179 lb (81.2 kg)  SpO2 97%  BMI 24.28 kg/m2  GENERAL APPEARANCE:  Alert, in no distress, deconditioned from acute on chronic illness. Slow in speech, movement  RESP:  respiratory effort and palpation of chest normal, auscultation of lungs clear, no respiratory distress  CV:  Palpation and auscultation of heart done , rate and rhythm regular with some occasional ectopy, trending towards tachycardia, no murmur, scant LE peripheral edema  ABDOMEN:  normal bowel sounds, soft, nontender, no hepatosplenomegaly or other masses  M/S:   Gait and station ambulatory with walker if family/therapy is present, otherwise uses W/C for mobility, Digits and nails with  arthritic changes and spacticity, reduced muscle mass, pronounced anterior head carriage with hypertonic suboccipitals, posterior cervicals,   SKIN:  Inspection and Palpation of skin and subcutaneous tissue pale, intact  PSYCH:  insight and judgement, memory seemingly intact , affect flat and mood per baseline, follows commands readily         Recent Labs:  CBC (HGB,HCT,WBC,RBC,Platelet) (05/28/2018 8:12 PM)       CBC (HGB,HCT,WBC,RBC,Platelet) (05/28/2018 8:12 PM)   Component Value Ref Range   WBC 11.6 (H) 4.3 - 10.8 K/uL   RBC 4.91 4.60 - 6.20 M/uL   HEMOGLOBIN 14.6 14.0 - 18.0 gm/dL   HEMATOCRIT 42.2 40.0 - 54.0 %   MCV 86 80 - 100 fl   MCH 30 27 - 33 pg   MCHC 35 33 - 36 gm/dL   RDW 12.3 11.5 - 14.5 %   PLATELET COUNT 176 150 - 400 K/uL   MPV 9.9 6.5 - 12.0      Basic Metab Profile (05/28/2018 8:12 PM)       Basic Metab Profile (05/28/2018 8:12 PM)   Component Value Ref Range   SODIUM 138 136 - 145 mmol/L   POTASSIUM 3.8 3.5 - 5.1 mmol/L   CHLORIDE 103 98 - 112 mmol/L   CARBON DIOXIDE 27 21 - 32 mmol/L   BUN (UREA NITRO) 21 7 - 24 mg/dL   CREATININE 1.00 0.70 - 1.30 mg/dL   EST GFR (MDRD) >60 >60 mL/min   EST GFR IF AFRICAN AM >60 >60 mL/min   GLUCOSE 109 (H) 74 - 106 mg/dL   CALCIUM, SERUM 8.6 8.5 - 10.1 mg/dL   ANION GAP 8.0 0.0 - 15.0 mmol/L               Assessment/Plan:  Closed displaced fracture of second cervical vertebra with routine healing, unspecified fracture morphology, subsequent encounter  Fall, subsequent encounter  Patient fell backwards out of chair and hit his head  ST showing minimally displaced bilat C2 laminar fractures, HCT negative  Neurosurgery consulted and noted stable fractures, no collar needed, OK to be up with therapies and no f/u needed.      Analgesia with:  (new) Tylenol 1000 mg TID  (new) lidocaine patch on for 12 hours, off for 12 hours - family asking for time change d/t therapy   (new) robaxin 500 mg QID  (new) tramadol 25 mg po q4 hours and q4 hours PRN     Patient  reporting pain is slightly better but still not well controlled.  Extensive discussion about dosing and frequency of Tramadol.  Ultimately, patient reporting dose is effective so will increase frequency and change PRN to stay under daily limit.       PLAN:  - continue (new) Tylenol 1000 mg TID  - continue (new) lidocaine patch (chaneg time), robaxin,   - Change Tramadol to 25 mg q3 hours and [id number] PRN, and monitor for dosing efficacy   - monitor for pain control and titrate as needed  - Physical Therapy, Occupational Therapy for strengthening and ROM of neck as able, no restrictions per Neurosurgery      PSP (progressive supranuclear palsy) (H)  Progressive supranuclear ophthalmoplegia (H)  Impaired functional mobility, balance, gait, and endurance  Speech dysfunction  Dysphagia, Severe oropharyngeal phase  PSP x 4 years now  Patient's wife noting patient still very active, but PSP definitely progressing.   Deficits include:  Dysarthria (slow, slurred, mumbled speech)  Decreased vision (double, blurred vision)  Sensitivity to lights (wears hat and sunglasses inside)  Impaired coordination/balance  Dysphagia (eats/drinks slowly)  Per Care Everywhere SLP consult:  Per his wife, he has had two previous videofluoroscopic swallowing studies that have demonstrated aspiration with all consistencies, the last one being in February of 2018. Compensatory strategies such as chin tuck were found not to be beneficial, per wife. Apparently, tube feedings were recommended at that time but the patient has chosen to continue po intake despite the known risk of aspiration     SLP consult noted patient takes whole pills in pudding, was coughing with liquids.  Patient/Pt's family have refused pureed foods and thickened liquids and know the risks involved but prefer patient to have QOL. SLP following for swallowing and speech exercising.      On PTA amantadine 100 mg TID (0600, 1100, 1600), PTA rivastigmine 9.5 mg/24 hour patch.  "  PTA Vitamin D and MV remain    Therapy update on progress:  Max assist for toileting  Mod assist for dressing  Min assist for feeding  CGA for ambulation, 300 ft with U-Step walker  SLP working with swallowing, communication  LCD estimated as 6/27, possibly sooner with Home PCA services     PLAN:  - Physical Therapy, Occupational Therapy for balance, strengthening, ROM, gait, etc.   - SLP for swallowing and speech exercising.   - Continue PTA Amantadine and rivastigmine patch  - nursing for supportive cares     Essential hypertension  S/p ablation for atrial flutter  On PTA amlodipine 2.5 mg qPM, ASA 81 mg daily, HCTZ 12.5 mg daily, olmesartan 20 mg BID     BPs 120-130s/80s  HRs   Patient denies CP, HA, lightheadedness; also endorses pain (which may be elevating BP/HR)     PLAN:  - continue PTA amlodipine, olmesartan, ASA, HCTZ  - increase pain medication frequency  - monitor for titration needs     Neuropathy  bilat feet  History of herniated lumbar discs (per patient)  On no meds at this time.  Will monitor for need.      Migraine without aura and without status migrainosus, not intractable  On PTA PRN Zolmitriptan 5 mg at onset of migraine  No HA nor migraine while in TCU  Will monitor.      Displacement of lumbar intervertebral disc without myelopathy  History of herniated lumbar discs, per patient  History of neuropathy  Patient reporting tolerable LBP today.      Analgesia with:  (new) Tylenol 1000 mg TID  (new) robaxin 500 mg QID  (new) tramadol 25 mg po q4 hours and q4 hours PRN - changing today     PLAN:  - continue (new) Tylenol 1000 mg TID  - change Tramadol to 25 mg q3 hours and q6 hours PRN.   - Physical Therapy, Occupational Therapy for strengthening, ROM, etc.      Slow transit constipation  PTA Senna 2 tab qHS now changed to Senna 1 tab BID.   Patient reporting constipation but improvement since last \"bolus\" of Senna and supp  History of diarrhea so patient leery to take more laxatives, does " not want changes today      PLAN:  - monitor BMs and can add more if needed.   - continue Senna S 1 tab BID and 1 tab BID PRN.       Cognitive complaints  History of progressive supranuclear palsy  No cognitive testing on file  Occupational Therapy started cognitive testing but patient was tired so will finish soon.      PLAN:  - Occupational Therapy for cognitive testing     Insomnia due to medical condition (PSP)  On PTA nortriptyline 30 mg qHS  Patient's wife noting patient sleeps poorly, even despite nortriptyline.      PLAN:  - increase frequency of Tramadol for pain management (so he can sleep)  - monitor sleep for titration needs.      Urge Incontinence  Patient's wife noting patient has had urge incontinence since his injury  Possible nerve innervation contributory although this more likely with S2-4 injury (not cervical)  Possible BPH as patient noting he does not feel like he completely empties after urination.     Discussion about Occupational Therapy for bladder program. Will order and monitor if patient an appropriate candidate.  Discussion also about start of tamsulosin. Patient and family wishing to not increase pill burden so will ask Occupational Therapy about program first.      Orders:  1. Change tramadol to 25 mg q3 hours scheduled and 25 mg q6 hours PRN Dx: Pain  2. OK for massage therapy to posterior neck muscles  3. Bladder program with Occupational Therapy Dx: urge incontinence  4. Lidocaine patch timing change: on in pm, off in AM. Dx: Pain      Total time spent with patient visit at the skilled nursing facility was >30 minutes including patient visit and discussion with pt's wife present. Greater than 50% of total time spent with counseling and coordinating care due to patient visit, coordiantion of care with nursing and IDT    Electronically signed by  YULY Montiel CNP                    Sincerely,        YULY Montiel CNP

## 2018-06-06 NOTE — PROGRESS NOTES
[facility]    Chief Complaint   Patient presents with     retirement Acute       [facility] Record Number:  [id number]    HPI:    Jimmy Patrick is a 67 year old  (1950), who is being seen today for an episodic care visit at [facility].  HPI information obtained from: facility chart records, facility staff, patient report, [facility] chart review and family/first contact pt's wife report.Today's concern is:     Closed displaced fracture of second cervical vertebra with routine healing, unspecified fracture morphology, subsequent encounter  Fall, subsequent encounter  PSP (progressive supranuclear palsy) (H)  Progressive supranuclear ophthalmoplegia (H)  Impaired functional mobility, balance, gait, and endurance  Speech dysfunction  Dysphagia, unspecified type  Essential hypertension  Neuropathy  Migraine without aura and without status migrainosus, not intractable  Displacement of lumbar intervertebral disc without myelopathy  Slow transit constipation  Cognitive complaints  Insomnia due to medical condition  Urge incontinence of urine     Per Epic note/Hx:  Patient is a 67 year old gentleman with PMH of progressive supranuclear palsy x 4 years with subsequent dysphagia, impaired vision (blurry, double vision), impaired balance, neuropathy in bilat feet, migraines who on 5/28/18 fell backwards from a chair and hit his head.  He declined going to the hospital at that time but later went in as the pain was worsening. He initially presented to [facility] and was transferred to [facility] after CT Angio showed (stable) minimally displaced bilat C2 laminar fractures.  Neurology was consulted and noted no collar ws needed, OK to be up with therapies and no f/u needed. He was transferred to [facility] for rehab, medical management and nursing cares.      Today he and his wife are met in his TCU room at [facility] where he reports slight improvement in his neck pain but  still not appropriately controlled.  He continues to not move his neck and his wife is significantly concerned about his long-term prognosis if he continues to not move his neck.  He reports his LBP is tolerable at this time.  He also reports continued constipation but improving. He denies SOB, CP, lightheadedness, upset stomach or heartburn.        ALLERGIES: Other [seasonal allergies] and Codeine  Past Medical, Surgical, Family and Social History reviewed and updated in [facility].    Current Outpatient Prescriptions   Medication Sig Dispense Refill     acetaminophen (TYLENOL) 500 MG tablet Take 2 tablets (1,000 mg) by mouth 3 times daily       adapalene (DIFFERIN) 0.1 % gel Apply to Face topically every day and evening shift for Acne prevention 135 g 3     amantadine (SYMMETREL) 100 MG capsule Take 1 capsule (100 mg) by mouth 3 times daily 6am,11am and 4pm 270 capsule 3     amLODIPine (NORVASC) 2.5 MG tablet Take 1 tablet by mouth daily       aspirin (BABY ASPIRIN) 81 MG chewable tablet Take 81 mg by mouth daily        cholecalciferol (VITAMIN D-3 SUPER STRENGTH) 2000 UNITS tablet Take 1 tablet by mouth        hydrochlorothiazide 12.5 MG TABS tablet Take 1 tablet (12.5 mg) by mouth daily 90 tablet 3     lidocaine (LIDODERM) 5 % Patch Place 1 patch onto the skin every 24 hours       loperamide (IMODIUM) 2 MG capsule Take 2 mg by mouth every 4 hours as needed for diarrhea       METHOCARBAMOL PO Take 500 mg by mouth 4 times daily       Multiple Vitamins-Minerals (CENTRUM SILVER) per tablet Take 1 tablet by mouth daily 30 tablet      nortriptyline (PAMELOR) 10 MG capsule TAKE 3 CAPSULES AT BEDTIME 270 capsule 3     olmesartan (BENICAR) 20 MG tablet Take 1 tablet (20 mg) by mouth 2 times daily       polyethylene glycol 0.4%- propylene glycol 0.3% (SYSTANE ULTRA) 0.4-0.3 % SOLN ophthalmic solution Place 1 drop into both eyes 3 times daily       rivastigmine (EXELON) 9.5 MG/24HR 24 hr patch Place 1 patch onto the skin daily  30 patch 11     sennosides (SENOKOT) 8.6 MG tablet Take 1 tablet by mouth 2 times daily       TRAMADOL HCL PO Take 25 mg by mouth every 4 hours        TRAMADOL HCL PO Take 25 mg by mouth every 4 hours as needed for moderate to severe pain        White Petrolatum-Mineral Oil (SYSTANE NIGHTTIME) OINT Instill 1 ribbon in both eyes at bedtime for Dry eyes       ZOLMitriptan (ZOMIG) 5 MG tablet Take 1 tablet (5 mg) by mouth at onset of headache for migraine 30 tablet 5     Medications reviewed:  Medications reconciled to facility chart and changes were made to reflect current medications as identified as above med list. Below are the changes that were made:   Medications stopped since last EPIC medication reconciliation:   There are no discontinued medications.    Medications started since last [facility] medication reconciliation:  See previous notes    REVIEW OF SYSTEMS:  10 point ROS of systems including Constitutional, Eyes, Respiratory, Cardiovascular, Gastroenterology, Genitourinary, Integumentary, Muscularskeletal, Psychiatric were all negative except for pertinent positives noted in my HPI.    Physical Exam:  /78  Pulse 80  Temp 97.6  F (36.4  C)  Resp 18  Wt 179 lb (81.2 kg)  SpO2 97%  BMI 24.28 kg/m2  GENERAL APPEARANCE:  Alert, in no distress, deconditioned from acute on chronic illness. Slow in speech, movement  RESP:  respiratory effort and palpation of chest normal, auscultation of lungs clear, no respiratory distress  CV:  Palpation and auscultation of heart done , rate and rhythm regular with some occasional ectopy, trending towards tachycardia, no murmur, scant LE peripheral edema  ABDOMEN:  normal bowel sounds, soft, nontender, no hepatosplenomegaly or other masses  M/S:   Gait and station ambulatory with walker if family/therapy is present, otherwise uses W/C for mobility, Digits and nails with arthritic changes and spacticity, reduced muscle mass, pronounced anterior head carriage with  hypertonic suboccipitals, posterior cervicals,   SKIN:  Inspection and Palpation of skin and subcutaneous tissue pale, intact  PSYCH:  insight and judgement, memory seemingly intact , affect flat and mood per baseline, follows commands readily         Recent Labs:  CBC (HGB,HCT,WBC,RBC,Platelet) (05/28/2018 8:12 PM)       CBC (HGB,HCT,WBC,RBC,Platelet) (05/28/2018 8:12 PM)   Component Value Ref Range   WBC 11.6 (H) 4.3 - 10.8 K/uL   RBC 4.91 4.60 - 6.20 M/uL   HEMOGLOBIN 14.6 14.0 - 18.0 gm/dL   HEMATOCRIT 42.2 40.0 - 54.0 %   MCV 86 80 - 100 fl   MCH 30 27 - 33 pg   MCHC 35 33 - 36 gm/dL   RDW 12.3 11.5 - 14.5 %   PLATELET COUNT 176 150 - 400 K/uL   MPV 9.9 6.5 - 12.0      Basic Metab Profile (05/28/2018 8:12 PM)       Basic Metab Profile (05/28/2018 8:12 PM)   Component Value Ref Range   SODIUM 138 136 - 145 mmol/L   POTASSIUM 3.8 3.5 - 5.1 mmol/L   CHLORIDE 103 98 - 112 mmol/L   CARBON DIOXIDE 27 21 - 32 mmol/L   BUN (UREA NITRO) 21 7 - 24 mg/dL   CREATININE 1.00 0.70 - 1.30 mg/dL   EST GFR (MDRD) >60 >60 mL/min   EST GFR IF AFRICAN AM >60 >60 mL/min   GLUCOSE 109 (H) 74 - 106 mg/dL   CALCIUM, SERUM 8.6 8.5 - 10.1 mg/dL   ANION GAP 8.0 0.0 - 15.0 mmol/L               Assessment/Plan:  Closed displaced fracture of second cervical vertebra with routine healing, unspecified fracture morphology, subsequent encounter  Fall, subsequent encounter  Patient fell backwards out of chair and hit his head  ST showing minimally displaced bilat C2 laminar fractures, HCT negative  Neurosurgery consulted and noted stable fractures, no collar needed, OK to be up with therapies and no f/u needed.      Analgesia with:  (new) Tylenol 1000 mg TID  (new) lidocaine patch on for 12 hours, off for 12 hours - family asking for time change d/t therapy   (new) robaxin 500 mg QID  (new) tramadol 25 mg po q4 hours and q4 hours PRN     Patient reporting pain is slightly better but still not well controlled.  Extensive discussion about dosing  and frequency of Tramadol.  Ultimately, patient reporting dose is effective so will increase frequency and change PRN to stay under daily limit.       PLAN:  - continue (new) Tylenol 1000 mg TID  - continue (new) lidocaine patch (chaneg time), robaxin,   - Change Tramadol to 25 mg q3 hours and g74ribol PRN, and monitor for dosing efficacy   - monitor for pain control and titrate as needed  - Physical Therapy, Occupational Therapy for strengthening and ROM of neck as able, no restrictions per Neurosurgery      PSP (progressive supranuclear palsy) (H)  Progressive supranuclear ophthalmoplegia (H)  Impaired functional mobility, balance, gait, and endurance  Speech dysfunction  Dysphagia, Severe oropharyngeal phase  PSP x 4 years now  Patient's wife noting patient still very active, but PSP definitely progressing.   Deficits include:  Dysarthria (slow, slurred, mumbled speech)  Decreased vision (double, blurred vision)  Sensitivity to lights (wears hat and sunglasses inside)  Impaired coordination/balance  Dysphagia (eats/drinks slowly)  Per Care Everywhere SLP consult:  Per his wife, he has had two previous videofluoroscopic swallowing studies that have demonstrated aspiration with all consistencies, the last one being in February of 2018. Compensatory strategies such as chin tuck were found not to be beneficial, per wife. Apparently, tube feedings were recommended at that time but the patient has chosen to continue po intake despite the known risk of aspiration     SLP consult noted patient takes whole pills in pudding, was coughing with liquids.  Patient/Pt's family have refused pureed foods and thickened liquids and know the risks involved but prefer patient to have QOL. SLP following for swallowing and speech exercising.      On PTA amantadine 100 mg TID (0600, 1100, 1600), PTA rivastigmine 9.5 mg/24 hour patch.   PTA Vitamin D and MV remain    Therapy update on progress:  Max assist for toileting  Mod assist for  "dressing  Min assist for feeding  CGA for ambulation, 300 ft with U-Step walker  SLP working with swallowing, communication  LCD estimated as 6/27, possibly sooner with Home PCA services     PLAN:  - Physical Therapy, Occupational Therapy for balance, strengthening, ROM, gait, etc.   - SLP for swallowing and speech exercising.   - Continue PTA Amantadine and rivastigmine patch  - nursing for supportive cares     Essential hypertension  S/p ablation for atrial flutter  On PTA amlodipine 2.5 mg qPM, ASA 81 mg daily, HCTZ 12.5 mg daily, olmesartan 20 mg BID     BPs 120-130s/80s  HRs   Patient denies CP, HA, lightheadedness; also endorses pain (which may be elevating BP/HR)     PLAN:  - continue PTA amlodipine, olmesartan, ASA, HCTZ  - increase pain medication frequency  - monitor for titration needs     Neuropathy  bilat feet  History of herniated lumbar discs (per patient)  On no meds at this time.  Will monitor for need.      Migraine without aura and without status migrainosus, not intractable  On PTA PRN Zolmitriptan 5 mg at onset of migraine  No HA nor migraine while in TCU  Will monitor.      Displacement of lumbar intervertebral disc without myelopathy  History of herniated lumbar discs, per patient  History of neuropathy  Patient reporting tolerable LBP today.      Analgesia with:  (new) Tylenol 1000 mg TID  (new) robaxin 500 mg QID  (new) tramadol 25 mg po q4 hours and q4 hours PRN - changing today     PLAN:  - continue (new) Tylenol 1000 mg TID  - change Tramadol to 25 mg q3 hours and q6 hours PRN.   - Physical Therapy, Occupational Therapy for strengthening, ROM, etc.      Slow transit constipation  PTA Senna 2 tab qHS now changed to Senna 1 tab BID.   Patient reporting constipation but improvement since last \"bolus\" of Senna and supp  History of diarrhea so patient leery to take more laxatives, does not want changes today      PLAN:  - monitor BMs and can add more if needed.   - continue Senna S 1 tab " BID and 1 tab BID PRN.       Cognitive complaints  History of progressive supranuclear palsy  No cognitive testing on file  Occupational Therapy started cognitive testing but patient was tired so will finish soon.      PLAN:  - Occupational Therapy for cognitive testing     Insomnia due to medical condition (PSP)  On PTA nortriptyline 30 mg qHS  Patient's wife noting patient sleeps poorly, even despite nortriptyline.      PLAN:  - increase frequency of Tramadol for pain management (so he can sleep)  - monitor sleep for titration needs.      Urge Incontinence  Patient's wife noting patient has had urge incontinence since his injury  Possible nerve innervation contributory although this more likely with S2-4 injury (not cervical)  Possible BPH as patient noting he does not feel like he completely empties after urination.     Discussion about Occupational Therapy for bladder program. Will order and monitor if patient an appropriate candidate.  Discussion also about start of tamsulosin. Patient and family wishing to not increase pill burden so will ask Occupational Therapy about program first.      Orders:  1. Change tramadol to 25 mg q3 hours scheduled and 25 mg q6 hours PRN Dx: Pain  2. OK for massage therapy to posterior neck muscles  3. Bladder program with Occupational Therapy Dx: urge incontinence  4. Lidocaine patch timing change: on in pm, off in AM. Dx: Pain      Total time spent with patient visit at the skilled nursing facility was >30 minutes including patient visit and discussion with pt's wife present. Greater than 50% of total time spent with counseling and coordinating care due to patient visit, coordiantion of care with nursing and IDT    Electronically signed by  YULY Montiel CNP

## 2018-06-11 NOTE — PROGRESS NOTES
[facility]    Chief Complaint   Patient presents with     long term Acute       [place] Medical Record Number:  [id number]    HPI:    Jimmy Patrick is a 67 year old  (1950), who is being seen today for an episodic care visit at [facility].  HPI information obtained from: facility chart records, facility staff, patient report, [facility] chart review and family/first contact pt's wife report.Today's concern is:     Closed displaced fracture of second cervical vertebra with routine healing, unspecified fracture morphology, subsequent encounter  Fall, subsequent encounter  PSP (progressive supranuclear palsy) (H)  Progressive supranuclear ophthalmoplegia (H)  Impaired functional mobility, balance, gait, and endurance  Speech dysfunction  Dysphagia, unspecified type  Essential hypertension  Neuropathy  Migraine without aura and without status migrainosus, not intractable  Displacement of lumbar intervertebral disc without myelopathy  Slow transit constipation  Cognitive complaints  Insomnia due to medical condition  Urge incontinence of urine     Per Epic note/Hx:  Patient is a 67 year old gentleman with PMH of progressive supranuclear palsy x 4 years with subsequent dysphagia, impaired vision (blurry, double vision), impaired balance, neuropathy in bilat feet, migraines who on 5/28/18 fell backwards from a chair and hit his head.  He declined going to the hospital at that time but later went in as the pain was worsening. [facility] and was [facility] to [facility] after CT Angio showed (stable) minimally displaced bilat C2 laminar fractures.  Neurology was consulted and noted no collar ws needed, OK to be up with therapies and no f/u needed. He was transferred to [facility] for rehab, medical management and nursing cares.      Today he and his wife are met in therapy.  He reports his pain is slightly better but still is limiting with therapy.   His wife has noted more lethargy since increasing his tramadol.  He, his wife and the Physical Therapist all report the same anterior head carriage that is severely limiting to Raymond's progression with therapy and concern for long-term prognosis.    He denies SOB, CP, heartburn, upset stomach.  He reports some constipation but is leery about more laxatives.    His wife is visibly exhausted and frustrated with lack of progress, fear for neck flexion long-term posture. She noted her family is asking for him to be home sooner than later; she is concerned for her abilities to care for him at this time.       ALLERGIES: Other [seasonal allergies] and Codeine  Past Medical, Surgical, Family and Social History reviewed and updated in [facility].    Current Outpatient Prescriptions   Medication Sig Dispense Refill     acetaminophen (TYLENOL) 500 MG tablet Take 2 tablets (1,000 mg) by mouth 3 times daily       adapalene (DIFFERIN) 0.1 % gel Apply to Face topically every day and evening shift for Acne prevention 135 g 3     amantadine (SYMMETREL) 100 MG capsule Take 1 capsule (100 mg) by mouth 3 times daily 6am,11am and 4pm 270 capsule 3     amLODIPine (NORVASC) 2.5 MG tablet Take 1 tablet by mouth daily       aspirin (BABY ASPIRIN) 81 MG chewable tablet Take 81 mg by mouth daily        bisacodyl (DULCOLAX) 10 MG Suppository Place 10 mg rectally once       cholecalciferol (VITAMIN D-3 SUPER STRENGTH) 2000 UNITS tablet Take 1 tablet by mouth        hydrochlorothiazide 12.5 MG TABS tablet Take 1 tablet (12.5 mg) by mouth daily 90 tablet 3     lidocaine (LIDODERM) 5 % Patch Place 1 patch onto the skin every 24 hours       loperamide (IMODIUM) 2 MG capsule Take 2 mg by mouth every 4 hours as needed for diarrhea       METHOCARBAMOL PO Take 500 mg by mouth 4 times daily       Multiple Vitamins-Minerals (CENTRUM SILVER) per tablet Take 1 tablet by mouth daily 30 tablet      nortriptyline (PAMELOR) 10 MG capsule TAKE 3 CAPSULES AT BEDTIME  270 capsule 3     olmesartan (BENICAR) 20 MG tablet Take 1 tablet (20 mg) by mouth 2 times daily       polyethylene glycol 0.4%- propylene glycol 0.3% (SYSTANE ULTRA) 0.4-0.3 % SOLN ophthalmic solution Place 1 drop into both eyes 3 times daily       rivastigmine (EXELON) 9.5 MG/24HR 24 hr patch Place 1 patch onto the skin daily 30 patch 11     sennosides (SENOKOT) 8.6 MG tablet Take 1 tablet by mouth 2 times daily       traMADol (ULTRAM) 50 MG tablet Take 0.5 tablets (25 mg) by mouth every 3 hours And q6 hours PRN 10 tablet 0     White Petrolatum-Mineral Oil (SYSTANE NIGHTTIME) OINT Instill 1 ribbon in both eyes at bedtime for Dry eyes       ZOLMitriptan (ZOMIG) 5 MG tablet Take 1 tablet (5 mg) by mouth at onset of headache for migraine 30 tablet 5     Medications reviewed:  Medications reconciled to facility chart and changes were made to reflect current medications as identified as above med list. Below are the changes that were made:   Medications stopped since last EPIC medication reconciliation:   There are no discontinued medications.    Medications started since last [facility] medication reconciliation:  See previous notes    REVIEW OF SYSTEMS:  10 point ROS of systems including Constitutional, Eyes, Respiratory, Cardiovascular, Gastroenterology, Genitourinary, Integumentary, Muscularskeletal, Psychiatric were all negative except for pertinent positives noted in my HPI.    Physical Exam:  /78  Pulse 90  Temp 97.6  F (36.4  C)  Resp 18  Wt 179 lb (81.2 kg)  SpO2 96%  BMI 24.28 kg/m2  GENERAL APPEARANCE:  Alert, in no distress, deconditioned from acute on chronic illness. Slow in speech, movement  RESP:  respiratory effort and palpation of chest normal, auscultation of lungs clear, no respiratory distress  CV:  Palpation and auscultation of heart done , rate and rhythm regular but trending towards tachycardia, no murmur, no LE peripheral edema  ABDOMEN:  normal bowel sounds, soft, nontender, no  hepatosplenomegaly or other masses  M/S:   Gait and station ambulatory with walker if family/therapy is present, otherwise uses W/C for mobility, Digits and nails with arthritic changes and spacticity, reduced muscle mass, pronounced anterior head carriage with nearly 90 degrees flexion, hypertonic suboccipitals, posterior cervicals, trapezius, levator scapulae.   SKIN:  Inspection and Palpation of skin and subcutaneous tissue pale, intact  PSYCH:  insight and judgement, memory seemingly intact , affect flat and mood per baseline, follows commands readily         Recent Labs:  CBC (HGB,HCT,WBC,RBC,Platelet) (05/28/2018 8:12 PM)       CBC (HGB,HCT,WBC,RBC,Platelet) (05/28/2018 8:12 PM)   Component Value Ref Range   WBC 11.6 (H) 4.3 - 10.8 K/uL   RBC 4.91 4.60 - 6.20 M/uL   HEMOGLOBIN 14.6 14.0 - 18.0 gm/dL   HEMATOCRIT 42.2 40.0 - 54.0 %   MCV 86 80 - 100 fl   MCH 30 27 - 33 pg   MCHC 35 33 - 36 gm/dL   RDW 12.3 11.5 - 14.5 %   PLATELET COUNT 176 150 - 400 K/uL   MPV 9.9 6.5 - 12.0      Basic Metab Profile (05/28/2018 8:12 PM)       Basic Metab Profile (05/28/2018 8:12 PM)   Component Value Ref Range   SODIUM 138 136 - 145 mmol/L   POTASSIUM 3.8 3.5 - 5.1 mmol/L   CHLORIDE 103 98 - 112 mmol/L   CARBON DIOXIDE 27 21 - 32 mmol/L   BUN (UREA NITRO) 21 7 - 24 mg/dL   CREATININE 1.00 0.70 - 1.30 mg/dL   EST GFR (MDRD) >60 >60 mL/min   EST GFR IF AFRICAN AM >60 >60 mL/min   GLUCOSE 109 (H) 74 - 106 mg/dL   CALCIUM, SERUM 8.6 8.5 - 10.1 mg/dL   ANION GAP 8.0 0.0 - 15.0 mmol/L            Assessment/Plan:  Closed displaced fracture of second cervical vertebra with routine healing, unspecified fracture morphology, subsequent encounter  Fall, subsequent encounter  Patient fell backwards out of chair and hit his head  ST showing minimally displaced bilat C2 laminar fractures, HCT negative  Neurosurgery consulted and noted stable fractures, no collar needed, OK to be up with therapies and no f/u needed.      Analgesia  with:  (new) Tylenol 1000 mg TID  (new) lidocaine patch on for 12 hours, off for 12 hours  (new) robaxin 500 mg QID  (new) tramadol 25 mg po q3 hours and q6 hours PRN - used x 1 in last 7 days     Patient reporting pain is slightly better but still limiting his therapy.  Discussion about taking PRN dosing before therapy to be able to use neck muscles.  Patient today getting deep muscle heat treatment.  Exam with some massage therapy for relaxation of hypertonic muscles with increased extension of neck more than previous, but limited by pain and returned to resting near-90 degree flexion when held pressure released.      PLAN:  - continue (new) Tylenol 1000 mg TID  - continue (new) lidocaine patch (chaneg time), robaxin,   - Continue Tramadol to 25 mg q3 hours and [id number] PRN, and monitor for dosing efficacy   - monitor for pain control and titrate as needed  - Physical Therapy, Occupational Therapy for strengthening and ROM of neck as able, no restrictions per Neurosurgery      PSP (progressive supranuclear palsy) (H)  Progressive supranuclear ophthalmoplegia (H)  Impaired functional mobility, balance, gait, and endurance  Speech dysfunction  Dysphagia, Severe oropharyngeal phase  PSP x 4 years now  Patient's wife noting patient still very active, but PSP definitely progressing.   Deficits include:  Dysarthria (slow, slurred, mumbled speech)  Decreased vision (double, blurred vision)  Sensitivity to lights (wears hat and sunglasses inside)  Impaired coordination/balance  Dysphagia (eats/drinks slowly)  Per Care Everywhere SLP consult:  Per his wife, he has had two previous videofluoroscopic swallowing studies that have demonstrated aspiration with all consistencies, the last one being in February of 2018. Compensatory strategies such as chin tuck were found not to be beneficial, per wife. Apparently, tube feedings were recommended at that time but the patient has chosen to continue po intake despite the known risk  of aspiration     SLP consult noted patient takes whole pills in pudding, was coughing with liquids.  Patient/Pt's family have refused pureed foods and thickened liquids and know the risks involved but prefer patient to have QOL. SLP following for swallowing and speech exercising.      On PTA amantadine 100 mg TID (0600, 1100, 1600), PTA rivastigmine 9.5 mg/24 hour patch.   PTA Vitamin D and MV remain    Therapy update on progress:  Max assist for toileting  Mod assist for dressing  Min assist for feeding  CGA for ambulation, 300 ft with U-Step walker  SLP working with swallowing, communication  LCD estimated as 6/27, possibly sooner with Home PCA services     PLAN:  - Physical Therapy, Occupational Therapy for balance, strengthening, ROM, gait, etc.   - SLP for swallowing and speech exercising.   - Continue PTA Amantadine and rivastigmine patch  - nursing for supportive cares     Essential hypertension  S/p ablation for atrial flutter  On PTA amlodipine 2.5 mg qPM, ASA 81 mg daily, HCTZ 12.5 mg daily, olmesartan 20 mg BID     BPs 120s/60-80s  HRs 80-90s  Patient denies CP, HA, lightheadedness     PLAN:  - continue PTA amlodipine, olmesartan, ASA, HCTZ  - monitor for titration needs     Neuropathy  bilat feet  History of herniated lumbar discs (per patient)  On no meds at this time.  Will monitor for need.      Migraine without aura and without status migrainosus, not intractable  On PTA PRN Zolmitriptan 5 mg at onset of migraine  No HA nor migraine while in TCU  Will monitor.      Displacement of lumbar intervertebral disc without myelopathy  History of herniated lumbar discs, per patient  History of neuropathy  Patient reporting tolerable LBP today.      Analgesia with:  (new) Tylenol 1000 mg TID  (new) robaxin 500 mg QID  (new) tramadol 25 mg po q3 hours and q6 hours PRN - used x 1 in last 7 days     PLAN:  - continue (new) Tylenol 1000 mg TID  - continueTramadol to 25 mg q3 hours and q6 hours PRN.   - Physical  "Therapy, Occupational Therapy for strengthening, ROM, etc.      Slow transit constipation  PTA Senna 2 tab qHS now changed to Senna 1 tab BID.   Patient reporting constipation but improvement since last \"bolus\" of Senna and supp  History of diarrhea so patient leery to take more laxatives, does not want changes today     EHR noting last BM 2 days ago.  Patient with history of PSP, now with increased Tramadol dosing.    Discussion about bisacodyl supp (which had effects last time) and increase in PM Senna for tonight only - patient agreed.      PLAN:  - monitor BMs and can add more Senna if needed.   - continue Senna S 1 tab BID and 1 tab BID PRN, except give extra tonight (for total of Senna S 2 tabs)  - Bisacodyl supp for constipation.      Cognitive complaints  History of progressive supranuclear palsy  No cognitive testing on file  Occupational Therapy started cognitive testing but patient was tired so will finish soon.      PLAN:  - Occupational Therapy for cognitive testing     Insomnia due to medical condition (PSP)  On PTA nortriptyline 30 mg qHS  Patient's wife noting patient sleeps poorly, even despite nortriptyline.      PLAN:  - continue tramadol for analgesia so he can sleep  - monitor sleep for titration needs.      Urge Incontinence  Patient's wife noting patient has had urge incontinence since his injury  Possible nerve innervation contributory although this more likely with S2-4 injury (not cervical)  Possible BPH as patient noting he does not feel like he completely empties after urination.     Occupational Therapy consult for bladder program. May anticipate start of tamsulosin if ineffective.        Orders:  1. Bisacodyl 10mg po ASAP. Dx: Constipation  2) Tonight- give additional Senna S (2 tab total tonight). Dx: Constipation.      Total time spent with patient visit at the skilled nursing facility was >30 minutes including patient visit and discussion with pt's wife present, PT. Greater than 50% " of total time spent with counseling and coordinating care due to patient visit, minimal massage therapy with exam for possible head flexion, coordiantion of care with nursing and IDT    Electronically signed by  YULY Montiel CNP

## 2018-06-11 NOTE — LETTER
6/11/2018        RE: Jimmy Patrick  [address] [facility] [address]        [facility]    Chief Complaint   Patient presents with     jail Acute       [facility] Record Number:  [id number]    HPI:    Jimmy Patrick is a 67 year old  (1950), who is being seen today for an episodic care visit at [facility].  HPI information obtained from: facility chart records, facility staff, patient report, [facility] chart review and family/first contact pt's wife report.Today's concern is:     Closed displaced fracture of second cervical vertebra with routine healing, unspecified fracture morphology, subsequent encounter  Fall, subsequent encounter  PSP (progressive supranuclear palsy) (H)  Progressive supranuclear ophthalmoplegia (H)  Impaired functional mobility, balance, gait, and endurance  Speech dysfunction  Dysphagia, unspecified type  Essential hypertension  Neuropathy  Migraine without aura and without status migrainosus, not intractable  Displacement of lumbar intervertebral disc without myelopathy  Slow transit constipation  Cognitive complaints  Insomnia due to medical condition  Urge incontinence of urine     Per Epic note/Hx:  Patient is a 67 year old gentleman with PMH of progressive supranuclear palsy x 4 years with subsequent dysphagia, impaired vision (blurry, double vision), impaired balance, neuropathy in bilat feet, migraines who on 5/28/18 fell backwards from a chair and hit his head.  He declined going to the hospital at that time but later went in as the pain was worsening. He initially presented to [facility] and was transferred to [facility] after CT Angio showed (stable) minimally displaced bilat C2 laminar fractures.  Neurology was consulted and noted no collar ws needed, OK to be up with therapies and no f/u needed. He was transferred to [facility] for rehab, medical management and nursing cares.      Today he and his wife  are met in therapy.  He reports his pain is slightly better but still is limiting with therapy.  His wife has noted more lethargy since increasing his tramadol.  He, his wife and the Physical Therapist all report the same anterior head carriage that is severely limiting to Raymond's progression with therapy and concern for long-term prognosis.    He denies SOB, CP, heartburn, upset stomach.  He reports some constipation but is leery about more laxatives.    His wife is visibly exhausted and frustrated with lack of progress, fear for neck flexion long-term posture. She noted her family is asking for him to be home sooner than later; she is concerned for her abilities to care for him at this time.       ALLERGIES: Other [seasonal allergies] and Codeine  Past Medical, Surgical, Family and Social History reviewed and updated in [facility].    Current Outpatient Prescriptions   Medication Sig Dispense Refill     acetaminophen (TYLENOL) 500 MG tablet Take 2 tablets (1,000 mg) by mouth 3 times daily       adapalene (DIFFERIN) 0.1 % gel Apply to Face topically every day and evening shift for Acne prevention 135 g 3     amantadine (SYMMETREL) 100 MG capsule Take 1 capsule (100 mg) by mouth 3 times daily 6am,11am and 4pm 270 capsule 3     amLODIPine (NORVASC) 2.5 MG tablet Take 1 tablet by mouth daily       aspirin (BABY ASPIRIN) 81 MG chewable tablet Take 81 mg by mouth daily        bisacodyl (DULCOLAX) 10 MG Suppository Place 10 mg rectally once       cholecalciferol (VITAMIN D-3 SUPER STRENGTH) 2000 UNITS tablet Take 1 tablet by mouth        hydrochlorothiazide 12.5 MG TABS tablet Take 1 tablet (12.5 mg) by mouth daily 90 tablet 3     lidocaine (LIDODERM) 5 % Patch Place 1 patch onto the skin every 24 hours       loperamide (IMODIUM) 2 MG capsule Take 2 mg by mouth every 4 hours as needed for diarrhea       METHOCARBAMOL PO Take 500 mg by mouth 4 times daily       Multiple Vitamins-Minerals (CENTRUM SILVER) per tablet Take 1 tablet  by mouth daily 30 tablet      nortriptyline (PAMELOR) 10 MG capsule TAKE 3 CAPSULES AT BEDTIME 270 capsule 3     olmesartan (BENICAR) 20 MG tablet Take 1 tablet (20 mg) by mouth 2 times daily       polyethylene glycol 0.4%- propylene glycol 0.3% (SYSTANE ULTRA) 0.4-0.3 % SOLN ophthalmic solution Place 1 drop into both eyes 3 times daily       rivastigmine (EXELON) 9.5 MG/24HR 24 hr patch Place 1 patch onto the skin daily 30 patch 11     sennosides (SENOKOT) 8.6 MG tablet Take 1 tablet by mouth 2 times daily       traMADol (ULTRAM) 50 MG tablet Take 0.5 tablets (25 mg) by mouth every 3 hours And q6 hours PRN 10 tablet 0     White Petrolatum-Mineral Oil (SYSTANE NIGHTTIME) OINT Instill 1 ribbon in both eyes at bedtime for Dry eyes       ZOLMitriptan (ZOMIG) 5 MG tablet Take 1 tablet (5 mg) by mouth at onset of headache for migraine 30 tablet 5     Medications reviewed:  Medications reconciled to facility chart and changes were made to reflect current medications as identified as above med list. Below are the changes that were made:   Medications stopped since last EPIC medication reconciliation:   There are no discontinued medications.    Medications started since last [facility] medication reconciliation:  See previous notes    REVIEW OF SYSTEMS:  10 point ROS of systems including Constitutional, Eyes, Respiratory, Cardiovascular, Gastroenterology, Genitourinary, Integumentary, Muscularskeletal, Psychiatric were all negative except for pertinent positives noted in my HPI.    Physical Exam:  /78  Pulse 90  Temp 97.6  F (36.4  C)  Resp 18  Wt 179 lb (81.2 kg)  SpO2 96%  BMI 24.28 kg/m2  GENERAL APPEARANCE:  Alert, in no distress, deconditioned from acute on chronic illness. Slow in speech, movement  RESP:  respiratory effort and palpation of chest normal, auscultation of lungs clear, no respiratory distress  CV:  Palpation and auscultation of heart done , rate and rhythm regular but trending towards tachycardia,  no murmur, no LE peripheral edema  ABDOMEN:  normal bowel sounds, soft, nontender, no hepatosplenomegaly or other masses  M/S:   Gait and station ambulatory with walker if family/therapy is present, otherwise uses W/C for mobility, Digits and nails with arthritic changes and spacticity, reduced muscle mass, pronounced anterior head carriage with nearly 90 degrees flexion, hypertonic suboccipitals, posterior cervicals, trapezius, levator scapulae.   SKIN:  Inspection and Palpation of skin and subcutaneous tissue pale, intact  PSYCH:  insight and judgement, memory seemingly intact , affect flat and mood per baseline, follows commands readily         Recent Labs:  CBC (HGB,HCT,WBC,RBC,Platelet) (05/28/2018 8:12 PM)       CBC (HGB,HCT,WBC,RBC,Platelet) (05/28/2018 8:12 PM)   Component Value Ref Range   WBC 11.6 (H) 4.3 - 10.8 K/uL   RBC 4.91 4.60 - 6.20 M/uL   HEMOGLOBIN 14.6 14.0 - 18.0 gm/dL   HEMATOCRIT 42.2 40.0 - 54.0 %   MCV 86 80 - 100 fl   MCH 30 27 - 33 pg   MCHC 35 33 - 36 gm/dL   RDW 12.3 11.5 - 14.5 %   PLATELET COUNT 176 150 - 400 K/uL   MPV 9.9 6.5 - 12.0      Basic Metab Profile (05/28/2018 8:12 PM)       Basic Metab Profile (05/28/2018 8:12 PM)   Component Value Ref Range   SODIUM 138 136 - 145 mmol/L   POTASSIUM 3.8 3.5 - 5.1 mmol/L   CHLORIDE 103 98 - 112 mmol/L   CARBON DIOXIDE 27 21 - 32 mmol/L   BUN (UREA NITRO) 21 7 - 24 mg/dL   CREATININE 1.00 0.70 - 1.30 mg/dL   EST GFR (MDRD) >60 >60 mL/min   EST GFR IF AFRICAN AM >60 >60 mL/min   GLUCOSE 109 (H) 74 - 106 mg/dL   CALCIUM, SERUM 8.6 8.5 - 10.1 mg/dL   ANION GAP 8.0 0.0 - 15.0 mmol/L            Assessment/Plan:  Closed displaced fracture of second cervical vertebra with routine healing, unspecified fracture morphology, subsequent encounter  Fall, subsequent encounter  Patient fell backwards out of chair and hit his head  ST showing minimally displaced bilat C2 laminar fractures, HCT negative  Neurosurgery consulted and noted stable fractures, no  collar needed, OK to be up with therapies and no f/u needed.      Analgesia with:  (new) Tylenol 1000 mg TID  (new) lidocaine patch on for 12 hours, off for 12 hours  (new) robaxin 500 mg QID  (new) tramadol 25 mg po q3 hours and q6 hours PRN - used x 1 in last 7 days     Patient reporting pain is slightly better but still limiting his therapy.  Discussion about taking PRN dosing before therapy to be able to use neck muscles.  Patient today getting deep muscle heat treatment.  Exam with some massage therapy for relaxation of hypertonic muscles with increased extension of neck more than previous, but limited by pain and returned to resting near-90 degree flexion when held pressure released.      PLAN:  - continue (new) Tylenol 1000 mg TID  - continue (new) lidocaine patch (chaneg time), robaxin,   - Continue Tramadol to 25 mg q3 hours and [id number] PRN, and monitor for dosing efficacy   - monitor for pain control and titrate as needed  - Physical Therapy, Occupational Therapy for strengthening and ROM of neck as able, no restrictions per Neurosurgery      PSP (progressive supranuclear palsy) (H)  Progressive supranuclear ophthalmoplegia (H)  Impaired functional mobility, balance, gait, and endurance  Speech dysfunction  Dysphagia, Severe oropharyngeal phase  PSP x 4 years now  Patient's wife noting patient still very active, but PSP definitely progressing.   Deficits include:  Dysarthria (slow, slurred, mumbled speech)  Decreased vision (double, blurred vision)  Sensitivity to lights (wears hat and sunglasses inside)  Impaired coordination/balance  Dysphagia (eats/drinks slowly)  Per Care Everywhere SLP consult:  Per his wife, he has had two previous videofluoroscopic swallowing studies that have demonstrated aspiration with all consistencies, the last one being in February of 2018. Compensatory strategies such as chin tuck were found not to be beneficial, per wife. Apparently, tube feedings were recommended at that  time but the patient has chosen to continue po intake despite the known risk of aspiration     SLP consult noted patient takes whole pills in pudding, was coughing with liquids.  Patient/Pt's family have refused pureed foods and thickened liquids and know the risks involved but prefer patient to have QOL. SLP following for swallowing and speech exercising.      On PTA amantadine 100 mg TID (0600, 1100, 1600), PTA rivastigmine 9.5 mg/24 hour patch.   PTA Vitamin D and MV remain    Therapy update on progress:  Max assist for toileting  Mod assist for dressing  Min assist for feeding  CGA for ambulation, 300 ft with U-Step walker  SLP working with swallowing, communication  LCD estimated as 6/27, possibly sooner with Home PCA services     PLAN:  - Physical Therapy, Occupational Therapy for balance, strengthening, ROM, gait, etc.   - SLP for swallowing and speech exercising.   - Continue PTA Amantadine and rivastigmine patch  - nursing for supportive cares     Essential hypertension  S/p ablation for atrial flutter  On PTA amlodipine 2.5 mg qPM, ASA 81 mg daily, HCTZ 12.5 mg daily, olmesartan 20 mg BID     BPs 120s/60-80s  HRs 80-90s  Patient denies CP, HA, lightheadedness     PLAN:  - continue PTA amlodipine, olmesartan, ASA, HCTZ  - monitor for titration needs     Neuropathy  bilat feet  History of herniated lumbar discs (per patient)  On no meds at this time.  Will monitor for need.      Migraine without aura and without status migrainosus, not intractable  On PTA PRN Zolmitriptan 5 mg at onset of migraine  No HA nor migraine while in TCU  Will monitor.      Displacement of lumbar intervertebral disc without myelopathy  History of herniated lumbar discs, per patient  History of neuropathy  Patient reporting tolerable LBP today.      Analgesia with:  (new) Tylenol 1000 mg TID  (new) robaxin 500 mg QID  (new) tramadol 25 mg po q3 hours and q6 hours PRN - used x 1 in last 7 days     PLAN:  - continue (new) Tylenol 1000  "mg TID  - continueTramadol to 25 mg q3 hours and q6 hours PRN.   - Physical Therapy, Occupational Therapy for strengthening, ROM, etc.      Slow transit constipation  PTA Senna 2 tab qHS now changed to Senna 1 tab BID.   Patient reporting constipation but improvement since last \"bolus\" of Senna and supp  History of diarrhea so patient leery to take more laxatives, does not want changes today     EHR noting last BM 2 days ago.  Patient with history of PSP, now with increased Tramadol dosing.    Discussion about bisacodyl supp (which had effects last time) and increase in PM Senna for tonight only - patient agreed.      PLAN:  - monitor BMs and can add more Senna if needed.   - continue Senna S 1 tab BID and 1 tab BID PRN, except give extra tonight (for total of Senna S 2 tabs)  - Bisacodyl supp for constipation.      Cognitive complaints  History of progressive supranuclear palsy  No cognitive testing on file  Occupational Therapy started cognitive testing but patient was tired so will finish soon.      PLAN:  - Occupational Therapy for cognitive testing     Insomnia due to medical condition (PSP)  On PTA nortriptyline 30 mg qHS  Patient's wife noting patient sleeps poorly, even despite nortriptyline.      PLAN:  - continue tramadol for analgesia so he can sleep  - monitor sleep for titration needs.      Urge Incontinence  Patient's wife noting patient has had urge incontinence since his injury  Possible nerve innervation contributory although this more likely with S2-4 injury (not cervical)  Possible BPH as patient noting he does not feel like he completely empties after urination.     Occupational Therapy consult for bladder program. May anticipate start of tamsulosin if ineffective.        Orders:  1. Bisacodyl 10mg po ASAP. Dx: Constipation  2) Tonight- give additional Senna S (2 tab total tonight). Dx: Constipation.      Total time spent with patient visit at the skilled nursing facility was >30 minutes " including patient visit and discussion with pt's wife present, PT. Greater than 50% of total time spent with counseling and coordinating care due to patient visit, minimal massage therapy with exam for possible head flexion, coordiantion of care with nursing and IDT    Electronically signed by  YULY Montiel CNP                  Sincerely,        YULY Montiel CNP

## 2018-06-12 PROBLEM — G89.11 ACUTE PAIN DUE TO INJURY: Status: ACTIVE | Noted: 2018-01-01

## 2018-06-12 PROBLEM — G89.11 ACUTE PAIN DUE TO TRAUMA: Status: ACTIVE | Noted: 2018-01-01

## 2018-06-13 NOTE — PROGRESS NOTES
[facility]    Chief Complaint   Patient presents with     custodial Acute       [facility] Medical Record Number:  [id number]    HPI:    Jimmy Patrick is a 67 year old  (1950), who is being seen today for an episodic care visit at [facility].  HPI information obtained from: facility chart records, facility staff, patient report, [facility] chart review and family/first contact pt's wife report.Today's concern is:     Closed displaced fracture of second cervical vertebra with routine healing, unspecified fracture morphology, subsequent encounter  Fall, subsequent encounter  PSP (progressive supranuclear palsy) (H)  Progressive supranuclear ophthalmoplegia (H)  Impaired functional mobility, balance, gait, and endurance  Speech dysfunction  Dysphagia, unspecified type  Essential hypertension  Neuropathy  Migraine without aura and without status migrainosus, not intractable  Displacement of lumbar intervertebral disc without myelopathy  Slow transit constipation  Cognitive complaints  Insomnia due to medical condition  Urge incontinence of urine     Per Epic note/Hx:  Patient is a 67 year old gentleman with PMH of progressive supranuclear palsy x 4 years with subsequent dysphagia, impaired vision (blurry, double vision), impaired balance, neuropathy in bilat feet, migraines who on 5/28/18 fell backwards from a chair and hit his head.  He declined going to the hospital at that time but later went in as the pain was worsening. He initially presented to [facility] and was transferred to [facility] after CT Angio showed (stable) minimally displaced bilat C2 laminar fractures.  Neurology was consulted and noted no collar ws needed, OK to be up with therapies and no f/u needed. He was transferred to [facility] TCU for rehab, medical management and nursing cares.      Today he and his wife, daughter are met in his TCU room.  Discussion today regarding POC to be MD home with 24 hour  services or possible admission to Acute Care Rehab for neck rehab.  Patient's family is concerned about his prognosis and wants to try to rehab him to baseline, but also is exhausted from living at [facility] and feels more care can be given to him at home with 24 hour services. Raymond is reporting pain 3/10 in his neck at rest, minimal LBP at this time, constipation is still problematic.  He did have a BM after the suppository the other day but family is concerned that this is not a sustainable therapy approach.        ALLERGIES: Other [seasonal allergies] and Codeine  Past Medical, Surgical, Family and Social History reviewed and updated in [facility].    Current Outpatient Prescriptions   Medication Sig Dispense Refill     acetaminophen (TYLENOL) 500 MG tablet Take 2 tablets (1,000 mg) by mouth 3 times daily       adapalene (DIFFERIN) 0.1 % gel Apply to Face topically every day and evening shift for Acne prevention 135 g 3     amantadine (SYMMETREL) 100 MG capsule Take 1 capsule (100 mg) by mouth 3 times daily 6am,11am and 4pm 270 capsule 3     amLODIPine (NORVASC) 2.5 MG tablet Take 1 tablet by mouth daily       aspirin (BABY ASPIRIN) 81 MG chewable tablet Take 81 mg by mouth daily        cholecalciferol (VITAMIN D-3 SUPER STRENGTH) 2000 UNITS tablet Take 1 tablet by mouth        hydrochlorothiazide 12.5 MG TABS tablet Take 1 tablet (12.5 mg) by mouth daily 90 tablet 3     lidocaine (LIDODERM) 5 % Patch Place 1 patch onto the skin every 24 hours       loperamide (IMODIUM) 2 MG capsule Take 2 mg by mouth every 4 hours as needed for diarrhea       METHOCARBAMOL PO Take 500 mg by mouth 4 times daily       Multiple Vitamins-Minerals (CENTRUM SILVER) per tablet Take 1 tablet by mouth daily 30 tablet      nortriptyline (PAMELOR) 10 MG capsule TAKE 3 CAPSULES AT BEDTIME 270 capsule 3     olmesartan (BENICAR) 20 MG tablet Take 1 tablet (20 mg) by mouth 2 times daily       polyethylene glycol 0.4%- propylene glycol 0.3% (SYSTANE ULTRA)  0.4-0.3 % SOLN ophthalmic solution Place 1 drop into both eyes 3 times daily       rivastigmine (EXELON) 9.5 MG/24HR 24 hr patch Place 1 patch onto the skin daily 30 patch 11     sennosides (SENOKOT) 8.6 MG tablet Take 1 tablet by mouth 2 times daily       traMADol (ULTRAM) 50 MG tablet Take 0.5 tablets (25 mg) by mouth every 3 hours And q6 hours PRN 10 tablet 0     White Petrolatum-Mineral Oil (SYSTANE NIGHTTIME) OINT Instill 1 ribbon in both eyes at bedtime for Dry eyes       ZOLMitriptan (ZOMIG) 5 MG tablet Take 1 tablet (5 mg) by mouth at onset of headache for migraine 30 tablet 5     Medications reviewed:  Medications reconciled to facility chart and changes were made to reflect current medications as identified as above med list. Below are the changes that were made:   Medications stopped since last EPIC medication reconciliation:   There are no discontinued medications.    Medications started since last [facility] medication reconciliation:  See previous notes    REVIEW OF SYSTEMS:  10 point ROS of systems including Constitutional, Eyes, Respiratory, Cardiovascular, Gastroenterology, Genitourinary, Integumentary, Muscularskeletal, Psychiatric were all negative except for pertinent positives noted in my HPI.    Physical Exam:  /74  Pulse 88  Temp 97.8  F (36.6  C)  Resp 18  Wt 179 lb (81.2 kg)  SpO2 95%  BMI 24.28 kg/m2  GENERAL APPEARANCE:  Alert, in no distress, deconditioned from acute on chronic illness. Slow in speech, movement from chronic PSP  RESP:  respiratory effort and palpation of chest normal, auscultation of lungs clear, no respiratory distress  CV:  Palpation and auscultation of heart done , rate and rhythm regular, no murmur, no LE peripheral edema  ABDOMEN:  normal bowel sounds, soft, nontender, no hepatosplenomegaly or other masses  M/S:   Gait and station ambulatory with walker if family/therapy is present, otherwise uses W/C for mobility, Digits and nails with arthritic changes and  spacticity, reduced muscle mass, pronounced anterior head carriage with nearly 70-80 degrees flexion, hypertonic suboccipitals, posterior cervicals, trapezius, levator scapulae.   SKIN:  Inspection and Palpation of skin and subcutaneous tissue pale, intact  PSYCH:  insight and judgement, memory seemingly with impairment, affect flat and mood per baseline, follows commands readily         Recent Labs:  CBC (HGB,HCT,WBC,RBC,Platelet) (05/28/2018 8:12 PM)       CBC (HGB,HCT,WBC,RBC,Platelet) (05/28/2018 8:12 PM)   Component Value Ref Range   WBC 11.6 (H) 4.3 - 10.8 K/uL   RBC 4.91 4.60 - 6.20 M/uL   HEMOGLOBIN 14.6 14.0 - 18.0 gm/dL   HEMATOCRIT 42.2 40.0 - 54.0 %   MCV 86 80 - 100 fl   MCH 30 27 - 33 pg   MCHC 35 33 - 36 gm/dL   RDW 12.3 11.5 - 14.5 %   PLATELET COUNT 176 150 - 400 K/uL   MPV 9.9 6.5 - 12.0      Basic Metab Profile (05/28/2018 8:12 PM)       Basic Metab Profile (05/28/2018 8:12 PM)   Component Value Ref Range   SODIUM 138 136 - 145 mmol/L   POTASSIUM 3.8 3.5 - 5.1 mmol/L   CHLORIDE 103 98 - 112 mmol/L   CARBON DIOXIDE 27 21 - 32 mmol/L   BUN (UREA NITRO) 21 7 - 24 mg/dL   CREATININE 1.00 0.70 - 1.30 mg/dL   EST GFR (MDRD) >60 >60 mL/min   EST GFR IF AFRICAN AM >60 >60 mL/min   GLUCOSE 109 (H) 74 - 106 mg/dL   CALCIUM, SERUM 8.6 8.5 - 10.1 mg/dL   ANION GAP 8.0 0.0 - 15.0 mmol/L            Assessment/Plan:  Closed displaced fracture of second cervical vertebra with routine healing, unspecified fracture morphology, subsequent encounter  Fall, subsequent encounter  Patient fell backwards out of chair and hit his head  ST showing minimally displaced bilat C2 laminar fractures, HCT negative  Neurosurgery consulted and noted stable fractures, no collar needed, OK to be up with therapies and no f/u needed.      Analgesia with:  (new) Tylenol 1000 mg TID  (new) lidocaine patch on for 12 hours, off for 12 hours  (new) robaxin 500 mg QID  (new) tramadol 25 mg po q3 hours and q6 hours PRN - used x 2 in last 7  days     Patient reporting pain is slightly better but still continuing to limit therapy.  POC discussion about Acute Rehab in-patient stay vs DC home with 24 hour services.  SW and Therapy will work on both scenarios and present both options to patient and family when known if possible admission to Acute-Care Rehab possible.   Please see DME information below in regards to hospital bed.      PLAN:  - continue (new) Tylenol 1000 mg TID  - continue (new) lidocaine patch (chaneg time), robaxin,   - Continue Tramadol to 25 mg q3 hours and a40clbsi PRN, and monitor for dosing efficacy   - monitor for pain control and titrate as needed  - Physical Therapy, Occupational Therapy for strengthening and ROM of neck as able, no restrictions per Neurosurgery      PSP (progressive supranuclear palsy) (H)  Progressive supranuclear ophthalmoplegia (H)  Impaired functional mobility, balance, gait, and endurance  Speech dysfunction  Dysphagia, Severe oropharyngeal phase  PSP x 4 years now  Patient's wife noting patient still very active, but PSP definitely progressing.   Deficits include:  Dysarthria (slow, slurred, mumbled speech)  Decreased vision (double, blurred vision)  Sensitivity to lights (wears hat and sunglasses inside)  Impaired coordination/balance  Dysphagia (eats/drinks slowly)  Per Care Everywhere SLP consult:  Per his wife, he has had two previous videofluoroscopic swallowing studies that have demonstrated aspiration with all consistencies, the last one being in February of 2018. Compensatory strategies such as chin tuck were found not to be beneficial, per wife. Apparently, tube feedings were recommended at that time but the patient has chosen to continue po intake despite the known risk of aspiration     SLP consult noted patient takes whole pills in pudding, was coughing with liquids.  Patient/Pt's family have refused pureed foods and thickened liquids and know the risks involved but prefer patient to have QOL.  SLP following for swallowing and speech exercising.      On PTA amantadine 100 mg TID (0600, 1100, 1600), PTA rivastigmine 9.5 mg/24 hour patch.   PTA Vitamin D and MV remain.  Please see information below in regards to DME for hospital bed.     Therapy update on progress:  Max assist for toileting  Mod assist for dressing  Min assist for feeding  CGA for ambulation, unlimited distances with U-Step walker  SLP working with swallowing, communication  SLUMS 17  LCD estimated as 6/20, possibly sooner with Home PCA services or in-patient acute rehab admission.   Will need hospital bed if discharging home.      PLAN:  - Physical Therapy, Occupational Therapy for balance, strengthening, ROM, gait, etc.   - SLP for swallowing and speech exercising.   - SW for discharge disposition  - Continue PTA Amantadine and rivastigmine patch  - nursing for supportive cares     Essential hypertension  S/p ablation for atrial flutter  On PTA amlodipine 2.5 mg qPM, ASA 81 mg daily, HCTZ 12.5 mg daily, olmesartan 20 mg BID     BPs 120s/70s  HRs 70-90s  Patient denies CP, HA, lightheadedness     PLAN:  - continue PTA amlodipine, olmesartan, ASA, HCTZ  - monitor for titration needs     Neuropathy  bilat feet  History of herniated lumbar discs (per patient)  On no meds at this time.  Will monitor for need.      Migraine without aura and without status migrainosus, not intractable  On PTA PRN Zolmitriptan 5 mg at onset of migraine  No HA nor migraine while in TCU  Will monitor.      Displacement of lumbar intervertebral disc without myelopathy  History of herniated lumbar discs, per patient  History of neuropathy  Patient reporting tolerable LBP today.      Analgesia with:  (new) Tylenol 1000 mg TID  (new) robaxin 500 mg QID  (new) tramadol 25 mg po q3 hours and q6 hours PRN - used x 2 in last 7 days     PLAN:  - continue (new) Tylenol 1000 mg TID  - continueTramadol to 25 mg q3 hours and q6 hours PRN.   - Physical Therapy, Occupational  Therapy for strengthening, ROM, etc.      Slow transit constipation  PTA Senna 2 tab qHS now changed to Senna 1 tab BID.   Patient reporting constipation but improvement with bisacodyl supp  Discussion about increasing Senna for proactive approach and PRN supp if npo BM in 3 days.     Patient with history of PSP, now with increased Tramadol dosing.    PLAN:  - Increase Senna to 1 tab qAM, 2 tabs qPM, and continue 1 tab BID PRN   - bisacodyl supp 10 mg  PRN if no BM in 3 days     Cognitive complaints  History of progressive supranuclear palsy  No cognitive testing on file  SLUMS 17  Patient's wife declined CPT at this time (pt was tired)      PLAN:  - Occupational Therapy for CPT testing.      Insomnia due to medical condition (PSP)  On PTA nortriptyline 30 mg qHS  Patient's wife noting patient sleeps poorly, even despite nortriptyline.      PLAN:  - continue tramadol for analgesia so he can sleep  - monitor sleep for titration needs.      Urge Incontinence  Patient's wife noting patient has had urge incontinence since his injury  Possible nerve innervation contributory although this more likely with S2-4 injury (not cervical)  Possible BPH as patient noting he does not feel like he completely empties after urination.     Occupational Therapy consult for bladder program. May anticipate start of tamsulosin if ineffective.        Orders:  1. Change Senna to 1 tab po qAM, 2 tab po qPM Dx: constipation  2. Bisacodyl supp 10 mg if no BM in 3 days Dx: constipation      Total time spent with patient visit at the skilled nursing facility was >30 minutes including patient visit and discussion with pt's wife and daughter present, IDT. Greater than 50% of total time spent with counseling and coordinating care due to discussion about possible discharge disposition, pain management, PSP, rehab.     Electronically signed by  YULY Montiel CNP          Face to Face and Medical Necessity Statement for DME Provider  "visit    Demographic Information on Jimmy Patrick:  Gender: male  : 1950  [address]  [place] [address]  [phone] (home) None (work)    Medical Record: [id number]  Social Security Number: [id number]  Primary Care Provider: Evelina Morse  Insurance: Payor: [facility] / Plan: [facility] FOR SENIORS / Product Type: HMO /     HPI:   Jimmy Patrick is a 67 year old  (1950), who is being seen today for a face to face provider visit at [facility]; medical necessity statement for DME included. This patient requires the following:  DME Ordered and Medical Necessity Statement   Hospital bed: fully electronic with dual upper side rails  Face-to-Face for DME for Hospital Bed:   The patient does  require positioning of the body in ways not feasible with an ordinary bed due to a medical condition that is expected to last \"99\" (lifetime) due to PSP and minimally displaced bilat C2 laminar fractures .   The patient does  require, for the alleviation of pain, postioning of the body in ways not feasible with an ordinary bed.   The patient does  require the head of bed elevated more than 30* most of the time due to aspiration.  The patient does not require traction that can only be attached to a hospital bed.  The patient does  require a bed height different than a fixed height hospital bed to permit tranfers to wheelchair or standing position.   The patient does require use of fully electric controls as he has reduced ROM and strength 2/2 neck fracture and PSP, and for caregivers who help patient with transfers, bed mobility so as to reduce unnecessary pulling/pushing of patient (which can cause pain).   The patient does  require frequent or immediate changes in body position due to pain from neck fractures and progressive spasticity 2/2 PSP.     Pt needing above DME with expected length of need of \"99\" years  due to medical necessity associated with following diagnosis:     Closed displaced " fracture of second cervical vertebra with routine healing, unspecified fracture morphology, subsequent encounter  Fall, subsequent encounter  PSP (progressive supranuclear palsy) (H)  Progressive supranuclear ophthalmoplegia (H)  Impaired functional mobility, balance, gait, and endurance  Speech dysfunction  Dysphagia, unspecified type  Essential hypertension  Neuropathy  Migraine without aura and without status migrainosus, not intractable  Displacement of lumbar intervertebral disc without myelopathy  Slow transit constipation  Cognitive complaints  Insomnia due to medical condition  Urge incontinence of urine      PMH   has a past medical history of Blurred vision (8/11/2014); Cancer (H) (1979; 1960's); DISK (aka Displacement of intervertebral disc, site unspecified, without myelopathy) (8/11/2014); Displacement of cervical intervertebral disc without myelopathy (HERNIATED DISK) (8/11/2014); Double vision (8/11/2014); Epistaxis (8/11/2014); Fluttering heart (8/11/2014); Headache (8/11/2014); Leg swelling (8/11/2014); Memory loss (8/11/2014); Migraines (2007); PSP (progressive supranuclear palsy) (H) (8/11/2014); Sinus disorder (8/11/2014); Tinnitus (8/11/2014); Urinary urgency (8/11/2014); and Varicose veins (8/11/2014). He also has no past medical history of Basal cell carcinoma; Malignant melanoma (H); Skin cancer; or Squamous cell carcinoma.  CURRENT MEDICATIONS  Current Outpatient Prescriptions   Medication Sig Dispense Refill     acetaminophen (TYLENOL) 500 MG tablet Take 2 tablets (1,000 mg) by mouth 3 times daily       adapalene (DIFFERIN) 0.1 % gel Apply to Face topically every day and evening shift for Acne prevention 135 g 3     amantadine (SYMMETREL) 100 MG capsule Take 1 capsule (100 mg) by mouth 3 times daily 6am,11am and 4pm 270 capsule 3     amLODIPine (NORVASC) 2.5 MG tablet Take 1 tablet by mouth daily       aspirin (BABY ASPIRIN) 81 MG chewable tablet Take 81 mg by mouth daily         cholecalciferol (VITAMIN D-3 SUPER STRENGTH) 2000 UNITS tablet Take 1 tablet by mouth        hydrochlorothiazide 12.5 MG TABS tablet Take 1 tablet (12.5 mg) by mouth daily 90 tablet 3     lidocaine (LIDODERM) 5 % Patch Place 1 patch onto the skin every 24 hours       loperamide (IMODIUM) 2 MG capsule Take 2 mg by mouth every 4 hours as needed for diarrhea       METHOCARBAMOL PO Take 500 mg by mouth 4 times daily       Multiple Vitamins-Minerals (CENTRUM SILVER) per tablet Take 1 tablet by mouth daily 30 tablet      nortriptyline (PAMELOR) 10 MG capsule TAKE 3 CAPSULES AT BEDTIME 270 capsule 3     olmesartan (BENICAR) 20 MG tablet Take 1 tablet (20 mg) by mouth 2 times daily       polyethylene glycol 0.4%- propylene glycol 0.3% (SYSTANE ULTRA) 0.4-0.3 % SOLN ophthalmic solution Place 1 drop into both eyes 3 times daily       rivastigmine (EXELON) 9.5 MG/24HR 24 hr patch Place 1 patch onto the skin daily 30 patch 11     sennosides (SENOKOT) 8.6 MG tablet Take 3 tablets by mouth daily        traMADol (ULTRAM) 50 MG tablet Take 0.5 tablets (25 mg) by mouth every 3 hours And q6 hours PRN 10 tablet 0     White Petrolatum-Mineral Oil (SYSTANE NIGHTTIME) OINT Instill 1 ribbon in both eyes at bedtime for Dry eyes       ZOLMitriptan (ZOMIG) 5 MG tablet Take 1 tablet (5 mg) by mouth at onset of headache for migraine 30 tablet 5     bisacodyl (DULCOLAX) 10 MG Suppository Place 10 mg rectally daily as needed for constipation If no BM in 3 days.       TRAMADOL HCL PO Take 25 mg by mouth once       REVIEW OF SYSTEMS:  10 point ROS of systems including Constitutional, Eyes, Respiratory, Cardiovascular, Gastroenterology, Genitourinary, Integumentary, Muscularskeletal, Psychiatric were all negative except for pertinent positives noted in my HPI.    Physical Exam:  /74  Pulse 88  Temp 97.8  F (36.6  C)  Resp 18  Wt 179 lb (81.2 kg)  SpO2 95%  BMI 24.28 kg/m2  GENERAL APPEARANCE:  Alert, in no distress, deconditioned from  acute on chronic illness. Slow in speech, movement from chronic PSP  RESP:  respiratory effort and palpation of chest normal, auscultation of lungs clear, no respiratory distress  CV:  Palpation and auscultation of heart done , rate and rhythm regular, no murmur, no LE peripheral edema  ABDOMEN:  normal bowel sounds, soft, nontender, no hepatosplenomegaly or other masses  M/S:   Gait and station ambulatory with walker if family/therapy is present, otherwise uses W/C for mobility, Digits and nails with arthritic changes and spacticity, reduced muscle mass, pronounced anterior head carriage with nearly 70-80 degrees flexion, hypertonic suboccipitals, posterior cervicals, trapezius, levator scapulae.   SKIN:  Inspection and Palpation of skin and subcutaneous tissue pale, intact  PSYCH:  insight and judgement, memory seemingly with impairment, affect flat and mood per baseline, follows commands readily       ASSESSMENT/PLAN:  1. Closed displaced fracture of second cervical vertebra with routine healing, unspecified fracture morphology, subsequent encounter    2. Fall, subsequent encounter    3. PSP (progressive supranuclear palsy) (H)    4. Progressive supranuclear ophthalmoplegia (H)    5. Impaired functional mobility, balance, gait, and endurance    6. Speech dysfunction    7. Dysphagia, unspecified type    8. Essential hypertension    9. Neuropathy    10. Migraine without aura and without status migrainosus, not intractable    11. Displacement of lumbar intervertebral disc without myelopathy    12. Slow transit constipation    13. Cognitive complaints    14. 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 Urge incontinence of urine        Orders:  1. Facility staff/TC to contact DME company to get their order form for provider to fill out    ELECTRONICALLY SIGNED BY EDWNI CERTIFIED PROVIDER:  YULY Montiel CNP   NPI: [id number]  [facility]  [address] [place] [address], [address]  [place]  [address]

## 2018-06-13 NOTE — LETTER
6/13/2018        RE: Jimmy Patrick  [address] [facility] [address]        [place] [facility]    Chief Complaint   Patient presents with     retirement Acute       [place] Medical Record Number:  [id number]    HPI:    Jimmy Patrick is a 67 year old  (1950), who is being seen today for an episodic care visit at [facility].  HPI information obtained from: facility chart records, facility staff, patient report, [facility] chart review and family/first contact pt's wife report.Today's concern is:     Closed displaced fracture of second cervical vertebra with routine healing, unspecified fracture morphology, subsequent encounter  Fall, subsequent encounter  PSP (progressive supranuclear palsy) (H)  Progressive supranuclear ophthalmoplegia (H)  Impaired functional mobility, balance, gait, and endurance  Speech dysfunction  Dysphagia, unspecified type  Essential hypertension  Neuropathy  Migraine without aura and without status migrainosus, not intractable  Displacement of lumbar intervertebral disc without myelopathy  Slow transit constipation  Cognitive complaints  Insomnia due to medical condition  Urge incontinence of urine     Per Epic note/Hx:  Patient is a 67 year old gentleman with PMH of progressive supranuclear palsy x 4 years with subsequent dysphagia, impaired vision (blurry, double vision), impaired balance, neuropathy in bilat feet, migraines who on 5/28/18 fell backwards from a chair and hit his head.  He declined going to the hospital at that time but later went in as the pain was worsening. He initially presented to [facility] and was transferred to [facility] after CT Angio showed (stable) minimally displaced bilat C2 laminar fractures.  Neurology was consulted and noted no collar ws needed, OK to be up with therapies and no f/u needed. He was transferred to [facility] for rehab, medical management and nursing cares.      Today he and his  wife, daughter are met in his TCU room.  Discussion today regarding POC to be DC home with 24 hour services or possible admission to Acute Care Rehab for neck rehab.  Patient's family is concerned about his prognosis and wants to try to rehab him to baseline, but also is exhausted from living at TCU and feels more care can be given to him at home with 24 hour services. Raymond is reporting pain 3/10 in his neck at rest, minimal LBP at this time, constipation is still problematic.  He did have a BM after the suppository the other day but family is concerned that this is not a sustainable therapy approach.        ALLERGIES: Other [seasonal allergies] and Codeine  Past Medical, Surgical, Family and Social History reviewed and updated in [employer].    Current Outpatient Prescriptions   Medication Sig Dispense Refill     acetaminophen (TYLENOL) 500 MG tablet Take 2 tablets (1,000 mg) by mouth 3 times daily       adapalene (DIFFERIN) 0.1 % gel Apply to Face topically every day and evening shift for Acne prevention 135 g 3     amantadine (SYMMETREL) 100 MG capsule Take 1 capsule (100 mg) by mouth 3 times daily 6am,11am and 4pm 270 capsule 3     amLODIPine (NORVASC) 2.5 MG tablet Take 1 tablet by mouth daily       aspirin (BABY ASPIRIN) 81 MG chewable tablet Take 81 mg by mouth daily        cholecalciferol (VITAMIN D-3 SUPER STRENGTH) 2000 UNITS tablet Take 1 tablet by mouth        hydrochlorothiazide 12.5 MG TABS tablet Take 1 tablet (12.5 mg) by mouth daily 90 tablet 3     lidocaine (LIDODERM) 5 % Patch Place 1 patch onto the skin every 24 hours       loperamide (IMODIUM) 2 MG capsule Take 2 mg by mouth every 4 hours as needed for diarrhea       METHOCARBAMOL PO Take 500 mg by mouth 4 times daily       Multiple Vitamins-Minerals (CENTRUM SILVER) per tablet Take 1 tablet by mouth daily 30 tablet      nortriptyline (PAMELOR) 10 MG capsule TAKE 3 CAPSULES AT BEDTIME 270 capsule 3     olmesartan (BENICAR) 20 MG tablet Take 1 tablet  (20 mg) by mouth 2 times daily       polyethylene glycol 0.4%- propylene glycol 0.3% (SYSTANE ULTRA) 0.4-0.3 % SOLN ophthalmic solution Place 1 drop into both eyes 3 times daily       rivastigmine (EXELON) 9.5 MG/24HR 24 hr patch Place 1 patch onto the skin daily 30 patch 11     sennosides (SENOKOT) 8.6 MG tablet Take 1 tablet by mouth 2 times daily       traMADol (ULTRAM) 50 MG tablet Take 0.5 tablets (25 mg) by mouth every 3 hours And q6 hours PRN 10 tablet 0     White Petrolatum-Mineral Oil (SYSTANE NIGHTTIME) OINT Instill 1 ribbon in both eyes at bedtime for Dry eyes       ZOLMitriptan (ZOMIG) 5 MG tablet Take 1 tablet (5 mg) by mouth at onset of headache for migraine 30 tablet 5     Medications reviewed:  Medications reconciled to facility chart and changes were made to reflect current medications as identified as above med list. Below are the changes that were made:   Medications stopped since last EPIC medication reconciliation:   There are no discontinued medications.    Medications started since last [facility] medication reconciliation:  See previous notes    REVIEW OF SYSTEMS:  10 point ROS of systems including Constitutional, Eyes, Respiratory, Cardiovascular, Gastroenterology, Genitourinary, Integumentary, Muscularskeletal, Psychiatric were all negative except for pertinent positives noted in my HPI.    Physical Exam:  /74  Pulse 88  Temp 97.8  F (36.6  C)  Resp 18  Wt 179 lb (81.2 kg)  SpO2 95%  BMI 24.28 kg/m2  GENERAL APPEARANCE:  Alert, in no distress, deconditioned from acute on chronic illness. Slow in speech, movement from chronic PSP  RESP:  respiratory effort and palpation of chest normal, auscultation of lungs clear, no respiratory distress  CV:  Palpation and auscultation of heart done , rate and rhythm regular, no murmur, no LE peripheral edema  ABDOMEN:  normal bowel sounds, soft, nontender, no hepatosplenomegaly or other masses  M/S:   Gait and station ambulatory with walker if  family/therapy is present, otherwise uses W/C for mobility, Digits and nails with arthritic changes and spacticity, reduced muscle mass, pronounced anterior head carriage with nearly 70-80 degrees flexion, hypertonic suboccipitals, posterior cervicals, trapezius, levator scapulae.   SKIN:  Inspection and Palpation of skin and subcutaneous tissue pale, intact  PSYCH:  insight and judgement, memory seemingly with impairment, affect flat and mood per baseline, follows commands readily         Recent Labs:  CBC (HGB,HCT,WBC,RBC,Platelet) (05/28/2018 8:12 PM)       CBC (HGB,HCT,WBC,RBC,Platelet) (05/28/2018 8:12 PM)   Component Value Ref Range   WBC 11.6 (H) 4.3 - 10.8 K/uL   RBC 4.91 4.60 - 6.20 M/uL   HEMOGLOBIN 14.6 14.0 - 18.0 gm/dL   HEMATOCRIT 42.2 40.0 - 54.0 %   MCV 86 80 - 100 fl   MCH 30 27 - 33 pg   MCHC 35 33 - 36 gm/dL   RDW 12.3 11.5 - 14.5 %   PLATELET COUNT 176 150 - 400 K/uL   MPV 9.9 6.5 - 12.0      Basic Metab Profile (05/28/2018 8:12 PM)       Basic Metab Profile (05/28/2018 8:12 PM)   Component Value Ref Range   SODIUM 138 136 - 145 mmol/L   POTASSIUM 3.8 3.5 - 5.1 mmol/L   CHLORIDE 103 98 - 112 mmol/L   CARBON DIOXIDE 27 21 - 32 mmol/L   BUN (UREA NITRO) 21 7 - 24 mg/dL   CREATININE 1.00 0.70 - 1.30 mg/dL   EST GFR (MDRD) >60 >60 mL/min   EST GFR IF AFRICAN AM >60 >60 mL/min   GLUCOSE 109 (H) 74 - 106 mg/dL   CALCIUM, SERUM 8.6 8.5 - 10.1 mg/dL   ANION GAP 8.0 0.0 - 15.0 mmol/L            Assessment/Plan:  Closed displaced fracture of second cervical vertebra with routine healing, unspecified fracture morphology, subsequent encounter  Fall, subsequent encounter  Patient fell backwards out of chair and hit his head  ST showing minimally displaced bilat C2 laminar fractures, HCT negative  Neurosurgery consulted and noted stable fractures, no collar needed, OK to be up with therapies and no f/u needed.      Analgesia with:  (new) Tylenol 1000 mg TID  (new) lidocaine patch on for 12 hours, off for 12  hours  (new) robaxin 500 mg QID  (new) tramadol 25 mg po q3 hours and q6 hours PRN - used x 2 in last 7 days     Patient reporting pain is slightly better but still continuing to limit therapy.  POC discussion about Acute Rehab in-patient stay vs DC home with 24 hour services.  SW and Therapy will work on both scenarios and present both options to patient and family when known if possible admission to Acute-Care Rehab possible.   Please see DME information below in regards to hospital bed.      PLAN:  - continue (new) Tylenol 1000 mg TID  - continue (new) lidocaine patch (chaneg time), robaxin,   - Continue Tramadol to 25 mg q3 hours and [id number] PRN, and monitor for dosing efficacy   - monitor for pain control and titrate as needed  - Physical Therapy, Occupational Therapy for strengthening and ROM of neck as able, no restrictions per Neurosurgery      PSP (progressive supranuclear palsy) (H)  Progressive supranuclear ophthalmoplegia (H)  Impaired functional mobility, balance, gait, and endurance  Speech dysfunction  Dysphagia, Severe oropharyngeal phase  PSP x 4 years now  Patient's wife noting patient still very active, but PSP definitely progressing.   Deficits include:  Dysarthria (slow, slurred, mumbled speech)  Decreased vision (double, blurred vision)  Sensitivity to lights (wears hat and sunglasses inside)  Impaired coordination/balance  Dysphagia (eats/drinks slowly)  Per Care Everywhere SLP consult:  Per his wife, he has had two previous videofluoroscopic swallowing studies that have demonstrated aspiration with all consistencies, the last one being in February of 2018. Compensatory strategies such as chin tuck were found not to be beneficial, per wife. Apparently, tube feedings were recommended at that time but the patient has chosen to continue po intake despite the known risk of aspiration     SLP consult noted patient takes whole pills in pudding, was coughing with liquids.  Patient/Pt's family have  refused pureed foods and thickened liquids and know the risks involved but prefer patient to have QOL. SLP following for swallowing and speech exercising.      On PTA amantadine 100 mg TID (0600, 1100, 1600), PTA rivastigmine 9.5 mg/24 hour patch.   PTA Vitamin D and MV remain.  Please see information below in regards to DME for hospital bed.     Therapy update on progress:  Max assist for toileting  Mod assist for dressing  Min assist for feeding  CGA for ambulation, unlimited distances with U-Step walker  SLP working with swallowing, communication  SLUMS 17  LCD estimated as 6/20, possibly sooner with Home PCA services or in-patient acute rehab admission.   Will need hospital bed if discharging home.      PLAN:  - Physical Therapy, Occupational Therapy for balance, strengthening, ROM, gait, etc.   - SLP for swallowing and speech exercising.   - SW for discharge disposition  - Continue PTA Amantadine and rivastigmine patch  - nursing for supportive cares     Essential hypertension  S/p ablation for atrial flutter  On PTA amlodipine 2.5 mg qPM, ASA 81 mg daily, HCTZ 12.5 mg daily, olmesartan 20 mg BID     BPs 120s/70s  HRs 70-90s  Patient denies CP, HA, lightheadedness     PLAN:  - continue PTA amlodipine, olmesartan, ASA, HCTZ  - monitor for titration needs     Neuropathy  bilat feet  History of herniated lumbar discs (per patient)  On no meds at this time.  Will monitor for need.      Migraine without aura and without status migrainosus, not intractable  On PTA PRN Zolmitriptan 5 mg at onset of migraine  No HA nor migraine while in TCU  Will monitor.      Displacement of lumbar intervertebral disc without myelopathy  History of herniated lumbar discs, per patient  History of neuropathy  Patient reporting tolerable LBP today.      Analgesia with:  (new) Tylenol 1000 mg TID  (new) robaxin 500 mg QID  (new) tramadol 25 mg po q3 hours and q6 hours PRN - used x 2 in last 7 days     PLAN:  - continue (new) Tylenol 1000  mg TID  - continueTramadol to 25 mg q3 hours and q6 hours PRN.   - Physical Therapy, Occupational Therapy for strengthening, ROM, etc.      Slow transit constipation  PTA Senna 2 tab qHS now changed to Senna 1 tab BID.   Patient reporting constipation but improvement with bisacodyl supp  Discussion about increasing Senna for proactive approach and PRN supp if npo BM in 3 days.     Patient with history of PSP, now with increased Tramadol dosing.    PLAN:  - Increase Senna to 1 tab qAM, 2 tabs qPM, and continue 1 tab BID PRN   - bisacodyl supp 10 mg  PRN if no BM in 3 days     Cognitive complaints  History of progressive supranuclear palsy  No cognitive testing on file  SLUMS 17  Patient's wife declined CPT at this time (pt was tired)      PLAN:  - Occupational Therapy for CPT testing.      Insomnia due to medical condition (PSP)  On PTA nortriptyline 30 mg qHS  Patient's wife noting patient sleeps poorly, even despite nortriptyline.      PLAN:  - continue tramadol for analgesia so he can sleep  - monitor sleep for titration needs.      Urge Incontinence  Patient's wife noting patient has had urge incontinence since his injury  Possible nerve innervation contributory although this more likely with S2-4 injury (not cervical)  Possible BPH as patient noting he does not feel like he completely empties after urination.     Occupational Therapy consult for bladder program. May anticipate start of tamsulosin if ineffective.        Orders:  1. Change Senna to 1 tab po qAM, 2 tab po qPM Dx: constipation  2. Bisacodyl supp 10 mg if no BM in 3 days Dx: constipation      Total time spent with patient visit at the skilled nursing facility was >30 minutes including patient visit and discussion with pt's wife and daughter present, IDT. Greater than 50% of total time spent with counseling and coordinating care due to discussion about possible discharge disposition, pain management, PSP, rehab.     Electronically signed by  Yue GORDILLO  "YULY Castaneda CNP          Face to Face and Medical Necessity Statement for DME Provider visit    Demographic Information on Jimmy Patrick:  Gender: male  : 1950  [address]  [facility] [address]  [phone] (home) None (work)    Medical Record: [id number]  Social Security Number: [id number]  Primary Care Provider: Evelina Morse  Insurance: Payor: [facility] / Plan: [facility] [employer] / Product Type: HMO /     HPI:   Jimmy Patrick is a 67 year old  (1950), who is being seen today for a face to face provider visit at [facility]; medical necessity statement for DME included. This patient requires the following:  DME Ordered and Medical Necessity Statement   Hospital bed: fully electronic with dual upper side rails  Face-to-Face for DME for Hospital Bed:   The patient does  require positioning of the body in ways not feasible with an ordinary bed due to a medical condition that is expected to last \"99\" (lifetime) due to PSP and minimally displaced bilat C2 laminar fractures .   The patient does  require, for the alleviation of pain, postioning of the body in ways not feasible with an ordinary bed.   The patient does  require the head of bed elevated more than 30* most of the time due to aspiration.  The patient does not require traction that can only be attached to a hospital bed.  The patient does  require a bed height different than a fixed height hospital bed to permit tranfers to wheelchair or standing position.   The patient does require use of fully electric controls as he has reduced ROM and strength 2/2 neck fracture and PSP, and for caregivers who help patient with transfers, bed mobility so as to reduce unnecessary pulling/pushing of patient (which can cause pain).   The patient does  require frequent or immediate changes in body position due to pain from neck fractures and progressive spasticity 2/2 PSP.     Pt needing above DME with expected length of need of " "\"99\" years  due to medical necessity associated with following diagnosis:     Closed displaced fracture of second cervical vertebra with routine healing, unspecified fracture morphology, subsequent encounter  Fall, subsequent encounter  PSP (progressive supranuclear palsy) (H)  Progressive supranuclear ophthalmoplegia (H)  Impaired functional mobility, balance, gait, and endurance  Speech dysfunction  Dysphagia, unspecified type  Essential hypertension  Neuropathy  Migraine without aura and without status migrainosus, not intractable  Displacement of lumbar intervertebral disc without myelopathy  Slow transit constipation  Cognitive complaints  Insomnia due to medical condition  Urge incontinence of urine      PMH   has a past medical history of Blurred vision (8/11/2014); Cancer (H) (1979; 1960's); DISK (aka Displacement of intervertebral disc, site unspecified, without myelopathy) (8/11/2014); Displacement of cervical intervertebral disc without myelopathy (HERNIATED DISK) (8/11/2014); Double vision (8/11/2014); Epistaxis (8/11/2014); Fluttering heart (8/11/2014); Headache (8/11/2014); Leg swelling (8/11/2014); Memory loss (8/11/2014); Migraines (2007); PSP (progressive supranuclear palsy) (H) (8/11/2014); Sinus disorder (8/11/2014); Tinnitus (8/11/2014); Urinary urgency (8/11/2014); and Varicose veins (8/11/2014). He also has no past medical history of Basal cell carcinoma; Malignant melanoma (H); Skin cancer; or Squamous cell carcinoma.  CURRENT MEDICATIONS  Current Outpatient Prescriptions   Medication Sig Dispense Refill     acetaminophen (TYLENOL) 500 MG tablet Take 2 tablets (1,000 mg) by mouth 3 times daily       adapalene (DIFFERIN) 0.1 % gel Apply to Face topically every day and evening shift for Acne prevention 135 g 3     amantadine (SYMMETREL) 100 MG capsule Take 1 capsule (100 mg) by mouth 3 times daily 6am,11am and 4pm 270 capsule 3     amLODIPine (NORVASC) 2.5 MG tablet Take 1 tablet by mouth daily  "      aspirin (BABY ASPIRIN) 81 MG chewable tablet Take 81 mg by mouth daily        cholecalciferol (VITAMIN D-3 SUPER STRENGTH) 2000 UNITS tablet Take 1 tablet by mouth        hydrochlorothiazide 12.5 MG TABS tablet Take 1 tablet (12.5 mg) by mouth daily 90 tablet 3     lidocaine (LIDODERM) 5 % Patch Place 1 patch onto the skin every 24 hours       loperamide (IMODIUM) 2 MG capsule Take 2 mg by mouth every 4 hours as needed for diarrhea       METHOCARBAMOL PO Take 500 mg by mouth 4 times daily       Multiple Vitamins-Minerals (CENTRUM SILVER) per tablet Take 1 tablet by mouth daily 30 tablet      nortriptyline (PAMELOR) 10 MG capsule TAKE 3 CAPSULES AT BEDTIME 270 capsule 3     olmesartan (BENICAR) 20 MG tablet Take 1 tablet (20 mg) by mouth 2 times daily       polyethylene glycol 0.4%- propylene glycol 0.3% (SYSTANE ULTRA) 0.4-0.3 % SOLN ophthalmic solution Place 1 drop into both eyes 3 times daily       rivastigmine (EXELON) 9.5 MG/24HR 24 hr patch Place 1 patch onto the skin daily 30 patch 11     sennosides (SENOKOT) 8.6 MG tablet Take 3 tablets by mouth daily        traMADol (ULTRAM) 50 MG tablet Take 0.5 tablets (25 mg) by mouth every 3 hours And q6 hours PRN 10 tablet 0     White Petrolatum-Mineral Oil (SYSTANE NIGHTTIME) OINT Instill 1 ribbon in both eyes at bedtime for Dry eyes       ZOLMitriptan (ZOMIG) 5 MG tablet Take 1 tablet (5 mg) by mouth at onset of headache for migraine 30 tablet 5     bisacodyl (DULCOLAX) 10 MG Suppository Place 10 mg rectally daily as needed for constipation If no BM in 3 days.       TRAMADOL HCL PO Take 25 mg by mouth once       REVIEW OF SYSTEMS:  10 point ROS of systems including Constitutional, Eyes, Respiratory, Cardiovascular, Gastroenterology, Genitourinary, Integumentary, Muscularskeletal, Psychiatric were all negative except for pertinent positives noted in my HPI.    Physical Exam:  /74  Pulse 88  Temp 97.8  F (36.6  C)  Resp 18  Wt 179 lb (81.2 kg)  SpO2 95%   BMI 24.28 kg/m2  GENERAL APPEARANCE:  Alert, in no distress, deconditioned from acute on chronic illness. Slow in speech, movement from chronic PSP  RESP:  respiratory effort and palpation of chest normal, auscultation of lungs clear, no respiratory distress  CV:  Palpation and auscultation of heart done , rate and rhythm regular, no murmur, no LE peripheral edema  ABDOMEN:  normal bowel sounds, soft, nontender, no hepatosplenomegaly or other masses  M/S:   Gait and station ambulatory with walker if family/therapy is present, otherwise uses W/C for mobility, Digits and nails with arthritic changes and spacticity, reduced muscle mass, pronounced anterior head carriage with nearly 70-80 degrees flexion, hypertonic suboccipitals, posterior cervicals, trapezius, levator scapulae.   SKIN:  Inspection and Palpation of skin and subcutaneous tissue pale, intact  PSYCH:  insight and judgement, memory seemingly with impairment, affect flat and mood per baseline, follows commands readily       ASSESSMENT/PLAN:  1. Closed displaced fracture of second cervical vertebra with routine healing, unspecified fracture morphology, subsequent encounter    2. Fall, subsequent encounter    3. PSP (progressive supranuclear palsy) (H)    4. Progressive supranuclear ophthalmoplegia (H)    5. Impaired functional mobility, balance, gait, and endurance    6. Speech dysfunction    7. Dysphagia, unspecified type    8. Essential hypertension    9. Neuropathy    10. Migraine without aura and without status migrainosus, not intractable    11. Displacement of lumbar intervertebral disc without myelopathy    12. Slow transit constipation    13. Cognitive complaints    14. 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 Urge incontinence of urine        Orders:  1. Facility staff/TC to contact DME company to get their order form for provider to fill out    ELECTRONICALLY SIGNED BY EDWIN CERTIFIED PROVIDER:  YULY Montiel CNP   NPI:  [id number]  [facility]  [address] [place] [address], [address]  [place] [address]            Sincerely,        Yue Castaneda APRN CNP

## 2018-06-13 NOTE — LETTER
6/13/2018        RE: Jimmy Patrick  [address] [facility] [address]        [facility]    Chief Complaint   Patient presents with     retirement Acute       [place] Medical Record Number:  [id number]    HPI:    Jimmy Patrick is a 67 year old  (1950), who is being seen today for an episodic care visit at [facility].  HPI information obtained from: facility chart records, facility staff, patient report, [facility] chart review and family/first contact pt's wife report.Today's concern is:     Closed displaced fracture of second cervical vertebra with routine healing, unspecified fracture morphology, subsequent encounter  Fall, subsequent encounter  PSP (progressive supranuclear palsy) (H)  Progressive supranuclear ophthalmoplegia (H)  Impaired functional mobility, balance, gait, and endurance  Speech dysfunction  Dysphagia, unspecified type  Essential hypertension  Neuropathy  Migraine without aura and without status migrainosus, not intractable  Displacement of lumbar intervertebral disc without myelopathy  Slow transit constipation  Cognitive complaints  Insomnia due to medical condition  Urge incontinence of urine     Per Epic note/Hx:  Patient is a 67 year old gentleman with PMH of progressive supranuclear palsy x 4 years with subsequent dysphagia, impaired vision (blurry, double vision), impaired balance, neuropathy in bilat feet, migraines who on 5/28/18 fell backwards from a chair and hit his head.  He declined going to the hospital at that time but later went in as the pain was worsening. [place] [facility] [place] [facility] after CT Angio showed (stable) minimally displaced bilat C2 laminar fractures.  Neurology was consulted and noted no collar ws needed, OK to be up with therapies and no f/u needed. He was transferred to [facility] for rehab, medical management and nursing cares.      Today he and his  wife, daughter are met in his TCU room.  Discussion today regarding POC to be DC home with 24 hour services or possible admission to Acute Care Rehab for neck rehab.  Patient's family is concerned about his prognosis and wants to try to rehab him to baseline, but also is exhausted from living at TCU and feels more care can be given to him at home with 24 hour services. Raymond is reporting pain 3/10 in his neck at rest, minimal LBP at this time, constipation is still problematic.  He did have a BM after the suppository the other day but family is concerned that this is not a sustainable therapy approach.        ALLERGIES: Other [seasonal allergies] and Codeine  Past Medical, Surgical, Family and Social History reviewed and updated in [facility].    Current Outpatient Prescriptions   Medication Sig Dispense Refill     acetaminophen (TYLENOL) 500 MG tablet Take 2 tablets (1,000 mg) by mouth 3 times daily       adapalene (DIFFERIN) 0.1 % gel Apply to Face topically every day and evening shift for Acne prevention 135 g 3     amantadine (SYMMETREL) 100 MG capsule Take 1 capsule (100 mg) by mouth 3 times daily 6am,11am and 4pm 270 capsule 3     amLODIPine (NORVASC) 2.5 MG tablet Take 1 tablet by mouth daily       aspirin (BABY ASPIRIN) 81 MG chewable tablet Take 81 mg by mouth daily        cholecalciferol (VITAMIN D-3 SUPER STRENGTH) 2000 UNITS tablet Take 1 tablet by mouth        hydrochlorothiazide 12.5 MG TABS tablet Take 1 tablet (12.5 mg) by mouth daily 90 tablet 3     lidocaine (LIDODERM) 5 % Patch Place 1 patch onto the skin every 24 hours       loperamide (IMODIUM) 2 MG capsule Take 2 mg by mouth every 4 hours as needed for diarrhea       METHOCARBAMOL PO Take 500 mg by mouth 4 times daily       Multiple Vitamins-Minerals (CENTRUM SILVER) per tablet Take 1 tablet by mouth daily 30 tablet      nortriptyline (PAMELOR) 10 MG capsule TAKE 3 CAPSULES AT BEDTIME 270 capsule 3     olmesartan (BENICAR) 20 MG tablet Take 1 tablet  (20 mg) by mouth 2 times daily       polyethylene glycol 0.4%- propylene glycol 0.3% (SYSTANE ULTRA) 0.4-0.3 % SOLN ophthalmic solution Place 1 drop into both eyes 3 times daily       rivastigmine (EXELON) 9.5 MG/24HR 24 hr patch Place 1 patch onto the skin daily 30 patch 11     sennosides (SENOKOT) 8.6 MG tablet Take 1 tablet by mouth 2 times daily       traMADol (ULTRAM) 50 MG tablet Take 0.5 tablets (25 mg) by mouth every 3 hours And q6 hours PRN 10 tablet 0     White Petrolatum-Mineral Oil (SYSTANE NIGHTTIME) OINT Instill 1 ribbon in both eyes at bedtime for Dry eyes       ZOLMitriptan (ZOMIG) 5 MG tablet Take 1 tablet (5 mg) by mouth at onset of headache for migraine 30 tablet 5     Medications reviewed:  Medications reconciled to facility chart and changes were made to reflect current medications as identified as above med list. Below are the changes that were made:   Medications stopped since last EPIC medication reconciliation:   There are no discontinued medications.    Medications started since last [facility] medication reconciliation:  See previous notes    REVIEW OF SYSTEMS:  10 point ROS of systems including Constitutional, Eyes, Respiratory, Cardiovascular, Gastroenterology, Genitourinary, Integumentary, Muscularskeletal, Psychiatric were all negative except for pertinent positives noted in my HPI.    Physical Exam:  /74  Pulse 88  Temp 97.8  F (36.6  C)  Resp 18  Wt 179 lb (81.2 kg)  SpO2 95%  BMI 24.28 kg/m2  GENERAL APPEARANCE:  Alert, in no distress, deconditioned from acute on chronic illness. Slow in speech, movement from chronic PSP  RESP:  respiratory effort and palpation of chest normal, auscultation of lungs clear, no respiratory distress  CV:  Palpation and auscultation of heart done , rate and rhythm regular, no murmur, no LE peripheral edema  ABDOMEN:  normal bowel sounds, soft, nontender, no hepatosplenomegaly or other masses  M/S:   Gait and station ambulatory with walker if  family/therapy is present, otherwise uses W/C for mobility, Digits and nails with arthritic changes and spacticity, reduced muscle mass, pronounced anterior head carriage with nearly 70-80 degrees flexion, hypertonic suboccipitals, posterior cervicals, trapezius, levator scapulae.   SKIN:  Inspection and Palpation of skin and subcutaneous tissue pale, intact  PSYCH:  insight and judgement, memory seemingly with impairment, affect flat and mood per baseline, follows commands readily         Recent Labs:  CBC (HGB,HCT,WBC,RBC,Platelet) (05/28/2018 8:12 PM)       CBC (HGB,HCT,WBC,RBC,Platelet) (05/28/2018 8:12 PM)   Component Value Ref Range   WBC 11.6 (H) 4.3 - 10.8 K/uL   RBC 4.91 4.60 - 6.20 M/uL   HEMOGLOBIN 14.6 14.0 - 18.0 gm/dL   HEMATOCRIT 42.2 40.0 - 54.0 %   MCV 86 80 - 100 fl   MCH 30 27 - 33 pg   MCHC 35 33 - 36 gm/dL   RDW 12.3 11.5 - 14.5 %   PLATELET COUNT 176 150 - 400 K/uL   MPV 9.9 6.5 - 12.0      Basic Metab Profile (05/28/2018 8:12 PM)       Basic Metab Profile (05/28/2018 8:12 PM)   Component Value Ref Range   SODIUM 138 136 - 145 mmol/L   POTASSIUM 3.8 3.5 - 5.1 mmol/L   CHLORIDE 103 98 - 112 mmol/L   CARBON DIOXIDE 27 21 - 32 mmol/L   BUN (UREA NITRO) 21 7 - 24 mg/dL   CREATININE 1.00 0.70 - 1.30 mg/dL   EST GFR (MDRD) >60 >60 mL/min   EST GFR IF AFRICAN AM >60 >60 mL/min   GLUCOSE 109 (H) 74 - 106 mg/dL   CALCIUM, SERUM 8.6 8.5 - 10.1 mg/dL   ANION GAP 8.0 0.0 - 15.0 mmol/L            Assessment/Plan:  Closed displaced fracture of second cervical vertebra with routine healing, unspecified fracture morphology, subsequent encounter  Fall, subsequent encounter  Patient fell backwards out of chair and hit his head  ST showing minimally displaced bilat C2 laminar fractures, HCT negative  Neurosurgery consulted and noted stable fractures, no collar needed, OK to be up with therapies and no f/u needed.      Analgesia with:  (new) Tylenol 1000 mg TID  (new) lidocaine patch on for 12 hours, off for 12  hours  (new) robaxin 500 mg QID  (new) tramadol 25 mg po q3 hours and q6 hours PRN - used x 2 in last 7 days     Patient reporting pain is slightly better but still continuing to limit therapy.  POC discussion about Acute Rehab in-patient stay vs DC home with 24 hour services.  SW and Therapy will work on both scenarios and present both options to patient and family when known if possible admission to Acute-Care Rehab possible.   Please see DME information below in regards to hospital bed.      PLAN:  - continue (new) Tylenol 1000 mg TID  - continue (new) lidocaine patch (chaneg time), robaxin,   - Continue Tramadol to 25 mg q3 hours and [id number] PRN, and monitor for dosing efficacy   - monitor for pain control and titrate as needed  - Physical Therapy, Occupational Therapy for strengthening and ROM of neck as able, no restrictions per Neurosurgery      PSP (progressive supranuclear palsy) (H)  Progressive supranuclear ophthalmoplegia (H)  Impaired functional mobility, balance, gait, and endurance  Speech dysfunction  Dysphagia, Severe oropharyngeal phase  PSP x 4 years now  Patient's wife noting patient still very active, but PSP definitely progressing.   Deficits include:  Dysarthria (slow, slurred, mumbled speech)  Decreased vision (double, blurred vision)  Sensitivity to lights (wears hat and sunglasses inside)  Impaired coordination/balance  Dysphagia (eats/drinks slowly)  Per Care Everywhere SLP consult:  Per his wife, he has had two previous videofluoroscopic swallowing studies that have demonstrated aspiration with all consistencies, the last one being in February of 2018. Compensatory strategies such as chin tuck were found not to be beneficial, per wife. Apparently, tube feedings were recommended at that time but the patient has chosen to continue po intake despite the known risk of aspiration     SLP consult noted patient takes whole pills in pudding, was coughing with liquids.  Patient/Pt's family have  refused pureed foods and thickened liquids and know the risks involved but prefer patient to have QOL. SLP following for swallowing and speech exercising.      On PTA amantadine 100 mg TID (0600, 1100, 1600), PTA rivastigmine 9.5 mg/24 hour patch.   PTA Vitamin D and MV remain.  Please see information below in regards to DME for hospital bed.     Therapy update on progress:  Max assist for toileting  Mod assist for dressing  Min assist for feeding  CGA for ambulation, unlimited distances with U-Step walker  SLP working with swallowing, communication  SLUMS 17  LCD estimated as 6/20, possibly sooner with Home PCA services or in-patient acute rehab admission.   Will need hospital bed if discharging home.      PLAN:  - Physical Therapy, Occupational Therapy for balance, strengthening, ROM, gait, etc.   - SLP for swallowing and speech exercising.   - SW for discharge disposition  - Continue PTA Amantadine and rivastigmine patch  - nursing for supportive cares     Essential hypertension  S/p ablation for atrial flutter  On PTA amlodipine 2.5 mg qPM, ASA 81 mg daily, HCTZ 12.5 mg daily, olmesartan 20 mg BID     BPs 120s/70s  HRs 70-90s  Patient denies CP, HA, lightheadedness     PLAN:  - continue PTA amlodipine, olmesartan, ASA, HCTZ  - monitor for titration needs     Neuropathy  bilat feet  History of herniated lumbar discs (per patient)  On no meds at this time.  Will monitor for need.      Migraine without aura and without status migrainosus, not intractable  On PTA PRN Zolmitriptan 5 mg at onset of migraine  No HA nor migraine while in TCU  Will monitor.      Displacement of lumbar intervertebral disc without myelopathy  History of herniated lumbar discs, per patient  History of neuropathy  Patient reporting tolerable LBP today.      Analgesia with:  (new) Tylenol 1000 mg TID  (new) robaxin 500 mg QID  (new) tramadol 25 mg po q3 hours and q6 hours PRN - used x 2 in last 7 days     PLAN:  - continue (new) Tylenol 1000  mg TID  - continueTramadol to 25 mg q3 hours and q6 hours PRN.   - Physical Therapy, Occupational Therapy for strengthening, ROM, etc.      Slow transit constipation  PTA Senna 2 tab qHS now changed to Senna 1 tab BID.   Patient reporting constipation but improvement with bisacodyl supp  Discussion about increasing Senna for proactive approach and PRN supp if npo BM in 3 days.     Patient with history of PSP, now with increased Tramadol dosing.    PLAN:  - Increase Senna to 1 tab qAM, 2 tabs qPM, and continue 1 tab BID PRN   - bisacodyl supp 10 mg  PRN if no BM in 3 days     Cognitive complaints  History of progressive supranuclear palsy  No cognitive testing on file  SLUMS 17  Patient's wife declined CPT at this time (pt was tired)      PLAN:  - Occupational Therapy for CPT testing.      Insomnia due to medical condition (PSP)  On PTA nortriptyline 30 mg qHS  Patient's wife noting patient sleeps poorly, even despite nortriptyline.      PLAN:  - continue tramadol for analgesia so he can sleep  - monitor sleep for titration needs.      Urge Incontinence  Patient's wife noting patient has had urge incontinence since his injury  Possible nerve innervation contributory although this more likely with S2-4 injury (not cervical)  Possible BPH as patient noting he does not feel like he completely empties after urination.     Occupational Therapy consult for bladder program. May anticipate start of tamsulosin if ineffective.        Orders:  1. Change Senna to 1 tab po qAM, 2 tab po qPM Dx: constipation  2. Bisacodyl supp 10 mg if no BM in 3 days Dx: constipation      Total time spent with patient visit at the skilled nursing facility was >30 minutes including patient visit and discussion with pt's wife and daughter present, IDT. Greater than 50% of total time spent with counseling and coordinating care due to discussion about possible discharge disposition, pain management, PSP, rehab.     Electronically signed by  Yue GORDILLO  "YULY Castaneda CNP          Face to Face and Medical Necessity Statement for DME Provider visit    Demographic Information on Jimmy Patrick:  Gender: male  : 1950  [address]  [facility] [address]  [phone] (home) None (work)    Medical Record: [id number]  Social Security Number: [id number]  Primary Care Provider: Evelina Morse  Insurance: Payor: [facility] / Plan: [facility] FOR S[employer]ORS / Product Type: HMO /     HPI:   Jimmy Patrick is a 67 year old  (1950), who is being seen today for a face to face provider visit at [facility]; medical necessity statement for DME included. This patient requires the following:  DME Ordered and Medical Necessity Statement   Hospital bed: fully electronic with dual upper side rails  Face-to-Face for DME for Hospital Bed:   The patient does  require positioning of the body in ways not feasible with an ordinary bed due to a medical condition that is expected to last \"99\" (lifetime) due to PSP and minimally displaced bilat C2 laminar fractures .   The patient does  require, for the alleviation of pain, postioning of the body in ways not feasible with an ordinary bed.   The patient does  require the head of bed elevated more than 30* most of the time due to aspiration.  The patient does not require traction that can only be attached to a hospital bed.  The patient does  require a bed height different than a fixed height hospital bed to permit tranfers to wheelchair or standing position.   The patient does require use of fully electric controls as he has reduced ROM and strength 2/2 neck fracture and PSP, and for caregivers who help patient with transfers, bed mobility so as to reduce unnecessary pulling/pushing of patient (which can cause pain).   The patient does  require frequent or immediate changes in body position due to pain from neck fractures and progressive spasticity 2/2 PSP.     Pt needing above DME with expected length of need of " "\"99\" years  due to medical necessity associated with following diagnosis:     Closed displaced fracture of second cervical vertebra with routine healing, unspecified fracture morphology, subsequent encounter  Fall, subsequent encounter  PSP (progressive supranuclear palsy) (H)  Progressive supranuclear ophthalmoplegia (H)  Impaired functional mobility, balance, gait, and endurance  Speech dysfunction  Dysphagia, unspecified type  Essential hypertension  Neuropathy  Migraine without aura and without status migrainosus, not intractable  Displacement of lumbar intervertebral disc without myelopathy  Slow transit constipation  Cognitive complaints  Insomnia due to medical condition  Urge incontinence of urine      PMH   has a past medical history of Blurred vision (8/11/2014); Cancer (H) (1979; 1960's); DISK (aka Displacement of intervertebral disc, site unspecified, without myelopathy) (8/11/2014); Displacement of cervical intervertebral disc without myelopathy (HERNIATED DISK) (8/11/2014); Double vision (8/11/2014); Epistaxis (8/11/2014); Fluttering heart (8/11/2014); Headache (8/11/2014); Leg swelling (8/11/2014); Memory loss (8/11/2014); Migraines (2007); PSP (progressive supranuclear palsy) (H) (8/11/2014); Sinus disorder (8/11/2014); Tinnitus (8/11/2014); Urinary urgency (8/11/2014); and Varicose veins (8/11/2014). He also has no past medical history of Basal cell carcinoma; Malignant melanoma (H); Skin cancer; or Squamous cell carcinoma.  CURRENT MEDICATIONS  Current Outpatient Prescriptions   Medication Sig Dispense Refill     acetaminophen (TYLENOL) 500 MG tablet Take 2 tablets (1,000 mg) by mouth 3 times daily       adapalene (DIFFERIN) 0.1 % gel Apply to Face topically every day and evening shift for Acne prevention 135 g 3     amantadine (SYMMETREL) 100 MG capsule Take 1 capsule (100 mg) by mouth 3 times daily 6am,11am and 4pm 270 capsule 3     amLODIPine (NORVASC) 2.5 MG tablet Take 1 tablet by mouth daily  "      aspirin (BABY ASPIRIN) 81 MG chewable tablet Take 81 mg by mouth daily        cholecalciferol (VITAMIN D-3 SUPER STRENGTH) 2000 UNITS tablet Take 1 tablet by mouth        hydrochlorothiazide 12.5 MG TABS tablet Take 1 tablet (12.5 mg) by mouth daily 90 tablet 3     lidocaine (LIDODERM) 5 % Patch Place 1 patch onto the skin every 24 hours       loperamide (IMODIUM) 2 MG capsule Take 2 mg by mouth every 4 hours as needed for diarrhea       METHOCARBAMOL PO Take 500 mg by mouth 4 times daily       Multiple Vitamins-Minerals (CENTRUM SILVER) per tablet Take 1 tablet by mouth daily 30 tablet      nortriptyline (PAMELOR) 10 MG capsule TAKE 3 CAPSULES AT BEDTIME 270 capsule 3     olmesartan (BENICAR) 20 MG tablet Take 1 tablet (20 mg) by mouth 2 times daily       polyethylene glycol 0.4%- propylene glycol 0.3% (SYSTANE ULTRA) 0.4-0.3 % SOLN ophthalmic solution Place 1 drop into both eyes 3 times daily       rivastigmine (EXELON) 9.5 MG/24HR 24 hr patch Place 1 patch onto the skin daily 30 patch 11     sennosides (SENOKOT) 8.6 MG tablet Take 3 tablets by mouth daily        traMADol (ULTRAM) 50 MG tablet Take 0.5 tablets (25 mg) by mouth every 3 hours And q6 hours PRN 10 tablet 0     White Petrolatum-Mineral Oil (SYSTANE NIGHTTIME) OINT Instill 1 ribbon in both eyes at bedtime for Dry eyes       ZOLMitriptan (ZOMIG) 5 MG tablet Take 1 tablet (5 mg) by mouth at onset of headache for migraine 30 tablet 5     bisacodyl (DULCOLAX) 10 MG Suppository Place 10 mg rectally daily as needed for constipation If no BM in 3 days.       TRAMADOL HCL PO Take 25 mg by mouth once       REVIEW OF SYSTEMS:  10 point ROS of systems including Constitutional, Eyes, Respiratory, Cardiovascular, Gastroenterology, Genitourinary, Integumentary, Muscularskeletal, Psychiatric were all negative except for pertinent positives noted in my HPI.    Physical Exam:  /74  Pulse 88  Temp 97.8  F (36.6  C)  Resp 18  Wt 179 lb (81.2 kg)  SpO2 95%   BMI 24.28 kg/m2  GENERAL APPEARANCE:  Alert, in no distress, deconditioned from acute on chronic illness. Slow in speech, movement from chronic PSP  RESP:  respiratory effort and palpation of chest normal, auscultation of lungs clear, no respiratory distress  CV:  Palpation and auscultation of heart done , rate and rhythm regular, no murmur, no LE peripheral edema  ABDOMEN:  normal bowel sounds, soft, nontender, no hepatosplenomegaly or other masses  M/S:   Gait and station ambulatory with walker if family/therapy is present, otherwise uses W/C for mobility, Digits and nails with arthritic changes and spacticity, reduced muscle mass, pronounced anterior head carriage with nearly 70-80 degrees flexion, hypertonic suboccipitals, posterior cervicals, trapezius, levator scapulae.   SKIN:  Inspection and Palpation of skin and subcutaneous tissue pale, intact  PSYCH:  insight and judgement, memory seemingly with impairment, affect flat and mood per baseline, follows commands readily       ASSESSMENT/PLAN:  1. Closed displaced fracture of second cervical vertebra with routine healing, unspecified fracture morphology, subsequent encounter    2. Fall, subsequent encounter    3. PSP (progressive supranuclear palsy) (H)    4. Progressive supranuclear ophthalmoplegia (H)    5. Impaired functional mobility, balance, gait, and endurance    6. Speech dysfunction    7. Dysphagia, unspecified type    8. Essential hypertension    9. Neuropathy    10. Migraine without aura and without status migrainosus, not intractable    11. Displacement of lumbar intervertebral disc without myelopathy    12. Slow transit constipation    13. Cognitive complaints    14. 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 Urge incontinence of urine        Orders:  1. Facility staff/TC to contact DME company to get their order form for provider to fill out    ELECTRONICALLY SIGNED BY EDWIN CERTIFIED PROVIDER:  YULY Montiel CNP   NPI:  [id number]  [facility]  [address] [place] [address], [address]  [place] [address]            Sincerely,        Yue Castaneda APRN CNP

## 2018-06-13 NOTE — LETTER
6/13/2018        RE: Jimmy Patrick  [address] [address]        [place] [facility]    Chief Complaint   Patient presents with     shelter Acute       [place] Medical Record Number:  [id number]    HPI:    Jimmy Patrick is a 67 year old  (1950), who is being seen today for an episodic care visit at [facility].  HPI information obtained from: facility chart records, facility staff, patient report, [facility] chart review and family/first contact pt's wife report.Today's concern is:     Closed displaced fracture of second cervical vertebra with routine healing, unspecified fracture morphology, subsequent encounter  Fall, subsequent encounter  PSP (progressive supranuclear palsy) (H)  Progressive supranuclear ophthalmoplegia (H)  Impaired functional mobility, balance, gait, and endurance  Speech dysfunction  Dysphagia, unspecified type  Essential hypertension  Neuropathy  Migraine without aura and without status migrainosus, not intractable  Displacement of lumbar intervertebral disc without myelopathy  Slow transit constipation  Cognitive complaints  Insomnia due to medical condition  Urge incontinence of urine     Per Epic note/Hx:  Patient is a 67 year old gentleman with PMH of progressive supranuclear palsy x 4 years with subsequent dysphagia, impaired vision (blurry, double vision), impaired balance, neuropathy in bilat feet, migraines who on 5/28/18 fell backwards from a chair and hit his head.  He declined going to the hospital at that time but later went in as the pain was worsening. He initially presented to [facility] and was transferred to [facility] after CT Angio showed (stable) minimally displaced bilat C2 laminar fractures.  Neurology was consulted and noted no collar ws needed, OK to be up with therapies and no f/u needed. He was transferred to [facility] for rehab, medical management and nursing cares.      Today he and his  wife, daughter are met in his TCU room.  Discussion today regarding POC to be DC home with 24 hour services or possible admission to Acute Care Rehab for neck rehab.  Patient's family is concerned about his prognosis and wants to try to rehab him to baseline, but also is exhausted from living at TCU and feels more care can be given to him at home with 24 hour services. Raymond is reporting pain 3/10 in his neck at rest, minimal LBP at this time, constipation is still problematic.  He did have a BM after the suppository the other day but family is concerned that this is not a sustainable therapy approach.        ALLERGIES: Other [seasonal allergies] and Codeine  Past Medical, Surgical, Family and Social History reviewed and updated in [employer].    Current Outpatient Prescriptions   Medication Sig Dispense Refill     acetaminophen (TYLENOL) 500 MG tablet Take 2 tablets (1,000 mg) by mouth 3 times daily       adapalene (DIFFERIN) 0.1 % gel Apply to Face topically every day and evening shift for Acne prevention 135 g 3     amantadine (SYMMETREL) 100 MG capsule Take 1 capsule (100 mg) by mouth 3 times daily 6am,11am and 4pm 270 capsule 3     amLODIPine (NORVASC) 2.5 MG tablet Take 1 tablet by mouth daily       aspirin (BABY ASPIRIN) 81 MG chewable tablet Take 81 mg by mouth daily        cholecalciferol (VITAMIN D-3 SUPER STRENGTH) 2000 UNITS tablet Take 1 tablet by mouth        hydrochlorothiazide 12.5 MG TABS tablet Take 1 tablet (12.5 mg) by mouth daily 90 tablet 3     lidocaine (LIDODERM) 5 % Patch Place 1 patch onto the skin every 24 hours       loperamide (IMODIUM) 2 MG capsule Take 2 mg by mouth every 4 hours as needed for diarrhea       METHOCARBAMOL PO Take 500 mg by mouth 4 times daily       Multiple Vitamins-Minerals (CENTRUM SILVER) per tablet Take 1 tablet by mouth daily 30 tablet      nortriptyline (PAMELOR) 10 MG capsule TAKE 3 CAPSULES AT BEDTIME 270 capsule 3     olmesartan (BENICAR) 20 MG tablet Take 1 tablet  (20 mg) by mouth 2 times daily       polyethylene glycol 0.4%- propylene glycol 0.3% (SYSTANE ULTRA) 0.4-0.3 % SOLN ophthalmic solution Place 1 drop into both eyes 3 times daily       rivastigmine (EXELON) 9.5 MG/24HR 24 hr patch Place 1 patch onto the skin daily 30 patch 11     sennosides (SENOKOT) 8.6 MG tablet Take 1 tablet by mouth 2 times daily       traMADol (ULTRAM) 50 MG tablet Take 0.5 tablets (25 mg) by mouth every 3 hours And q6 hours PRN 10 tablet 0     White Petrolatum-Mineral Oil (SYSTANE NIGHTTIME) OINT Instill 1 ribbon in both eyes at bedtime for Dry eyes       ZOLMitriptan (ZOMIG) 5 MG tablet Take 1 tablet (5 mg) by mouth at onset of headache for migraine 30 tablet 5     Medications reviewed:  Medications reconciled to facility chart and changes were made to reflect current medications as identified as above med list. Below are the changes that were made:   Medications stopped since last EPIC medication reconciliation:   There are no discontinued medications.    Medications started since last [facility] medication reconciliation:  See previous notes    REVIEW OF SYSTEMS:  10 point ROS of systems including Constitutional, Eyes, Respiratory, Cardiovascular, Gastroenterology, Genitourinary, Integumentary, Muscularskeletal, Psychiatric were all negative except for pertinent positives noted in my HPI.    Physical Exam:  /74  Pulse 88  Temp 97.8  F (36.6  C)  Resp 18  Wt 179 lb (81.2 kg)  SpO2 95%  BMI 24.28 kg/m2  GENERAL APPEARANCE:  Alert, in no distress, deconditioned from acute on chronic illness. Slow in speech, movement from chronic PSP  RESP:  respiratory effort and palpation of chest normal, auscultation of lungs clear, no respiratory distress  CV:  Palpation and auscultation of heart done , rate and rhythm regular, no murmur, no LE peripheral edema  ABDOMEN:  normal bowel sounds, soft, nontender, no hepatosplenomegaly or other masses  M/S:   Gait and station ambulatory with walker if  family/therapy is present, otherwise uses W/C for mobility, Digits and nails with arthritic changes and spacticity, reduced muscle mass, pronounced anterior head carriage with nearly 70-80 degrees flexion, hypertonic suboccipitals, posterior cervicals, trapezius, levator scapulae.   SKIN:  Inspection and Palpation of skin and subcutaneous tissue pale, intact  PSYCH:  insight and judgement, memory seemingly with impairment, affect flat and mood per baseline, follows commands readily         Recent Labs:  CBC (HGB,HCT,WBC,RBC,Platelet) (05/28/2018 8:12 PM)       CBC (HGB,HCT,WBC,RBC,Platelet) (05/28/2018 8:12 PM)   Component Value Ref Range   WBC 11.6 (H) 4.3 - 10.8 K/uL   RBC 4.91 4.60 - 6.20 M/uL   HEMOGLOBIN 14.6 14.0 - 18.0 gm/dL   HEMATOCRIT 42.2 40.0 - 54.0 %   MCV 86 80 - 100 fl   MCH 30 27 - 33 pg   MCHC 35 33 - 36 gm/dL   RDW 12.3 11.5 - 14.5 %   PLATELET COUNT 176 150 - 400 K/uL   MPV 9.9 6.5 - 12.0      Basic Metab Profile (05/28/2018 8:12 PM)       Basic Metab Profile (05/28/2018 8:12 PM)   Component Value Ref Range   SODIUM 138 136 - 145 mmol/L   POTASSIUM 3.8 3.5 - 5.1 mmol/L   CHLORIDE 103 98 - 112 mmol/L   CARBON DIOXIDE 27 21 - 32 mmol/L   BUN (UREA NITRO) 21 7 - 24 mg/dL   CREATININE 1.00 0.70 - 1.30 mg/dL   EST GFR (MDRD) >60 >60 mL/min   EST GFR IF AFRICAN AM >60 >60 mL/min   GLUCOSE 109 (H) 74 - 106 mg/dL   CALCIUM, SERUM 8.6 8.5 - 10.1 mg/dL   ANION GAP 8.0 0.0 - 15.0 mmol/L            Assessment/Plan:  Closed displaced fracture of second cervical vertebra with routine healing, unspecified fracture morphology, subsequent encounter  Fall, subsequent encounter  Patient fell backwards out of chair and hit his head  ST showing minimally displaced bilat C2 laminar fractures, HCT negative  Neurosurgery consulted and noted stable fractures, no collar needed, OK to be up with therapies and no f/u needed.      Analgesia with:  (new) Tylenol 1000 mg TID  (new) lidocaine patch on for 12 hours, off for 12  hours  (new) robaxin 500 mg QID  (new) tramadol 25 mg po q3 hours and q6 hours PRN - used x 2 in last 7 days     Patient reporting pain is slightly better but still continuing to limit therapy.  POC discussion about Acute Rehab in-patient stay vs DC home with 24 hour services.  SW and Therapy will work on both scenarios and present both options to patient and family when known if possible admission to Acute-Care Rehab possible.      PLAN:  - continue (new) Tylenol 1000 mg TID  - continue (new) lidocaine patch (chaneg time), robaxin,   - Continue Tramadol to 25 mg q3 hours and [id number] PRN, and monitor for dosing efficacy   - monitor for pain control and titrate as needed  - Physical Therapy, Occupational Therapy for strengthening and ROM of neck as able, no restrictions per Neurosurgery      PSP (progressive supranuclear palsy) (H)  Progressive supranuclear ophthalmoplegia (H)  Impaired functional mobility, balance, gait, and endurance  Speech dysfunction  Dysphagia, Severe oropharyngeal phase  PSP x 4 years now  Patient's wife noting patient still very active, but PSP definitely progressing.   Deficits include:  Dysarthria (slow, slurred, mumbled speech)  Decreased vision (double, blurred vision)  Sensitivity to lights (wears hat and sunglasses inside)  Impaired coordination/balance  Dysphagia (eats/drinks slowly)  Per Care Everywhere SLP consult:  Per his wife, he has had two previous videofluoroscopic swallowing studies that have demonstrated aspiration with all consistencies, the last one being in February of 2018. Compensatory strategies such as chin tuck were found not to be beneficial, per wife. Apparently, tube feedings were recommended at that time but the patient has chosen to continue po intake despite the known risk of aspiration     SLP consult noted patient takes whole pills in pudding, was coughing with liquids.  Patient/Pt's family have refused pureed foods and thickened liquids and know the risks  involved but prefer patient to have QOL. SLP following for swallowing and speech exercising.      On PTA amantadine 100 mg TID (0600, 1100, 1600), PTA rivastigmine 9.5 mg/24 hour patch.   PTA Vitamin D and MV remain    Therapy update on progress:  Max assist for toileting  Mod assist for dressing  Min assist for feeding  CGA for ambulation, unlimited distances with U-Step walker  SLP working with swallowing, communication  SLUMS 17  LCD estimated as 6/20, possibly sooner with Home PCA services or in-patient acute rehab admission.   Will need hospital bed if discharging home.      PLAN:  - Physical Therapy, Occupational Therapy for balance, strengthening, ROM, gait, etc.   - SLP for swallowing and speech exercising.   - SW for discharge disposition  - Continue PTA Amantadine and rivastigmine patch  - nursing for supportive cares     Essential hypertension  S/p ablation for atrial flutter  On PTA amlodipine 2.5 mg qPM, ASA 81 mg daily, HCTZ 12.5 mg daily, olmesartan 20 mg BID     BPs 120s/70s  HRs 70-90s  Patient denies CP, HA, lightheadedness     PLAN:  - continue PTA amlodipine, olmesartan, ASA, HCTZ  - monitor for titration needs     Neuropathy  bilat feet  History of herniated lumbar discs (per patient)  On no meds at this time.  Will monitor for need.      Migraine without aura and without status migrainosus, not intractable  On PTA PRN Zolmitriptan 5 mg at onset of migraine  No HA nor migraine while in TCU  Will monitor.      Displacement of lumbar intervertebral disc without myelopathy  History of herniated lumbar discs, per patient  History of neuropathy  Patient reporting tolerable LBP today.      Analgesia with:  (new) Tylenol 1000 mg TID  (new) robaxin 500 mg QID  (new) tramadol 25 mg po q3 hours and q6 hours PRN - used x 2 in last 7 days     PLAN:  - continue (new) Tylenol 1000 mg TID  - continueTramadol to 25 mg q3 hours and q6 hours PRN.   - Physical Therapy, Occupational Therapy for strengthening, ROM,  etc.      Slow transit constipation  PTA Senna 2 tab qHS now changed to Senna 1 tab BID.   Patient reporting constipation but improvement with bisacodyl supp  Discussion about increasing Senna for proactive approach and PRN supp if npo BM in 3 days.     Patient with history of PSP, now with increased Tramadol dosing.    PLAN:  - Increase Senna to 1 tab qAM, 2 tabs qPM, and continue 1 tab BID PRN   - bisacodyl supp 10 mg  PRN if no BM in 3 days     Cognitive complaints  History of progressive supranuclear palsy  No cognitive testing on file  SLUMS 17  Patient's wife declined CPT at this time (pt was tired)      PLAN:  - Occupational Therapy for CPT testing.      Insomnia due to medical condition (PSP)  On PTA nortriptyline 30 mg qHS  Patient's wife noting patient sleeps poorly, even despite nortriptyline.      PLAN:  - continue tramadol for analgesia so he can sleep  - monitor sleep for titration needs.      Urge Incontinence  Patient's wife noting patient has had urge incontinence since his injury  Possible nerve innervation contributory although this more likely with S2-4 injury (not cervical)  Possible BPH as patient noting he does not feel like he completely empties after urination.     Occupational Therapy consult for bladder program. May anticipate start of tamsulosin if ineffective.        Orders:  1. Change Senna to 1 tab po qAM, 2 tab po qPM Dx: constipation  2. Bisacodyl supp 10 mg if no BM in 3 days Dx: constipation      Total time spent with patient visit at the skilled nursing facility was >30 minutes including patient visit and discussion with pt's wife and daughter present, IDT. Greater than 50% of total time spent with counseling and coordinating care due to discussion about possible discharge disposition, pain management, PSP, rehab.     Electronically signed by  YULY Montiel CNP                Sincerely,        YULY Montiel CNP

## 2018-06-14 NOTE — PROGRESS NOTES
[place] [facility]    Chief Complaint   Patient presents with     Nursing Home Acute       HPI:    Jimmy Patrick is a 67 year old  (1950), who is being seen today for an episodic care visit at [facility].    HPI information obtained from: facility chart records, facility staff, patient report, [facility] chart review and family/first contact pt's wife who is present report. Today's concern is:     Closed displaced fracture of second cervical vertebra with routine healing, unspecified fracture morphology, subsequent encounter  PSP (progressive supranuclear palsy) (H)     Patient fell backwards out of chair and hit his head  ST showing minimally displaced bilat C2 laminar fractures, HCT negative  Neurosurgery consulted and noted stable fractures, no collar needed, OK to be up with therapies and no f/u needed.     Patient has been working with therapy but pain and PSP has been limiting to progress.   Today patient reports extreme pain, 8-9/10.  Patient's wife has reported that he has never reported pain 8-19/10, even when they were in the ED right after his fall.      Analgesia with:  (new) Tylenol 1000 mg TID  (new) lidocaine patch on for 12 hours, off for 12 hours  (new) robaxin 500 mg QID  (new) tramadol 25 mg po q3 hours and q6 hours PRN    Patient is in bed, crying from pain.  He was given his scheduled Tramadol about an hour ago and a PRN Tramadol about 5 minutes prior to this visit.     REVIEW OF SYSTEMS:  4 point ROS including Respiratory, CV, GI and , other than that noted in the HPI,  is negative    Past medical and surgical history reviewed.     /82  Pulse 86  Temp 97.8  F (36.6  C)  Resp 18  Wt 178 lb 12.8 oz (81.1 kg)  SpO2 95%  BMI 24.25 kg/m2  GENERAL APPEARANCE:  Alert, in moderate to severe distress related to pain. Crying.   RESP:  respiratory effort normal, no respiratory distress  CV:  No LE peripheral edema  ABDOMEN:  nondistended  M/S:   Gait and station  - in bed at time of visit, Digits and nails at baseline, reduced muscle mass, posterior neck muscles, levator scapulae, suboccipital all very hypertonic.  Limited ROM severe  SKIN:  Inspection and Palpation of skin and subcutaneous tissue diaphoretic  PSYCH:  insight and judgement, memory with impairment , affect and mood anxious and in pain, follows commands readily           ASSESSMENT/PLAN:     Closed displaced fracture of second cervical vertebra with routine healing, unspecified fracture morphology, subsequent encounter  PSP (progressive supranuclear palsy) (H)     Visit and exam initially after PRN Tramadol given. Revisit about 30 minutes after PRN Tramadol given and patient reporting pain 9-->5, still higher than he has been rating the pain for days.  Patient reports he was assisted last night and miscommunication between staff and him possibly caused pain; unknown when pain started.  Patient had been working with Occupational Therapy and SLP this AM.     OK to give additional Tramadol 25 mg for analgesia. Will order Xray to r/o further injury.      Orders:  1) Cervical Spine Xray 2 view ASAP. Hx of minimally displaced bila C2 laminar fractures with new onset severe pain. Dx: Pain  2) Tramadol 25 mg po NOW (in addition to scheduled and prn dose). Dx: Pain    lectronically signed by:  YULY Montiel CNP

## 2018-06-14 NOTE — LETTER
6/14/2018        RE: Jimmy Patrick  [address] [address]        [place] [facility]    Chief Complaint   Patient presents with     Nursing Home Acute       HPI:    Jimmy Patrick is a 67 year old  (1950), who is being seen today for an episodic care visit at [facility].    HPI information obtained from: facility chart records, facility staff, patient report, [facility] chart review and family/first contact pt's wife who is present report. Today's concern is:     Closed displaced fracture of second cervical vertebra with routine healing, unspecified fracture morphology, subsequent encounter  PSP (progressive supranuclear palsy) (H)     Patient fell backwards out of chair and hit his head  ST showing minimally displaced bilat C2 laminar fractures, HCT negative  Neurosurgery consulted and noted stable fractures, no collar needed, OK to be up with therapies and no f/u needed.     Patient has been working with therapy but pain and PSP has been limiting to progress.   Today patient reports extreme pain, 8-9/10.  Patient's wife has reported that he has never reported pain 8-19/10, even when they were in the ED right after his fall.      Analgesia with:  (new) Tylenol 1000 mg TID  (new) lidocaine patch on for 12 hours, off for 12 hours  (new) robaxin 500 mg QID  (new) tramadol 25 mg po q3 hours and q6 hours PRN    Patient is in bed, crying from pain.  He was given his scheduled Tramadol about an hour ago and a PRN Tramadol about 5 minutes prior to this visit.     REVIEW OF SYSTEMS:  4 point ROS including Respiratory, CV, GI and , other than that noted in the HPI,  is negative    Past medical and surgical history reviewed.     /82  Pulse 86  Temp 97.8  F (36.6  C)  Resp 18  Wt 178 lb 12.8 oz (81.1 kg)  SpO2 95%  BMI 24.25 kg/m2  GENERAL APPEARANCE:  Alert, in moderate to severe distress related to pain. Crying.   RESP:  respiratory effort normal, no  respiratory distress  CV:  No LE peripheral edema  ABDOMEN:  nondistended  M/S:   Gait and station - in bed at time of visit, Digits and nails at baseline, reduced muscle mass, posterior neck muscles, levator scapulae, suboccipital all very hypertonic.  Limited ROM severe  SKIN:  Inspection and Palpation of skin and subcutaneous tissue diaphoretic  PSYCH:  insight and judgement, memory with impairment , affect and mood anxious and in pain, follows commands readily           ASSESSMENT/PLAN:     Closed displaced fracture of second cervical vertebra with routine healing, unspecified fracture morphology, subsequent encounter  PSP (progressive supranuclear palsy) (H)     Visit and exam initially after PRN Tramadol given. Revisit about 30 minutes after PRN Tramadol given and patient reporting pain 9-->5, still higher than he has been rating the pain for days.  Patient reports he was assisted last night and miscommunication between staff and him possibly caused pain; unknown when pain started.  Patient had been working with Occupational Therapy and SLP this AM.     OK to give additional Tramadol 25 mg for analgesia. Will order Xray to r/o further injury.      Orders:  1) Cervical Spine Xray 2 view ASAP. Hx of minimally displaced bila C2 laminar fractures with new onset severe pain. Dx: Pain  2) Tramadol 25 mg po NOW (in addition to scheduled and prn dose). Dx: Pain    lectronically signed by:  YULY Montiel CNP      Sincerely,        YULY Montiel CNP

## 2018-06-18 NOTE — LETTER
6/18/2018        RE: Jimmy Patrick  [address] [facility] [id number]        [facility]    Chief Complaint   Patient presents with     retirement Acute       [facility] Medical Record Number:  [id number]    HPI:    Jimmy Patrick is a 67 year old  (1950), who is being seen today for an episodic care visit at [facility].  HPI information obtained from: facility chart records, facility staff, patient report, [facility] chart review and family/first contact pt's wife report.Today's concern is:     Closed displaced fracture of second cervical vertebra with routine healing, unspecified fracture morphology, subsequent encounter  PSP (progressive supranuclear palsy) (H)  Fall, subsequent encounter  Progressive supranuclear ophthalmoplegia (H)  Impaired functional mobility, balance, gait, and endurance  Speech dysfunction  Dysphagia, unspecified type  Essential hypertension  Neuropathy  Migraine without aura and without status migrainosus, not intractable  Displacement of lumbar intervertebral disc without myelopathy  Slow transit constipation  Cognitive complaints  Insomnia due to medical condition  Urge incontinence of urine     Per Epic note/Hx:  Patient is a 67 year old gentleman with PMH of progressive supranuclear palsy x 4 years with subsequent dysphagia, impaired vision (blurry, double vision), impaired balance, neuropathy in bilat feet, migraines who on 5/28/18 fell backwards from a chair and hit his head.  He declined going to the hospital at that time but later went in as the pain was worsening. He initially presented to [facility] and was transferred to [facility] after CT Angio showed (stable) minimally displaced bilat C2 laminar fractures.  Neurology was consulted and noted no collar ws needed, OK to be up with therapies and no f/u needed. He was transferred to [facility] for rehab, medical management and nursing cares.      Today he is met in  his TCU room with his wife.  He has since been denied from Acute Care Rehab and family is preparing for discharge home with live-in services.  He reports his pain is better and Xray results were negative for any other acute pathologies. His wife is concerned about nocturia and positioning and is questioning possibility of condom catheter  She is also wondering about an air mattress topper for the ordered hospital bed.   He has a home visit today with therapy.     He denies SOB, CP, HA, lightheadedness, upset stomach, heartburn.  He endorses some persistent constipation.         ALLERGIES: Other [seasonal allergies] and Codeine  Past Medical, Surgical, Family and Social History reviewed and updated in [employer].    Current Outpatient Prescriptions   Medication Sig Dispense Refill     acetaminophen (TYLENOL) 500 MG tablet Take 2 tablets (1,000 mg) by mouth 3 times daily       adapalene (DIFFERIN) 0.1 % gel Apply to Face topically every day and evening shift for Acne prevention 135 g 3     amantadine (SYMMETREL) 100 MG capsule Take 1 capsule (100 mg) by mouth 3 times daily 6am,11am and 4pm 270 capsule 3     amLODIPine (NORVASC) 2.5 MG tablet Take 1 tablet by mouth daily       aspirin (BABY ASPIRIN) 81 MG chewable tablet Take 81 mg by mouth daily        bisacodyl (DULCOLAX) 10 MG Suppository Place 10 mg rectally daily as needed for constipation If no BM in 3 days.       cholecalciferol (VITAMIN D-3 SUPER STRENGTH) 2000 UNITS tablet Take 1 tablet by mouth        hydrochlorothiazide 12.5 MG TABS tablet Take 1 tablet (12.5 mg) by mouth daily 90 tablet 3     lidocaine (LIDODERM) 5 % Patch Place 1 patch onto the skin every 12 hours        loperamide (IMODIUM) 2 MG capsule Take 2 mg by mouth every 4 hours as needed for diarrhea       METHOCARBAMOL PO Take 500 mg by mouth 4 times daily       Multiple Vitamins-Minerals (CENTRUM SILVER) per tablet Take 1 tablet by mouth daily 30 tablet      nortriptyline (PAMELOR) 10 MG capsule TAKE  3 CAPSULES AT BEDTIME 270 capsule 3     olmesartan (BENICAR) 20 MG tablet Take 1 tablet (20 mg) by mouth 2 times daily       polyethylene glycol 0.4%- propylene glycol 0.3% (SYSTANE ULTRA) 0.4-0.3 % SOLN ophthalmic solution Place 1 drop into both eyes 3 times daily       rivastigmine (EXELON) 9.5 MG/24HR 24 hr patch Place 1 patch onto the skin daily 30 patch 11     sennosides (SENOKOT) 8.6 MG tablet Take 2 tablets by mouth 2 times daily        traMADol (ULTRAM) 50 MG tablet Take 0.5 tablets (25 mg) by mouth every 3 hours And q6 hours PRN 10 tablet 0     White Petrolatum-Mineral Oil (SYSTANE NIGHTTIME) OINT Instill 1 ribbon in both eyes at bedtime for Dry eyes       ZOLMitriptan (ZOMIG) 5 MG tablet Take 1 tablet (5 mg) by mouth at onset of headache for migraine 30 tablet 5     Medications reviewed:  Medications reconciled to facility chart and changes were made to reflect current medications as identified as above med list. Below are the changes that were made:   Medications stopped since last EPIC medication reconciliation:   There are no discontinued medications.    Medications started since last [facility] medication reconciliation:  See previous notes    REVIEW OF SYSTEMS:  10 point ROS of systems including Constitutional, Eyes, Respiratory, Cardiovascular, Gastroenterology, Genitourinary, Integumentary, Muscularskeletal, Psychiatric were all negative except for pertinent positives noted in my HPI.    Physical Exam:  /78  Pulse 85  Temp 98.1  F (36.7  C)  Resp 16  Wt 178 lb 12.8 oz (81.1 kg)  SpO2 94%  BMI 24.25 kg/m2  GENERAL APPEARANCE:  Alert, in no distress, deconditioned from acute on chronic illness. Slow in speech, movement from chronic PSP  RESP:  respiratory effort and palpation of chest normal, auscultation of lungs clear, no respiratory distress  CV:  Palpation and auscultation of heart done , rate and rhythm regular, no murmur, no LE peripheral edema  ABDOMEN:  normal bowel sounds, soft,  nontender, no hepatosplenomegaly or other masses  M/S:   Gait and station ambulatory with walker if family/therapy is present, otherwise uses W/C for mobility, Digits and nails with arthritic changes and spacticity, reduced muscle mass, pronounced anterior head carriage/flexion, hypertonic suboccipitals, posterior cervicals, trapezius, levator scapulae.   SKIN:  Inspection and Palpation of skin and subcutaneous tissue pale, intact  PSYCH:  insight and judgement, memory seemingly with impairment, affect flat and mood per baseline, follows commands readily         Recent Labs:  CBC (HGB,HCT,WBC,RBC,Platelet) (05/28/2018 8:12 PM)       CBC (HGB,HCT,WBC,RBC,Platelet) (05/28/2018 8:12 PM)   Component Value Ref Range   WBC 11.6 (H) 4.3 - 10.8 K/uL   RBC 4.91 4.60 - 6.20 M/uL   HEMOGLOBIN 14.6 14.0 - 18.0 gm/dL   HEMATOCRIT 42.2 40.0 - 54.0 %   MCV 86 80 - 100 fl   MCH 30 27 - 33 pg   MCHC 35 33 - 36 gm/dL   RDW 12.3 11.5 - 14.5 %   PLATELET COUNT 176 150 - 400 K/uL   MPV 9.9 6.5 - 12.0      Basic Metab Profile (05/28/2018 8:12 PM)       Basic Metab Profile (05/28/2018 8:12 PM)   Component Value Ref Range   SODIUM 138 136 - 145 mmol/L   POTASSIUM 3.8 3.5 - 5.1 mmol/L   CHLORIDE 103 98 - 112 mmol/L   CARBON DIOXIDE 27 21 - 32 mmol/L   BUN (UREA NITRO) 21 7 - 24 mg/dL   CREATININE 1.00 0.70 - 1.30 mg/dL   EST GFR (MDRD) >60 >60 mL/min   EST GFR IF AFRICAN AM >60 >60 mL/min   GLUCOSE 109 (H) 74 - 106 mg/dL   CALCIUM, SERUM 8.6 8.5 - 10.1 mg/dL   ANION GAP 8.0 0.0 - 15.0 mmol/L            Assessment/Plan:  Closed displaced fracture of second cervical vertebra with routine healing, unspecified fracture morphology, subsequent encounter  Fall, subsequent encounter  Patient fell backwards out of chair and hit his head  ST showing minimally displaced bilat C2 laminar fractures, HCT negative  Neurosurgery consulted and noted stable fractures, no collar needed, OK to be up with therapies and no f/u needed.      Analgesia with:  (new)  Tylenol 1000 mg TID  (new) lidocaine patch on for 12 hours, off for 12 hours  (new) robaxin 500 mg QID  (new) tramadol 25 mg po q3 hours and q6 hours PRN - used x 2 in last 7 days     6/14/18 Patient reported severe pain, Xray done and showed no acute pathology but patient may need CT to assess fully.  Patient improved clinically today, reporting pain 2/10.  Discussion today re: taper of tramadol; patient prefers to wait right now.  Will re-address on Wednesday.     Discharge planning:  Patient to MD home with live-in services.  Denied Acute Care Rehab.   Hospital bed has been ordered.  Discussion today with SW for mattress air topper for pressure-reduction as patient requires others for repositioning.        PLAN:  - continue (new) Tylenol 1000 mg TID  - continue (new) lidocaine patch (chaneg time), robaxin,   - Continue Tramadol to 25 mg q3 hours and q 6hours PRN, and monitor for dosing efficacy   - monitor for pain control and titrate as needed  - Physical Therapy, Occupational Therapy for strengthening and ROM of neck as able, no restrictions per Neurosurgery      PSP (progressive supranuclear palsy) (H)  Progressive supranuclear ophthalmoplegia (H)  Impaired functional mobility, balance, gait, and endurance  Speech dysfunction  Dysphagia, Severe oropharyngeal phase  PSP x 4 years now  Patient's wife noting patient was still very active, but PSP definitely progressing.   Deficits include:  Dysarthria (slow, slurred, mumbled speech)  Decreased vision (double, blurred vision)  Sensitivity to lights (wears hat and sunglasses inside)  Impaired coordination/balance  Dysphagia (eats/drinks slowly)  (new) urinary oncontinence  Per Care Everywhere SLP consult:  Per his wife, he has had two previous videofluoroscopic swallowing studies that have demonstrated aspiration with all consistencies, the last one being in February of 2018. Compensatory strategies such as chin tuck were found not to be beneficial, per wife.  Apparently, tube feedings were recommended at that time but the patient has chosen to continue po intake despite the known risk of aspiration     SLP consult noted patient takes whole pills in pudding, was coughing with liquids.  Patient/Pt's family have refused pureed foods and thickened liquids and know the risks involved but prefer patient to have QOL. SLP following for swallowing and speech exercising.      On PTA amantadine 100 mg TID (0600, 1100, 1600), PTA rivastigmine 9.5 mg/24 hour patch.   PTA Vitamin D and MV remain.      Likely advanced PSP now since neck fracture d/t immobility and deconditioning.  Patient's wife appropriately planning for progression with live-in services, hospital bed, etc. Today asking for condom catheter since patient no longer able to use bathroom independently, especially at night.  This is a good time to trial condom catheters before he discharges home.       Therapy update on progress:  Max assist for toileting  Mod assist for dressing  Min assist for feeding  CGA for ambulation, unlimited distances with U-Step walker  SLP working with swallowing, communication  SLUMS 17  LCD estimated as 6/20, possibly sooner with Home PCA services or in-patient acute rehab admission.   Will need hospital bed if discharging home.      PLAN:  - Physical Therapy, Occupational Therapy for balance, strengthening, ROM, gait, etc.   - SLP for swallowing and speech exercising.   - SW for discharge disposition  - Continue PTA Amantadine and rivastigmine patch  - nursing for supportive cares  - attempt condom catheter while in TCU      Essential hypertension  S/p ablation for atrial flutter  On PTA amlodipine 2.5 mg qPM, ASA 81 mg daily, HCTZ 12.5 mg daily, olmesartan 20 mg BID     BPs 120-130s/80s  HRs 80s  Patient denies CP, HA, lightheadedness     PLAN:  - continue PTA amlodipine, olmesartan, ASA, HCTZ  - monitor for titration needs     Neuropathy  bilat feet  History of herniated lumbar discs (per  patient)  On no meds at this time.  Will monitor for need.      Migraine without aura and without status migrainosus, not intractable  On PTA PRN Zolmitriptan 5 mg at onset of migraine  No HA nor migraine while in TCU  Will monitor.      Displacement of lumbar intervertebral disc without myelopathy  History of herniated lumbar discs, per patient  History of neuropathy  Patient reporting tolerable LBP today, 2/10     Analgesia with:  (new) Tylenol 1000 mg TID  (new) robaxin 500 mg QID  (new) tramadol 25 mg po q3 hours and q6 hours PRN - used x 2 in last 7 days     PLAN:  - continue (new) Tylenol 1000 mg TID  - continueTramadol to 25 mg q3 hours and q6 hours PRN.   - Physical Therapy, Occupational Therapy for strengthening, ROM, etc.      Slow transit constipation  PTA Senna 2 tab qHS now changed to Senna 1 tab qAM, 2 tab qPM.   Patient reporting constipation but improvement with bisacodyl supp  No BM for 3 days  Discussion about increasing Senna for proactive approach; patient reluctantly agreed    Patient with history of PSP, now with increased Tramadol dosing.    PLAN:  - Increase Senna to 2 tab BID and continue 1 tab BID PRN   - bisacodyl supp 10 mg  PRN if no BM in 3 days     Cognitive complaints  History of progressive supranuclear palsy  No cognitive testing on file  SLUMS 17  Patient's wife declined CPT at this time (pt was tired)      PLAN:  - Occupational Therapy for CPT testing.      Insomnia due to medical condition (PSP)  On PTA nortriptyline 30 mg qHS  Patient's wife noting patient sleeps poorly, even despite nortriptyline.      PLAN:  - continue tramadol for analgesia so he can sleep  - monitor sleep for titration needs.      Urge Incontinence  Patient's wife noting patient has had urge incontinence since his injury  Possible nerve innervation contributory although this more likely with S2-4 injury (not cervical)  Possible BPH as patient noting he does not feel like he completely empties after  urination.   Occupational Therapy consult for bladder program. May recommend start of tamsulosin if ineffective.  Discussion today about condom catheter for HS as patient with reduced continence and advancing PSP.        Orders:  1) Please give prn Bisacodyl supp today. Dx: constipation  2) Please give Senna S 1 tab po now. Dx: Constipation  3) increase Senna S to 2 tab po BID. Dx: constipation  4) Condom Catheter with Cloud bag at bedtime. (please perform family teacing. Re: application) Dx: PSP.      Total time spent with patient visit at the skilled nursing facility was >30 minutes including patient visit and discussion with patient and patient's wife present, SW, nursing. Greater than 50% of total time spent with counseling and coordinating care due to discussion about narcotic tapering, discharge disposition and planning, constipation.     Electronically signed by  YULY Montiel CNP                    Sincerely,        YULY Montiel CNP

## 2018-06-18 NOTE — PROGRESS NOTES
[facility]    Chief Complaint   Patient presents with     shelter Acute       [facility] Medical Record Number:  [id number]    HPI:    Jimmy Patrick is a 67 year old  (1950), who is being seen today for an episodic care visit at [facility].  HPI information obtained from: facility chart records, facility staff, patient report, [facility] chart review and family/first contact pt's wife report.Today's concern is:     Closed displaced fracture of second cervical vertebra with routine healing, unspecified fracture morphology, subsequent encounter  PSP (progressive supranuclear palsy) (H)  Fall, subsequent encounter  Progressive supranuclear ophthalmoplegia (H)  Impaired functional mobility, balance, gait, and endurance  Speech dysfunction  Dysphagia, unspecified type  Essential hypertension  Neuropathy  Migraine without aura and without status migrainosus, not intractable  Displacement of lumbar intervertebral disc without myelopathy  Slow transit constipation  Cognitive complaints  Insomnia due to medical condition  Urge incontinence of urine     Per Epic note/Hx:  Patient is a 67 year old gentleman with PMH of progressive supranuclear palsy x 4 years with subsequent dysphagia, impaired vision (blurry, double vision), impaired balance, neuropathy in bilat feet, migraines who on 5/28/18 fell backwards from a chair and hit his head.  He declined going to the hospital at that time but later went in as the pain was worsening. He initially presented to [facility] and was transferred to [facility] after CT Angio showed (stable) minimally displaced bilat C2 laminar fractures.  Neurology was consulted and noted no collar ws needed, OK to be up with therapies and no f/u needed. He was transferred to [facility] for rehab, medical management and nursing cares.      Today he is met in his TCU room with his wife.  He has since been denied from Acute Care Rehab and family is  preparing for discharge home with live-in services.  He reports his pain is better and Xray results were negative for any other acute pathologies. His wife is concerned about nocturia and positioning and is questioning possibility of condom catheter  She is also wondering about an air mattress topper for the ordered hospital bed.   He has a home visit today with therapy.     He denies SOB, CP, HA, lightheadedness, upset stomach, heartburn.  He endorses some persistent constipation.         ALLERGIES: Other [seasonal allergies] and Codeine  Past Medical, Surgical, Family and Social History reviewed and updated in [facility].    Current Outpatient Prescriptions   Medication Sig Dispense Refill     acetaminophen (TYLENOL) 500 MG tablet Take 2 tablets (1,000 mg) by mouth 3 times daily       adapalene (DIFFERIN) 0.1 % gel Apply to Face topically every day and evening shift for Acne prevention 135 g 3     amantadine (SYMMETREL) 100 MG capsule Take 1 capsule (100 mg) by mouth 3 times daily 6am,11am and 4pm 270 capsule 3     amLODIPine (NORVASC) 2.5 MG tablet Take 1 tablet by mouth daily       aspirin (BABY ASPIRIN) 81 MG chewable tablet Take 81 mg by mouth daily        bisacodyl (DULCOLAX) 10 MG Suppository Place 10 mg rectally daily as needed for constipation If no BM in 3 days.       cholecalciferol (VITAMIN D-3 SUPER STRENGTH) 2000 UNITS tablet Take 1 tablet by mouth        hydrochlorothiazide 12.5 MG TABS tablet Take 1 tablet (12.5 mg) by mouth daily 90 tablet 3     lidocaine (LIDODERM) 5 % Patch Place 1 patch onto the skin every 12 hours        loperamide (IMODIUM) 2 MG capsule Take 2 mg by mouth every 4 hours as needed for diarrhea       METHOCARBAMOL PO Take 500 mg by mouth 4 times daily       Multiple Vitamins-Minerals (CENTRUM SILVER) per tablet Take 1 tablet by mouth daily 30 tablet      nortriptyline (PAMELOR) 10 MG capsule TAKE 3 CAPSULES AT BEDTIME 270 capsule 3     olmesartan (BENICAR) 20 MG tablet Take 1 tablet  (20 mg) by mouth 2 times daily       polyethylene glycol 0.4%- propylene glycol 0.3% (SYSTANE ULTRA) 0.4-0.3 % SOLN ophthalmic solution Place 1 drop into both eyes 3 times daily       rivastigmine (EXELON) 9.5 MG/24HR 24 hr patch Place 1 patch onto the skin daily 30 patch 11     sennosides (SENOKOT) 8.6 MG tablet Take 2 tablets by mouth 2 times daily        traMADol (ULTRAM) 50 MG tablet Take 0.5 tablets (25 mg) by mouth every 3 hours And q6 hours PRN 10 tablet 0     White Petrolatum-Mineral Oil (SYSTANE NIGHTTIME) OINT Instill 1 ribbon in both eyes at bedtime for Dry eyes       ZOLMitriptan (ZOMIG) 5 MG tablet Take 1 tablet (5 mg) by mouth at onset of headache for migraine 30 tablet 5     Medications reviewed:  Medications reconciled to facility chart and changes were made to reflect current medications as identified as above med list. Below are the changes that were made:   Medications stopped since last EPIC medication reconciliation:   There are no discontinued medications.    Medications started since last [facility] medication reconciliation:  See previous notes    REVIEW OF SYSTEMS:  10 point ROS of systems including Constitutional, Eyes, Respiratory, Cardiovascular, Gastroenterology, Genitourinary, Integumentary, Muscularskeletal, Psychiatric were all negative except for pertinent positives noted in my HPI.    Physical Exam:  /78  Pulse 85  Temp 98.1  F (36.7  C)  Resp 16  Wt 178 lb 12.8 oz (81.1 kg)  SpO2 94%  BMI 24.25 kg/m2  GENERAL APPEARANCE:  Alert, in no distress, deconditioned from acute on chronic illness. Slow in speech, movement from chronic PSP  RESP:  respiratory effort and palpation of chest normal, auscultation of lungs clear, no respiratory distress  CV:  Palpation and auscultation of heart done , rate and rhythm regular, no murmur, no LE peripheral edema  ABDOMEN:  normal bowel sounds, soft, nontender, no hepatosplenomegaly or other masses  M/S:   Gait and station ambulatory with  walker if family/therapy is present, otherwise uses W/C for mobility, Digits and nails with arthritic changes and spacticity, reduced muscle mass, pronounced anterior head carriage/flexion, hypertonic suboccipitals, posterior cervicals, trapezius, levator scapulae.   SKIN:  Inspection and Palpation of skin and subcutaneous tissue pale, intact  PSYCH:  insight and judgement, memory seemingly with impairment, affect flat and mood per baseline, follows commands readily         Recent Labs:  CBC (HGB,HCT,WBC,RBC,Platelet) (05/28/2018 8:12 PM)       CBC (HGB,HCT,WBC,RBC,Platelet) (05/28/2018 8:12 PM)   Component Value Ref Range   WBC 11.6 (H) 4.3 - 10.8 K/uL   RBC 4.91 4.60 - 6.20 M/uL   HEMOGLOBIN 14.6 14.0 - 18.0 gm/dL   HEMATOCRIT 42.2 40.0 - 54.0 %   MCV 86 80 - 100 fl   MCH 30 27 - 33 pg   MCHC 35 33 - 36 gm/dL   RDW 12.3 11.5 - 14.5 %   PLATELET COUNT 176 150 - 400 K/uL   MPV 9.9 6.5 - 12.0      Basic Metab Profile (05/28/2018 8:12 PM)       Basic Metab Profile (05/28/2018 8:12 PM)   Component Value Ref Range   SODIUM 138 136 - 145 mmol/L   POTASSIUM 3.8 3.5 - 5.1 mmol/L   CHLORIDE 103 98 - 112 mmol/L   CARBON DIOXIDE 27 21 - 32 mmol/L   BUN (UREA NITRO) 21 7 - 24 mg/dL   CREATININE 1.00 0.70 - 1.30 mg/dL   EST GFR (MDRD) >60 >60 mL/min   EST GFR IF AFRICAN AM >60 >60 mL/min   GLUCOSE 109 (H) 74 - 106 mg/dL   CALCIUM, SERUM 8.6 8.5 - 10.1 mg/dL   ANION GAP 8.0 0.0 - 15.0 mmol/L            Assessment/Plan:  Closed displaced fracture of second cervical vertebra with routine healing, unspecified fracture morphology, subsequent encounter  Fall, subsequent encounter  Patient fell backwards out of chair and hit his head  ST showing minimally displaced bilat C2 laminar fractures, HCT negative  Neurosurgery consulted and noted stable fractures, no collar needed, OK to be up with therapies and no f/u needed.      Analgesia with:  (new) Tylenol 1000 mg TID  (new) lidocaine patch on for 12 hours, off for 12 hours  (new)  robaxin 500 mg QID  (new) tramadol 25 mg po q3 hours and q6 hours PRN - used x 2 in last 7 days     6/14/18 Patient reported severe pain, Xray done and showed no acute pathology but patient may need CT to assess fully.  Patient improved clinically today, reporting pain 2/10.  Discussion today re: taper of tramadol; patient prefers to wait right now.  Will re-address on Wednesday.     Discharge planning:  Patient to [place] home with live-in services.  Denied Acute Care Rehab.   Hospital bed has been ordered.  Discussion today with SW for mattress air topper for pressure-reduction as patient requires others for repositioning.        PLAN:  - continue (new) Tylenol 1000 mg TID  - continue (new) lidocaine patch (chaneg time), robaxin,   - Continue Tramadol to 25 mg q3 hours and q 6hours PRN, and monitor for dosing efficacy   - monitor for pain control and titrate as needed  - Physical Therapy, Occupational Therapy for strengthening and ROM of neck as able, no restrictions per Neurosurgery      PSP (progressive supranuclear palsy) (H)  Progressive supranuclear ophthalmoplegia (H)  Impaired functional mobility, balance, gait, and endurance  Speech dysfunction  Dysphagia, Severe oropharyngeal phase  PSP x 4 years now  Patient's wife noting patient was still very active, but PSP definitely progressing.   Deficits include:  Dysarthria (slow, slurred, mumbled speech)  Decreased vision (double, blurred vision)  Sensitivity to lights (wears hat and sunglasses inside)  Impaired coordination/balance  Dysphagia (eats/drinks slowly)  (new) urinary oncontinence  Per Care Everywhere SLP consult:  Per his wife, he has had two previous videofluoroscopic swallowing studies that have demonstrated aspiration with all consistencies, the last one being in February of 2018. Compensatory strategies such as chin tuck were found not to be beneficial, per wife. Apparently, tube feedings were recommended at that time but the patient has chosen to  continue po intake despite the known risk of aspiration     SLP consult noted patient takes whole pills in pudding, was coughing with liquids.  Patient/Pt's family have refused pureed foods and thickened liquids and know the risks involved but prefer patient to have QOL. SLP following for swallowing and speech exercising.      On PTA amantadine 100 mg TID (0600, 1100, 1600), PTA rivastigmine 9.5 mg/24 hour patch.   PTA Vitamin D and MV remain.      Likely advanced PSP now since neck fracture d/t immobility and deconditioning.  Patient's wife appropriately planning for progression with live-in services, hospital bed, etc. Today asking for condom catheter since patient no longer able to use bathroom independently, especially at night.  This is a good time to trial condom catheters before he discharges home.       Therapy update on progress:  Max assist for toileting  Mod assist for dressing  Min assist for feeding  CGA for ambulation, unlimited distances with U-Step walker  SLP working with swallowing, communication  SLUMS 17  LCD estimated as 6/20, possibly sooner with Home PCA services or in-patient acute rehab admission.   Will need hospital bed if discharging home.      PLAN:  - Physical Therapy, Occupational Therapy for balance, strengthening, ROM, gait, etc.   - SLP for swallowing and speech exercising.   - SW for discharge disposition  - Continue PTA Amantadine and rivastigmine patch  - nursing for supportive cares  - attempt condom catheter while in TCU      Essential hypertension  S/p ablation for atrial flutter  On PTA amlodipine 2.5 mg qPM, ASA 81 mg daily, HCTZ 12.5 mg daily, olmesartan 20 mg BID     BPs 120-130s/80s  HRs 80s  Patient denies CP, HA, lightheadedness     PLAN:  - continue PTA amlodipine, olmesartan, ASA, HCTZ  - monitor for titration needs     Neuropathy  bilat feet  History of herniated lumbar discs (per patient)  On no meds at this time.  Will monitor for need.      Migraine without aura  and without status migrainosus, not intractable  On PTA PRN Zolmitriptan 5 mg at onset of migraine  No HA nor migraine while in TCU  Will monitor.      Displacement of lumbar intervertebral disc without myelopathy  History of herniated lumbar discs, per patient  History of neuropathy  Patient reporting tolerable LBP today, 2/10     Analgesia with:  (new) Tylenol 1000 mg TID  (new) robaxin 500 mg QID  (new) tramadol 25 mg po q3 hours and q6 hours PRN - used x 2 in last 7 days     PLAN:  - continue (new) Tylenol 1000 mg TID  - continueTramadol to 25 mg q3 hours and q6 hours PRN.   - Physical Therapy, Occupational Therapy for strengthening, ROM, etc.      Slow transit constipation  PTA Senna 2 tab qHS now changed to Senna 1 tab qAM, 2 tab qPM.   Patient reporting constipation but improvement with bisacodyl supp  No BM for 3 days  Discussion about increasing Senna for proactive approach; patient reluctantly agreed    Patient with history of PSP, now with increased Tramadol dosing.    PLAN:  - Increase Senna to 2 tab BID and continue 1 tab BID PRN   - bisacodyl supp 10 mg  PRN if no BM in 3 days     Cognitive complaints  History of progressive supranuclear palsy  No cognitive testing on file  SLUMS 17  Patient's wife declined CPT at this time (pt was tired)      PLAN:  - Occupational Therapy for CPT testing.      Insomnia due to medical condition (PSP)  On PTA nortriptyline 30 mg qHS  Patient's wife noting patient sleeps poorly, even despite nortriptyline.      PLAN:  - continue tramadol for analgesia so he can sleep  - monitor sleep for titration needs.      Urge Incontinence  Patient's wife noting patient has had urge incontinence since his injury  Possible nerve innervation contributory although this more likely with S2-4 injury (not cervical)  Possible BPH as patient noting he does not feel like he completely empties after urination.   Occupational Therapy consult for bladder program. May recommend start of  tamsulosin if ineffective.  Discussion today about condom catheter for HS as patient with reduced continence and advancing PSP.        Orders:  1) Please give prn Bisacodyl supp today. Dx: constipation  2) Please give Senna S 1 tab po now. Dx: Constipation  3) increase Senna S to 2 tab po BID. Dx: constipation  4) Condom Catheter with Cloud bag at bedtime. (please perform family teacing. Re: application) Dx: PSP.      Total time spent with patient visit at the skilled nursing facility was >30 minutes including patient visit and discussion with patient and patient's wife present, SW, nursing. Greater than 50% of total time spent with counseling and coordinating care due to discussion about narcotic tapering, discharge disposition and planning, constipation.     Electronically signed by  YULY Montiel CNP

## 2018-06-19 NOTE — TELEPHONE ENCOUNTER
Called patient, spoke to patient's wife.  The [facility] where patient was residing, is not able to care for this patient so he will be going home and needs homecare.      Needs a visit after discharge on 6/21/18 due to the homecare is scheduled to go out to help this patient that afternoon.      Scheduled with Angelina Quinones PA-C and let Kriss know will look into if this is an appropriate provider.    Huddle with Bernadette Mohamud NP, she does not think Angelina is able to sign orders.    Victorina Olivares RN, [facility]    
[facility] Call Center    Phone Message    May a detailed message be left on voicemail: yes    Reason for Call: Patient needs to be seen earlier per . Patients wife requesting Thursday 6/21/18 around 11 am-patient being discharged that morning at 10 AM. They declined Friday Morning and Thursday afternoon appointments. Please call as soon as possible to let them know if patient can be seen. Thank you    Action Taken: Message routed to:  Primary Care p [phone]  
RNCC called Kriss and notified her of the message below. Appointment was rescheduled with Dr. Burch at [facility]. Advised Kriss to bring all discharge paperwork along with medication list to this appointment. Address and phone number given. Encouraged Kriss to call with any questions or concerns shall they arise before hand.     Nothing further needed at this time.     Bessy Schofield RN  RN Care Coordinator  [facility]  [phone]    
There are no appointment times open at this specific time of day on Thursday 6/21/18.  Would they be able to see another provider at a different clinic?    Victorina Olivares RN, [facility]    
26-Sep-2017 19:25

## 2018-06-20 NOTE — TELEPHONE ENCOUNTER
Shavonne called and is looking for verbal that Dr. Morse will follow care via phone and fax.  Best contact: [phone]- secure line ok to leave a message.  Thank you.

## 2018-06-20 NOTE — PROGRESS NOTES
[facility] DISCHARGE SUMMARY    PATIENT'S NAME: Jimmy Patrick  YOB: 1950  MEDICAL RECORD NUMBER:  [id number]    PRIMARY CARE PROVIDER AND CLINIC RESPONSIBLE AFTER TRANSFER: Evelina Morse [id number] / [place] [address]     CODE STATUS/ADVANCE DIRECTIVES DISCUSSION:   CPR/Full code        Allergies   Allergen Reactions     Other [Seasonal Allergies]      environmental     Codeine Other (See Comments) and Rash     GI upset  GI upset       TRANSFERRING PROVIDERS: YULY Montiel CNP, Dr. Katerin MD  DATE OF SNF ADMISSION:  May / 31 / 2018  DATE OF SNF (anticipated) DISCHARGE: June / 21 / 2018  DISCHARGE DISPOSITION: G Provider   Nursing Facility: [facility] stay 5/28/18 to 5/31/18.     Condition on Discharge:  Stable.  Function:  Max assist for toileting, mod assist for dressing, min assist for feeding, CGA for ambulation, using U-step walker   Cognitive Scores: SLUMS 17    Equipment: walker and wheelchair    DISCHARGE DIAGNOSIS:   1. Closed displaced fracture of second cervical vertebra with routine healing, unspecified fracture morphology, subsequent encounter    2. Fall, subsequent encounter    3. PSP (progressive supranuclear palsy) (H)    4. Progressive supranuclear ophthalmoplegia (H)    5. Impaired functional mobility, balance, gait, and endurance    6. Speech dysfunction    7. Dysphagia, unspecified type    8. Essential hypertension    9. Neuropathy    10. Migraine without aura and without status migrainosus, not intractable    11. Displacement of lumbar intervertebral disc without myelopathy    12. Slow transit constipation    13. Cognitive complaints    14. 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 Urge incontinence of urine       Per Epic note/Hx:  Patient is a 67 year old gentleman with PMH of progressive supranuclear palsy x 4 years with subsequent dysphagia, impaired vision (blurry, double  vision), impaired balance, neuropathy in bilat feet, migraines who on 5/28/18 fell backwards from a chair and hit his head.  He declined going to the hospital at that time but later went in as the pain was worsening. He initially presented to [facility] and was transferred to [facility] after CT Angio showed (stable) minimally displaced bilat C2 laminar fractures.  Neurology was consulted and noted no collar ws needed, OK to be up with therapies and no f/u needed. He was transferred to [facility] for rehab, medical management and nursing cares.        HPI Nursing Facility Course:  HPI information obtained from: facility chart records, facility staff, patient report, [facility] chart review and family/first contact PT's wife and daughter report.    TCU stay complicated by pain - treated with increased Tramadol dosing.  He also has urinary incontinence since his accident, is using condom catheters for HS.  Constipation also was problematic - bowel meds increased.       Closed displaced fracture of second cervical vertebra with routine healing, unspecified fracture morphology, subsequent encounter  Fall, subsequent encounter  Patient fell backwards out of chair and hit his head   showing minimally displaced bilat C2 laminar fractures, HCT negative  Neurosurgery consulted and noted stable fractures, no collar needed, OK to be up with therapies and no f/u needed.       Analgesia with:  (new) Tylenol 1000 mg TID  (new) lidocaine patch on for 12 hours, off for 12 hours  (new) robaxin 500 mg QID  (new) tramadol 25 mg po q3 hours and q6 hours PRN - used x 3 in last 7 days      6/14/18 Patient reported severe pain, Xray done and showed no acute pathology but patient may need CT to assess fully.  Patient improved clinically today, reporting pain 3/10.  Discussion re: taper of tramadol; patient prefers to wait right now.  PCP to address.       Discharge planning:  Patient to OR home with live-in services.  Denied  Acute Care Rehab.   Hospital bed being delivered day of DC.         PSP (progressive supranuclear palsy) (H)  Progressive supranuclear ophthalmoplegia (H)  Impaired functional mobility, balance, gait, and endurance  Speech dysfunction  Dysphagia, Severe oropharyngeal phase  PSP x 4 years now  Patient's wife noting patient was still very active, but PSP definitely progressing.   Deficits include:  Dysarthria (slow, slurred, mumbled speech)  Decreased vision (double, blurred vision)  Sensitivity to lights (wears hat and sunglasses inside)  Impaired coordination/balance  Dysphagia (eats/drinks slowly)  (new) urinary oncontinence  Per Care Everywhere SLP consult:  Per his wife, he has had two previous videofluoroscopic swallowing studies that have demonstrated aspiration with all consistencies, the last one being in February of 2018. Compensatory strategies such as chin tuck were found not to be beneficial, per wife. Apparently, tube feedings were recommended at that time but the patient has chosen to continue po intake despite the known risk of aspiration      SLP consult noted patient takes whole pills in pudding, was coughing with liquids.  Patient/Pt's family have refused pureed foods and thickened liquids and know the risks involved but prefer patient to have QOL. SLP followed for swallowing and speech exercising.       On PTA amantadine 100 mg TID (0600, 1100, 1600), PTA rivastigmine 9.5 mg/24 hour patch.   PTA Vitamin D and MV remain.     Likely advanced PSP now since neck fracture d/t immobility and deconditioning.  Patient's wife appropriately planning for progression with live-in services, hospital bed, etc. Patient no longer able to use bathroom independently, especially at night, condom catheters ordered.       Therapy update on progress:  Max assist for toileting  Mod assist for dressing  Min assist for feeding  CGA for ambulation, unlimited distances with U-Step walker  Carmita 12  SLP working with  swallowing, communication  SLUMS 17      Essential hypertension  S/p ablation for atrial flutter  On PTA amlodipine 2.5 mg qPM, ASA 81 mg daily, HCTZ 12.5 mg daily, olmesartan 20 mg BID      BPs 120-130s/80s  HRs 80-90s  Patient denies CP, HA, lightheadedness    stable      Neuropathy  bilat feet  History of herniated lumbar discs (per patient)  On no meds at this time.    Migraine without aura and without status migrainosus, not intractable  On PTA PRN Zolmitriptan 5 mg at onset of migraine  No HA nor migraine while in TCU     Displacement of lumbar intervertebral disc without myelopathy  History of herniated lumbar discs, per patient  History of neuropathy  Patient reporting tolerable LBP today, 2/10      Analgesia with:  (new) Tylenol 1000 mg TID  (new) robaxin 500 mg QID  (new) tramadol 25 mg po q3 hours and q6 hours PRN - used x 3 in last 7 days      Slow transit constipation  Senna changed to 2 tab BID   Patient reporting constipation but improvement with bisacodyl supp  Added bisacodyl supp 10mg QOD, and daily PRN for no BM in 3 days.      Patient with history of PSP, now with increased Tramadol dosing.        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         Urge Incontinence  Patient's wife noting patient has had urge incontinence since his injury  Possible nerve innervation contributory although this more likely with S2-4 injury (not cervical)  Possible BPH as patient noting he does not feel like he completely empties after urination.   Occupational Therapy consult for bladder program. Could recommend start of tamsulosin if ineffective.  Condom catheters for HS       PAST MEDICAL HISTORY:  has a past medical history of Blurred vision  (8/11/2014); Cancer (H) (1979; 1960's); DISK (aka Displacement of intervertebral disc, site unspecified, without myelopathy) (8/11/2014); Displacement of cervical intervertebral disc without myelopathy (HERNIATED DISK) (8/11/2014); Double vision (8/11/2014); Epistaxis (8/11/2014); Fluttering heart (8/11/2014); Headache (8/11/2014); Leg swelling (8/11/2014); Memory loss (8/11/2014); Migraines (2007); PSP (progressive supranuclear palsy) (H) (8/11/2014); Sinus disorder (8/11/2014); Tinnitus (8/11/2014); Urinary urgency (8/11/2014); and Varicose veins (8/11/2014). He also has no past medical history of Basal cell carcinoma; Malignant melanoma (H); Skin cancer; or Squamous cell carcinoma.    DISCHARGE MEDICATIONS:  Current Outpatient Prescriptions   Medication Sig Dispense Refill     acetaminophen (TYLENOL) 500 MG tablet Take 2 tablets (1,000 mg) by mouth 3 times daily       adapalene (DIFFERIN) 0.1 % gel Apply to Face topically every day and evening shift for Acne prevention 135 g 3     amantadine (SYMMETREL) 100 MG capsule Take 1 capsule (100 mg) by mouth 3 times daily 6am,11am and 4pm 270 capsule 3     amLODIPine (NORVASC) 2.5 MG tablet Take 1 tablet by mouth daily       aspirin (BABY ASPIRIN) 81 MG chewable tablet Take 81 mg by mouth daily        bisacodyl (DULCOLAX) 10 MG Suppository Place 10 mg rectally every other day If no BM in 3 days.        cholecalciferol (VITAMIN D-3 SUPER STRENGTH) 2000 UNITS tablet Take 1 tablet by mouth        hydrochlorothiazide 12.5 MG TABS tablet Take 1 tablet (12.5 mg) by mouth daily 90 tablet 3     lidocaine (LIDODERM) 5 % Patch Place 1 patch onto the skin every 12 hours        loperamide (IMODIUM) 2 MG capsule Take 2 mg by mouth every 4 hours as needed for diarrhea       METHOCARBAMOL PO Take 500 mg by mouth 4 times daily       Multiple Vitamins-Minerals (CENTRUM SILVER) per tablet Take 1 tablet by mouth daily 30 tablet      nortriptyline (PAMELOR) 10 MG capsule TAKE 3 CAPSULES AT  BEDTIME 270 capsule 3     olmesartan (BENICAR) 20 MG tablet Take 1 tablet (20 mg) by mouth 2 times daily       polyethylene glycol 0.4%- propylene glycol 0.3% (SYSTANE ULTRA) 0.4-0.3 % SOLN ophthalmic solution Place 1 drop into both eyes 3 times daily       rivastigmine (EXELON) 9.5 MG/24HR 24 hr patch Place 1 patch onto the skin daily 30 patch 11     sennosides (SENOKOT) 8.6 MG tablet Take 1 tablet by mouth 2 times daily        traMADol (ULTRAM) 50 MG tablet Take 0.5 tablets (25 mg) by mouth every 3 hours And q6 hours PRN 10 tablet 0     White Petrolatum-Mineral Oil (SYSTANE NIGHTTIME) OINT Instill 1 ribbon in both eyes at bedtime for Dry eyes       ZOLMitriptan (ZOMIG) 5 MG tablet Take 1 tablet (5 mg) by mouth at onset of headache for migraine 30 tablet 5       MEDICATION CHANGES/RATIONALE:   Senna increased  Bisacodyl supp scheduled and PRN added  Tramadol increased - Primary Care Provider to taper    Hosp:  - new Tylenol 1000 mg TID  - new lidocaine patch  - new robaxin QID  - new tramadol (now increased in TCU)    Controlled medications sent with patient:   Medication: Tramadol 25 mg (1/2 tab) , 156 tabs given to patient at the time of discharge to take home  Medication: Robaxin 500 mg , 34 tabs given to patient at the time of discharge to take home  Medication: Lidocaine patch , 6 patches tabs given to patient at the time of discharge to take home     ROS:    10 point ROS of systems including Constitutional, Eyes, Respiratory, Cardiovascular, Gastroenterology, Genitourinary, Integumentary, Muscularskeletal, Psychiatric were all negative except for pertinent positives noted in my HPI.    Physical Exam:   Vitals: /84  Pulse 86  Temp 98.2  F (36.8  C)  Resp 18  Wt 178 lb 12.8 oz (81.1 kg)  SpO2 96%  BMI 24.25 kg/m2  BMI= Body mass index is 24.25 kg/(m^2).    GENERAL APPEARANCE:  Alert, in no distress, deconditioned from acute on chronic illness. Slow in speech, movement from chronic PSP  RESP:   respiratory effort and palpation of chest normal, auscultation of lungs clear, no respiratory distress, on RA  CV:  Palpation and auscultation of heart done , rate and rhythm regular, no murmur, no LE peripheral edema  ABDOMEN:  normal bowel sounds, soft, nontender, no hepatosplenomegaly or other masses  M/S:   Gait and station ambulatory with walker if family/therapy is present, otherwise uses W/C for mobility, Digits and nails with arthritic changes and spacticity, reduced muscle mass, pronounced anterior head carriage/flexion, hypertonic suboccipitals, posterior cervicals, trapezius, levator scapulae.   SKIN:  Inspection and Palpation of skin and subcutaneous tissue pale, intact  PSYCH:  insight and judgement, memory seemingly with mild impairment, affect flat and mood per baseline, follows commands readily       DISCHARGE PLAN:  Occupational Therapy, Physical Therapy, Speech Therapy , Registered Nurse, Home Health Aide,  and From:  [facility], Live-in Care  Patient instructed to follow-up with:  Has appt with Primary Care Provider on day of discharge at 11:00.       Current [facility] scheduled appointments:  Future Appointments  Date Time Provider Department Center   6/21/2018 11:00 AM Issa Burch MD [facility]   6/28/2018 1:15 PM Nishi Oro MD [facility]   6/28/2018 2:30 PM Sheeba Roy [facility]   7/11/2018 11:00 AM Clint Gutiérrez MD [facility]   7/12/2018 1:00 PM UC SPEC INFUSION [facility]   7/24/2018 7:30 AM LAB FIRST FLOOR [facility]   7/24/2018 2:30 PM Evelina Morse MD [facility]   8/9/2018 10:30 AM Paulo Saravia MD [facility]   8/9/2018 1:00 PM UC SPEC INFUSION [facility]   9/6/2018 9:30 AM Paulo Saravia MD [facility]   9/6/2018 12:00 PM UC SPEC INFUSION [facility]   10/4/2018 10:30 AM Paulo Saravia MD [facility]   10/4/2018 12:00 PM UC SPEC INFUSION [facility]   11/1/2018 10:30 AM Paulo Saravia MD [facility]   11/1/2018 12:00 PM UC SPEC INFUSION [facility]   11/29/2018 9:30 AM Paulo Saravia MD [facility]   11/29/2018 1:00 PM UC SPEC INFUSION [facility]   12/27/2018 12:00 PM UC SPEC INFUSION [facility]   4/16/2019 10:30 AM [facility] referral needed and placed by this provider: No    Pending labs: none  SNF labs   CBC (HGB,HCT,WBC,RBC,Platelet) (05/28/2018 8:12 PM)          CBC (HGB,HCT,WBC,RBC,Platelet) (05/28/2018 8:12 PM)   Component Value Ref Range   WBC 11.6 (H) 4.3 - 10.8 K/uL   RBC 4.91 4.60 - 6.20 M/uL   HEMOGLOBIN 14.6 14.0 - 18.0 gm/dL   HEMATOCRIT 42.2 40.0 - 54.0 %   MCV 86 80 - 100 fl   MCH 30 27 - 33 pg   MCHC 35 33 - 36 gm/dL   RDW 12.3 11.5 - 14.5 %   PLATELET COUNT 176 150 - 400 K/uL   MPV 9.9 6.5 - 12.0       Basic Metab Profile (05/28/2018 8:12 PM)          Basic Metab Profile (05/28/2018 8:12 PM)   Component Value Ref Range   SODIUM 138 136 - 145 mmol/L   POTASSIUM 3.8 3.5 - 5.1 mmol/L   CHLORIDE 103 98 - 112 mmol/L   CARBON DIOXIDE 27 21 - 32 mmol/L   BUN (UREA NITRO) 21 7 - 24 mg/dL   CREATININE 1.00 0.70 - 1.30 mg/dL   EST GFR (MDRD) >60 >60 mL/min   EST GFR IF AFRICAN AM >60 >60 mL/min   GLUCOSE 109 (H) 74 - 106 mg/dL   CALCIUM, SERUM 8.6 8.5 - 10.1 mg/dL   ANION GAP 8.0 0.0 - 15.0 mmol/L            Discharge Treatments:  1) OK to D/C home with all medications + narcotics.  2) Home Care PT/OT/SLP/RN/SW with [facility] to eval + treat.  3) [facility] for live-in care  4) Bisacodyl supp 10 mg po every other day. Dx: Constipation      TOTAL DISCHARGE TIME:   Greater than 30 minutes  Electronically signed by:  YULY Montiel CNP         Documentation of Face-to-Face and Certification for Home Health Services     Patient: Jimmy Patrick   YOB: 1950  MR Number: [id number]  Today's Date: 6/20/2018    I certify that patient: Jimmy Patrick is  under my care and that I, or a nurse practitioner or physician's assistant working with me, had a face-to-face encounter that meets the physician face-to-face encounter requirements with this patient on: 6/20/2018.    This encounter with the patient was in whole, or in part, for the following medical condition, which is the primary reason for home health care: closed displaced fracture of second vertebrae, PSP.    I certify that, based on my findings, the following services are medically necessary home health services: Nursing, Occupational Therapy, Physical Therapy and Speech Language Therapy.    My clinical findings support the need for the above services because: Nurse is needed: To assess VS and medication management after changes in medications or other medical regimen., To provide assessment and oversight required in the home to assure adherence to the medical plan due to: PSP progression, neck fracture. and To provide caregiver training to assist with: condom catheter application.., Occupational Therapy Services are needed to assess and treat cognitive ability and address ADL safety due to impairment in weakness, limited mobility and ADLs., Physical Therapy Services are needed to assess and treat the following functional impairments: limited mobility, weakness. and Speech Therapy Services are needed to assess and treat impairments in language and/or swallow functions due to swallowing and communication limitations.    Further, I certify that my clinical findings support that this patient is homebound (i.e. absences from home require considerable and taxing effort and are for medical reasons or [employer] services or infrequently or of short duration when for other reasons) because: Leaving home is medically contraindicated for the following reason(s): PSP. and Requires assistance of another person or specialized equipment to access medical services because patient: Is unable to operate assistive equipment on  their own., Range of motion limitations prevents ability to exit home safely. and Requires supervision of another for safe transfer...    Based on the above findings. I certify that this patient is confined to the home and needs intermittent skilled nursing care, physical therapy and/or speech therapy.  The patient is under my care, and I have initiated the establishment of the plan of care.  This patient will be followed by a physician who will periodically review the plan of care.  Physician/Provider to provide follow up care: Evelina Morse    Responsible Medicare certified PECOS Physician: Dr Silvina Conklin   Physician Signature: See electronic signature associated with these discharge orders.  Date: 6/20/2018      Electronically signed by Silvina Conklin MD on 06/20/18

## 2018-06-20 NOTE — TELEPHONE ENCOUNTER
Spoke with Shavonne from home health care, she reports that the patient is being discharged in a couple days from [facility] from suffering a fall.     ED intake note 5/28/18  Jimmy Patrick is a 67 y.o. male with a history of progressive supranuclear palsy who presents to the emergency department with his wife for evaluation of a fall and neck pain. The patient reportedly had an unwitnessed fall this morning around 7:00am in which he fell backwards out of a chair and hit his head. He did not have loss of consciousness and denies headache at this time. Since then, he has had persistent posterior neck pain so they decided to bring him in for further evaluation. No further concerns or complaints are voiced.      The patient is being discharged with orders to have PT and OT visits in the home. [facility] is calling to see under which provider should these orders be under.     Noted that Dr. Saravia in Neurology placed two referrals on 4/19/18 for the patient to have OT and PT. Since Dr. Saravia placed these orders is he willing to verbalize orders for PT and OT in the home?    Also, Dr. Morse is out of clinic until 7/23/18 and would not be available to approve these orders.     Please call [facility] at [phone] to inform her of Dr. Herrera response.     Thank you,    Kylie Greco RN

## 2018-06-20 NOTE — LETTER
6/20/2018        RE: Jimmy Patrick  [address]  [place] [address]          [facility] DISCHARGE SUMMARY    PATIENT'S NAME: Jimmy Patrick  YOB: 1950  MEDICAL RECORD NUMBER:  [id number]    PRIMARY CARE PROVIDER AND CLINIC RESPONSIBLE AFTER TRANSFER: Evelina Morse [address] / [place] [address]     CODE STATUS/ADVANCE DIRECTIVES DISCUSSION:   CPR/Full code        Allergies   Allergen Reactions     Other [Seasonal Allergies]      environmental     Codeine Other (See Comments) and Rash     GI upset  GI upset       TRANSFERRING PROVIDERS: YULY Montiel CNP, Dr. Katerin MD  DATE OF SNF ADMISSION:  May / 31 / 2018  DATE OF SNF (anticipated) DISCHARGE: June / 21 / 2018  DISCHARGE DISPOSITION: [facility]   Nursing Facility: [facility] stay 5/28/18 to 5/31/18.     Condition on Discharge:  Stable.  Function:  Max assist for toileting, mod assist for dressing, min assist for feeding, CGA for ambulation, using U-step walker   Cognitive Scores: SLUMS 17    Equipment: walker and wheelchair    DISCHARGE DIAGNOSIS:   1. Closed displaced fracture of second cervical vertebra with routine healing, unspecified fracture morphology, subsequent encounter    2. Fall, subsequent encounter    3. PSP (progressive supranuclear palsy) (H)    4. Progressive supranuclear ophthalmoplegia (H)    5. Impaired functional mobility, balance, gait, and endurance    6. Speech dysfunction    7. Dysphagia, unspecified type    8. Essential hypertension    9. Neuropathy    10. Migraine without aura and without status migrainosus, not intractable    11. Displacement of lumbar intervertebral disc without myelopathy    12. Slow transit constipation    13. Cognitive complaints    14. 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 Urge incontinence of urine       Per Epic note/Hx:  Patient is a 67 year old gentleman with PMH of  progressive supranuclear palsy x 4 years with subsequent dysphagia, impaired vision (blurry, double vision), impaired balance, neuropathy in bilat feet, migraines who on 5/28/18 fell backwards from a chair and hit his head.  He declined going to the hospital at that time but later went in as the pain was worsening. He initially presented to [facility] and was transferred to [facility] after CT Angio showed (stable) minimally displaced bilat C2 laminar fractures.  Neurology was consulted and noted no collar ws needed, OK to be up with therapies and no f/u needed. He was transferred to [facility] for rehab, medical management and nursing cares.        HPI Nursing Facility Course:  HPI information obtained from: facility chart records, facility staff, patient report, [facility] chart review and family/first contact PT's wife and daughter report.    TCU stay complicated by pain - treated with increased Tramadol dosing.  He also has urinary incontinence since his accident, is using condom catheters for HS.  Constipation also was problematic - bowel meds increased.       Closed displaced fracture of second cervical vertebra with routine healing, unspecified fracture morphology, subsequent encounter  Fall, subsequent encounter  Patient fell backwards out of chair and hit his head   showing minimally displaced bilat C2 laminar fractures, HCT negative  Neurosurgery consulted and noted stable fractures, no collar needed, OK to be up with therapies and no f/u needed.       Analgesia with:  (new) Tylenol 1000 mg TID  (new) lidocaine patch on for 12 hours, off for 12 hours  (new) robaxin 500 mg QID  (new) tramadol 25 mg po q3 hours and q6 hours PRN - used x 3 in last 7 days      6/14/18 Patient reported severe pain, Xray done and showed no acute pathology but patient may need CT to assess fully.  Patient improved clinically today, reporting pain 3/10.  Discussion  re: taper of tramadol; patient prefers to wait  right now.   PCP to address.       Discharge planning:  Patient to DC home with live-in services.  Denied Acute Care Rehab.   Hospital bed being delivered day of DC.         PSP (progressive supranuclear palsy) (H)  Progressive supranuclear ophthalmoplegia (H)  Impaired functional mobility, balance, gait, and endurance  Speech dysfunction  Dysphagia, Severe oropharyngeal phase  PSP x 4 years now  Patient's wife noting patient was still very active, but PSP definitely progressing.   Deficits include:  Dysarthria (slow, slurred, mumbled speech)  Decreased vision (double, blurred vision)  Sensitivity to lights (wears hat and sunglasses inside)  Impaired coordination/balance  Dysphagia (eats/drinks slowly)  (new) urinary oncontinence  Per Care Everywhere SLP consult:  Per his wife, he has had two previous videofluoroscopic swallowing studies that have demonstrated aspiration with all consistencies, the last one being in February of 2018. Compensatory strategies such as chin tuck were found not to be beneficial, per wife. Apparently, tube feedings were recommended at that time but the patient has chosen to continue po intake despite the known risk of aspiration      SLP consult noted patient takes whole pills in pudding, was coughing with liquids.  Patient/Pt's family have refused pureed foods and thickened liquids and know the risks involved but prefer patient to have QOL. SLP followed for swallowing and speech exercising.       On PTA amantadine 100 mg TID (0600, 1100, 1600), PTA rivastigmine 9.5 mg/24 hour patch.   PTA Vitamin D and MV remain.     Likely advanced PSP now since neck fracture d/t immobility and deconditioning.  Patient's wife appropriately planning for progression with live-in services, hospital bed, etc. Patient no longer able to use bathroom independently, especially at night, condom catheters ordered.       Therapy update on progress:  Max assist for toileting  Mod assist for dressing  Min assist for  feeding  CGA for ambulation, unlimited distances with U-Step walker  Tinetti 12  SLP working with swallowing, communication  SLUMS 17      Essential hypertension  S/p ablation for atrial flutter  On PTA amlodipine 2.5 mg qPM, ASA 81 mg daily, HCTZ 12.5 mg daily, olmesartan 20 mg BID      BPs 120-130s/80s  HRs 80-90s  Patient denies CP, HA, lightheadedness    stable      Neuropathy  bilat feet  History of herniated lumbar discs (per patient)  On no meds at this time.    Migraine without aura and without status migrainosus, not intractable  On PTA PRN Zolmitriptan 5 mg at onset of migraine  No HA nor migraine while in TCU     Displacement of lumbar intervertebral disc without myelopathy  History of herniated lumbar discs, per patient  History of neuropathy  Patient reporting tolerable LBP today, 2/10      Analgesia with:  (new) Tylenol 1000 mg TID  (new) robaxin 500 mg QID  (new) tramadol 25 mg po q3 hours and q6 hours PRN - used x 3 in last 7 days      Slow transit constipation  Senna changed to 2 tab BID   Patient reporting constipation but improvement with bisacodyl supp  Added bisacodyl supp 10mg QOD, and daily PRN for no BM in 3 days.      Patient with history of PSP, now with increased Tramadol dosing.        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         Urge Incontinence  Patient's wife noting patient has had urge incontinence since his injury  Possible nerve innervation contributory although this more likely with S2-4 injury (not cervical)  Possible BPH as patient noting he does not feel like he completely empties after urination.   Occupational Therapy consult for bladder program. Could recommend start of tamsulosin if  ineffective.  Condom catheters for HS       PAST MEDICAL HISTORY:  has a past medical history of Blurred vision (8/11/2014); Cancer (H) (1979; 1960's); DISK (aka Displacement of intervertebral disc, site unspecified, without myelopathy) (8/11/2014); Displacement of cervical intervertebral disc without myelopathy (HERNIATED DISK) (8/11/2014); Double vision (8/11/2014); Epistaxis (8/11/2014); Fluttering heart (8/11/2014); Headache (8/11/2014); Leg swelling (8/11/2014); Memory loss (8/11/2014); Migraines (2007); PSP (progressive supranuclear palsy) (H) (8/11/2014); Sinus disorder (8/11/2014); Tinnitus (8/11/2014); Urinary urgency (8/11/2014); and Varicose veins (8/11/2014). He also has no past medical history of Basal cell carcinoma; Malignant melanoma (H); Skin cancer; or Squamous cell carcinoma.    DISCHARGE MEDICATIONS:  Current Outpatient Prescriptions   Medication Sig Dispense Refill     acetaminophen (TYLENOL) 500 MG tablet Take 2 tablets (1,000 mg) by mouth 3 times daily       adapalene (DIFFERIN) 0.1 % gel Apply to Face topically every day and evening shift for Acne prevention 135 g 3     amantadine (SYMMETREL) 100 MG capsule Take 1 capsule (100 mg) by mouth 3 times daily 6am,11am and 4pm 270 capsule 3     amLODIPine (NORVASC) 2.5 MG tablet Take 1 tablet by mouth daily       aspirin (BABY ASPIRIN) 81 MG chewable tablet Take 81 mg by mouth daily        bisacodyl (DULCOLAX) 10 MG Suppository Place 10 mg rectally every other day If no BM in 3 days.        cholecalciferol (VITAMIN D-3 SUPER STRENGTH) 2000 UNITS tablet Take 1 tablet by mouth        hydrochlorothiazide 12.5 MG TABS tablet Take 1 tablet (12.5 mg) by mouth daily 90 tablet 3     lidocaine (LIDODERM) 5 % Patch Place 1 patch onto the skin every 12 hours        loperamide (IMODIUM) 2 MG capsule Take 2 mg by mouth every 4 hours as needed for diarrhea       METHOCARBAMOL PO Take 500 mg by mouth 4 times daily       Multiple Vitamins-Minerals (CENTRUM SILVER)  per tablet Take 1 tablet by mouth daily 30 tablet      nortriptyline (PAMELOR) 10 MG capsule TAKE 3 CAPSULES AT BEDTIME 270 capsule 3     olmesartan (BENICAR) 20 MG tablet Take 1 tablet (20 mg) by mouth 2 times daily       polyethylene glycol 0.4%- propylene glycol 0.3% (SYSTANE ULTRA) 0.4-0.3 % SOLN ophthalmic solution Place 1 drop into both eyes 3 times daily       rivastigmine (EXELON) 9.5 MG/24HR 24 hr patch Place 1 patch onto the skin daily 30 patch 11     sennosides (SENOKOT) 8.6 MG tablet Take 1 tablet by mouth 2 times daily        traMADol (ULTRAM) 50 MG tablet Take 0.5 tablets (25 mg) by mouth every 3 hours And q6 hours PRN 10 tablet 0     White Petrolatum-Mineral Oil (SYSTANE NIGHTTIME) OINT Instill 1 ribbon in both eyes at bedtime for Dry eyes       ZOLMitriptan (ZOMIG) 5 MG tablet Take 1 tablet (5 mg) by mouth at onset of headache for migraine 30 tablet 5       MEDICATION CHANGES/RATIONALE:   Senna increased  Bisacodyl supp scheduled and PRN added  Tramadol increased - Primary Care Provider to taper    Hosp:  - new Tylenol 1000 mg TID  - new lidocaine patch  - new robaxin QID  - new tramadol (now increased in TCU)    Controlled medications sent with patient:   Medication: Tramadol 25 mg (1/2 tab) , 156 tabs given to patient at the time of discharge to take home  Medication: Robaxin 500 mg , 34 tabs given to patient at the time of discharge to take home  Medication: Lidocaine patch , 6 patches tabs given to patient at the time of discharge to take home     ROS:    10 point ROS of systems including Constitutional, Eyes, Respiratory, Cardiovascular, Gastroenterology, Genitourinary, Integumentary, Muscularskeletal, Psychiatric were all negative except for pertinent positives noted in my HPI.    Physical Exam:   Vitals: /84  Pulse 86  Temp 98.2  F (36.8  C)  Resp 18  Wt 178 lb 12.8 oz (81.1 kg)  SpO2 96%  BMI 24.25 kg/m2  BMI= Body mass index is 24.25 kg/(m^2).    GENERAL APPEARANCE:  Alert, in no  distress, deconditioned from acute on chronic illness. Slow in speech, movement from chronic PSP  RESP:  respiratory effort and palpation of chest normal, auscultation of lungs clear, no respiratory distress, on RA  CV:  Palpation and auscultation of heart done , rate and rhythm regular, no murmur, no LE peripheral edema  ABDOMEN:  normal bowel sounds, soft, nontender, no hepatosplenomegaly or other masses  M/S:   Gait and station ambulatory with walker if family/therapy is present, otherwise uses W/C for mobility, Digits and nails with arthritic changes and spacticity, reduced muscle mass, pronounced anterior head carriage/flexion, hypertonic suboccipitals, posterior cervicals, trapezius, levator scapulae.   SKIN:  Inspection and Palpation of skin and subcutaneous tissue pale, intact  PSYCH:  insight and judgement, memory seemingly with mild impairment, affect flat and mood per baseline, follows commands readily       DISCHARGE PLAN:  Occupational Therapy, Physical Therapy, Speech Therapy , Registered Nurse, Home Health Aide,  and From:  [facility], Live-in Care  Patient instructed to follow-up with:  Has appt with Primary Care Provider on day of discharge at 11:00.       Current [facility] scheduled appointments:  Future Appointments  Date Time Provider Department Center   6/21/2018 11:00 AM Issa Burch MD [facility]   6/28/2018 1:15 PM Nihsi Oro MD [facility]   6/28/2018 2:30 PM Sheeba Roy, [facility]   7/11/2018 11:00 AM Clint Gutiérrez MD [facility]   7/12/2018 1:00 PM UC SPEC INFUSION [facility]   7/24/2018 7:30 AM LAB FIRST FLOOR [facility] [place]   7/24/2018 2:30 PM Evelina Morse MD [facility]   8/9/2018 10:30 AM Paulo Saravia MD [facility]   8/9/2018 1:00 PM UC SPEC INFUSION [facility]   9/6/2018 9:30 AM Paulo Saravia MD [facility]   9/6/2018 12:00 PM UC SPEC INFUSION [facility]    10/4/2018 10:30 AM Paulo Saravia MD [facility]   10/4/2018 12:00 PM UC SPEC INFUSION [facility]   11/1/2018 10:30 AM Paulo Saravia MD [facility]   11/1/2018 12:00 PM UC SPEC INFUSION [facility]   11/29/2018 9:30 AM Paulo Saravia MD [facility]   11/29/2018 1:00 PM UC SPEC INFUSION [facility]   12/27/2018 12:00 PM UC SPEC INFUSION [facility]   4/16/2019 10:30 AM Rafi Ramirez MD [facility] referral needed and placed by this provider: No    Pending labs: none  SNF labs   CBC (HGB,HCT,WBC,RBC,Platelet) (05/28/2018 8:12 PM)          CBC (HGB,HCT,WBC,RBC,Platelet) (05/28/2018 8:12 PM)   Component Value Ref Range   WBC 11.6 (H) 4.3 - 10.8 K/uL   RBC 4.91 4.60 - 6.20 M/uL   HEMOGLOBIN 14.6 14.0 - 18.0 gm/dL   HEMATOCRIT 42.2 40.0 - 54.0 %   MCV 86 80 - 100 fl   MCH 30 27 - 33 pg   MCHC 35 33 - 36 gm/dL   RDW 12.3 11.5 - 14.5 %   PLATELET COUNT 176 150 - 400 K/uL   MPV 9.9 6.5 - 12.0       Basic Metab Profile (05/28/2018 8:12 PM)          Basic Metab Profile (05/28/2018 8:12 PM)   Component Value Ref Range   SODIUM 138 136 - 145 mmol/L   POTASSIUM 3.8 3.5 - 5.1 mmol/L   CHLORIDE 103 98 - 112 mmol/L   CARBON DIOXIDE 27 21 - 32 mmol/L   BUN (UREA NITRO) 21 7 - 24 mg/dL   CREATININE 1.00 0.70 - 1.30 mg/dL   EST GFR (MDRD) >60 >60 mL/min   EST GFR IF AFRICAN AM >60 >60 mL/min   GLUCOSE 109 (H) 74 - 106 mg/dL   CALCIUM, SERUM 8.6 8.5 - 10.1 mg/dL   ANION GAP 8.0 0.0 - 15.0 mmol/L            Discharge Treatments:  1) OK to D/C home with all medications + narcotics.  2) Home Care PT/OT/SLP/RN/SW with [facility] to eval + treat.  3) [facility] for live-in care  4) Bisacodyl supp 10 mg po every other day. Dx: Constipation      TOTAL DISCHARGE TIME:   Greater than 30 minutes  Electronically signed by:  YULY Montiel CNP         Documentation of Face-to-Face and Certification for Home Health Services     Patient: Jimmy Patrick   Date of Birth:  1950  MR Number: [id number]  Today's Date: 6/20/2018    I certify that patient: Jimmy Patrick is under my care and that I, or a nurse practitioner or physician's assistant working with me, had a face-to-face encounter that meets the physician face-to-face encounter requirements with this patient on: 6/20/2018.    This encounter with the patient was in whole, or in part, for the following medical condition, which is the primary reason for home health care: closed displaced fracture of second vertebrae, PSP.    I certify that, based on my findings, the following services are medically necessary home health services: Nursing, Occupational Therapy, Physical Therapy and Speech Language Therapy.    My clinical findings support the need for the above services because: Nurse is needed: To assess VS and medication management after changes in medications or other medical regimen., To provide assessment and oversight required in the home to assure adherence to the medical plan due to: PSP progression, neck fracture. and To provide caregiver training to assist with: condom catheter application.., Occupational Therapy Services are needed to assess and treat cognitive ability and address ADL safety due to impairment in weakness, limited mobility and ADLs., Physical Therapy Services are needed to assess and treat the following functional impairments: limited mobility, weakness. and Speech Therapy Services are needed to assess and treat impairments in language and/or swallow functions due to swallowing and communication limitations.    Further, I certify that my clinical findings support that this patient is homebound (i.e. absences from home require considerable and taxing effort and are for medical reasons or Confucianist services or infrequently or of short duration when for other reasons) because: Leaving home is medically contraindicated for the following reason(s): PSP. and Requires assistance of another person or  specialized equipment to access medical services because patient: Is unable to operate assistive equipment on their own., Range of motion limitations prevents ability to exit home safely. and Requires supervision of another for safe transfer...    Based on the above findings. I certify that this patient is confined to the home and needs intermittent skilled nursing care, physical therapy and/or speech therapy.  The patient is under my care, and I have initiated the establishment of the plan of care.  This patient will be followed by a physician who will periodically review the plan of care.  Physician/Provider to provide follow up care: Evelina Morse    Responsible Medicare certified PECOS Physician: Dr Silvina Conklin   Physician Signature: See electronic signature associated with these discharge orders.  Date: 6/20/2018      Electronically signed by Silvina Conklin MD on 06/20/18          Sincerely,        YULY Motniel CNP

## 2018-06-21 NOTE — MR AVS SNAPSHOT
After Visit Summary   6/21/2018    Jimmy Patrick    MRN: [id number]           Patient Information     Date Of Birth          1950        Visit Information        Provider Department      6/21/2018 11:00 AM Issa Burch MD [facility]        Today's Diagnoses     Progressive supranuclear palsy (H)    -  1    Other closed nondisplaced fracture of second cervical vertebra, sequela           Follow-ups after your visit        Your next 10 appointments already scheduled     Jun 28, 2018  1:15 PM CDT   Return Visit with Nishi Oro MD   [facility][note 3])    [id number] [place] [id number]   [phone]            Jun 28, 2018  2:30 PM CDT   [facility]    [address] [place] [address]   [phone]            Jul 11, 2018 11:00 AM CDT   Return Visit with Clint Gutiérrez MD   [facility][note 3])    [address] [place] [address]   [phone]            Jul 12, 2018  1:00 PM CDT   Infusion 60 with UC SPEC INFUSION, UC 45 ATC   [facility])    [address]  [address] [place] [address]   [phone]            Jul 24, 2018  7:30 AM CDT   LAB with LAB FIRST FLOOR [facility])    [address] [place] [address]   [phone]           Please do not eat 10-12 hours before your appointment if you are coming in fasting for labs on lipids, cholesterol, or glucose (sugar). This does not apply to pregnant women. Water, hot tea and black coffee (with nothing added) are okay. Do not drink other fluids, diet soda or chew gum.            Jul 24, 2018  2:30 PM CDT   PHYSICAL  with Evelina Morse MD   [facility]    [address] [place] [id number] [phone]            Aug 09, 2018 10:30 AM CDT   (Arrive by 10:15 AM)   Return Movement Disorder with Paulo Saravia MD   [facility] ([facility])    [address]  3rd Floor  [facility] [address] [phone]            Aug 09, 2018  1:00 PM CDT   Infusion 60 with UC SPEC INFUSION, UC 45 ATC   [facility] ([facility])    [address]  [address]  [facility] [address] [phone]              Who to contact     If you have questions or need follow up information about today's clinic visit or your schedule please contact [facility] directly at [phone].  Normal or non-critical lab and imaging results will be communicated to you by [facility], letter or phone within 4 business days after the clinic has received the results. If you do not hear from us within 7 days, please contact the clinic through [facility] or phone. If you have a critical or abnormal lab result, we will notify you by phone as soon as possible.  Submit refill requests through [facility] or call your pharmacy and they will forward the refill request to us. Please allow 3 business days for your refill to be completed.          Additional Information About Your Visit        [facility] Information     [facility] gives you secure access to your electronic health record. If you see a primary care provider, you can also send messages to your care team and make appointments. If you have questions, please call your primary care clinic.  If you do not have a primary care provider, please call [phone] and they will assist you.        Care EveryWhere ID     This is your Care EveryWhere ID. This could be used by other organizations to access your [facility]  records  [id number]        Your Vitals Were     Pulse Temperature Respirations Pulse Oximetry          98 97.3  F (36.3  C) (Tympanic) 18 97%         Blood Pressure from Last 3 Encounters:   06/21/18 104/74   06/20/18 120/84   06/18/18 125/78    Weight from Last 3 Encounters:   06/20/18 178 lb 12.8 oz (81.1 kg)   06/18/18 178 lb 12.8 oz (81.1 kg)   06/14/18 178 lb 12.8 oz (81.1 kg)              We Performed the Following     Vitamin D Deficiency          Today's Medication Changes          These changes are accurate as of 6/21/18 11:57 AM.  If you have any questions, ask your nurse or doctor.               These medicines have changed or have updated prescriptions.        Dose/Directions    nortriptyline 10 MG capsule   Commonly known as:  PAMELOR   This may have changed:  additional instructions   Used for:  Migraine without aura and without status migrainosus, not intractable        TAKE 3 CAPSULES AT BEDTIME   Quantity:  270 capsule   Refills:  3       * traMADol 50 MG tablet   Commonly known as:  ULTRAM   This may have changed:  Another medication with the same name was added. Make sure you understand how and when to take each.   Used for:  Closed displaced fracture of second cervical vertebra with routine healing, unspecified fracture morphology, subsequent encounter   Changed by:  Issa Burch MD        Dose:  25 mg   Take 0.5 tablets (25 mg) by mouth every 3 hours And q6 hours PRN   Quantity:  10 tablet   Refills:  0       * traMADol 100 MG 24 hr tablet   Commonly known as:  ULTRAM-ER   This may have changed:  You were already taking a medication with the same name, and this prescription was added. Make sure you understand how and when to take each.   Used for:  Other closed nondisplaced fracture of first cervical vertebra, sequela   Changed by:  Issa Burch MD        Dose:  100 mg   Take 1 tablet (100 mg) by mouth nightly as needed for severe pain   Quantity:  30 tablet   Refills:  5        * Notice:  This list has 2 medication(s) that are the same as other medications prescribed for you. Read the directions carefully, and ask your doctor or other care provider to review them with you.         Where to get your medicines      Some of these will need a paper prescription and others can be bought over the counter.  Ask your nurse if you have questions.     Bring a paper prescription for each of these medications     traMADol 100 MG 24 hr tablet               Information about OPIOIDS     PRESCRIPTION OPIOIDS: WHAT YOU NEED TO KNOW   We gave you an opioid (narcotic) pain medicine. It is important to manage your pain, but opioids are not always the best choice. You should first try all the other options your care team gave you. Take this medicine for as short a time (and as few doses) as possible.     These medicines have risks:    DO NOT drive when on new or higher doses of pain medicine. These medicines can affect your alertness and reaction times, and you could be arrested for driving under the influence (DUI). If you need to use opioids long-term, talk to your care team about driving.    DO NOT operate heave machinery    DO NOT do any other dangerous activities while taking these medicines.     DO NOT drink any alcohol while taking these medicines.      If the opioid prescribed includes acetaminophen, DO NOT take with any other medicines that contain acetaminophen. Read all labels carefully. Look for the word  acetaminophen  or  Tylenol.  Ask your pharmacist if you have questions or are unsure.    You can get addicted to pain medicines, especially if you have a history of addiction (chemical, alcohol or substance dependence). Talk to your care team about ways to reduce this risk.    Store your pills in a secure place, locked if possible. We will not replace any lost or stolen medicine. If you don t finish your medicine, please throw away (dispose) as directed by your pharmacist. The [place]  Pollution Control Agency has more information about safe disposal: https://www.pca.[facility].mn.us/living-green/managing-unwanted-medications.     All opioids tend to cause constipation. Drink plenty of water and eat foods that have a lot of fiber, such as fruits, vegetables, prune juice, apple juice and high-fiber cereal. Take a laxative (Miralax, milk of magnesia, Colace, Senna) if you don t move your bowels at least every other day.          Primary Care Provider Office Phone # Fax #    Evelina Morse [phone] [address]  [facility] [address]        Equal Access to Services     LEDY STEELE : Berny Granado, waosmar garcia, qazulma kaalmada higinio, gely lynch. So [facility] [phone].    ATENCIÓN: Si habla español, tiene a langford disposición servicios gratuitos de asistencia lingüística. Llame al [phone].    We comply with applicable federal civil rights laws and [place] laws. We do not discriminate on the basis of race, color, national origin, age, disability, sex, sexual orientation, or gender identity.            Thank you!     Thank you for choosing [facility]  for your care. Our goal is always to provide you with excellent care. Hearing back from our patients is one way we can continue to improve our services. Please take a few minutes to complete the written survey that you may receive in the mail after your visit with us. Thank you!             Your Updated Medication List - Protect others around you: Learn how to safely use, store and throw away your medicines at www.disposemymeds.org.          This list is accurate as of 6/21/18 11:57 AM.  Always use your most recent med list.                   Brand Name Dispense Instructions for use Diagnosis    acetaminophen 500 MG tablet    TYLENOL     Take 2 tablets (1,000 mg) by mouth 3 times daily    Closed displaced fracture of second cervical vertebra with  routine healing, unspecified fracture morphology, subsequent encounter       adapalene 0.1 % gel    DIFFERIN    135 g    Apply to Face topically every day and evening shift for Acne prevention    Acne vulgaris       amantadine 100 MG capsule    SYMMETREL    270 capsule    Take 1 capsule (100 mg) by mouth 3 times daily 6am,11am and 4pm    PSP (progressive supranuclear palsy) (H)       amLODIPine 2.5 MG tablet    NORVASC     Take 1 tablet by mouth daily        BABY ASPIRIN 81 MG chewable tablet   Generic drug:  aspirin      Take 81 mg by mouth daily        BENICAR 20 MG tablet   Generic drug:  olmesartan      Take 1 tablet (20 mg) by mouth 2 times daily    PSP (progressive supranuclear palsy) (H)       bisacodyl 10 MG Suppository    DULCOLAX     Place 10 mg rectally every other day If no BM in 3 days.        CENTRUM SILVER per tablet     30 tablet    Take 1 tablet by mouth daily        hydrochlorothiazide 12.5 MG Tabs tablet     90 tablet    Take 1 tablet (12.5 mg) by mouth daily    Essential hypertension       lidocaine 5 % Patch    LIDODERM     Place 1 patch onto the skin every 12 hours        loperamide 2 MG capsule    IMODIUM     Take 2 mg by mouth every 4 hours as needed for diarrhea        METHOCARBAMOL PO      Take 500 mg by mouth 4 times daily        nortriptyline 10 MG capsule    PAMELOR    270 capsule    TAKE 3 CAPSULES AT BEDTIME    Migraine without aura and without status migrainosus, not intractable       polyethylene glycol 0.4%- propylene glycol 0.3% 0.4-0.3 % Soln ophthalmic solution    SYSTANE ULTRA     Place 1 drop into both eyes 3 times daily        rivastigmine 9.5 MG/24HR 24 hr patch    EXELON    30 patch    Place 1 patch onto the skin daily    PSP (progressive supranuclear palsy) (H)       sennosides 8.6 MG tablet    SENOKOT     Take 1 tablet by mouth 2 times daily        SYSTANE NIGHTTIME Oint      Instill 1 ribbon in both eyes at bedtime for Dry eyes        * traMADol 50 MG tablet    ULTRAM     10 tablet    Take 0.5 tablets (25 mg) by mouth every 3 hours And q6 hours PRN    Closed displaced fracture of second cervical vertebra with routine healing, unspecified fracture morphology, subsequent encounter       * traMADol 100 MG 24 hr tablet    ULTRAM-ER    30 tablet    Take 1 tablet (100 mg) by mouth nightly as needed for severe pain    Other closed nondisplaced fracture of first cervical vertebra, sequela       Vitamin D-3 Super Strength 2000 units tablet   Generic drug:  cholecalciferol      Take 1 tablet by mouth        ZOLMitriptan 5 MG tablet    ZOMIG    30 tablet    Take 1 tablet (5 mg) by mouth at onset of headache for migraine    PSP (progressive supranuclear palsy) (H), Migraine without aura and without status migrainosus, not intractable       * Notice:  This list has 2 medication(s) that are the same as other medications prescribed for you. Read the directions carefully, and ask your doctor or other care provider to review them with you.

## 2018-06-21 NOTE — PROGRESS NOTES
SUBJECTIVE:   Jimmy Patrick is a 67 year old male who presents to clinic today for the following health issues:      Hospital Follow-up Visit:    Hospital/Nursing Home/IP Rehab Facility: [facility]   Date of Admission: May 31.2018  Date of Discharge: 06.21.18  Reason(s) for Admission: Closed displaced fracture of second cervical vertebra with routine healing, unspecified fracture morphology, subsequent encounter            Problems taking medications regularly:  PSP diagnose, difficulty swallowing but going well with applessauce       Medication changes since discharge: Senna increase,Bisacodyl supp schedule PRN added, Tramadol ( now increase in TCU)-Controlled medications sent with patient:   Medication: Tramadol 25 mg (1/2 tab) , 156 tabs given to patient at the time of discharge to take home  Medication: Robaxin 500 mg , 34 tabs given to patient at the time of discharge to take home  Medication: Lidocaine patch , 6 patches tabs given to patient at the time of discharge to take home       Problems adhering to non-medication therapy:  Occupational Therapy, Physical Therapy, Speech Therapy , Registered Nurse, Home Health Aide,  and From:  [facility], Live[facility]in [facility]  Patient instructed to follow-up with:  Has appt with Primary Care Provider on day of discharge at 11:00.       Summary of hospitalization:  CareEverywhere information obtained and reviewed  Diagnostic Tests/Treatments reviewed.  Follow up needed: YES.  Other Healthcare Providers Involved in Patient s Care:         None  Update since discharge: improved.     Post Discharge Medication Reconciliation: discharge medications reconciled, continue medications without change.  Plan of care communicated with family     Coding guidelines for this visit:  Type of Medical   Decision Making Face-to-Face Visit       within 7 Days of discharge Face-to-Face Visit        within 14 days of discharge   Moderate Complexity [id number]   High Complexity  [id number]              Problem list and histories reviewed & adjusted, as indicated.  Additional history: as documented    Patient Active Problem List   Diagnosis     Transient paralysis of limb     Migraine without aura     Speech disturbance     PSP (progressive supranuclear palsy) (H)     Fluttering heart     Leg swelling     Varicose veins     Headache     Memory loss     Urinary urgency     Blurred vision     Double vision     Tinnitus     Sinus disorder     Epistaxis     DISK (aka Displacement of intervertebral disc, site unspecified, without myelopathy)     Impairment of balance     Signs and symptoms involving cognition     Fatigue     Migraine without status migrainosus, not intractable     Palpitations     Parkinsonism (H)     Progressive supranuclear ophthalmoplegia (H)     Speech dysfunction     Cellular blue nevus     S/P ablation of atrial flutter     Migraine without aura and without status migrainosus, not intractable     Parkinsonism, unspecified Parkinsonism type (H)     Essential hypertension     CARDIOVASCULAR SCREENING; LDL GOAL LESS THAN 160     Cognitive complaints     ACP (advance care planning)     Closed fracture of second cervical vertebra (H)     Fall     Acute pain due to trauma     Acute pain due to injury     Past Surgical History:   Procedure Laterality Date     BIOPSY  2016    Dermatologist remoed skin mole (not cancerus)     CARDIAC SURGERY  2013 ablation    Successful in eliminatng heart flutter     H ABLATION ATRIAL FLUTTER         Social History   Substance Use Topics     Smoking status: Never Smoker     Smokeless tobacco: Never Used     Alcohol use No     Family History   Problem Relation Age of Onset     Cancer Father          Allergies   Allergen Reactions     Other [Seasonal Allergies]      environmental     Codeine Other (See Comments) and Rash     GI upset  GI upset     Recent Labs   Lab Test  11/03/17   1009  05/12/17   1410  02/14/17   1037   CR  1.10   --   1.00    GFRESTIMATED  67  67  75   GFRESTBLACK  81  81  >90   GFR Calc     POTASSIUM  3.9   --   4.0      BP Readings from Last 3 Encounters:   06/21/18 104/74   06/20/18 120/84   06/18/18 125/78    Wt Readings from Last 3 Encounters:   06/20/18 178 lb 12.8 oz (81.1 kg)   06/18/18 178 lb 12.8 oz (81.1 kg)   06/14/18 178 lb 12.8 oz (81.1 kg)                 ROS:  CONSTITUTIONAL: NEGATIVE for fever, chills, change in weight  INTEGUMENTARY/SKIN: NEGATIVE for worrisome rashes, moles or lesions  EYES: NEGATIVE for vision changes or irritation  ENT/MOUTH: NEGATIVE for ear, mouth and throat problems  RESP: NEGATIVE for significant cough or SOB  CV: NEGATIVE for chest pain, palpitations or peripheral edema  GI: NEGATIVE for nausea, abdominal pain, heartburn, or change in bowel habits  : NEGATIVE for frequency, dysuria, or hematuria  MUSCULOSKELETAL: NEGATIVE for significant arthralgias or myalgia  NEURO: NEGATIVE for weakness, dizziness or paresthesias  ENDOCRINE: NEGATIVE for temperature intolerance, skin/hair changes  HEME: NEGATIVE for bleeding problems  PSYCHIATRIC: NEGATIVE for changes in mood or affect    OBJECTIVE:     /74 (BP Location: Left arm, Patient Position: Chair, Cuff Size: Adult Regular)  Pulse 98  Temp 97.3  F (36.3  C) (Tympanic)  Resp 18  SpO2 97%  There is no height or weight on file to calculate BMI.  GENERAL: healthy, alert and no distress  EYES: Eyes grossly normal to inspection, PERRL and conjunctivae and sclerae normal  HENT: ear canals and TM's normal, nose and mouth without ulcers or lesions  NECK: no adenopathy, no asymmetry, masses, or scars and thyroid normal to palpation  RESP: lungs clear to auscultation - no rales, rhonchi or wheezes  CV: regular rate and rhythm, normal S1 S2, no S3 or S4, no murmur, click or rub, no peripheral edema and peripheral pulses strong  ABDOMEN: soft, nontender, no hepatosplenomegaly, no masses and bowel sounds normal  MS: no gross  musculoskeletal defects noted, no edema  SKIN: no suspicious lesions or rashes  NEURO: Normal strength and tone, mentation intact and speech normal  PSYCH: mentation appears normal, affect normal/bright    Diagnostic Test Results:  none     ASSESSMENT/PLAN:     (G23.1) Progressive supranuclear palsy (H)  (primary encounter diagnosis)  Comment: Diagnosed 4 years ago. Main predisposing factor to fall risk.  Plan: Per Neurology service.    (S12.091S) Other closed nondisplaced fracture of second cervical vertebra, sequela  Comment: Physical exam yields no motor weakness. Continue conservative treatment.  Plan: Vitamin D Deficiency, traMADol (ULTRAM-ER) 100         MG 24 hr tablet          FUTURE APPOINTMENTS:       - Follow-up visit with PCP next month.    Issa Burch MD  [facility]

## 2018-06-21 NOTE — PROGRESS NOTES
Patient Demographics  - 67 y.o. Male, born Nov. 17, 1950     Patient Address Communication Language Race / Ethnicity   [address]  [facility] [place] [address]   [phone] (Home)  [phone] (Mobile)   English (Preferred) White / Not  or    Reason for Referral  (Routine)  (Routine)   Status Reason Specialty Diagnoses / Procedures Referred By Contact Referred To Contact   Pending Review     Diagnoses    Fall, initial encounter    Progressive supranuclear ophthalmoplegia (HCC)       Eduar Rodriguez, APRN, CNP    [address] [place] [address]    Phone: [phone]    Fax: [phone]   Clinic, Unknown    [place]            (Routine)  (Routine)   Status Reason Specialty Diagnoses / Procedures Referred By Contact Referred To Contact   Pending Review     Procedures    Full Code   Eduar Rodriguez, APRN, CNP    [address] [place] [address]    Phone: [phone]    Fax: [phone]               (Routine)  (Routine)   Status Reason Specialty Diagnoses / Procedures Referred By Contact Referred To Contact   Pending Review     Diagnoses    Fall, initial encounter    Progressive supranuclear ophthalmoplegia (HCC)       Eduar Rodriguez, APRN, CNP    [address] [place] [address]    Phone: [phone]    Fax: [phone]   Clinic, Unknown    [place]            (Routine)  (Routine)   Status Reason Specialty Diagnoses / Procedures Referred By Contact Referred To Contact   Pending Review     Procedures    Diet as Tolerated   Eduar Rodriguez, APRN, CNP    [address] [place] [address]    Phone: [phone]    Fax: [phone]               (Routine)  (Routine)   Status Reason Specialty Diagnoses / Procedures Referred By Contact Referred To Contact   Pending Review     Procedures    Discharge Instructions   Eduar Rodriguez, APRN, CNP    [address] [place] [address]    Phone: [phone]    Fax: [phone]                (Routine)  (Routine)   Status Reason Specialty Diagnoses / Procedures Referred By Contact Referred To Contact   Pending Review     Procedures    Discharge Instructions   Eduar Rodriguez, YULY, CNP    [address] [place] [address]    Phone: [phone]    Fax: [phone]               (Routine)  (Routine)   Status Reason Specialty Diagnoses / Procedures Referred By Contact Referred To Contact   Pending Review     Procedures    Weight bearing   Eduar Rodriguez, YULY, CNP    [address] [address]    Phone: [phone]    Fax: [phone]               (Routine)  (Routine)   Status Reason Specialty Diagnoses / Procedures Referred By Contact Referred To Contact   Pending Review     Procedures    Activity as tolerated   Eduar Rodriguez, APRN, CNP    [address] [address]    Phone: [phone]    Fax: [phone]               (Routine)  (Routine)   Status Reason Specialty Diagnoses / Procedures Referred By Contact Referred To Contact   Pending Review       Eduar Rodriguez, YULY, CNP    [address] [place] [address]    Phone: [phone]    Fax: [phone]   [facility]    [address] [address]    Phone: [phone]    Fax: [phone]            (Routine)  (Routine)   Status Reason Specialty Diagnoses / Procedures Referred By Contact Referred To Contact   Pending Review       Eduar Rodriguez APRN, CNP    [address] [place] [address]    Phone: [phone]    Fax: [phone]   [facility]    [address]        [place] [address]    Phone: [phone]            (Routine)  (Routine)   Status Reason Specialty Diagnoses / Procedures Referred By Contact Referred To Contact   Pending Review     Procedures    Rehabilitation Potential   Eduar Rodriguez, YULY, CNP    [address]  [place] [address]    Phone: [phone]    Fax: [phone]               (Routine)  (Routine)   Status Reason Specialty Diagnoses / Procedures Referred By Contact Referred To Contact   Pending Review     Procedures    Nursing Home Standing Orders   Eduar Rodriguez, YULY, CNP    [address] [place] [address]    Phone: [phone]    Fax: [phone]               (Routine)  (Routine)   Status Reason Specialty Diagnoses / Procedures Referred By Contact Referred To Contact   Pending Review     Procedures    Care Level   Eduar Rodriguez, YULY, CNP    [address] [place] [address]    Phone: [phone]    Fax: [phone]               (Routine)  (Routine)   Status Reason Specialty Diagnoses / Procedures Referred By Contact Referred To Contact   Pending Review     Procedures    Admission History & Physical   Eduar Rodriguez, YULY, CNP    [address]    [place] [address]    Phone: [phone]    Fax: [phone]               (Routine)  (Routine)   Status Reason Specialty Diagnoses / Procedures Referred By Contact Referred To Contact   Pending Review     Procedures    Condition At Discharge   Eduar Rodriguez, YULY, CNP    [address] [place] [address]    Phone: [phone]    Fax: [phone]               (Routine)  (Routine)   Status Reason Specialty Diagnoses / Procedures Referred By Contact Referred To Contact   Pending Review     Procedures    Length Of Stay   Eduar Rodriguez, YULY, CNP    [address] [place] [address]    Phone: [phone]    Fax: [phone]               (Routine)  (Routine)   Status Reason Specialty Diagnoses / Procedures Referred By Contact Referred To Contact   Pending Review     Procedures    Medication Refill Policy   Eduar Rodriguez, YULY, CNP    [address] [address]    Phone: [phone]    Fax: [phone]           Reason for  Visit    Inpatient Admission  Inpatient Admission   Status Reason Specialty Diagnoses / Procedures Referred By Contact Referred To Contact         Diagnoses    c2 fx                  Encounter Details    Date Type Department Care Team Description   05/28/2018 - 05/31/2018 Hospital Encounter W6    [address] [facility] [address] [place] [address] [phone]   Xander French MD    [address] [facility] [place] [address] [phone] (Fax)       Denis Smith MD    [address]    [facility] [place] [place] [address] [phone] (Fax)   Fall   Social History  - as of this encounter    Tobacco Use Types Packs/Day Years Used Date   Never Smoker           Smokeless Tobacco: Never Used           Sex Assigned at Birth Date Recorded   Not on file     Last Filed Vital Signs  - in this encounter    Vital Sign Reading Time Taken   Blood Pressure 125/85 05/31/2018 11:08 AM CDT   Pulse 89 05/31/2018 11:08 AM CDT   Temperature 37.3  C (99.1  F) 05/31/2018 11:08 AM CDT   Respiratory Rate 18 05/31/2018 11:08 AM CDT   Oxygen Saturation 95% 05/31/2018 11:08 AM CDT   Inhaled Oxygen Concentration - -   Weight 85.1 kg (187 lb 11.2 oz) 05/28/2018 4:55 PM CDT   Height 182.9 cm (6') 05/28/2018 4:39 PM CDT   Body Mass Index 25.46 05/28/2018 4:55 PM CDT   Discharge Instructions  - in this encounter    Patient Instructions - Shiloh Portillo RN - 05/31/2018 11:03 AM CDT  Formatting of this note may be different from the original.  Cervical Fracture   WHAT YOU NEED TO KNOW:     A cervical fracture is a break in 1 or more of the 7 cervical vertebrae (bones) in your neck. Cervical vertebrae support your head and allow your neck to bend and twist. The vertebrae enclose and protect the spinal cord, which controls your ability to move. Cervical fractures can happen after injuring the neck in motor vehicle accidents, falls, diving, and contact sports. Because a cervical fracture can also harm the  spinal cord, it can be a very serious injury.        DISCHARGE INSTRUCTIONS:   Medicines:     Pain medicine: You may be given medicine to take away or decrease pain. Do not wait until the pain is severe before you take your medicine.      Take your medicine as directed. Contact your healthcare provider if you think your medicine is not helping or if you have side effects. Tell him or her if you are allergic to any medicine. Keep a list of the medicines, vitamins, and herbs you take. Include the amounts, and when and why you take them. Bring the list or the pill bottles to follow-up visits. Carry your medicine list with you in case of an emergency.  Follow up with your spine specialist or healthcare provider as directed: Write down your questions so you remember to ask them during your visits.   Skin and brace care: Skin breakdown can lead to deep wounds caused by pressure or pulling on your skin. Check your chin, ears, back of your head, and shoulders for redness or sores if you are wearing a brace. Check the skin daily around halo brace pins for signs of infection, such as redness or bad-smelling drainage. Change your vest lining if it gets wet. Ask your healthcare provider how to care for your halo pins and vest. Ask your healthcare provider for more information about using a halo brace, semirigid collar, or soft collar.  Therapy: A physical therapist and an occupational therapist may exercise your arms, legs, and hands. They may also teach you new ways to do things around the house. A speech therapist may work with you to help you talk or swallow.   Wound care: Ask your healthcare provider to show you how to care for your wound.  Contact your spine specialist or healthcare provider if:     You have a fever.       You see a skin rash, redness, or sores under your brace.      You have problems swallowing while you are wearing your halo brace.      Your neck pain is not getting better even with treatment.      You  have questions or concerns about your cervical fracture, medicine, or care.  Seek care immediately or call 911 if:     You have a sudden, severe headache with nausea and vomiting.      You are seeing double or cannot see out of 1 eye.      You cannot stay awake.      The pins in your halo brace have loosened or look deeper in the skin than before.      You feel new weakness or numbness in your hands or fingers.      You are short of breath.      You cannot feel or move your arms or legs.      You cough up blood.      You feel lightheaded, short of breath, and have chest pain. You cough up blood.      You feel lightheaded, short of breath, and have chest pain.      Your arm or leg feels warm, tender, and painful. It may look swollen and red.    2018 [employer] Information is for End User's use only and may not be sold, redistributed or otherwise used for commercial purposes. All illustrations and images included in CareNotes  are the copyrighted property of [employer]. or [employer].  The above information is an  only. It is not intended as medical advice for individual conditions or treatments. Talk to your doctor, nurse or pharmacist before following any medical regimen to see if it is safe and effective for you.      Medications at Time of Discharge  - as of this encounter    Medication Sig. Disp. Refills Start Date End Date   acetaminophen (TYLENOL) 325 mg oral tablet   Take 2 tablets (650 mg) by mouth every 4 (four) hours as needed for Fever or Pain (mild pain or fever).   0 05/31/2018     Adapalene (DIFFERIN) 0.1 % Top Gel   APPLY TO FACE ONCE DAILY AT BEDTIME   11 08/31/2016     amantadine HCl (SYMMETREL) 100 mg oral Cap   TAKE 1 CAPSULE (100 MG) BY MOUTH 3 TIMES DAILY AT 6AM,11AM AND 4PM   3 07/01/2016     amLODIPine (NORVASC) 2.5 mg oral tablet   Take 1 tablet (2.5 mg) by mouth daily In evening.     01/17/2018     aspirin 81 mg oral enteric coated tablet   Take  81 mg by mouth once daily.           Cholecalciferol, Vitamin D3, 2,000 unit oral tablet   Take 2,000 Units by mouth once daily.           folic acid/multivit-min/lutein (CENTRUM SILVER ORAL)   Take 1 tablet by mouth once daily.           Hydrochlorothiazide 12.5 mg oral tablet   Take 12.5 mg by mouth once daily.           lidocaine 5% (LIDODERM) Top patch   Apply 1 patch to skin once a day as needed (pain). Keep on for 12 hours and remove for 12 hours 15 patch   1 05/30/2018     loperamide (IMODIUM) 2 mg oral capsule   Take 2 mg by mouth every 4 (four) hours as needed for Diarrhea.           methocarbamol (ROBAXIN) 500 mg oral tablet   Take 1 tablet (500 mg) by mouth four times a day. 30 tablet   0 05/31/2018     nortriptyline (PAMELOR) 10 mg oral Cap   Take 30 mg by mouth at bedtime.            olmesartan (BENICAR) 20 mg oral Tab   Take 20 mg by mouth twice a day.           rivastigmine (EXELON) 9.5 mg/24 hr TD PT24   Apply 9.5 mg to skin once daily.           senna-docusate (SENNA-S) 8.6-50 mg oral tablet   Take 1 tablet by mouth 2 times daily     04/19/2018     traMADol (ULTRAM) 50 mg oral tablet   Take 0.5 tablets (25 mg) by mouth every 4 (four) hours as needed for Pain. 30 tablet   0 05/31/2018     ZOLMitriptan (ZOMIG) 5 mg oral Tab   TAKE 1 TABLET (5 MG) BY MOUTH AT ONSET OF HEADACHE FOR MIGRAINE   5 06/13/2016     Ordered Prescriptions  - in this encounter    Prescription Sig. Disp. Refills Start Date End Date   traMADol (ULTRAM) 50 mg oral tablet   Take 0.5 tablets (25 mg) by mouth every 4 (four) hours as needed for Pain. 30 tablet   0 05/31/2018     methocarbamol (ROBAXIN) 500 mg oral tablet   Take 1 tablet (500 mg) by mouth four times a day. 30 tablet   0 05/31/2018     acetaminophen (TYLENOL) 325 mg oral tablet   Take 2 tablets (650 mg) by mouth every 4 (four) hours as needed for Fever or Pain (mild pain or fever).   0 05/31/2018     lidocaine 5% (LIDODERM) Top patch   Apply 1 patch to skin once a day as  needed (pain). Keep on for 12 hours and remove for 12 hours 15 patch   1 05/30/2018     methocarbamol (ROBAXIN) 500 mg oral tablet   Take 1 tablet (500 mg) by mouth four times a day.   0 05/31/2018 05/31/2018   Discharge Disposition  - in this encounter    Disposition Code Departure Means Destination   Nursing Home/SNF         Progress Notes  - in this encounter    Table of Contents for Progress Notes  Laverne Mayen RN - 05/31/2018 1:06 PM CDT  Florin Augustin MD - 05/31/2018 8:36 AM CDT  Zeb Guardado RN - 05/31/2018 7:50 AM CDT  Lauren Pedersen RN - 05/30/2018 10:28 PM CDT  Shiloh Portillo RN - 05/30/2018 3:27 PM CDT  Betzy Mock APRN, CNP - 05/30/2018 9:12 AM CDT  Florin Augustin MD - 05/30/2018 7:52 AM CDT  Lauren Pedersen RN - 05/30/2018 12:20 AM CDT  Leonora Soliman - 05/29/2018 1:09 PM CDT  Lara Alberto PA-C - 05/29/2018 10:30 AM CDT  Florin Augustin MD - 05/29/2018 10:19 AM CDT  Johanna Silveira RN - 05/29/2018 8:51 AM CDT  Alice Lobo - 05/29/2018 7:50 AM CDT  Esther Jo RN - 05/29/2018 12:06 AM CDT  Trena Steel RN - 05/28/2018 2:51 PM CDT     Laverne Mayen RN - 05/31/2018 1:06 PM CDT  Jimmy Patrick [id number]    P: Discharge  A: Discharged via wheelchair to transitional care at 1306 escorted by ambulance personnel and spouse  I: Discharge information and arrangements included: review of written discharge instructions, review of purpose and side effects of new medication, prescriptions sent with patient, belongings list completed.   R:Patient, spouse expressed understanding of information..    Back to top of Progress Notes  Florin Augustin MD - 05/31/2018 8:36 AM CDT  Formatting of this note may be different from the original.  Neurosurgery Progress Note    Chief complaint: Neck pain     Interval: Sounds to be having muscle spasms this morning.     Patient Vitals for the past 6 hrs:  BP Temp Pulse Resp SpO2   05/31/18 0716  (!) 144/93 98.7  F (37.1  C) 99 16 97 %   05/31/18 0411 (!) 166/100 99.5  F (37.5  C) 95 18 98 %     Allergies: Codeine  Neurological: Higher integrative functions: Oriented to person, place and time  Memory: Good Recent and remote events  Attention Span and Concentration: Good  Language: clear   Fund of Knowledge: good  CN II-XII: PERRLA, conjugate gaze, face symmetrical  Sensation: No decrease in sensation in upper or lower limbs to touch  Myotatic Reflexes: 2/4 throughout   Motor: Full strength in all 4 without focal paresis.     Labs:   SODIUM   Date Value Ref Range Status   05/28/2018 138 136 - 145 mmol/L Final     WBC   Date Value Ref Range Status   05/28/2018 11.6 4.3 - 10.8 K/uL Final     RBC   Date Value Ref Range Status   05/28/2018 4.91 4.60 - 6.20 M/uL Final     HEMOGLOBIN   Date Value Ref Range Status   05/28/2018 14.6 14.0 - 18.0 gm/dL Final     Impression/Plan: 68 yo male with stable C2 fracture. No instability on flex/ex films. No collar needed. Up with therapies. Robaxin and tramadol added. Dispo pending. No f/u needed.     DVT PPX: servando Salguero PA-C  Pager: [phone]    Neck sore  No UE or LE sx    Alert, mild confusion  Speech mildly dysarthric, mod fluent, baseline  P3/3-2/2, conjugate  Flat faces, sym  UE and LE strong throughout    Stable C2 fx  No Rx  Up as guadalupe  dispo pending    Back to top of Progress Notes  Zeb Guardado RN - 05/31/2018 7:50 AM CDT  [facility]  Med-Surg Care Progression Note Type: Shift to shift summary     Length of stay: 3 days  Code Status: Full Code    Reason for Admission: Direct admit from [facility]. Pt was attempting to sit backwards in a chair when he lost his balance trying to keep from spilling glass of orange juice. Fall resulting in a C-2 fx.  5/28 CT angio done tonight  5/29 X-ray Cervical spine/flexion/extension, Ok to remove collar per Neuro MD.    Hx: Progressive Supranuclear Palsy-causing weak voice, dysphagia issues, sensitivity to  bright lights(sunglasses and hat in room). Walks with walker baseline but per wife has been using w/c more when out lately.     F- Feeding Progression: Tolerating Diet- soft diet- pt has difficultly swallowing, needs assistance with feeds. Takes pills slowly whole with pudding. Pt coughing with liquids, per speech note, pt and wife know aspiration risk but pt will refuse pureed foods/thick liquids. Cautious with feedings.     Fluids Progression: Saline lock - tolerating PO. Offer fluids with hourly rounding.     A- Analgesic Progression: Pain at goal with interventions. Prn tylenol given x 1. Ice pack to back of neck. Lido patch removed.     S- Skin Progression: Total Alfred Score: 17: High risk for PI development, Specialty Bed and SAFER Bundle in place. R hip bruising noted. Repositioned every 2hours. Bilateral heels elevated~refusing heel boots.     Safety Progression: Hendrich II Total Score: 4; Falls risk - interventions in place call light within reach, green armband applied, bed alarm on, red socks on. Purposeful hourly rounding.     T- Telemetry Progression: Not applicable    Treatment Progression: Neuro checks. Therapies. Pain management.     E- Emotional Needs Progression: Participating in cares and Appropriate affect. Wife is primary caregiver.     Neuro Progression: Alert and Oriented. Pt whispers but able to understand needs. Give time to answer questions.     R- Respiratory Needs Progression: On room air cont. to monitor. Encourage IS use. Pt denies any SOB.     H- Head OUT of Bed/Activity Progression: PT following and OT following. OOB with A1-2, walker and gait belt. Did not get out of bed overnight.     U- Ultimate Discharge Progression: Pain management. Therapies.     Anticipated Disposition: Plan to: TCU 5/31 at 1200.     Anticipated Equipment/Supply Needs: Anticipated equipment/supply needs walker?     G- Glycemic Control Progression: Not applicable    B- Bowel and Bladder Care Progression: Last  BM: 05/30/18; On bowel regimen/regular. Uses urinal with assistance. Brief on for inc. Neg UA.   I- Indwelling/Invasive Devices Progression: PIV X2.     D- DVT/Anticoagulation Progression: SCDs and Anticoagulant SQ.     Summary: Pt is A&Ox3. Give time to answer questions. Whispers. Tylenol x 1 for neck pain, ice pack applied. Photosensitvity (sunglasses and hat in room) Aspiration risk, took tylenol whole in pudding with water after and did well, minimal coughing after. Pt to discharge to TCU 5/31 at 1200.             Back to top of Progress Notes  Lauren Pedersen RN - 05/30/2018 10:28 PM CDT  [facility]  Med-Surg Care Progression Note Type: Shift to shift summary [id number]     Length of stay: 2 days  Code Status: Full Code    Reason for Admission: Direct admit from [facility]. Pt was attempting to sit backwards in a chair when he lost his balance trying to keep from spilling glass of orange juice. Fall resulting in a C-2 fx.  5/28 CT angio done tonight  5/29 X-ray Cervical spine/flexion/extension, Ok to remove collar per Neuro MD.    Hx: Progressive Supranuclear Palsy-causing weak voice, dysphagia issues, sensitivity to bright lights(sunglasses and hat in room). Walks with walker baseline but per wife has been using w/c more when out lately.     F- Feeding Progression: Tolerating Diet- soft diet- pt has difficultly swallowing, needs assistance with feeds. Takes pills slowly with pudding. Pt coughing with liquids, per speech note, pt and wife know aspiration risk but pt will refuse pureed foods/thick liquids. Cautious with feedings.     Fluids Progression: Saline lock - tolerating PO    A- Analgesic Progression: Pain at goal with interventions. C/o pain to back from C-collar - licocaine patch applied. PRNs available.     S- Skin Progression: Total Alfred Score: 17: High risk for PI development, Specialty Bed and SAFER Bundle in place. R hip bruising CDI. Q2hr turns in bed with turning system. BL heel boots refused  in bed, heels elevated on pillow.     Safety Progression: Hendrich II Total Score: 4; Falls risk - interventions in place call light within reach, green armband applied, bed alarm on, red socks on. Purposeful hourly rounding.     T- Telemetry Progression: Not applicable    Treatment Progression: Neuro checks. Therapies. Pain management.     E- Emotional Needs Progression: Participating in cares and Appropriate affect. Wife is primary caregiver, very supportive.     Neuro Progression: Alert and Oriented. Understandable whispers.     R- Respiratory Needs Progression: On room air cont. to monitor. Encourage IS use.     H- Head OUT of Bed/Activity Progression: PT following and OT following. OOB with A1-2, walker and gait belt.     U- Ultimate Discharge Progression: Pain management. Therapies.     Anticipated Disposition: Plan to: TCU 5/31 at 1200.     Anticipated Equipment/Supply Needs: Anticipated equipment/supply needs walker?     G- Glycemic Control Progression: Not applicable    B- Bowel and Bladder Care Progression: Last BM: 05/30/18; On bowel regimen/regular. Uses urinal with assistance. Neg UA. BM this shift to commode.     I- Indwelling/Invasive Devices Progression: PIV X2.     D- DVT/Anticoagulation Progression: SCDs and Anticoagulant SQ.     Summary: Photosensitvity (sunglasses and hat in room) OOB A1-2 with walker and gait belt. Pt to discharge to TCU 5/31 at 1200.     Lauren Pedersen RN          Back to top of Progress Notes  Shiloh Portillo RN - 05/30/2018 3:27 PM CDT  5517-3400 shift update:  F- Feeding Progression: Soft diet- pt has difficultly swallowing, needs assistance with feeds. Takes pills slowly with pudding. Pt coughing with liquids, per speech note, pt and wife know aspiration risk but pt will refuse pureed foods/thick liquids. Cautious with feedings.   Fluids Progression: IV S/L   A- Analgesic Progression: Pain at goal with interventions. C/o neck pain from collar, PRN tylenol/caffeine home  med given x1. Lidocaine patch applied to back of neck.   S- Skin Progression: Total Alfred Score: 16: High risk for PI development. R hip bruising CDI. Q2hr turns in bed with turning system. On specialty bed. SCDs and heel boots applied this shift.  Safety Progression: Hendrich II Total Score: 4; Falls risk - interventions in place call light within reach, green armband on, bed alarm on. Purposeful hourly rounding.   T- Telemetry Progression: N/A  Treatment Progression: Therapies. Pain management. Neuro checks.   E- Emotional Needs Progression:. Participates in cares. Wife is primary caregiver, very supportive per report.   Neuro Progression: Alert and Oriented. Pt whispers; can understand.   R- Respiratory Needs Progression: On R/A. I/S encouraged.   H- Head OUT of Bed/Activity Progression: PT following and OT following. OOB to chair with A1-2; walker, gait belt.  U- Ultimate Discharge Progression: Pain control, therapies.   Anticipated Disposition: Plan to: TCU 5/31, ride set up for 1200  Anticipated Equipment/Supply Needs: Anticipated equipment/supply needs TBD.   G- Glycemic Control Progression: Not applicable  B- Bowel and Bladder Care Progression: Last BM: 05/24/18; On bowel regimen/regular. Uses urinal with asssistance. Difficult urinating more than 100ml per time; UA done results neg. No BM this shift. Encouraged pt to take more bowel meds, pt hesitant currently.   I- Indwelling/Invasive Devices Progression: PIV x2 both locked.   D- DVT/Anticoagulation Progression: SCDs and Anticoagulant SQ.     Summary: Photosensitivity- uses sunglasses and hat in room. OOB with A1-2, gait belt and walker. Q2hr turns in bed. Potassium 3.6 this AM, received 40mg supplement. Repeat potassium drawn, results not back yet.         Back to top of Progress Notes  Betzy Mock, APRN, CNP - 05/30/2018 9:12 AM CDT  Formatting of this note may be different from the original.  TRAUMA DAILY PROGRESS NOTE    Patient seen on  5/30/2018 at 9:12 AM.    CC/HPI: Fall, neck pain     Interval History: No acute events overnight, up in chair eating breakfast. Having some neck pain but reports it is tolerable. Denies HA , N/V.     PAST MEDICAL HISTORY:  No Change    REVIEW OF SYSTEMS: Last 24 hours denies fever, chills, nausea/vomiting, chest pain and shortness of breath.    A comprehensive review of systems was negative except for items noted in the HPI/Subjective.    CURRENT MEDS:  Current Facility-Administered Medications: **saline FLUSH syringe 10 mL, 10 mL, Intravenous, Q8H  **saline FLUSH syringe 10 mL, 10 mL, Intravenous, PRN  acetaminophen (TYLENOL) tablet 650 mg, 650 mg, oral, Q4H PRN  amantadine HCl (SYMMETREL) capsule 100 mg, 100 mg, oral, TID  amLODIPine (NORVASC) tablet 2.5 mg, 2.5 mg, oral, QPMCC  Cholecalciferol (Vitamin D3) tablet 2,000 Units, 2,000 Units, oral, DAILY  dextrose 5% in water-lactated ringers IV infusion, , Intravenous, CONTINUOUS  enoxaparin (LOVENOX) injection 30 mg, 30 mg, Subcutaneous, Q12H (NS)  Falls Pharmacy Consult, 1 Consult, N/A, PRN  hydroCHLOROthiazide (MICROZIDE) capsule 12.5 mg, 12.5 mg, oral, DAILY  lidocaine ( LIDODERM ) 5 % patch REMOVAL 1 each, 1 each, Transdermal, DAILY - PATCH REMOVAL FOR LIDO  lidocaine (LMX-4) topical cream 1 Application, 1 Application, Topical, PRN  lidocaine 1% (XYLOCAINE) injection 0.1-0.3 mL, 0.1-0.3 mL, Intradermal, PRN  lidocaine 1% / 8.4% sod bicarb (buffered lidocaine) syringe for IV starts 0.1-0.3 mL, 0.1-0.3 mL, Intradermal, PRN  lidocaine 5% (LIDODERM) adhesive patch, medicated 1 patch, 1 patch, Transdermal, DAILY - APPLY PATCH  Lubricant Drops ophthamlic (EYE) solution 1-2 drop, 1-2 drop, Each Eye, Q1H PRN  MAGNESIUM REPLACEMENT INTRAVENOUS - NOT FOR DOCUMENTATION PURPOSES, , Intravenous, PER PROTOCOL  naloxone (NARCAN) injection 0.1 mg, 0.1 mg, Intravenous, Q1 MINUTE PRN  nonformulary medication (pending approval) 500 mg, 500 mg, oral, Q6H PRN  nortriptyline  (PAMELOR) capsule 30 mg, 30 mg, oral, Q BEDTIME  olmesartan (BENICAR) tablet 20 mg, 20 mg, oral, Twice Daily  ondansetron (ZOFRAN) disintegrating tablet 4 mg, 4 mg, oral, Q8H PRN  oxyCODONE (immediate release) (ROXICODONE) tablet 2.5-5 mg, 2.5-5 mg, oral, Q4H PRN  POTASSIUM REPLACEMENT INTRAVENOUS - NOT FOR DOCUMENTATION PURPOSES, , Intravenous, PER PROTOCOL  POTASSIUM REPLACEMENT ORAL - NOT FOR DOCUMENTATION PURPOSES, , oral, PER PROTOCOL  rivastigmine (EXELON) 9.5 mg/24 hr transdermal patch 9.5 mg, 9.5 mg, Transdermal, DAILY  rivastigmine 9.5 mg/24 hour patch REMOVAL 1 each, 1 each, Transdermal, DAILY  senna-docusate (SENNA-S) tablet 1-2 tablet, 1-2 tablet, oral, Twice Daily    EXAM:  Temp (24hrs), Av.1  F (36.7  C), Min:97.6  F (36.4  C), Max:98.8  F (37.1  C)    BP (!) 144/87  Pulse 89  Temp 98.8  F (37.1  C)  Resp 18  Ht 6' (1.829 m)  Wt 85.1 kg (187 lb 11.2 oz)  SpO2 96%  BMI 25.46 kg/m    Patient Vitals for the past 72 hrs:  Weight   18 1655 85.1 kg (187 lb 11.2 oz)   18 1639 87.5 kg (193 lb)     Constitutional: well developed male, resting quietly  Head: normocephalic, atraumatic  Eyes: Normal lids and conjunctivae - PERRLA - EOMs intact, Pupils: Right - reactive with size 2 mm / Left - reactive with size 2 mm  Ears: externally normal  Nose: no deformity  Oropharynx: oral mucosa and pharynx normal, moist mucous membranes  Neck: Some midline tenderness posterior neck, pain increased with activity  Respiratory: lungs clear to auscultation, normal diaphragmatic movement, symmetrical, effort normal  Cardiovascular: Normal, S1, S2, regular rhythm, Rhythm: regular, Rate: normal  Gastrointestinal: soft, nontender, nondistended, no palpable masses  Musculoskeletal: Without deformity, moves all extremities with ease and purpose  Integumentary: intact, warm, dry  Neurological: slow speech at baseline, alert, GCS - 15, Speech: normal, Orientation: oriented x 4  Psych: appropriate  affect    Intake/Output Summary (Last 24 hours) at 05/30/18 0912  Last data filed at 05/30/18 0626  Gross per 24 hour   Intake 2360 ml   Output 1650 ml   Net 710 ml     Recent Labs   05/28/18 2012   WBC 11.6*   RBC 4.91   HEMOGLOBIN 14.6   HEMATOCRIT 42.2   MCV 86   MCH 30   RDW 12.3   PLATELETCT 176     Recent Labs   05/28/18  1843 05/28/18 2012 05/29/18  0455 05/30/18  0428   SODIUM -- 138 -- --   POTASSIUM -- 3.8 3.6 3.6   CHLORIDE -- 103 -- --   CARBONDIOXI -- 27 -- --   ANIONGAP -- 8.0 -- --   GLUCOSE -- 109* -- --   BUNUREANRO -- 21 -- --   CREATININE 1.06 1.00 0.97 0.96   CALCIUMSERUM -- 8.6 -- --   ESTGFRMDRD >60 >60 >60 >60   ESTGFRIFBLCK >60 >60 >60 >60       Imaging:   XR SPINE CERV FLEX&EXT ONLY   Final Result   IMPRESSION: C2 fractures. No pathologic subluxation with limited flexion/extension.       CT Angio Neck   Final Result   IMPRESSION:     1. Normal carotid CT angiogram with no evidence of arterial injury, transection, dissection, stenosis or pseudoaneurysm.   2. Redemonstration of minimally displaced bilateral C2 laminar fractures.             Additional Comments: I discussed the patient's care with Dr. Hardin and bedside RN.     ASSESSMENT:  Principal Problem:  Fall  Active Problems:  Closed fracture of second cervical vertebra (HCC)    IMPRESSION/PLAN:    Neuro: Stable C2 Laminar fx- No bracing needed. HX of supranuclear palsy: PTA rivastigmine and amantadine resumed on admission. SP/PT/OT following   Acute Pain: multimodal pain management approach with scheduled acetaminophen, Lidoderm Patch, Robaxin, Oxycodone PRN, ice packs  Cardiovascular: HD stable, Hx HTN- On PTA Norvasc, HCTZ, Benicar  Lines: Location-PIV  DVT Prophylaxis: mechanical, Lovenox, mobilize  Respiratory: On RA- Encourage IS and pulmonary toileting   GI: Soft diet with supplements- Speech following for risk of aspiration   Ulcer Prophylaxis: None indicated   Malnutrition : Does not meet malnutrition criteria (two required)      /Renal: Making adequate UOP- Creatinine stable- Frequent urination- UA unremarkable. IVF st  Cloud: No   Indication: NA  Musculoskeletal: C2 fx and supranuclear palsy as above- PT/OT  C/T/L -spine status: C2 fx no bracing needed   Activity: As tolerated  Weight bearing status: As tolerated   Integumentary:  Wound care plan (s):NA  Sutures/Staples:NA  Drains Present: NA  Mobility issues: no  Appliance/brace: no  Measures to prevent skin breakdown: OOB, frequent turn and repositioning, RN skin assessment   Pressure Ulcer: Blanchable Erythema Left;Right Neck (Active)   First Observed/Origin Date/First Observed/Origin Time: 05/28/18 1700 Type: Blanchable Erythema Orientation: Left;Right Location: (c) Neck Wound Observance : Prior to Admission     Pressure Ulcer: Blanchable Erythema Left;Right Ear (Active)   First Observed/Origin Date/First Observed/Origin Time: 05/28/18 1700 Type: Blanchable Erythema Orientation: Left;Right Location: Ear Wound Observance : Prior to Admission     Pressure Ulcer: Blanchable Erythema Left;Right;Posterior Neck;Back (Active)   First Observed/Origin Date/First Observed/Origin Time: 05/28/18 1700 Type: Blanchable Erythema Orientation: Left;Right;Posterior Location: Neck;Back Wound Observance : Prior to Admission     Pressure Ulcer: Blanchable Erythema Left;Right;Upper Chest;Shoulder (Active)   First Observed/Origin Date/First Observed/Origin Time: 05/28/18 1700 Type: Blanchable Erythema Orientation: Left;Right;Upper Location: (c) Chest;Shoulder Wound Observance : Prior to Admission     Skin Condition Ecchymotic (Bruised) Right Hip (Active)   First Observed/Origin Date/First Observed/Origin Time: 05/28/18 1700 Skin Condition Type: Ecchymotic (Bruised) Orientation: Right Location: Hip Wound Observance : Prior to Admission     Skin Condition Other (Comment) Left;Right;Lower Leg (Active)   First Observed/Origin Date/First Observed/Origin Time: 05/28/18 1700 Skin Condition Type: (c) Other  (Comment) Orientation: Left;Right;Lower Location: Leg Wound Observance : Prior to Admission     Restraints: Not indicated  Infectious Disease: NA  Endocrine/Metabolic: Electrolytes stable   Consulting Services: Neurosurgery   Therapies: Physical Therapy, Occupational Therapy, Speech Therapy  : Following for discharge planning, Will need TCU     Disposition Plan:  Transition care placement 5/31    30 minutes total time spent with customer, more than 50% of time spent counseling and/or coordination of care.     Betzy Mock, APRN, CNP    Back to top of Progress Notes  Florin Augustin MD - 05/30/2018 7:52 AM CDT  Formatting of this note may be different from the original.  Neurosurgery Progress Note    Chief complaint: Neck sore     Interval: No acute events overnight. No neuro changes.     Patient Vitals for the past 6 hrs:  BP Temp Pulse Resp SpO2   05/30/18 0721 (!) 144/87 98.8  F (37.1  C) 89 18 96 %   05/30/18 0305 128/77 97.9  F (36.6  C) 87 17 95 %     Allergies: Codeine  Neurological: Higher integrative functions: Oriented to person, place and time  Memory: Good Recent and remote events  Attention Span and Concentration: Good  Language: clear   Fund of Knowledge: good  CN II-XII: PERRLA, conjugate gaze, face symmetrical  Sensation: No decrease in sensation in upper or lower limbs to touch  Myotatic Reflexes: 2/4 throughout   Motor: Full strength in all 4 without focal paresis.     Labs:   SODIUM   Date Value Ref Range Status   05/28/2018 138 136 - 145 mmol/L Final     WBC   Date Value Ref Range Status   05/28/2018 11.6 4.3 - 10.8 K/uL Final     RBC   Date Value Ref Range Status   05/28/2018 4.91 4.60 - 6.20 M/uL Final     HEMOGLOBIN   Date Value Ref Range Status   05/28/2018 14.6 14.0 - 18.0 gm/dL Final     Imaging:   Flex/ex xrays without instability yesterday     Impression/Plan: 66 yo male with stable C2 fracture. No instability on flex/ex films. No collar needed. Up with therapies.  Dispo pending. No f/u needed.     DVT PPX: okay     Guanako Salguero PA-C  Pager: [phone]    Neck sore,  No new UE or LE sx    Alert, mild confusion  Speech mildly dysarthric, mod fluent  P3/3-2/2, conjugate  Flat affect  Mot 5/5 all    Stable C2 laminar fx, no Rx  Okay for d/c from our standpoint  No f/u needed  Thank you        Back to top of Progress Notes  Lauren Pedersen RN - 05/30/2018 12:20 AM CDT  [facility]  Med-Surg Care Progression Note Type: Shift to shift summary [id number]     Length of stay: 1 days  Code Status: Full Code    Reason for Admission: Direct admit from [facility]. Pt was attempting to sit backwards in a chair when he lost his balance trying to keep from spilling glass of orange juice. Fall resulting in a C-2 fx.  5/28 CT angio done tonight  5/29 X-ray Cervical spine/flexion/extension, Ok to remove collar per Neuro MD.    Hx: Progressive Supranuclear Palsy-causing weak voice, dysphagia issues, sensitivity to bright lights(sunglasses and hat in room). Walks with walker baseline but per wife has been using w/c more when out lately.     F- Feeding Progression: Soft diet- pt has difficultly swallowing, needs assistance with feeds. Takes pills slowly with pudding.     Fluids Progression: IVF at 75ml/hr     A- Analgesic Progression: Pain at goal with interventions. C/o neck pain from collar. Ice pack applied.     S- Skin Progression: Total Alfred Score: 16: High risk for PI development. Neck, upper shoulders and chest with blanchable redness from hard collar. R hip bruising CDI. Q2hr turns in bed with turning system. On specialty bed. Pt refusing to wear BL heel boots in bed.     Safety Progression: Hendrich II Total Score: 4; Falls risk - interventions in place call light within reach, green armband on, bed alarm on. Purposeful hourly rounding.     T- Telemetry Progression: Not applicable    Treatment Progression: Therapies. Pain management. Neuro checks.     E- Emotional Needs  Progression: Participating in cares. Wife is primary caregiver, very supportive.     Neuro Progression: Alert and Oriented. Reports baseline numbness to BL toes. Voice is weak but understandable.     R- Respiratory Needs Progression: On room air cont. to monitor and Incentive Spirometer pulls 500-750 with encouragement.     H- Head OUT of Bed/Activity Progression: PT following and OT following. OOB to chair with A1-2, walker, gait belt.    U- Ultimate Discharge Progression: Therapies.     Anticipated Disposition: Plan to: TBD- Home with wife? Vs facility.     Anticipated Equipment/Supply Needs: Anticipated equipment/supply needs TBD.     G- Glycemic Control Progression: Not applicable    B- Bowel and Bladder Care Progression: Last BM: 05/24/18; On bowel regimen/regular. Pt freq urination in urinal with assistance. Brief on overnight. No BM this shift.     I- Indwelling/Invasive Devices Progression: PIV.     D- DVT/Anticoagulation Progression: SCDs and Anticoagulant SQ.     Summary: Photosensitivity- uses sunglasses and hat in room. OOB with A1-2, gait belt and walker. Q2hr turns in bed. Monitor UOP.     Lauren Pedersen RN        Back to top of Progress Notes  Leonora Soliman - 05/29/2018 1:09 PM CDT  Occupational Therapy    Initiated OT in room. Pt was accompanied by spouse and two friends, slowly eating some lunch. Spouse politely requested OT return later. Will reschedule.     Back to top of Progress Notes  Lara Alberto PA-C - 05/29/2018 10:30 AM CDT  Formatting of this note may be different from the original.  TRAUMA TERTIARY EXAM NOTE    Patient seen on 5/29/2018 AT 10:30 AM.    LOS: 1 day     CC/HPI: Neck pain    Interval History: Plan for tx without a collar per NS. Reporting ongoing pain to neck. Happy to have collar off. Also felt a migraine may be starting.    PAST MEDICAL HISTORY:   No Change    REVIEW OF SYSTEMS: Last 24 hours; Denies fever, chills, nausea/vomiting, chest pain, and shortness  of breath.    CURRENT MEDS:  Current Facility-Administered Medications: **saline FLUSH syringe 10 mL, 10 mL, Intravenous, Q8H  **saline FLUSH syringe 10 mL, 10 mL, Intravenous, PRN  acetaminophen (TYLENOL) tablet 650 mg, 650 mg, oral, Q4H PRN  amantadine HCl (SYMMETREL) capsule 100 mg, 100 mg, oral, TID  amLODIPine (NORVASC) tablet 2.5 mg, 2.5 mg, oral, QPMCC  Cholecalciferol (Vitamin D3) tablet 2,000 Units, 2,000 Units, oral, DAILY  dextrose 5% in water-lactated ringers IV infusion, , Intravenous, CONTINUOUS  enoxaparin (LOVENOX) injection 30 mg, 30 mg, Subcutaneous, Q12H (NS)  Falls Pharmacy Consult, 1 Consult, N/A, PRN  hydroCHLOROthiazide (MICROZIDE) capsule 12.5 mg, 12.5 mg, oral, DAILY  lidocaine ( LIDODERM ) 5 % patch REMOVAL 1 each, 1 each, Transdermal, DAILY - PATCH REMOVAL FOR LIDO  lidocaine (LMX-4) topical cream 1 Application, 1 Application, Topical, PRN  lidocaine 1% (XYLOCAINE) injection 0.1-0.3 mL, 0.1-0.3 mL, Intradermal, PRN  lidocaine 1% / 8.4% sod bicarb (buffered lidocaine) syringe for IV starts 0.1-0.3 mL, 0.1-0.3 mL, Intradermal, PRN  lidocaine 5% (LIDODERM) adhesive patch, medicated 1 patch, 1 patch, Transdermal, DAILY - APPLY PATCH  Lubricant Drops ophthamlic (EYE) solution 1-2 drop, 1-2 drop, Each Eye, Q1H PRN  MAGNESIUM REPLACEMENT INTRAVENOUS - NOT FOR DOCUMENTATION PURPOSES, , Intravenous, PER PROTOCOL  naloxone (NARCAN) injection 0.1 mg, 0.1 mg, Intravenous, Q1 MINUTE PRN  nonformulary medication (pending approval) 500 mg, 500 mg, oral, Q6H PRN  nortriptyline (PAMELOR) capsule 30 mg, 30 mg, oral, Q BEDTIME  olmesartan (BENICAR) tablet 20 mg, 20 mg, oral, Twice Daily  ondansetron (ZOFRAN) disintegrating tablet 4 mg, 4 mg, oral, Q8H PRN  oxyCODONE (immediate release) (ROXICODONE) tablet 2.5-5 mg, 2.5-5 mg, oral, Q4H PRN  POTASSIUM REPLACEMENT INTRAVENOUS - NOT FOR DOCUMENTATION PURPOSES, , Intravenous, PER PROTOCOL  POTASSIUM REPLACEMENT ORAL - NOT FOR DOCUMENTATION PURPOSES, , oral, PER  PROTOCOL  rivastigmine (EXELON) 9.5 mg/24 hr transdermal patch 9.5 mg, 9.5 mg, Transdermal, DAILY  rivastigmine 9.5 mg/24 hour patch REMOVAL 1 each, 1 each, Transdermal, DAILY  senna-docusate (SENNA-S) tablet 1-2 tablet, 1-2 tablet, oral, Twice Daily    EXAM:  Temp (24hrs), Av.5  F (36.9  C), Min:97.9  F (36.6  C), Max:99.3  F (37.4  C)    BP (!) 152/96  Pulse 90  Temp 97.9  F (36.6  C)  Resp 18  Ht 6' (1.829 m)  Wt 85.1 kg (187 lb 11.2 oz)  SpO2 98%  BMI 25.46 kg/m    Patient Vitals for the past 72 hrs:  Weight   18 1655 85.1 kg (187 lb 11.2 oz)   18 1639 87.5 kg (193 lb)     Glascow Coma Scale:  Eyes Open: 4 - Eyes open spontaneously  Best Verbal Response: 5 -Oriented  Best Motor Response: 6 -Obeys commands  Total Score: 15    Head: normocephalic, atraumatic  Neurologic: alert, Cranial Nerves - III, IV, V, VI, VII, IX and X intact- exam slightly limited by progressive supranuclear palsy; Sensation intact-   HEENT  Eyes: Normal lids and conjunctivae - PERRLA - EOMs intact, Pupils: Right - reactive with size 2 mm / Left - reactive with size 2 mm  Ears: externally normal  Nose/sinus: no deformity  Throat/Oropharynx: oral mucosa and pharynx normal, moist mucous membranes  Neck: supple, tracheal midline, no tracheal deviation, neck slightly TTP down midline  Chest: normal diaphramatic movement and symmetrical  Pulmonary: lungs clear to auscultation and percussion, effort normal, room air  Cardiovascular:  Heart: Normal, S1, S2, regular rhythm, Rate: normal, Edema: none, Pulses: Radial: bilateral - symetrical, 2-3  Posterior Tibial: Bilateral - symmetrical, 2-3  Gastrointestinal:  Abdominal: soft, nontender, nondistended  Rectal: deferred  Genitourinary: deferred   Musculoskeletal:  Extremities: RUE: no deformities, non-tender to palpation, FROM at all joints with 5/5 strength; LUE: no deformities, non-tender to palpation, FROM at all joints with 5/5 strength; RLE: no deformities, non-tender to  palpation, FROM at ankle, no pain with movement at hip and knee, ankle strength 5/5; LLE: no deformities, non-tender to palpation, FROM at ankle, no pain with movement at hip and knee, ankle strength 5/5; pelvis: stable and non-tender  Integumentary: intact, warm, dry    Intake/Output Summary (Last 24 hours) at 05/29/18 1425  Last data filed at 05/29/18 1335  Gross per 24 hour   Intake 2004 ml   Output 1925 ml   Net 79 ml     Recent Labs   05/28/18 2012   WBC 11.6*   RBC 4.91   HEMOGLOBIN 14.6   HEMATOCRIT 42.2   MCV 86   MCH 30   RDW 12.3   PLATELETCT 176     Recent Labs   05/28/18  1843 05/28/18 2012 05/29/18  0455   SODIUM -- 138 --   POTASSIUM -- 3.8 3.6   CHLORIDE -- 103 --   CARBONDIOXI -- 27 --   ANIONGAP -- 8.0 --   GLUCOSE -- 109* --   BUNUREANRO -- 21 --   CREATININE 1.06 1.00 0.97   CALCIUMSERUM -- 8.6 --   ESTGFRMDRD >60 >60 >60   ESTGFRIFBLCK >60 >60 >60       IMAGING:  No new imaging    ETOH SCREENING:  No results found for this visit on 05/28/18.  No Data Recorded    Do you drink alcohol? no    AUDIT-C Questionnaire  N/A    ADDITIONAL COMMENTS:   I reviewed the patient's new clinical lab test results. K 3.6, creat 0.97, Mg 1.9    ASSESSMENT:  Current Known Injuries:  Principal Problem:  Fall  Active Problems:  Closed fracture of second cervical vertebra (HCC)    New Findings: none  Incidental Findings: none  Follow up tertiary exam: Not required, patient responsive and able to participate in clinical exam    IMPRESSION/PLAN:  Imaging needed: none  Labs needed: none    Neuro: C2 fracture, no bracing per Neurosurgery. Start therapies  Hx progressive supranuclear palsy: stable, SP/OT/PT to see. On PTA rivastigmine and amantadine  Acute pain regimen: Add Lidoderm patch to neck, APAP prn with PTA caffeine for migraine, oxycodone prn  Cardiovascular: Hx HTN on PTA Norvasc and HCTZ and Benicar  Lines: PIV  DVT Prophylaxis: mechanical, Lovenox, mobilize  Respiratory: No acute issues, encourage OOB and  pulmonary toilet  GI: Soft diet, dysphagia with PSP, SP to see. Supplements and bowel regimen ordered.  Ulcer Prophylaxis: None indicated   Malnutrition : Does not meet malnutrition criteria (two required)     /Renal: Voiding, creat stable.  Cloud: No  Musculoskeletal: C2 fracture above  C/T/L -spine status: Cleared for no collar  Activity: As tolerated with assist  Weight bearing status: WBAT  Integumentary:  Wound care plan (s): bacitracin prn  Sutures/Staples: none  Mobility issues: yes  Appliance/brace: no  Measures to prevent skin breakdown: frequent turns and skin checks, mobilize  Pressure Ulcer: Blanchable Erythema Left;Right Neck (Active)   First Observed/Origin Date/First Observed/Origin Time: 05/28/18 1700 Type: Blanchable Erythema Orientation: Left;Right Location: (c) Neck Wound Observance : Prior to Admission     Pressure Ulcer: Blanchable Erythema Left;Right Ear (Active)   First Observed/Origin Date/First Observed/Origin Time: 05/28/18 1700 Type: Blanchable Erythema Orientation: Left;Right Location: Ear Wound Observance : Prior to Admission     Pressure Ulcer: Blanchable Erythema Left;Right;Posterior Neck;Back (Active)   First Observed/Origin Date/First Observed/Origin Time: 05/28/18 1700 Type: Blanchable Erythema Orientation: Left;Right;Posterior Location: Neck;Back Wound Observance : Prior to Admission     Pressure Ulcer: Blanchable Erythema Left;Right;Upper Chest;Shoulder (Active)   First Observed/Origin Date/First Observed/Origin Time: 05/28/18 1700 Type: Blanchable Erythema Orientation: Left;Right;Upper Location: (c) Chest;Shoulder Wound Observance : Prior to Admission     Skin Condition Ecchymotic (Bruised) Right Hip (Active)   First Observed/Origin Date/First Observed/Origin Time: 05/28/18 1700 Skin Condition Type: Ecchymotic (Bruised) Orientation: Right Location: Hip Wound Observance : Prior to Admission     Skin Condition Other (Comment) Left;Right;Lower Leg (Active)   First Observed/Origin  Date/First Observed/Origin Time: 05/28/18 1700 Skin Condition Type: (c) Other (Comment) Orientation: Left;Right;Lower Location: Leg Wound Observance : Prior to Admission     Restraints: Not indicated  Infectious Disease: Afebrile  Antibiotics: none  Endocrine/Metabolic: K/M protocol  Consulting Services: Neurosurgery  Therapies: Physical Therapy, Occupational Therapy  : Following    Disposition Plan:  Pending therapist(s)' recommendations    30 minutes total time spent with customer, more than 50% of time spent counseling and/or coordination of care.     Lara Alberto PA-C        Back to top of Progress Notes  Florin Augustin MD - 05/29/2018 10:19 AM CDT  F/E C-sp okay   No evidence of instability    Back to top of Progress Notes  Johanna Silveira RN - 05/29/2018 8:51 AM CDT  [facility]  Med-Surg Care Progression Note Type: Shift to shift summary     Length of stay: 1 days  Code Status: Full Code    Reason for Admission: Direct admit from [place] [facility]. Pt was attempting to sit backwards in a chair when he lost his balance trying to keep from spilling glass of orange juice. Fall resulting in a C-2 fx.  5/28 CT angio done tonight    Hx: Progressive Supranuclear Palsy-causing weak voice, dysphagia issues, sensitivity to bright lights(sunglasses and hat in room). Walks with walker baseline but per wife has been using w/c more when out lately.     F- Feeding Progression: NPO .Difficulty w/swallowing per report. SP to consult.     Fluids Progression: D5 LR infusing at 125 ml/hour. PO, restrictions.     A- Analgesic Progression: Pain at goal with interventions, Repositioning for comfort. Reports neck discomfort at goal of 2/10 with repositioning. Declining pain meds.    S- Skin Progression: Total Alfred Score: 15: skin under hard field collar reddened, blanchable to bilateral sides of neck, anterior chest and shoulder and posterior neck, and ears. Foam mepilex placed under hard collar for padding.  Right mid back with blanchable redness; has inflatable pillow that valve was digging into skin. Right hip with bruising c/d/I. Turn/repo q2hrs, TAPS in place. 1 heel boot in room, dispensing currently out of stock.    Safety Progression: Hendrich II Total Score: 5; Falls risk - interventions in place bed alarm on. Call light within reach. Purposeful hourly rounding done.     T- Telemetry Progression: Not applicable    Treatment Progression: Neurosurgery to consult tomorrow am. PT/OT. Pain control. Neuro checks. Bladder scan, monitor UO. Hard Cervical field collar in place ( needs aspen)?    E- Emotional Needs Progression: Participating in cares and Updated family/SO on POC. Pt calm and cooperative with all cares. Wife is primary caregiver, very supportive.    Neuro Progression: Alert and Oriented and Neuro checks every 2 hours. UE's strong, Pt is able to lift LE's off of bed against resistance, slight weakness noted to plantar/dorsi flexion bilaterally. Reports baseline numbness to bilateral toes; possibly related to lumbar bulging discs suffered from fall from ladder 25yrs ago per wife. Sensation is otherwise intact. Voice is weak, but understandable.    R- Respiratory Needs Progression: On room air cont. to monitor    H- Head OUT of Bed/Activity Progression: Bedrest, c-spine precautions.     U- Ultimate Discharge Progression: TBD     Anticipated Disposition: Plan to: TBD Home with wife?, she is primary caregiver    Anticipated Equipment/Supply Needs: Anticipated equipment/supply needs aspen collar?     G- Glycemic Control Progression: Not applicable    B- Bowel and Bladder Care Progression: Last BM: 05/24/18; Voiding in urinal multiple times throughout night. May be retaining urine; straight cathed x1 on anton, monitor closely.     I- Indwelling/Invasive Devices Progression: left and right PIV.     D- DVT/Anticoagulation Progression: SCD's..     Summary/admission: Admitted at 1620. Pt had CT angio done, see results  in [facility]. Neurosurg to consult in am.     Johanna Silveira, ANN MARIE          Back to top of Progress Notes  Alice Lobo - 05/29/2018 7:50 AM CDT  PT  Patient is on bedrest with C2 fracture. Will wait for activity orders.    Back to top of Progress Notes  Esther Jo RN - 05/29/2018 12:06 AM CDT  [facility]  Med-Surg Care Progression Note Type: Shift to shift summary [id number]/Admission     Length of stay: 0 days  Code Status: Full Code    Reason for Admission: Pt was direct admit from [facility] after fall backwards out of a chair, hitting his head. Pt reportedly fell backwards sustaining C2 lamina fx and was sent here for neurosurg consult  5/28 CT angio done tonight    Hx: Progressive Supranuclear Palsy-causing weak voice, dysphagia issues, sensitivity to bright lights(sunglasses and hat in room). Walks with walker baseline but per wife has been using w/c more when out lately.    F- Feeding Progression: NPO     Fluids Progression: Recieived 1000 ml NS fluid bolus. Now has D5 LR infusing at 125 ml/hour.     A- Analgesic Progression: Pain at goal with interventions, Repositioning for comfort. Reports neck discomfort at goal of 2/10 with repositioning.     S- Skin Progression: Total Alfred Score: 16: skin under hard field collar reddened, blanchable to bilateral sides of neck, anterior chest and shoulder and posterior neck. Foam mepilex placed under hard collar for padding. Skin under collar cleansed. Bilateral ears with blanchable redness, collar off of ears. Right mid back with blanchable redness. Right hip with bruising c/d/I. TAPS system in place. Repositioning side to side every 2 hours. Elevating heels.    Safety Progression: Hendrich II Total Score: 6; Falls risk - interventions in place bed alarm on. Call light within reach. Purposeful hourly rounding done.     T- Telemetry Progression: Not applicable    Treatment Progression: Neurosurgery to consult tomorrow am. PT/OT. Pain control. Neuro checks.  Bladder scan, monitor UO. Hard Cervical field collar in place ( needs aspen)?    E- Emotional Needs Progression: Participating in cares and Updated family/SO on POC. Pt calm and cooperative with all cares. Lots of family/friends bedside this shift. Wife is primary caregiver, very supportive.    Neuro Progression: Alert and Oriented and Neuro checks every 2 hours. UE's strong, Pt is able to lift LE's off of bed against resistance, slight weakness noted to plantar/dorsi flexion bilaterally. Reports baseline numbness to bilateral toes. Sensation is otherwise intact. Voice is weak.    R- Respiratory Needs Progression: BP (!) 149/85  Pulse 83  Temp 99.3  F (37.4  C)  Resp 17  Ht 6' (1.829 m)  Wt 85.1 kg (187 lb 11.2 oz)  SpO2 99%On room air cont. to monitor    H- Head OUT of Bed/Activity Progression: pt is on flat bedrest with c-spine precautions. Turning side to side.     U- Ultimate Discharge Progression: TBD     Anticipated Disposition: Plan to: TBD Home with wife?, she is primary caregiver    Anticipated Equipment/Supply Needs: Anticipated equipment/supply needs aspen collar?     G- Glycemic Control Progression: K+-3.8, Mg2+-1.9, hgb-14.6, WBC-11.6     B- Bowel and Bladder Care Progression: Last BM: 05/24/18; pt voided 100 ml x 1 this shift. C/o bladder discomfort and inability to void. Bladder scanner reading >200 ml. January Finger PA with trauma updated and st cath order obtained. Pt was st cathed at 2221 for 850 ml yellow, cloudy urine. Monitor closely.     I- Indwelling/Invasive Devices Progression: left and right PIV.     D- DVT/Anticoagulation Progression: Sequentials placed.     Summary/admission: pt was admitted to room 633 at 1620. Oriented to room, call light and POC. Wife and family were bedside. Pt had CT angio done this shift. Received 1000 ml NS fluid bolus. St cathed for 850 ml at 2221. Pain tolerable with repositioning. Monitor skin under hard collar, padded with mepilex foam. Neurosurg to  consult in am. Report given to oncoming RN. CAROL WEAVER RN          Back to top of Progress Notes  Trena Steel RN - 05/28/2018 2:51 PM CDT  Med-Surg Care Progression Note Type: Admission summary     Length of stay: 0 days  Code Status: No Order    Reason for Admission: pt coming from [facility] after falling at home today. Was found down by caregiver who found him on his back. He had hit his head. HCT negative but cervical spine ct shows C2 lamina fx. He will be transferred to  and have neuro surg consult.    F- Feeding Progression: Tolerating Diet    Fluids Progression: Saline lock - tolerating PO    A- Analgesic Progression: Has slight pain in neck but has not been given any medication or IV fluids at [facility].    S- Skin Progression: Total Alfred Score: (not recorded): Maintaining skin integrity/pressure prevention     Safety Progression: Hendrich II Total Score: (not recorded); has been falling recently. He has been dx with supranuclear palsy which is like ALS/parkinsons. He does ambulate at times but did come in to [facility] with his wheelchair.     T- Telemetry Progression: Not applicable    Treatment Progression: Accepted by Dr. French and will have neurosurg consult    E- Emotional Needs Progression: His wife is his main caregiver but does have a caregiver that comes in a few times a week so that she can go her errands.    Neuro Progression: Alert and Oriented    R- Respiratory Needs Progression: On room air cont. to monitor    H- Head OUT of Bed/Activity Progression: will be a fall risk and need help with ambulation.    U- Ultimate Discharge Progression: may need more help at home and SS consult should be done.    Anticipated Disposition: Plan to: Home    Anticipated Equipment/Supply Needs: None     G- Glycemic Control Progression: Not applicable    B- Bowel and Bladder Care Progression: Last BM: (not recorded); On bowel regimen/regular    I- Indwelling/Invasive Devices Progression: Not applicable    D-  DVT/Anticoagulation Progression: Not applicable    Summary Coming from [facility]. Report taken from [facility] at [phone] so call if any questions. They have been called and can come after [phone] to [phone]. Last VS were 129/80, HR 85 and sats good at 100% on RA.  Kathy Steel RN        Back to top of Progress Notes  H&P Notes  - in this encounter    Denis Smith MD - 05/28/2018 7:37 PM CDT  Formatting of this note may be different from the original.  TRAUMA ADMISSION HISTORY AND PHYSICAL    Patient Name: Jimmy Patrick  Address: [address]  [place] [address]  Age:67 y.o.  Sex: male  Admission Date/Time: 5/28/2018 4:33 PM  Admitting provider: Xander French MD  Hospital Attending Physician: Xander French MD   Primary Care Provider: Md, None   Informant:patient and spouse and family     Patient seen on 5/28/2018 at 1900.     Trauma Activation: Trauma Outside of ED     Arrival/Pre-Notification: Referring Hospital--transfer from [facility] ED     CHIEF COMPLAINT: fall    HPI: Jimmy Patrick is a 67 y.o. male s/p fall in the basement about 0700 today--fell backwards out of a chair reaching for his OJ and hit his head. Had some neck discomfort but didn't want to come into the ED per his wife. He has had PSP for 4 yrs and still very active, but has progressed. Slow to eat and drink, has dysphagia with this. Was downstairs exercising. Discomfort was worse at lunch and they came to [facility] ED about 1300 today. Direct admit to the floor here at [facility] after C2 fx identified. Rest of ROS negative or chronic, see PMH    REVIEW OF SYSTEMS  A comprehensive review of systems was negative except for items noted in the HPI/Subjective.  Denies CP or SOB. Has chronic numbness of the bilateral feet, and nothing worse or changed today after the fall. Has troubles with headaches and migraines if he doesn't eat, but no headache now. No change in vision, but vision not great in general either. Denies LOC with the fall.  No abd pains, no bleeding.     Last Oral Intake: lunch today   Last Tetanus: Unknown    PAST MEDICAL HISTORY:  Past Medical History:   Diagnosis Date     HTN (hypertension)     Progressive supranuclear palsy (HCC)       PAST SURGICAL HISTORY:  Past Surgical History:   Procedure Laterality Date     CARDIOVASCULAR PROCEDURE UNLI*   cardiac ablation 2013, doing well from this     MEDICATIONS:  Prescriptions Prior to Admission   Medication Sig Dispense Refill Last Dose     Adapalene (DIFFERIN) 0.1 % Top Gel APPLY TO FACE ONCE DAILY AT BEDTIME 11     amantadine HCl (SYMMETREL) 100 mg oral Cap TAKE 1 CAPSULE (100 MG) BY MOUTH 3 TIMES DAILY AT 6AM,11AM AND 4PM 3     amLODIPine (NORVASC) 2.5 mg oral tablet Take 1 tablet (2.5 mg) by mouth daily In evening.     BENICAR 20 mg oral Tab TAKE 1/2 TABLET IN EVENING FOR HEADACHE 3     fluticasone (FLONASE) 50 mcg/actuation nasal spray Instill 1 spray into EACH nare Twice a Day. 16 g 0     nortriptyline (PAMELOR) 10 mg oral Cap Take by mouth at bedtime.     rivastigmine (EXELON) 4.6 mg/24 hr TD PT24 PLACE 1 PATCH ONTO THE SKIN DAILY 3     senna-docusate (SENNA-S) 8.6-50 mg oral tablet Take 1 tablet by mouth 2 times daily     ZOLMitriptan (ZOMIG) 5 mg oral Tab TAKE 1 TABLET (5 MG) BY MOUTH AT ONSET OF HEADACHE FOR MIGRAINE 5       ALLERGIES:  Codeine     FAMILY HISTORY:  No bleeding disorders or anesthesia problems.       SOCIAL HISTORY:  Social History     Social History     Marital status:    Spouse name: N/A     Number of children: N/A     Years of education: N/A     Occupational History     Not on file.     Social History Main Topics     Smoking status: Never Smoker     Smokeless tobacco: Never Used     Alcohol use Not on file     Drug use: No     Sexual activity: Not on file     Other Topics Concern     Not on file     Social History Narrative     No narrative on file       PHYSICAL EXAM:  BP (!) 149/85  Pulse 83  Temp 99.3  F (37.4  C)  Resp 17  Ht 6' (1.829 m)  Wt  85.1 kg (187 lb 11.2 oz)  SpO2 99%  BMI 25.46 kg/m      General - healthy, no apparent distress  Eyes - normal lids and conjuntivae, PERRLA, EOMs intact  Head: No abrasions or bleeding  Oropharynx - Oral mucosa and pharynx normal, moist mucous membranes  NECK: supple, trachea midline; C collar in place; mild tender to palpation  RESPIRATORY: lungs clear, no dyspnea  CARDIOVASCULAR: Regular rate and rhythm, no JVD  CHEST: No bony crepitance or subcutaneous emphysema  ABDOMEN: soft, nontender, nondistended, no sign of trauma, no old scars   MUSCULOSKELETAL: no gross deformities, normal range of motion  BACK: Deferred at this time --[place] [facility] [place]   SKIN: intact, warm, dry  NEUROLOGIC: alert and oriented x 3, moves all extremities, cranial nerves 2-12 grossly intact  EXTREMITIES: 2+ DP and radial pulses intact bilaterally        Laboratory Data:  Results for orders placed or performed during the hospital encounter of 05/28/18 (from the past 24 hour(s))   Creatinine / eGFR   Result Value Ref Range   CREATININE 1.06 0.70 - 1.30 mg/dL   EST GFR (MDRD) >60 >60 mL/min   EST GFR IF AFRICAN AM >60 >60 mL/min     Additional Comments: I reviewed the patient's new clinical lab test results.   I reviewed the patient's new imaging test results.     Imaging:   CT Angio Neck (Results Pending)     CT C spine: FINDINGS: There are minimally displaced laminar fractures bilaterally at C2. No extension to the facet joints or pedicles on either side. No associated malalignment.     No additional fracture. No significant disc degeneration. Very mild facet arthropathy.    IMPRESSION: Minimally displaced laminar fractures bilaterally at C2.    CT head  FINDINGS: The ventricles are normal in size and configuration. Normal brain volume.     No intracranial hemorrhage. No intra-axial mass, edema, infarct, or encephalomalacia.     Small partially calcified meningioma along left lateral aspect of the tentorium, 1.1 cm in length.  No associated mass effect.     Sinuses and mastoids are clear. No skull fracture or other bone abnormality.     IMPRESSION  IMPRESSION:      1. No acute intracranial finding.     2. Small meningioma along left lateral aspect of the tentorium. No associated mass effect.    ASSESSMENT/DIFFERENTIAL DIAGNOSIS:  Principal Problem:  Fall  Active Problems:  Closed fracture of second cervical vertebra (HCC)            Pressure Ulcer: Blanchable Erythema Left;Right Neck (Active)   First Observed/Origin Date/First Observed/Origin Time: 05/28/18 1700 Type: Blanchable Erythema Orientation: Left;Right Location: (c) Neck Wound Observance : Prior to Admission     Pressure Ulcer: Blanchable Erythema Left;Right Ear (Active)   First Observed/Origin Date/First Observed/Origin Time: 05/28/18 1700 Type: Blanchable Erythema Orientation: Left;Right Location: Ear Wound Observance : Prior to Admission     Pressure Ulcer: Blanchable Erythema Left;Right;Posterior Neck;Back (Active)   First Observed/Origin Date/First Observed/Origin Time: 05/28/18 1700 Type: Blanchable Erythema Orientation: Left;Right;Posterior Location: Neck;Back Wound Observance : Prior to Admission     Pressure Ulcer: Blanchable Erythema Left;Right;Upper Chest;Shoulder (Active)   First Observed/Origin Date/First Observed/Origin Time: 05/28/18 1700 Type: Blanchable Erythema Orientation: Left;Right;Upper Location: (c) Chest;Shoulder Wound Observance : Prior to Admission     Skin Condition Ecchymotic (Bruised) Right Hip (Active)   First Observed/Origin Date/First Observed/Origin Time: 05/28/18 1700 Skin Condition Type: Ecchymotic (Bruised) Orientation: Right Location: Hip Wound Observance : Prior to Admission     Skin Condition Other (Comment) Left;Right;Lower Leg (Active)   First Observed/Origin Date/First Observed/Origin Time: 05/28/18 1700 Skin Condition Type: (c) Other (Comment) Orientation: Left;Right;Lower Location: Leg Wound Observance : Prior to Admission      PLAN  Jimmy Patrick is a 67 y.o. male s/p Fall with above noted injuries.    Admission and CT angio of the neck pending tonight  NPO/IVF/OK for some ice chips  Neurosurgery consult--[place] [facility]     DISPOSITION: Anticipated date of discharge 3-4 days pending how he does . Criteria for discharge is stable per neurosurgey .  IP - Anticipated LOS >2Midnights due to acuity of clinical presentation requiring inpatient level of care    Cloud: NA  DVT Prophylaxis: mechanical  Ulcer Prophylaxis: None indicated   Restraints: Not indicated  Pain Management:: Narcotics - IV, Oral  Sedation: none  Warming Techniques: passive    Family Conference: discussed with family   Update Called To: Not needed     Specialty Services Consult(s)  Neurosurgery Non-Emergent consult    30 minutes total time spent with customer, more than 50% of time spent counseling and/or coordination of care.     Denis Smith MD    Consult Notes  - in this encounter    Table of Contents for Consult Notes  Victorina Whyte, PT - 05/29/2018 3:13 PM CDT  Leonora Soliman - 05/29/2018 2:39 PM CDT  Diana Blue SLP - 05/29/2018 12:59 PM CDT  Justin White RD - 05/29/2018 12:04 PM CDT  Florin Augustin MD - 05/29/2018 9:12 AM CDT     Victorina Whyte, PT - 05/29/2018 3:13 PM CDT  PT ACUTE EVALUATION    History: Patient admitted to [facility] on 5/25/2018 due to fall resulting in C2 fx. Per NSG, ok to advance activity, no bracing needed. PMH: PSP. Refer to Neeraj Assessment, Chart Review, and Past Medical History.    Social History: Patient lives with spouse in a 2-story home with 4 stairs to enter with 1 rail. There is a w/c lift for these stairs. There are 12 stairs within the home with 2 rails. Patient uses a U step walker at baseline. Patient needs assistance with ADLs. Wife reports she assists patient with all ADLs, also has hired help 3/hours per day for ADLs. Patient goes down stairs to the home  gym ~5x/week. Per wife, patient falls at home frequently but has never been hospitalized for a fall before. Wife present and supportive.    Evaluation Findings: Patient was seen in room for initial evaluation. Patient was alert, oriented and cooperative. Patient follows directions and participates in PT activity. Judgment appears intact and communication is impaired due to mumbled speech.    ROM: BLE ROM WFL. However patient had increased tone in BLE that limited range.    Strength: WFL in lower extremities.    Sensation: Patient's light touch was intact in lower extremities.    Mobility:  Bed mobility- NT, patient began and ended session in bedside chair.  Transfers- sit <> stand with min A. Toilet transfer with min A from raised toilet seat.  Gait- 200' with min A of 2 (therapist and patient's wife), short step length bilaterally, needed cues to slow down when ambulating for safety, unsteady gait.  Stairs- NT, patient too unstable ambulating to safely attempt stairs.    Pain: Patient did not c/o pain, states neck pain waxes and wanes.    Precautions: Precautions include: fall risk and pain.    Endurance: Patient's endurance is good.    Patient Education: Patient educated on role of PT, strengthening and conditioning, transfers and ambulation, safety with mobility, plan of care and risk of falls.    Family Education: safe mobility techniques, risk of falls, home safety, ongoing rehab and discharge recommendation and importance of involvement in rehab    Assessment/Recommendations: Patient is a 68 y/o male presenting s/p fall resulting in C2 fx. At baseline patient lives with his wife in a 2 story home, ambulates with a U step walker and has assist with ADLs. During evaluation patient performed transfers with min A and ambulated with min A of 2 due to gait instability. Has had frequent falls at home per wife. Recommend d/c to TCU for ongoing rehab when medically stable for d/c. PT will continue to follow for  duration of hospitalization. Patient's strengths include: age, good family support, patient motivation, patient cooperation. Patient's limitations include: current medical status and decreased balance.     Patient/Family Participation in Goal Setting: Yes    Patient Goal/Preference: To get stronger.    Treatment Plan: bed mobility, transfer training, gait training, balance, therapeutic exercise, home exercise program, patient/family education and neuromuscular re-education    Goals:  Short/Long Term Goals: 7-10 days  Patient will perform bed mobility with SBA without side rails.  Patient will dangle at edge of bed for 5 minutes with SBA  Patient will perform sit to stand/stand to sit with SBA  Patient will perform standing pivot transfer with SBA FWB on lower extremities  Patient will ambulate 150' FWB on lower extremities SBA and U step walker  Patient will climb and descend full flight of stairs with 2 rails FWB on lower extremities with SBA  Patient will participate in 15-30 minute therapy program.  Patient will demonstrate understanding of home exercise program.  Patient will maximize his/her functional ability.    Therapy Potential: good due to above stated strengths and limitations    Frequency and Duration: 1-2x/day, 5-7days/week or for duration of hospitalization    5 minutes spent reviewing chart.      Back to top of Consult Notes  Leonora Soliman - 2018 2:39 PM CDT  Formatting of this note may be different from the original.  Occupational Therapy - Acute Evaluation Consult   Patient's Name Jimmy Patrick Attending Provider Xander French MD   Patient's  1950 Admitting Diagnosis c2 fx   Patient's Age 67 y.o. Admission Date/Time 2018 4:33 PM   Patient's Gender male Today's Date/Time 2018 / 2:39 PM   Patient's MRN [id number] Therapist Leonora Soliman       Onset of Symptoms Date: 18   Patient History   Social: With Spouse, Private Home (55+ private home can stay main  level, steps to enter or WC lift in garage, has exercise equipment downstairs, hand rails ) Past Medical History: Refer to H&P   Vocational: Other (Comment) (worked in engineering, MA in nuclear engineering, PhD decision analysis) Precautions: Fall Risk (dysphagia, soft diet, diplopia and photophobia, soft voice (has voice amplifier))   Pre-Hospitalization ADL Status   Hygienes: Needs Assist Grocery Shopping: Not Responsible   Feeding: Needs Assist Money Management: Unknown   Dressing: Dependent Medication Management: Needs Assist   Meal Prep: Not Responsible Driving Status: Not Responsible   Yard Work/Snow Removal: Not Responsible   Current ADL Status   Hygienes: Will Further Assess Dressing: Dependent   Feeding: Will Further Assess Meal Prep: Not Responsible   Homemaking: Not Responsible   Cognitive Function   Attention Span: Good for evaluation session Oriented to Place: Inaccurate (came from [facility], never been here, moved here from [place] 2.5 yrs ago)   Level of Alertness: Alert Oriented to Date: Accurate   Mood: Cooperative Immediate Memory: Accurate   Motivation: Good Recent Memory: Unable To Assess   Orientation to Person: Accurate Remote Memory: Accurate (for basic)   Previous Hand Dominance   Previous Hand Dominance: Right   RUE Function   RUE AROM: Functional  RUE Strength: Functional   LUE Function   LUE AROM: Limited  LUE Strength: Functional   Endurance/Activity Tolerance   Endurance/Activity Tolerance: Fair   Pain Score   Pain (0-10): Other (Comment) (pt did not rate, had a new Lidoderm patch)   Patient Education   Patient/Family Education: Educated on role of OT, Goals set together   Patient Goals   Patient Goal/Preference: Pt agreed to OT for ADLs, possible adaptive equipment and UE therapeutic exercise.   Short-Term Goals   Pt will in:: 5-7 days   Grooming Goals: Complete grooming independently with supplies in reach UE Goals: Demonstrate independence with home exercise program, Other (Comment) (Femi  "functional coordination for basic ADLs)     Eating Goals: Be independent in feeding self with adaptive equipment/techniques Energy Conservation: Demonstrate follow through with energy conservation techniques during ADL's   Endurance Goals: Tolerate 30 minutes of light UE exercise/ADL activity   Long-Term Goals   Patient Will:: Maximize ADL independence, Return to least restrictive environment   Patient Strengths   Strengths: Functional UE Strength, Supportive Family, Motivated, Other (Comment) (has some adaptive equipment and PSP resources in place)   Patient Limitations   Limitations: Multiple Medical Issues, Decreased Mobility, Needs Assist for ADL's   Treatment Plan   Treatment Plan: Graded UE exercises/Endurance building, Patient/family education, ADL evaluation and training with adaptive equipment, Work simplification/Energy conservation instructions, Instruction in home exercise program   Frequency Duration   Pt will be seen:: 5-6 x per week, 1 x daily Duration: During hospitalization   Therapy Potential   Therapy: Good 7N Rehab Unit: Not appropriate   Recommendations   OT Recommendations: Ongoing OT Services, disposition appears most dependent on PT progress and patient's ability to mobilize, today bed to chair required A of 2 per visitor  OT Services: ADL's, Endurance/Activity Tolerance, Strengthening, UE Functional Use    Pt was seen in room with spouse / caregiver and two friends (his former boss and his spouse) present. Patient's spouse \"Kriss\" provided the majority of social and ADL hx due to patient's soft voice and difficulty with speech. At the end of March they began to receive in home assistance via home health aides as follows: Monday eves 730 - 11, Tues and Thurs 12 - 330 pm and Wed and Fri 7 am - 1030. Pt has not been receiving any home health therapies. The visitor spoke with writer while spouse assisted pt with using urinal and commented that she thinks his spouse needs MORE assistance at home. " There are two daughters in the area and pt and spouse moved here 2.5 years ago from [place].     His diagnosis of progressive supranuclear palsy (PSP) was 4 years ago and friend commented it likely started earlier such as noting difficulty with vision at work. They have the [facility] as resource for support groups and pt is in a drug trial at the [facility].     Pt has the following equipment at home: Lifeline, U step walker (squeeze brakes to go) and straps a reacher to it, Drive duet walker which converts to transport chair (although the brakes work in opposite / traditional manner), Life Vac (used in event of choking to remove food residue), Theracycle (electric, senses effort and provides assist PRN), Core Trainer machine, accessible bathroom, W/C lift from garage (but has not used it yet). Pt had injured lumbar discs due to fall from tree over two decades ago and has been diligent with trying to keep up his exercises per spouse.           Back to top of Consult Notes  Diana Blue, NORMA - 05/29/2018 12:59 PM CDT  SPEECH PATHOLOGY INITIAL EVALUATION    Patient Name: Jimmy Patrick   YOB: 1950  Age: 67 y.o.  MRN: [id number]  Attending Provider: Xander French MD  Date: 5/29/2018    HISTORY    Principal Problem:  Fall  Active Problems:  Closed fracture of second cervical vertebra (HCC)      Orders Received: Evaluate and treat (swallowing)  Present Method of Nutrition: Oral  Current Diet Consistency: Soft, Regular (Thin) Liquids    Employment: N/A    EVALUATION RESULTS    Bedside/Behavioral Observations:   Alert but appeared fatigued  Followed simple directives  Speech intelligibility significantly reduced due to decreased loudness and imprecise articulation  Adequate natural dentition   Noted to swallow mouthwash when attempting to clean mouth prior to meal, had difficulty with swish and spit technique    Hearing Status: Hearing acuity was not formally assessed but appeared adequate for  conversation    Oral Mechanism:   Mouth opening was restricted  Tongue protrusion was restricted, with only slight protrusion past the lower lip  The patient demonstrated equal tongue lateralization to the right and left corners of mouth, but intraoral lateralization appeared reduced  Lingual elevation was reduced  Vocal quality was weak and wet sounding  Volitional coughing and throat-clearing was weak    BEDSIDE SWALLOWING ASSESSMENT    Patient's position during the evaluation: upright in a chair    Items assessed included thin liquids and soft textured food from lunch tray .    The patient had reduced mouth opening and was slow to retrieve items from the spoon. Patient fed himself, but the process of bringing utensil to mouth was significantly delayed. Patient's wife assisted with cutting up food and occasionally helped him place food on utensil.     The oral stage of the swallow was characterized by:   Slow oral stage of swallow  Reduced bolus control and formation  Occasional anterior spillage   Reduced mastication    The pharyngeal stage of the swallow was characterized by: Apparent delay in initiation of pharyngeal swallow - moderate to severe  Multiple swallows; indicating possible pharyngeal weakness or reduced pharyngeal contraction  No coughing demonstrated but vocal quality was wet.    IMPRESSION/PROGNOSIS    Swallowing: The patient appears to demonstrate moderate to severe oropharyngeal dysphagia. Per his wife, he has had two previous videofluoroscopic swallowing studies that have demonstrated aspiration with all consistencies, the last one being in February of 2018. Compensatory strategies such as chin tuck were found not to be beneficial, per wife. Apparently, tube feedings were recommended at that time but the patient has chosen to continue po intake despite the known risk of aspiration. Wife states he refuses to eat pureed food or drink thickened liquids.     Communication: While not formally  assessed, the patient appears to demonstrate moderate to severe dysarthria of speech characterized by reduced articulatory precision, breathy vocal quality and reduced loudness.    Cognition: Not addressed    The prognosis for the patient to safely tolerate an oral diet is likely fair to poor given his medical history and baseline dysphagia.    RECOMMENDATIONS    Diet: Continue soft textured diet and thin liquids (per patient's wishes). Clean mouth prior to and after meals using mouth swabs moistened with mouthwash.    Feeding: The patient to be upright for all p.o. intake  Use straw with liquid  Take small bites of food and single swallows of liquid  Avoid large, sequential swallows of liquid  Eat/feed slowly  Chew thoroughly  Take double swallows  Alternate liquids and solids  Make sure oral cavity is cleared before presenting additional food or liquid  Check for pocketed food, use finger sweep as needed  Remain upright 20-30 minutes after meal    Treatment Plan: No further speech pathology services are recommended during this hospitalization. Reevaluation of speech and swallowing will likely be warranted at a later date.    Goal Acceptance: These recommendations were discussed with the patient and/or family and were agreed upon.    The patient's personal goal is not stated at this time.    This patient's chart and past medical history were reviewed for 10 minutes.      Back to top of Consult Notes  Justin White, RD - 05/29/2018 12:04 PM CDT  Formatting of this note may be different from the original.  Nutrition Assessment   Reason for visit: Consult: Unintentional Weight Loss    Malnutrition : Does not meet malnutrition criteria (two required)   This may change per physician's clinical assessment.       Nutrition Interventions  1. Nutrition Prescription: Recommend high protien, high calorie foods   Medical Food Supplements/commercial beverage: Magic Cup BID   Goal: Improve nutritional intake to consume greater  "than 75% of estimated nutritional needs to delay further weight loss  Outcome:Likely Met     2. Coordination of Other Care During Nutrition Care: Collaboration/referral to other providers  Goal: SLP to determine swallowing/safety   Outcome:had video swallow last june that deemed pt at high risk for aspiration. Awaiting repeat study  ______________________________________________________________________    Jimmy Patrick is a 67 y.o. male admitted due to c2 fx following a fall from his chair. History pertinent for progressive supranuclear palsy. Consult received for diminished intakes, weight loss due to \"much effort when eating \" SLP evaluation today. Witnessed chopped soft foods, pt feeding self slowly. Alternating bites with sips of thin liquids. Went over high calorie high protein foods/strategies to help maintain weight and delay further loss. Wife was thankful. Agreeable to Magic Cup.     Labs:  Lab Results   Component Value Date   GLUCOSE 109 (H) 05/28/2018     Lab Results   Component Value Date   SODIUM 138 05/28/2018   POTASSIUM 3.6 05/29/2018   MAGNESIUM 1.9 05/29/2018   CALCIUMSERUM 8.6 05/28/2018   BUNUREANRO 21 05/28/2018   CREATININE 0.97 05/29/2018     Patient History:   Past Medical History:   Diagnosis Date     HTN (hypertension)     Progressive supranuclear palsy (HCC)     Nutrition History: Diminished intakes due to requiring a lot of effort to eat. Wife reports pt failed video swallow last June, however pt was determined not to consume altered textured diet or have feeding tube.continued with soft food diet. Reports overall good intakes.  Diet Order: Soft  Intake: NA - first, meal, few bites witnessed, slow taking,   Nutrition Needs: Unmet  Anthropometric Measurements:  Height: 6' (182.9 cm)   Admission Weight: 87.5 kg (193 lb), Current Weight: 85.1 kg (187 lb 11.2 oz)   IBW: 81 kg +/- 10%, 105% IBW   BMI: 26.2, BMI Category: Normal (18.5-24.9)  Weight History: loss of 4 % BW (8lbs) in 2 " months.   Wt Readings from Last 5 Encounters:   05/28/18 85.1 kg (187 lb 11.2 oz)   03/03/18 88.5 kg (195 lb)   02/11/17 88.5 kg (195 lb)   09/12/16 88.5 kg (195 lb)     Medications Reviewed:   Current Facility-Administered Medications   Medication Dose Route Frequency Provider Last Rate Last Dose     **saline FLUSH syringe 10 mL 10 mL Intravenous Q8H January Henderson PA 10 mL at 05/29/18 0658     **saline FLUSH syringe 10 mL 10 mL Intravenous PRN January Henderson PA     acetaminophen (TYLENOL) tablet 650 mg 650 mg oral Q4H PRN January Henderson PA     amantadine HCl (SYMMETREL) capsule 100 mg 100 mg oral TID Lara Alberto PA-C 100 mg at 05/29/18 1055     amLODIPine (NORVASC) tablet 2.5 mg 2.5 mg oral QPMCC Lara Alberto PA-C     Cholecalciferol (Vitamin D3) tablet 2,000 Units 2,000 Units oral DAILY Lara Alberto PA-C 2,000 Units at 05/29/18 1104     dextrose 5% in water-lactated ringers IV infusion Intravenous CONTINUOUS January Henderson PA 75 mL/hr at 05/29/18 1036     enoxaparin (LOVENOX) injection 30 mg 30 mg Subcutaneous Q12H (NS) Lara Alberto PA-C     [facility] Consult 1 Consult N/A PRN January Henderson PA     hydroCHLOROthiazide (MICROZIDE) capsule 12.5 mg 12.5 mg oral DAILY Lara Alberto PA-C 12.5 mg at 05/29/18 1055     lidocaine ( LIDODERM ) 5 % patch REMOVAL 1 each 1 each Transdermal DAILY - PATCH REMOVAL FOR LIDO Lara Alberto PA-C     lidocaine (LMX-4) topical cream 1 Application 1 Application Topical PRN January Henderson PA     lidocaine 1% (XYLOCAINE) injection 0.1-0.3 mL 0.1-0.3 mL Intradermal PRN January Henderson PA     lidocaine 1% / 8.4% sod bicarb (buffered lidocaine) syringe for IV starts 0.1-0.3 mL 0.1-0.3 mL Intradermal PRN Beatriz, VENTURA Mohamud     lidocaine 5% (LIDODERM) adhesive patch, medicated 1 patch 1 patch Transdermal DAILY - APPLY PATCH Lara Alberto, PA-C     Lubricant Drops ophthamlic (EYE) solution 1-2 drop 1-2  drop Each Eye Q1H PRN Lara Alberto PA-C     MAGNESIUM REPLACEMENT INTRAVENOUS - NOT FOR DOCUMENTATION PURPOSES Intravenous PER PROTOCOL January Henderson PA     naloxone (NARCAN) injection 0.1 mg 0.1 mg Intravenous Q1 MINUTE PRN January Henderson PA     nonformulary medication (pending approval) 500 mg 500 mg oral Q6H PRN Lara Alberto PA-C     nortriptyline (PAMELOR) capsule 30 mg 30 mg oral Q BEDTIME Lara Alberto PA-C     olmesartan (BENICAR) tablet 20 mg 20 mg oral Twice Daily Lara Alberto PA-C 20 mg at 05/29/18 1055     ondansetron (ZOFRAN) disintegrating tablet 4 mg 4 mg oral Q8H PRN January Henderson PA     oxyCODONE (immediate release) (ROXICODONE) tablet 2.5-5 mg 2.5-5 mg oral Q4H PRN January Henderson PA     POTASSIUM REPLACEMENT INTRAVENOUS - NOT FOR DOCUMENTATION PURPOSES Intravenous PER PROTOCOL January Henderson PA     POTASSIUM REPLACEMENT ORAL - NOT FOR DOCUMENTATION PURPOSES oral PER PROTOCOL January Henderson PA     rivastigmine (EXELON) 9.5 mg/24 hr transdermal patch 9.5 mg 9.5 mg Transdermal DAILY January Henderson PA 9.5 mg at 05/28/18 2232     rivastigmine 9.5 mg/24 hour patch REMOVAL 1 each 1 each Transdermal DAILY January Henderson PA 1 each at 05/28/18 2215     senna-docusate (SENNA-S) tablet 1-2 tablet 1-2 tablet oral Twice Daily January Henderson PA     Nutrition-Focused Physical Findings:   Overall Appearance: alert, up in chair, feeding self   Skin: overall intact  Digestive System: positive bowel sounds  Muscle Loss: not appropraite to eval  Fat Loss: appears with no loss  Micronutrient Deficiencies: not addressed   Edema: BLE 1+     Estimated Needs:  7728-7801 kcal/day (25-28 kcal/kg IBW)  81-97 grams protein/day (1-1.2 g/kg IBW)  2430 mL/day (30 mL/kg IBW)    Nutrition Diagnosis  Inadequate oral intake related to progressive disorder limiting mobility and resulting in physiological difficulty as evidenced by unable to tolerate regular foods  and failure to maintain weight, failed video swallow study 06/2017     Nutrition Monitoring and Evaluation  Food, Nutrient Intakes: Total energy intake, Protein intake and Liquid meal replacement or supplement  Nutrition Focused Physical Findings: Dysphagia  Anthropometric Measures: Weight changes  Biochemical Data: Basal metabolic profile    Nutritional Care Level: Moderate  Discharge Goal: Able to improve intake with small frequent meals and high protein beverages or foods to meet 80% of estimated needs    Justin White, MS, RD, CDE  Pager: [phone] [employer]       Back to top of Consult Notes  Florin Augustin MD - 05/29/2018 9:12 AM CDT  Formatting of this note may be different from the original.  NEUROSURGICAL CONSULTATION NOTE  Patient Name: Jimmy Patrick  Address: [address] [address]  Age:67 y.o.  Sex: male   Admission Date/Time: 5/28/2018 4:33 PM   Hospital Attending Physician: Xander French MD     I was asked to see this patient at the request of Dr Smith for evaluation of C2 fracture.     CHIEF COMPLAINT: Fall    HPI:   This 67 y.o. male was brought in for evaluation after a fall. He has a PMH of progressive supranuclear palsy. Per his wife, he fell backwards out of his chair. Initially was complaining of soreness in the neck but now is only complaining of discomfort from the cervical collar. No new motor/sensory complaints of the extremities. No HA or N/V. Imaging showed bilateral C2 laminar fractures.     REVIEW OF SYSTEMS  A comprehensive review of systems was negative except for items noted in the HPI/Subjective.    PAST MEDICAL HISTORY  Past Medical History:   Diagnosis Date     HTN (hypertension)     Progressive supranuclear palsy (HCC)     PAST SURGICAL HISTORY  Past Surgical History:   Procedure Laterality Date     CARDIOVASCULAR PROCEDURE UNLI*     PRIOR TO ADMISSION MEDICATIONS  Prescriptions Prior to Admission   Medication Sig Dispense Refill Last Dose      Adapalene (DIFFERIN) 0.1 % Top Gel APPLY TO FACE ONCE DAILY AT BEDTIME 11 5/27/2018     amantadine HCl (SYMMETREL) 100 mg oral Cap TAKE 1 CAPSULE (100 MG) BY MOUTH 3 TIMES DAILY AT 6AM,11AM AND 4PM 3 5/28/2018 x 2     amLODIPine (NORVASC) 2.5 mg oral tablet Take 1 tablet (2.5 mg) by mouth daily In evening. 5/27/2018     aspirin 81 mg oral enteric coated tablet Take 81 mg by mouth once daily. 5/28/2018     Cholecalciferol, Vitamin D3, 2,000 unit oral tablet Take 2,000 Units by mouth once daily. 5/28/2018     folic acid/multivit-min/lutein (CENTRUM SILVER ORAL) Take 1 tablet by mouth once daily. 5/28/2018     Hydrochlorothiazide 12.5 mg oral tablet Take 12.5 mg by mouth once daily. 5/28/2018     loperamide (IMODIUM) 2 mg oral capsule Take 2 mg by mouth every 4 (four) hours as needed for Diarrhea. PRN     nortriptyline (PAMELOR) 10 mg oral Cap Take 30 mg by mouth at bedtime. 5/27/2018     olmesartan (BENICAR) 20 mg oral Tab Take 20 mg by mouth twice a day.     rivastigmine (EXELON) 9.5 mg/24 hr TD PT24 Apply 9.5 mg to skin once daily. 5/27/2018     senna-docusate (SENNA-S) 8.6-50 mg oral tablet Take 1 tablet by mouth 2 times daily 5/28/2018 x 1     ZOLMitriptan (ZOMIG) 5 mg oral Tab TAKE 1 TABLET (5 MG) BY MOUTH AT ONSET OF HEADACHE FOR MIGRAINE 5 PRN     ALLERGIES  Allergies   Allergen Reactions     Codeine Abdominal pain     FAMILY HISTORY  No family history on file.   SOCIAL HISTORY  Social History     Social History     Marital status:    Spouse name: N/A     Number of children: N/A     Years of education: N/A     Occupational History     Not on file.     Social History Main Topics     Smoking status: Never Smoker     Smokeless tobacco: Never Used     Alcohol use Not on file     Drug use: No     Sexual activity: Not on file     Other Topics Concern     Not on file     Social History Narrative     No narrative on file       PHYSICAL EXAM  General Appearance: NAD  BP (!) 141/79  Pulse 91  Temp 98.2  F  (36.8  C)  Resp 18  Ht 6' (1.829 m)  Wt 85.1 kg (187 lb 11.2 oz)  SpO2 97%  BMI 25.46 kg/m    Body mass index is 25.46 kg/m .     HEENT: no evidence of otorrhea or rhinorrhea  Neck: cervical collar removed. Non tender to palpation.   Back: non tender to palpation   Respiratory: normal respiratory effort  Cardiovascular: normal heart rate  Extremities: no obvious deformities  Neurologic Exam:   Orientation: A&Ox3  Speech: Clear  Memory: intact for both short term and long term  Concentration: good  Insight: good  Cranial Nerves: EOMs full, Pupils 2/2-1/1, face symmetrical, tongue midline, hearing equal, shoulder shrug equal  Motor: 5/5 throughout all 4 extremities.  Sensation to light touch: normal in all 4 extremities  Cerebellar Testing:  Reflexes: hypoactive but symmetric deep tendons. Down going toes.   Integument: warm and dry.    IMAGING: Films reviewed and discussed with Dr. Augustin  CT C spine shows bilateral C2 laminar fracture. No anterior or lateral element involvement. No malalignment.   CT head negative  CTA neck without acute vascular abnormalities.     ASSESSMENT/PLAN:   66 yo male here after a fall. Has a hx of progressive supranuclear palsy with frequent falls. Has limited ROM of the neck at baseline. Imaging showed stable bilateral C2 laminar fractures. Given underlying medical condition will attempt to treat without bracing. Will obtain flexion/extension xrays without collar. If stable then no collar is needed and can advance activity. Okay to advance diet.     DVT PPX: okay     Thank you for the opportunity to participate in this patient's care.     Guanako Salguero PA-C ................... 5/29/2018 9:12 AM   pager [phone]    66 yo WM w/ PSP, fall backwards yesterday, no LOC.  Progressive cervical pain, no new UE or LE sx    Alert, speech slow, mildly dysarthric (baseline)  P3/3-2/2 slowly  Faces flat, min mvmt  Tongue ML  SS weak, sym  UE's and LE's 5/5 all, slow  DTR hypoactive,  sym  Toes equiv  No clonus    HCT neg  C-sp CT - stable laminar fx's C2    No T or L TTP    Stable C2 laminar fx's  D/c collar  Will check F/E C-sp  D/w pt/wife  Will follow w/ you  Thank you        Back to top of Consult Notes  Miscellaneous Notes  - in this encounter    Table of Contents for Miscellaneous Notes  Care Plan Notes - Valerie Flowers, RN - 05/31/2018 10:38 AM CDT  Care Plan Notes - Zeb Guardado, RN - 05/31/2018 7:49 AM CDT  Care Plan Notes - Lauren Pedersen RN - 05/30/2018 9:50 PM CDT  Advance Care Planning - Jefferson Noyola LSW - 05/30/2018 4:22 PM CDT  Care Plan Notes - Alexus France - 05/30/2018 1:40 PM CDT  Care Plan Notes - Shiloh Portillo RN - 05/30/2018 11:07 AM CDT  Care Plan Notes - Yael Plunkett, PT - 05/30/2018 11:02 AM CDT  Care Plan Notes - Valerie Flowers RN - 05/30/2018 10:30 AM CDT  Care Plan Notes - Melquiades Whitney, OT - 05/30/2018 9:21 AM CDT  Care Plan Notes - Lyla Armendariz, RN - 05/30/2018 6:59 AM CDT  Care Plan Notes - Kemi Jaeger RN - 05/29/2018 3:56 PM CDT  Advance Care Planning - Jefferson Noyola LSW - 05/29/2018 3:49 PM CDT  Care Plan Notes - Leonora Soliman - 05/29/2018 2:51 PM CDT  Care Plan Notes - Valerie Elliott, RN - 05/29/2018 1:48 PM CDT  Care Plan Notes - Birgit Maynard, RN - 05/29/2018 11:30 AM CDT  Care Plan Notes - Johanna Silveira, RN - 05/29/2018 4:49 AM CDT  Care Plan Notes - Esther Jo, RN - 05/29/2018 12:05 AM CDT  Med Reconciliation - Carissa Byers, Pharmacy Student - 05/28/2018 9:51 PM CDT  Summary Progress Notes - Betzy Mock APRN, CNP - 05/28/2018 5:39 PM CDT     Care Plan Notes - Valerie Flowers RN - 05/31/2018 10:38 AM CDT  Formatting of this note may be different from the original.  Problem: Discharge Planning  Goal: Establish appropriate post-hospitalization placement  Outcome: Ongoing  Care Management-Discharge Plan Finalization    Discharge Date: [facility]  5/31/18    Discharge Needs: TCU  Facility:   Destination - Selection Complete   Service Request Status Selected Specialties Address Phone Number Fax Number   [facility] Selected Transitional Care Unit [address], [facility] [address] [phone]   Valerie Flowers, RN 5/30/2018 1028   5/30/18 10:28 AM  Called clinical liasion Lou regarding referral. Granted Epic access.                   PAS: [id number]  D/C orders, prescriptions and PAS were faxed to [facility] (P: [phone] F: [phone])  Oxygen: Room air  Transportation: Discussed options and cost of WC transportation with patient and wife Yael [phone]. Requesting [place] WC transportation at the time of discharge. [facility] WC transportation scheduled for 1300 on 5/31/18.     Updated pt, wife, bedside RN, Charge RN, [facility], and care facility of plan.         Back to top of Miscellaneous Notes  Care Plan Notes - Zeb Guardado RN - 05/31/2018 7:49 AM CDT  Problem: Falls/Injury-Risk of  Goal: Absence of Falls/Injury  Outcome: Met this shift  Pt did not have any falls during the night. Bed alarm on for safety.     Problem: Communication  Goal: Demonstrates/exhibits ability to communicate needs effectively  Outcome: Met this shift  Pt is able to communicate needs to staff. Pt whispers and needs time to answer questions.         Back to top of Miscellaneous Notes  Care Plan Notes - Lauren Pedersen RN - 05/30/2018 9:50 PM CDT  Problem: Falls/Injury-Risk of  Goal: Absence of Falls/Injury  Outcome: Met this shift  Pt without falls this shift. Bed alarm on and call light within reach. Purposeful hourly rounding.     Problem: Communication  Goal: Demonstrates/exhibits ability to communicate needs effectively  Outcome: Met this shift  Pt able to verbalize needs.         Back to top of Miscellaneous Notes  Advance Care Planning - Jefferson Noyola LSW - 05/30/2018 4:22 PM CDT  UPDATE:    Plan for visit: F/U  "w/ support with advance care planning (ACP).    Summary of today's visit: Met with Raymond and wife Kriss at bedside. Reintroduced self and role of ACP in the hospital.     Explained role and purpose of Health Care Directive (HCD). Kriss provided copy of HCD that they have been working on w/ care team from Replaced by [facility] - writer reviewed and made recommendations for changes. Kriss and Raymond endorsed understanding and state they prefer to f/u with elder law  and complete with other legal and financial planning documentation.    Ryamond endorsed the following during today's ACP facilitation:  PRIMARY HEALTH CARE AGENT: Yael Dodsontiff (Wife)  ALTERNATE HEALTH CARE AGENT: Beatriz Seechapitovance (Daughter)    Jimmy Patrick identifies his medical condition as PSP and describes it as progressive and life limiting.    He currently hopes to maintain independence, control pain and symptoms;, delay progression of, but not cure, the illness and when he no longer has quality of life, he would like to die with dignity..    He identifies the following fears and worries about his medical care: becoming a physical burden, becoming a financial burden, losing mental capacity and leaving spouse behind    Patient Goals of Care:   HIGH SURVIVAL; LOW COGNITIVE STATUS: if Jimmy had a serious complication and had a good chance living through the complication, but it was expected that he would never know who he was or who he was with and would require 24 hour nursing care, he would choose: to focus treatment on comfort and quality of life (\"Quality of life is more important than length of life to Jimmy\").    HIGH SURVIVAL; LOW FUNCTIONAL STATUS: if Jimmy had an event with a good chance of surviving, but it was expected he would never be independent again and would require 24 hour nursing care, he would choose: to focus treatment on comfort and quality of life (\"Quality of life is more important than length of life to " "Jimmy\").    CARDIO-PULMONARY RESUSCITATION (CPR): The facts, risks and benefits of CPR were discussed with Jimmy. If he had a sudden event that caused his heart and breathing to stop, he: WOULD want CPR attempted unless his provider determines any one of the following: CPR would only prolong his death, have no chance of recovering, have an incurable illness and am dying, have little or no chance of survival if his heart or breathing stops.     MECHANICAL VENTILATION: if Jimmy had an episode where he was unable to breathe on his own, he would choose the following: attempt to use any appropriate non-invasive method to assist breathing, and use mechanical ventilation. Short-term use only. States he wouldn't want a \"trach\" long term.    Nutrition: Offer food and liquids by mouth if feasible. Would consider AN if it could improve quality of life only - would not want tube feeding if it could not improve Raymond's quality of life and continue to participate in activities and family life that brings him samantha.    End of Life Care Preferred Location: Receive care at home with hospice     Psychosocial Information  The following present and future experiences are most important for Jimmy Patrick to live well: Family Activities, Music, Reading, Television, Walking/Exercise and Other: eating food    Jimmy Patrick maurice with serious challenges in his life with the support of: spouse, his family, social supports, and his [employer] seth    PLAN: Raymond will f/u with Elder Law  and provide completed HCD to [facility] in postage paid return envelope.     RECOMMENDATION: Please continue to address Advance Care Planning (ACP) with Jimmy Patrick on next admission, or in outpatient setting.       CEZAR Foster  Advance Care Planning Facilitator  [facility]  Pager: [phone]  Phone: [phone]  4:15 PM 5/30/2018    Back to top of Miscellaneous Notes  Care Plan Notes - Alexus France - 05/30/2018 " 1:40 PM CDT  Problem: Impaired Living Skills  Goal: Maximize independence with daily living skills  Outcome: Ongoing  History: Patient admitted to [facility] on 5/25/2018 due to fall resulting in C2 fx. Per NSG, ok to advance activity, no bracing needed. PMH: PSP. Refer to Neeraj Assessment, Chart Review, and Past Medical History.     Social History: Patient lives with spouse in a 2-story home with 4 stairs to enter with 1 rail. There is a w/c lift for these stairs. There are 12 stairs within the home with 2 rails. Patient uses a U step walker at baseline. Patient needs assistance with ADLs. Wife reports she assists patient with all ADLs, also has hired help 3/hours per day for ADLs. Patient goes down stairs to the home gym ~5x/week. Per wife, patient falls at home frequently but has never been hospitalized for a fall before. Wife present and supportive.    I: Pt seen in room for brief OT session, wife present and very supportive.    R: Pt sitting in chair eating lunch when OT arrived. Pt appeared to be grasping all items well, and reported no complaints at this time. OT will continue to assess need for built up handles. Pt and wife did report difficulty with straws reaching mouth. State vision is difficult and bringing straw to mouth is challenging. Pt provided with long straw to assist. Pt provided with digi  to assist with hand/fine motor strength. Pt and wife expressed understanding of use.    A: OT will continue to follow pt per plan of care and progress as able. If pt were to discharge today, OT would recommend TCU to further address strength and endurance for return to ADL status.         Back to top of Miscellaneous Notes  Care Plan Notes - Shiloh Portillo, RN - 05/30/2018 11:07 AM CDT  Problem: Communication  Goal: Demonstrates/exhibits ability to communicate needs effectively  Outcome: Met this shift  Patient stated to give meds slow and in pudding.     Problem: SAFETY  Goal:  "*Communicates safety needs  Outcome: Met this shift  Patient uses call light. Bed and chair alarm on.     Problem: Pain  Goal: Exhibits reduction in pain to a level of acceptable comfort  Outcome: Met this shift  Patient stated neck pain was relieved with Tylenol.         Back to top of Miscellaneous Notes  Care Plan Notes - Yael Plunkett, PT - 05/30/2018 11:02 AM CDT  PT Rehabilitation Services Daily Note    History: Patient admitted to [facility] on 5/25/2018 due to fall resulting in C2 fx. Per NSG, ok to advance activity, no bracing needed. PMH: PSP. Refer to [facility] Assessment, Chart Review, and Past Medical History.     Social History: Patient lives with spouse in a 2-story home with 4 stairs to enter with 1 rail. There is a w/c lift for these stairs. There are 12 stairs within the home with 2 rails. Patient uses a U step walker at baseline. Patient needs assistance with ADLs. Wife reports she assists patient with all ADLs, also has hired help 3/hours per day for ADLs. Patient goes down stairs to the home gym ~5x/week. Per wife, patient falls at home frequently but has never been hospitalized for a fall before. Wife present and supportive.    Patient came to PT department via w/c for PT treatment session. Pt wife present and supportive    Patient is functioning as follows:  Transfers: Patient performed sit <> stand with minimal assist, with moderate assist.   *Pt reporting increased \"neck\" pain with leaning forward to transfer from sit-stand  *Pt edu and assisted in flext from hips and knees to limit forward c/s flexion  *cues for hand placement and sequencing with each transfer, especially with safety returning to sitting    Ambulation: Patient ambulated 250 feet and 125 feet with a rolling walker with CGA  *Pt tends to veer to R side and bumped into objects multiple times requiring assist to manage walker and improve safety awareness  *Pt amb at slow pace with shuffling steps, balance " Fair with no overt LOB noted    Exercise: sitting on mat, unsupported, pt cued for improved upright sitting and shoulder retraction and alignment for ear-shoulder-hip. Pt indicates improved comfort and pain with improved sitting posture. Pt does demonstrate signficant forward head throughout activity.    Education: Patient/family educated on safe mobility techniques and plan of care/discharge recommendations.    Assessment/Plan: Continue with Inpatient PT. Pt expressing increased pain on this date in neck and shoulders. Pt edu in positioning and safety with mobility. Wife present and very supportive, assisting with history and new deficits vs previous deficits. Pt would benefit from DC to TCU for cont therapy prior to DC Home.    Patient making progress towards goals and will benefit from continued rehab.    There have been 0/6 PTA visits.    Yael Plunkett, PT        Back to top of Miscellaneous Notes  Care Plan Notes - Valerie Flowers, RN - 05/30/2018 10:30 AM CDT  Formatting of this note may be different from the original.  Problem: Discharge Planning  Goal: Establish appropriate post-hospitalization placement  Outcome: Ongoing  Care Management - Progress Note    Patient's chart reviewed. Patient discussed in rounds.    Anticipated Discharge Date: target 5/31/18    Anticipated Discharge Needs: TCU    Plan:  Destination - Selection Complete   Service Request Status Selected Specialties Address Phone Number Fax Number   [facility] Selected Transitional Care Unit [address] [id number] [phone]   Valerie Flowers, ANN MARIE 5/30/2018 1028   5/30/18 10:28 AM  Called clinical liasion Lou regarding referral. Granted Epic access.                 Bed availabe at [facility] TCU 5/31/18. Discussed options and cost of WC transportation with patient and wife Yael [phone]. Requesting [place]  transportation at the time of discharge. [facility]  transportation scheduled for 1200 on 5/31/18.     Care Management will continue to follow.         Back to top of Miscellaneous Notes  Care Plan Notes - Melquiades Whitney, OT - 05/30/2018 9:21 AM CDT  Problem: Impaired Living Skills  Goal: Maximize independence with daily living skills  Outcome: Ongoing  Occupational Therapy    Attempted to see pt in room for OT session. Pt with multiple providers and then using restroom. Will reattempt later today as schedule permits.         Back to top of Miscellaneous Notes  Care Plan Notes - Lyla Armendariz RN - 05/30/2018 6:59 AM CDT  Problem: Falls/Injury-Risk of  Goal: Absence of Falls/Injury  Outcome: Met this shift  Pt free from fall this shift.    Comments: Med-Surg Care Progression Note Type:  [id number]  Length of stay: 2 days  Code Status: Full Code    Reason for Admission: Direct admit [facility]. Pt was attempting to sit backwards in a chair when he lost his balance trying to keep from spilling glass of orange juice. Fall resulting in a C-2 fx.  5/28 CT angio done tonight 5/29 X-ray Cervical spine/flexion/extension, Ok to remove collar per Neuro MD.    Hx: Progressive Supranuclear Palsy-causing weak voice, dysphagia issues, sensitivity to bright lights(sunglasses and hat in room). Walks with walker baseline but per wife has been using w/c more when out lately.     F- Feeding Progression: Soft diet- pt has difficultly swallowing, needs assistance with feeds. Takes pills slowly with pudding. Pt coughing with liquids, per speech note, pt and wife know aspiration risk but pt will refuse pureed foods/thick liquids. Cautious with feedings.     Fluids Progression: IVF at 75ml/hr     A- Analgesic Progression: Pain at goal with interventions. C/o neck pain from collar, PRN tylenol/caffeine home med given x1 with relief. Ice pack PRN.    S- Skin Progression: Total Alfred Score: 16: High risk for PI development. Neck, upper shoulders and chest with blanchable  redness from hard collar. R hip bruising CDI. Q2hr turns in bed with turning system. On specialty bed. SCDs and heel boots applied this shift.    Safety Progression: Hendrich II Total Score: 4; Falls risk - interventions in place call light within reach, green armband on, bed alarm on. Purposeful hourly rounding.     T- Telemetry Progression: Not applicable    Treatment Progression: Therapies. Pain management. Neuro checks.     E- Emotional Needs Progression: Participating in cares. Wife is primary caregiver, very supportive per report.     Neuro Progression: Alert and Oriented. Reports baseline numbness to bilateral toes. Pt whispers but is understandable.     R- Respiratory Needs Progression: On room air cont. to monitor and Incentive Spirometer , encourage.     H- Head OUT of Bed/Activity Progression: PT following and OT following. OOB to chair with A1-2, walker, gait belt.    U- Ultimate Discharge Progression: Therapies.     Anticipated Disposition: Plan to: TBD- Home with wife? Vs facility.     Anticipated Equipment/Supply Needs: Anticipated equipment/supply needs TBD.     G- Glycemic Control Progression: Not applicable    B- Bowel and Bladder Care Progression: Last BM: 05/24/18; On bowel regimen/regular. urinal with assistance. Brief on overnight. No BM this shift.     I- Indwelling/Invasive Devices Progression: PIV x2, IVF.     D- DVT/Anticoagulation Progression: SCDs and Anticoagulant SQ.     Summary: Photosensitivity- uses sunglasses and hat in room. OOB with A1-2, gait belt and walker. Q2hr turns in bed. Potassium 3.6 this AM, 1st of 4 bags infusing.    Back to top of Miscellaneous Notes  Care Plan Notes - Kemi Jaeger RN - 05/29/2018 3:56 PM CDT  Problem: Pain  Goal: Exhibits reduction in pain to a level of acceptable comfort  Outcome: Met this shift  Hourly rounding for pain assessment. Medicate for pain as ordered. Reposition every 2 hours.     Problem: Discharge Planning  Goal: Establish  "appropriate post-hospitalization placement  Outcome: Met this shift  Case management following for discharge needs.        Back to top of Miscellaneous Notes  Advance Care Planning - Jefferson Noyola LSW - 05/29/2018 3:49 PM CDT  Advance Care Planning (ACP)  Consult received. Chart reviewed. Left hand-off  for Care Manager, Jhonatan Flowers    Code Status: FULL (Did not discuss today)    ACP Document: started one at [facility] w/ SW-  Plan for visit: Provide information and support with advance care planning (ACP).    Summary of today's visit: Met with Jimmy \"Raymond\" Celina who was sitting in a chair in a darkened room - wife Yael \"Kriss\" Celina and two family friends also present. Introduced self and role of ACP in the hospital.     Explained role and purpose of Health Care Directive (HCD). Endorses proceeding, but would like writer to return following today's therapy. We reviewed HCD and [facility], \"Health Values Worksheet\". Pt's wife, Kriss would like to see if ACP documents they have been working on at home can be found. Will try to bring to the hospital tomorrow.    Arranged to meet w/ pt and Vigneshy tomorrow at 2:00PM    Resources provided: Honoring Choices: Health Care Directive/s (short/long form); and [facility]: HCD Q&A, Health Values Worksheet    PLAN: Will return tomorrow at 2:00 pm (taking note of pt schedule). Will continue to provide information and support for Jimmy Patrick and family/supports to discuss and/or document future health care decisions.    CEZAR Foster  Advance Care Planning Facilitator  [facility]  Pager: [phone]  Phone: [phone]  3:49 PM 5/29/2018        Back to top of Miscellaneous Notes  Care Plan Notes - Leonora Soliman - 05/29/2018 2:51 PM CDT  Problem: Impaired Living Skills  Goal: Maximize independence with daily living skills  Outcome: Ongoing  Refer to consult note for specifics        Back to top of Miscellaneous Notes  Care " Plan Notes - Valerie Elliott RN - 05/29/2018 1:48 PM CDT  Problem: Pain  Goal: Exhibits reduction in pain to a level of acceptable comfort  Outcome: Met this shift  Pt pain managed with scheduled Lidocaine. Use of cool ice packs, no use of lights in room keeps pt more comfortable.        Back to top of Miscellaneous Notes  Care Plan Notes - Birgit Maynard RN - 05/29/2018 11:30 AM CDT  Problem: Discharge Planning  Goal: Establish appropriate post-hospitalization placement  Outcome: Ongoing  Discharge Planning Initial Assessment    Patients chart reviewed. Patient discussed in rounds. Attempted to meet with patient and wife at bedside but staff members have been working with patient. Will try back at another time.     Admitting diagnoses: c2 fx-neurosurgery consulted.     Prior living situation or transferred from: Lives with family: SO    Support prior to hospitalization: Family    Primary decision maker: patient, no health care directive on file     DME prior to admission: unknown     Anticipated discharge needs: Continue to assess- PT, OT, and SLP consulted. OT schedule for 1300 today and SLP schedule for 1100 today.     Care coordination initiated: Care Coordination     Possible barriers to discharge: pain control     Anticipate discharge in 1-2 days. Care management will continue to follow.     Sima Maynard RN, BSN   Float    (assisting 6W CM)  Care Management Department   Pager [phone]  11:24 AM        Back to top of Miscellaneous Notes  Care Plan Notes - Johanna Silveira RN - 05/29/2018 4:49 AM CDT  Problem: Falls/Injury-Risk of  Goal: Absence of Falls/Injury  Outcome: Met this shift  No falls this shift. Not OOB, on bedrest. Bed alarm witin reach, hourly rounding performed.    Problem: Pressure Injury - Risk of  Goal: Absence of pressure injury  Outcome: Met this shift  Bruising to L hip. Turn/repo q2hrs, TAPS systen in place.        Back to top of  Miscellaneous Notes  Care Plan Notes - Esther Jo, RN - 05/29/2018 12:05 AM CDT  Problem: Confusion - Acute, Actual or Risk of  Goal: Maintains/improves cognitive status within limits of condition  Outcome: Met this shift  Pt is alert and oriented x 3. Appropriate, whispers when speaks.    Problem: Falls/Injury-Risk of  Goal: Absence of Falls/Injury  Outcome: Met this shift  Bed alarm on. Pt instructed on bedrest orders, pt verbalized understanding. Call light within reach. Purposeful hourly rounding.    Problem: Pressure Injury - Actual  Goal: Demonstrates progressive healing of pressure injury  Outcome: Met this shift  Skin under hard field collar to neck/shoulder/chest and upper back reddened but blanchable. Foam mepilex applied to pressure areas and skin cleansed.    Problem: Neurological Function - Risk of, Impaired  Goal: Absence of continued neurologic deterioration signs and symptoms  Outcome: Met this shift  Pt has baseline numbness to bilateral toes, sensation otherwise intact. Pt lifts bilateral legs against resistance. Strong UE's. Voice quality is weak per baseline. Pt walks with walker baseline per wife.    Problem: Communication  Goal: Demonstrates/exhibits ability to communicate needs effectively  Outcome: Met this shift  Pt communicating needs to writer during hourly rounding.     Problem: Pain  Goal: Exhibits reduction in pain to a level of acceptable comfort  Outcome: Met this shift  Pain to neck at goal with repositioning.        Back to top of Miscellaneous Notes  Med Reconciliation - Carissa Byers, Pharmacy Student - 05/28/2018 9:51 PM CDT  Formatting of this note may be different from the original.  PHARMACY MEDICATION RECONCILIATION NOTE    MEDICATION RECONCILIATION on admission by pharmacy has been completed.  Prior to admission medications were reviewed with the patient's family and medications from home.     The PTA medication list has been updated and reflected in the chart below.    Please use the PTA medication section for ordering home doses during admission.    Medication related issues including pertinent changes made to the PTA list by pharmacy:   1. Removed Flonase.  2. Added aspirin, loperamide, vitamin D, Centrum Silver, and hydrochlorothiazide.  3. Updated:  - Nortriptyline to 30mg QHS  - Olmesartan from 10mg QPM to 20mg BID  - Rivastigmine patch from 4.6mg to 9.5mg    Medications requiring detailed history:  Recent (3mo) Antibiotics:   3/3/18-3/13/18 Amoxicillin 400mg/5mL - 150mL/10 days. Finished course.    PRIOR TO ADMISSION MEDICATION LIST:  Prior to Admission Medications   Prescriptions Last Dose Informant Patient Reported? Taking?   Adapalene (DIFFERIN) 0.1 % Top Gel 5/27/2018 Family Yes Yes   Sig: APPLY TO FACE ONCE DAILY AT BEDTIME   Cholecalciferol, Vitamin D3, 2,000 unit oral tablet 5/28/2018 Family Yes Yes   Sig: Take 2,000 Units by mouth once daily.   Hydrochlorothiazide 12.5 mg oral tablet 5/28/2018 Family Yes Yes   Sig: Take 12.5 mg by mouth once daily.   ZOLMitriptan (ZOMIG) 5 mg oral Tab PRN Family Yes Yes   Sig: TAKE 1 TABLET (5 MG) BY MOUTH AT ONSET OF HEADACHE FOR MIGRAINE   amLODIPine (NORVASC) 2.5 mg oral tablet 5/27/2018 Family Yes Yes   Sig: Take 1 tablet (2.5 mg) by mouth daily In evening.   amantadine HCl (SYMMETREL) 100 mg oral Cap 5/28/2018 x 2 Family Yes Yes   Sig: TAKE 1 CAPSULE (100 MG) BY MOUTH 3 TIMES DAILY AT 6AM,11AM AND 4PM   aspirin 81 mg oral enteric coated tablet 5/28/2018 Family Yes Yes   Sig: Take 81 mg by mouth once daily.   folic acid/multivit-min/lutein (CENTRUM SILVER ORAL) 5/28/2018 Family Yes Yes   Sig: Take 1 tablet by mouth once daily.   loperamide (IMODIUM) 2 mg oral capsule PRN Family Yes Yes   Sig: Take 2 mg by mouth every 4 (four) hours as needed for Diarrhea.   nortriptyline (PAMELOR) 10 mg oral Cap 5/27/2018 Family Yes Yes   Sig: Take 30 mg by mouth at bedtime.   olmesartan (BENICAR) 20 mg oral Tab Family Yes Yes   Sig: Take 20 mg  by mouth twice a day.   rivastigmine (EXELON) 9.5 mg/24 hr TD PT24 5/27/2018 Family Yes Yes   Sig: Apply 9.5 mg to skin once daily.   senna-docusate (SENNA-S) 8.6-50 mg oral tablet 5/28/2018 x 1 Family Yes Yes   Sig: Take 1 tablet by mouth 2 times daily     Facility-Administered Medications: None     This patient obtains medications from [facility].    Thank you for the opportunity to participate in the care of this patient.  Carissa Byers, Pharmacy Student  Phone #:[phone]  Time spent reconciling meds: 25 min           Associated attestation - Mauro Thomas, Pharm D - 05/28/2018 10:07 PM CDT    I have reviewed the note and PTA med list.  Mauro Thomas, Pharm D      Back to top of Miscellaneous Notes  Summary Progress Notes - Betzy Mock APRN, CNP - 05/28/2018 5:39 PM CDT  Formatting of this note may be different from the original.  TRAUMA PROBLEM LIST/SUMMARY    Admit Date: 5/28/2018    Past Medical History:   Diagnosis Date     HTN (hypertension)     Progressive supranuclear palsy (HCC)     INJURIES  Principal Problem:  Fall  Active Problems:  Closed fracture of second cervical vertebra (HCC)      Mechanism: Fall (patient falls almost every day)    Weight Bear Status:    PROCEDURES AND EVENTS  5/28: Admit TSCC  NS consult  C collar  CTA: negative  5/29: Tx with no collar  PT/OT  SP to see, supplements ordered  5/30 On soft diet  Plan to discharge to TCU tomorrow       Tertiary Exam: 5/29    Sutures & Staples:     Incidental Findings: 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  No Data Recorded    Do you drink alcohol? no    AUDIT-C N/A    Back to top of Miscellaneous Notes  Plan of Treatment  - as of this encounter    Scheduled Referrals  Scheduled Referrals   Name Priority Associated Diagnoses Order Schedule   Follow Up Routine   Ordered: 05/30/2018   Follow Up Routine    Ordered: 05/30/2018   Follow Up Physical Therapy Routine Fall, initial encounter    Progressive supranuclear ophthalmoplegia (HCC)   Ordered: 05/30/2018   Follow Up Occupational Therapy Routine Fall, initial encounter    Progressive supranuclear ophthalmoplegia (HCC)   Ordered: 05/30/2018   Lab Results  - in this encounter    Table of Contents for Lab Results  Creatinine eGFR (05/31/2018 4:07 AM)  Magnesium (05/31/2018 4:07 AM)  Potassium, Serum (05/31/2018 4:07 AM)  Urinalysis w/ Microscopy and/or UC (05/30/2018 2:28 PM)  CBC (HGB,HCT,WBC,RBC,Platelet) (05/28/2018 8:12 PM)  Basic Metab Profile (05/28/2018 8:12 PM)       Creatinine eGFR (05/31/2018 4:07 AM)  Only the most recent of 4 results within the time period is included.    Creatinine eGFR (05/31/2018 4:07 AM)   Component Value Ref Range   CREATININE 0.93 0.70 - 1.30 mg/dL   EST GFR (MDRD) >60 >60 mL/min   EST GFR IF AFRICAN AM >60 >60 mL/min     Creatinine eGFR (05/31/2018 4:07 AM)   Specimen Performing Laboratory   Blood [facility]    [address] [place] [address]     Back to top of Lab Results      Magnesium (05/31/2018 4:07 AM)  Only the most recent of 5 results within the time period is included.    Magnesium (05/31/2018 4:07 AM)   Component Value Ref Range   Magnesium 1.9 1.8 - 2.4 mg/dL     Magnesium (05/31/2018 4:07 AM)   Specimen Performing Laboratory   Blood [facility] LABORAT[facility]    [address]    [place] [address]     Back to top of Lab Results      Potassium, Serum (05/31/2018 4:07 AM)  Only the most recent of 4 results within the time period is included.    Potassium, Serum (05/31/2018 4:07 AM)   Component Value Ref Range   POTASSIUM 3.6 3.5 - 5.1 mmol/L     Potassium, Serum (05/31/2018 4:07 AM)   Specimen Performing Laboratory   Blood [facility] LABORAT[facility]    [address] [place] [address]     Back to top of Lab Results      Urinalysis w/ Microscopy and/or UC  (05/30/2018 2:28 PM)  Urinalysis w/ Microscopy and/or UC (05/30/2018 2:28 PM)   Component Value Ref Range   PH URINE 6.0 4.5 - 8.0   SP.GRAVITY, UA 1.015 1.015 - 1.025   PROTEIN, UA Negative Negative mg/dL   GLUCOSE, UA Negative Negative mg/dL   KETONE, UA Trace (A) Negative mg/dL   BILIRUBIN, UA Negative Negative   OCCULT BLOOD, UA Negative Negative, Trace, TRACE-LYSED, TRACE-INTACT   UROBILINOGEN, UA 0.2 0.2 - 1.0 EU/dL   WBC ESTERASE, UA Negative Negative, Trace   NITRITE, UA Negative Negative     Urinalysis w/ Microscopy and/or UC (05/30/2018 2:28 PM)   Specimen Performing Laboratory   Urine [facility] LABORATORY    [address] [place] [address]     Back to top of Lab Results      CBC (HGB,HCT,WBC,RBC,Platelet) (05/28/2018 8:12 PM)  CBC (HGB,HCT,WBC,RBC,Platelet) (05/28/2018 8:12 PM)   Component Value Ref Range   WBC 11.6 (H) 4.3 - 10.8 K/uL   RBC 4.91 4.60 - 6.20 M/uL   HEMOGLOBIN 14.6 14.0 - 18.0 gm/dL   HEMATOCRIT 42.2 40.0 - 54.0 %   MCV 86 80 - 100 fl   MCH 30 27 - 33 pg   MCHC 35 33 - 36 gm/dL   RDW 12.3 11.5 - 14.5 %   PLATELET COUNT 176 150 - 400 K/uL   MPV 9.9 6.5 - 12.0     CBC (HGB,HCT,WBC,RBC,Platelet) (05/28/2018 8:12 PM)   Specimen Performing Laboratory   Blood [facility] LABORATORY    [address] [place] [address]     Back to top of Lab Results      Basic Metab Profile (05/28/2018 8:12 PM)  Basic Metab Profile (05/28/2018 8:12 PM)   Component Value Ref Range   SODIUM 138 136 - 145 mmol/L   POTASSIUM 3.8 3.5 - 5.1 mmol/L   CHLORIDE 103 98 - 112 mmol/L   CARBON DIOXIDE 27 21 - 32 mmol/L   BUN (UREA NITRO) 21 7 - 24 mg/dL   CREATININE 1.00 0.70 - 1.30 mg/dL   EST GFR (MDRD) >60 >60 mL/min   EST GFR IF AFRICAN AM >60 >60 mL/min   GLUCOSE 109 (H) 74 - 106 mg/dL   CALCIUM, SERUM 8.6 8.5 - 10.1 mg/dL   ANION GAP 8.0 0.0 - 15.0 mmol/L     Basic Metab Profile (05/28/2018 8:12 PM)   Specimen Performing Laboratory   Blood [facility] LABORAT[facility]     [address] [place] [address]     Back to top of Lab Results  Imaging Results  - in this encounter    Table of Contents for Imaging Results  XR SPINE CERV FLEX&EXT ONLY (05/29/2018 9:45 AM)  CT ANGIO NECK (05/28/2018 8:08 PM)       XR SPINE CERV FLEX&EXT ONLY (05/29/2018 9:45 AM)  XR SPINE CERV FLEX&EXT ONLY (05/29/2018 9:45 AM)   Specimen Performing Laboratory     TEST       XR SPINE CERV FLEX&EXT ONLY (05/29/2018 9:45 AM)   Impressions   IMPRESSION: C2 fractures. No pathologic subluxation with limited flexion/extension.           XR SPINE CERV FLEX&EXT ONLY (05/29/2018 9:45 AM)   Narrative   EXAM: XR CERVICAL SPINE WITH FLEXION/EXTENSION, 5/29/2018        CLINICAL DATA: Fracture.        COMPARISON: CT cervical spine, 5/28/2018        FINDINGS: Upright lateral images in neutral, flexed, and extended positions.        Limited range of motion, particularly with flexion.        Minimally displaced laminar fractures redemonstrated at C2. The alignment and curvature remain normal between skull base and the top of C6 with flexion and extension. No pathologic subluxation.        Mild prevertebral edema along the upper cervical spine.           XR SPINE CERV FLEX&EXT ONLY (05/29/2018 9:45 AM)   Procedure Note   Interface, Nmhcradordrslt In - 05/29/2018 11:11 AM CDT    EXAM: XR CERVICAL SPINE WITH FLEXION/EXTENSION, 5/29/2018    CLINICAL DATA: Fracture.    COMPARISON: CT cervical spine, 5/28/2018    FINDINGS: Upright lateral images in neutral, flexed, and extended positions.    Limited range of motion, particularly with flexion.    Minimally displaced laminar fractures redemonstrated at C2. The alignment and curvature remain normal between skull base and the top of C6 with flexion and extension. No pathologic subluxation.    Mild prevertebral edema along the upper cervical spine.    IMPRESSION  IMPRESSION: C2 fractures. No pathologic subluxation with limited flexion/extension.       Back to top of Imaging  Results      CT ANGIO NECK (05/28/2018 8:08 PM)  CT ANGIO NECK (05/28/2018 8:08 PM)   Specimen Performing Laboratory     TEST       CT ANGIO NECK (05/28/2018 8:08 PM)   Impressions   IMPRESSION:          1.  Normal carotid CT angiogram with no evidence of arterial injury, transection, dissection, stenosis or pseudoaneurysm.     2.  Redemonstration of minimally displaced bilateral C2 laminar fractures.               CT ANGIO NECK (05/28/2018 8:08 PM)   Narrative   EXAM:  CT ANGIOGRAM NECK (3D CTA CAROTIDS).        DATE:  5/28/2018 7:43 PM        CLINICAL DATA:  Neck trauma, blunt          COMPARISON:  CT of the cervical spine from earlier today.        TECHNIQUE:  A CT angiogram (CTA) of the carotid arteries was obtained.  Specifically, during bolus infusion of intravenous contrast, thin-section contiguous transaxial images were obtained through the neck during the arterial phase of enhancement.  Data was also manipulated on an independent workstation by the Radiologist and 3D images were created and reviewed.        *NOTE: Following NASCET criteria, carotid stenosis measurements are calculated using the distal internal carotid artery in the neck as the denominator.        CONTRAST: 100 cc intravenous injection of nonionic contrast.        FINDINGS:          Normal anatomy of the origin of the great vessels off the arch.  The right common and internal carotid arteries are widely patent.  The left common and internal carotid arteries are widely patent.  The dominant left vertebral artery and mildly hypoplastic right vertebral artery are widely patent with no evidence of stenosis, dissection or transection. No evidence of arterial dissection, transection or an aneurysm involving the major cervical arterial structures.  No extravasation of arterial contrast.        No evidence of a cervical hematoma or fluid collection. No lymphadenopathy. The visualized portions of the upper lungs are clear. The cervical airway appears  patent.          There are minimally displaced bilateral C2 laminar fractures, as described in the CT cervical spine exam from earlier today.           CT ANGIO NECK (05/28/2018 8:08 PM)   Procedure Note   Adam Zamora In - 05/28/2018 8:22 PM CDT    EXAM: CT ANGIOGRAM NECK (3D CTA CAROTIDS).    DATE: 5/28/2018 7:43 PM    CLINICAL DATA: Neck trauma, blunt     COMPARISON: CT of the cervical spine from earlier today.    TECHNIQUE: A CT angiogram (CTA) of the carotid arteries was obtained. Specifically, during bolus infusion of intravenous contrast, thin-section contiguous transaxial images were obtained through the neck during the arterial phase of enhancement. Data was also manipulated on an independent workstation by the Radiologist and 3D images were created and reviewed.    *NOTE: Following NASCET criteria, carotid stenosis measurements are calculated using the distal internal carotid artery in the neck as the denominator.    CONTRAST: 100 cc intravenous injection of nonionic contrast.    FINDINGS:     Normal anatomy of the origin of the great vessels off the arch. The right common and internal carotid arteries are widely patent. The left common and internal carotid arteries are widely patent. The dominant left vertebral artery and mildly hypoplastic right vertebral artery are widely patent with no evidence of stenosis, dissection or transection. No evidence of arterial dissection, transection or an aneurysm involving the major cervical arterial structures. No extravasation of arterial contrast.    No evidence of a cervical hematoma or fluid collection. No lymphadenopathy. The visualized portions of the upper lungs are clear. The cervical airway appears patent.     There are minimally displaced bilateral C2 laminar fractures, as described in the CT cervical spine exam from earlier today.    IMPRESSION  IMPRESSION:     1. Normal carotid CT angiogram with no evidence of arterial injury, transection,  dissection, stenosis or pseudoaneurysm.   2. Redemonstration of minimally displaced bilateral C2 laminar fractures.         Back to top of Imaging Results  Visit Diagnoses    Diagnosis   Fall, initial encounter   Progressive supranuclear ophthalmoplegia (HCC)   Other degenerative diseases of the basal ganglia     Closed fracture of second cervical vertebra (HCC)   Closed fracture of second cervical vertebra without mention of spinal cord injury     Acute pain due to trauma   Acute pain due to injury   Acute pain due to trauma     Admitting Diagnoses    Diagnosis   c2 fx   Administered Medications  - in this encounter    Inactive Administered Medications - up to 3 most recent administrations  Inactive Administered Medications - up to 3 most recent administrations   Medication Order MAR Action Action Date Dose Rate Site   **saline FLUSH syringe 10 mL    10 mL, Intravenous, EVERY 8 HOURS, First dose on Mon 5/28/18 at 2200, Until Discontinued   Given 5/30/2018 14:25 CDT 10 mL        Given 5/30/2018 21:13 CDT 10 mL        Given 5/31/2018 06:16 CDT 10 mL             **saline FLUSH syringe 10 mL    10 mL, Intravenous, AS NEEDED, Starting Mon 5/28/18 at 1927, Until u 5/31/18 at 1906, Line Care             acetaminophen (TYLENOL) tablet 650 mg    650 mg, oral, EVERY 4 HOURS AS NEEDED, Starting Mon 5/28/18 at 1928, Until Thu 5/31/18 at 1906, Fever, Pain, mild pain or fever   Given 5/31/2018 03:21  mg        Given 5/31/2018 07:45  mg             amantadine HCl (SYMMETREL) capsule 100 mg    100 mg, oral, THREE TIMES A DAY, First dose on Tue 5/29/18 at 1100, Until Discontinued   Given 5/30/2018 17:09  mg        Given 5/31/2018 06:16  mg        Given 5/31/2018 11:27  mg             amLODIPine (NORVASC) tablet 2.5 mg    2.5 mg, oral, DAILY WITH EVENING MEAL, First dose on Tue 5/29/18 at 1700, Until Discontinued   Given 5/29/2018 16:42 CDT 2.5 mg        Given 5/30/2018 17:09 CDT 2.5 mg              Cholecalciferol (Vitamin D3) tablet 2,000 Units    2,000 Units, oral, DAILY, First dose on Tue 5/29/18 at 0930, Until Discontinued   Given 5/29/2018 11:04 CDT 2,000 Units        Given 5/30/2018 08:58 CDT 2,000 Units        Given 5/31/2018 08:34 CDT 2,000 Units             dextrose 5% in water-lactated ringers IV infusion    at 125 mL/hr, Intravenous, CONTINUOUS, Starting Mon 5/28/18 at 1945, Until Tue 5/29/18 at 2355   New Bag 5/28/2018 22:25 CDT   125 mL/hr      Rate Change 5/29/2018 10:36 CDT   75 mL/hr           dextrose 5% in water-lactated ringers IV infusion    at 75 mL/hr, Intravenous, CONTINUOUS, Starting Wed 5/30/18 at 0000, Until Wed 5/30/18 at 0915   New Bag 5/30/2018 00:00 CDT   75 mL/hr      New Bag 5/30/2018 00:44 CDT   75 mL/hr           enoxaparin (LOVENOX) injection 30 mg    30 mg, Subcutaneous, EVERY 12 HOURS (NS), First dose on Tue 5/29/18 at 2000, Until Discontinued, The minimum weight for this order is 35 kg. For 1 mg/kg dose patients >190 mg, consider using an unfractionated heparin infusion. For 1.5 mg/kg dose patients >125 kg, use enoxaparin 1 mg/kg q12h per dose rounding guidelines. This medication has a Blackbox Warning. Click the formulary reference link for more information.   Given 5/30/2018 08:58 CDT 30 mg   Abdomen    Given 5/30/2018 20:00 CDT 30 mg   Abdomen    Given 5/31/2018 08:33 CDT 30 mg   Abdomen: Left Lower Quadrant         [facility]                 hydroCHLOROthiazide (MICROZIDE) capsule 12.5 mg    12.5 mg, oral, DAILY, First dose on Tue 5/29/18 at 0930, Until Discontinued   Given 5/29/2018 10:55 CDT 12.5 mg        Given 5/30/2018 09:00 CDT 12.5 mg        Given 5/31/2018 08:35 CDT 12.5 mg             iohexol 350 mgI/mL (OMNIPAQUE) 100 mL    100 mL, Intravenous, INTRA-PROCEDURE ONE TIME DOSE AS NEEDED, 1 dose, Starting Mon 5/28/18 at 2008, Until Mon 5/28/18 at 2008, per procedure   Given 5/28/2018 20:08  mL             lidocaine ( LIDODERM ) 5 % patch  REMOVAL 1 each    DAILY - PATCH REMOVAL FOR LIDO, First dose on Tue 5/29/18 at 2300, Until Discontinued   Given 5/29/2018 23:00 CDT 1 each   Right Upper Back    Patch Removed 5/30/2018 23:00 CDT 1 each   Other (Comment)         lidocaine (LMX-4) topical cream 1 Application    Topical, AS NEEDED, Starting Mon 5/28/18 at 1927, Until Thu 5/31/18 at 1906, IV start or restart if patient prefers a needleless local anesthetic.             lidocaine 1% (XYLOCAINE) injection 0.1-0.3 mL    0.1-0.3 mL, Intradermal, AS NEEDED, Starting Mon 5/28/18 at 1927, Until Thu 5/31/18 at 1906, IV start or restart             lidocaine 1% / 8.4% sod bicarb (buffered lidocaine) syringe for IV starts 0.1-0.3 mL    0.1-0.3 mL, Intradermal, AS NEEDED, Starting Mon 5/28/18 at 1927, Until Thu 5/31/18 at 1906, IV line placement, IV start or restart             lidocaine 5% (LIDODERM) adhesive patch, medicated 1 patch    1 patch, Transdermal, DAILY - APPLY PATCH, First dose on Tue 5/29/18 at 1100, Until Discontinued   Given 5/29/2018 13:35 CDT 1 patch   Other (Comment)    Given 5/30/2018 12:42 CDT 1 patch   Other (Comment)    Given 5/31/2018 11:27 CDT 1 patch   Other (Comment)         Lubricant Drops ophthamlic (EYE) solution 1-2 drop    1-2 drop, Each Eye, EVERY 1 HOUR AS NEEDED, Starting Tue 5/29/18 at 1048, Until Thu 5/31/18 at 1906, Dry Eye(s)             MAGNESIUM REPLACEMENT INTRAVENOUS - NOT FOR DOCUMENTATION PURPOSES    Intravenous, PER PROTOCOL, Starting Mon 5/28/18 at 1927, Until Thu 5/31/18 at 1906             magnesium sulfate 2 gram / 50mL (4%) IV piggyback (PREMIX) 2 g    2 g, Intravenous, ONE TIME DOSE, 1 dose, Tue 5/29/18 at 0700   New Bag 5/29/2018 06:59 CDT 2 g             methocarbamol (ROBAXIN) tablet 500 mg    500 mg, oral, FOUR TIMES A DAY, First dose on u 5/31/18 at 0845, Until Discontinued   Given 5/31/2018 09:26  mg             naloxone (NARCAN) injection 0.1 mg    0.1 mg, Intravenous, EVERY 1 MINUTE PRN, 4 doses,  Starting Mon 5/28/18 at 1927, Until Thu 5/31/18 at 1906, Opiate Reversal             nonformulary medication (pending approval) 500 mg    500 mg, oral, EVERY 6 HOURS AS NEEDED, Starting Tue 5/29/18 at 1218, Until Thu 5/31/18 at 1906   Given 5/30/2018 00:33  mg        Given 5/30/2018 12:42  mg             nortriptyline (PAMELOR) capsule 30 mg    30 mg, oral, AT BEDTIME, First dose on Tue 5/29/18 at 2200, Until Discontinued   Given 5/29/2018 23:33 CDT 30 mg        Given 5/30/2018 21:12 CDT 30 mg             olmesartan (BENICAR) tablet 20 mg    20 mg, oral, TWICE A DAY, First dose on Tue 5/29/18 at 0930, Until Discontinued   Given 5/30/2018 09:00 CDT 20 mg        Given 5/30/2018 20:00 CDT 20 mg        Given 5/31/2018 08:34 CDT 20 mg             ondansetron (ZOFRAN) disintegrating tablet 4 mg    4 mg, oral, EVERY 8 HOURS AS NEEDED, Starting Mon 5/28/18 at 1929, Until Thu 5/31/18 at 1906, Nausea             oxyCODONE (immediate release) (ROXICODONE) tablet 2.5-5 mg    2.5-5 mg, oral, EVERY 4 HOURS AS NEEDED, Starting Mon 5/28/18 at 1928, Until Thu 5/31/18 at 1906, Pain, when taking PO, moderate-to-severe pain when tolerating oral intake             potassium chloride (K-DUR) extended release tablet 40 mEq    40 mEq, oral, ONE TIME DOSE, 1 dose, Wed 5/30/18 at 0800   Given 5/30/2018 08:59 CDT 40 mEq             potassium chloride 10 mEq IV piggyback in 100 mL    10 mEq, Intravenous, EVERY 1 HOUR, 4 doses, First dose on Tue 5/29/18 at 1700, Last dose on Tue 5/29/18 at 2000, Administer over 60 Minutes   New Bag 5/29/2018 19:01 CDT 10 mEq        New Bag 5/29/2018 20:00 CDT 10 mEq        New Bag 5/29/2018 20:49 CDT 10 mEq             potassium chloride 10 mEq IV piggyback in 100 mL    10 mEq, Intravenous, EVERY 1 HOUR, 4 doses, First dose on Wed 5/30/18 at 0630, Last dose on Wed 5/30/18 at 0930, Administer over 60 Minutes   New Bag 5/30/2018 06:21 CDT 10 mEq             POTASSIUM REPLACEMENT INTRAVENOUS - NOT  FOR DOCUMENTATION PURPOSES    Intravenous, PER PROTOCOL, Starting Mon 5/28/18 at 1927, Until Thu 5/31/18 at 1906             POTASSIUM REPLACEMENT ORAL - NOT FOR DOCUMENTATION PURPOSES    oral, PER PROTOCOL, Starting Mon 5/28/18 at 1927, Until Thu 5/31/18 at 1906, May request packets if patient requires liquid potassium.             rivastigmine (EXELON) 9.5 mg/24 hr transdermal patch 9.5 mg    9.5 mg, Transdermal, DAILY, First dose on Mon 5/28/18 at 2215, Until Discontinued   Given 5/28/2018 22:32 CDT 9.5 mg   Right Upper Chest    Given 5/29/2018 23:33 CDT 9.5 mg   Left Arm    Given 5/30/2018 21:12 CDT 9.5 mg   Left Upper Chest         rivastigmine 9.5 mg/24 hour patch REMOVAL 1 each    DAILY, First dose on Mon 5/28/18 at 2215, Until Discontinued   Patch Removed 5/28/2018 22:15 CDT 1 each   Left Upper Chest    Patch Removed 5/29/2018 22:00 CDT 1 each   Left Upper Chest    Patch Removed 5/30/2018 22:00 CDT 1 each   Right Upper Chest         senna-docusate (SENNA-S) tablet 1-2 tablet    1-2 tablet, oral, TWICE A DAY, First dose on Mon 5/28/18 at 2000, Until Discontinued   Given 5/30/2018 08:59 CDT 2 tablets        Given 5/30/2018 20:00 CDT 1 tablet        Given 5/31/2018 08:35 CDT 1 tablet             sodium chloride 0.9 % IV BOLUS 1,000 mL    1,000 mL, Intravenous, ONE TIME DOSE, 1 dose, Mon 5/28/18 at 1945, Administer over 60 Minutes   New Bag 5/28/2018 20:17 CDT 1,000 mL 1000 mL/hr           traMADol (ULTRAM) tablet 25 mg    25 mg, oral, EVERY 4 HOURS AS NEEDED, Starting Thu 5/31/18 at 0837, Until Thu 5/31/18 at 1906, Pain, when taking PO   Given 5/31/2018 09:26 CDT 25 mg             Inactive Administered Medications - up to 3 most recent administrations  Inactive Administered Medications - up to 3 most recent administrations   Medication Order MAR Action Action Date Dose Rate Site   **saline FLUSH syringe 10 mL    10 mL, Intravenous, EVERY 8 HOURS, First dose on Mon 5/28/18 at 2200, Until Discontinued   Given  5/30/2018 14:25 CDT 10 mL       Given 5/30/2018 21:13 CDT 10 mL       Given 5/31/2018 06:16 CDT 10 mL                **saline FLUSH syringe 10 mL    10 mL, Intravenous, AS NEEDED, Starting Mon 5/28/18 at 1927, Until Thu 5/31/18 at 1906, Line Care                acetaminophen (TYLENOL) tablet 650 mg    650 mg, oral, EVERY 4 HOURS AS NEEDED, Starting Mon 5/28/18 at 1928, Until Thu 5/31/18 at 1906, Fever, Pain, mild pain or fever   Given 5/31/2018 03:21  mg       Given 5/31/2018 07:45  mg                amantadine HCl (SYMMETREL) capsule 100 mg    100 mg, oral, THREE TIMES A DAY, First dose on Tue 5/29/18 at 1100, Until Discontinued   Given 5/30/2018 17:09  mg       Given 5/31/2018 06:16  mg       Given 5/31/2018 11:27  mg                amLODIPine (NORVASC) tablet 2.5 mg    2.5 mg, oral, DAILY WITH EVENING MEAL, First dose on Tue 5/29/18 at 1700, Until Discontinued   Given 5/29/2018 16:42 CDT 2.5 mg       Given 5/30/2018 17:09 CDT 2.5 mg                Cholecalciferol (Vitamin D3) tablet 2,000 Units    2,000 Units, oral, DAILY, First dose on Tue 5/29/18 at 0930, Until Discontinued   Given 5/29/2018 11:04 CDT 2,000 Units       Given 5/30/2018 08:58 CDT 2,000 Units       Given 5/31/2018 08:34 CDT 2,000 Units                dextrose 5% in water-lactated ringers IV infusion    at 125 mL/hr, Intravenous, CONTINUOUS, Starting Mon 5/28/18 at 1945, Until Tue 5/29/18 at 2355   New Bag 5/28/2018 22:25 CDT   125 mL/hr     Rate Change 5/29/2018 10:36 CDT   75 mL/hr              dextrose 5% in water-lactated ringers IV infusion    at 75 mL/hr, Intravenous, CONTINUOUS, Starting Wed 5/30/18 at 0000, Until Wed 5/30/18 at 0915   New Bag 5/30/2018 00:00 CDT   75 mL/hr     New Bag 5/30/2018 00:44 CDT   75 mL/hr              enoxaparin (LOVENOX) injection 30 mg    30 mg, Subcutaneous, EVERY 12 HOURS (NS), First dose on Tue 5/29/18 at 2000, Until Discontinued, The minimum weight for this order is 35 kg.  For 1 mg/kg dose patients >190 mg, consider using an unfractionated heparin infusion. For 1.5 mg/kg dose patients >125 kg, use enoxaparin 1 mg/kg q12h per dose rounding guidelines. This medication has a Blackbox Warning. Click the formulary reference link for more information.   Given 5/30/2018 08:58 CDT 30 mg   Abdomen   Given 5/30/2018 20:00 CDT 30 mg   Abdomen   Given 5/31/2018 08:33 CDT 30 mg   Abdomen: Left Lower Quadrant            [facility]                    hydroCHLOROthiazide (MICROZIDE) capsule 12.5 mg    12.5 mg, oral, DAILY, First dose on Tue 5/29/18 at 0930, Until Discontinued   Given 5/29/2018 10:55 CDT 12.5 mg       Given 5/30/2018 09:00 CDT 12.5 mg       Given 5/31/2018 08:35 CDT 12.5 mg                iohexol 350 mgI/mL (OMNIPAQUE) 100 mL    100 mL, Intravenous, INTRA-PROCEDURE ONE TIME DOSE AS NEEDED, 1 dose, Starting Mon 5/28/18 at 2008, Until Mon 5/28/18 at 2008, per procedure   Given 5/28/2018 20:08  mL                lidocaine ( LIDODERM ) 5 % patch REMOVAL 1 each    DAILY - PATCH REMOVAL FOR LIDO, First dose on Tue 5/29/18 at 2300, Until Discontinued   Given 5/29/2018 23:00 CDT 1 each   Right Upper Back   Patch Removed 5/30/2018 23:00 CDT 1 each   Other (Comment)            lidocaine (LMX-4) topical cream 1 Application    Topical, AS NEEDED, Starting Mon 5/28/18 at 1927, Until Thu 5/31/18 at 1906, IV start or restart if patient prefers a needleless local anesthetic.                lidocaine 1% (XYLOCAINE) injection 0.1-0.3 mL    0.1-0.3 mL, Intradermal, AS NEEDED, Starting Mon 5/28/18 at 1927, Until Thu 5/31/18 at 1906, IV start or restart                lidocaine 1% / 8.4% sod bicarb (buffered lidocaine) syringe for IV starts 0.1-0.3 mL    0.1-0.3 mL, Intradermal, AS NEEDED, Starting Mon 5/28/18 at 1927, Until Thu 5/31/18 at 1906, IV line placement, IV start or restart                lidocaine 5% (LIDODERM) adhesive patch, medicated 1 patch    1 patch, Transdermal, DAILY  - APPLY PATCH, First dose on Tue 5/29/18 at 1100, Until Discontinued   Given 5/29/2018 13:35 CDT 1 patch   Other (Comment)   Given 5/30/2018 12:42 CDT 1 patch   Other (Comment)   Given 5/31/2018 11:27 CDT 1 patch   Other (Comment)            Lubricant Drops ophthamlic (EYE) solution 1-2 drop    1-2 drop, Each Eye, EVERY 1 HOUR AS NEEDED, Starting Tu 5/29/18 at 1048, Until Thu 5/31/18 at 1906, Dry Eye(s)                MAGNESIUM REPLACEMENT INTRAVENOUS - NOT FOR DOCUMENTATION PURPOSES    Intravenous, PER PROTOCOL, Starting Mon 5/28/18 at 1927, Until Thu 5/31/18 at 1906                magnesium sulfate 2 gram / 50mL (4%) IV piggyback (PREMIX) 2 g    2 g, Intravenous, ONE TIME DOSE, 1 dose, Tu 5/29/18 at 0700   New Bag 5/29/2018 06:59 CDT 2 g                methocarbamol (ROBAXIN) tablet 500 mg    500 mg, oral, FOUR TIMES A DAY, First dose on Thu 5/31/18 at 0845, Until Discontinued   Given 5/31/2018 09:26  mg                naloxone (NARCAN) injection 0.1 mg    0.1 mg, Intravenous, EVERY 1 MINUTE PRN, 4 doses, Starting Mon 5/28/18 at 1927, Until u 5/31/18 at 1906, Opiate Reversal                nonformulary medication (pending approval) 500 mg    500 mg, oral, EVERY 6 HOURS AS NEEDED, Starting Tue 5/29/18 at 1218, Until Thu 5/31/18 at 1906   Given 5/30/2018 00:33  mg       Given 5/30/2018 12:42  mg                nortriptyline (PAMELOR) capsule 30 mg    30 mg, oral, AT BEDTIME, First dose on Tue 5/29/18 at 2200, Until Discontinued   Given 5/29/2018 23:33 CDT 30 mg       Given 5/30/2018 21:12 CDT 30 mg                olmesartan (BENICAR) tablet 20 mg    20 mg, oral, TWICE A DAY, First dose on Tue 5/29/18 at 0930, Until Discontinued   Given 5/30/2018 09:00 CDT 20 mg       Given 5/30/2018 20:00 CDT 20 mg       Given 5/31/2018 08:34 CDT 20 mg                ondansetron (ZOFRAN) disintegrating tablet 4 mg    4 mg, oral, EVERY 8 HOURS AS NEEDED, Starting Mon 5/28/18 at 1929, Until Thu 5/31/18 at  1906, Nausea                oxyCODONE (immediate release) (ROXICODONE) tablet 2.5-5 mg    2.5-5 mg, oral, EVERY 4 HOURS AS NEEDED, Starting Mon 5/28/18 at 1928, Until Thu 5/31/18 at 1906, Pain, when taking PO, moderate-to-severe pain when tolerating oral intake                potassium chloride (K-DUR) extended release tablet 40 mEq    40 mEq, oral, ONE TIME DOSE, 1 dose, Wed 5/30/18 at 0800   Given 5/30/2018 08:59 CDT 40 mEq                potassium chloride 10 mEq IV piggyback in 100 mL    10 mEq, Intravenous, EVERY 1 HOUR, 4 doses, First dose on Tue 5/29/18 at 1700, Last dose on Tue 5/29/18 at 2000, Administer over 60 Minutes   New Bag 5/29/2018 19:01 CDT 10 mEq       New Bag 5/29/2018 20:00 CDT 10 mEq       New Bag 5/29/2018 20:49 CDT 10 mEq                potassium chloride 10 mEq IV piggyback in 100 mL    10 mEq, Intravenous, EVERY 1 HOUR, 4 doses, First dose on Wed 5/30/18 at 0630, Last dose on Wed 5/30/18 at 0930, Administer over 60 Minutes   New Bag 5/30/2018 06:21 CDT 10 mEq                POTASSIUM REPLACEMENT INTRAVENOUS - NOT FOR DOCUMENTATION PURPOSES    Intravenous, PER PROTOCOL, Starting Mon 5/28/18 at 1927, Until Thu 5/31/18 at 1906                POTASSIUM REPLACEMENT ORAL - NOT FOR DOCUMENTATION PURPOSES    oral, PER PROTOCOL, Starting Mon 5/28/18 at 1927, Until Thu 5/31/18 at 1906, May request packets if patient requires liquid potassium.                rivastigmine (EXELON) 9.5 mg/24 hr transdermal patch 9.5 mg    9.5 mg, Transdermal, DAILY, First dose on Mon 5/28/18 at 2215, Until Discontinued   Given 5/28/2018 22:32 CDT 9.5 mg   Right Upper Chest   Given 5/29/2018 23:33 CDT 9.5 mg   Left Arm   Given 5/30/2018 21:12 CDT 9.5 mg   Left Upper Chest            rivastigmine 9.5 mg/24 hour patch REMOVAL 1 each    DAILY, First dose on Mon 5/28/18 at 2215, Until Discontinued   Patch Removed 5/28/2018 22:15 CDT 1 each   Left Upper Chest   Patch Removed 5/29/2018 22:00 CDT 1 each   Left Upper Chest    Patch Removed 5/30/2018 22:00 CDT 1 each   Right Upper Chest            senna-docusate (SENNA-S) tablet 1-2 tablet    1-2 tablet, oral, TWICE A DAY, First dose on Mon 5/28/18 at 2000, Until Discontinued   Given 5/30/2018 08:59 CDT 2 tablets       Given 5/30/2018 20:00 CDT 1 tablet       Given 5/31/2018 08:35 CDT 1 tablet                sodium chloride 0.9 % IV BOLUS 1,000 mL    1,000 mL, Intravenous, ONE TIME DOSE, 1 dose, Mon 5/28/18 at 1945, Administer over 60 Minutes   New Bag 5/28/2018 20:17 CDT 1,000 mL 1000 mL/hr              traMADol (ULTRAM) tablet 25 mg    25 mg, oral, EVERY 4 HOURS AS NEEDED, Starting Thu 5/31/18 at 0837, Until Thu 5/31/18 at 1906, Pain, when taking PO   Given 5/31/2018 09:26 CDT 25 mg                Discontinued Medications  - as of this encounter    Prescription Sig. Discontinue Reason Start Date End Date   BENICAR 20 mg oral Tab   TAKE 1/2 TABLET IN EVENING FOR HEADACHE Error (Remove from MAR) 07/20/2016 05/28/2018   fluticasone (FLONASE) 50 mcg/actuation nasal spray   Instill 1 spray into EACH nare Twice a Day. Error (Remove from MAR) 03/03/2018 05/28/2018   rivastigmine (EXELON) 4.6 mg/24 hr TD PT24   PLACE 1 PATCH ONTO THE SKIN DAILY Error (Remove from MAR) 08/31/2016 05/28/2018   methocarbamol (ROBAXIN) 500 mg oral tablet   Take 1 tablet (500 mg) by mouth four times a day.   05/31/2018 05/31/2018   Historical Medications  - added in this encounter    This list may reflect changes made after this encounter.    Medication Sig. Disp. Refills Start Date End Date   Hydrochlorothiazide 12.5 mg oral tablet   Take 12.5 mg by mouth once daily.           folic acid/multivit-min/lutein (CENTRUM SILVER ORAL)   Take 1 tablet by mouth once daily.           Cholecalciferol, Vitamin D3, 2,000 unit oral tablet   Take 2,000 Units by mouth once daily.           loperamide (IMODIUM) 2 mg oral capsule   Take 2 mg by mouth every 4 (four) hours as needed for Diarrhea.           aspirin 81 mg oral  enteric coated tablet   Take 81 mg by mouth once daily.           rivastigmine (EXELON) 9.5 mg/24 hr TD PT24   Apply 9.5 mg to skin once daily.           olmesartan (BENICAR) 20 mg oral Tab   Take 20 mg by mouth twice a day.           Orders  - in this encounter  Medications Ordered That Might Not Have Been Administered Count Last Ordered Date First Ordered Date   Lubricant Drops ophthamlic (EYE) solution 1-2 drop   05/29/2018     acetaminophen (TYLENOL) 500 mg, Caffeine 65 mg   05/29/2018     **Pharmacy Medication Reconciliation Pending 1 Consult 3 05/28/2018     **saline FLUSH syringe 10 mL   05/28/2018     D5W-NS 0.9% IV BOLUS 1,000 mL   05/28/2018     [facility] Consult   05/28/2018     MAGNESIUM REPLACEMENT INTRAVENOUS - NOT FOR DOCUMENTATION PURPOSES   05/28/2018     POTASSIUM REPLACEMENT INTRAVENOUS - NOT FOR DOCUMENTATION PURPOSES   05/28/2018     POTASSIUM REPLACEMENT ORAL - NOT FOR DOCUMENTATION PURPOSES   05/28/2018     lidocaine (LMX-4) topical cream 1 Application   05/28/2018     lidocaine 1% (XYLOCAINE) injection 0.1-0.3 mL   05/28/2018     lidocaine 1% / 8.4% sod bicarb (buffered lidocaine) syringe for IV starts 0.1-0.3 mL   05/28/2018     naloxone (NARCAN) injection 0.1 mg   05/28/2018     ondansetron (ZOFRAN) disintegrating tablet 4 mg   05/28/2018     oxyCODONE (immediate release) (ROXICODONE) tablet 2.5-5 mg   05/28/2018       Nursing Count Last Ordered Date First Ordered Date   ACTIVITY AS TOLERATED   05/30/2018     ADMISSION HISTORY & PHYSICAL   05/30/2018     CARE LEVEL   05/30/2018     CONDITION AT DISCHARGE   05/30/2018     DISCHARGE INSTRUCTIONS 2 05/30/2018     LENGTH OF STAY   05/30/2018     NOTIFY (FOR ORDER SET USE ONLY)   05/30/2018     NURSING HOME STANDING ORDERS   05/30/2018     REHABILITATION POTENTIAL   05/30/2018     WEIGHT BEARING   05/30/2018       Code Status Count Last Ordered Date First Ordered Date   FULL CODE   05/30/2018       Consult Count Last Ordered Date First  Ordered Date   CONSULT ADVANCE CARE PLANNING   05/28/2018     CONSULT DIETITIAN   05/28/2018       PT Count Last Ordered Date First Ordered Date   CONSULT PT   05/28/2018       OT Count Last Ordered Date First Ordered Date   CONSULT OT   05/28/2018       Dispensing Count Last Ordered Date First Ordered Date   HB PAD POSITIONING PREVALON BODY   (EXTRA MICROCLIMATE BODY PADS)  2 05/31/2018 05/29/2018   HB HEEL SUSPENSION BOOT   05/29/2018       Diet Note Count Last Ordered Date First Ordered Date   DIET AS TOLERATED   05/30/2018       SLP Count Last Ordered Date First Ordered Date   CONSULT SPEECH PATHOLOGY   05/28/2018       IV Team Count Last Ordered Date First Ordered Date   CONSULT VASCULAR ACCESS NURSE (VAN)   05/28/2018       BPA Order Count Last Ordered Date First Ordered Date   INITIATE POTASSIUM & MAGNESIUM REPLACEMENT PROTOCOL ORDERSET   05/28/2018     Document Information

## 2018-06-25 NOTE — LETTER
Health Care Home - Access Care Plan    About Me  Patient Name:  Jimmy Nelson    YOB: 1950  Age:                             67 year old   Mary MRN:            [id number] Telephone Information:     Home Phone [phone]   Mobile [phone]       Address:    [address]  [facility] [address] Email address:  [email or web address]      Emergency Contact(s)  Name Relationship Lgl Grd Work Phone Home Phone Mobile Phone   Cecil NELSON,TAF* Spouse No none [phone]             Health Maintenance:      My Access Plan  Medical Emergency 911   Questions or concerns during clinic hours Primary Clinic Line, I will call the clinic directly: [facility] - [phone]   24 Hour Appointment Line [phone] or  [phone]) (toll free)   24 Hour Nurse Line [phone] (toll free)   Questions or concerns outside clinic hours 24 Hour Appointment Line, I will call the after-hours on-call line:   [facility] [phone] or [phone]) (toll-free)   Preferred Urgent Care     Preferred Hospital [facility]  [phone]   Preferred Pharmacy [facility] [address] [address]     Behavioral Health Crisis Line The National Suicide Prevention Lifeline at 1-912.991.1548 or 911     My Care Team Members  Patient Care Team       Relationship Specialty Notifications Start End    Evelina Morse MD PCP - General Family Practice  10/3/17     Phone: [phone] Fax: [phone]         [address] [place] [address]    System, Provider Not In   Clinic  8/11/14     Comment:  [place] Almazan [facility] [address] [address] [address]    Fax: [phone]               Paulo Saravia MD Referring Physician Neurology  4/22/16     Comment:  Referring to neuropsych    Phone: [phone] Fax: [phone]         [address] [id number]  [facility] [address]    Emperatriz Candelaria, PhD LP MD Psychology  4/22/16     Phone: [phone] Fax: [phone]         [address] [facility] [address]    Jade Peterson, RN Nurse Coordinator Neurology Admissions 2/23/17     Comment:  Movement Disorders    Phone: [phone] Pager: [phone] Fax: [phone]       Umberto Villafana MD MD Ophthalmology  5/11/18     Phone: [phone] Fax: [phone]         [address] [address]    Behl, Melissa K, RN Clinic Care Coordinator Primary Care - CC  6/25/18     Phone: [phone] Fax: [phone]               My Medical and Care Information  Problem List   Patient Active Problem List   Diagnosis     Transient paralysis of limb     Migraine without aura     Speech disturbance     PSP (progressive supranuclear palsy) (H)     Fluttering heart     Leg swelling     Varicose veins     Headache     Memory loss     Urinary urgency     Blurred vision     Double vision     Tinnitus     Sinus disorder     Epistaxis     DISK (aka Displacement of intervertebral disc, site unspecified, without myelopathy)     Impairment of balance     Signs and symptoms involving cognition     Fatigue     Migraine without status migrainosus, not intractable     Palpitations     Parkinsonism (H)     Progressive supranuclear ophthalmoplegia (H)     Speech dysfunction     Cellular blue nevus     S/P ablation of atrial flutter     Migraine without aura and without status migrainosus, not intractable     Parkinsonism, unspecified Parkinsonism type (H)     Essential hypertension     CARDIOVASCULAR SCREENING; LDL GOAL LESS THAN 160     Cognitive complaints     ACP (advance care planning)     Closed fracture of second cervical vertebra (H)     Fall     Acute pain due to trauma     Acute pain due to injury      Current Medications and Allergies:  See printed Medication Report

## 2018-06-25 NOTE — PROGRESS NOTES
Clinic Care Coordination Contact  Care Coordination Communication    Referral Source: SNF/TCU    Clinical Data: Patient was hospitalized at [facility]  from 5/28/18 to 5/31/18 and then [facility] 5/31/18-6/21/18 with diagnosis of Fall, C2 fracture.     Home Care Contact:              Home Care Agency: [facility]              Contact name () and phone number: Melody JESUS [phone]              Care Coordination contacted home care: Yes              Anticipated start of care date: 6/25/2018     Melody informed ANN MARIE CC that patient has been receiving private pay services from their agency, but will also be receiving Medicare covered services following his hospitalization and TCU stay.  Melody states she will be seeing patient later this afternoon.  RN CC requested for Melody to clarify PCP with patient/wife, as PCP is listed as  Health provider, but TCU follow up referral came from [facility] report and patient was last seen by Dr. Burch at [facility] 6/21/18.    Patient Contact:   [facility]/Voicemail    Referral Source: SNF/TCU  Clinical Data: Care Coordinator Outreach  Outreach attempted x 1, voicemail box was full.  Plan: Care Coordinator will mail out care coordination introduction letter with care coordinator contact information and explanation of care coordination services. Care Coordinator will try to reach patient again in 1-2 business days.       Melissa Behl BSN, RN, N  [facility] Care Coordinator  [phone]

## 2018-06-25 NOTE — TELEPHONE ENCOUNTER
RN can not j luis up the Methocarbamol. The Rx should be for 500 mg tablet 1 tablet four times a day.     Also the senokot is 2 tablets BID per Shuana.    Provider to review and sign refills.    Beba Bird RN, [facility] Triage

## 2018-06-25 NOTE — TELEPHONE ENCOUNTER
Reason for Call:  Medication or medication refill:    Do you use a [facility]?  Name of the pharmacy and phone number for the current request:  [facility] [address] [place] [address]    Name of the medication requested: METHOCARBAMOL PO, traMADol (ULTRAM) 50 MG tablet, sennosides (SENOKOT) 8.6 MG tablet,  lidocaine (LIDODERM) 5 % Patch, bisacodyl (DULCOLAX) 10 MG Suppository    Other request: Please call the nurse before sending prescriptions over as she she would like to speak with you in regards to to Jimmy and his medications. Thank you     Can we leave a detailed message on this number? YES    Phone number patient can be reached at: [phone]     Best Time: Any    Call taken on 6/25/2018 at 4:35 PM by Edwardo Acevedo        METHOCARBAMOL PO

## 2018-06-25 NOTE — LETTER
[place] CARE COORDINATION  [address] [address]    June 25, 2018    [facility]  [address]  [facility] [address]      Dear Jason,    I am a clinic care coordinator who works with Dr. Burch at [facility].  I wanted to introduce myself and provide you with my contact information so that you can call me with questions or concerns about your health care. Below is a description of clinic care coordination and how I can further assist you.     The clinic care coordinator is a registered nurse and/or  who understand the health care system. The goal of clinic care coordination is to help you manage your health and improve access to the [facility] in the most efficient manner. The registered nurse can assist you in meeting your health care goals by providing education, coordinating services, and strengthening the communication among your providers. The  can assist you with financial, behavioral, psychosocial, chemical dependency, counseling, and/or psychiatric resources.    Please feel free to contact me at [phone], with any questions or concerns. We at [facility] are focused on providing you with the highest-quality healthcare experience possible and that all starts with you.     Sincerely,     Melissa Behl BSN, RN, N  [facility] Care Coordinator  [phone]      Enclosed: I have enclosed a copy of a 24 Hour Access Plan. This has helpful phone numbers for you to call when needed. Please keep this in an easy to access place to use as needed.

## 2018-06-26 PROBLEM — S12.100D: Status: ACTIVE | Noted: 2018-01-01

## 2018-06-26 PROBLEM — K59.09 OTHER CONSTIPATION: Status: ACTIVE | Noted: 2018-01-01

## 2018-06-26 NOTE — TELEPHONE ENCOUNTER
Reason for Call:  Medication question     Detailed comments: Nurse states per patients wife, she's asking if traMADol (ULTRAM) 50 MG tablet can be wean slower than what was prescribed. Please call nurse to further discuss and to advise. Thanks.      Phone Number Nurse can be reached at: [phone]    Best Time: anytime     Can we leave a detailed message on this number? YES    Call taken on 6/26/2018 at 1:54 PM by Katy Nieves

## 2018-06-26 NOTE — TELEPHONE ENCOUNTER
PA Initiation    Medication: lidocaine (LIDODERM) 5 % Patch  Insurance Company: [employer] - Phone [phone] Fax [phone]  [facility] [address] [place] [address]  Filling Pharmacy Phone: [phone]  Filling Pharmacy Fax:    Start Date: 6/26/2018    THIS HAS BEEN SUBMITTED BY THE PRIOR-AUTHORIZATION TEAM. ANY QUESTIONS PLEASE CALL [phone]. THANK YOU

## 2018-06-26 NOTE — TELEPHONE ENCOUNTER
PRIOR AUTHORIZATION DENIED    Medication: methocarbamol (ROBAXIN) 500 MG tablet denied     Denial Date: 6/26/2018    Denial Rational: must try/fail Tizanidine:        Appeal Information: IF YOU WOULD LIKE TO APPEAL, PLEASE SUPPLY PA TEAM WITH A LETTER OF MEDICAL NECESSITY.

## 2018-06-26 NOTE — TELEPHONE ENCOUNTER
1) Notify patient's wife that Rx for Baclofen, substitute for Robaxin, was sent to his pharmacy.    2) Pursue prior authorization for Lidocaine 5% patch.      Indication: Neck pain secondary to closed second cervical vertebral fracture.      Rationale: NSAIDs are not effective. Oral opiates are not effective.

## 2018-06-26 NOTE — TELEPHONE ENCOUNTER
Written rx for tramadol faxed to the pharmacy, Pharmacy will contact pt when medication is ready for pickup.  Jonas Veliz,  [facility]

## 2018-06-26 NOTE — TELEPHONE ENCOUNTER
Reason for Call:  Other prescription    Detailed comments: patients wife is calling in and requesting an appeal for the Lidocaine 5 % patch that was denied. Patient has only two patches left and would like to expedite it if possible. Thanks.      Phone Number Patient can be reached at: Cell number on file:    Telephone Information:   Mobile [phone]       Best Time: anytime     Can we leave a detailed message on this number? YES    Call taken on 6/26/2018 at 4:53 PM by Katy Nieves

## 2018-06-26 NOTE — TELEPHONE ENCOUNTER
Call patient and spoke with patient's wife.  Informed of Dr. Burch's message below.  Patient stated understood and will await for PA for lidocaine 5% patch.    Justo Hoyt MA

## 2018-06-26 NOTE — PROGRESS NOTES
Clinic Care Coordination Contact  Care Team Conversations    RN CC received call back from home care RN Case Manager Melody stating patient's PCP is Dr. Morse with [facility].  Patient saw Dr. Burch at [facility] due to PCP being out of town.    [place] Care Coordination will not follow patient.    Melissa Behl BSN, RN, N  [facility] Care Coordinator  [phone]

## 2018-06-26 NOTE — TELEPHONE ENCOUNTER
PRIOR AUTHORIZATION DENIED    Medication: lidocaine (LIDODERM) 5 % Patch denied     Denial Date: 6/26/2018    Denial Rational:  Insurance doesn't cover this medication for off-label use.  It is only approved for Post Herpetic Neuralogia, Diabetic Neuropathy, and Cancer pain      Appeal Information: IF YOU WOULD LIKE TO APPEAL, PLEASE SUPPLY PA TEAM WITH A LETTER OF MEDICAL NECESSITY.

## 2018-06-26 NOTE — TELEPHONE ENCOUNTER
Plan does not cover lidocaine (LIDODERM) 5 % Patch and methocarbamol (ROBAXIN) 500 MG tablet.  Please call [phone] to initiate Prior Auth or change med.    Insurance type and ID number: ID# [id number]      Additional Information: 2 meds need henok Arredondo

## 2018-06-26 NOTE — TELEPHONE ENCOUNTER
PA Initiation    Medication: methocarbamol (ROBAXIN) 500 MG tablet  Insurance Company: [employer] - Phone [phone] Fax [phone]  Pharmacy Filling the Rx: [facility] [address] [place] [address]  Filling Pharmacy Phone: [phone]  Filling Pharmacy Fax:    Start Date: 6/26/2018    THIS HAS BEEN SUBMITTED BY THE PRIOR-AUTHORIZATION TEAM. ANY QUESTIONS PLEASE CALL [phone]. THANK YOU

## 2018-06-26 NOTE — TELEPHONE ENCOUNTER
Reason for Call:  Other prescription    Detailed comments: Kriss called states that the prior authorization for methocarbamol (ROBAXIN) 500 MG tablet still will not get it covered. She is asking if you could send the prescription to [place] [id number] [place] [address].  She can get the medication cheap with a coupon from [facility].   Please call and let her know if and when this gets re sent.  Thank you     Phone Number Patient can be reached at: [phone] (H)    Best Time: Any    Can we leave a detailed message on this number? YES    Call taken on 6/26/2018 at 3:52 PM by Edwardo Acevedo

## 2018-06-26 NOTE — TELEPHONE ENCOUNTER
Requested Prescriptions   Pending Prescriptions Disp Refills     methocarbamol (ROBAXIN) 500 MG tablet 120 tablet 5     Sig: Take 1 tablet (500 mg) by mouth 4 times daily    There is no refill protocol information for this order        Different pharmacy being requested for refill than previously sent to.   Routing refill request to provider for review/approval because:  Drug not on the [facility] refill protocol     Garrett Novak RN, BSN

## 2018-06-27 NOTE — TELEPHONE ENCOUNTER
Pursue appeal for Lidocaine 5% patch.  Indication: Cervical neuralgia  Rationale: Oral neuroleptic agents causes side effects. Cannot tolerate oral NSAIDs nor oral opiates.

## 2018-06-27 NOTE — TELEPHONE ENCOUNTER
Spoke with Melody nurse from home care. Patient was getting 25 mg tramadol scheduled at the nursing home every three hours as opposed to every six hours. Wife and nurse are wondering if they can give 25 mg every 4 hours scheduled, then try and extend to every 5 hours scheduled then every 6 hours scheduled as patient tolerates and also get a new prescription sent to pharmacy with updated directions as she is having issues with insurance covering things due to the changes that were made at the nursing home and PA issues as well. Orders pended; please sign if appropriate.     Garrett Novak RN, BSN

## 2018-06-27 NOTE — TELEPHONE ENCOUNTER
Called informed wife that Appeal is in process. Will inform her when we hear back from Prior Auth department.  Shilpa Villafana MA

## 2018-06-27 NOTE — TELEPHONE ENCOUNTER
Agree to plan of giving Tramadol 25 mg every 4 hours as scheduled and later wean down to Tramadol 25 mg every 6 hours as scheduled.    Rx printed, signed and placed in TC basket for faxing.

## 2018-06-27 NOTE — TELEPHONE ENCOUNTER
Reason for Call:  Other prescription    Detailed comments: Pt's Spouse returning phone call that she said she received from the clinic but was not sure what it was about but would like a phone call back for more clarification.    Phone Number Patient can be reached at: Home number on file [phone] (home)    Best Time: anytime    Can we leave a detailed message on this number? YES    Call taken on 6/27/2018 at 10:57 AM by Brendon Hurst

## 2018-06-27 NOTE — TELEPHONE ENCOUNTER
Reason for Call:  Other prescription    Detailed comments: Pt's wife calling to follow up on medication request for lidocaine patches in regards to its status for it has helped alleviate pain in Pt in the past and due to Pt's recent neck injuries  Pt's spouse prefers Pt to continue to be prescribed lidocaine patches.    Phone Number Patient can be reached at: Home number on file [phone] (home)    Best Time: anytime    Can we leave a detailed message on this number? YES    Call taken on 6/27/2018 at 10:30 AM by Brendon Hurst

## 2018-06-27 NOTE — TELEPHONE ENCOUNTER
"Please call patient's home care nurse and clarify her question about \"slower wean\" from Tramadol.  "

## 2018-06-27 NOTE — TELEPHONE ENCOUNTER
Melody informed by phone of provider's message. Called and spoke with wife Kriss. Would like rx faxed to [facility].  Rx faxed.    Shilpa Villafana MA

## 2018-06-28 NOTE — LETTER
6/28/2018         RE: Jimmy Patrick  [address] [facility] [address]        Dear Colleague,    Thank you for referring your patient, Jimmy Patrick, to the [facility]. Please see a copy of my visit note below.    Palliative Care Outpatient Clinic Progress Note    This note was written using voice recognition. I did proof read, but might have missed some things. Please contact me for major errors.    Patient Name:  Jimmy Patrick  Primary Provider:  Evelina Morse    Chief Complaint: further decreased functional status    Interim History:  Jimmy Patrick 67 year old male with PSP returns to be seen by palliative care today.      Patient is here with his wife    Medical History:  - progressive supranuclear palsy diagnosed in 2014  - migraine headaches   - AFib, HTN   - recent fall with C2 fracture, managed conservatively  - recent onset urinary incontinence    He was recently discharged from rehab after a fall in which he fractured his C2. Today is his first time out of the home since, so he has somewhat increased pain, but generally his pain is well controlled with the current regimen. He takes tramadol 25mg q4h during the day (3-4/day) and 100mg at night. He is also going to start methocarbamol, but hasn't filled the script yet.   He has occasionally had some sleepiness after tramadol. He is adamant that he doesn't want other opioids. We discuss that we can increase the intervals between his doses.    He has developed urinary incontinence toward the end of his rehab stay. No other neurological deficits that would point to cord injury with the fracture. He is using a condom catheter overnight and wears depends during the day.     He has issues with constipation, which they are gaining better control over with senna. Due to difficulties with swallowing he can't take miralax.      Coping:  They are saddened by the additional loss in function and independence he  suffered after the fall.     Social History:  Pertinent changes to social history/social situation since last visit:   Recently returned home. They now hired 24 hour help, which is a great relief    Key support resources: wife    Advance Directive Status:  They have completed a HCD, but want to finalize the paperwork for his plan to donate his brain to a PSP research center.     We discussed hospice today. He is enrolled in a trial for PSP treatment at the . This has been meaningful for him as a contribution to science rather than a hope for improvement of his personal situation. They are wondering whether that would interfere with hospice eligibility. The trial treatments and visits are free of cost. Currently, he is on hold for the trial due to the fall and they will hear by the end of the month whether he can rejoin. If yes and they feel ready, I offered to talk to the hospice medical director and clarify his eligibility.   They are interested in an informational visit anyway.  Social History   Substance Use Topics     Smoking status: Never Smoker     Smokeless tobacco: Never Used     Alcohol use No       Allergies   Allergen Reactions     Other [Seasonal Allergies]      environmental     Codeine Other (See Comments) and Rash     GI upset  GI upset     Current pertinent medications:  acetaminophen 1000mg tid  Baclofen 5mg daytime, 10mg HS  Tramadol 50mg q4h prn, 100mg HS prn  methocarbamol 500mg qid    Nortriptyline 30mg HS  Past Medical History:   Diagnosis Date     Blurred vision 8/11/2014     Cancer (H) 1979; 1960's    Florin: father; grandparents (on father's side)     DISK (aka Displacement of intervertebral disc, site unspecified, without myelopathy) 8/11/2014     Displacement of cervical intervertebral disc without myelopathy (HERNIATED DISK) 8/11/2014     Double vision 8/11/2014     Epistaxis 8/11/2014     Fluttering heart 8/11/2014     Headache 8/11/2014     Leg swelling 8/11/2014     Memory loss  2014     Migraines     Ramila: karen     PSP (progressive supranuclear palsy) (H) 2014     Sinus disorder 2014     Tinnitus 2014     Urinary urgency 2014     Varicose veins 2014     Past Surgical History:   Procedure Laterality Date     BIOPSY  2016    Dermatologist remoed skin mole (not cancerus)     CARDIAC SURGERY   ablation    Successful in eliminatng heart flutter     H ABLATION ATRIAL FLUTTER         Review of Systems:   ROS: 10 point ROS neg other than the symptoms noted above in the HPI            Physical Exam:   Vitals were reviewed      BP: 97/67 Pulse: 97   Resp: 16          CONSTIT: awake, appears uncomfortable  EENT: MMM, EOMI, no icterus  NECK: ROM grossly intact  RESP: reg, nl effort  MSK:  in wheelchair, poor tone  SKIN:  warm, no rash, no obvious lesions  NEURO: alert, oriented x3, slurred speech  PSYCH: not very engaged, flat affect, memory and thought process intact        Key Data Reviewed:  GFR >60    : ok      Impression & Recommendations & Counseliny/o male with progressive supranuclear palsy and recent fall with C2 fracture.     Pain: adequate control with current regimen. Discussed options to decrease tramadol   - no changes today    ACP: discussed scope and services of hospice. Pt interested in informational visit. Will facilitate    Return to clinic in 1 month or later if no needs at that time    Nishi Oro MD  Palliative Medicine  Pager [phone]      Again, thank you for allowing me to participate in the care of your patient.        Sincerely,        Nishi Oro MD

## 2018-06-28 NOTE — NURSING NOTE
Oncology Rooming Note    June 28, 2018 1:26 PM   Jimmy Patrick is a 67 year old male who presents for:    Chief Complaint   Patient presents with     Clinic Care Coordination - Follow-up     Initial Vitals: BP 97/67 (BP Location: Right arm, Patient Position: Chair)  Pulse 97  Resp 16 Estimated body mass index is 24.25 kg/(m^2) as calculated from the following:    Height as of 5/17/18: 1.829 m (6').    Weight as of 6/20/18: 81.1 kg (178 lb 12.8 oz). There is no height or weight on file to calculate BSA.  Moderate Pain (5) Comment: Data Unavailable   No LMP for male patient.  Allergies reviewed: Yes  Medications reviewed: No    Medications: Medication refills not needed today.  Pharmacy name entered into [facility]:    [id number] [address] [facility] [address]  [place] [address] [place] [address]    Clinical concerns: Would like information on Hospice for future planning.     15  minutes for nursing intake (face to face time)     Lulu Mccarthy RN

## 2018-06-28 NOTE — MR AVS SNAPSHOT
After Visit Summary   6/28/2018    Jimmy Patrick    MRN: [id number]           Patient Information     Date Of Birth          1950        Visit Information        Provider Department      6/28/2018 1:15 PM Nishi Oro MD [facility]        Today's Diagnoses     Progressive supranuclear ophthalmoplegia (H)    -  1      Care Instructions    Patient Instructions      Expand All Collapse All    Thank you for coming into the Palliative Care Clinic today.     1. No changes in your medications today     2. Hospice will call you to make an appointment for an informational visit      Return to clinic in 1 month for a follow up. Please do postpone this if you don't have questions or needs at that time.    You can reach the Palliative Care Team during business hours at the following number:    - [phone]    To reach the Palliative Care Provider on-call After-hours or on holidays and weekends, call: [phone].  Please note that we are not able to provide pain medication refills on evenings or weekends.                     Follow-ups after your visit        Your next 10 appointments already scheduled     Jul 24, 2018  7:30 AM CDT   LAB with LAB FIRST FLOOR [facility] ([facility])    [address] [place] [id number] [phone]           Please do not eat 10-12 hours before your appointment if you are coming in fasting for labs on lipids, cholesterol, or glucose (sugar). This does not apply to pregnant women. Water, hot tea and black coffee (with nothing added) are okay. Do not drink other fluids, diet soda or chew gum.            Jul 24, 2018  2:30 PM CDT   PHYSICAL with Evelina Morse MD   [facility]    [address] [place] [address]   [phone]            Aug 09, 2018 10:30 AM CDT   (Arrive by 10:15 AM)   Return Movement Disorder with  Paulo Saravia MD   [facility] ([facility])    [address]  3rd Floor  [facility] [address]   [phone]            Aug 09, 2018  1:00 PM CDT   Infusion 60 with UC SPEC INFUSION, UC 45 ATC   [facility] ([facility])    [address]  [address]  [facility] [address]   [phone]            Aug 23, 2018 10:15 AM CDT   Return Visit with Nishi Oro MD   [facility] ([facility])    [address] [place] [id number] [phone]            Sep 06, 2018  9:30 AM CDT   (Arrive by 9:15 AM)   Return Movement Disorder with Paulo Saravia MD   [facility] ([facility])    [address]  3rd [facility] [address]   [phone]            Sep 06, 2018 12:00 PM CDT   Infusion 60 with UC SPEC INFUSION, UC 50 ATC   [facility] ([facility])    [address]  [address]  [facility] [address]   [phone]            Oct 04, 2018 10:30 AM CDT   (Arrive by 10:15 AM)   Return Movement Disorder with Paulo Saravia MD   [facility] ([facility])    [address]  3rd Floor  [facility] [address]   [phone]              Future tests that were ordered for you today     Open Future Orders        Priority Expected Expires Ordered    Follow-Up with Electrophysiologist - 3 Months Routine 11/1/2018 1/7/2019 7/11/2018            Who to contact     If you have questions or need follow up information about today's clinic visit or your schedule please contact [facility] directly at [phone].  Normal or non-critical lab and imaging results will be communicated to you by MyChart, letter or phone within 4 business days after the clinic has received  the results. If you do not hear from us within 7 days, please contact the clinic through [employer] or phone. If you have a critical or abnormal lab result, we will notify you by phone as soon as possible.  Submit refill requests through [employer] or call your pharmacy and they will forward the refill request to us. Please allow 3 business days for your refill to be completed.          Additional Information About Your Visit        [employer] Information     [employer] gives you secure access to your electronic health record. If you see a primary care provider, you can also send messages to your care team and make appointments. If you have questions, please call your primary care clinic.  If you do not have a primary care provider, please call [phone] and they will assist you.      [employer] is an electronic gateway that provides easy, online access to your medical records. With [employer], you can request a clinic appointment, read your test results, renew a prescription or communicate with your care team.     To access your existing account, please contact your [facility] or call [phone] for assistance.        Care EveryWhere ID     This is your Care EveryWhere ID. This could be used by other organizations to access your [facility] medical records  [id number]        Your Vitals Were     Pulse Respirations                97 16           Blood Pressure from Last 3 Encounters:   07/11/18 100/71   06/28/18 97/67   06/21/18 104/74    Weight from Last 3 Encounters:   07/11/18 79.4 kg (175 lb)   06/20/18 81.1 kg (178 lb 12.8 oz)   06/18/18 81.1 kg (178 lb 12.8 oz)              Today, you had the following     No orders found for display         Today's Medication Changes          These changes are accurate as of 6/28/18 11:59 PM.  If you have any questions, ask your nurse or doctor.               These medicines have changed or have updated prescriptions.        Dose/Directions    nortriptyline  10 MG capsule   Commonly known as:  PAMELOR   This may have changed:  additional instructions   Used for:  Migraine without aura and without status migrainosus, not intractable        TAKE 3 CAPSULES AT BEDTIME   Quantity:  270 capsule   Refills:  3         Stop taking these medicines if you haven't already. Please contact your care team if you have questions.     baclofen 10 MG tablet   Commonly known as:  LIORESAL   Stopped by:  Nishi Oro MD                    Primary Care Provider Office Phone # Fax #    Evelina Morse [phone] [address]  [facility] [address]        Equal Access to Services     [facility]: Hadii aad ku hadasho Soomaali, waaxda luqadaha, qaybta kaalmada adeegyada, waxfabby vivar hayemmanueln adenilsa heller . So [facility] [phone].    ATENCIÓN: Si habla español, tiene a langford disposición servicios gratuitos de asistencia lingüística. Llame al [phone].    We comply with applicable federal civil rights laws and [place] laws. We do not discriminate on the basis of race, color, national origin, age, disability, sex, sexual orientation, or gender identity.            Thank you!     Thank you for choosing [facility]  for your care. Our goal is always to provide you with excellent care. Hearing back from our patients is one way we can continue to improve our services. Please take a few minutes to complete the written survey that you may receive in the mail after your visit with us. Thank you!             Your Updated Medication List - Protect others around you: Learn how to safely use, store and throw away your medicines at www.disposemymeds.org.          This list is accurate as of 6/28/18 11:59 PM.  Always use your most recent med list.                   Brand Name Dispense Instructions for use Diagnosis    acetaminophen 500 MG tablet    TYLENOL     Take 2 tablets (1,000 mg) by mouth 3 times daily    Closed displaced fracture of  second cervical vertebra with routine healing, unspecified fracture morphology, subsequent encounter       adapalene 0.1 % gel    DIFFERIN    135 g    Apply to Face topically every day and evening shift for Acne prevention    Acne vulgaris       amantadine 100 MG capsule    SYMMETREL    270 capsule    Take 1 capsule (100 mg) by mouth 3 times daily 6am,11am and 4pm    PSP (progressive supranuclear palsy) (H)       amLODIPine 2.5 MG tablet    NORVASC     Take 1 tablet by mouth daily        BABY ASPIRIN 81 MG chewable tablet   Generic drug:  aspirin      Take 81 mg by mouth daily        BENICAR 20 MG tablet   Generic drug:  olmesartan      Take 1 tablet (20 mg) by mouth 2 times daily    PSP (progressive supranuclear palsy) (H)       bisacodyl 10 MG Suppository    DULCOLAX    30 suppository    Place 1 suppository (10 mg) rectally every other day If no BM in 3 days.    Other constipation       CENTRUM SILVER per tablet     30 tablet    Take 1 tablet by mouth daily        hydrochlorothiazide 12.5 MG Tabs tablet     90 tablet    Take 1 tablet (12.5 mg) by mouth daily    Essential hypertension       * lidocaine 5 % Patch    LIDODERM    30 patch    Apply one patch on skin once a day. Keep on 12 hours and remove for 12 hours.    Closed displaced fracture of second cervical vertebra with routine healing, unspecified fracture morphology, subsequent encounter       * lidocaine 5 % ointment    XYLOCAINE    50 g    Apply topically 2 times daily    Closed displaced fracture of second cervical vertebra, unspecified fracture morphology, sequela, Cervical neuralgia       loperamide 2 MG capsule    IMODIUM     Take 2 mg by mouth every 4 hours as needed for diarrhea        methocarbamol 500 MG tablet    ROBAXIN    120 tablet    Take 1 tablet (500 mg) by mouth 4 times daily    Closed displaced fracture of second cervical vertebra with routine healing, unspecified fracture morphology, subsequent encounter       nortriptyline 10 MG capsule     PAMELOR    270 capsule    TAKE 3 CAPSULES AT BEDTIME    Migraine without aura and without status migrainosus, not intractable       polyethylene glycol 0.4%- propylene glycol 0.3% 0.4-0.3 % Soln ophthalmic solution    SYSTANE ULTRA     Place 1 drop into both eyes 3 times daily        rivastigmine 9.5 MG/24HR 24 hr patch    EXELON    90 patch    Place 1 patch onto the skin daily    PSP (progressive supranuclear palsy) (H)       sennosides 8.6 MG tablet    SENOKOT    120 each    Take 2 tablets by mouth 2 times daily    Other constipation       SYSTANE NIGHTTIME Oint      Instill 1 ribbon in both eyes at bedtime for Dry eyes        * traMADol 100 MG 24 hr tablet    ULTRAM-ER    30 tablet    Take 1 tablet (100 mg) by mouth nightly as needed for severe pain    Other closed nondisplaced fracture of first cervical vertebra, sequela       * traMADol 50 MG tablet    ULTRAM    90 tablet    Take 0.5 tablets (25 mg) by mouth every 4 hours (while awake)    Closed displaced fracture of second cervical vertebra with routine healing, unspecified fracture morphology, subsequent encounter       Vitamin D-3 Super Strength 2000 units tablet   Generic drug:  cholecalciferol      Take 1 tablet by mouth        ZOLMitriptan 5 MG tablet    ZOMIG    30 tablet    Take 1 tablet (5 mg) by mouth at onset of headache for migraine    PSP (progressive supranuclear palsy) (H), Migraine without aura and without status migrainosus, not intractable       * Notice:  This list has 4 medication(s) that are the same as other medications prescribed for you. Read the directions carefully, and ask your doctor or other care provider to review them with you.

## 2018-06-28 NOTE — PATIENT INSTRUCTIONS
Patient Instructions      Expand All Collapse All    Thank you for coming into the Palliative Care Clinic today.     1. No changes in your medications today     2. Hospice will call you to make an appointment for an informational visit      Return to clinic in 1 month for a follow up. Please do postpone this if you don't have questions or needs at that time.    You can reach the Palliative Care Team during business hours at the following number:    - [phone]    To reach the Palliative Care Provider on-call After-hours or on holidays and weekends, call: [phone].  Please note that we are not able to provide pain medication refills on evenings or weekends.

## 2018-06-28 NOTE — PROGRESS NOTES
Palliative Care Outpatient Clinic Progress Note    This note was written using voice recognition. I did proof read, but might have missed some things. Please contact me for major errors.    Patient Name:  Jimmy Patrick  Primary Provider:  Evelina Morse    Chief Complaint: further decreased functional status    Interim History:  Jimmy Patrick 67 year old male with PSP returns to be seen by palliative care today.      Patient is here with his wife    Medical History:  - progressive supranuclear palsy diagnosed in 2014  - migraine headaches   - AFib, HTN   - recent fall with C2 fracture, managed conservatively  - recent onset urinary incontinence    He was recently discharged from rehab after a fall in which he fractured his C2. Today is his first time out of the home since, so he has somewhat increased pain, but generally his pain is well controlled with the current regimen. He takes tramadol 25mg q4h during the day (3-4/day) and 100mg at night. He is also going to start methocarbamol, but hasn't filled the script yet.   He has occasionally had some sleepiness after tramadol. He is adamant that he doesn't want other opioids. We discuss that we can increase the intervals between his doses.    He has developed urinary incontinence toward the end of his rehab stay. No other neurological deficits that would point to cord injury with the fracture. He is using a condom catheter overnight and wears depends during the day.     He has issues with constipation, which they are gaining better control over with senna. Due to difficulties with swallowing he can't take miralax.      Coping:  They are saddened by the additional loss in function and independence he suffered after the fall.     Social History:  Pertinent changes to social history/social situation since last visit:   Recently returned home. They now hired 24 hour help, which is a great relief    Key support resources: wife    Advance Directive Status:   They have completed a HCD, but want to finalize the paperwork for his plan to donate his brain to a PSP research center.     We discussed hospice today. He is enrolled in a trial for PSP treatment at the . This has been meaningful for him as a contribution to science rather than a hope for improvement of his personal situation. They are wondering whether that would interfere with hospice eligibility. The trial treatments and visits are free of cost. Currently, he is on hold for the trial due to the fall and they will hear by the end of the month whether he can rejoin. If yes and they feel ready, I offered to talk to the hospice medical director and clarify his eligibility.   They are interested in an informational visit anyway.  Social History   Substance Use Topics     Smoking status: Never Smoker     Smokeless tobacco: Never Used     Alcohol use No       Allergies   Allergen Reactions     Other [Seasonal Allergies]      environmental     Codeine Other (See Comments) and Rash     GI upset  GI upset     Current pertinent medications:  acetaminophen 1000mg tid  Baclofen 5mg daytime, 10mg HS  Tramadol 50mg q4h prn, 100mg HS prn  methocarbamol 500mg qid    Nortriptyline 30mg HS  Past Medical History:   Diagnosis Date     Blurred vision 8/11/2014     Cancer (H) 1979; 1960's    Florin: father; grandparents (on father's side)     DISK (aka Displacement of intervertebral disc, site unspecified, without myelopathy) 8/11/2014     Displacement of cervical intervertebral disc without myelopathy (HERNIATED DISK) 8/11/2014     Double vision 8/11/2014     Epistaxis 8/11/2014     Fluttering heart 8/11/2014     Headache 8/11/2014     Leg swelling 8/11/2014     Memory loss 8/11/2014     Migraines Favian Mcgrath: karen     PSP (progressive supranuclear palsy) (H) 8/11/2014     Sinus disorder 8/11/2014     Tinnitus 8/11/2014     Urinary urgency 8/11/2014     Varicose veins 8/11/2014     Past Surgical History:   Procedure Laterality  Date     BIOPSY  2016    Dermatologist remoed skin mole (not cancerus)     CARDIAC SURGERY  2013 ablation    Successful in eliminatng heart flutter     H ABLATION ATRIAL FLUTTER         Review of Systems:   ROS: 10 point ROS neg other than the symptoms noted above in the HPI            Physical Exam:   Vitals were reviewed      BP: 97/67 Pulse: 97   Resp: 16          CONSTIT: awake, appears uncomfortable  EENT: MMM, EOMI, no icterus  NECK: ROM grossly intact  RESP: reg, nl effort  MSK:  in wheelchair, poor tone  SKIN:  warm, no rash, no obvious lesions  NEURO: alert, oriented x3, slurred speech  PSYCH: not very engaged, flat affect, memory and thought process intact        Key Data Reviewed:  GFR >60    : ok      Impression & Recommendations & Counseliny/o male with progressive supranuclear palsy and recent fall with C2 fracture.     Pain: adequate control with current regimen. Discussed options to decrease tramadol   - no changes today    ACP: discussed scope and services of hospice. Pt interested in informational visit. Will facilitate    Return to clinic in 1 month or later if no needs at that time    Nishi Oro MD  Palliative Medicine  Pager [phone]

## 2018-06-28 NOTE — PROGRESS NOTES
"Palliative Care Counseling Services - Initial Assessment    PLEASE NOTE:  THIS IS A MENTAL HEALTH NOTE.  OTHER PROVIDERS VIEWING THIS NOTE SHOULD USE THIS ONLY FOR UNDERSTANDING THE CONTEXT OF THE PATIENT S EXPERIENCE.  TOPICS DESCRIBED IN THIS NOTE SHOULD NOT BE REFERENCED TO THE PATIENT BY MEDICAL PROVIDERS    Jimmy \"Raymond\" Celina is a 67 year old man with diagnosis of progressive supranuclear palsy diagnosed 4 years ago, seen today for initial palliative care counseling assessment at [place] [facility].  Raymond was quite fatigued and having pain at the time of my visit.  We completed what we could in the time that he could endure but did end the session after 30 minutes at his request.  Due to his illness it was very difficult for Raymond to speak / project his voice (he often uses an amplifier but they forgot it today).  He answered questions as able but Andreasupriya filled in information as needed; at times Raymond deferred entirely to Kriss to answer questions.     Referred by: Dr. Oro    Presenting Issues: Coping with illness    Preferred Name: Raymond    Mental Status Exam: (List all that apply)      Appearance: Appropriate      Eye Contact: Good       Orientation: Yes, x4      Mood: Depressed      Affect: Presented as flat, but I suspect that this is largely due to his neurological condition. Movement is severely affected.       Thought Content: Clear         Thought Form: Logical      Psychomotor Behavior: Normal    Family:       Marital status:     Years : 41    Years together: 42     Name of spouse/partner: Kriss Patrick      Children: 2 daughters: Beatriz (38 yo) and Kitty (34) both live locally.  Jason recently relocated from their home in [place] to be closer to family.       Parents: parents        Siblings: 4 sisters and 2 brothers      Other:     Support system: Family and Friends Since moving to [place] Jason now have the close support of family, but are grieving " for the strong support system of friends that they left behind in [place]     Living situation: House live in a town home in [place]    Difficulty accessing and/or getting around living space: yes Raymond fell 6 weeks ago and just left [facility] where he was rehabbing.  His mobility has changed significantly since then and he now needs more assistance for all ADL's.  He was in a wheelchair for today' s appointment.  Kriss shared that prior to the fall Raymond was more independent with mobility and ADL's.     Other concerns: yes wondering the extent to which Raymond will regain function from his fall.      Employment history:      Current employment status:  Raymond stopped working after his diagnosis.           Kind of work:        Spouses/SO current employment status: Retired      Kind of work: IT     Education highest level: Post graduate    Financial:       Descriptor:       Health insurance:     Legal concerns: not asked       Area(s) of concern: not asked     Health Care Directive: Has one:  no       If yes, copy in EMR: n/a       Basic information regarding health care directive provided: yes       Health Care Agent(s): No health care directive:  Surrogate  health care decision maker is per legal succession  POLST? Given info by Dr. Oro but Raymond was not able to complete it today.  They will take a copy home to work on and will call with questions.  Will bring to next appointment.     Medical History/Issues (patient account): Raymond was diagnosed with supranuclear palsy (a neurological disease that has a similar presentation to Parkinson's) approximately 4 years ago though both Raymond and Kriss are sure that the had symptoms prior to diagnosis that they did not recognize.  The symptoms that led them to seek medical assistance were the onset of migraines and difficulty projecting his voice.  Raymond's health has steadily deteriorated since his diagnosis and he is currently using a wheelchair after he had a fall 6 weeks ago  "which resulted in several neck fractures.  [facility] just left [facility] where he was [facility] and is back home living with Kriss.  Kriss shared that they are not sure what to expect with his prognosis.  They have done research and understand that his disease is likely to be terminal due to a complication such as aspiration pneumonia, infection, or a fall. They have had some discussion of what they hope for as his disease progresses and are planning to meet with [facility] for an informational visit in the next few weeks.  Raymond shared that he feels that he is suffering and that he is \"trapped\" in his body, Kriss affirmed that Raymond expresses this sentiment often.  Prior to his diagnosis Raymond was active and still working. (I cannot recall his exact title).  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  He is unable to work any longer and now needs near total assistance with all ADL's.  It is unclear to what extent he will recover function.  He is enrolled in a clinical trial through the [facility] and they feel that it has helped delay the progression of his disease.     Pain/Discomfort Issues: Pain, Fatigue and Existential distress   Raymond expresses distress as he feels that he is suffering, and \"trapped\" in his body, describing it as a \"terrible disease' that he is living with.      Coping: Raymond acknowledges feeling depressed, it was difficult to assess how much of this is situational.  Raymond denies a history of any mental health diagnosis or concerns.  Kriss shared that they do not get out very often, due in part to limited mobility.  They enjoy watching comedies on television.  Raymond did share that he has a sister in law who was diagnosed with MS at the age of 23 and that he looks to this to keep perspective \"she's had to suffer for over 20 years, I've only had to suffer for 4.\"    Sleep: not asked    Sexual Health/Intimacy: not asked     Mental Health History and Current Review of Symptoms (patient account): "     Depression in past?: no    Treatment in past?:  no   Treatment currently?:  no    Depression symptoms currently?: I did not get to do a full depression screen as Raymond asked to end the session due to pain.  He did endorse feelings of sadness and hopelessness.   - Anhedonia:    - Hopeless or down mood:    - Sleep problems (too much or too little):    - Fatigue:    - Appetite (too much or too little):    - Excessively negative self perception:    - Trouble concentrating:    - Motor slowness:    - Current and/or recent thoughts of suicide:      Suicide risk screen:  - Past thoughts of suicide?    - Past attempts to end own life?    - If yes, how?   - Protective factors currently?   - Safety plan needed currently?     Anxiety in past?:    Treatment in past?     Treatment currently?      Anxiety symptoms currently?  - Nervous, on edge:    - Sleep problems:    - Worrying a lot/hard to manage worries:    Specific recent areas of worry/fear/concern:   - Tense, hard to relax:    - Feeling restless, hard to sit still:    - Irritable:    - Feeling of dread/ afraid that something awful may happen:    - How long?   - Impacts on daily life?     Any other mental health diagnosis or symptoms in past?:      Any family history of mental health concerns?        Positive Bipolar Disorder screen?     Positive Thought Disorder screen?    History of learning difficulties?        Grief vs Depression:  Endorses symptoms for: Difficult to fully assess due to symptom burden     Psychological Trauma and/or Major Losses:     Nightmares?      Thought about when not wanting to think about?   Tried hard not to think about it?   Avoided situations that reminded you of it?   Felt constantly on guard, watchful, or easily startled?   Felt numb or detached from others, activities, or your surroundings?     Safety Screen:  History of being harmed or controlled by someone close to you?    Being hurt or controlled by someone close to you?    Worried  will be harmed in future?    Worried will harm someone else in future?      CD History:   Using alcohol actively?     Amount per week:   Current concerns (self or other) about alcohol or drug use?   Past concerns and/or CD treatment?       Medications:   Any current concerns?   Taking as prescribed currently?      Carol/Spirituality:       Belong to a carol community:      Specify:        Identify with a particular Hindu:       Identify as spiritual:       How find expression:     Hope:      What do you hope for:          What gives you hope:         Internal Resources (positive memories, sources of samantha):     Perceived Needs:     Resource needs/Referrals:     Assessment:  Raymond Patrick  presents as a 67, facing Supranuclear Palsy. Coping concerns include: existential distress, pain, fatigue . Criteria are met for adjustment disorder with depressed mood including significant distress in response to illness, no prior history of depressive episode reported.       Beneficial palliative care counseling interventions may include: psychotherapy, solution focused therapy, acceptance and commitment therapy    We were unable to complete a full assessment today due to pain and fatigue.      Intervention: Initial palliative care counseling / clinical social work evaluation was conducted.  Palliative Care Counseling interventions available were discussed, including counseling related to serious illness, behavioral interventions for symptom management, consultation regarding goals of care/health care directive/POLST, and other interventions specific to the patient's situation or concerns.     Plan: Raymond and Kriss are aware of how to schedule with me as needed.      DSM5 Diagnoses:   Adjustment Disorders  309.9 (F43.20) Unspecified    Time Spent with Patient/Family: 30 minutes  (Start 2:34, end 3:02)    BERNARDO Euceda, [facility]   Palliative Care    Pgr:[phone]  Ph: [phone]      DO NOT SEND ANY  LETTERS

## 2018-06-28 NOTE — TELEPHONE ENCOUNTER
Medication Appeal Initiation    We have initiated an appeal for the requested medication:  Medication: lidocaine (LIDODERM) 5 % Patch denied   Appeal Start Date:  6/28/2018  Insurance Company: [employer] - Phone [phone] Fax [phone]  Comments:       Faxed information to [facility] at [phone]

## 2018-06-28 NOTE — MR AVS SNAPSHOT
After Visit Summary   6/28/2018    Jimmy Patrick    MRN: [id number]           Patient Information     Date Of Birth          1950        Visit Information        Provider Department      6/28/2018 2:30 PM Sheeba Roy, [facility]        Today's Diagnoses     Adjustment disorder, unspecified type    -  1    Encounter for palliative care        Grief           Follow-ups after your visit        Your next 10 appointments already scheduled     Jul 11, 2018 11:00 AM CDT   [facility] Clint [facility]rez [facility]   [facility]    [id number] [place] [address]   [phone]            Jul 12, 2018  1:00 PM CDT   Infusion 60 with UC SPEC INFUSION, UC 45 ATC   [facility] ([facility])    [address] [place] [address]  [address] [place] [id number] [phone]            Jul 24, 2018  7:30 AM CDT   LAB with LAB FIRST FLOOR [facility])    [address] [place] [address]   [phone]           Please do not eat 10-12 hours before your appointment if you are coming in fasting for labs on lipids, cholesterol, or glucose (sugar). This does not apply to pregnant women. Water, hot tea and black coffee (with nothing added) are okay. Do not drink other fluids, diet soda or chew gum.            Jul 24, 2018  2:30 PM CDT   PHYSICAL with Evelina Morse MD   [facility]    [address] [place] [address]   [phone]            Aug 09, 2018 10:30 AM CDT   (Arrive by 10:15 AM)   Return Movement Disorder with Paulo Saravia MD   [facility] ([facility])    [address]  3rd Floor  [facility] [id number] [phone]            Aug 09, 2018  1:00 PM CDT    Infusion 60 with UC SPEC INFUSION, UC 45 ATC   [facility] ([facility])    [address]  [address]  [facility] [address] [phone]            Sep 06, 2018  9:30 AM CDT   (Arrive by 9:15 AM)   Return Movement Disorder with Paulo Saravia MD   [facility] ([facility])    [address]  3rd Floor  [facility] [address] [phone]            Sep 06, 2018 12:00 PM CDT   Infusion 60 with UC SPEC INFUSION   [facility] ([facility])    [address]  [address]  [facility] [address] [phone]              Who to contact     If you have questions or need follow up information about today's clinic visit or your schedule please contact [facility] directly at [phone].  Normal or non-critical lab and imaging results will be communicated to you by Cafe Affairshart, letter or phone within 4 business days after the clinic has received the results. If you do not hear from us within 7 days, please contact the clinic through MD Lingot or phone. If you have a critical or abnormal lab result, we will notify you by phone as soon as possible.  Submit refill requests through Hemoteq or call your pharmacy and they will forward the refill request to us. Please allow 3 business days for your refill to be completed.          Additional Information About Your Visit        Cafe Affairshart Information     Hemoteq gives you secure access to your electronic health record. If you see a primary care provider, you can also send messages to your care team and make appointments. If you have questions, please call your primary care clinic.  If you do not have a primary care provider, please call [phone] and they will assist you.      Hemoteq is an electronic gateway that provides easy, online access to your medical  records. With Dynamic Signal, you can request a clinic appointment, read your test results, renew a prescription or communicate with your care team.     To access your existing account, please contact your [facility] or call [phone] for assistance.        Care EveryWhere ID     This is your Care EveryWhere ID. This could be used by other organizations to access your [facility] medical records  [id number]         Blood Pressure from Last 3 Encounters:   06/28/18 97/67   06/21/18 104/74   06/20/18 120/84    Weight from Last 3 Encounters:   06/20/18 81.1 kg (178 lb 12.8 oz)   06/18/18 81.1 kg (178 lb 12.8 oz)   06/14/18 81.1 kg (178 lb 12.8 oz)              Today, you had the following     No orders found for display         Today's Medication Changes          These changes are accurate as of 6/28/18  3:43 PM.  If you have any questions, ask your nurse or doctor.               These medicines have changed or have updated prescriptions.        Dose/Directions    nortriptyline 10 MG capsule   Commonly known as:  PAMELOR   This may have changed:  additional instructions   Used for:  Migraine without aura and without status migrainosus, not intractable        TAKE 3 CAPSULES AT BEDTIME   Quantity:  270 capsule   Refills:  3                Primary Care Provider Office Phone # Fax #    Evelina Morse [phone]       [address]  [facility] [address]        Equal Access to Services     MATEO STEELE AH: Hadii robin harper hadasho Sojovanny, waaxda luqadaha, qaybta kaalmada adeegyada, gely heller . So [facility] [phone].    ATENCIÓN: Si habla español, tiene a langford disposición servicios gratuitos de asistencia lingüística. Llame al [phone].    We comply with applicable federal civil rights laws and [place] laws. We do not discriminate on the basis of race, color, national origin, age, disability, sex, sexual orientation, or gender identity.             Thank you!     Thank you for choosing [facility]  for your care. Our goal is always to provide you with excellent care. Hearing back from our patients is one way we can continue to improve our services. Please take a few minutes to complete the written survey that you may receive in the mail after your visit with us. Thank you!             Your Updated Medication List - Protect others around you: Learn how to safely use, store and throw away your medicines at www.disposemymeds.org.          This list is accurate as of 6/28/18  3:43 PM.  Always use your most recent med list.                   Brand Name Dispense Instructions for use Diagnosis    acetaminophen 500 MG tablet    TYLENOL     Take 2 tablets (1,000 mg) by mouth 3 times daily    Closed displaced fracture of second cervical vertebra with routine healing, unspecified fracture morphology, subsequent encounter       adapalene 0.1 % gel    DIFFERIN    135 g    Apply to Face topically every day and evening shift for Acne prevention    Acne vulgaris       amantadine 100 MG capsule    SYMMETREL    270 capsule    Take 1 capsule (100 mg) by mouth 3 times daily 6am,11am and 4pm    PSP (progressive supranuclear palsy) (H)       amLODIPine 2.5 MG tablet    NORVASC     Take 1 tablet by mouth daily        BABY ASPIRIN 81 MG chewable tablet   Generic drug:  aspirin      Take 81 mg by mouth daily        baclofen 10 MG tablet    LIORESAL    60 tablet    Take 1/2 tablet twice a day during the daytime. Take 1 tablet at bedtime.    Closed displaced fracture of second cervical vertebra, unspecified fracture morphology, sequela       BENICAR 20 MG tablet   Generic drug:  olmesartan      Take 1 tablet (20 mg) by mouth 2 times daily    PSP (progressive supranuclear palsy) (H)       bisacodyl 10 MG Suppository    DULCOLAX    30 suppository    Place 1 suppository (10 mg) rectally every other day If no BM in 3 days.    Other constipation       CENTRUM SILVER per  tablet     30 tablet    Take 1 tablet by mouth daily        hydrochlorothiazide 12.5 MG Tabs tablet     90 tablet    Take 1 tablet (12.5 mg) by mouth daily    Essential hypertension       lidocaine 5 % Patch    LIDODERM    30 patch    Apply one patch on skin once a day. Keep on 12 hours and remove for 12 hours.    Closed displaced fracture of second cervical vertebra with routine healing, unspecified fracture morphology, subsequent encounter       loperamide 2 MG capsule    IMODIUM     Take 2 mg by mouth every 4 hours as needed for diarrhea        methocarbamol 500 MG tablet    ROBAXIN    120 tablet    Take 1 tablet (500 mg) by mouth 4 times daily    Closed displaced fracture of second cervical vertebra with routine healing, unspecified fracture morphology, subsequent encounter       nortriptyline 10 MG capsule    PAMELOR    270 capsule    TAKE 3 CAPSULES AT BEDTIME    Migraine without aura and without status migrainosus, not intractable       polyethylene glycol 0.4%- propylene glycol 0.3% 0.4-0.3 % Soln ophthalmic solution    SYSTANE ULTRA     Place 1 drop into both eyes 3 times daily        rivastigmine 9.5 MG/24HR 24 hr patch    EXELON    30 patch    Place 1 patch onto the skin daily    PSP (progressive supranuclear palsy) (H)       sennosides 8.6 MG tablet    SENOKOT    120 each    Take 2 tablets by mouth 2 times daily    Other constipation       SYSTANE NIGHTTIME Oint      Instill 1 ribbon in both eyes at bedtime for Dry eyes        * traMADol 100 MG 24 hr tablet    ULTRAM-ER    30 tablet    Take 1 tablet (100 mg) by mouth nightly as needed for severe pain    Other closed nondisplaced fracture of first cervical vertebra, sequela       * traMADol 50 MG tablet    ULTRAM    90 tablet    Take 0.5 tablets (25 mg) by mouth every 4 hours (while awake)    Closed displaced fracture of second cervical vertebra with routine healing, unspecified fracture morphology, subsequent encounter       Vitamin D-3 Super Strength  2000 units tablet   Generic drug:  cholecalciferol      Take 1 tablet by mouth        ZOLMitriptan 5 MG tablet    ZOMIG    30 tablet    Take 1 tablet (5 mg) by mouth at onset of headache for migraine    PSP (progressive supranuclear palsy) (H), Migraine without aura and without status migrainosus, not intractable       * Notice:  This list has 2 medication(s) that are the same as other medications prescribed for you. Read the directions carefully, and ask your doctor or other care provider to review them with you.

## 2018-06-28 NOTE — TELEPHONE ENCOUNTER
Call patient's wife and she stated that the correct dose should be Tramadol 50mg take 0.5 tablet by mouth every 4 hours.  This rx needs to go to [facility] [place] not [facility] in [place].     Correct RX found and faxed to [facility] in [place].  Call pharmacy and inform correct dose and sig.  Pharmacy stated understood and will delete the other rx on file.    Justo Hoyt MA

## 2018-06-28 NOTE — LETTER
"    6/28/2018         RE: Jimmy Patrick  [address] [address]        Dear Colleague,    Thank you for referring your patient, Jimmy Patrick, to the [facility]. Please see a copy of my visit note below.    Palliative Care Counseling Services - Initial Assessment    PLEASE NOTE:  THIS IS A MENTAL HEALTH NOTE.  OTHER PROVIDERS VIEWING THIS NOTE SHOULD USE THIS ONLY FOR UNDERSTANDING THE CONTEXT OF THE PATIENT S EXPERIENCE.  TOPICS DESCRIBED IN THIS NOTE SHOULD NOT BE REFERENCED TO THE PATIENT BY MEDICAL PROVIDERS    Jimmy \"Raymond\" Celina is a 67 year old man with diagnosis of progressive supranuclear palsy diagnosed 4 years ago, seen today for initial palliative care counseling assessment at [facility].  Raymond was quite fatigued and having pain at the time of my visit.  We completed what we could in the time that he could endure but did end the session after 30 minutes at his request.  Due to his illness it was very difficult for Raymond to speak / project his voice (he often uses an amplifier but they forgot it today).  He answered questions as able but Kriss filled in information as needed; at times Raymond deferred entirely to Kriss to answer questions.     Referred by: Dr. Oro    Presenting Issues: Coping with illness    Preferred Name: Raymond    Mental Status Exam: (List all that apply)      Appearance: Appropriate      Eye Contact: Good       Orientation: Yes, x4      Mood: Depressed      Affect: Presented as flat, but I suspect that this is largely due to his neurological condition. Movement is severely affected.       Thought Content: Clear         Thought Form: Logical      Psychomotor Behavior: Normal    Family:       Marital status:     Years : 41    Years together: 42     Name of spouse/partner: Kriss Patrick      Children: 2 daughters: Beatriz (38 yo) and Kitty (34) both live locally.  Raymond and Kriss recently relocated from " their home in [place] to be closer to family.       Parents: parents        Siblings: 4 sisters and 2 brothers      Other:     Support system: Family and Friends Since moving to [place] Jason now have the close support of family, but are grieving for the strong support system of friends that they left behind in [place]     Living situation: House live in a town home in a care home community    Difficulty accessing and/or getting around living space: yes Raymond fell 6 weeks ago and just left [facility] where he was rehabbing.  His mobility has changed significantly since then and he now needs more assistance for all ADL's.  He was in a wheelchair for today' s appointment.  Kriss shared that prior to the fall Raymond was more independent with mobility and ADL's.     Other concerns: yes wondering the extent to which Raymond will regain function from his fall.      Employment history:      Current employment status:  Raymond stopped working after his diagnosis.           Kind of work:        Spouses/SO current employment status: Retired      Kind of work: IT     Education highest level: Post graduate    Financial:       Descriptor:       Health insurance:     Legal concerns: not asked       Area(s) of concern: not asked     Health Care Directive: Has one:  no       If yes, copy in EMR: n/a       Basic information regarding health care directive provided: yes       Health Care Agent(s): No health care directive:  Surrogate  health care decision maker is per legal succession  POLST? Given info by Dr. Oro but Raymond was not able to complete it today.  They will take a copy home to work on and will call with questions.  Will bring to next appointment.     Medical History/Issues (patient account): Raymond was diagnosed with supranuclear palsy (a neurological disease that has a similar presentation to Parkinson's) approximately 4 years ago though both Raymond and Kriss are sure that the had symptoms prior to diagnosis that they did not  "recognize.  The symptoms that led them to seek medical assistance were the onset of migraines and difficulty projecting his voice.  Raymond's health has steadily deteriorated since his diagnosis and he is currently using a wheelchair after he had a fall 6 weeks ago which resulted in several neck fractures.  He just left [facility] where he was doing rehab and is back home living with Kriss.  Kriss shared that they are not sure what to expect with his prognosis.  They have done research and understand that his disease is likely to be terminal due to a complication such as aspiration pneumonia, infection, or a fall. They have had some discussion of what they hope for as his disease progresses and are planning to meet with [facility] for an informational visit in the next few weeks.  Raymond shared that he feels that he is suffering and that he is \"trapped\" in his body, Kriss affirmed that Raymond expresses this sentiment often.  Prior to his diagnosis Raymond was active and still working. (I cannot recall his exact title).  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  He is unable to work any longer and now needs near total assistance with all ADL's.  It is unclear to what extent he will recover function.  He is enrolled in a clinical trial through the [facility] and they feel that it has helped delay the progression of his disease.     Pain/Discomfort Issues: Pain, Fatigue and Existential distress   Raymond expresses distress as he feels that he is suffering, and \"trapped\" in his body, describing it as a \"terrible disease' that he is living with.      Coping: Raymond acknowledges feeling depressed, it was difficult to assess how much of this is situational.  Raymond denies a history of any mental health diagnosis or concerns.  Kriss shared that they do not get out very often, due in part to limited mobility.  They enjoy watching comedies on television.  Raymond did share that he has a sister in law who was diagnosed with MS at the age " "of 23 and that he looks to this to keep perspective \"she's had to suffer for over 20 years, I've only had to suffer for 4.\"    Sleep: not asked    Sexual Health/Intimacy: not asked     Mental Health History and Current Review of Symptoms (patient account):     Depression in past?: no    Treatment in past?:  no   Treatment currently?:  no    Depression symptoms currently?: I did not get to do a full depression screen as Raymond asked to end the session due to pain.  He did endorse feelings of sadness and hopelessness.   - Anhedonia:    - Hopeless or down mood:    - Sleep problems (too much or too little):    - Fatigue:    - Appetite (too much or too little):    - Excessively negative self perception:    - Trouble concentrating:    - Motor slowness:    - Current and/or recent thoughts of suicide:      Suicide risk screen:  - Past thoughts of suicide?    - Past attempts to end own life?    - If yes, how?   - Protective factors currently?   - Safety plan needed currently?     Anxiety in past?:    Treatment in past?     Treatment currently?      Anxiety symptoms currently?  - Nervous, on edge:    - Sleep problems:    - Worrying a lot/hard to manage worries:    Specific recent areas of worry/fear/concern:   - Tense, hard to relax:    - Feeling restless, hard to sit still:    - Irritable:    - Feeling of dread/ afraid that something awful may happen:    - How long?   - Impacts on daily life?     Any other mental health diagnosis or symptoms in past?:      Any family history of mental health concerns?        Positive Bipolar Disorder screen?     Positive Thought Disorder screen?    History of learning difficulties?        Grief vs Depression:  Endorses symptoms for: Difficult to fully assess due to symptom burden     Psychological Trauma and/or Major Losses:     Nightmares?      Thought about when not wanting to think about?   Tried hard not to think about it?   Avoided situations that reminded you of it?   Felt constantly on " guard, watchful, or easily startled?   Felt numb or detached from others, activities, or your surroundings?     Safety Screen:  History of being harmed or controlled by someone close to you?    Being hurt or controlled by someone close to you?    Worried will be harmed in future?    Worried will harm someone else in future?      CD History:   Using alcohol actively?     Amount per week:   Current concerns (self or other) about alcohol or drug use?   Past concerns and/or CD treatment?       Medications:   Any current concerns?   Taking as prescribed currently?      Carol/Spirituality:       Belong to a carol community:      Specify:        Identify with a particular Baptism:       Identify as spiritual:       How find expression:     Hope:      What do you hope for:          What gives you hope:         Internal Resources (positive memories, sources of samantha):     Perceived Needs:     Resource needs/Referrals:     Assessment:  Raymond Patrick  presents as a 67, facing Supranuclear Palsy. Coping concerns include: existential distress, pain, fatigue . Criteria are met for adjustment disorder with depressed mood including significant distress in response to illness, no prior history of depressive episode reported.       Beneficial palliative care counseling interventions may include: psychotherapy, solution focused therapy, acceptance and commitment therapy    We were unable to complete a full assessment today due to pain and fatigue.      Intervention: Initial palliative care counseling / clinical social work evaluation was conducted.  Palliative Care Counseling interventions available were discussed, including counseling related to serious illness, behavioral interventions for symptom management, consultation regarding goals of care/health care directive/POLST, and other interventions specific to the patient's situation or concerns.     Plan: Raymond and Kriss are aware of how to schedule with me as needed.      DSM5  Diagnoses:   Adjustment Disorders  309.9 (F43.20) Unspecified    Time Spent with Patient/Family: 30 minutes  (Start 2:34, end 3:02)    BERNARDO Euceda, [facility]   Palliative Care    Pgr:[phone]  Ph: [phone]      DO NOT SEND ANY LETTERS          Again, thank you for allowing me to participate in the care of your patient.        Sincerely,        Sheeba Roy [facility]

## 2018-06-28 NOTE — TELEPHONE ENCOUNTER
.Reason for Call:  Other appointment    Detailed comments: Patient's wife called and stated that [facility] did not get RC but instead it was the [facility] in [place]. Patient's wife would like the RX re faxed to [facility]. Pharmacy: [facility] [id number] [address] [address] [Patient Preferred]  [phone]    Phone Number Patient can be reached at: Cell number on file:    Telephone Information:   Mobile [phone]       Best Time: any    Can we leave a detailed message on this number? YES    Call taken on 6/28/2018 at 4:39 PM by Casey Laura

## 2018-07-09 NOTE — TELEPHONE ENCOUNTER
MEDICATION APPEAL DENIED    Medication: lidocaine (LIDODERM) 5 % Patch appeal denied     Denial Date: 7/9/2018    Denial Rational:  Per call to Insurance-- Insurance doesn't cover this medication for off-label use.  It is only approved for Post Herpetic Neuralogia, Diabetic Neuropathy, and Cancer pain      Second Level Appeal Information:     Second level appeals will be managed by the clinic staff and provider. Please contact the [employer] Prior Authorization Team if additional information about the denial is needed.

## 2018-07-10 NOTE — TELEPHONE ENCOUNTER
Inform patient's wife that I will send Rx for Lidocaine 5% ointment as substitute for Lidocaine 5% patch.

## 2018-07-11 NOTE — MR AVS SNAPSHOT
After Visit Summary   7/11/2018    Jimmy Patrick    MRN: [id number]           Patient Information     Date Of Birth          1950        Visit Information        Provider Department      7/11/2018 11:00 AM Clint Gutiérrez MD [facility]        Today's Diagnoses     Neurogenic orthostatic hypotension (H)    -  1    Essential hypertension        PSP (progressive supranuclear palsy)        S/P ablation of atrial flutter           Follow-ups after your visit        Additional Services     Follow-Up with Electrophysiologist - 3 Months                 Your next 10 appointments already scheduled     Jul 24, 2018  7:30 AM CDT   LAB with LAB FIRST FLOOR [facility] ([facility])    [address] [place] [id number] [phone]           Please do not eat 10-12 hours before your appointment if you are coming in fasting for labs on lipids, cholesterol, or glucose (sugar). This does not apply to pregnant women. Water, hot tea and black coffee (with nothing added) are okay. Do not drink other fluids, diet soda or chew gum.            Jul 24, 2018  2:30 PM CDT   PHYSICAL with Evelina Morse MD   [facility] ([facility])    [address] [place] [id number]   [phone]            Aug 09, 2018 10:30 AM CDT   (Arrive by 10:15 AM)   Return Movement Disorder with Paulo Saravia MD   [facility]rology ([facility])    [address]  3rd Floor  [facility] [id number] [phone]            Aug 09, 2018  1:00 PM CDT   Infusion 60 with UC SPEC INFUSION, UC 45 ATC   [facility] ([facility] [place])    [address]  [address]  [facility] [id number] [phone]            Sep 06, 2018  9:30 AM CDT   (Arrive by 9:15 AM)   Return Movement Disorder with Paulo Olivas  MD Rani   [facility]rology ([facility])    [address]  3rd Floor  [facility] [address]   [phone]            Sep 06, 2018 12:00 PM CDT   Infusion 60 with UC SPEC INFUSION, UC 50 ATC   [facility] Special[facility] Procedure ([facility])    [address]  [address]  [facility] [address]   [phone]            Oct 04, 2018 10:30 AM CDT   (Arrive by 10:15 AM)   Return Movement Disorder with Paulo Saravia MD   [facility] Neurology ([facility])    [address]  3rd Floor  [place] [facility] [address]   [phone]            Oct 04, 2018 12:00 PM CDT   Infusion 60 with UC SPEC INFUSION   [facility] Procedure ([facility])    [address]  [address]  [facility] [place] [address]   [phone]              Future tests that were ordered for you today     Open Future Orders        Priority Expected Expires Ordered    Follow-Up with Electrophysiologist - 3 Months Routine 11/1/2018 1/7/2019 7/11/2018            Who to contact     If you have questions or need follow up information about today's clinic visit or your schedule please contact [facility] directly at [phone].  Normal or non-critical lab and imaging results will be communicated to you by MyChart, letter or phone within 4 business days after the clinic has received the results. If you do not hear from us within 7 days, please contact the clinic through MyChart or phone. If you have a critical or abnormal lab result, we will notify you by phone as soon as possible.  Submit refill requests through [employer] or call your pharmacy and they will forward the refill request to us. Please allow 3 business days for your refill to be completed.          Additional Information About Your Visit        [employer] Information     [employer] gives you secure  access to your electronic health record. If you see a primary care provider, you can also send messages to your care team and make appointments. If you have questions, please call your primary care clinic.  If you do not have a primary care provider, please call [phone] and they will assist you.      [employer] is an electronic gateway that provides easy, online access to your medical records. With [employer], you can request a clinic appointment, read your test results, renew a prescription or communicate with your care team.     To access your existing account, please contact your [facility] or call [phone] for assistance.        Care EveryWhere ID     This is your Care EveryWhere ID. This could be used by other organizations to access your [place] medical records  [id number]        Your Vitals Were     Pulse Pulse Oximetry BMI (Body Mass Index)             86 98% 23.73 kg/m2          Blood Pressure from Last 3 Encounters:   07/11/18 100/71   06/28/18 97/67   06/21/18 104/74    Weight from Last 3 Encounters:   07/11/18 79.4 kg (175 lb)   06/20/18 81.1 kg (178 lb 12.8 oz)   06/18/18 81.1 kg (178 lb 12.8 oz)              We Performed the Following     Follow-Up with Electrophysiologist - 6 Months          Today's Medication Changes          These changes are accurate as of 7/11/18 11:36 AM.  If you have any questions, ask your nurse or doctor.               These medicines have changed or have updated prescriptions.        Dose/Directions    nortriptyline 10 MG capsule   Commonly known as:  PAMELOR   This may have changed:  additional instructions   Used for:  Migraine without aura and without status migrainosus, not intractable        TAKE 3 CAPSULES AT BEDTIME   Quantity:  270 capsule   Refills:  3                Primary Care Provider Office Phone # Fax #    Evelina Morse [phone] [address] [facility] [address]        Equal Access to  Services     [facility]: Hadii aad ku hadgrantlaurie Elizabethjovanny, waaxda luqadaha, qaybta kaalmada higinio, gely heller . So [facility] [phone].    ATENCIÓN: Si alfredola kaity, tiene a langford disposición servicios gratuitos de asistencia lingüística. Llame al [phone].    We comply with applicable federal civil rights laws and [place] laws. We do not discriminate on the basis of race, color, national origin, age, disability, sex, sexual orientation, or gender identity.            Thank you!     Thank you for choosing [facility]  for your care. Our goal is always to provide you with excellent care. Hearing back from our patients is one way we can continue to improve our services. Please take a few minutes to complete the written survey that you may receive in the mail after your visit with us. Thank you!             Your Updated Medication List - Protect others around you: Learn how to safely use, store and throw away your medicines at www.disposemymeds.org.          This list is accurate as of 7/11/18 11:36 AM.  Always use your most recent med list.                   Brand Name Dispense Instructions for use Diagnosis    acetaminophen 500 MG tablet    TYLENOL     Take 2 tablets (1,000 mg) by mouth 3 times daily    Closed displaced fracture of second cervical vertebra with routine healing, unspecified fracture morphology, subsequent encounter       adapalene 0.1 % gel    DIFFERIN    135 g    Apply to Face topically every day and evening shift for Acne prevention    Acne vulgaris       amantadine 100 MG capsule    SYMMETREL    270 capsule    Take 1 capsule (100 mg) by mouth 3 times daily 6am,11am and 4pm    PSP (progressive supranuclear palsy) (H)       amLODIPine 2.5 MG tablet    NORVASC     Take 1 tablet by mouth daily        BABY ASPIRIN 81 MG chewable tablet   Generic drug:  aspirin      Take 81 mg by mouth daily        BENICAR 20 MG tablet   Generic drug:  olmesartan       Take 1 tablet (20 mg) by mouth 2 times daily    PSP (progressive supranuclear palsy) (H)       bisacodyl 10 MG Suppository    DULCOLAX    30 suppository    Place 1 suppository (10 mg) rectally every other day If no BM in 3 days.    Other constipation       CENTRUM SILVER per tablet     30 tablet    Take 1 tablet by mouth daily        hydrochlorothiazide 12.5 MG Tabs tablet     90 tablet    Take 1 tablet (12.5 mg) by mouth daily    Essential hypertension       * lidocaine 5 % Patch    LIDODERM    30 patch    Apply one patch on skin once a day. Keep on 12 hours and remove for 12 hours.    Closed displaced fracture of second cervical vertebra with routine healing, unspecified fracture morphology, subsequent encounter       * lidocaine 5 % ointment    XYLOCAINE    50 g    Apply topically 2 times daily    Closed displaced fracture of second cervical vertebra, unspecified fracture morphology, sequela, Cervical neuralgia       loperamide 2 MG capsule    IMODIUM     Take 2 mg by mouth every 4 hours as needed for diarrhea        methocarbamol 500 MG tablet    ROBAXIN    120 tablet    Take 1 tablet (500 mg) by mouth 4 times daily    Closed displaced fracture of second cervical vertebra with routine healing, unspecified fracture morphology, subsequent encounter       nortriptyline 10 MG capsule    PAMELOR    270 capsule    TAKE 3 CAPSULES AT BEDTIME    Migraine without aura and without status migrainosus, not intractable       polyethylene glycol 0.4%- propylene glycol 0.3% 0.4-0.3 % Soln ophthalmic solution    SYSTANE ULTRA     Place 1 drop into both eyes 3 times daily        rivastigmine 9.5 MG/24HR 24 hr patch    EXELON    90 patch    Place 1 patch onto the skin daily    PSP (progressive supranuclear palsy) (H)       sennosides 8.6 MG tablet    SENOKOT    120 each    Take 2 tablets by mouth 2 times daily    Other constipation       SYSTANE NIGHTTIME Oint      Instill 1 ribbon in both eyes at bedtime for Dry eyes        *  traMADol 100 MG 24 hr tablet    ULTRAM-ER    30 tablet    Take 1 tablet (100 mg) by mouth nightly as needed for severe pain    Other closed nondisplaced fracture of first cervical vertebra, sequela       * traMADol 50 MG tablet    ULTRAM    90 tablet    Take 0.5 tablets (25 mg) by mouth every 4 hours (while awake)    Closed displaced fracture of second cervical vertebra with routine healing, unspecified fracture morphology, subsequent encounter       Vitamin D-3 Super Strength 2000 units tablet   Generic drug:  cholecalciferol      Take 1 tablet by mouth        ZOLMitriptan 5 MG tablet    ZOMIG    30 tablet    Take 1 tablet (5 mg) by mouth at onset of headache for migraine    PSP (progressive supranuclear palsy) (H), Migraine without aura and without status migrainosus, not intractable       * Notice:  This list has 4 medication(s) that are the same as other medications prescribed for you. Read the directions carefully, and ask your doctor or other care provider to review them with you.

## 2018-07-11 NOTE — TELEPHONE ENCOUNTER
This writer attempted to contact patient on 07/11/18      Reason for call medication recommendations and unable to leave message.      If patient calls back:   Ofelia RN or 1st floor care team      Roxy Chavez CMA

## 2018-07-11 NOTE — TELEPHONE ENCOUNTER
Notify Zaria about the followin) If patient cannot receive Lidocaine 5% patch, then I recommend Lidocaine 5% ointment.  2) Telephone encounter with his wife last 18 states that patient takes Tramadol 25 mg (taking 1/2 tab of 50 mg) every 4 hours as scheduled. Please refer to telephone encounter below:    2018   Justo Hoyt Y, [facility]       5:27 PM   Note      Call patient's wife and she stated that the correct dose should be Tramadol 50mg take 0.5 tablet by mouth every 4 hours.  This rx needs to go to [facility] in [place] not [facility] in [place].      Correct RX found and faxed to [facility] in [place].  Call pharmacy and inform correct dose and sig.  Pharmacy stated understood and will delete the other rx on file.     Justo Hoyt MA

## 2018-07-11 NOTE — TELEPHONE ENCOUNTER
This writer attempted to contact patient on 07/11/18    Reason for call alternative sent and unable to leave message. Mailbox is full.    If patient calls back:   Relay message. Your doctor have sent a new prescription Lidocaine ointment to your pharmacy to replace the lidocaine patches that your insurance did not cover. Document that patient called and close encounter,      Shilpa Villafana MA

## 2018-07-11 NOTE — PROGRESS NOTES
Clinical Cardiac Electrophysiology    Chief Complaint: atrial flutter    HPI: I was happy to see Mr. Patrick in consultation for the above at the request of Dr. Saravia.    Mr. Patrick recently moved to [place] from [place]. He reports a history of atrial flutter treated with ablation in 2013. He had palpitations associated with the atrial flutter. He reports no history of atrial fibrillation. He was on oral anticoagulation for approximately a year and then switched to an aspirin. He reports no palpitations since the ablation.     He takes diltiazem for hypertension and Benicar, primarily for his migraines.     [id number]: he reports no significant palpitations. He feels his PSP has gotten a little worse.    [id number]: I usually see Mr. Patrick in  but due to scheduling issues he was seen in [place] today. He reports doing well from a cardiac standpoint. He has had no palpitations. He denies any recent change in his exertional abilities, no chest pain/discomfort, no unusual dyspnea on exertion, no syncope, near syncope and no increase in ankle edema.    [id number]: He reports no palpitations. He has no heart concerns today except his BP. His resting BP measurements at home are below his goal of 140/90 but he is concerned that his BP goes up when he exercises. He does have some lightheadedness when he stands. He has not had syncope.     He denies any chest pain/discomfort, increased dyspnea on exertion or symptoms of heart failure.    [id number]: He had his BP machine checked with us today and it is measuring a bit lower than ours. The edema is about the same. His PSP is slowly progressing.    [id number]: His BP has been better controlled. He has not had any atrial fibrillation that he is aware of. His initial RA sat was low. However, repeat on room air, after he warmed up, was fine.    July 11, 2018 Interval history: He had a fall recently which resulted in a cervical fracture.  He is currently in  rehabilitation facility.  He and his wife report that this is resulted in a significant decline in his functional status.  He is hopeful that with continued rehab he will regain his functional status.  He has not had any palpitations or flutter.  The fall was not related to the loss of consciousness.  It was not a syncopal episode.    Current Outpatient Prescriptions   Medication Sig Dispense Refill     acetaminophen (TYLENOL) 500 MG tablet Take 2 tablets (1,000 mg) by mouth 3 times daily       adapalene (DIFFERIN) 0.1 % gel Apply to Face topically every day and evening shift for Acne prevention 135 g 3     amantadine (SYMMETREL) 100 MG capsule Take 1 capsule (100 mg) by mouth 3 times daily 6am,11am and 4pm 270 capsule 3     amLODIPine (NORVASC) 2.5 MG tablet Take 1 tablet by mouth daily       aspirin (BABY ASPIRIN) 81 MG chewable tablet Take 81 mg by mouth daily        bisacodyl (DULCOLAX) 10 MG Suppository Place 1 suppository (10 mg) rectally every other day If no BM in 3 days. 30 suppository 11     cholecalciferol (VITAMIN D-3 SUPER STRENGTH) 2000 UNITS tablet Take 1 tablet by mouth        hydrochlorothiazide 12.5 MG TABS tablet Take 1 tablet (12.5 mg) by mouth daily 90 tablet 3     loperamide (IMODIUM) 2 MG capsule Take 2 mg by mouth every 4 hours as needed for diarrhea       methocarbamol (ROBAXIN) 500 MG tablet Take 1 tablet (500 mg) by mouth 4 times daily 120 tablet 5     Multiple Vitamins-Minerals (CENTRUM SILVER) per tablet Take 1 tablet by mouth daily 30 tablet      nortriptyline (PAMELOR) 10 MG capsule TAKE 3 CAPSULES AT BEDTIME (Patient taking differently: TAKE 3 CAPSULES AT BEDTIME) 270 capsule 3     olmesartan (BENICAR) 20 MG tablet Take 1 tablet (20 mg) by mouth 2 times daily       polyethylene glycol 0.4%- propylene glycol 0.3% (SYSTANE ULTRA) 0.4-0.3 % SOLN ophthalmic solution Place 1 drop into both eyes 3 times daily       rivastigmine (EXELON) 9.5 MG/24HR 24 hr patch Place 1 patch onto the skin  daily 90 patch 3     sennosides (SENOKOT) 8.6 MG tablet Take 2 tablets by mouth 2 times daily 120 each 11     traMADol (ULTRAM) 50 MG tablet Take 0.5 tablets (25 mg) by mouth every 4 hours (while awake) 90 tablet 0     traMADol (ULTRAM-ER) 100 MG 24 hr tablet Take 1 tablet (100 mg) by mouth nightly as needed for severe pain 30 tablet 5     White Petrolatum-Mineral Oil (SYSTANE NIGHTTIME) OINT Instill 1 ribbon in both eyes at bedtime for Dry eyes       ZOLMitriptan (ZOMIG) 5 MG tablet Take 1 tablet (5 mg) by mouth at onset of headache for migraine 30 tablet 5     lidocaine (LIDODERM) 5 % Patch Apply one patch on skin once a day. Keep on 12 hours and remove for 12 hours. (Patient not taking: Reported on 7/11/2018) 30 patch 5     lidocaine (XYLOCAINE) 5 % ointment Apply topically 2 times daily (Patient not taking: Reported on 7/11/2018) 50 g 11       Past Medical History:   Diagnosis Date     Blurred vision 8/11/2014     Cancer (H) 1979; 1960's    Florin: father; grandparents (on father's side)     DISK (aka Displacement of intervertebral disc, site unspecified, without myelopathy) 8/11/2014     Displacement of cervical intervertebral disc without myelopathy (HERNIATED DISK) 8/11/2014     Double vision 8/11/2014     Epistaxis 8/11/2014     Fluttering heart 8/11/2014     Headache 8/11/2014     Leg swelling 8/11/2014     Memory loss 8/11/2014     Migraines 2007    Ramila: karen     PSP (progressive supranuclear palsy) (H) 8/11/2014     Sinus disorder 8/11/2014     Tinnitus 8/11/2014     Urinary urgency 8/11/2014     Varicose veins 8/11/2014       Past Surgical History:   Procedure Laterality Date     BIOPSY  2016    Dermatologist remoed skin mole (not cancerus)     CARDIAC SURGERY  2013 ablation    Successful in eliminatng heart flutter     H ABLATION ATRIAL FLUTTER         Family History   Problem Relation Age of Onset     Cancer Father        Social History   Substance Use Topics     Smoking status: Never Smoker      Smokeless tobacco: Never Used     Alcohol use No       Allergies   Allergen Reactions     Other [Seasonal Allergies]      environmental     Codeine Other (See Comments) and Rash     GI upset  GI upset         ROS:  he has not had any recent falls, he is much less active than in the past. 2018/woa    Physical Examination:  Vitals: /71 (BP Location: Right arm, Patient Position: Chair, Cuff Size: Adult Regular)  Pulse 86  Wt 79.4 kg (175 lb)  SpO2 98%  BMI 23.73 kg/m2  BMI= Body mass index is 23.73 kg/(m^2).    GENERAL APPEARANCE: thin, alert and no distress  HEENT: no icterus  NECK: JVP is not visible  CHEST: lungs clear to auscultation  CARDIOVASCULAR: regular rhythm, normal S1S2, no murmur or gallop, precordium quiet  EXTREMITIES: mild ankle edema      Laboratory and diagnostic data reviewed 2018:    I have personally reviewed the ECG obtained on 2018.  It shows sinus rhythm.  ECG is normal.  LA interval is normal.    Previous studies reviewed 2018:    [facility]  Echocardiography Laboratory  [address] [place] [address]        Name: KARUNA NELSON  MRN: [id number]  : 1950  Study Date: 2016 03:08 PM  Age: 65 yrs  Gender: Male  Patient Location: [facility]  Reason For Study: , Essential (primary) hypertension, Edema, unspecified  Ordering Physician: SARTHAK BYRD  Referring Physician: SARTHAK BYDR  Performed By: Lakisha Bunn     BSA: 2.1 m2  Height: 72 in  Weight: 188 lb  HR: 79  BP: 132/82 mmHg  ______________________________________________________________________________        Procedure  Echocardiogram with two-dimensional, color and spectral Doppler performed.  ______________________________________________________________________________     Interpretation Summary     Global and regional left ventricular function is normal with an EF of 55-60%.  Global right ventricular function is normal.    Assessment and  recommendations:    1) S/P ablation for atrial flutter - no clinical recurrence    2) Hypertension - mild hypotension, his recent event was related to a fall not loss of consciousness.  However, his blood pressure is on the low side at this point.  If need be we can begin to cut back on his antihypertensives.    3) Ankle edema -    - elevation when possible    4) Fall - not due to loss of consciousness    Portions of this note were dictated using speech recognition software. The note has been proofread but errors in the text may have been overlooked. Please contact me if there are any concerns regarding the accuracy of the dictation.     I appreciate the chance to help with Mr. Patrick's care.

## 2018-07-11 NOTE — LETTER
7/11/2018      RE: Jimmy Patrick  [address]  [place] [address]             Clinical Cardiac Electrophysiology    Chief Complaint: atrial flutter    HPI: I was happy to see Mr. Patrick in consultation for the above at the request of Dr. Saravia.    Mr. Patrick recently moved to [place] from [place]. He reports a history of atrial flutter treated with ablation in 2013. He had palpitations associated with the atrial flutter. He reports no history of atrial fibrillation. He was on oral anticoagulation for approximately a year and then switched to an aspirin. He reports no palpitations since the ablation.     He takes diltiazem for hypertension and Benicar, primarily for his migraines.     [id number]: he reports no significant palpitations. He feels his PSP has gotten a little worse.    [id number]: I usually see Mr. Patrick in  but due to scheduling issues he was seen in [place] today. He reports doing well from a cardiac standpoint. He has had no palpitations. He denies any recent change in his exertional abilities, no chest pain/discomfort, no unusual dyspnea on exertion, no syncope, near syncope and no increase in ankle edema.    [id number]: He reports no palpitations. He has no heart concerns today except his BP. His resting BP measurements at home are below his goal of 140/90 but he is concerned that his BP goes up when he exercises. He does have some lightheadedness when he stands. He has not had syncope.     He denies any chest pain/discomfort, increased dyspnea on exertion or symptoms of heart failure.    [id number]: He had his BP machine checked with us today and it is measuring a bit lower than ours. The edema is about the same. His PSP is slowly progressing.    [id number]: His BP has been better controlled. He has not had any atrial fibrillation that he is aware of. His initial RA sat was low. However, repeat on room air, after he warmed up, was fine.    July 11, 2018 Interval  history: He had a fall recently which resulted in a cervical fracture.  He is currently in rehabilitation facility.  He and his wife report that this is resulted in a significant decline in his functional status.  He is hopeful that with continued rehab he will regain his functional status.  He has not had any palpitations or flutter.  The fall was not related to the loss of consciousness.  It was not a syncopal episode.    Current Outpatient Prescriptions   Medication Sig Dispense Refill     acetaminophen (TYLENOL) 500 MG tablet Take 2 tablets (1,000 mg) by mouth 3 times daily       adapalene (DIFFERIN) 0.1 % gel Apply to Face topically every day and evening shift for Acne prevention 135 g 3     amantadine (SYMMETREL) 100 MG capsule Take 1 capsule (100 mg) by mouth 3 times daily 6am,11am and 4pm 270 capsule 3     amLODIPine (NORVASC) 2.5 MG tablet Take 1 tablet by mouth daily       aspirin (BABY ASPIRIN) 81 MG chewable tablet Take 81 mg by mouth daily        bisacodyl (DULCOLAX) 10 MG Suppository Place 1 suppository (10 mg) rectally every other day If no BM in 3 days. 30 suppository 11     cholecalciferol (VITAMIN D-3 SUPER STRENGTH) 2000 UNITS tablet Take 1 tablet by mouth        hydrochlorothiazide 12.5 MG TABS tablet Take 1 tablet (12.5 mg) by mouth daily 90 tablet 3     loperamide (IMODIUM) 2 MG capsule Take 2 mg by mouth every 4 hours as needed for diarrhea       methocarbamol (ROBAXIN) 500 MG tablet Take 1 tablet (500 mg) by mouth 4 times daily 120 tablet 5     Multiple Vitamins-Minerals (CENTRUM SILVER) per tablet Take 1 tablet by mouth daily 30 tablet      nortriptyline (PAMELOR) 10 MG capsule TAKE 3 CAPSULES AT BEDTIME (Patient taking differently: TAKE 3 CAPSULES AT BEDTIME) 270 capsule 3     olmesartan (BENICAR) 20 MG tablet Take 1 tablet (20 mg) by mouth 2 times daily       polyethylene glycol 0.4%- propylene glycol 0.3% (SYSTANE ULTRA) 0.4-0.3 % SOLN ophthalmic solution Place 1 drop into both eyes 3  times daily       rivastigmine (EXELON) 9.5 MG/24HR 24 hr patch Place 1 patch onto the skin daily 90 patch 3     sennosides (SENOKOT) 8.6 MG tablet Take 2 tablets by mouth 2 times daily 120 each 11     traMADol (ULTRAM) 50 MG tablet Take 0.5 tablets (25 mg) by mouth every 4 hours (while awake) 90 tablet 0     traMADol (ULTRAM-ER) 100 MG 24 hr tablet Take 1 tablet (100 mg) by mouth nightly as needed for severe pain 30 tablet 5     White Petrolatum-Mineral Oil (SYSTANE NIGHTTIME) OINT Instill 1 ribbon in both eyes at bedtime for Dry eyes       ZOLMitriptan (ZOMIG) 5 MG tablet Take 1 tablet (5 mg) by mouth at onset of headache for migraine 30 tablet 5     lidocaine (LIDODERM) 5 % Patch Apply one patch on skin once a day. Keep on 12 hours and remove for 12 hours. (Patient not taking: Reported on 7/11/2018) 30 patch 5     lidocaine (XYLOCAINE) 5 % ointment Apply topically 2 times daily (Patient not taking: Reported on 7/11/2018) 50 g 11       Past Medical History:   Diagnosis Date     Blurred vision 8/11/2014     Cancer (H) 1979; 1960's    Florin: father; grandparents (on father's side)     DISK (aka Displacement of intervertebral disc, site unspecified, without myelopathy) 8/11/2014     Displacement of cervical intervertebral disc without myelopathy (HERNIATED DISK) 8/11/2014     Double vision 8/11/2014     Epistaxis 8/11/2014     Fluttering heart 8/11/2014     Headache 8/11/2014     Leg swelling 8/11/2014     Memory loss 8/11/2014     Migraines 2007    Ramila: karen     PSP (progressive supranuclear palsy) (H) 8/11/2014     Sinus disorder 8/11/2014     Tinnitus 8/11/2014     Urinary urgency 8/11/2014     Varicose veins 8/11/2014       Past Surgical History:   Procedure Laterality Date     BIOPSY  2016    Dermatologist remoed skin mole (not cancerus)     CARDIAC SURGERY  2013 ablation    Successful in eliminatng heart flutter     H ABLATION ATRIAL FLUTTER         Family History   Problem Relation Age of Onset     Cancer  Father        Social History   Substance Use Topics     Smoking status: Never Smoker     Smokeless tobacco: Never Used     Alcohol use No       Allergies   Allergen Reactions     Other [Seasonal Allergies]      environmental     Codeine Other (See Comments) and Rash     GI upset  GI upset         ROS:  he has not had any recent falls, he is much less active than in the past. [id number]/woa    Physical Examination:  Vitals: /71 (BP Location: Right arm, Patient Position: Chair, Cuff Size: Adult Regular)  Pulse 86  Wt 79.4 kg (175 lb)  SpO2 98%  BMI 23.73 kg/m2  BMI= Body mass index is 23.73 kg/(m^2).    GENERAL APPEARANCE: thin, alert and no distress  HEENT: no icterus  NECK: JVP is not visible  CHEST: lungs clear to auscultation  CARDIOVASCULAR: regular rhythm, normal S1S2, no murmur or gallop, precordium quiet  EXTREMITIES: mild ankle edema      Laboratory and diagnostic data reviewed [id number]:    I have personally reviewed the ECG obtained on 2018.  It shows sinus rhythm.  ECG is normal.  NM interval is normal.    Previous studies reviewed 2018:    [facility]  Echocardiography Laboratory  [address] [place] [address]        Name: KARUNA NELSON  MRN: [id number]  : 1950  Study Date: 2016 03:08 PM  Age: 65 yrs  Gender: Male  Patient Location: [facility]  Reason For Study: , Essential (primary) hypertension, Edema, unspecified  Ordering Physician: SARTHAK BYRD  Referring Physician: SARTHAK BYRD  Performed By: Lakisha Bunn     BSA: 2.1 m2  Height: 72 in  Weight: 188 lb  HR: 79  BP: 132/82 mmHg  ______________________________________________________________________________        Procedure  Echocardiogram with two-dimensional, color and spectral Doppler performed.  ______________________________________________________________________________     Interpretation Summary     Global and regional left ventricular function is normal  with an EF of 55-60%.  Global right ventricular function is normal.    Assessment and recommendations:    1) S/P ablation for atrial flutter - no clinical recurrence    2) Hypertension - mild hypotension, his recent event was related to a fall not loss of consciousness.  However, his blood pressure is on the low side at this point.  If need be we can begin to cut back on his antihypertensives.    3) Ankle edema -    - elevation when possible    4) Fall - not due to loss of consciousness    Portions of this note were dictated using speech recognition software. The note has been proofread but errors in the text may have been overlooked. Please contact me if there are any concerns regarding the accuracy of the dictation.     I appreciate the chance to help with Mr. Patrick's care.     Clint Gutiérrez MD

## 2018-07-12 NOTE — TELEPHONE ENCOUNTER
Wil, patient's nurse contacted and informed of provider's response. She has additional question regarding the Tramadol:    Patient currently has Tramadol 50mg take 1/2 tablet every 4 hours as scheduled. In addition, patient also takes Tramadol every 3 hours and 6 hours as needed.    Therefore Wil is requesting a prn medication in addition to the scheduled Tramadol. Please advise.    Lai Rivera CMA

## 2018-07-12 NOTE — TELEPHONE ENCOUNTER
Wil contacted and informed of Tramadol new instructions approved  Rx faxed to pharmacy.  Lai Rivera CMA

## 2018-07-12 NOTE — TELEPHONE ENCOUNTER
This writer attempted to contact patient on 07/12/18      Reason for call informed message below and left detailed message.      If patient calls back:   Patient contacted by 1st floor [facility] Team (MA/TC). Inform patient that someone from the team will contact them, document that pt called and route to care team.         Bibi Chen MA

## 2018-07-12 NOTE — TELEPHONE ENCOUNTER
Called patient's wife Kriss and she states Patient discharged from the hospital with Tramadol every 3 hours. When out, a new prescription was written for every 6 hours but patient did not pick this prescription up. They requested a different prescription for every 4 hours as scheduled. Therefore currently, patient should only be taking Tramadol 50mg take 1/2 tablet every 4 hours as scheduled.    Sometimes patient does need an additional tablet to help reduce pain more and is the reason why Taffy/patient requesting another prescription for Tramadol every 6 hours as needed.    Dr Burch - Please advise.  Lai Rivera CMA

## 2018-07-16 NOTE — TELEPHONE ENCOUNTER
...Reason for Call:  Other     Detailed comments: Patient's wife called and said she was waiting to  script from the pharmacy for the atiente. She said the pharmacy said the script for TRAMADOL needs a doctors signature.    Phone Number Patient can be reached at: Home number on file [phone] (home)    Best Time: anytime    Can we leave a detailed message on this number? YES    Call taken on 7/16/2018 at 8:41 AM by Alfred Machado

## 2018-07-24 PROBLEM — Z91.89 AT HIGH RISK FOR ASPIRATION: Status: ACTIVE | Noted: 2018-01-01

## 2018-07-24 NOTE — PATIENT INSTRUCTIONS
Get the PCV 23 shot today  Schedule for sports consult in 4 weeks      Preventive Health Recommendations:       Male Ages 65 and over    Yearly exam:             See your health care provider every year in order to  o   Review health changes.   o   Discuss preventive care.    o   Review your medicines if your doctor has prescribed any.    Talk with your health care provider about whether you should have a test to screen for prostate cancer (PSA).    Every 3 years, have a diabetes test (fasting glucose). If you are at risk for diabetes, you should have this test more often.    Every 5 years, have a cholesterol test. Have this test more often if you are at risk for high cholesterol or heart disease.     Every 10 years, have a colonoscopy. Or, have a yearly FIT test (stool test). These exams will check for colon cancer.    Talk to with your health care provider about screening for Abdominal Aortic Aneurysm if you have a family history of AAA or have a history of smoking.  Shots:     Get a flu shot each year.     Get a tetanus shot every 10 years.     Talk to your doctor about your pneumonia vaccines. There are now two you should receive - Pneumovax (PPSV 23) and Prevnar (PCV 13).    Talk to your pharmacist about a shingles vaccine.     Talk to your doctor about the hepatitis B vaccine.  Nutrition:     Eat at least 5 servings of fruits and vegetables each day.     Eat whole-grain bread, whole-wheat pasta and brown rice instead of white grains and rice.     Get adequate Calcium and Vitamin D.   Lifestyle    Exercise for at least 150 minutes a week (30 minutes a day, 5 days a week). This will help you control your weight and prevent disease.     Limit alcohol to one drink per day.     No smoking.     Wear sunscreen to prevent skin cancer.     See your dentist every six months for an exam and cleaning.     See your eye doctor every 1 to 2 years to screen for conditions such as glaucoma, macular degeneration and  cataracts.

## 2018-07-24 NOTE — MR AVS SNAPSHOT
After Visit Summary   7/24/2018    Jimmy Patrick    MRN: [id number]           Patient Information     Date Of Birth          1950        Visit Information        Provider Department      7/24/2018 2:30 PM Evelina Morse MD [facility]        Today's Diagnoses     Encounter for general adult medical examination with abnormal findings    -  1    Chronic constipation        Need for prophylactic vaccination against Streptococcus pneumoniae (pneumococcus)        Need for shingles vaccine        At high risk for aspiration        Closed displaced fracture of second cervical vertebra with routine healing, unspecified fracture morphology, subsequent encounter        Screening for colon cancer          Care Instructions    Get the PCV 23 shot today  Schedule for sports consult in 4 weeks      Preventive Health Recommendations:       Male Ages 65 and over    Yearly exam:             See your health care provider every year in order to  o   Review health changes.   o   Discuss preventive care.    o   Review your medicines if your doctor has prescribed any.    Talk with your health care provider about whether you should have a test to screen for prostate cancer (PSA).    Every 3 years, have a diabetes test (fasting glucose). If you are at risk for diabetes, you should have this test more often.    Every 5 years, have a cholesterol test. Have this test more often if you are at risk for high cholesterol or heart disease.     Every 10 years, have a colonoscopy. Or, have a yearly FIT test (stool test). These exams will check for colon cancer.    Talk to with your health care provider about screening for Abdominal Aortic Aneurysm if you have a family history of AAA or have a history of smoking.  Shots:     Get a flu shot each year.     Get a tetanus shot every 10 years.     Talk to your doctor about your pneumonia vaccines. There are now two you should receive - Pneumovax (PPSV 23) and  Prevnar (PCV 13).    Talk to your pharmacist about a shingles vaccine.     Talk to your doctor about the hepatitis B vaccine.  Nutrition:     Eat at least 5 servings of fruits and vegetables each day.     Eat whole-grain bread, whole-wheat pasta and brown rice instead of white grains and rice.     Get adequate Calcium and Vitamin D.   Lifestyle    Exercise for at least 150 minutes a week (30 minutes a day, 5 days a week). This will help you control your weight and prevent disease.     Limit alcohol to one drink per day.     No smoking.     Wear sunscreen to prevent skin cancer.     See your dentist every six months for an exam and cleaning.     See your eye doctor every 1 to 2 years to screen for conditions such as glaucoma, macular degeneration and cataracts.          Follow-ups after your visit        Additional Services     SPORTS MEDICINE REFERRAL       Your provider has referred you to:  [facility]: [facility] [phone]   [email or web address] in 4 weeks    Please be aware that coverage of these services is subject to the terms and limitations of your health insurance plan.  Call member services at your health plan with any benefit or coverage questions.      Please bring the following to your appointment:    >>   Any x-rays, CTs or MRIs which have been performed.  Contact the facility where they were done to arrange for  prior to your scheduled appointment.    >>   List of current medications   >>   This referral request   >>   Any documents/labs given to you for this referral                  Your next 10 appointments already scheduled     Aug 09, 2018 10:30 AM CDT   (Arrive by 10:15 AM)   Return Movement Disorder with Paulo Saravia MD   [facility] ([facility])    [address] [place] [id number] [phone]            Aug 09, 2018  1:00 PM CDT   Infusion 60 with  SPEC  INFUSION, UC 45 ATC   [facility] ([facility])    [address]  [address]  [facility] [id number] [phone]            Aug 23, 2018 10:15 AM CDT   Return Visit with Nishi Oro MD   [facility] ([facility])    [id number] [place] [id number] [phone]            Sep 06, 2018  9:30 AM CDT   (Arrive by 9:15 AM)   Return Movement Disorder with Paulo Saravia MD   [facility] ([facility])    [address]  3rd Floor  [facility] [id number] [phone]            Sep 06, 2018 12:00 PM CDT   Infusion 60 with UC SPEC INFUSION, UC 50 ATC   [facility] ([facility])    [address]  [address]  [facility] [address]   [phone]            Oct 04, 2018 10:30 AM CDT   (Arrive by 10:15 AM)   Return Movement Disorder with Paulo Saravia MD   [facility] ([facility])    [address]  3rd Floor  [facility] [id number] [phone]            Oct 04, 2018 12:00 PM CDT   Infusion 60 with UC SPEC INFUSION, UC 50 ATC   [facility] ([facility])    [address]  [address]  [facility] [id number] [phone]              Future tests that were ordered for you today     Open Future Orders        Priority Expected Expires Ordered    Fecal colorectal cancer screen (FIT) Routine 8/14/2018 10/16/2018 7/24/2018            Who to contact     If you have questions or need follow up information about today's clinic visit or your schedule please contact [facility] directly at [phone].  Normal or non-critical lab and imaging results will be communicated to you by MyChart, letter or phone within 4 business  days after the clinic has received the results. If you do not hear from us within 7 days, please contact the clinic through [employer] or phone. If you have a critical or abnormal lab result, we will notify you by phone as soon as possible.  Submit refill requests through [employer] or call your pharmacy and they will forward the refill request to us. Please allow 3 business days for your refill to be completed.          Additional Information About Your Visit        [employer] Information     [employer] gives you secure access to your electronic health record. If you see a primary care provider, you can also send messages to your care team and make appointments. If you have questions, please call your primary care clinic.  If you do not have a primary care provider, please call [phone] and they will assist you.      [employer] is an electronic gateway that provides easy, online access to your medical records. With [employer], you can request a clinic appointment, read your test results, renew a prescription or communicate with your care team.     To access your existing account, please contact your [facility] or call [phone] for assistance.        Care EveryWhere ID     This is your Care EveryWhere ID. This could be used by other organizations to access your [place] medical records  [id number]        Your Vitals Were     Pulse Height Pulse Oximetry BMI (Body Mass Index)          82 6' (1.829 m) 99% 24.02 kg/m2         Blood Pressure from Last 3 Encounters:   07/24/18 122/79   07/11/18 100/71   06/28/18 97/67    Weight from Last 3 Encounters:   07/24/18 177 lb 1.6 oz (80.3 kg)   07/11/18 175 lb (79.4 kg)   06/20/18 178 lb 12.8 oz (81.1 kg)              We Performed the Following     PNEUMOCOCCAL VACCINE,ADULT,SQ OR IM     SPORTS MEDICINE REFERRAL          Today's Medication Changes          These changes are accurate as of 7/24/18  3:13 PM.  If you have any questions, ask your nurse or  doctor.               Start taking these medicines.        Dose/Directions    senna-docusate 8.6-50 MG per tablet   Commonly known as:  SENOKOT-S;PERICOLACE   Used for:  Chronic constipation   Started by:  Evelina Morse MD        Dose:  2 tablet   Take 2 tablets by mouth 2 times daily   Quantity:  120 tablet   Refills:  0       zoster vaccine recombinant adjuvanted injection   Commonly known as:  SHINGRIX   Used for:  Need for shingles vaccine   Started by:  Evelina Morse MD        Dose:  1 each   Inject 0.5 mLs into the muscle once for 1 dose   Quantity:  0.5 mL   Refills:  0         These medicines have changed or have updated prescriptions.        Dose/Directions    nortriptyline 10 MG capsule   Commonly known as:  PAMELOR   This may have changed:  additional instructions   Used for:  Migraine without aura and without status migrainosus, not intractable        TAKE 3 CAPSULES AT BEDTIME   Quantity:  270 capsule   Refills:  3            Where to get your medicines      These medications were sent to [facility] [address] [place] [address]     Phone:  [phone]     senna-docusate 8.6-50 MG per tablet         Some of these will need a paper prescription and others can be bought over the counter.  Ask your nurse if you have questions.     Bring a paper prescription for each of these medications     zoster vaccine recombinant adjuvanted injection                Primary Care Provider Office Phone # Fax #    Evelina Morse [phone] [address]  [facility] [address]        Equal Access to Services     [facility] AH: Hadii robin ku hadasho Soomaali, waaxda luqadaha, qaybta kaalmada adeegyada, gely lynch. So [facility] [phone].    ATENCIÓN: Si habla español, tiene a langford disposición servicios gratuitos de asistencia lingüística. Llame al [phone].    We comply with  applicable federal civil rights laws and [place] laws. We do not discriminate on the basis of race, color, national origin, age, disability, sex, sexual orientation, or gender identity.            Thank you!     Thank you for choosing [facility]  for your care. Our goal is always to provide you with excellent care. Hearing back from our patients is one way we can continue to improve our services. Please take a few minutes to complete the written survey that you may receive in the mail after your visit with us. Thank you!             Your Updated Medication List - Protect others around you: Learn how to safely use, store and throw away your medicines at www.disposemymeds.org.          This list is accurate as of 7/24/18  3:13 PM.  Always use your most recent med list.                   Brand Name Dispense Instructions for use Diagnosis    acetaminophen 500 MG tablet    TYLENOL     Take 2 tablets (1,000 mg) by mouth 3 times daily    Closed displaced fracture of second cervical vertebra with routine healing, unspecified fracture morphology, subsequent encounter       adapalene 0.1 % gel    DIFFERIN    135 g    Apply to Face topically every day and evening shift for Acne prevention    Acne vulgaris       amantadine 100 MG capsule    SYMMETREL    270 capsule    Take 1 capsule (100 mg) by mouth 3 times daily 6am,11am and 4pm    PSP (progressive supranuclear palsy) (H)       amLODIPine 2.5 MG tablet    NORVASC     Take 1 tablet by mouth daily        BABY ASPIRIN 81 MG chewable tablet   Generic drug:  aspirin      Take 81 mg by mouth daily        BENICAR 20 MG tablet   Generic drug:  olmesartan      Take 1 tablet (20 mg) by mouth 2 times daily    PSP (progressive supranuclear palsy) (H)       bisacodyl 10 MG Suppository    DULCOLAX    30 suppository    Place 1 suppository (10 mg) rectally every other day If no BM in 3 days.    Other constipation       CENTRUM SILVER per tablet     30 tablet    Take 1  tablet by mouth daily        hydrochlorothiazide 12.5 MG Tabs tablet     90 tablet    Take 1 tablet (12.5 mg) by mouth daily    Essential hypertension       * lidocaine 5 % Patch    LIDODERM    30 patch    Apply one patch on skin once a day. Keep on 12 hours and remove for 12 hours.    Closed displaced fracture of second cervical vertebra with routine healing, unspecified fracture morphology, subsequent encounter       * lidocaine 5 % ointment    XYLOCAINE    50 g    Apply topically 2 times daily    Closed displaced fracture of second cervical vertebra, unspecified fracture morphology, sequela, Cervical neuralgia       loperamide 2 MG capsule    IMODIUM     Take 2 mg by mouth every 4 hours as needed for diarrhea        methocarbamol 500 MG tablet    ROBAXIN    120 tablet    Take 1 tablet (500 mg) by mouth 4 times daily    Closed displaced fracture of second cervical vertebra with routine healing, unspecified fracture morphology, subsequent encounter       nortriptyline 10 MG capsule    PAMELOR    270 capsule    TAKE 3 CAPSULES AT BEDTIME    Migraine without aura and without status migrainosus, not intractable       polyethylene glycol 0.4%- propylene glycol 0.3% 0.4-0.3 % Soln ophthalmic solution    SYSTANE ULTRA     Place 1 drop into both eyes 3 times daily        rivastigmine 9.5 MG/24HR 24 hr patch    EXELON    90 patch    Place 1 patch onto the skin daily    PSP (progressive supranuclear palsy) (H)       senna-docusate 8.6-50 MG per tablet    SENOKOT-S;PERICOLACE    120 tablet    Take 2 tablets by mouth 2 times daily    Chronic constipation       sennosides 8.6 MG tablet    SENOKOT    120 each    Take 2 tablets by mouth 2 times daily    Other constipation       SYSTANE NIGHTTIME Oint      Instill 1 ribbon in both eyes at bedtime for Dry eyes        traMADol 50 MG tablet    ULTRAM    150 tablet    Take 0.5 tablets (25 mg) by mouth every 4 hours (while awake) May also take 0.5 tablet every 6 hours as needed.     Closed displaced fracture of second cervical vertebra with routine healing, unspecified fracture morphology, subsequent encounter       Vitamin D-3 Super Strength 2000 units tablet   Generic drug:  cholecalciferol      Take 1 tablet by mouth        ZOLMitriptan 5 MG tablet    ZOMIG    30 tablet    Take 1 tablet (5 mg) by mouth at onset of headache for migraine    PSP (progressive supranuclear palsy) (H), Migraine without aura and without status migrainosus, not intractable       zoster vaccine recombinant adjuvanted injection    SHINGRIX    0.5 mL    Inject 0.5 mLs into the muscle once for 1 dose    Need for shingles vaccine       * Notice:  This list has 2 medication(s) that are the same as other medications prescribed for you. Read the directions carefully, and ask your doctor or other care provider to review them with you.

## 2018-07-24 NOTE — PROGRESS NOTES
SUBJECTIVE:   Jimmy Patrick is a 67 year old male who presents for Preventive Visit.      Are you in the first 12 months of your Medicare coverage?  No    Physical   Annual:     Getting at least 3 servings of Calcium per day:  NO    Bi-annual eye exam:  Yes    Dental care twice a year:  Yes    Sleep apnea or symptoms of sleep apnea:  None    Diet:  Other    Frequency of exercise:  6-7 days/week    Duration of exercise:  15-30 minutes    Taking medications regularly:  Yes    Medication side effects:  None    Additional concerns today:  YES    Ability to successfully perform activities of daily living: telephone requires assistance, transportation requires assistance, shopping requires assistance, preparing meals requires assistance, housework requires assistance, bathing requires assistance, laundry requires assistance, medication administration requires assistance and money management requires assistance    Home Safety:  No safety concerns identified    Hearing Impairment: difficulty understanding soft or whispered speech and difficulty understanding speech on the telephone        Fall risk:  Medical Reason(s) for Not Completing Screen:: Patient in wheelchair due to recent fall    COGNITIVE SCREENING:  Not appropriate due to mental handicap    Reviewed and updated as needed this visit by clinical staff  Tobacco  Allergies  Meds  Med Hx  Surg Hx  Fam Hx  Soc Hx        Reviewed and updated as needed this visit by Provider        Social History   Substance Use Topics     Smoking status: Never Smoker     Smokeless tobacco: Never Used     Alcohol use No       Alcohol Use 7/23/2018   If you drink alcohol do you typically have greater than 3 drinks per day OR greater than 7 drinks per week? No   No flowsheet data found.        Hypertension Follow-up      Outpatient blood pressures are not being checked.    Low Salt Diet: no added salt      Today's PHQ-2 Score:   PHQ-2 ( 1999 [employer]) 7/23/2018   Q1: Little  interest or pleasure in doing things 3   Q2: Feeling down, depressed or hopeless 3   PHQ-2 Score 6   Q1: Little interest or pleasure in doing things Nearly every day   Q2: Feeling down, depressed or hopeless Nearly every day   PHQ-2 Score 6       Do you feel safe in your environment - Yes    Do you have a Health Care Directive?: No: Advance care planning reviewed with patient; information given to patient to review.    Current providers sharing in care for this patient include:   Patient Care Team:  Evelina Morse MD as PCP - General (Family Practice)  System, Provider Not In (Clinic)  Paulo Saravia MD as Referring Physician (Neurology)  Emperatriz Candelaria, PhD LP as MD (Psychology)  Jade Peterson, RN as Nurse Coordinator (Neurology)  Umberto Villafana MD as MD (Ophthalmology)  [facility] ([facility] ([facility] ([place]    The following health maintenance items are reviewed in Epic and correct as of today:  Health Maintenance   Topic Date Due     MIGRAINE ACTION PLAN  [birth year]17/1968     TETANUS IMMUNIZATION (SYSTEM ASSIGNED)  11/17/1968     HEPATITIS C SCREENING  11/17/1968     COLON CANCER SCREEN (SYSTEM ASSIGNED)  11/17/2000     AORTIC ANEURYSM SCREENING (SYSTEM ASSIGNED)  11/17/2015     INFLUENZA VACCINE (1) 09/01/2018     PNEUMOCOCCAL (2 of 2 - PPSV23) 10/18/2018     FALL RISK ASSESSMENT  04/19/2019     BMP Q1 YR  07/24/2019     LIPID MONITORING Q1 YEAR  07/24/2019     MICROALBUMIN Q1 YEAR  07/24/2019     PHQ-2 Q1 YR  07/24/2019     ADVANCE DIRECTIVE PLANNING Q5 YRS  05/29/2023     Labs reviewed in EPIC  BP Readings from Last 3 Encounters:   07/24/18 122/79   07/11/18 100/71   06/28/18 97/67    Wt Readings from Last 3 Encounters:   07/24/18 177 lb 1.6 oz (80.3 kg)   07/11/18 175 lb (79.4 kg)   06/20/18 178 lb 12.8 oz (81.1 kg)                  Patient Active Problem List   Diagnosis     Transient paralysis of limb     Migraine without aura     Speech disturbance     PSP  (progressive supranuclear palsy) (H)     Fluttering heart     Leg swelling     Varicose veins     Headache     Memory loss     Urinary urgency     Blurred vision     Double vision     Tinnitus     Sinus disorder     Epistaxis     DISK (aka Displacement of intervertebral disc, site unspecified, without myelopathy)     Impairment of balance     Signs and symptoms involving cognition     Fatigue     Migraine without status migrainosus, not intractable     Palpitations     Parkinsonism (H)     Progressive supranuclear ophthalmoplegia (H)     Speech dysfunction     Cellular blue nevus     S/P ablation of atrial flutter     Migraine without aura and without status migrainosus, not intractable     Parkinsonism, unspecified Parkinsonism type (H)     Essential hypertension     CARDIOVASCULAR SCREENING; LDL GOAL LESS THAN 160     Cognitive complaints     ACP (advance care planning)     Closed fracture of second cervical vertebra (H)     Fall     Acute pain due to trauma     Acute pain due to injury     Closed displaced fracture of second cervical vertebra with routine healing, unspecified fracture morphology, subsequent encounter     Other constipation     At high risk for aspiration     Past Surgical History:   Procedure Laterality Date     BIOPSY  2016    Dermatologist remoed skin mole (not cancerus)     CARDIAC SURGERY  2013 ablation    Successful in eliminatng heart flutter     H ABLATION ATRIAL FLUTTER         Social History   Substance Use Topics     Smoking status: Never Smoker     Smokeless tobacco: Never Used     Alcohol use No     Family History   Problem Relation Age of Onset     Cancer Father          Current Outpatient Prescriptions   Medication Sig Dispense Refill     acetaminophen (TYLENOL) 500 MG tablet Take 2 tablets (1,000 mg) by mouth 3 times daily       adapalene (DIFFERIN) 0.1 % gel Apply to Face topically every day and evening shift for Acne prevention 135 g 3     amantadine (SYMMETREL) 100 MG capsule  Take 1 capsule (100 mg) by mouth 3 times daily 6am,11am and 4pm 270 capsule 3     amLODIPine (NORVASC) 2.5 MG tablet Take 1 tablet by mouth daily       aspirin (BABY ASPIRIN) 81 MG chewable tablet Take 81 mg by mouth daily        bisacodyl (DULCOLAX) 10 MG Suppository Place 1 suppository (10 mg) rectally every other day If no BM in 3 days. 30 suppository 11     cholecalciferol (VITAMIN D-3 SUPER STRENGTH) 2000 UNITS tablet Take 1 tablet by mouth        hydrochlorothiazide 12.5 MG TABS tablet Take 1 tablet (12.5 mg) by mouth daily 90 tablet 3     loperamide (IMODIUM) 2 MG capsule Take 2 mg by mouth every 4 hours as needed for diarrhea       methocarbamol (ROBAXIN) 500 MG tablet Take 1 tablet (500 mg) by mouth 4 times daily 120 tablet 5     Multiple Vitamins-Minerals (CENTRUM SILVER) per tablet Take 1 tablet by mouth daily 30 tablet      nortriptyline (PAMELOR) 10 MG capsule TAKE 3 CAPSULES AT BEDTIME (Patient taking differently: TAKE 3 CAPSULES AT BEDTIME) 270 capsule 3     olmesartan (BENICAR) 20 MG tablet Take 1 tablet (20 mg) by mouth 2 times daily       polyethylene glycol 0.4%- propylene glycol 0.3% (SYSTANE ULTRA) 0.4-0.3 % SOLN ophthalmic solution Place 1 drop into both eyes 3 times daily       rivastigmine (EXELON) 9.5 MG/24HR 24 hr patch Place 1 patch onto the skin daily 90 patch 3     senna-docusate (SENOKOT-S;PERICOLACE) 8.6-50 MG per tablet Take 2 tablets by mouth 2 times daily 120 tablet 0     traMADol (ULTRAM) 50 MG tablet Take 0.5 tablets (25 mg) by mouth every 4 hours (while awake) May also take 0.5 tablet every 6 hours as needed. 150 tablet 0     White Petrolatum-Mineral Oil (SYSTANE NIGHTTIME) OINT Instill 1 ribbon in both eyes at bedtime for Dry eyes       ZOLMitriptan (ZOMIG) 5 MG tablet Take 1 tablet (5 mg) by mouth at onset of headache for migraine 30 tablet 5     zoster vaccine recombinant adjuvanted (SHINGRIX) injection Inject 0.5 mLs into the muscle once for 1 dose 0.5 mL 0     lidocaine  (LIDODERM) 5 % Patch Apply one patch on skin once a day. Keep on 12 hours and remove for 12 hours. (Patient not taking: Reported on 7/11/2018) 30 patch 5     lidocaine (XYLOCAINE) 5 % ointment Apply topically 2 times daily (Patient not taking: Reported on 7/11/2018) 50 g 11     sennosides (SENOKOT) 8.6 MG tablet Take 2 tablets by mouth 2 times daily (Patient not taking: Reported on 7/24/2018) 120 each 11     Allergies   Allergen Reactions     Other [Seasonal Allergies]      environmental     Codeine Other (See Comments) and Rash     GI upset  GI upset     Recent Labs   Lab Test  07/24/18   0748  11/03/17   1009   LDL  103*   --    HDL  39*   --    TRIG  116   --    CR  0.98  1.10   GFRESTIMATED  76  67   GFRESTBLACK  >90  81   POTASSIUM  4.0  3.9        Pneumonia Vaccine:Adults age 65+ who received Pneumovax (PPSV23) at 65 years or older: Should be given PCV13 > 1 year after their most recent PPSV23    Review of Systems  CONSTITUTIONAL: NEGATIVE for fever, chills, change in weight  INTEGUMENTARY/SKIN: NEGATIVE for worrisome rashes, moles or lesions  EYES: History of progressive supranuclear ophthalmoplegia  ENT/MOUTH: NEGATIVE for ear, mouth and throat problems  RESP: NEGATIVE for significant cough or SOB  CV: NEGATIVE for chest pain, palpitations or peripheral edema  CV: Hx HTN  GI: NEGATIVE for nausea, abdominal pain, heartburn, or change in bowel habits  : negative for dysuria, hematuria, decreased urinary stream, erectile dysfunction  MUSCULOSKELETAL: NEGATIVE for significant arthralgias or myalgia  NEURO: History of PSP  ENDOCRINE: NEGATIVE for temperature intolerance, skin/hair changes  HEME/ALLERGY/IMMUNE: NEGATIVE for bleeding problems  PSYCHIATRIC: NEGATIVE for changes in mood or affect    OBJECTIVE:   /79 (BP Location: Right arm, Patient Position: Sitting, Cuff Size: Adult Regular)  Pulse 82  Ht 6' (1.829 m)  Wt 177 lb 1.6 oz (80.3 kg)  SpO2 99%  BMI 24.02 kg/m2 Estimated body mass index is  24.02 kg/(m^2) as calculated from the following:    Height as of this encounter: 6' (1.829 m).    Weight as of this encounter: 177 lb 1.6 oz (80.3 kg).  Physical Exam  GENERAL: healthy, alert and no distress  EYES: Eyes grossly normal to inspection, PERRL and conjunctivae and sclerae normal  HENT: ear canals -lateral cerumen impactionbi nose and mouth without ulcers or lesions  NECK: no adenopathy, no asymmetry, masses, or scars and thyroid normal to palpation  RESP: lungs clear to auscultation - no rales, rhonchi or wheezes  CV: regular rate and rhythm, normal S1 S2, no S3 or S4, no murmur, click or rub, no peripheral edema and peripheral pulses strong  ABDOMEN: soft, nontender, no hepatosplenomegaly, no masses and bowel sounds normal  MS: Sitting in the wheelchair with no acute distress  SKIN: no suspicious lesions or rashes  NEURO: Normal strength and tone, mentation intact and speech -slow and slurred  PSYCH: mentation appears normal, affect normal/bright    Diagnostic Test Results:  Results for orders placed or performed in visit on 07/24/18 (from the past 24 hour(s))   Lipid panel reflex to direct LDL Fasting   Result Value Ref Range    Cholesterol 165 <200 mg/dL    Triglycerides 116 <150 mg/dL    HDL Cholesterol 39 (L) >39 mg/dL    LDL Cholesterol Calculated 103 (H) <100 mg/dL    Non HDL Cholesterol 126 <130 mg/dL   Basic metabolic panel   Result Value Ref Range    Sodium 142 133 - 144 mmol/L    Potassium 4.0 3.4 - 5.3 mmol/L    Chloride 105 94 - 109 mmol/L    Carbon Dioxide 30 20 - 32 mmol/L    Anion Gap 7 3 - 14 mmol/L    Glucose 95 70 - 99 mg/dL    Urea Nitrogen 21 7 - 30 mg/dL    Creatinine 0.98 0.66 - 1.25 mg/dL    GFR Estimate 76 >60 mL/min/1.7m2    GFR Estimate If Black >90 >60 mL/min/1.7m2    Calcium 9.2 8.5 - 10.1 mg/dL       ASSESSMENT / PLAN:   1. Encounter for general adult medical examination with abnormal findings  : Discussed on regular exercises, healthy eating, self testicular exams  and  routine dental checks.  - PNEUMOCOCCAL VACCINE,ADULT,SQ OR IM  - Fecal colorectal cancer screen (FIT); Future    2. Chronic constipation    - senna-docusate (SENOKOT-S;PERICOLACE) 8.6-50 MG per tablet; Take 2 tablets by mouth 2 times daily  Dispense: 120 tablet; Refill: 0    3. Need for prophylactic vaccination against Streptococcus pneumoniae (pneumococcus)    - PNEUMOCOCCAL VACCINE,ADULT,SQ OR IM    4. Need for shingles vaccine    - zoster vaccine recombinant adjuvanted (SHINGRIX) injection; Inject 0.5 mLs into the muscle once for 1 dose  Dispense: 0.5 mL; Refill: 0    5. At high risk for aspiration  Reviewed aspiration prevention, start on incentive spirometry at least 4-5 times a day twice daily    6. Closed displaced fracture of second cervical vertebra with routine healing, unspecified fracture morphology, subsequent encounter  Patient is currently taking tramadol as needed for pain, recommended to consult sports medicine for follow-up and recheck  - SPORTS MEDICINE REFERRAL    7. Screening for colon cancer    - Fecal colorectal cancer screen (FIT); Future    8. PSP (progressive supranuclear palsy) (H)  Continue follow-up with neurology for ongoing care    9. Migraine without aura and without status migrainosus, not intractable  On nortriptyline for prevention    10. Parkinsonism, unspecified Parkinsonism type (H)  Continue neurology follow-up and recommendations    11. Progressive supranuclear ophthalmoplegia (H)  Continue ophthalmology follow-up  12. Cerumen impaction-bilateral ear wash was done by [facility] staff, wax was removed  Recommended not to use Q-tips, return to clinic as needed.    End of Life Planning:  Patient currently has an advanced directive: No.  I have verified the patient's ablity to prepare an advanced directive/make health care decisions.  Literature was provided to assist patient in preparing an advanced directive.    COUNSELING:  Reviewed preventive health counseling, as reflected in  patient instructions  Special attention given to:       Regular exercise       Healthy diet/nutrition       Vision screening       Hearing screening       Dental care       Immunizations    Vaccinated for: Pneumococcal             Colon cancer screening       Prostate cancer screening       Osteoporosis Prevention/Bone Health       The 10-year ASCVD risk score (Patria HORTON Jr, et al., 2013) is: 15.9%    Values used to calculate the score:      Age: 67 years      Sex: Male      Is Non- : No      Diabetic: No      Tobacco smoker: No      Systolic Blood Pressure: 122 mmHg      Is BP treated: Yes      HDL Cholesterol: 39 mg/dL      Total Cholesterol: 165 mg/dL    BP Readings from Last 1 Encounters:   07/24/18 122/79     Estimated body mass index is 24.02 kg/(m^2) as calculated from the following:    Height as of this encounter: 6' (1.829 m).    Weight as of this encounter: 177 lb 1.6 oz (80.3 kg).           reports that he has never smoked. He has never used smokeless tobacco.      Appropriate preventive services were discussed with this patient, including applicable screening as appropriate for cardiovascular disease, diabetes, osteopenia/osteoporosis, and glaucoma.  As appropriate for age/gender, discussed screening for colorectal cancer, prostate cancer, breast cancer, and cervical cancer. Checklist reviewing preventive services available has been given to the patient.    Reviewed patients plan of care and provided an AVS. The Intermediate Care Plan ( asthma action plan, low back pain action plan, and migraine action plan) for Jimmy meets the Care Plan requirement. This Care Plan has been established and reviewed with the Patient/patient's wife.    Counseling Resources:  ATP IV Guidelines  Pooled Cohorts Equation Calculator  Breast Cancer Risk Calculator  FRAX Risk Assessment  ICSI Preventive Guidelines  Dietary Guidelines for Americans, 2010  USDA's MyPlate  ASA Prophylaxis  Lung CA  Screening    Evelina Morse MD  [facility]  Chart documentation done in part with Dragon Voice recognition Software. Although reviewed after completion, some word and grammatical error may remain.

## 2018-08-02 NOTE — TELEPHONE ENCOUNTER
I reviewed his last refill from Dr. Burch on 7/16/18, where Rx was done for 150 tablets for max of 5 tabs per day.  He should have enough until 8/16/18  At his last visit with me, he was recommended to f/u with sports medicine for f/u on his fracture.  Please inform patient and his wife.      Medication Detail      Disp Refills Start End MATEUS   traMADol (ULTRAM) 50 MG tablet 150 tablet 0 7/16/2018  No   Sig - Route: Take 0.5 tablets (25 mg) by mouth every 4 hours (while awake) May also take 0.5 tablet every 6 hours as needed. - Oral   Class: Local Print   Notes to Pharmacy: New dose. Maximum total number of tabs per day: 5. Please file.   Order: [id number]

## 2018-08-06 NOTE — TELEPHONE ENCOUNTER
[facility] Call Center    Phone Message    May a detailed message be left on voicemail: yes    Reason for Call: Other: patient's wife called to follow up on the tramadol refill and what the next steps will be- please advise patient/wife     Action Taken: Message routed to:  Primary Care p [phone]

## 2018-08-06 NOTE — TELEPHONE ENCOUNTER
Wife states that she didn't realize that both previous RX were combined in that last RX.   She was originally told to keep them .  She said she had 1 PRN bottle and then another scheduled bottle that were almost gone.        She read the current bottle instructions and she has plenty of the RX from 7/16/18.       Patient already has a sports med follow up on 8/23/18.     Fabi Farfan RN,   [facility]

## 2018-08-06 NOTE — TELEPHONE ENCOUNTER
Wife states that she didn't realize that both previous RX were combined in that last RX.   She was originally told to keep them .  She said she had 1 PRN bottle and then another scheduled bottle that were almost gone.       She read the current bottle instructions and she has plenty of the RX from 7/16/18.      Patient already has a sports med follow up on 8/23/18.    Fabi Farfan RN,   [facility]

## 2018-08-06 NOTE — TELEPHONE ENCOUNTER
Left detailed message with Dr. Morse's note below on wife, Kriss's voicemail.  Informed her to contact us if he is using more than 5 pills of tramadol per day.        [facility]       Evelina Morse MD Physician Signed    Date of Service: 08/02/2018 4:13 PM Creation Time: 08/02/2018 4:13 PM    Addend Delete  Bookmark Copy       I reviewed his last refill from Dr. Burch on 7/16/18, where Rx was done for 150 tablets for max of 5 tabs per day.  He should have enough until 8/16/18  At his last visit with me, he was recommended to f/u with sports medicine for f/u on his fracture.  Please inform patient and his wife.        Medication Detail      Disp Refills Start End MATEUS   traMADol (ULTRAM) 50 MG tablet 150 tablet 0 7/16/2018   No   Sig - Route: Take 0.5 tablets (25 mg) by mouth every 4 hours (while awake) May also take 0.5 tablet every 6 hours as needed. - Oral   Class: Local Print   Notes to Pharmacy: New dose. Maximum total number of tabs per day: 5. Please file.   Order: [id number]

## 2018-08-15 NOTE — TELEPHONE ENCOUNTER
[facility] Call Center    Phone Message    May a detailed message be left on voicemail: yes    Reason for Call: Medication Refill Request    Has the patient contacted the pharmacy for the refill? Yes   Name of medication being requested: senna-docusate (SENOKOT-S;PERICOLACE) 8.6-50 MG per tablet [12424] (Order [id number])     Provider who prescribed the medication: Dr. Morse  Pharmacy: [facility]  Date medication is needed: requesting 90 day supply         Action Taken: Message routed to:  Primary Care p [phone]

## 2018-08-16 NOTE — TELEPHONE ENCOUNTER
senna-docusate (SENOKOT-S;PERICOLACE) 8.6-50 MG per tablet 120 tablet 0 7/24/2018  No   Sig - Route: Take 2 tablets by mouth 2 times daily - Oral     Please note that the patient is taking 2 tablets 2 times daily and would like a 90 day refill.     Last OV with Dr. Morse: 7/24/2018 - physical    Next 5 appointments (look out 90 days)     Aug 16, 2018  2:50 PM CDT   Return Visit with YULY Douglas CNP   [facility])    [address] [place] [address] [id number] [phone]            Aug 23, 2018 10:15 AM CDT   Return Visit with Nishi Oro MD   [facility])    [address] [place] [address] [id number] [phone]                Refilled per [facility] protocol.    Kylie Greco RN

## 2018-08-16 NOTE — PATIENT INSTRUCTIONS
PLAN:   1.   Symptomatic therapy suggested: .  ,Zyrtec, Claritin or Allegra  (or generic equivalents)  2.  Orders Placed This Encounter   Medications     amoxicillin (AMOXIL) 875 MG tablet     Sig: Take 1 tablet (875 mg) by mouth 2 times daily     Dispense:  20 tablet     Refill:  0     3. Patient needs to follow up in if no improvement,or sooner if worsening of symptoms or other symptoms develop.      It was a pleasure seeing you today at the [facility]. Thank you for allowing us to care for you today. We truly hope we provided you with the excellent service you deserve. Please let us know if there is anything else we can do for you so we can be sure you are leaving completley satisfied with your care experience.       General information about your clinic   Clinic Hours Lab Hours (Appointments are required)   Mon-Thurs: 7:30 AM - 7 PM Mon-Thurs: 7:30 AM - 7 PM   Fri: 7:30 AM - 5 PM Fri: 7:30 AM - 5 PM        After Hours Nurse Advise & Appts:  [facility]: [phone]  [facility] On Call: to make appointments anytime: [phone] On Call Physician: call [phone] and answering service will page the on call physician.        For urgent appointments, please call [phone] and ask for the triage nurse or your care team clinic nurse.  How to contact my care team:  [facility]: [email or web address]   Phone: [phone]   Fax: [phone]       [facility]:   Phone: [phone]  Hours: 8:00 AM - 6:00 PM  Medication Refills:  Call your pharmacy and they will forward the refill to us. Please allow 3 business days for your refills to be completed.       Normal or non-critical lab and imaging results will be communicated to you by MyChart, letter or phone within 7 days.  If you do not hear from us within 10 days, please call the clinic. If you have a critical or abnormal lab result, we will notify you by phone as soon as possible.       We now have PWIC (Pediatric  Walk in Care)  Monday-Friday from 7:30-4. Simply walk in and be seen for your urgent needs like cough, fever, rash, diarrhea or vomiting, pink eye, UTI. No appointments needed. Ask one of the team for more information      -Your Care Team:    Dr. Adi Croft - Internal Medicine/Pediatrics   Dr. Evelina Morse - Family Medicine  Dr. Maddi Christianson - Pediatrics  Dr. Monique Desir - Pediatrics  Caroline Mohamud CNP - Family Practice Nurse Practitioner

## 2018-08-16 NOTE — MR AVS SNAPSHOT
After Visit Summary   8/16/2018    Jimmy Patrick    MRN: [id number]           Patient Information     Date Of Birth          1950        Visit Information        Provider Department      8/16/2018 2:50 PM Caroline Mohamud APRN [facility]        Today's Diagnoses     OME (otitis media with effusion), left    -  1      Care Instructions    PLAN:   1.   Symptomatic therapy suggested: .  ,Zyrtec, Claritin or Allegra  (or generic equivalents)  2.  Orders Placed This Encounter   Medications     amoxicillin (AMOXIL) 875 MG tablet     Sig: Take 1 tablet (875 mg) by mouth 2 times daily     Dispense:  20 tablet     Refill:  0     3. Patient needs to follow up in if no improvement,or sooner if worsening of symptoms or other symptoms develop.      It was a pleasure seeing you today at the [facility]. Thank you for allowing us to care for you today. We truly hope we provided you with the excellent service you deserve. Please let us know if there is anything else we can do for you so we can be sure you are leaving completley satisfied with your care experience.       General information about your clinic   Clinic Hours Lab Hours (Appointments are required)   Mon-Thurs: 7:30 AM - 7 PM Mon-Thurs: 7:30 AM - 7 PM   Fri: 7:30 AM - 5 PM Fri: 7:30 AM - 5 PM        After Hours Nurse Advise & Appts:  [facility] Nurse Advisors: [phone]  [facility] On Call: to make appointments anytime: [phone] On Call Physician: call [phone] and answering service will page the on call physician.        For urgent appointments, please call [phone] and ask for the triage nurse or your care team clinic nurse.  How to contact my care team:  MyChart: [email or web address]   Phone: [phone]   Fax: [phone]       [facility]:   Phone: [phone]  Hours: 8:00 AM - 6:00 PM  Medication Refills:  Call your pharmacy and they will forward the refill  to us. Please allow 3 business days for your refills to be completed.       Normal or non-critical lab and imaging results will be communicated to you by MyChart, letter or phone within 7 days.  If you do not hear from us within 10 days, please call the clinic. If you have a critical or abnormal lab result, we will notify you by phone as soon as possible.       We now have PWIC (Pediatric Walk in Care)  Monday-Friday from 7:30-4. Simply walk in and be seen for your urgent needs like cough, fever, rash, diarrhea or vomiting, pink eye, UTI. No appointments needed. Ask one of the team for more information      -Your Care Team:    Dr. Adi Croft - Internal Medicine/Pediatrics   Dr. Evelina Morse - Family Medicine  Dr. Maddi Christianson - Pediatrics  Dr. Monique Desir - Pediatrics  Caroline Mohamud CNP - Family Practice Nurse Practitioner                         Follow-ups after your visit        Your next 10 appointments already scheduled     Aug 23, 2018 10:15 AM CDT   Return Visit with Nishi Oro MD   [facility])    [id number] [place] [address]   [phone]            Aug 23, 2018 11:00 AM CDT   New Visit with Alonzo Mohamud DO   [facility])    [address] [place] [address]   [phone]            Sep 06, 2018  9:30 AM CDT   (Arrive by 9:15 AM)   Return Movement Disorder with Paulo Saravia MD   [facility] ([facility])    [address]  3rd Floor  [facility] [id number] [phone]            Sep 06, 2018 12:00 PM CDT   Infusion 60 with UC SPEC INFUSION, UC 50 ATC   [facility] ([facility])    [address]  [address]  [facility] [id number] [phone]            Oct 04, 2018 10:30 AM CDT   (Arrive by 10:15 AM)   Return Movement Disorder with  Paulo Saravia MD   [facility] ([facility])    [address]  3rd Floor  [facility] [facility] [address]   [phone]            Oct 04, 2018 12:00 PM CDT   Infusion 60 with UC SPEC INFUSION, UC 50 ATC   [facility] Procedure ([facility])    [address]  [address]  [place] [address]   [phone]            Nov 01, 2018 10:30 AM CDT   (Arrive by 10:15 AM)   Return Movement Disorder with Paulo Saravia MD   [facility] ([facility])    [address]  3rd Floor  [place] [address]   [phone]            Nov 01, 2018 12:00 PM CDT   Infusion 60 with UC SPEC INFUSION   [facility] ([facility])    [address]  [address]  [facility] [address]   [phone]              Who to contact     If you have questions or need follow up information about today's clinic visit or your schedule please contact [facility] directly at [phone].  Normal or non-critical lab and imaging results will be communicated to you by MyChart, letter or phone within 4 business days after the clinic has received the results. If you do not hear from us within 7 days, please contact the clinic through [employer] or phone. If you have a critical or abnormal lab result, we will notify you by phone as soon as possible.  Submit refill requests through [employer] or call your pharmacy and they will forward the refill request to us. Please allow 3 business days for your refill to be completed.          Additional Information About Your Visit        [employer] Information     [employer] gives you secure access to your electronic health record. If you see a primary care provider, you can also send messages to your care team and make appointments. If you have questions, please call your primary care  clinic.  If you do not have a primary care provider, please call [phone] and they will assist you.      [employer] is an electronic gateway that provides easy, online access to your medical records. With [employer], you can request a clinic appointment, read your test results, renew a prescription or communicate with your care team.     To access your existing account, please contact your [facility] or call [phone] for assistance.        Care EveryWhere ID     This is your Care EveryWhere ID. This could be used by other organizations to access your [place] medical records  [id number]        Your Vitals Were     Pulse Temperature Pulse Oximetry BMI (Body Mass Index)          88 97.8  F (36.6  C) (Temporal) 98% 23.79 kg/m2         Blood Pressure from Last 3 Encounters:   08/16/18 116/75   07/24/18 122/79   07/11/18 100/71    Weight from Last 3 Encounters:   08/16/18 175 lb 6.4 oz (79.6 kg)   07/24/18 177 lb 1.6 oz (80.3 kg)   07/11/18 175 lb (79.4 kg)              Today, you had the following     No orders found for display         Today's Medication Changes          These changes are accurate as of 8/16/18  3:18 PM.  If you have any questions, ask your nurse or doctor.               Start taking these medicines.        Dose/Directions    amoxicillin 875 MG tablet   Commonly known as:  AMOXIL   Used for:  OME (otitis media with effusion), left   Started by:  Caroline Mohamud APRN CNP        Dose:  875 mg   Take 1 tablet (875 mg) by mouth 2 times daily   Quantity:  20 tablet   Refills:  0         These medicines have changed or have updated prescriptions.        Dose/Directions    nortriptyline 10 MG capsule   Commonly known as:  PAMELOR   This may have changed:  additional instructions   Used for:  Migraine without aura and without status migrainosus, not intractable        TAKE 3 CAPSULES AT BEDTIME   Quantity:  270 capsule   Refills:  3            Where to get your  medicines      These medications were sent to [facility] - [place], [place] - [address] Replaced by [facility] [address]  [address] Replaced by [facility] [address], [facility] [address]     Phone:  [phone]     amoxicillin 875 MG tablet                Primary Care Provider Office Phone # Fax #    Evelina Morse [phone]       [address]  [facility] [address]        Equal Access to Services     [facility] : Hadii aad ku hadasho Soomaali, waaxda luqadaha, qaybta kaalmada adeegyada, waxay idiin hayaan adeeg kharasri la'sindhu . So [facility] [phone].    ATENCIÓN: Si habla español, tiene a langford disposición servicios gratuitos de asistencia lingüística. [facility] [phone].    We comply with applicable federal civil rights laws and [place] laws. We do not discriminate on the basis of race, color, national origin, age, disability, sex, sexual orientation, or gender identity.            Thank you!     Thank you for choosing [facility]  for your care. Our goal is always to provide you with excellent care. Hearing back from our patients is one way we can continue to improve our services. Please take a few minutes to complete the written survey that you may receive in the mail after your visit with us. Thank you!             Your Updated Medication List - Protect others around you: Learn how to safely use, store and throw away your medicines at www.disposemymeds.org.          This list is accurate as of 8/16/18  3:18 PM.  Always use your most recent med list.                   Brand Name Dispense Instructions for use Diagnosis    acetaminophen 500 MG tablet    TYLENOL     Take 2 tablets (1,000 mg) by mouth 3 times daily    Closed displaced fracture of second cervical vertebra with routine healing, unspecified fracture morphology, subsequent encounter       adapalene 0.1 % gel    DIFFERIN    135 g    Apply to Face topically every day and evening shift for Acne prevention    Acne vulgaris        amantadine 100 MG capsule    SYMMETREL    270 capsule    Take 1 capsule (100 mg) by mouth 3 times daily 6am,11am and 4pm    PSP (progressive supranuclear palsy) (H)       amLODIPine 2.5 MG tablet    NORVASC     Take 1 tablet by mouth daily        amoxicillin 875 MG tablet    AMOXIL    20 tablet    Take 1 tablet (875 mg) by mouth 2 times daily    OME (otitis media with effusion), left       BABY ASPIRIN 81 MG chewable tablet   Generic drug:  aspirin      Take 81 mg by mouth daily        BENICAR 20 MG tablet   Generic drug:  olmesartan      Take 1 tablet (20 mg) by mouth 2 times daily    PSP (progressive supranuclear palsy) (H)       bisacodyl 10 MG Suppository    DULCOLAX    30 suppository    Place 1 suppository (10 mg) rectally every other day If no BM in 3 days.    Other constipation       CENTRUM SILVER per tablet     30 tablet    Take 1 tablet by mouth daily        hydrochlorothiazide 12.5 MG Tabs tablet     90 tablet    Take 1 tablet (12.5 mg) by mouth daily    Essential hypertension       * lidocaine 5 % Patch    LIDODERM    30 patch    Apply one patch on skin once a day. Keep on 12 hours and remove for 12 hours.    Closed displaced fracture of second cervical vertebra with routine healing, unspecified fracture morphology, subsequent encounter       * lidocaine 5 % ointment    XYLOCAINE    50 g    Apply topically 2 times daily    Closed displaced fracture of second cervical vertebra, unspecified fracture morphology, sequela, Cervical neuralgia       loperamide 2 MG capsule    IMODIUM     Take 2 mg by mouth every 4 hours as needed for diarrhea        methocarbamol 500 MG tablet    ROBAXIN    120 tablet    Take 1 tablet (500 mg) by mouth 4 times daily    Closed displaced fracture of second cervical vertebra with routine healing, unspecified fracture morphology, subsequent encounter       nortriptyline 10 MG capsule    PAMELOR    270 capsule    TAKE 3 CAPSULES AT BEDTIME    Migraine without aura and without status  migrainosus, not intractable       polyethylene glycol 0.4%- propylene glycol 0.3% 0.4-0.3 % Soln ophthalmic solution    SYSTANE ULTRA     Place 1 drop into both eyes 3 times daily        rivastigmine 9.5 MG/24HR 24 hr patch    EXELON    90 patch    Place 1 patch onto the skin daily    PSP (progressive supranuclear palsy) (H)       senna-docusate 8.6-50 MG per tablet    SENOKOT-S;PERICOLACE    120 tablet    Take 2 tablets by mouth 2 times daily    Chronic constipation       sennosides 8.6 MG tablet    SENOKOT    360 each    Take 2 tablets by mouth 2 times daily    Other constipation       SYSTANE NIGHTTIME Oint ophthalmic ointment   Generic drug:  artificial tears      Instill 1 ribbon in both eyes at bedtime for Dry eyes        traMADol 50 MG tablet    ULTRAM    150 tablet    Take 0.5 tablets (25 mg) by mouth every 4 hours (while awake) May also take 0.5 tablet every 6 hours as needed.    Closed displaced fracture of second cervical vertebra with routine healing, unspecified fracture morphology, subsequent encounter       Vitamin D-3 Super Strength 2000 units tablet   Generic drug:  cholecalciferol      Take 1 tablet by mouth        ZOLMitriptan 5 MG tablet    ZOMIG    30 tablet    Take 1 tablet (5 mg) by mouth at onset of headache for migraine    PSP (progressive supranuclear palsy) (H), Migraine without aura and without status migrainosus, not intractable       * Notice:  This list has 2 medication(s) that are the same as other medications prescribed for you. Read the directions carefully, and ask your doctor or other care provider to review them with you.

## 2018-08-16 NOTE — PROGRESS NOTES
SUBJECTIVE:   Jimmy Patrick is a 67 year old male who presents to clinic today for the following health issues:    Jimmy Patrick is accompanied today by wife      Acute Illness   Acute illness concerns: ear pain  Onset: last night    Fever: no    Chills/Sweats: no    Headache (location?): no    Sinus Pressure:no    Conjunctivitis:  no    Ear Pain: YES: left    Rhinorrhea: no    Congestion: no    Sore Throat: no     Cough: YES-non-productive    Wheeze: no    Decreased Appetite: no    Nausea: no    Vomiting: no    Diarrhea:  no    Dysuria/Freq.: no    Fatigue/Achiness: no    Sick/Strep Exposure: no     Therapies Tried and outcome: none, is currently on acetaminophen and tramadol daily    Left ear pain started last night  No cold symptom but has some issues with allergies in the fall  Not tolerant of sunlight and bright lights due to the PSP in the last 4 years      Problem list and histories reviewed & adjusted, as indicated.  Additional history: as documented    Patient Active Problem List   Diagnosis     Transient paralysis of limb     Migraine without aura     Speech disturbance     PSP (progressive supranuclear palsy) (H)     Fluttering heart     Leg swelling     Varicose veins     Headache     Memory loss     Urinary urgency     Blurred vision     Double vision     Tinnitus     Sinus disorder     Epistaxis     DISK (aka Displacement of intervertebral disc, site unspecified, without myelopathy)     Impairment of balance     Signs and symptoms involving cognition     Fatigue     Migraine without status migrainosus, not intractable     Palpitations     Parkinsonism (H)     Progressive supranuclear ophthalmoplegia (H)     Speech dysfunction     Cellular blue nevus     S/P ablation of atrial flutter     Migraine without aura and without status migrainosus, not intractable     Parkinsonism, unspecified Parkinsonism type (H)     Essential hypertension     CARDIOVASCULAR SCREENING; LDL GOAL LESS THAN 160      Cognitive complaints     ACP (advance care planning)     Closed fracture of second cervical vertebra (H)     Fall     Acute pain due to trauma     Acute pain due to injury     Closed displaced fracture of second cervical vertebra with routine healing, unspecified fracture morphology, subsequent encounter     Other constipation     At high risk for aspiration     Past Surgical History:   Procedure Laterality Date     BIOPSY      Dermatologist remoed skin mole (not cancerus)     CARDIAC SURGERY   ablation    Successful in eliminatng heart flutter     COLONOSCOPY  ?     H ABLATION ATRIAL FLUTTER         Social History   Substance Use Topics     Smoking status: Never Smoker     Smokeless tobacco: Never Used     Alcohol use No     Family History   Problem Relation Age of Onset     Cancer Father      Other Cancer Father      throat cancer,       Hypertension Mother      Osteoperosis Mother      Hypertension Sister      Osteoperosis Sister      Osteoperosis Sister          Current Outpatient Prescriptions   Medication Sig Dispense Refill     acetaminophen (TYLENOL) 500 MG tablet Take 2 tablets (1,000 mg) by mouth 3 times daily       adapalene (DIFFERIN) 0.1 % gel Apply to Face topically every day and evening shift for Acne prevention 135 g 3     amantadine (SYMMETREL) 100 MG capsule Take 1 capsule (100 mg) by mouth 3 times daily 6am,11am and 4pm 270 capsule 3     amLODIPine (NORVASC) 2.5 MG tablet Take 1 tablet by mouth daily       aspirin (BABY ASPIRIN) 81 MG chewable tablet Take 81 mg by mouth daily        bisacodyl (DULCOLAX) 10 MG Suppository Place 1 suppository (10 mg) rectally every other day If no BM in 3 days. 30 suppository 11     cholecalciferol (VITAMIN D-3 SUPER STRENGTH) 2000 UNITS tablet Take 1 tablet by mouth        hydrochlorothiazide 12.5 MG TABS tablet Take 1 tablet (12.5 mg) by mouth daily 90 tablet 3     loperamide (IMODIUM) 2 MG capsule Take 2 mg by mouth every 4 hours as needed for  diarrhea       methocarbamol (ROBAXIN) 500 MG tablet Take 1 tablet (500 mg) by mouth 4 times daily 120 tablet 5     Multiple Vitamins-Minerals (CENTRUM SILVER) per tablet Take 1 tablet by mouth daily 30 tablet      nortriptyline (PAMELOR) 10 MG capsule TAKE 3 CAPSULES AT BEDTIME (Patient taking differently: TAKE 3 CAPSULES AT BEDTIME) 270 capsule 3     olmesartan (BENICAR) 20 MG tablet Take 1 tablet (20 mg) by mouth 2 times daily       polyethylene glycol 0.4%- propylene glycol 0.3% (SYSTANE ULTRA) 0.4-0.3 % SOLN ophthalmic solution Place 1 drop into both eyes 3 times daily       rivastigmine (EXELON) 9.5 MG/24HR 24 hr patch Place 1 patch onto the skin daily 90 patch 3     senna-docusate (SENOKOT-S;PERICOLACE) 8.6-50 MG per tablet Take 2 tablets by mouth 2 times daily 120 tablet 0     sennosides (SENOKOT) 8.6 MG tablet Take 2 tablets by mouth 2 times daily 360 each 3     traMADol (ULTRAM) 50 MG tablet Take 0.5 tablets (25 mg) by mouth every 4 hours (while awake) May also take 0.5 tablet every 6 hours as needed. 150 tablet 0     White Petrolatum-Mineral Oil (SYSTANE NIGHTTIME) OINT Instill 1 ribbon in both eyes at bedtime for Dry eyes       ZOLMitriptan (ZOMIG) 5 MG tablet Take 1 tablet (5 mg) by mouth at onset of headache for migraine 30 tablet 5     lidocaine (LIDODERM) 5 % Patch Apply one patch on skin once a day. Keep on 12 hours and remove for 12 hours. (Patient not taking: Reported on 7/11/2018) 30 patch 5     lidocaine (XYLOCAINE) 5 % ointment Apply topically 2 times daily (Patient not taking: Reported on 7/11/2018) 50 g 11     Allergies   Allergen Reactions     Other [Seasonal Allergies]      environmental     Codeine Other (See Comments) and Rash     GI upset  GI upset     Labs reviewed in EPIC    Reviewed and updated as needed this visit by clinical staff  Tobacco  Allergies  Meds  Med Hx  Surg Hx  Fam Hx  Soc Hx      Reviewed and updated as needed this visit by Provider          ROS:  CONSTITUTIONAL:NEGATIVE for fever, chills, change in weight  INTEGUMENTARY/SKIN: NEGATIVE for rash   ENT/MOUTH: POSITIVE for ear pain left and NEGATIVE for epistaxis and fever  RESP:POSITIVE for cough chronic due to dysphagia  and NEGATIVE for SOB/dyspnea  CV: NEGATIVE for chest pain/chest pressure  MUSCULOSKELETAL: POSITIVE  for overall muscle stiffness  and NEGATIVE for joint swelling  and joint warmth   HEME/ALLERGY/IMMUNE: NEGATIVE for bleeding problems and POSITIVE  for allergies    OBJECTIVE:     /75 (BP Location: Right arm, Patient Position: Sitting, Cuff Size: Adult Regular)  Pulse 88  Temp 97.8  F (36.6  C) (Temporal)  Wt 175 lb 6.4 oz (79.6 kg)  SpO2 98%  BMI 23.79 kg/m2  Body mass index is 23.79 kg/(m^2).  GENERAL: Patient is well nourished, well developed,in no apparent distress, non-toxic, in no respiratory distress and acyanotic, alert, cooperative and well hydrated  EYES:  Right conjunctiva is not injected and without discharge.  Left conjunctiva is not injected and without discharge.  EARS: negative findings: external ears normal to inspection and palpation, no mastoid process tenderness, positive findings: , Right TM: serous middle ear fluid, Left TM: erythematous, dull and serous middle ear fluid  NOSE: positive findings: mucosa swollen, pale, and boggy,  Sinus not tender.  THROAT: normal.  NECK: supple with no adenopathy,   CARDIAC:NORMAL - regular rate and rhythm without murmur.  RESP: normal respiratory rate and rhythm, lungs clear to auscultation  unlabored respirations, no intercostal retractions or accessory muscle use  ABD: Abdomen soft, non-tender.  SKIN: Skin color, texture, turgor normal. No rashes or lesions.  MS: Wheelchair bound   extremities normal- no gross deformities noted, gait normal and normal muscle tone    Diagnostic Test Results:  none     ASSESSMENT/PLAN:     Jimmy was seen today for ear problem.    Diagnoses and all orders for this visit:    OME (otitis  media with effusion), left  -     : amoxicillin (AMOXIL) 875 MG tablet; Take 1 tablet (875 mg) by mouth 2 times daily    PLAN:   1.   Symptomatic therapy suggested: .  ,Zyrtec, Claritin or Allegra  (or generic equivalents)  2.  Orders Placed This Encounter   Medications     amoxicillin (AMOXIL) 875 MG tablet     Sig: Take 1 tablet (875 mg) by mouth 2 times daily     Dispense:  20 tablet     Refill:  0     3. Patient needs to follow up in if no improvement,or sooner if worsening of symptoms or other symptoms develop.      See Patient Instructions    YLUY Douglas CNP  M [facility]

## 2018-08-17 NOTE — PROGRESS NOTES
Social Work Intervention  [facility]    Data/Intervention:    Patient Name:  Jimmy Patrick  /Age:  1950 (67 year old)    Visit Type: telephone  Referral Source: Pt's spouse Kriss  Reason for Referral:  Coughing     Collaborated With:    -Kriss    Patient Concerns/Issues:   Kriss contacted me because she wasn't sure where to take her questions about Jimmy' increase in coughing. He has a long episode of coughing at 2:30am. He has had episodes previously but this time she reports he coughed for an hour. He sleeps with his head raised.   Encouraged her to send a Grapeshot message to Dr Saravia. She is wondering about some medications to try to dry secretions.     Intervention/Education/Resources Provided:  He has a  since his vertebral fractures the end of May. He has lost about 20 lbs in the past year but weight loss has been stable of late. Appetite is good. He does have some issues with neck weakness in addition to mobility issues.Discussed hospice as an option and the general philosophy and decision making re being ready for that. He has not completed any health care directives. Discussed a POLST. Offered to assist with that. We could work on it over the phone.    Assessment/Plan:  Kriss will consider the above. She will send a Grapeshot message to Dr Saravia. I will forward my note to Jade Peterson RN for Dr Saravia.    Provided patient/family with contact information and availability.    BERNARDO Plascencia, [facility]    [facility]  [facility]ics and [facility]  [phone]

## 2018-08-17 NOTE — TELEPHONE ENCOUNTER
With the PSP, he needs speech therapy  for this recurring cough , which I see that the order was placed by Dr. Saravia twice since 11/2017.  If this was not done yet, will need to schedule.  I would recommend to check with Dr. Saravia about the Atropine  Due to h/o PSP.

## 2018-08-17 NOTE — TELEPHONE ENCOUNTER
Informed wife, Kriss, new RX sent for capsules to pharmacy.    Also informed her about Dr. Skelton note below.  She states that patient had been seeing speech therapy at [facility] up until his fall around memorial day.  He then had home therapy but they discharged him.  She thought about rescheduling with [facility] again but its a half an hour ride and with his pain she didn't think he would be able to do the half hour ride, therapy, then the half hour ride back.    She will check with  regarding the swallowing/coughing, PSP and atropine gtts.    Fabi Farfan RN,   [facility]

## 2018-08-17 NOTE — TELEPHONE ENCOUNTER
[facility] Call Center    Phone Message    May a detailed message be left on voicemail: yes    Reason for Call: Symptoms or Concerns     If patient has red-flag symptoms, warm transfer to triage line    Current symptom or concern: severe cough got worse over night    Symptoms have been present for:  A couple day(s)    Has patient previously been seen for this? No     Are there any new or worsening symptoms? Yes      Action Taken: Message routed to:  Primary Care p [phone]

## 2018-08-17 NOTE — TELEPHONE ENCOUNTER
Noted patient saw Bernadette Mohamud yesterday for otitis media.  Patient reported he had a non productive cough at office visit, left ear pain, no fever. Given amoxicillin.    Wife states that due to patients PSP he has been coughing more and more over time.  The PSP has affected his swallowing.   Last night he woke up around midnight and was coughing so severely for about an hour.  He had nothing to eat or drink hours before bedtime.  The wife thinks he was choking on his saliva and related to his PSP issues.  This morning no coughing, denies fever or chills.    1. Routed to Dr. Morse.  She wonders if there is anything they can do for the coughing?  She ran into a surgeon friend and he suggested maybe trying atropine gtts to decrease the secretions at night time?  Wife said she is not sure what would be best.      2. Routed to Bernadette Mohamud.  Also had question about the amoxicillin RX.  Patient has a hard time swallowing liquid so they decided on the pill.  It is a huge pill so wife has been cutting in half.  Patient is requesting if a capsule amoxicillin is available?    Fabi Farfan RN,   [facility]

## 2018-08-23 NOTE — LETTER
"    8/23/2018         RE: Jimmy Patrick  [address] [facility] [address]        Dear Colleague,    Thank you for referring your patient, Jimmy Patrick, to the [facility]. Please see a copy of my visit note below.    Palliative Care Outpatient Clinic Progress Note    This note was written using voice recognition. I did proof read, but might have missed some things. Please contact me for major errors.    Patient Name:  Jimmy Patrick  Primary Provider:  Evelina Morse    Chief Complaint: neck pain    Interim History:  Jimmy Patrick 67 year old male with PSP returns to be seen by palliative care today.      Patient is here with his wife Kriss    Medical History/Summary:  - progressive supranuclear palsy diagnosed in 2014  - migraine headaches   - AFib, HTN                 - fall with C2 fracture May 2018, managed conservatively  - urinary incontinence since June 2018  - ear infection last week, finishing up antibiotics     Still has significant pain in his neck, since fracture about 3 months ago. Pain is mostly on right, seems largely unchanged since acute pain. Describes as \"muscular\", seems to have a lot of tension. Taking methocarbamol with good relief. Also tramadol 25mg q4h, planning to stretch out to every 5 hours, hasn't been taking this over night.   Had been doing PT, OT as outpt at [facility] in [place], but commute became too long. Home PT/OT completed mid July. Rides the [employer] for 30 min daily. They feel having a therapy team familiar with neurodegenerative disorders was helpful and are uncertain whether they would want to establish therapy closer to home.    More constipation, likely due to pain meds. Takes Senna two tabs twice a day. Discussed that this can be increased. Has trouble with larger volumes, hence hasn't been taking miralax, discussed that he can also take lower doses.    Uses condom catheter overnight, urine output seems to be " decreasing, concern whether his liquid intake is adequate. Has progressive dysphagia. Considering seeing speech again, now accepts thickened liquids.    Cough more frequent now, productive for thick mucous. Discussed that thickening/drying agents might actually worsen things. They have a humidifier at home, no nebulizer.  Started claritin last week, since he has seasonal allergies that tend to worsen around this time of year.     Coping:  They are struggling to find space for anything other than his illness. They did have friends visit for a few days, which both of them enjoyed. They also look forward to visits from their children. Since they have busy lives they can only come every 2-3 weeks, though.     Both go to a support group together and Andreasupriya attends a caregiver support group as well.     However, they both agree that additional support in coping and processing would be helpful.     Social History:  Pertinent changes to social history/social situation since last visit: none  Key support resources: Kriss  Advance Directive Status:  None completed yet. Started conversation today, will discuss more at next visit.     Social History   Substance Use Topics     Smoking status: Never Smoker     Smokeless tobacco: Never Used     Alcohol use No     Impression & Recommendations & Counseliny/o man with PSP complicated by fall with C2 fracture 3 months ago.     Pain: mostly in neck since fracture. This was managed conservatively, will have f/u CT and appointment today to clarify how he is healing. Suspect muscular pain associated with tension and immobility. He also describes more stiffness throughout his body.   - continue tramadol 25mg, discussed that he can increase interval as he sees fit  - continue methocarbamol 500mg qid  - referred to massage therapy (they will need to clarify safety for neck area with Dr Mohamud, sports med)  - encouraged to resume PT    Cough: suspect associated with dysphagia, likely  worsened by seasonal allergies. In addition he has difficulties generating sufficient pressure   - saline nebs prn  - discussed elevating head of bed (which they are)  - recommended another meeting with SLP to discuss safety measures while swallowing  - will explore further options (chest PT or similar)    Constipation: has BM every 3 days.   - increase senna up to 4 tabs tid  - add miralax as tolerated  - hydration as tolerated    Dysphagia: is starting to thicken liquids. Discussed importance of maintaining hydration.   - recommended SLP as above    Coping: would like more support  - provided list with community counselors. Depending on schedule they will choose someone from the list vs CAROL Euceda     Advance care planning: started conversation  - will discuss in more detail at next visit    Return to clinic in 1 month    Data / Chart Review:    Review of Systems:   ROS: 10 point ROS neg other than the symptoms noted above in the HPI and pertinents here:  Palliative Symptom Review (0=no symptom/no concern, 1=mild, 2=moderate, 3=severe):      Pain: 2      Fatigue: 2      Nausea: 0      Constipation: 3      Diarrhea: 0      Depressive Symptoms: 1      Anxiety: 1      Drowsiness: 1      Poor Appetite: 1      Shortness of Breath: 0      Insomnia: 1      Other: 0      Overall (0 good/no concerns, 3 very poor):  2         Physical Exam:   Vitals were reviewed  /73  Pulse 95  Temp 97.5  F (36.4  C)  Resp 22  SpO2 96%     CONSTIT: awake, appears moderately comfortable, uses speech enhancer  EENT: MMM, EOMI, no icterus  RESP: reg, mildly increased effort  MSK: in wheelchair, generalized weakness  SKIN:  warm, no rash, no obvious lesions  NEURO: alert, oriented x3  PSYCH: somewhat flat affect, memory and thought process intact      Allergies   Allergen Reactions     Other [Seasonal Allergies]      environmental     Codeine Other (See Comments) and Rash     GI upset  GI upset     Current pertinent  medications:  acetaminophen 1000mg tid  Tramadol 25mg q4h and q6h prn  methocarbamol 500mg qid     Nortriptyline 30mg HS    Past Medical History:   Diagnosis Date     Blurred vision 8/11/2014     Cancer (H) 1979; 1960's    Florin: father; grandparents (on father's side)     Depressive disorder April, 2014    When received diagnosis of PSP     DISK (aka Displacement of intervertebral disc, site unspecified, without myelopathy) 8/11/2014     Displacement of cervical intervertebral disc without myelopathy (HERNIATED DISK) 8/11/2014     Double vision 8/11/2014     Epistaxis 8/11/2014     Fluttering heart 8/11/2014     Headache 8/11/2014     Hypertension February, 2013     Leg swelling 8/11/2014     Memory loss 8/11/2014     Migraines 2007    Ramila: karen     PSP (progressive supranuclear palsy) (H) 8/11/2014     Sinus disorder 8/11/2014     Tinnitus 8/11/2014     Urinary urgency 8/11/2014     Varicose veins 8/11/2014     Past Surgical History:   Procedure Laterality Date     BIOPSY  2016    Dermatologist remoed skin mole (not cancerus)     CARDIAC SURGERY  2013 ablation    Successful in eliminatng heart flutter     COLONOSCOPY  2005?     H ABLATION ATRIAL FLUTTER           Key Data Reviewed:  LABS: GFR 76    IMAGING: none recent    : ok        Nishi Oro MD  Palliative Medicine  Pager [phone]      Again, thank you for allowing me to participate in the care of your patient.        Sincerely,        Nishi Oro MD

## 2018-08-23 NOTE — PATIENT INSTRUCTIONS
Thanks for coming today.  Ortho/Sports Medicine Clinic  [address] [place] [address]    To schedule future appointments in Ortho Clinic, you may call [phone].    To schedule ordered imaging by your provider:   Call Central Imaging [phone]    To schedule an injection ordered by your provider:  Call Central Imaging Injection scheduling line: [phone]  Andahart available online at:  [email or web address]    Please call if any further questions or concerns ([phone]).  Clinic hours 8 am to 5 pm.    Return to clinic (call) if symptoms worsen or fail to improve.

## 2018-08-23 NOTE — MR AVS SNAPSHOT
After Visit Summary   2018    Jimmy Patrick    MRN: [id number]           Patient Information     Date Of Birth          1950        Visit Information        Provider Department      2018 11:00 AM Alonzo Mohamud DO [facility]        Today's Diagnoses     Other closed nondisplaced fracture of second cervical vertebra, initial encounter (H)    -  1      Care Instructions    Thanks for coming today.  Ortho/Sports Medicine Clinic  [address] [place] [address]    To schedule future appointments in Ortho Clinic, you may call [phone].    To schedule ordered imaging by your provider:   Call Central Imaging [phone]    To schedule an injection ordered by your provider:  Call Central Imaging Injection scheduling line: [phone]  [employer] available online at:  [email or web address]    Please call if any further questions or concerns ([phone]).  Clinic hours 8 am to 5 pm.    Return to clinic (call) if symptoms worsen or fail to improve.            Follow-ups after your visit        Additional Services     PHYSICAL THERAPY REFERRAL (Internal)       Physical Therapy Referral                  Your next 10 appointments already scheduled     Aug 30, 2018  9:30 AM CDT   New Visit with Amandeep Plasencia MD   [facility]    [address] [place] [address] [phone]            Sep 06, 2018  9:30 AM CDT   (Arrive by 9:15 AM)   Return Movement Disorder with Paulo Saravia MD   [facility] ([facility])    [address]  3rd Floor  [facility] [address] [phone]            Sep 06, 2018 12:00 PM CDT   Infusion 60 with UC SPEC INFUSION, UC 50 ATC   [facility] ([facility])    [address]  [address]  [facility] [address] [phone]             Sep 24, 2018 10:45 AM CDT   Return Visit with Nishi Oro MD   [facility] ([facility])    [address] [place] [address] [phone]            Oct 04, 2018 10:30 AM CDT   (Arrive by 10:15 AM)   Return Movement Disorder with Paulo Saravia MD   [facility] ([facility])    [address]  3rd Floor  [facility] [address]   [phone]            Oct 04, 2018 12:00 PM CDT   Infusion 60 with UC SPEC INFUSION, UC 50 ATC   [facility] ([facility])    [address]  [address]  [facility] [address] [phone]            Nov 01, 2018 10:30 AM CDT   (Arrive by 10:15 AM)   Return Movement Disorder with Paulo Saravia MD   [facility] ([facility])    [address]  3rd Floor  [facility] [address]   [phone]            Nov 01, 2018 12:00 PM CDT   Infusion 60 with UC SPEC INFUSION   [facility] ([facility])    [address]  [address]  [facility] [address] [phone]              Who to contact     If you have questions or need follow up information about today's clinic visit or your schedule please contact [facility] directly at [phone].  Normal or non-critical lab and imaging results will be communicated to you by MyChart, letter or phone within 4 business days after the clinic has received the results. If you do not hear from us within 7 days, please contact the clinic through MyChart or phone. If you have a critical or abnormal lab result, we will notify you by phone as soon as possible.  Submit refill requests through [employer] or call your pharmacy and they will forward the refill request to us. Please allow 3 business days for your refill to be completed.           Additional Information About Your Visit        [employer]hart Information     [employer] gives you secure access to your electronic health record. If you see a primary care provider, you can also send messages to your care team and make appointments. If you have questions, please call your primary care clinic.  If you do not have a primary care provider, please call [phone] and they will assist you.      [employer] is an electronic gateway that provides easy, online access to your medical records. With [employer], you can request a clinic appointment, read your test results, renew a prescription or communicate with your care team.     To access your existing account, please contact your [facility] or call [phone] for assistance.        Care EveryWhere ID     This is your Care EveryWhere ID. This could be used by other organizations to access your [facility] medical records  [id number]        Your Vitals Were     Pulse Pulse Oximetry                88 94%           Blood Pressure from Last 3 Encounters:   08/23/18 120/70   08/23/18 109/73   08/16/18 116/75    Weight from Last 3 Encounters:   08/16/18 79.6 kg (175 lb 6.4 oz)   07/24/18 80.3 kg (177 lb 1.6 oz)   07/11/18 79.4 kg (175 lb)              We Performed the Following     PHYSICAL THERAPY REFERRAL (Internal)          Today's Medication Changes          These changes are accurate as of 8/23/18  3:48 PM.  If you have any questions, ask your nurse or doctor.               Start taking these medicines.        Dose/Directions    order for DME   Used for:  Chronic cough   Started by:  Nishi Oro MD        Equipment being ordered: Nebulizer   Quantity:  1 each   Refills:  0       sodium chloride 3 % Nebu neb solution   Used for:  Chronic cough   Started by:  Nishi Oro MD        Dose:  3 mL   Take 3 mLs by nebulization every 3 hours as needed for wheezing   Quantity:  180 mL   Refills:  3         These  medicines have changed or have updated prescriptions.        Dose/Directions    nortriptyline 10 MG capsule   Commonly known as:  PAMELOR   This may have changed:  additional instructions   Used for:  Migraine without aura and without status migrainosus, not intractable        TAKE 3 CAPSULES AT BEDTIME   Quantity:  270 capsule   Refills:  3            Where to get your medicines      These medications were sent to [facility] [address] [address] [address]  [address] [address], [facility] [address]     Phone:  [phone]     sodium chloride 3 % Nebu neb solution         Some of these will need a paper prescription and others can be bought over the counter.  Ask your nurse if you have questions.     Bring a paper prescription for each of these medications     order for DME                Primary Care Provider Office Phone # Fax #    Evelina Morse [phone] [address]  [facility] [address]        Equal Access to Services     [facility] Hospital[facility] : Hadii aad ku hadasho Soomaali, waaxda luqadaha, qaybta kaalmada adeegyada, waxay idiin hayaan ally kharasri heller . So [facility] [phone].    ATENCIÓN: Si habla español, tiene a langford disposición servicios gratuitos de asistencia lingüística. [facility] [phone].    We comply with applicable federal civil rights laws and [place] laws. We do not discriminate on the basis of race, color, national origin, age, disability, sex, sexual orientation, or gender identity.            Thank you!     Thank you for choosing [facility]  for your care. Our goal is always to provide you with excellent care. Hearing back from our patients is one way we can continue to improve our services. Please take a few minutes to complete the written survey that you may receive in the mail after your visit with us. Thank you!             Your Updated Medication List - Protect others around you: Learn how to safely use,  store and throw away your medicines at www.disposemymeds.org.          This list is accurate as of 8/23/18  3:48 PM.  Always use your most recent med list.                   Brand Name Dispense Instructions for use Diagnosis    acetaminophen 500 MG tablet    TYLENOL     Take 2 tablets (1,000 mg) by mouth 3 times daily    Closed displaced fracture of second cervical vertebra with routine healing, unspecified fracture morphology, subsequent encounter       adapalene 0.1 % gel    DIFFERIN    135 g    Apply to Face topically every day and evening shift for Acne prevention    Acne vulgaris       amantadine 100 MG capsule    SYMMETREL    270 capsule    Take 1 capsule (100 mg) by mouth 3 times daily 6am,11am and 4pm    PSP (progressive supranuclear palsy) (H)       amLODIPine 2.5 MG tablet    NORVASC     Take 1 tablet by mouth daily        amoxicillin 500 MG capsule    AMOXIL    30 capsule    Take 1 capsule (500 mg) by mouth 3 times daily    OME (otitis media with effusion), left       BABY ASPIRIN 81 MG chewable tablet   Generic drug:  aspirin      Take 81 mg by mouth daily        BENICAR 20 MG tablet   Generic drug:  olmesartan      Take 1 tablet (20 mg) by mouth 2 times daily    PSP (progressive supranuclear palsy) (H)       bisacodyl 10 MG Suppository    DULCOLAX    30 suppository    Place 1 suppository (10 mg) rectally every other day If no BM in 3 days.    Other constipation       CENTRUM SILVER per tablet     30 tablet    Take 1 tablet by mouth daily        CLARITIN 10 MG capsule   Generic drug:  loratadine     30 capsule    Take 10 mg by mouth daily    Chronic seasonal allergic rhinitis, unspecified trigger       hydrochlorothiazide 12.5 MG Tabs tablet     90 tablet    Take 1 tablet (12.5 mg) by mouth daily    Essential hypertension       * lidocaine 5 % Patch    LIDODERM    30 patch    Apply one patch on skin once a day. Keep on 12 hours and remove for 12 hours.    Closed displaced fracture of second cervical  vertebra with routine healing, unspecified fracture morphology, subsequent encounter       * lidocaine 5 % ointment    XYLOCAINE    50 g    Apply topically 2 times daily    Closed displaced fracture of second cervical vertebra, unspecified fracture morphology, sequela, Cervical neuralgia       loperamide 2 MG capsule    IMODIUM     Take 2 mg by mouth every 4 hours as needed for diarrhea        methocarbamol 500 MG tablet    ROBAXIN    120 tablet    Take 1 tablet (500 mg) by mouth 4 times daily    Closed displaced fracture of second cervical vertebra with routine healing, unspecified fracture morphology, subsequent encounter       nortriptyline 10 MG capsule    PAMELOR    270 capsule    TAKE 3 CAPSULES AT BEDTIME    Migraine without aura and without status migrainosus, not intractable       order for DME     1 each    Equipment being ordered: Nebulizer    Chronic cough       polyethylene glycol 0.4%- propylene glycol 0.3% 0.4-0.3 % Soln ophthalmic solution    SYSTANE ULTRA     Place 1 drop into both eyes 3 times daily        rivastigmine 9.5 MG/24HR 24 hr patch    EXELON    90 patch    Place 1 patch onto the skin daily    PSP (progressive supranuclear palsy) (H)       senna-docusate 8.6-50 MG per tablet    SENOKOT-S;PERICOLACE    120 tablet    Take 2 tablets by mouth 2 times daily    Chronic constipation       sodium chloride 3 % Nebu neb solution     180 mL    Take 3 mLs by nebulization every 3 hours as needed for wheezing    Chronic cough       traMADol 50 MG tablet    ULTRAM    150 tablet    Take 0.5 tablets (25 mg) by mouth every 4 hours (while awake) May also take 0.5 tablet every 6 hours as needed.    Closed displaced fracture of second cervical vertebra with routine healing, unspecified fracture morphology, subsequent encounter       Vitamin D-3 Super Strength 2000 units tablet   Generic drug:  cholecalciferol      Take 1 tablet by mouth        ZOLMitriptan 5 MG tablet    ZOMIG    30 tablet    Take 1 tablet (5  mg) by mouth at onset of headache for migraine    PSP (progressive supranuclear palsy) (H), Migraine without aura and without status migrainosus, not intractable       * Notice:  This list has 2 medication(s) that are the same as other medications prescribed for you. Read the directions carefully, and ask your doctor or other care provider to review them with you.

## 2018-08-23 NOTE — NURSING NOTE
Oncology Rooming Note    August 23, 2018 10:04 AM   Jimmy Patrick is a 67 year old male who presents for:    Chief Complaint   Patient presents with     Palliative     Initial Vitals: /73  Pulse 95  Temp 97.5  F (36.4  C)  Resp 22  SpO2 96% Estimated body mass index is 23.79 kg/(m^2) as calculated from the following:    Height as of 7/24/18: 1.829 m (6').    Weight as of 8/16/18: 79.6 kg (175 lb 6.4 oz). There is no height or weight on file to calculate BSA.  Mild Pain (2) Comment: Tramadol   No LMP for male patient.  Allergies reviewed: Yes  Medications reviewed: Yes    Medications: Medication refills not needed today.  Pharmacy name entered into [facility]:    [facility] - [place], [place] - [address] [address]  [facility] - [place], [place] - [facility] [facility]     5 minutes for nursing intake (face to face time)     Andreina Pulliam LPN

## 2018-08-23 NOTE — PROGRESS NOTES
CHIEF COMPLAINT:  Consult (right neck and right shoulder pain. )       HISTORY OF PRESENT ILLNESS  Mr. Patrick is a pleasant 67 year old year old male who presents to clinic today for follow-up of a C2 fracture.  Raymond is seen at the request of Dr. Morse.  He has a history of progressive supranuclear palsy.    Memorial day weekend Raymond was downstairs at his home when he fell backward and hit his head very hard onto the ground.  He does report feeling and hearing a distinct crack in his neck.  This fall was unwitnessed.  His wife then found him downstairs in pain and on the floor.  She helped him up and he was able to ambulate throughout the day but had increasing neck pain.  The next day Raymond and his wife sought care, at that emergency visit he was diagnosed with a fracture of the C2 vertebrae that was very slightly displaced.  For treatment the decision was made to not brace him, rather to have him avoid extreme motion whenever possible.  The decision to not treat with the collar was based on potential weakening with immobilization due to his baseline supranuclear palsy.    Recently Raymond has been doing quite well, he has been using his motorized chair for ambulation to minimize risk of falling.  His neck does feel stiff but pain is much improved compared to when he originally suffered this fracture.  Notably, he denies any new pains or feelings down the arm such as weakness, numbness, or tingling    Additional history: as documented    MEDICAL HISTORY  Patient Active Problem List   Diagnosis     Transient paralysis of limb     Migraine without aura     Speech disturbance     PSP (progressive supranuclear palsy) (H)     Fluttering heart     Leg swelling     Varicose veins     Headache     Memory loss     Urinary urgency     Blurred vision     Double vision     Tinnitus     Sinus disorder     Epistaxis     DISK (aka Displacement of intervertebral disc, site unspecified, without myelopathy)     Impairment of balance      Signs and symptoms involving cognition     Fatigue     Migraine without status migrainosus, not intractable     Palpitations     Parkinsonism (H)     Progressive supranuclear ophthalmoplegia (H)     Speech dysfunction     Cellular blue nevus     S/P ablation of atrial flutter     Migraine without aura and without status migrainosus, not intractable     Parkinsonism, unspecified Parkinsonism type (H)     Essential hypertension     CARDIOVASCULAR SCREENING; LDL GOAL LESS THAN 160     Cognitive complaints     ACP (advance care planning)     Closed fracture of second cervical vertebra (H)     Fall     Acute pain due to trauma     Acute pain due to injury     Closed displaced fracture of second cervical vertebra with routine healing, unspecified fracture morphology, subsequent encounter     Other constipation     At high risk for aspiration       Current Outpatient Prescriptions   Medication Sig Dispense Refill     acetaminophen (TYLENOL) 500 MG tablet Take 2 tablets (1,000 mg) by mouth 3 times daily       adapalene (DIFFERIN) 0.1 % gel Apply to Face topically every day and evening shift for Acne prevention 135 g 3     amantadine (SYMMETREL) 100 MG capsule Take 1 capsule (100 mg) by mouth 3 times daily 6am,11am and 4pm 270 capsule 3     amLODIPine (NORVASC) 2.5 MG tablet Take 1 tablet by mouth daily       amoxicillin (AMOXIL) 500 MG capsule Take 1 capsule (500 mg) by mouth 3 times daily 30 capsule 0     aspirin (BABY ASPIRIN) 81 MG chewable tablet Take 81 mg by mouth daily        bisacodyl (DULCOLAX) 10 MG Suppository Place 1 suppository (10 mg) rectally every other day If no BM in 3 days. 30 suppository 11     cholecalciferol (VITAMIN D-3 SUPER STRENGTH) 2000 UNITS tablet Take 1 tablet by mouth        hydrochlorothiazide 12.5 MG TABS tablet Take 1 tablet (12.5 mg) by mouth daily 90 tablet 3     lidocaine (LIDODERM) 5 % Patch Apply one patch on skin once a day. Keep on 12 hours and remove for 12 hours. (Patient not  taking: Reported on 2018) 30 patch 5     lidocaine (XYLOCAINE) 5 % ointment Apply topically 2 times daily (Patient not taking: Reported on 2018) 50 g 11     loperamide (IMODIUM) 2 MG capsule Take 2 mg by mouth every 4 hours as needed for diarrhea       loratadine (CLARITIN) 10 MG capsule Take 10 mg by mouth daily 30 capsule      methocarbamol (ROBAXIN) 500 MG tablet Take 1 tablet (500 mg) by mouth 4 times daily 120 tablet 5     Multiple Vitamins-Minerals (CENTRUM SILVER) per tablet Take 1 tablet by mouth daily 30 tablet      nortriptyline (PAMELOR) 10 MG capsule TAKE 3 CAPSULES AT BEDTIME (Patient taking differently: TAKE 3 CAPSULES AT BEDTIME) 270 capsule 3     olmesartan (BENICAR) 20 MG tablet Take 1 tablet (20 mg) by mouth 2 times daily       polyethylene glycol 0.4%- propylene glycol 0.3% (SYSTANE ULTRA) 0.4-0.3 % SOLN ophthalmic solution Place 1 drop into both eyes 3 times daily       rivastigmine (EXELON) 9.5 MG/24HR 24 hr patch Place 1 patch onto the skin daily 90 patch 3     senna-docusate (SENOKOT-S;PERICOLACE) 8.6-50 MG per tablet Take 2 tablets by mouth 2 times daily 120 tablet 0     traMADol (ULTRAM) 50 MG tablet Take 0.5 tablets (25 mg) by mouth every 4 hours (while awake) May also take 0.5 tablet every 6 hours as needed. 150 tablet 0     ZOLMitriptan (ZOMIG) 5 MG tablet Take 1 tablet (5 mg) by mouth at onset of headache for migraine 30 tablet 5       Allergies   Allergen Reactions     Other [Seasonal Allergies]      environmental     Codeine Other (See Comments) and Rash     GI upset  GI upset       Family History   Problem Relation Age of Onset     Cancer Father      Other Cancer Father      throat cancer,       Hypertension Mother      Osteoperosis Mother      Hypertension Sister      Osteoperosis Sister      Osteoperosis Sister        Additional medical/Social/Surgical histories reviewed in [facility] and updated as appropriate.     REVIEW OF SYSTEMS (2018)  CONSTITUTIONAL: Denies  fever and weight loss  EYES: Denies acute vision changes  ENT: Denies hearing changes or difficulty swallowing  CARDIAC: Denies chest pain or edema  RESPIRATORY: Denies dyspnea, cough or wheeze  GASTROINTESTINAL: Denies abdominal pain, nausea, vomiting  MUSCULOSKELETAL: See HPI  SKIN: Denies any recent rash or lesion  NEUROLOGICAL: Denies numbness or focal weakness  PSYCHIATRIC: No history of psychiatric symptoms or problems  ENDOCRINE: Denies current diagnosis of diabetes  HEMATOLOGY: Denies episodes of easy bleeding      PHYSICAL EXAM    Vitals:    08/23/18 1111   BP: 120/70   Pulse: 88   SpO2: 94%     On exam Raymond is in a motorized wheelchair which she uses for ambulation when he is not feeling great.  He does not appear to be any pain.  Raymond neck 's exhibits stiffness when rotating the neck from side to side that is slightly more than at baseline per his wife.  He has no tenderness about the cervical spine.  He does have tenderness about the paraspinal muscles in the inferior cervical spine region and more so about the bilateral trapezius muscles.  He exhibits normal range of motion of the arms for his current state.       ASSESSMENT & PLAN  Mr. Patrick is a 67 year old year old male who presents to clinic today with a C2 fracture.    I ordered and reviewed a CT of his cervical spine which does show stability of his C2 fracture and no further displacement compared to his original CT that was taken over 12 weeks ago.    Although his CT does not show complete healing I do believe that he would benefit from some light physical therapy.  He may seek some light massage of the trapezius muscles, however Raymond and his wife do note to avoid extreme ranges of motion such as those that may be done and chiropractic maneuvers.    Raymond and I should follow-up in 6 weeks.  We could consider a repeat, focused CT scan of the C2 to ensure proper healing.    Thank you for allowing me to participate in Jimmy's care.    Alonzo Mohamud,  [facility]  Primary Care Sports Medicine

## 2018-08-23 NOTE — PROGRESS NOTES
"Palliative Care Outpatient Clinic Progress Note    This note was written using voice recognition. I did proof read, but might have missed some things. Please contact me for major errors.    Patient Name:  Jimmy Patrick  Primary Provider:  Evelina Morse    Chief Complaint: neck pain    Interim History:  Jimmy Patrick 67 year old male with PSP returns to be seen by palliative care today.      Patient is here with his wife Kriss    Medical History/Summary:  - progressive supranuclear palsy diagnosed in 2014  - migraine headaches   - AFib, HTN                 - fall with C2 fracture May 2018, managed conservatively  - urinary incontinence since June 2018  - ear infection last week, finishing up antibiotics     Still has significant pain in his neck, since fracture about 3 months ago. Pain is mostly on right, seems largely unchanged since acute pain. Describes as \"muscular\", seems to have a lot of tension. Taking methocarbamol with good relief. Also tramadol 25mg q4h, planning to stretch out to every 5 hours, hasn't been taking this over night.   Had been doing PT, OT as outpt at [facility] in [place], but commute became too long. Home PT/OT completed mid July. Rides the [employer] for 30 min daily. They feel having a therapy team familiar with neurodegenerative disorders was helpful and are uncertain whether they would want to establish therapy closer to home.    More constipation, likely due to pain meds. Takes Senna two tabs twice a day. Discussed that this can be increased. Has trouble with larger volumes, hence hasn't been taking miralax, discussed that he can also take lower doses.    Uses condom catheter overnight, urine output seems to be decreasing, concern whether his liquid intake is adequate. Has progressive dysphagia. Considering seeing speech again, now accepts thickened liquids.    Cough more frequent now, productive for thick mucous. Discussed that thickening/drying agents might actually " worsen things. They have a humidifier at home, no nebulizer.  Started claritin last week, since he has seasonal allergies that tend to worsen around this time of year.     Coping:  They are struggling to find space for anything other than his illness. They did have friends visit for a few days, which both of them enjoyed. They also look forward to visits from their children. Since they have busy lives they can only come every 2-3 weeks, though.     Both go to a support group together and Kriss attends a caregiver support group as well.     However, they both agree that additional support in coping and processing would be helpful.     Social History:  Pertinent changes to social history/social situation since last visit: none  Key support resources: Kriss  Advance Directive Status:  None completed yet. Started conversation today, will discuss more at next visit.     Social History   Substance Use Topics     Smoking status: Never Smoker     Smokeless tobacco: Never Used     Alcohol use No     Impression & Recommendations & Counseliny/o man with PSP complicated by fall with C2 fracture 3 months ago.     Pain: mostly in neck since fracture. This was managed conservatively, will have f/u CT and appointment today to clarify how he is healing. Suspect muscular pain associated with tension and immobility. He also describes more stiffness throughout his body.   - continue tramadol 25mg, discussed that he can increase interval as he sees fit  - continue methocarbamol 500mg qid  - referred to massage therapy (they will need to clarify safety for neck area with Dr Mohamud, sports med)  - encouraged to resume PT    Cough: suspect associated with dysphagia, likely worsened by seasonal allergies. In addition he has difficulties generating sufficient pressure   - saline nebs prn to loosen bronchial secretions and improve efficiency of cough  - discussed elevating head of bed (which they are)  - recommended another meeting with  SLP to discuss safety measures while swallowing  - will explore further options (chest PT or similar)    Constipation: has BM every 3 days.   - increase senna up to 4 tabs tid  - add miralax as tolerated  - hydration as tolerated    Dysphagia: is starting to thicken liquids. Discussed importance of maintaining hydration.   - recommended SLP as above    Coping: would like more support  - provided list with community counselors. Depending on schedule they will choose someone from the list vs CAROL Euceda     Advance care planning: started conversation  - will discuss in more detail at next visit    Return to clinic in 1 month    Data / Chart Review:    Review of Systems:   ROS: 10 point ROS neg other than the symptoms noted above in the HPI and pertinents here:  Palliative Symptom Review (0=no symptom/no concern, 1=mild, 2=moderate, 3=severe):      Pain: 2      Fatigue: 2      Nausea: 0      Constipation: 3      Diarrhea: 0      Depressive Symptoms: 1      Anxiety: 1      Drowsiness: 1      Poor Appetite: 1      Shortness of Breath: 0      Insomnia: 1      Other: 0      Overall (0 good/no concerns, 3 very poor):  2         Physical Exam:   Vitals were reviewed  /73  Pulse 95  Temp 97.5  F (36.4  C)  Resp 22  SpO2 96%     CONSTIT: awake, appears moderately comfortable, uses speech enhancer  EENT: MMM, EOMI, no icterus  RESP: reg, mildly increased effort  MSK: in wheelchair, generalized weakness  SKIN:  warm, no rash, no obvious lesions  NEURO: alert, oriented x3  PSYCH: somewhat flat affect, memory and thought process intact      Allergies   Allergen Reactions     Other [Seasonal Allergies]      environmental     Codeine Other (See Comments) and Rash     GI upset  GI upset     Current pertinent medications:  acetaminophen 1000mg tid  Tramadol 25mg q4h and q6h prn  methocarbamol 500mg qid     Nortriptyline 30mg HS    Past Medical History:   Diagnosis Date     Blurred vision 8/11/2014     Cancer  (H) 1979; 1960's    Florin: father; grandparents (on father's side)     Depressive disorder April, 2014    When received diagnosis of PSP     DISK (aka Displacement of intervertebral disc, site unspecified, without myelopathy) 8/11/2014     Displacement of cervical intervertebral disc without myelopathy (HERNIATED DISK) 8/11/2014     Double vision 8/11/2014     Epistaxis 8/11/2014     Fluttering heart 8/11/2014     Headache 8/11/2014     Hypertension February, 2013     Leg swelling 8/11/2014     Memory loss 8/11/2014     Migraines 2007    Ramila: karen     PSP (progressive supranuclear palsy) (H) 8/11/2014     Sinus disorder 8/11/2014     Tinnitus 8/11/2014     Urinary urgency 8/11/2014     Varicose veins 8/11/2014     Past Surgical History:   Procedure Laterality Date     BIOPSY  2016    Dermatologist remoed skin mole (not cancerus)     CARDIAC SURGERY  2013 ablation    Successful in eliminatng heart flutter     COLONOSCOPY  2005?     H ABLATION ATRIAL FLUTTER           Key Data Reviewed:  LABS: GFR 76    IMAGING: none recent    : ok        Nishi Oro MD  Palliative Medicine  Pager [phone]

## 2018-08-23 NOTE — NURSING NOTE
Jimmy Patrick's chief complaint for this visit includes:  Chief Complaint   Patient presents with     Consult     right neck and right shoulder pain.      PCP: Evelina Morse    Referring Provider:  Evelina Morse MD  [id number] [place] [address]    /70  Pulse 88  SpO2 94%  Mild Pain (2)     Do you need any medication refills at today's visit? No

## 2018-08-23 NOTE — MR AVS SNAPSHOT
After Visit Summary   8/23/2018    Jimmy Patrick    MRN: [id number]           Patient Information     Date Of Birth          1950        Visit Information        Provider Department      8/23/2018 10:15 AM Nishi Oro MD [facility]        Today's Diagnoses     Chronic seasonal allergic rhinitis, unspecified trigger    -  1    Chronic cough        Progressive supranuclear palsy (H)          Care Instructions    Patient Instructions      Expand All Collapse All    Thank you for coming into the Palliative Care Clinic today.     1. Please use saline nebulizer as needed for cough    2. Please increase your bowel regimen:  - you can take senna up to 4 tablets up to three times a day  - take miralax as you can tolerate    3. Please see the list of community counselors    4.I referred you to massage therapy    5. I will reach out to pulmonology to get recommendations on coughing      Return to clinic in 1 month for a follow up.     You can reach the Palliative Care Team during business hours at the following number:    - [phone]    To reach the Palliative Care Provider on-call After-hours or on holidays and weekends, call: [phone].  Please note that we are not able to provide pain medication refills on evenings or weekends.                     Follow-ups after your visit        Your next 10 appointments already scheduled     Aug 30, 2018  9:30 AM CDT   New Visit with Amandeep Plasencia MD   [facility] ([facility]    [address] [place] [address] [phone]            Sep 06, 2018  9:30 AM CDT   (Arrive by 9:15 AM)   Return Movement Disorder with Paulo Saravia MD   [facility] ([facility])    [address] [address] [phone]            Sep 06, 2018 12:00 PM CDT   Infusion 60 with UC SPEC INFUSION, UC 50 ATC   [facility]    [address]  [address]  [facility] [address]   [phone]            Sep 24, 2018 10:45 AM CDT   Return Visit with Nishi Oro MD   [facility] ([facility])    [address] [place] [address]   [phone]            Oct 04, 2018 10:30 AM CDT   (Arrive by 10:15 AM)   Return Movement Disorder with Paulo Saravia MD   [facility]    [address]  3rd Floor  [facility] [address]   [phone]            Oct 04, 2018 12:00 PM CDT   Infusion 60 with UC SPEC INFUSION, UC 50 ATC   [facility]    [address]  [address]  [facility] [address]   [phone]            Nov 01, 2018 10:30 AM CDT   (Arrive by 10:15 AM)   Return Movement Disorder with Paulo Saravia MD   [facility]    [address]  3rd Floor  [facility] [address]   [phone]            Nov 01, 2018 12:00 PM CDT   Infusion 60 with UC SPEC INFUSION   [facility]    [address]  [address]  [facility] [address]   [phone]              Who to contact     If you have questions or need follow up information about today's clinic visit or your schedule please contact [facility] directly at [phone].  Normal or non-critical lab and imaging results will be communicated to you by MyChart, letter or phone within 4 business days after the clinic has received the results. If you do not hear from us within 7 days, please contact the clinic through MyChart or phone. If you have a critical or abnormal lab result, we will notify you by phone as soon as possible.  Submit refill  requests through [employer] or call your pharmacy and they will forward the refill request to us. Please allow 3 business days for your refill to be completed.          Additional Information About Your Visit        [employer] Information     [employer] gives you secure access to your electronic health record. If you see a primary care provider, you can also send messages to your care team and make appointments. If you have questions, please call your primary care clinic.  If you do not have a primary care provider, please call [phone] and they will assist you.      [employer] is an electronic gateway that provides easy, online access to your medical records. With [employer], you can request a clinic appointment, read your test results, renew a prescription or communicate with your care team.     To access your existing account, please contact your [facility] or call [phone] for assistance.        Care EveryWhere ID     This is your Care EveryWhere ID. This could be used by other organizations to access your [place] medical records  [id number]        Your Vitals Were     Pulse Temperature Respirations Pulse Oximetry          95 97.5  F (36.4  C) 22 96%         Blood Pressure from Last 3 Encounters:   08/23/18 109/73   08/16/18 116/75   07/24/18 122/79    Weight from Last 3 Encounters:   08/16/18 79.6 kg (175 lb 6.4 oz)   07/24/18 80.3 kg (177 lb 1.6 oz)   07/11/18 79.4 kg (175 lb)              Today, you had the following     No orders found for display         Today's Medication Changes          These changes are accurate as of 8/23/18 11:03 AM.  If you have any questions, ask your nurse or doctor.               These medicines have changed or have updated prescriptions.        Dose/Directions    nortriptyline 10 MG capsule   Commonly known as:  PAMELOR   This may have changed:  additional instructions   Used for:  Migraine without aura and without status migrainosus, not intractable         TAKE 3 CAPSULES AT BEDTIME   Quantity:  270 capsule   Refills:  3                Primary Care Provider Office Phone # Fax #    Evelina Morse [phone] [address] [facility] [address]        Equal Access to Services     [facility] IVETTE AH: Hadii aad ku hadgranto Soomaali, waaxda luqadaha, qaybta kaalmada adeegyada, waxay bonnyin hayemmanueln adeeg vinicio laerikaharry ah. So [facility] [phone].    ATENCIÓN: Si habla español, tiene a langford disposición servicios gratuitos de asistencia lingüística. Llame al [phone].    We comply with applicable federal civil rights laws and [place] laws. We do not discriminate on the basis of race, color, national origin, age, disability, sex, sexual orientation, or gender identity.            Thank you!     Thank you for choosing [facility]  for your care. Our goal is always to provide you with excellent care. Hearing back from our patients is one way we can continue to improve our services. Please take a few minutes to complete the written survey that you may receive in the mail after your visit with us. Thank you!             Your Updated Medication List - Protect others around you: Learn how to safely use, store and throw away your medicines at www.disposemymeds.org.          This list is accurate as of 8/23/18 11:03 AM.  Always use your most recent med list.                   Brand Name Dispense Instructions for use Diagnosis    acetaminophen 500 MG tablet    TYLENOL     Take 2 tablets (1,000 mg) by mouth 3 times daily    Closed displaced fracture of second cervical vertebra with routine healing, unspecified fracture morphology, subsequent encounter       adapalene 0.1 % gel    DIFFERIN    135 g    Apply to Face topically every day and evening shift for Acne prevention    Acne vulgaris       amantadine 100 MG capsule    SYMMETREL    270 capsule    Take 1 capsule (100 mg) by mouth 3 times daily 6am,11am and 4pm    PSP (progressive  supranuclear palsy) (H)       amLODIPine 2.5 MG tablet    NORVASC     Take 1 tablet by mouth daily        amoxicillin 500 MG capsule    AMOXIL    30 capsule    Take 1 capsule (500 mg) by mouth 3 times daily    OME (otitis media with effusion), left       BABY ASPIRIN 81 MG chewable tablet   Generic drug:  aspirin      Take 81 mg by mouth daily        BENICAR 20 MG tablet   Generic drug:  olmesartan      Take 1 tablet (20 mg) by mouth 2 times daily    PSP (progressive supranuclear palsy) (H)       bisacodyl 10 MG Suppository    DULCOLAX    30 suppository    Place 1 suppository (10 mg) rectally every other day If no BM in 3 days.    Other constipation       CENTRUM SILVER per tablet     30 tablet    Take 1 tablet by mouth daily        CLARITIN 10 MG capsule   Generic drug:  loratadine     30 capsule    Take 10 mg by mouth daily    Chronic seasonal allergic rhinitis, unspecified trigger       hydrochlorothiazide 12.5 MG Tabs tablet     90 tablet    Take 1 tablet (12.5 mg) by mouth daily    Essential hypertension       * lidocaine 5 % Patch    LIDODERM    30 patch    Apply one patch on skin once a day. Keep on 12 hours and remove for 12 hours.    Closed displaced fracture of second cervical vertebra with routine healing, unspecified fracture morphology, subsequent encounter       * lidocaine 5 % ointment    XYLOCAINE    50 g    Apply topically 2 times daily    Closed displaced fracture of second cervical vertebra, unspecified fracture morphology, sequela, Cervical neuralgia       loperamide 2 MG capsule    IMODIUM     Take 2 mg by mouth every 4 hours as needed for diarrhea        methocarbamol 500 MG tablet    ROBAXIN    120 tablet    Take 1 tablet (500 mg) by mouth 4 times daily    Closed displaced fracture of second cervical vertebra with routine healing, unspecified fracture morphology, subsequent encounter       nortriptyline 10 MG capsule    PAMELOR    270 capsule    TAKE 3 CAPSULES AT BEDTIME    Migraine without  aura and without status migrainosus, not intractable       polyethylene glycol 0.4%- propylene glycol 0.3% 0.4-0.3 % Soln ophthalmic solution    SYSTANE ULTRA     Place 1 drop into both eyes 3 times daily        rivastigmine 9.5 MG/24HR 24 hr patch    EXELON    90 patch    Place 1 patch onto the skin daily    PSP (progressive supranuclear palsy) (H)       senna-docusate 8.6-50 MG per tablet    SENOKOT-S;PERICOLACE    120 tablet    Take 2 tablets by mouth 2 times daily    Chronic constipation       traMADol 50 MG tablet    ULTRAM    150 tablet    Take 0.5 tablets (25 mg) by mouth every 4 hours (while awake) May also take 0.5 tablet every 6 hours as needed.    Closed displaced fracture of second cervical vertebra with routine healing, unspecified fracture morphology, subsequent encounter       Vitamin D-3 Super Strength 2000 units tablet   Generic drug:  cholecalciferol      Take 1 tablet by mouth        ZOLMitriptan 5 MG tablet    ZOMIG    30 tablet    Take 1 tablet (5 mg) by mouth at onset of headache for migraine    PSP (progressive supranuclear palsy) (H), Migraine without aura and without status migrainosus, not intractable       * Notice:  This list has 2 medication(s) that are the same as other medications prescribed for you. Read the directions carefully, and ask your doctor or other care provider to review them with you.

## 2018-08-23 NOTE — LETTER
8/23/2018         RE: Jimmy Patrick  [address] [address]        Dear Colleague,    Thank you for referring your patient, Jimmy Patrick, to the [facility]. Please see a copy of my visit note below.    CHIEF COMPLAINT:  Consult (right neck and right shoulder pain. )       HISTORY OF PRESENT ILLNESS  Mr. Patrick is a pleasant 67 year old year old male who presents to clinic today for follow-up of a C2 fracture.  Raymond is seen at the request of Dr. Morse.  He has a history of progressive supranuclear palsy.    Memorial day weekend Raymond was downstairs at his home when he fell backward and hit his head very hard onto the ground.  He does report feeling and hearing a distinct crack in his neck.  This fall was unwitnessed.  His wife then found him downstairs in pain and on the floor.  She helped him up and he was able to ambulate throughout the day but had increasing neck pain.  The next day Raymond and his wife sought care, at that emergency visit he was diagnosed with a fracture of the C2 vertebrae that was very slightly displaced.  For treatment the decision was made to not brace him, rather to have him avoid extreme motion whenever possible.  The decision to not treat with the collar was based on potential weakening with immobilization due to his baseline supranuclear palsy.    Recently Raymond has been doing quite well, he has been using his motorized chair for ambulation to minimize risk of falling.  His neck does feel stiff but pain is much improved compared to when he originally suffered this fracture.  Notably, he denies any new pains or feelings down the arm such as weakness, numbness, or tingling    Additional history: as documented    MEDICAL HISTORY  Patient Active Problem List   Diagnosis     Transient paralysis of limb     Migraine without aura     Speech disturbance     PSP (progressive supranuclear palsy) (H)     Fluttering heart     Leg swelling     Varicose  veins     Headache     Memory loss     Urinary urgency     Blurred vision     Double vision     Tinnitus     Sinus disorder     Epistaxis     DISK (aka Displacement of intervertebral disc, site unspecified, without myelopathy)     Impairment of balance     Signs and symptoms involving cognition     Fatigue     Migraine without status migrainosus, not intractable     Palpitations     Parkinsonism (H)     Progressive supranuclear ophthalmoplegia (H)     Speech dysfunction     Cellular blue nevus     S/P ablation of atrial flutter     Migraine without aura and without status migrainosus, not intractable     Parkinsonism, unspecified Parkinsonism type (H)     Essential hypertension     CARDIOVASCULAR SCREENING; LDL GOAL LESS THAN 160     Cognitive complaints     ACP (advance care planning)     Closed fracture of second cervical vertebra (H)     Fall     Acute pain due to trauma     Acute pain due to injury     Closed displaced fracture of second cervical vertebra with routine healing, unspecified fracture morphology, subsequent encounter     Other constipation     At high risk for aspiration       Current Outpatient Prescriptions   Medication Sig Dispense Refill     acetaminophen (TYLENOL) 500 MG tablet Take 2 tablets (1,000 mg) by mouth 3 times daily       adapalene (DIFFERIN) 0.1 % gel Apply to Face topically every day and evening shift for Acne prevention 135 g 3     amantadine (SYMMETREL) 100 MG capsule Take 1 capsule (100 mg) by mouth 3 times daily 6am,11am and 4pm 270 capsule 3     amLODIPine (NORVASC) 2.5 MG tablet Take 1 tablet by mouth daily       amoxicillin (AMOXIL) 500 MG capsule Take 1 capsule (500 mg) by mouth 3 times daily 30 capsule 0     aspirin (BABY ASPIRIN) 81 MG chewable tablet Take 81 mg by mouth daily        bisacodyl (DULCOLAX) 10 MG Suppository Place 1 suppository (10 mg) rectally every other day If no BM in 3 days. 30 suppository 11     cholecalciferol (VITAMIN D-3 SUPER STRENGTH) 2000 UNITS  tablet Take 1 tablet by mouth        hydrochlorothiazide 12.5 MG TABS tablet Take 1 tablet (12.5 mg) by mouth daily 90 tablet 3     lidocaine (LIDODERM) 5 % Patch Apply one patch on skin once a day. Keep on 12 hours and remove for 12 hours. (Patient not taking: Reported on 2018) 30 patch 5     lidocaine (XYLOCAINE) 5 % ointment Apply topically 2 times daily (Patient not taking: Reported on 2018) 50 g 11     loperamide (IMODIUM) 2 MG capsule Take 2 mg by mouth every 4 hours as needed for diarrhea       loratadine (CLARITIN) 10 MG capsule Take 10 mg by mouth daily 30 capsule      methocarbamol (ROBAXIN) 500 MG tablet Take 1 tablet (500 mg) by mouth 4 times daily 120 tablet 5     Multiple Vitamins-Minerals (CENTRUM SILVER) per tablet Take 1 tablet by mouth daily 30 tablet      nortriptyline (PAMELOR) 10 MG capsule TAKE 3 CAPSULES AT BEDTIME (Patient taking differently: TAKE 3 CAPSULES AT BEDTIME) 270 capsule 3     olmesartan (BENICAR) 20 MG tablet Take 1 tablet (20 mg) by mouth 2 times daily       polyethylene glycol 0.4%- propylene glycol 0.3% (SYSTANE ULTRA) 0.4-0.3 % SOLN ophthalmic solution Place 1 drop into both eyes 3 times daily       rivastigmine (EXELON) 9.5 MG/24HR 24 hr patch Place 1 patch onto the skin daily 90 patch 3     senna-docusate (SENOKOT-S;PERICOLACE) 8.6-50 MG per tablet Take 2 tablets by mouth 2 times daily 120 tablet 0     traMADol (ULTRAM) 50 MG tablet Take 0.5 tablets (25 mg) by mouth every 4 hours (while awake) May also take 0.5 tablet every 6 hours as needed. 150 tablet 0     ZOLMitriptan (ZOMIG) 5 MG tablet Take 1 tablet (5 mg) by mouth at onset of headache for migraine 30 tablet 5       Allergies   Allergen Reactions     Other [Seasonal Allergies]      environmental     Codeine Other (See Comments) and Rash     GI upset  GI upset       Family History   Problem Relation Age of Onset     Cancer Father      Other Cancer Father      throat cancer,       Hypertension Mother       Osteoperosis Mother      Hypertension Sister      Osteoperosis Sister      Osteoperosis Sister        Additional medical/Social/Surgical histories reviewed in [facility] and updated as appropriate.     REVIEW OF SYSTEMS (8/23/2018)  CONSTITUTIONAL: Denies fever and weight loss  EYES: Denies acute vision changes  ENT: Denies hearing changes or difficulty swallowing  CARDIAC: Denies chest pain or edema  RESPIRATORY: Denies dyspnea, cough or wheeze  GASTROINTESTINAL: Denies abdominal pain, nausea, vomiting  MUSCULOSKELETAL: See HPI  SKIN: Denies any recent rash or lesion  NEUROLOGICAL: Denies numbness or focal weakness  PSYCHIATRIC: No history of psychiatric symptoms or problems  ENDOCRINE: Denies current diagnosis of diabetes  HEMATOLOGY: Denies episodes of easy bleeding      PHYSICAL EXAM    Vitals:    08/23/18 1111   BP: 120/70   Pulse: 88   SpO2: 94%     On exam Raymond is in a motorized wheelchair which she uses for ambulation when he is not feeling great.  He does not appear to be any pain.  Raymond neck 's exhibits stiffness when rotating the neck from side to side that is slightly more than at baseline per his wife.  He has no tenderness about the cervical spine.  He does have tenderness about the paraspinal muscles in the inferior cervical spine region and more so about the bilateral trapezius muscles.  He exhibits normal range of motion of the arms for his current state.       ASSESSMENT & PLAN  Mr. Patrick is a 67 year old year old male who presents to clinic today with a C2 fracture.    I ordered and reviewed a CT of his cervical spine which does show stability of his C2 fracture and no further displacement compared to his original CT that was taken over 12 weeks ago.    Although his CT does not show complete healing I do believe that he would benefit from some light physical therapy.  He may seek some light massage of the trapezius muscles, however Raymond and his wife do note to avoid extreme ranges of motion such  as those that may be done and chiropractic maneuvers.    Raymond and I should follow-up in 6 weeks.  We could consider a repeat, focused CT scan of the C2 to ensure proper healing.    Thank you for allowing me to participate in Jimmy's care.    Alonzo Mohamud DO, [facility]  Primary Care Sports Medicine           Again, thank you for allowing me to participate in the care of your patient.        Sincerely,        Alonzo Mohamud DO

## 2018-08-23 NOTE — PATIENT INSTRUCTIONS
Patient Instructions      Expand All Collapse All    Thank you for coming into the Palliative Care Clinic today.     1. Please use saline nebulizer as needed for cough    2. Please increase your bowel regimen:  - you can take senna up to 4 tablets up to three times a day  - take miralax as you can tolerate    3. Please see the list of community counselors    4.I referred you to massage therapy    5. I will reach out to pulmonology to get recommendations on coughing      Return to clinic in 1 month for a follow up.     You can reach the Palliative Care Team during business hours at the following number:    - [phone]    To reach the Palliative Care Provider on-call After-hours or on holidays and weekends, call: [phone].  Please note that we are not able to provide pain medication refills on evenings or weekends.

## 2018-08-28 NOTE — TELEPHONE ENCOUNTER
{[facility] MEDICAL EQUIPMENT OPTIONS:563720}  Treatment Diagnosis: ***

## 2018-08-28 NOTE — TELEPHONE ENCOUNTER
"    Documentation of Face-to-Face and Certification for Home Health Services     Patient: Jimmy Patrick   YOB: 1950  MR Number: [id number]  Today's Date: 8/28/2018    I certify that patient: Jimmy Patrick is under my care and that I, or a nurse practitioner or physician's assistant working with me, had a face-to-face encounter that meets the physician face-to-face encounter requirements with this patient on: {Date choice:821458::\"***\"}.    This encounter with the patient was in whole, or in part, for the following medical condition, which is the primary reason for home health care: ***.    I certify that, based on my findings, the following services are medically necessary home health services: {Modalities- choose all that apply:889508}.    My clinical findings support the need for the above services because: {Homecare Options- Choose all that apply:813017}    Further, I certify that my clinical findings support that this patient is homebound (i.e. absences from home require considerable and taxing effort and are for medical reasons or [employer] services or infrequently or of short duration when for other reasons) because: {Homebound - Must include information about the medical condition and why it is a considerable and taxing effort for the patient to leave the home. Per CMS; diagnoses alone do not substantiate homebound.:993278}.    Based on the above findings. I certify that this patient is confined to the home and needs intermittent skilled nursing care, physical therapy and/or speech therapy.  The patient is under my care, and I have initiated the establishment of the plan of care.  This patient will be followed by a physician who will periodically review the plan of care.  Physician/Provider to provide follow up care: Evelina Morse    Responsible Medicare certified [place] Physician: ***  Physician Signature: See electronic signature associated with these discharge orders.  Date: " 8/28/2018

## 2018-08-29 NOTE — TELEPHONE ENCOUNTER
[facility] Call Center    Phone Message    May a detailed message be left on voicemail: no    Reason for Call: Other: [facility], asking for change in Tramadol medication.  ALso discontinue of a supplement due to swallowing issues.  Melody [phone]    Action Taken: Message routed to:  Primary Care p [phone]

## 2018-08-29 NOTE — TELEPHONE ENCOUNTER
Verbal orders given to Melody with Dr. Skelton note below.    Fabi Farfan RN,   [facility]

## 2018-08-29 NOTE — TELEPHONE ENCOUNTER
Routing to PCP to please advise for 3 medication questions    1.) patient is weaning off of the Tramadol. Patient is wishing to reduce the frequency of the Tramadol from 0.5 tablets every 4 hours to every 6 hours now?    traMADol (ULTRAM) 50 MG tablet 150 tablet 0 7/16/2018  No   Sig - Route: Take 0.5 tablets (25 mg) by mouth every 4 hours (while awake) May also take 0.5 tablet every 6 hours as needed. - Oral   Class: Local Print   Notes to Pharmacy: New dose. Maximum total number of tabs per day: 5. Please file.     2.) patient is having worsening difficulties with swallowing medications, he is requesting to discontinue his multi-vitamin below as it is very large in size.     Multiple Vitamins-Minerals (CENTRUM SILVER) per tablet 30 tablet  12/14/2015  --   Sig - Route: Take 1 tablet by mouth daily - Oral     3.) Homecare staff is requesting BP parameters for when to NOT administer BP medications and when to contact the clinic or send patient in to ED/UC for BPs.     hydrochlorothiazide 12.5 MG TABS tablet 90 tablet 3 1/17/2018  No   Sig - Route: Take 1 tablet (12.5 mg) by mouth daily - Oral     Kylie Greco RN

## 2018-08-29 NOTE — TELEPHONE ENCOUNTER
1.  Okay to stop taking the multivitamins  2.  Patient will start taking tramadol half tablet 3 times a day for 1 week, half tablet twice a day for 1 week and then half a tablet once a day for 1 week and then stop.  3 hold antihypertensives for blood pressures less than 100 systolic and 60 diastolic  4.  Only if patient gets symptomatic with significant dizziness, blood pressure less than 80/40, need to be seen in the ER

## 2018-08-30 PROBLEM — S12.101A CLOSED NONDISPLACED FRACTURE OF SECOND CERVICAL VERTEBRA, UNSPECIFIED FRACTURE MORPHOLOGY, INITIAL ENCOUNTER (H): Status: ACTIVE | Noted: 2018-01-01

## 2018-08-30 NOTE — MR AVS SNAPSHOT
After Visit Summary   8/30/2018    Jimmy Patrick    MRN: [id number]           Patient Information     Date Of Birth          1950        Visit Information        Provider Department      8/30/2018 9:30 AM Amandeep Plasencia MD [facility]        Today's Diagnoses     Progressive supranuclear ophthalmoplegia (H)    -  1    Lower urinary tract symptoms (LUTS)           Follow-ups after your visit        Your next 10 appointments already scheduled     Sep 10, 2018  2:00 PM CDT   AUDREY Spine with Angelina Guerrier, PT   [facility] ([facility]  )    [address]  [facility] [address]   [phone]            Sep 17, 2018 10:20 AM CDT   AUDREY Spine with Angelina Guerrier, PT   [facility] ([facility]  )    [address]  [facility] [address]   [phone]            Sep 24, 2018 10:45 AM CDT   Return Visit with Nishi Oro MD   [facility] ([facility])    [address]  [facility] [address]   [phone]            Sep 24, 2018 11:40 AM CDT   AUDREY Spine with Angelina Guerrier, PT   [facility] ([facility]  )    [address]  [facility] [address]   [phone]            Oct 04, 2018  8:30 AM CDT   (Arrive by 8:15 AM)   Neuropsych Eval with Emperatriz Candelaria, PhD [facility] ([facility])    [address] [place] [id number] [phone]            Oct 04, 2018 10:30 AM CDT   (Arrive by 10:15 AM)   Return Movement Disorder with Paulo Saravia MD   [facility] ([facility])    [address] [place] [address] [phone]            Oct 04, 2018 12:00 PM CDT   Infusion 60 with UC SPEC INFUSION, UC 50 ATC   [facility] ([facility])    [address]  [address]  [facility] [address]   [phone]            Nov 01, 2018 10:30 AM CDT   (Arrive by 10:15 AM)   Return Movement Disorder with Paulo Saravia MD   [facility] Neurology ([facility])    [address]  [address]rd [address]  [place] [address]   [phone]            Nov 01, 2018 12:00 PM CDT   Infusion 60 with UC SPEC INFUSION   [facility] ([facility])    [address]  [address]  [place] [address]   [phone]              Who to contact     If you have questions or need follow up information about today's clinic visit or your schedule please contact [facility] directly at [phone].  Normal or non-critical lab and imaging results will be communicated to you by [employer], letter or phone within 4 business days after the clinic has received the results. If you do not hear from us within 7 days, please contact the clinic through stickKt or phone. If you have a critical or abnormal lab result, we will notify you by phone as soon as possible.  Submit refill requests through [facility] or call your pharmacy and they will forward the refill request to us. Please allow 3 business days for your refill to be completed.          Additional Information About Your Visit        [employer] Information     [facility] gives you secure access to your electronic health record. If you see a primary care provider, you can also send messages to your care team and make appointments. If you have questions, please call your primary care clinic.  If you do not have a primary care provider, please call [phone] and they will assist you.      [facility] is an electronic gateway that provides easy, online access to your medical records. With [facility], you can request a clinic appointment, read your test  results, renew a prescription or communicate with your care team.     To access your existing account, please contact your [facility] or call [phone] for assistance.        Care EveryWhere ID     This is your Care EveryWhere ID. This could be used by other organizations to access your [facility] medical records  [id number]         Blood Pressure from Last 3 Encounters:   08/23/18 120/70   08/23/18 109/73   08/16/18 116/75    Weight from Last 3 Encounters:   08/16/18 79.6 kg (175 lb 6.4 oz)   07/24/18 80.3 kg (177 lb 1.6 oz)   07/11/18 79.4 kg (175 lb)              We Performed the Following     MEASURE POST-VOID RESIDUAL URINE/BLADDER CAPACITY, US NON-IMAGING (37506)          Today's Medication Changes          These changes are accurate as of 8/30/18  2:26 PM.  If you have any questions, ask your nurse or doctor.               These medicines have changed or have updated prescriptions.        Dose/Directions    nortriptyline 10 MG capsule   Commonly known as:  PAMELOR   This may have changed:  additional instructions   Used for:  Migraine without aura and without status migrainosus, not intractable        TAKE 3 CAPSULES AT BEDTIME   Quantity:  270 capsule   Refills:  3         Stop taking these medicines if you haven't already. Please contact your care team if you have questions.     CENTRUM SILVER per tablet   Stopped by:  Amandeep Plasencia MD           lidocaine 5 % ointment   Commonly known as:  XYLOCAINE   Stopped by:  Amandeep Plasencia MD           lidocaine 5 % Patch   Commonly known as:  LIDODERM   Stopped by:  Amandeep Plasencia MD                    Primary Care Provider Office Phone # Fax #    Evelina Morse [phone]       [address]  [facility] [address]        Equal Access to Services     LEDY STEELE : Berny Granado, waosmar luqadaha, qaybta kaalthalia sylvester, gely lynch. So [facility]  [phone].    ATENCIÓN: Si ruthy briceno, tiene a langford disposición servicios gratuitos de asistencia lingüística. Kiel shetty [phone].    We comply with applicable federal civil rights laws and [place] laws. We do not discriminate on the basis of race, color, national origin, age, disability, sex, sexual orientation, or gender identity.            Thank you!     Thank you for choosing [facility]  for your care. Our goal is always to provide you with excellent care. Hearing back from our patients is one way we can continue to improve our services. Please take a few minutes to complete the written survey that you may receive in the mail after your visit with us. Thank you!             Your Updated Medication List - Protect others around you: Learn how to safely use, store and throw away your medicines at www.disposemymeds.org.          This list is accurate as of 8/30/18  2:26 PM.  Always use your most recent med list.                   Brand Name Dispense Instructions for use Diagnosis    acetaminophen 500 MG tablet    TYLENOL     Take 2 tablets (1,000 mg) by mouth 3 times daily    Closed displaced fracture of second cervical vertebra with routine healing, unspecified fracture morphology, subsequent encounter       adapalene 0.1 % gel    DIFFERIN    135 g    Apply to Face topically every day and evening shift for Acne prevention    Acne vulgaris       amantadine 100 MG capsule    SYMMETREL    270 capsule    Take 1 capsule (100 mg) by mouth 3 times daily 6am,11am and 4pm    PSP (progressive supranuclear palsy) (H)       amLODIPine 2.5 MG tablet    NORVASC     Take 1 tablet by mouth daily        amoxicillin 500 MG capsule    AMOXIL    30 capsule    Take 1 capsule (500 mg) by mouth 3 times daily    OME (otitis media with effusion), left       BABY ASPIRIN 81 MG chewable tablet   Generic drug:  aspirin      Take 81 mg by mouth daily        BENICAR 20 MG tablet   Generic drug:  olmesartan      Take  1 tablet (20 mg) by mouth 2 times daily    PSP (progressive supranuclear palsy) (H)       bisacodyl 10 MG Suppository    DULCOLAX    30 suppository    Place 1 suppository (10 mg) rectally every other day If no BM in 3 days.    Other constipation       CLARITIN 10 MG capsule   Generic drug:  loratadine     30 capsule    Take 10 mg by mouth daily    Chronic seasonal allergic rhinitis, unspecified trigger       hydrochlorothiazide 12.5 MG Tabs tablet     90 tablet    Take 1 tablet (12.5 mg) by mouth daily    Essential hypertension       loperamide 2 MG capsule    IMODIUM     Take 2 mg by mouth every 4 hours as needed for diarrhea        methocarbamol 500 MG tablet    ROBAXIN    120 tablet    Take 1 tablet (500 mg) by mouth 4 times daily    Closed displaced fracture of second cervical vertebra with routine healing, unspecified fracture morphology, subsequent encounter       nortriptyline 10 MG capsule    PAMELOR    270 capsule    TAKE 3 CAPSULES AT BEDTIME    Migraine without aura and without status migrainosus, not intractable       order for DME     1 each    Equipment being ordered: Nebulizer    Chronic cough       polyethylene glycol 0.4%- propylene glycol 0.3% 0.4-0.3 % Soln ophthalmic solution    SYSTANE ULTRA     Place 1 drop into both eyes 3 times daily        rivastigmine 9.5 MG/24HR 24 hr patch    EXELON    90 patch    Place 1 patch onto the skin daily    PSP (progressive supranuclear palsy) (H)       senna-docusate 8.6-50 MG per tablet    SENOKOT-S;PERICOLACE    120 tablet    Take 2 tablets by mouth 2 times daily    Chronic constipation       sodium chloride 3 % Nebu neb solution     180 mL    Take 3 mLs by nebulization every 3 hours as needed for wheezing    Chronic cough       traMADol 50 MG tablet    ULTRAM    150 tablet    Take 0.5 tablets (25 mg) by mouth every 4 hours (while awake) May also take 0.5 tablet every 6 hours as needed.    Closed displaced fracture of second cervical vertebra with routine  healing, unspecified fracture morphology, subsequent encounter       Vitamin D-3 Super Strength 2000 units tablet   Generic drug:  cholecalciferol      Take 1 tablet by mouth        ZOLMitriptan 5 MG tablet    ZOMIG    30 tablet    Take 1 tablet (5 mg) by mouth at onset of headache for migraine    PSP (progressive supranuclear palsy) (H), Migraine without aura and without status migrainosus, not intractable

## 2018-08-30 NOTE — PROGRESS NOTES
[facility] Initial Evaluation -- Cervical    Evaluation Date: August 30, 2018  Jimmy Patrick is a 67 year old male with a cervical condition.   Referral: Sports Medicine - Dr. Mohamud  Work mechanical stresses: NA   Employment status: retired  Functional disability score (NDI):  72%  VAS score (0-10): 3/10 with Tramadol  Patient goals/expectations:  Decrease neck pain and stiffness s/p fall; C2 fracture    HISTORY:    Present symptoms:  Pain and tightness for the posterior aspect of the neck and into the R posterior shoulder region > L. .  Pain quality (sharp/shooting/stabbing/aching/burning/cramping):   Aching, tightness, intermittent sharp pain.  Paresthesia (yes/no):  no    Present since (onset date): May 28, 2018.     Symptoms (improving/unchanging/worsening):  Improving gradually as the fracture is healing.    Symptoms commenced as a result of:  Patient fell at home, backwards, and hit his head on the floor.  He has a progressive neurologic disorder which he was diagnosed with about 4 years ago (PSP -  progressive supranuclear palsy)     Symptoms at onset (neck/arm/forearm/headache): neck and upper back pain   Constant symptoms (neck/arm/forearm/headache): neck and upper back   Intermittent symptoms (neck/arm/forearm/headache): none    Symptoms are made worse with the following: Sometimes Bending, Sometimes Turning and Sometimes Rising   Symptoms are made better with the following: Always When still    Disturbed sleep (yes/no): yes - not sure if neck or due to his PSP    Number of pillows: varies as he tries to support his head in some flexion to keep the pain down  Sleeping postures (prone/sup/side R/L): supine    Previous episodes (0/1-5/6-10/11+): ?  Year of first episode: ?      Previous history: Has has neck and shoulder pain in the past due to falls.     Previous treatments: PT and Speech for the progressive neurological disorder (PSP)    Specific Questions: (as reported by the  patient)  Dizziness/Tinnitus/Nausea/Swallowing (pos/neg): difficulty swallowing (PSP related, not the recent fall)  Gait/Upper Limbs (normal/abnormal): abnormal (PSP related)  Medications (nil/NSAIDS/anlag/steroids/anticoag/other):  NSAIDS, OTC analgesic, Narcotics/Opioids and Other - Sleep, High blood pressure and Rivastigmine patch  Medical allergies:  Codeine  General health (excellent/good/fair/poor):  poor  Pertinent medical history:  Depression, High blood pressure, Migraines/Headaches, Progressive neurological deficits due to PSP- progressive supranuclear palsy, Raynaud's  Imaging (None/Xray/MRI/Other):  MRI - C2 fracture is healing, but not healed  Recent or major surgery (yes/no): not recent; heart ablation 2013  Night pain (yes/no):   Accidents (yes/no): yes  Unexplained weight loss (yes/no): no  Barriers at home: using a walker at home, wheel chair when going out.  Not able to drive.  Difficulty eating with the swallowing issues from PSP. Significant weakness - does have someone whom will assist with his cares during the day.  Other red flags: It has been determined that the red flag type of issues are from his progressive neurological disorder, not from the C2 fracture - weakness, difficulty swallowing/talking, changes in bowel/bladder,     EXAMINATION    Posture:   Sitting (good/fair/poor): poor  Standing (good/fair/poor): poor     Protruded head (yes/no): yes    Wry Neck (right/left/nil):  no  Relevant (yes/no):  no     Correction of posture(better/worse/no effect): did not tolerate for the lower back  Other observations:  Does tend to lean to the L in his wheel chair.  Has a back cushion to support his back, but not assist in maintaining neutral spine.  Not tender along the spine or musculature.  Muscle tightness for the neck and B UT/posterior shoulders, R > L.    Neurological:    Motor Deficit:  Not tested   Reflexes:  Not tested  Sensory Deficit:  Not tested   Dural signs:  Not tested    Movement  Loss:   Phil Mod Min Nil Pain   Protrusion     NT- does have a forward head posture   Flexion   x  Inc neck pain   Retraction x    Inc neck pain during movement, not worse p   Extension     NT   Lateral flexion R     NT   Lateral flexion L     NT   Rotation R     NT   Rotation L     NT     Test Movements:   During: produces, abolishes, increases, decreases, no effect, centralizing, peripheralizing  After: better, worse, no better, no worse, no effect, centralized, peripheralized    Pretest symptoms sitting: 3/10 neck and upper back/posterior UT area, R > L   Symptoms During Symptoms After ROM increased ROM decreased No Effect   PRO          Rep PRO          RET Increases    No Worse         Rep RET Decreases    No Better      X but improved sitting posture afterwards.     RET EXT          Rep RET EXT          Pretest symptoms lying:       Symptoms During Symptoms After ROM increased ROM decreased No Effect   RET          Rep RET          RET EXT          Rep RET EXT            Static Tests:   Protrusion:  NT  Flexion:  NT  Retraction:  NT  Extension (sitting/prone/supine):  NT    Other Tests: Taught Kriss (patient's wife) how to assist patient with neck retraction to improve his posture.  Patient did need manual assistance with his retraction ROM but a.s he contines to assist with the motion, he was able to do more of the motion independently  Provisional Classification:  Inconclusive/Other - Trauma/Recovering Trauma; fracture has not completely healed.  He is not wearing a cervical collar as this would decrease the cervical muscle activity and patient requires the use of his cervical muscular to support his neck.      Principle of Management:  Education:  Discussed proper posture.  May develop more central neck pain with the exercise (symptoms worse on the R may become more symmetrical).  Demonstrated assessing rotation in each direction before doing the neck retraction and again after the exercise.    Equipment  provided:  none  Mechanical therapy (Y/N):  Yes but minimal - will focus on posture correction.     Extension principle:  yes   Lateral principle:  no  Flexion principle:  no   Other:  Will work on muscle tone and progress to strengthening as able    ASSESSMENT/PLAN:    Patient is a 67 year old male with cervical complaints.    Patient has the following significant findings with corresponding treatment plan.                Diagnosis 1:  Cervical fracture at C2- healing slowly    Pain -  manual therapy, self management, education, directional preference exercise and home program  Decreased ROM/flexibility - manual therapy, therapeutic exercise and home program  Decreased joint mobility - manual therapy (will work below the level of the fracture, therapeutic exercise, home program   Decreased strength - therapeutic exercise, therapeutic activities and home program  Impaired muscle performance - neuro re-education and home program  Decreased function - therapeutic activities and home program  Impaired posture - neuro re-education, therapeutic activities and home program    Therapy Evaluation Codes:   1) History comprised of:    Personal factors that impact the plan of care:       PSP - progressive supranuclear palsy.    Comorbidity factors that impact the plan of care are:      High blood pressure.     Medications impacting care: None.  2) Examination of Body Systems comprised of:   Body structures and functions that impact the plan of care:      Cervical spine.   Activity limitations that impact the plan of care are:      Bending, Driving, Dressing, Grasping, Lifting, Reading/Computer work, Stairs, Standing, Walking, Sleeping and all activity is limited by PSP, however,  the neck fracture has also caused some limitations..  3) Clinical presentation characteristics are:   Stable/Uncomplicated.  4) Decision-Making    High complexity using standardized patient assessment instrument and/or measureable assessment of  functional outcome.  Cumulative Therapy Evaluation is: High complexity.    Previous and current functional limitations:  (See Goal Flow Sheet for this information)    Short term and Long term goals: (See Goal Flow Sheet for this information)     Communication ability:  Patient appears to be able to clearly communicate and understand verbal and written communication and follow directions correctly.  Patient is unable to clearly communicate and follow directions.  They will require assistance to perform a home exercise program.    Treatment Explanation - The following has been discussed with the patient:   RX ordered/plan of care  Anticipated outcomes  Possible risks and side effects  This patient would benefit from PT intervention to resume normal activities.   Rehab potential is fair.    Frequency:  2 X week, once daily  Duration:  for 8 weeks  Discharge Plan:  Achieve all LTG.  Independent in home treatment program.  Reach maximal therapeutic benefit.    Please refer to the daily flowsheet for treatment today, total treatment time and time spent performing 1:1 timed codes.

## 2018-09-05 NOTE — PROGRESS NOTES
Diagnosis/Summary/Recommendations:    PATIENT: Jimmy Patrick  67 year old male     : 1950    POLA: 2018    PSP  BLOOD pressure is better and under control. He is taking amlodipine, hydrochlorothiazide and olmesartan/benicar.  Constipation - ongoing problems and is taking senokot 2 tabs 2/day and miralax as needed  And using dulcolax as needed   Not using immodium    He remains on amantadine 100mg 3/day    He is using adapelene twice daily or once daily.   He uses Vaseline as needed topically.     2018  which was seen for and ear infection which was treated with amoxicillin capsules for 10 days.   Staring on the   Put on claritin at the time and remains on this.     Continues to have a cough    He is continuing on robaxin 500mg 4/day = to  Manage his neck and back pain.   After c2 fracture and his inability to do exercises his back pain has gottent worse    He continues on pamelor and has not had migraine for over year.    He uses systane for his eyes    Using 9.5mg exelon rivastigmine patch daily     He continues to uses vitamin D3    He is not on a mvi    He is on baby aspirin    He continues on acetaminophen 1000mg 3/day for back/neck pain.     He continues on tramadol and taking it scheduled.    He has not taken zomig - has not had migraines.     Still has ongoing numbness in his toes.     He has long term care person - living in   They don't need a nurse at this point to administer medications but the nurse is there to set up the medication    Nathen  Michelle is the live in person from [facility].     He has a hospital bed    He has not had a pneumonia to date    The aide has 8 hours off to sleep.     Some of the costs are covered from their long term care insurance    Daily cost is 360 dollars per day but 1.5 on holidays  365/nurse/per month  They have 103.50 coverage from long term care  It is 8k out of pocket expenses.    They are using ice/heat  for neck/back pain    They have seen Dr. Mohamud of sports medicine at [facility].     They have been trying therapy and chin tucks/back bends    They have not tried aspercreme    He has ongoing urinary incontinence  He is using a condom catheter at night.     The fall and break affected his mobility and independence eg toileting.  Since the break he had incontinence which is better but independence is less than before.     They have seen Dr. Oro who has been great and helpful.     They have looked at , [facility] hospice and [facility].   The former is a nonprofit.   If they decide to hospice they won't be able to continue physical therapy.   The hospice would be done in home and the costs would not be entirely covered.     He is in bed from 10pm till 7am but may only sleep for 5  Hours  Question raised about seroquel to help him sleep longer.     Talked about hospice  Book from [facility]    Has wireless doorbells to use as a call device.       Medications                                                                                                                                                History obtained from patient      Coding statement:   Duration of  Services: patient care and care coordination was 25 minutes  Greater than 50% of this visit was spent in counseling and coordination of care.     Paulo Saravia MD     ______________________________________    Last visit date and details:               ______________________________________      Patient was asked about 14 Review of systems including changes in vision (dry eyes, double vision), hearing, heart, lungs, musculoskeletal, depression, anxiety, snoring, RBD, insomnia, urinary frequency, urinary urgency, constipation, swallowing problems, hematological, ID, allergies, skin problems: seborrhea, endocrinological: thyroid, diabetes, cholesterol; balance, weight changes, and other neurological problems and these were not significant at this time  except for   Patient Active Problem List   Diagnosis     Transient paralysis of limb     Migraine without aura     Speech disturbance     PSP (progressive supranuclear palsy) (H)     Fluttering heart     Leg swelling     Varicose veins     Headache     Memory loss     Urinary urgency     Blurred vision     Double vision     Tinnitus     Sinus disorder     Epistaxis     DISK (aka Displacement of intervertebral disc, site unspecified, without myelopathy)     Impairment of balance     Signs and symptoms involving cognition     Fatigue     Migraine without status migrainosus, not intractable     Palpitations     Parkinsonism (H)     Progressive supranuclear ophthalmoplegia (H)     Speech dysfunction     Cellular blue nevus     S/P ablation of atrial flutter     Migraine without aura and without status migrainosus, not intractable     Parkinsonism, unspecified Parkinsonism type (H)     Essential hypertension     CARDIOVASCULAR SCREENING; LDL GOAL LESS THAN 160     Cognitive complaints     ACP (advance care planning)     Closed fracture of second cervical vertebra (H)     Fall     Acute pain due to trauma     Acute pain due to injury     Closed displaced fracture of second cervical vertebra with routine healing, unspecified fracture morphology, subsequent encounter     Other constipation     At high risk for aspiration     Closed nondisplaced fracture of second cervical vertebra, unspecified fracture morphology, initial encounter (H)     Acute left-sided low back pain without sciatica          Allergies   Allergen Reactions     Other [Seasonal Allergies]      environmental     Codeine Other (See Comments) and Rash     GI upset  GI upset  GI upset     Past Surgical History:   Procedure Laterality Date     BIOPSY  2016    Dermatologist remoed skin mole (not cancerus)     CARDIAC SURGERY  2013 ablation    Successful in eliminatng heart flutter     COLONOSCOPY  2005?     H ABLATION ATRIAL FLUTTER       Past Medical History:    Diagnosis Date     Blurred vision 8/11/2014     Cancer (H) 1979; 1960's    Florin: father; grandparents (on father's side)     Depressive disorder April, 2014    When received diagnosis of PSP     DISK (aka Displacement of intervertebral disc, site unspecified, without myelopathy) 8/11/2014     Displacement of cervical intervertebral disc without myelopathy (HERNIATED DISK) 8/11/2014     Double vision 8/11/2014     Epistaxis 8/11/2014     Fluttering heart 8/11/2014     Headache 8/11/2014     Hypertension February, 2013     Leg swelling 8/11/2014     Memory loss 8/11/2014     Migraines 2007    Ramila: needilson     PSP (progressive supranuclear palsy) (H) 8/11/2014     Sinus disorder 8/11/2014     Tinnitus 8/11/2014     Urinary urgency 8/11/2014     Varicose veins 8/11/2014     Social History     Social History     Marital status:      Spouse name: N/A     Number of children: N/A     Years of education: N/A     Occupational History     Not on file.     Social History Main Topics     Smoking status: Never Smoker     Smokeless tobacco: Never Used     Alcohol use No     Drug use: No     Sexual activity: Not Currently     Partners: Female     Other Topics Concern     Parent/Sibling W/ Cabg, Mi Or Angioplasty Before 65f 55m? 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    No alcohol    [employer]        Daughter in [facility]    Daughter in [facility]               Drug and lactation database from the [place] National Library of Medicine:  http://toxnet.nlm.nih.gov/cgi-bin/sis/htmlgen?LACT      B/P: Data Unavailable, T: Data Unavailable, P: Data Unavailable, R: Data Unavailable 0 lbs 0 oz  There were no vitals taken for this visit., There is no height or weight on file to calculate BMI.  Medications and Vitals not listed above were documented in the cart and reviewed by me.     Current  Outpatient Prescriptions   Medication Sig Dispense Refill     acetaminophen (TYLENOL) 500 MG tablet Take 2 tablets (1,000 mg) by mouth 3 times daily       adapalene (DIFFERIN) 0.1 % gel Apply to Face topically every day and evening shift for Acne prevention 135 g 3     amantadine (SYMMETREL) 100 MG capsule Take 1 capsule (100 mg) by mouth 3 times daily 6am,11am and 4pm 270 capsule 3     amLODIPine (NORVASC) 2.5 MG tablet Take 1 tablet by mouth daily       aspirin (BABY ASPIRIN) 81 MG chewable tablet Take 81 mg by mouth daily        bisacodyl (DULCOLAX) 10 MG Suppository Place 1 suppository (10 mg) rectally every other day If no BM in 3 days. 30 suppository 11     cholecalciferol (VITAMIN D-3 SUPER STRENGTH) 2000 UNITS tablet Take 1 tablet by mouth        hydrochlorothiazide 12.5 MG TABS tablet Take 1 tablet (12.5 mg) by mouth daily 90 tablet 3     loperamide (IMODIUM) 2 MG capsule Take 2 mg by mouth every 4 hours as needed for diarrhea       loratadine (CLARITIN) 10 MG capsule Take 10 mg by mouth daily 30 capsule      methocarbamol (ROBAXIN) 500 MG tablet Take 1 tablet (500 mg) by mouth 4 times daily 120 tablet 5     nortriptyline (PAMELOR) 10 MG capsule TAKE 3 CAPSULES AT BEDTIME (Patient taking differently: TAKE 3 CAPSULES AT BEDTIME) 270 capsule 3     olmesartan (BENICAR) 20 MG tablet Take 1 tablet (20 mg) by mouth 2 times daily       order for DME Equipment being ordered: Nebulizer 1 each 0     polyethylene glycol (MIRALAX/GLYCOLAX) powder Take 1 capful by mouth daily       polyethylene glycol 0.4%- propylene glycol 0.3% (SYSTANE ULTRA) 0.4-0.3 % SOLN ophthalmic solution Place 1 drop into both eyes 3 times daily       rivastigmine (EXELON) 9.5 MG/24HR 24 hr patch Place 1 patch onto the skin daily 90 patch 3     senna-docusate (SENOKOT-S;PERICOLACE) 8.6-50 MG per tablet Take 2 tablets by mouth 2 times daily 120 tablet 0     sodium chloride 3 % NEBU neb solution Take 3 mLs by nebulization every 3 hours as needed  for wheezing 180 mL 3     traMADol (ULTRAM) 50 MG tablet Take 0.5 tablets (25 mg) by mouth every 4 hours (while awake) May also take 0.5 tablet every 6 hours as needed. 120 tablet 0     ZOLMitriptan (ZOMIG) 5 MG tablet Take 1 tablet (5 mg) by mouth at onset of headache for migraine 30 tablet 5         Paulo Saravia MD

## 2018-09-07 NOTE — TELEPHONE ENCOUNTER
..Reason for Call:    missing some information to support request/cath, drain bag    Detailed comments: Kemi with [employer] calling to request the face to face notes not included with the PRESCRIPTION;   Order date 06-    Phone Number Patient can be reached at:   phone number:  [phone] - Fax [phone]    Best Time: anytime    Can we leave a detailed message on this number? Not Applicable    Call taken on 9/7/2018 at 12:22 PM by Roxi Morris

## 2018-09-10 PROBLEM — M54.50 ACUTE LEFT-SIDED LOW BACK PAIN WITHOUT SCIATICA: Status: ACTIVE | Noted: 2018-01-01

## 2018-09-10 NOTE — TELEPHONE ENCOUNTER
Dr. Burch, please advise, you were covering while patient's PCP was out, see the beginning of this encounter.  Jonas Veliz,  [facility]

## 2018-09-11 NOTE — PROGRESS NOTES
[facility] Initial Evaluation -- Lumbar    Date: September 10, 2018  Jimmy Patrick is a 67 year old male with a lumbar condition.   Referral: Orthopedics  Work mechanical stresses:    Employment status:  retired  Functional disability score (ANA/STarT Back):  ANA 64.44. STarT Back High (8)  VAS score (0-10): 5  Patient goals/expectations:  Alleviate lower back pain to allow increased tolerance for sitting, increase ease of motion    HISTORY:    Present symptoms: L sided lower back pain  Pain quality (sharp/shooting/stabbing/aching/burning/cramping):  Sharp, stabbing, aching   Paresthesia (yes/no):  no    Present since (onset date): 8/30/2018.     Symptoms (improving/unchanging/worsening):  unchanging.     Symptoms commenced as a result of: uncertain what caused flare of lower back pain - ? Fall when injured neck, 5/28/2018.    Condition occurred in the following environment:   home     Symptoms at onset (back/thigh/leg): back  Constant symptoms (back/thigh/leg): back  Intermittent symptoms (back/thigh/leg): none    Symptoms are made worse with the following: Always Bending, Sometimes Sitting, Sometimes Rising and Sometimes When still   Symptoms are made better with the following: Sometimes Standing and Sometimes Walking    Disturbed sleep (yes/no):  yes Sleeping postures (prone/sup/side R/L): supine     Previous episodes (0/1-5/6-10/11+): 11+ Year of first episode: about 1992-93.    Previous history: injured lower back when fell from a ladder when assisting trimming a tree 25 year ago.  He has had lower back pain off and on since then.  MRI - 3 disc herniations    Previous treatments: physical therapy - helpful      Specific Questions:  Cough/Sneeze/Strain (pos/neg): positive  Bowel/Bladder (normal/abnormal): no changes with onset of lower back pain  Gait (normal/abnormal): slowed due to PSP  Medications (nil/NSAIDS/analg/steroids/anticoag/other):  NSAIDS, OTC analgesic and  Narcotics/Opioids  Medical allergies:  Codeine  General health (excellent/good/fair/poor):  poor  Pertinent medical history:  Depression, High blood pressure, Progressive neurological deficits and Incontinence  Imaging (None/Xray/MRI/Other):  X-rays of lower back - bone spur.  Previous MRI - 3 HNP lumbar spine.  Healing C2 vertebra fracture  Recent or major surgery (yes/no): heart ablation   Night pain (yes/no): yes  Accidents (yes/no): yes - fell backwards to due his PSP  Unexplained weight loss (yes/no): no  Barriers at home: requires assistance with ADLS.  Uses walker in home, WC outside of home.  Difficult eating/swallowing due to PSP.  Does have a care provider during  Other red flags: PSP    EXAMINATION    Posture:   Sitting (good/fair/poor): poor  Standing (good/fair/poor):fair  Lordosis (red/acc/normal): decreased  Correction of posture (better/worse/no effect): better    Lateral Shift (right/left/nil): nil  Relevant (yes/no):  nil  Other Observations: moves slowly due to PSP.  Requires assistance to begin motions.    Neurological:    Motor deficit:  NT  Reflexes:  NT  Sensory deficit:  NT  Dural signs:  NT    Movement Loss:   Phil Mod Min Nil Pain   Flexion     NT due to balance issues   Extension x    Low back   Side Gliding R x    Low back   Side Gliding L x    Low back     Test Movements:   During: produces, abolishes, increases, decreases, no effect, centralizing, peripheralizing   After: better, worse, no better, no worse, no effect, centralized, peripheralized    Pretest symptoms standin/10 L low back pain   Symptoms During Symptoms After ROM increased ROM decreased No Effect   FIS          Rep FIS          EIS Increases    No Worse         Rep EIS Decreases    Better    x     Static Tests:  Sitting slouched:  NT  Sitting erect:  Better   Standing slouched NT  Standing erect:  NT  Lying prone in extension:  NT Long sitting:  NT    Other Tests: none    Provisional Classification:  Derangement -  Asymmetrical, unilateral, symptoms above knee    Principle of Management:  Education:  Discussed proper sitting posture, use of lumbar roll, centralization/peripheralization, trial of lumbar roll to place under back when laying supine at this is now bothersome for him.     Equipment provided:  Lumbar roll  Mechanical therapy (Y/N):  yes   Extension principle:  EIS 10 reps per 2 hours  Lateral Principle:  no  Flexion principle:  no  Other:  discussed assessing prone lying at next session (progressive extension)    ASSESSMENT/PLAN:    Patient is a 67 year old male with lumbar complaints.    Patient has the following significant findings with corresponding treatment plan.                Diagnosis 1:  Low back pain   Pain -  manual therapy, self management, education, directional preference exercise and home program  Decreased ROM/flexibility - manual therapy, therapeutic exercise and home program  Decreased joint mobility - manual therapy, therapeutic exercise and home program  Impaired balance - neuro re-education, therapeutic activities and home program  Decreased proprioception - neuro re-education, therapeutic activities and home program  Impaired muscle performance - neuro re-education and home program  Decreased function - therapeutic activities and home program  Impaired posture - neuro re-education, therapeutic activities and home program    Therapy Evaluation Codes:   1) History comprised of:   Personal factors that impact the plan of care:      Past/current experiences and  requires assistance with ADLS.    Comorbidity factors that impact the plan of care are:      Depression, High blood pressure, Weakness and progressive neurological disorder (PSP).     Medications impacting care: High blood pressure.  2) Examination of Body Systems comprised of:   Body structures and functions that impact the plan of care:      Cervical spine and Lumbar spine.   Activity limitations that impact the plan of care are:       Bending, Lifting, Sitting, Standing, Walking and Sleeping.  3) Clinical presentation characteristics are:   Stable/Uncomplicated.  4) Decision-Making    High complexity using standardized patient assessment instrument and/or measureable assessment of functional outcome.  Cumulative Therapy Evaluation is: High complexity.    Previous and current functional limitations:  (See Goal Flow Sheet for this information)    Short term and Long term goals: (See Goal Flow Sheet for this information)     Communication ability:  Patient appears to be able to clearly communicate and understand verbal and written communication and follow directions correctly. Wife Gurpreet assisting with communication as difficult for patient to communicate due to PSP>  Treatment Explanation - The following has been discussed with the patient:   RX ordered/plan of care  Anticipated outcomes  Possible risks and side effects  This patient would benefit from PT intervention to resume normal activities.   Rehab potential is good.    Frequency:  2 X week, once daily  Duration:  for 4 weeks  Discharge Plan:  Achieve all LTG.  Independent in home treatment program.  Reach maximal therapeutic benefit.    Please refer to the daily flowsheet for treatment today, total treatment time and time spent performing 1:1 timed codes.

## 2018-09-19 NOTE — TELEPHONE ENCOUNTER
Gave wife, Yael, message below from Dr. Morse.  Scheduled patient at [facility].  Then Yael asked if they can do a CT.  Informed that they only do xray at [facility].  If she wants a CT done then she would need to go to ER, as urgent care does not have CT.  She wants to keep appt at [facility].  She is going to call home care nurse to give her this information since she recommended CT and saw patient.  If decided she will cancel appt at [facility] and go to ER.     Fabi Farfan RN,   [facility]

## 2018-09-19 NOTE — TELEPHONE ENCOUNTER
[facility] Call Center    Phone Message    May a detailed message be left on voicemail: yes    Reason for Call: Other: Patients wife Yael called, and she is worried about a blockage per the home care nurse they need an order for imaging.  Constipation mostly due to meds and his condition -     Action Taken: Message routed to:  Primary Care p [phone]

## 2018-09-19 NOTE — TELEPHONE ENCOUNTER
Would recommend to be seen before just getting the x-ray,   Yes,  or other [facility] clinics is fine , if unable to get into see primary providers here in the clinic

## 2018-09-19 NOTE — TELEPHONE ENCOUNTER
TC to please refax as noted below.    Beba Bird RN, [facility] Triage

## 2018-09-19 NOTE — TELEPHONE ENCOUNTER
Reason for Call:  Other prescription    Detailed comments: Can you refax this DME RX looks like sent to wrong place. Nebulizer filter ref 18 part [id number] fax back to [phone] [facility] with patient name and .    Phone Number [facility] can be reached at: [phone]    Best Time: any    Can we leave a detailed message on this number? YES    Call taken on 2018 at 12:24 PM by Mansi Zaragoza

## 2018-09-19 NOTE — TELEPHONE ENCOUNTER
Wife statest that patient had not had a good BM since last Wednesday.  He did finally have a good BM this morning, large formed not super firm.  Yesterday he had a real small soft formed light brown stool.    He is taking tramadol.  Also takes senokot S, 2 tabs BID.  Had also taken mirilax x2 and suppository x2 this week.      Home care nurse said his stomach was hard and encouraged to get an xray to see if there was a blockage.  Patient is having abd pain 4-5/10.  Wife concerned due to his PCP, slowing of the muscles could cause a blockage and really would like to get Xray done today.      Unsure if  can order xray without seeing patient first.  Wife stated if cant be seen here she is willing to take him to urgent care at [facility].  Wanted Dr. Morse;s recommendations.    Fabi Farfan RN,   [facility], [facility]

## 2018-09-19 NOTE — MR AVS SNAPSHOT
After Visit Summary   9/19/2018    Jimmy Patrick    MRN: [id number]           Patient Information     Date Of Birth          1950        Visit Information        Provider Department      9/19/2018 3:00 PM Angelina Quinones PA-C [facility]        Today's Diagnoses     Other constipation    -  1    Abdominal pain, generalized          Care Instructions    Take miralax scoopful daily with breakfast along with senna  Follow up with next week us next week if symptoms not improving   Be seen urgently if fever, vomiting, increasing pain or other change in symptoms.   At [facility], we strive to deliver an exceptional experience to you, every time we see you.  If you receive a survey in the mail, please send us back your thoughts. We really do value your feedback.    Suggested websites for health information:  Www.[facility]ark.[employer] : Up to date and easily searchable information on multiple topics.  Www.MyGeekDay.gov : medication info, interactive tutorials, watch real surgeries online  Www.familydoctor.org : good info from the Academy of Family Physicians  Www.cdc.gov : public health info, travel advisories, epidemics (H1N1)  Www.aap.org : children's health info, normal development, vaccinations  Www.health.[facility].[place].us : [place] dept of health, public health issues in [place], N1N1    Your care team:     Family Medicine   ZUNILDA Dominguez MD Emily Bunt, YULY YEN   S. MD Kayce Barrios MD Angela Wermerskirchen, MD         Clinic hours: Monday - Wednesday 7 am-7 pm   Thursdays and Fridays 7 am-5 pm.     [facility]: Monday - Friday 11 am-9 pm,   Saturday and Sunday 9 am-5 pm.    [facility]: Monday -Thursday 8 am-8 pm; Friday 8 am-6 pm; Saturday and Sunday 9 am-5 pm.     [facility]: Monday - Thursday 8 am - 7 pm; Friday 8 am - 6 pm    Clinic: [phone]   [facility]:  [phone]   [facility]: [phone]                  Follow-ups after your visit        Follow-up notes from your care team     Return in about 1 week (around 9/26/2018), or if symptoms worsen or fail to improve.      Your next 10 appointments already scheduled     Sep 20, 2018  2:00 PM CDT   AUDREY Spine with Angelina Guerrier, PT   [facility] ([facility]  )    [address] [facility] [id number] [phone]            Sep 24, 2018 10:45 AM CDT   Return Visit with Nishi Oro MD   [facility] ([facility])    [address] [facility] [id number] [phone]            Sep 25, 2018  1:30 PM CDT   AUDREY Spine with Shavonne Mon PTA   [facility] ([facility]  )    [address] [facility] [id number] [phone]            Sep 28, 2018 11:00 AM CDT   AUDREY Spine with Shavonne Mon, PTA   [facility] ([facility]  )    [address] [facility] [id number] [phone]            Oct 02, 2018  1:30 PM CDT   AUDREY Spine with Shavonne Mon, PTA   [facility] ([facility]  )    [address] [facility] [id number] [phone]            Oct 03, 2018 10:00 AM CDT   MEDSPINE NEW with Alonzo Mohamud DO   [facility] ([facility])    [address] [facility] [id number] [phone]            Oct 04, 2018  8:30 AM CDT   (Arrive by 8:15 AM)   Neuropsych Eval with Emperatriz Candelaria, PhD [facility]ology ([facility])    [address]  [address]rd [facility] [id number] [phone]            Oct 04, 2018 10:30 AM CDT   (Arrive by 10:15 AM)   Return Movement Disorder with Paulo Saravia MD   [facility] ([facility])     [address]  3rd Floor  [facility] [id number] [phone]            Oct 04, 2018 12:00 PM CDT   Infusion 60 with UC SPEC INFUSION, UC 50 ATC   [facility] Specialty [facility] Procedure ([facility])    [address]  [address]  [facility] [id number] [phone]              Who to contact     If you have questions or need follow up information about today's clinic visit or your schedule please contact [facility] directly at [phone].  Normal or non-critical lab and imaging results will be communicated to you by UniPayhart, letter or phone within 4 business days after the clinic has received the results. If you do not hear from us within 7 days, please contact the clinic through [employer] or phone. If you have a critical or abnormal lab result, we will notify you by phone as soon as possible.  Submit refill requests through [employer] or call your pharmacy and they will forward the refill request to us. Please allow 3 business days for your refill to be completed.          Additional Information About Your Visit        MyChart Information     [employer] gives you secure access to your electronic health record. If you see a primary care provider, you can also send messages to your care team and make appointments. If you have questions, please call your primary care clinic.  If you do not have a primary care provider, please call [phone] and they will assist you.        Care EveryWhere ID     This is your Care EveryWhere ID. This could be used by other organizations to access your [place] medical records  [id number]        Your Vitals Were     Pulse Temperature Respirations Height Pulse Oximetry BMI (Body Mass Index)    99 97.6  F (36.4  C) (Oral) 12 1.829 m (6') 97% 23.33 kg/m2       Blood Pressure from Last 3 Encounters:   09/19/18 110/72   08/23/18 120/70   08/23/18 109/73    Weight from Last 3 Encounters:   09/19/18 78 kg (172  lb)   08/16/18 79.6 kg (175 lb 6.4 oz)   07/24/18 80.3 kg (177 lb 1.6 oz)              We Performed the Following     XR Abdomen 2 Views          Today's Medication Changes          These changes are accurate as of 9/19/18  4:01 PM.  If you have any questions, ask your nurse or doctor.               These medicines have changed or have updated prescriptions.        Dose/Directions    nortriptyline 10 MG capsule   Commonly known as:  PAMELOR   This may have changed:  additional instructions   Used for:  Migraine without aura and without status migrainosus, not intractable        TAKE 3 CAPSULES AT BEDTIME   Quantity:  270 capsule   Refills:  3                Primary Care Provider Office Phone # Fax #    Evelina Morse [phone] [address]  [place] [address]        Equal Access to Services     [facility] Center[facility]NA : Hadii aad ku hadasho Sojovanny, waaxda luqadaha, qaybta kaalmada adeconner, gely heller . So [facility] [phone].    ATENCIÓN: Si habla español, tiene a langford disposición servicios gratuitos de asistencia lingüística. [facility] [phone].    We comply with applicable federal civil rights laws and [place] laws. We do not discriminate on the basis of race, color, national origin, age, disability, sex, sexual orientation, or gender identity.            Thank you!     Thank you for choosing [facility]  for your care. Our goal is always to provide you with excellent care. Hearing back from our patients is one way we can continue to improve our services. Please take a few minutes to complete the written survey that you may receive in the mail after your visit with us. Thank you!             Your Updated Medication List - Protect others around you: Learn how to safely use, store and throw away your medicines at www.disposemymeds.org.          This list is accurate as of 9/19/18  4:01 PM.  Always use your most recent med list.                    Brand Name Dispense Instructions for use Diagnosis    acetaminophen 500 MG tablet    TYLENOL     Take 2 tablets (1,000 mg) by mouth 3 times daily    Closed displaced fracture of second cervical vertebra with routine healing, unspecified fracture morphology, subsequent encounter       adapalene 0.1 % gel    DIFFERIN    135 g    Apply to Face topically every day and evening shift for Acne prevention    Acne vulgaris       amantadine 100 MG capsule    SYMMETREL    270 capsule    Take 1 capsule (100 mg) by mouth 3 times daily 6am,11am and 4pm    PSP (progressive supranuclear palsy) (H)       amLODIPine 2.5 MG tablet    NORVASC     Take 1 tablet by mouth daily        BABY ASPIRIN 81 MG chewable tablet   Generic drug:  aspirin      Take 81 mg by mouth daily        BENICAR 20 MG tablet   Generic drug:  olmesartan      Take 1 tablet (20 mg) by mouth 2 times daily    PSP (progressive supranuclear palsy) (H)       bisacodyl 10 MG Suppository    DULCOLAX    30 suppository    Place 1 suppository (10 mg) rectally every other day If no BM in 3 days.    Other constipation       CLARITIN 10 MG capsule   Generic drug:  loratadine     30 capsule    Take 10 mg by mouth daily    Chronic seasonal allergic rhinitis, unspecified trigger       hydrochlorothiazide 12.5 MG Tabs tablet     90 tablet    Take 1 tablet (12.5 mg) by mouth daily    Essential hypertension       loperamide 2 MG capsule    IMODIUM     Take 2 mg by mouth every 4 hours as needed for diarrhea        methocarbamol 500 MG tablet    ROBAXIN    120 tablet    Take 1 tablet (500 mg) by mouth 4 times daily    Closed displaced fracture of second cervical vertebra with routine healing, unspecified fracture morphology, subsequent encounter       nortriptyline 10 MG capsule    PAMELOR    270 capsule    TAKE 3 CAPSULES AT BEDTIME    Migraine without aura and without status migrainosus, not intractable       order for DME     1 each    Equipment being ordered: Nebulizer     Chronic cough       polyethylene glycol 0.4%- propylene glycol 0.3% 0.4-0.3 % Soln ophthalmic solution    SYSTANE ULTRA     Place 1 drop into both eyes 3 times daily        rivastigmine 9.5 MG/24HR 24 hr patch    EXELON    90 patch    Place 1 patch onto the skin daily    PSP (progressive supranuclear palsy) (H)       senna-docusate 8.6-50 MG per tablet    SENOKOT-S;PERICOLACE    120 tablet    Take 2 tablets by mouth 2 times daily    Chronic constipation       sodium chloride 3 % Nebu neb solution     180 mL    Take 3 mLs by nebulization every 3 hours as needed for wheezing    Chronic cough       traMADol 50 MG tablet    ULTRAM    150 tablet    Take 0.5 tablets (25 mg) by mouth every 4 hours (while awake) May also take 0.5 tablet every 6 hours as needed.    Closed displaced fracture of second cervical vertebra with routine healing, unspecified fracture morphology, subsequent encounter       Vitamin D-3 Super Strength 2000 units tablet   Generic drug:  cholecalciferol      Take 1 tablet by mouth        ZOLMitriptan 5 MG tablet    ZOMIG    30 tablet    Take 1 tablet (5 mg) by mouth at onset of headache for migraine    PSP (progressive supranuclear palsy) (H), Migraine without aura and without status migrainosus, not intractable

## 2018-09-19 NOTE — PATIENT INSTRUCTIONS
Take miralax scoopful daily with breakfast along with senna  Follow up with next week us next week if symptoms not improving   Be seen urgently if fever, vomiting, increasing pain or other change in symptoms.   At [facility], we strive to deliver an exceptional experience to you, every time we see you.  If you receive a survey in the mail, please send us back your thoughts. We really do value your feedback.    Suggested websites for health information:  Www.[place].org : Up to date and easily searchable information on multiple topics.  Www.medlineplus.gov : medication info, interactive tutorials, watch real surgeries online  Www.familydoctor.org : good info from the Academy of Family Physicians  Www.cdc.gov : public health info, travel advisories, epidemics (H1N1)  Www.aap.org : children's health info, normal development, vaccinations  Www.health.state.[place].us : [place] dept of health, public health issues in [place], N1N1    Your care team:     Family Medicine   ZUNILDA Dominguez MD Emily Bunt, YULY [facility]   S. MD Kayce Barrios MD Angela Wermerskirchen, MD         Clinic hours: Monday - Wednesday 7 am-7 pm   Thursdays and Fridays 7 am-5 pm.     [facility]: Monday - Friday 11 am-9 pm,   Saturday and Sunday 9 am-5 pm.    [facility]: Monday -Thursday 8 am-8 pm; Friday 8 am-6 pm; Saturday and Sunday 9 am-5 pm.     [facility]: Monday - Thursday 8 am - 7 pm; Friday 8 am - 6 pm    Clinic: [phone]   [facility]: [phone]   [facility]: [phone]

## 2018-09-19 NOTE — TELEPHONE ENCOUNTER
Note: this writer do not see a order for neb filter, the DME for date 8/23/18 is for a nebulizer and this was not ordered by Dr. Burch. Jonas Veliz,  [employer]    DME order from patient's medication list dated 8/23/18 printed and faxed to [employer] at fax # below.  Jonas Veliz,  [employer] [facility]

## 2018-09-19 NOTE — PROGRESS NOTES
SUBJECTIVE:   Jimmy Patrick is a 67 year old male who presents to clinic today for the following health issues:      Constipation     Onset: 1 week    Description:   Frequency of bowel movements: had one this morning previously small BM Tuesday.  Stool consistency: soft formed, large caliber    Progression of Symptoms:  same    Accompanying Signs & Symptoms:  Abdominal pain (cramping?): YES  Blood in stool: no   Rectal pain: YES  Nausea/vomiting: no   Weight loss or gain: no    History:   History of abdominal surgery: no     Precipitating factors:   Recent use of narcotics, anticholinergics, calcium channel blockers, antacids, or iron supplements: no   Chronic Laxative Use: takes miralax         Therapies Tried and outcome: suppositories, home care nurse worried about blockage        Abdominal pain since yesterday.    Taking Tramadol 25 mg four times a day.    1000 mg tylenol 3 times a day and methocarbamol four times a day  Had one bowel movement this morning.  No blood.  No black stool. This morning green and last night light brown in color.   Has taken miralax twice and two suppositories.  Usually uses shaye 2 am and 2 bedtime  Constipation is normal progression of his disease (progressive supranuclear palsy)  On Tramadol for neck fracture- [place] [id number] vertebrate  Home health care for 3 months and she was concerned about an obstruction.   No vomiting.  Has little appetite but that is his norm.  No fever, sweats, chills    History of atrial flutter five years ago and ablated and  No recurrence since   Problem list and histories reviewed & adjusted, as indicated.  Additional history: as documented    Patient Active Problem List   Diagnosis     Transient paralysis of limb     Migraine without aura     Speech disturbance     PSP (progressive supranuclear palsy) (H)     Fluttering heart     Leg swelling     Varicose veins     Headache     Memory loss     Urinary urgency     Blurred vision     Double vision      Tinnitus     Sinus disorder     Epistaxis     DISK (aka Displacement of intervertebral disc, site unspecified, without myelopathy)     Impairment of balance     Signs and symptoms involving cognition     Fatigue     Migraine without status migrainosus, not intractable     Palpitations     Parkinsonism (H)     Progressive supranuclear ophthalmoplegia (H)     Speech dysfunction     Cellular blue nevus     S/P ablation of atrial flutter     Migraine without aura and without status migrainosus, not intractable     Parkinsonism, unspecified Parkinsonism type (H)     Essential hypertension     CARDIOVASCULAR SCREENING; LDL GOAL LESS THAN 160     Cognitive complaints     ACP (advance care planning)     Closed fracture of second cervical vertebra (H)     Fall     Acute pain due to trauma     Acute pain due to injury     Closed displaced fracture of second cervical vertebra with routine healing, unspecified fracture morphology, subsequent encounter     Other constipation     At high risk for aspiration     Closed nondisplaced fracture of second cervical vertebra, unspecified fracture morphology, initial encounter (H)     Acute left-sided low back pain without sciatica     Past Surgical History:   Procedure Laterality Date     BIOPSY      Dermatologist remoed skin mole (not cancerus)     CARDIAC SURGERY   ablation    Successful in eliminatng heart flutter     COLONOSCOPY  ?     H ABLATION ATRIAL FLUTTER         Social History   Substance Use Topics     Smoking status: Never Smoker     Smokeless tobacco: Never Used     Alcohol use No     Family History   Problem Relation Age of Onset     Cancer Father      Other Cancer Father      throat cancer,       Hypertension Mother      Osteoporosis Mother      Hypertension Sister      Osteoporosis Sister      Osteoporosis Sister          Current Outpatient Prescriptions   Medication Sig Dispense Refill     acetaminophen (TYLENOL) 500 MG tablet Take 2 tablets  (1,000 mg) by mouth 3 times daily       adapalene (DIFFERIN) 0.1 % gel Apply to Face topically every day and evening shift for Acne prevention 135 g 3     amantadine (SYMMETREL) 100 MG capsule Take 1 capsule (100 mg) by mouth 3 times daily 6am,11am and 4pm 270 capsule 3     amLODIPine (NORVASC) 2.5 MG tablet Take 1 tablet by mouth daily       aspirin (BABY ASPIRIN) 81 MG chewable tablet Take 81 mg by mouth daily        bisacodyl (DULCOLAX) 10 MG Suppository Place 1 suppository (10 mg) rectally every other day If no BM in 3 days. 30 suppository 11     cholecalciferol (VITAMIN D-3 SUPER STRENGTH) 2000 UNITS tablet Take 1 tablet by mouth        hydrochlorothiazide 12.5 MG TABS tablet Take 1 tablet (12.5 mg) by mouth daily 90 tablet 3     loperamide (IMODIUM) 2 MG capsule Take 2 mg by mouth every 4 hours as needed for diarrhea       loratadine (CLARITIN) 10 MG capsule Take 10 mg by mouth daily 30 capsule      methocarbamol (ROBAXIN) 500 MG tablet Take 1 tablet (500 mg) by mouth 4 times daily 120 tablet 5     nortriptyline (PAMELOR) 10 MG capsule TAKE 3 CAPSULES AT BEDTIME (Patient taking differently: TAKE 3 CAPSULES AT BEDTIME) 270 capsule 3     olmesartan (BENICAR) 20 MG tablet Take 1 tablet (20 mg) by mouth 2 times daily       order for DME Equipment being ordered: Nebulizer 1 each 0     polyethylene glycol 0.4%- propylene glycol 0.3% (SYSTANE ULTRA) 0.4-0.3 % SOLN ophthalmic solution Place 1 drop into both eyes 3 times daily       rivastigmine (EXELON) 9.5 MG/24HR 24 hr patch Place 1 patch onto the skin daily 90 patch 3     senna-docusate (SENOKOT-S;PERICOLACE) 8.6-50 MG per tablet Take 2 tablets by mouth 2 times daily 120 tablet 0     sodium chloride 3 % NEBU neb solution Take 3 mLs by nebulization every 3 hours as needed for wheezing 180 mL 3     traMADol (ULTRAM) 50 MG tablet Take 0.5 tablets (25 mg) by mouth every 4 hours (while awake) May also take 0.5 tablet every 6 hours as needed. 150 tablet 0      ZOLMitriptan (ZOMIG) 5 MG tablet Take 1 tablet (5 mg) by mouth at onset of headache for migraine 30 tablet 5       Reviewed and updated as needed this visit by clinical staff  Tobacco  Allergies  Meds  Med Hx  Surg Hx  Fam Hx  Soc Hx      Reviewed and updated as needed this visit by Provider  Tobacco  Allergies  Meds  Problems  Med Hx  Surg Hx  Fam Hx  Soc Hx          ROS:  Constitutional, HEENT, cardiovascular, pulmonary, gi and gu systems are negative, except as otherwise noted.    OBJECTIVE:     /72 (BP Location: Right arm, Patient Position: Chair, Cuff Size: Adult Large)  Pulse 99  Temp 97.6  F (36.4  C) (Oral)  Resp 12  Ht 1.829 m (6')  Wt 78 kg (172 lb)  SpO2 97%  BMI 23.33 kg/m2  Body mass index is 23.33 kg/(m^2).  GENERAL: alert, no distress, appears older than stated age and nonverbal - wife provides history   NECK: no adenopathy, no asymmetry, masses, or scars and thyroid normal to palpation  RESP: lungs clear to auscultation - no rales, rhonchi or wheezes  CV: regular rate and rhythm, normal S1 S2, no S3 or S4, no murmur, click or rub, no peripheral edema and peripheral pulses strong  ABDOMEN: tenderness diffusely and bowel sounds normal  RECTAL (male): normal sphincter tone, no rectal masses, prostate normal size, smooth, nontender without nodules or masses.  No stool in rectum  MS: in wheelchair- requires two of us to transfer- at home ambulatory with walker     Diagnostic Test Results:  Results for orders placed or performed in visit on 09/19/18 (from the past 24 hour(s))   XR Abdomen 2 Views    Narrative    XR ABDOMEN 2 VW 9/19/2018 3:45 PM    HISTORY: Abdominal pain. Constipation.    COMPARISON: None.    FINDINGS: No evidence of free intraperitoneal air. The bowel gas  pattern is nonobstructive. Moderate stool in the colon.      Impression    IMPRESSION: Moderate stool volume without specific evidence of  constipation.    DANICA QUINTANA MD       ASSESSMENT/PLAN:              1. Other constipation  Moderate stool in the colon.  ? If disease progressing or related to narcotic.  Advised to use miralax daily and follow up with us in one week if symptoms not improving.  Warning signs and symptoms warranting urgent evaluation reviewed.   - XR Abdomen 2 Views    2. Abdominal pain, generalized    - XR Abdomen 2 Views    3. PSP (progressive supranuclear palsy) (H)  Patient is not very verbal and requires assistance of two to get on exam table.        Patient Instructions     Take miralax scoopful daily with breakfast along with senna  Follow up with next week us next week if symptoms not improving   Be seen urgently if fever, vomiting, increasing pain or other change in symptoms.   At [facility], we strive to deliver an exceptional experience to you, every time we see you.  If you receive a survey in the mail, please send us back your thoughts. We really do value your feedback.    Suggested websites for health information:  Www.[facility].org : Up to date and easily searchable information on multiple topics.  Www.medlineplus.gov : medication info, interactive tutorials, watch real surgeries online  Www.familydoctor.org : good info from the Academy of Family Physicians  Www.cdc.gov : public health info, travel advisories, epidemics (H1N1)  Www.aap.org : children's health info, normal development, vaccinations  Www.health.[facility].[place].us : [place] dept of health, public health issues in [place], N1N1    Your care team:     Family Medicine   ZUNILDA Dominguez MD Emily Bunt, APRN CNP   S. MD Kayce Barrios MD Angela Wermerskirchen, MD         Clinic hours: Monday - Wednesday 7 am-7 pm   Thursdays and Fridays 7 am-5 pm.     [facility]: Monday - Friday 11 am-9 pm,   Saturday and Sunday 9 am-5 pm.    [facility]: Monday -Thursday 8 am-8 pm; Friday 8 am-6 pm; Saturday and Sunday 9 am-5 pm.     [facility] [place]  [facility]: Monday - Thursday 8 am - 7 pm; Friday 8 am - 6 pm    Clinic: [phone]   [facility]: [phone]   [facility]: [phone]           CC chart to PCP for kristin Quinones PA-C  [facility]

## 2018-09-21 NOTE — TELEPHONE ENCOUNTER
Call made to [facility] to discuss Hospice placement, at this point they want to meet some Hospice companies for informational visits. Hospice contacts sent to [facility] - they will attend visit with Dr Oro on Monday

## 2018-09-24 NOTE — NURSING NOTE
Oncology Rooming Note    September 24, 2018 10:52 AM   Jimmy Patrick is a 67 year old male who presents for:    Chief Complaint   Patient presents with     Palliative     follow up     Initial Vitals: /78  Pulse 95  Resp 18  Wt 78 kg (172 lb)  SpO2 97%  BMI 23.33 kg/m2 Estimated body mass index is 23.33 kg/(m^2) as calculated from the following:    Height as of 9/19/18: 1.829 m (6').    Weight as of this encounter: 78 kg (172 lb). Body surface area is 1.99 meters squared.  Moderate Pain (5) Comment: tramodol, tylenol and robaxin   No LMP for male patient.  Allergies reviewed: Yes  Medications reviewed: Yes    Medications: Medication refills not needed today.  Pharmacy name entered into [facility]:    [id number] - [place], [place] - [address] [address] [facility] [address]  [facility] - [place], [place] - [facility] [facility]       5 minutes for nursing intake (face to face time)     Andreina Pulliam LPN

## 2018-09-24 NOTE — MR AVS SNAPSHOT
After Visit Summary   9/24/2018    Jimmy Patrick    MRN: [id number]           Patient Information     Date Of Birth          1950        Visit Information        Provider Department      9/24/2018 10:45 AM Nishi Oro MD [facility]        Today's Diagnoses     Progressive supranuclear palsy (H)    -  1    Closed displaced fracture of second cervical vertebra with routine healing, unspecified fracture morphology, subsequent encounter          Care Instructions    Patient Instructions      Expand All Collapse All    Thank you for coming into the Palliative Care Clinic today.     1. Take tramadol 25mg up to four times a day as needed  - call us if you can't reduce it far enough to qualify for the trial      2. I referred you to [facility]. You will hear from them within 2 days. Please call [phone] if you haven't    Return to clinic in 1-2 months for a follow up.     You can reach the Palliative Care Team during business hours at the following number:    - [phone]    To reach the Palliative Care Provider on-call After-hours or on holidays and weekends, call: [phone].  Please note that we are not able to provide pain medication refills on evenings or weekends.                     Follow-ups after your visit        Additional Services     HOSPICE REFERRAL       **Order classes of: [facility], [facility] and [facility] will route to the Home Care and Hospice Referral Pool.  Home Care or Hospice will then contact the patient to schedule their appointment.**    Hospice eligibility overview: prognosis of 6 months or less, end stage disease, patient goals are comfort only, patient is not seeking curative treatment.    If you do not hear from Hospice, or you would like to call to schedule, please call the referring place of service that your provider has listed below.  ______________________________________________________________________    Your  provider has referred you to: [facility]: [facility] [phone]   [email or web address]    Anticipated discharge date 9/24/2018 . Homecare to begin within 48 hours of discharge.  PLEASE Schedule Hospice consult for 24 - 48 hours.  Please call if there is need for a variance to this timeline.    REASON FOR REFERRAL: Hospice Diagnosis: progressive supranuclear palsy    ADDITIONAL SERVICES NEEDED: per team    OTHER PERTINENT INFORMATION: Patient was last seen by provider on 9/24/2018  for palliative care clinic.  Factors that indicate prognosis of less than 6 months:  Weight loss: due to dysphagia  Functional decline: due to PSP, worsened by recent falls with spinal fractures  Other significant changes in last 6 months: loosing voice/ability to communicate    Current Outpatient Prescriptions:  acetaminophen (TYLENOL) 500 MG tablet, Take 2 tablets (1,000 mg) by mouth 3 times daily, Disp: , Rfl:   adapalene (DIFFERIN) 0.1 % gel, Apply to Face topically every day and evening shift for Acne prevention, Disp: 135 g, Rfl: 3  amantadine (SYMMETREL) 100 MG capsule, Take 1 capsule (100 mg) by mouth 3 times daily 6am,11am and 4pm, Disp: 270 capsule, Rfl: 3  amLODIPine (NORVASC) 2.5 MG tablet, Take 1 tablet by mouth daily, Disp: , Rfl:   aspirin (BABY ASPIRIN) 81 MG chewable tablet, Take 81 mg by mouth daily , Disp: , Rfl:   bisacodyl (DULCOLAX) 10 MG Suppository, Place 1 suppository (10 mg) rectally every other day If no BM in 3 days., Disp: 30 suppository, Rfl: 11  cholecalciferol (VITAMIN D-3 SUPER STRENGTH) 2000 UNITS tablet, Take 1 tablet by mouth , Disp: , Rfl:   hydrochlorothiazide 12.5 MG TABS tablet, Take 1 tablet (12.5 mg) by mouth daily, Disp: 90 tablet, Rfl: 3  loperamide (IMODIUM) 2 MG capsule, Take 2 mg by mouth every 4 hours as needed for diarrhea, Disp: , Rfl:   loratadine (CLARITIN) 10 MG capsule, Take 10 mg by mouth daily, Disp: 30 capsule, Rfl:    methocarbamol (ROBAXIN) 500 MG tablet, Take 1 tablet (500 mg) by mouth 4 times daily, Disp: 120 tablet, Rfl: 5  nortriptyline (PAMELOR) 10 MG capsule, TAKE 3 CAPSULES AT BEDTIME (Patient taking differently: TAKE 3 CAPSULES AT BEDTIME), Disp: 270 capsule, Rfl: 3  olmesartan (BENICAR) 20 MG tablet, Take 1 tablet (20 mg) by mouth 2 times daily, Disp: , Rfl:   order for DME, Equipment being ordered: Nebulizer, Disp: 1 each, Rfl: 0  polyethylene glycol (MIRALAX/GLYCOLAX) powder, Take 1 capful by mouth daily, Disp: , Rfl:   polyethylene glycol 0.4%- propylene glycol 0.3% (SYSTANE ULTRA) 0.4-0.3 % SOLN ophthalmic solution, Place 1 drop into both eyes 3 times daily, Disp: , Rfl:   rivastigmine (EXELON) 9.5 MG/24HR 24 hr patch, Place 1 patch onto the skin daily, Disp: 90 patch, Rfl: 3  senna-docusate (SENOKOT-S;PERICOLACE) 8.6-50 MG per tablet, Take 2 tablets by mouth 2 times daily, Disp: 120 tablet, Rfl: 0  sodium chloride 3 % NEBU neb solution, Take 3 mLs by nebulization every 3 hours as needed for wheezing, Disp: 180 mL, Rfl: 3  traMADol (ULTRAM) 50 MG tablet, Take 0.5 tablets (25 mg) by mouth every 4 hours (while awake) May also take 0.5 tablet every 6 hours as needed., Disp: 120 tablet, Rfl: 0  ZOLMitriptan (ZOMIG) 5 MG tablet, Take 1 tablet (5 mg) by mouth at onset of headache for migraine, Disp: 30 tablet, Rfl: 5      Patient Active Problem List:     Transient paralysis of limb     Migraine without aura     Speech disturbance     PSP (progressive supranuclear palsy) (H)     Fluttering heart     Leg swelling     Varicose veins     Headache     Memory loss     Urinary urgency     Blurred vision     Double vision     Tinnitus     Sinus disorder     Epistaxis     DISK (aka Displacement of intervertebral disc, site unspecified, without myelopathy)     Impairment of balance     Signs and symptoms involving cognition     Fatigue     Migraine without status migrainosus, not intractable     Palpitations     Parkinsonism  (H)     Progressive supranuclear ophthalmoplegia (H)     Speech dysfunction     Cellular blue nevus     S/P ablation of atrial flutter     Migraine without aura and without status migrainosus, not intractable     Parkinsonism, unspecified Parkinsonism type (H)     Essential hypertension     CARDIOVASCULAR SCREENING; LDL GOAL LESS THAN 160     Cognitive complaints     ACP (advance care planning)     Closed fracture of second cervical vertebra (H)     Fall     Acute pain due to trauma     Acute pain due to injury     Closed displaced fracture of second cervical vertebra with routine healing, unspecified fracture morphology, subsequent encounter     Other constipation     At high risk for aspiration     Closed nondisplaced fracture of second cervical vertebra, unspecified fracture morphology, initial encounter (H)     Acute left-sided low back pain without sciatica    This patient is under my care, and I have initiated the establishment of the plan of care. This patient will be followed by a physician who will periodically review the plan of care.    Physician/Provider to provide follow up care: Evelina oMrse certified Physician at time of discharge: Nishi Oro     Please be aware that coverage of these services is subject to the terms and limitations of your health insurance plan.  Call member services at your health plan with any benefit or coverage questions.                  Your next 10 appointments already scheduled     Sep 28, 2018 11:00 AM CDT   AUDREY Spine with Shavonne Mon PTA   [facility] Physical Therapy ([facility]  )    [address]  [facility] [address]   [phone]            Oct 02, 2018  1:30 PM CDT   AUDREY Spine with Shavonne Mon PTA   [facility] Physical Therapy ([facility]  )    [address]  [facility] [address]   [phone]            Oct 03, 2018 10:00 AM CDT   MEDSPINE NEW with Alonzo Mohamud DO    [facility] ([facility])    [address]  [facility] [address]   [phone]            Oct 04, 2018  8:30 AM CDT   (Arrive by 8:15 AM)   Neuropsych Eval with Emperatriz Candelaria, PhD LP   [facility] Neuropsychology ([facility])    [address]  3rd Floor  [facility] [address] [phone]            Oct 04, 2018 10:30 AM CDT   (Arrive by 10:15 AM)   Return Movement Disorder with Paulo Saravia MD   [facility] Neurology ([facility])    [address]  3rd Floor  [facility] [id number] [phone]            Oct 04, 2018 12:00 PM CDT   Infusion 60 with UC SPEC INFUSION, UC 50 ATC   [facility] ([facility])    [address]  [address]  [facility] [id number] [phone]            Oct 05, 2018  1:30 PM CDT   AUDREY Spine with Shavonne Mon, PTA   [facility] Physical Therapy ([facility]  )    [address]  [facility] [address] [phone]            Oct 08, 2018  2:00 PM CDT   AUDREY Spine with Angelina Guerrier, PT   [facility] Physical Therapy ([facility]  )    [address]  [facility] [address] [phone]            Oct 12, 2018  2:00 PM CDT   AUDREY Spine with Angelina Guerrier, PT   [facility] Physical Therapy ([facility]  )    [address] [facility] [address] [phone]              Who to contact     If you have questions or need follow up information about today's clinic visit or your schedule please contact [facility] directly at [phone].  Normal or non-critical lab and imaging results will be communicated to you by MyChart, letter or phone within 4 business days after the clinic has received the results. If you do not hear from us within 7 days, please contact the  clinic through [employer] or phone. If you have a critical or abnormal lab result, we will notify you by phone as soon as possible.  Submit refill requests through [employer] or call your pharmacy and they will forward the refill request to us. Please allow 3 business days for your refill to be completed.          Additional Information About Your Visit        VideoLenshart Information     DataPad gives you secure access to your electronic health record. If you see a primary care provider, you can also send messages to your care team and make appointments. If you have questions, please call your primary care clinic.  If you do not have a primary care provider, please call [phone] and they will assist you.      DataPad is an electronic gateway that provides easy, online access to your medical records. With DataPad, you can request a clinic appointment, read your test results, renew a prescription or communicate with your care team.     To access your existing account, please contact your [facility] or call [phone] for assistance.        Care EveryWhere ID     This is your Care EveryWhere ID. This could be used by other organizations to access your [place] medical records  [id number]        Your Vitals Were     Pulse Respirations Pulse Oximetry BMI (Body Mass Index)          95 18 97% 23.33 kg/m2         Blood Pressure from Last 3 Encounters:   09/24/18 117/78   09/19/18 110/72   08/23/18 120/70    Weight from Last 3 Encounters:   09/24/18 78 kg (172 lb)   09/19/18 78 kg (172 lb)   08/16/18 79.6 kg (175 lb 6.4 oz)              We Performed the Following     HOSPICE REFERRAL          Today's Medication Changes          These changes are accurate as of 9/24/18 11:59 PM.  If you have any questions, ask your nurse or doctor.               These medicines have changed or have updated prescriptions.        Dose/Directions    nortriptyline 10 MG capsule   Commonly known as:  PAMELOR   This may  have changed:  additional instructions   Used for:  Migraine without aura and without status migrainosus, not intractable        TAKE 3 CAPSULES AT BEDTIME   Quantity:  270 capsule   Refills:  3            Where to get your medicines      Some of these will need a paper prescription and others can be bought over the counter.  Ask your nurse if you have questions.     Bring a paper prescription for each of these medications     traMADol 50 MG tablet               Information about OPIOIDS     PRESCRIPTION OPIOIDS: WHAT YOU NEED TO KNOW   We gave you an opioid (narcotic) pain medicine. It is important to manage your pain, but opioids are not always the best choice. You should first try all the other options your care team gave you. Take this medicine for as short a time (and as few doses) as possible.    Some activities can increase your pain, such as bandage changes or therapy sessions. It may help to take your pain medicine 30 to 60 minutes before these activities. Reduce your stress by getting enough sleep, working on hobbies you enjoy and practicing relaxation or meditation. Talk to your care team about ways to manage your pain beyond prescription opioids.    These medicines have risks:    DO NOT drive when on new or higher doses of pain medicine. These medicines can affect your alertness and reaction times, and you could be arrested for driving under the influence (DUI). If you need to use opioids long-term, talk to your care team about driving.    DO NOT operate heavy machinery    DO NOT do any other dangerous activities while taking these medicines.    DO NOT drink any alcohol while taking these medicines.     If the opioid prescribed includes acetaminophen, DO NOT take with any other medicines that contain acetaminophen. Read all labels carefully. Look for the word  acetaminophen  or  Tylenol.  Ask your pharmacist if you have questions or are unsure.    You can get addicted to pain medicines, especially if you  have a history of addiction (chemical, alcohol or substance dependence). Talk to your care team about ways to reduce this risk.    All opioids tend to cause constipation. Drink plenty of water and eat foods that have a lot of fiber, such as fruits, vegetables, prune juice, apple juice and high-fiber cereal. Take a laxative (Miralax, milk of magnesia, Colace, Senna) if you don t move your bowels at least every other day. Other side effects include upset stomach, sleepiness, dizziness, throwing up, tolerance (needing more of the medicine to have the same effect), physical dependence and slowed breathing.    Store your pills in a secure place, locked if possible. We will not replace any lost or stolen medicine. If you don t finish your medicine, please throw away (dispose) as directed by your pharmacist. The [place] [employer]ution [employer] has more information about safe disposal: [email or web address]         Primary Care Provider Office Phone # Fax #    Evelina Morse [phone] [address] [facility] [address]        Equal Access to Services     MATEO [facility] HospitalMENA : Hadii robin harper hadasho Sojovanny, waaxda luqadaha, qaybta kaalmada adenilsayada, gely heller . So [facility] [phone].    ATENCIÓN: Si habla español, tiene a langford disposición servicios gratuitos de asistencia lingüística. Llame al [phone].    We comply with applicable federal civil rights laws and [place] laws. We do not discriminate on the basis of race, color, national origin, age, disability, sex, sexual orientation, or gender identity.            Thank you!     Thank you for choosing [facility]  for your care. Our goal is always to provide you with excellent care. Hearing back from our patients is one way we can continue to improve our services. Please take a few minutes to complete the written survey that you may receive in the  mail after your visit with us. Thank you!             Your Updated Medication List - Protect others around you: Learn how to safely use, store and throw away your medicines at www.disposemymeds.org.          This list is accurate as of 9/24/18 11:59 PM.  Always use your most recent med list.                   Brand Name Dispense Instructions for use Diagnosis    acetaminophen 500 MG tablet    TYLENOL     Take 2 tablets (1,000 mg) by mouth 3 times daily    Closed displaced fracture of second cervical vertebra with routine healing, unspecified fracture morphology, subsequent encounter       adapalene 0.1 % gel    DIFFERIN    135 g    Apply to Face topically every day and evening shift for Acne prevention    Acne vulgaris       amantadine 100 MG capsule    SYMMETREL    270 capsule    Take 1 capsule (100 mg) by mouth 3 times daily 6am,11am and 4pm    PSP (progressive supranuclear palsy) (H)       amLODIPine 2.5 MG tablet    NORVASC     Take 1 tablet by mouth daily        BABY ASPIRIN 81 MG chewable tablet   Generic drug:  aspirin      Take 81 mg by mouth daily        BENICAR 20 MG tablet   Generic drug:  olmesartan      Take 1 tablet (20 mg) by mouth 2 times daily    PSP (progressive supranuclear palsy) (H)       bisacodyl 10 MG Suppository    DULCOLAX    30 suppository    Place 1 suppository (10 mg) rectally every other day If no BM in 3 days.    Other constipation       CLARITIN 10 MG capsule   Generic drug:  loratadine     30 capsule    Take 10 mg by mouth daily    Chronic seasonal allergic rhinitis, unspecified trigger       hydrochlorothiazide 12.5 MG Tabs tablet     90 tablet    Take 1 tablet (12.5 mg) by mouth daily    Essential hypertension       loperamide 2 MG capsule    IMODIUM     Take 2 mg by mouth every 4 hours as needed for diarrhea        methocarbamol 500 MG tablet    ROBAXIN    120 tablet    Take 1 tablet (500 mg) by mouth 4 times daily    Closed displaced fracture of second cervical vertebra with  routine healing, unspecified fracture morphology, subsequent encounter       nortriptyline 10 MG capsule    PAMELOR    270 capsule    TAKE 3 CAPSULES AT BEDTIME    Migraine without aura and without status migrainosus, not intractable       order for DME     1 each    Equipment being ordered: Nebulizer    Chronic cough       polyethylene glycol 0.4%- propylene glycol 0.3% 0.4-0.3 % Soln ophthalmic solution    SYSTANE ULTRA     Place 1 drop into both eyes 3 times daily        polyethylene glycol powder    MIRALAX/GLYCOLAX     Take 1 capful by mouth daily        rivastigmine 9.5 MG/24HR 24 hr patch    EXELON    90 patch    Place 1 patch onto the skin daily    PSP (progressive supranuclear palsy) (H)       senna-docusate 8.6-50 MG per tablet    SENOKOT-S;PERICOLACE    120 tablet    Take 2 tablets by mouth 2 times daily    Chronic constipation       sodium chloride 3 % Nebu neb solution     180 mL    Take 3 mLs by nebulization every 3 hours as needed for wheezing    Chronic cough       traMADol 50 MG tablet    ULTRAM    120 tablet    Take 0.5 tablets (25 mg) by mouth every 4 hours (while awake) May also take 0.5 tablet every 6 hours as needed.    Closed displaced fracture of second cervical vertebra with routine healing, unspecified fracture morphology, subsequent encounter       Vitamin D-3 Super Strength 2000 units tablet   Generic drug:  cholecalciferol      Take 1 tablet by mouth        ZOLMitriptan 5 MG tablet    ZOMIG    30 tablet    Take 1 tablet (5 mg) by mouth at onset of headache for migraine    PSP (progressive supranuclear palsy) (H), Migraine without aura and without status migrainosus, not intractable

## 2018-09-24 NOTE — PROGRESS NOTES
"Palliative Care Outpatient Clinic Progress Note    This note was written using voice recognition. I did proof read, but might have missed some things. Please contact me for major errors.    Patient Name:  Jimmy Patrick  Primary Provider:  Evelina Morse    Chief Complaint: ongoing decline    Interim History:  Jimmy Patrick 67 year old male with PSP returns to be seen by palliative care today.      Patient is here with his wife Kriss    Medical History/Summary:  - progressive supranuclear palsy diagnosed in 2014  - migraine headaches   - AFib, HTN                 - fall with C2 fracture May 2018, managed conservatively  - urinary incontinence since June 2018  - recent ear infection       Still has pain in his neck associated with the fractures from his fall. Lately his chronic back pain has acted up again. He started PT, which seems helpful. Does the exercises at home. Takes tramadol 25mg qid scheduled, very rarely needs prn medications. Pain is at 1-5/10. Also takes acetaminophen 1000 tid and methocarbamol qid. He would like to wean off tramadol in order to be able to re-enroll in the trial at the [facility] for a PSP drug. Since this involves detailed cognitive testing one requirement is not to be on opioids including tramadol.     Coping:  Kriss and Raymond are coping remarkably well. Both are taken aback by the speed of progression of his illness. They are actively engaged in planning ahead.   Both regret that there is not much time in their days for anything other than \"getting through the day\".     Social History:  Pertinent changes to social history/social situation since last visit: none    Key support resources: Kriss  Advance Directive Status:  Discussed at some length today. Pt completed HCD and provided a copy to be scanned into our system. He has also completed the form for brain donation, which we got a copy of as well.     We completed a POLST today: DNR, selective treatment.     Goals of care: " Generally his focus is on comfort and being at home. He struggled through his admission and rehab stay after the recent fall. However, he is not entirely certain whether he'd want to be admitted for IV antibiotics for aspiration pna or UTI, but leans towards staying at home and focusing on comfort.   We discuss his code status at length. Initially he had considered Full Code, but since he is very certain that he would not want to be intubated and wants to avoid dying in a hospital, he agrees to my recommendation to choose DNR.  Pt is hoping to return to participate in a study for a PSP drug at the [facility] with the objective to contribute to research rather than expectation it will affect his own course. Study is fully funded by pharm company. I have informally discussed this with a hospice medical director and this should not pose a barrier.   Kriss states that they would like to meet with either [place] or [facility].    Social History   Substance Use Topics     Smoking status: Never Smoker     Smokeless tobacco: Never Used     Alcohol use No     Impression & Recommendations & Counseliny/o male with PSP, progressive decline in functional, nutritional and communication status    Pain: this was mainly due to his fractures after a fall earlier this summer. The pain has much improved now. He has some recurrence of chronic back pain, likely since he hasn't been able to keep up with his exercises.   - will change tramadol 25mg from scheduled to prn  - recommended they call if he is not able to wean off without residual pain that interferes with his quality of life   - would consider tylenol at that time    Treatment plan, goals:   - changed code status to DNR/DNI  - referred to [facility] for an informational visit. Pt strongly considering enrollment, but is uncertain whether he does want to forgo the option of future admissions for e.g. Aspiration/UTI.       Return to clinic in 1-2 months    Data /  Chart Review:    Review of Systems:   ROS: 10 point ROS neg other than the symptoms noted above in the HPI          Physical Exam:   Vitals were reviewed  /78  Pulse 95  Resp 18  Wt 78 kg (172 lb)  SpO2 97%  BMI 23.33 kg/m2    CONSTIT: awake, appears comfortable  EENT: MMM, EOMI, no icterus  RESP: reg, nl effort  MSK: poor tone, generalized significant weakness, in wheelchair  SKIN:  warm, no rash, no obvious lesions  NEURO: alert, oriented x3, speech markedly slowed and garbeled    Allergies   Allergen Reactions     Other [Seasonal Allergies]      environmental     Codeine Other (See Comments) and Rash     GI upset  GI upset  GI upset     Current pertinent medications:  acetaminophen 1000mg tid  Tramadol 25mg q4h and q6h prn  methocarbamol 500mg qid      Nortriptyline 30mg HS    Past Medical History:   Diagnosis Date     Blurred vision 8/11/2014     Cancer (H) 1979; 1960's    Florin: father; grandparents (on father's side)     Depressive disorder April, 2014    When received diagnosis of PSP     DISK (aka Displacement of intervertebral disc, site unspecified, without myelopathy) 8/11/2014     Displacement of cervical intervertebral disc without myelopathy (HERNIATED DISK) 8/11/2014     Double vision 8/11/2014     Epistaxis 8/11/2014     Fluttering heart 8/11/2014     Headache 8/11/2014     Hypertension February, 2013     Leg swelling 8/11/2014     Memory loss 8/11/2014     Migraines 2007    Ramila: karen     PSP (progressive supranuclear palsy) (H) 8/11/2014     Sinus disorder 8/11/2014     Tinnitus 8/11/2014     Urinary urgency 8/11/2014     Varicose veins 8/11/2014     Past Surgical History:   Procedure Laterality Date     BIOPSY  2016    Dermatologist remoed skin mole (not cancerus)     CARDIAC SURGERY  2013 ablation    Successful in eliminatng heart flutter     COLONOSCOPY  2005?     H ABLATION ATRIAL FLUTTER           Key Data Reviewed:  LABS:   GFR 76    : ok    Nishi Oro MD  Palliative  Medicine  Pager [phone]

## 2018-09-24 NOTE — PATIENT INSTRUCTIONS
Patient Instructions      Expand All Collapse All    Thank you for coming into the Palliative Care Clinic today.     1. Take tramadol 25mg up to four times a day as needed  - call us if you can't reduce it far enough to qualify for the trial      2. I referred you to [facility]. You will hear from them within 2 days. Please call [phone] if you haven't    Return to clinic in 1-2 months for a follow up.     You can reach the Palliative Care Team during business hours at the following number:    - [phone]    To reach the Palliative Care Provider on-call After-hours or on holidays and weekends, call: [phone].  Please note that we are not able to provide pain medication refills on evenings or weekends.

## 2018-09-24 NOTE — LETTER
"    9/24/2018         RE: Jimmy Patrick  [address] [address]        Dear Colleague,    Thank you for referring your patient, Jimmy Patrick, to the [facility]. Please see a copy of my visit note below.    Palliative Care Outpatient Clinic Progress Note    This note was written using voice recognition. I did proof read, but might have missed some things. Please contact me for major errors.    Patient Name:  Jimmy Patrick  Primary Provider:  Evelina Morse    Chief Complaint: ongoing decline    Interim History:  Jimmy Patrick 67 year old male with PSP returns to be seen by palliative care today.      Patient is here with his wife Kriss    Medical History/Summary:  - progressive supranuclear palsy diagnosed in 2014  - migraine headaches   - AFib, HTN                 - fall with C2 fracture May 2018, managed conservatively  - urinary incontinence since June 2018  - recent ear infection       Still has pain in his neck associated with the fractures from his fall. Lately his chronic back pain has acted up again. He started PT, which seems helpful. Does the exercises at home. Takes tramadol 25mg qid scheduled, very rarely needs prn medications. Pain is at 1-5/10. Also takes acetaminophen 1000 tid and methocarbamol qid. He would like to wean off tramadol in order to be able to re-enroll in the trial at the [facility] for a PSP drug. Since this involves detailed cognitive testing one requirement is not to be on opioids including tramadol.     Coping:  Kriss and Raymond are coping remarkably well. Both are taken aback by the speed of progression of his illness. They are actively engaged in planning ahead.   Both regret that there is not much time in their days for anything other than \"getting through the day\".     Social History:  Pertinent changes to social history/social situation since last visit: none    Key support resources: Kriss  Advance Directive Status:  " Discussed at some length today. Pt completed HCD and provided a copy to be scanned into our system. He has also completed the form for brain donation, which we got a copy of as well.     We completed a POLST today: DNR, selective treatment.     Goals of care: Generally his focus is on comfort and being at home. He struggled through his admission and rehab stay after the recent fall. However, he is not entirely certain whether he'd want to be admitted for IV antibiotics for aspiration pna or UTI, but leans towards staying at home and focusing on comfort.   We discuss his code status at length. Initially he had considered Full Code, but since he is very certain that he would not want to be intubated and wants to avoid dying in a hospital, he agrees to my recommendation to choose DNR.  Pt is hoping to return to participate in a study for a PSP drug at the [facility] with the objective to contribute to research rather than expectation it will affect his own course. Study is fully funded by pharm company. I have informally discussed this with a hospice medical director and this should not pose a barrier.   Kriss states that they would like to meet with either [place] or [facility].    Social History   Substance Use Topics     Smoking status: Never Smoker     Smokeless tobacco: Never Used     Alcohol use No     Impression & Recommendations & Counseliny/o male with PSP, progressive decline in functional, nutritional and communication status    Pain: this was mainly due to his fractures after a fall earlier this summer. The pain has much improved now. He has some recurrence of chronic back pain, likely since he hasn't been able to keep up with his exercises.   - will change tramadol 25mg from scheduled to prn  - recommended they call if he is not able to wean off without residual pain that interferes with his quality of life   - would consider tylenol at that time    Treatment plan, goals:   - changed code status  to DNR/DNI  - referred to [facility] for an informational visit. Pt strongly considering enrollment, but is uncertain whether he does want to forgo the option of future admissions for e.g. Aspiration/UTI.       Return to clinic in 1-2 months    Data / Chart Review:    Review of Systems:   ROS: 10 point ROS neg other than the symptoms noted above in the HPI          Physical Exam:   Vitals were reviewed  /78  Pulse 95  Resp 18  Wt 78 kg (172 lb)  SpO2 97%  BMI 23.33 kg/m2    CONSTIT: awake, appears comfortable  EENT: MMM, EOMI, no icterus  RESP: reg, nl effort  MSK: poor tone, generalized significant weakness, in wheelchair  SKIN:  warm, no rash, no obvious lesions  NEURO: alert, oriented x3, speech markedly slowed and garbeled    Allergies   Allergen Reactions     Other [Seasonal Allergies]      environmental     Codeine Other (See Comments) and Rash     GI upset  GI upset  GI upset     Current pertinent medications:  acetaminophen 1000mg tid  Tramadol 25mg q4h and q6h prn  methocarbamol 500mg qid      Nortriptyline 30mg HS    Past Medical History:   Diagnosis Date     Blurred vision 8/11/2014     Cancer (H) 1979; 1960's    Florin: father; grandparents (on father's side)     Depressive disorder April, 2014    When received diagnosis of PSP     DISK (aka Displacement of intervertebral disc, site unspecified, without myelopathy) 8/11/2014     Displacement of cervical intervertebral disc without myelopathy (HERNIATED DISK) 8/11/2014     Double vision 8/11/2014     Epistaxis 8/11/2014     Fluttering heart 8/11/2014     Headache 8/11/2014     Hypertension February, 2013     Leg swelling 8/11/2014     Memory loss 8/11/2014     Migraines 2007    Ramila: karen     PSP (progressive supranuclear palsy) (H) 8/11/2014     Sinus disorder 8/11/2014     Tinnitus 8/11/2014     Urinary urgency 8/11/2014     Varicose veins 8/11/2014     Past Surgical History:   Procedure Laterality Date     BIOPSY  2016     Dermatologist remoed skin mole (not cancerus)     CARDIAC SURGERY  2013 ablation    Successful in eliminatng heart flutter     COLONOSCOPY  2005?     H ABLATION ATRIAL FLUTTER           Key Data Reviewed:  LABS:   GFR 76    : ok    Nishi Oro MD  Palliative Medicine  Pager [phone]      Again, thank you for allowing me to participate in the care of your patient.        Sincerely,        Nishi Oro MD

## 2018-09-28 NOTE — PROGRESS NOTES
[place] requesting office note and provider signed referral for hospice informational visit. Last office note from Dr Oro and provider signed referral faxed as requested.  Freya Mitchell  RN, BSN, OCN

## 2018-10-02 NOTE — MR AVS SNAPSHOT
After Visit Summary   10/2/2018    Jimmy Patrick    MRN: [id number]           Patient Information     Date Of Birth          1950        Visit Information        Provider Department      10/2/2018 1:30 PM Shavonne Mon PTA [facility]        Today's Diagnoses     Acute left-sided low back pain without sciatica        Closed nondisplaced fracture of second cervical vertebra, unspecified fracture morphology, initial encounter (H)           Follow-ups after your visit        Your next 10 appointments already scheduled     Oct 03, 2018 10:00 AM CDT   MEDSPINE [facility]STILLO with Alonzo Mohamud DO   [facility] ([facility])    [address]  [facility] [address]   [phone]            Oct 04, 2018 10:30 AM CDT   (Arrive by 10:15 AM)   Return Movement Disorder with Paulo Saravia MD   [facility] ([facility])    [address] [place] [address] [phone]            Oct 05, 2018  1:30 PM CDT   AUDREY Spine with Shavonne Mon PTA   [facility] ([facility]  )    [address]  [facility] [address]   [phone]            Oct 08, 2018  2:00 PM CDT   AUDREY Spine with Angelina Guerrier, PT   [facility] ([facility]  )    [address]  [facility] [address]   [phone]            Oct 12, 2018  2:00 PM CDT   AUDREY Spine with Angelina Guerrier, PT   [facility] ([facility]  )    [address]  [facility] [address]   [phone]            Oct 15, 2018 10:20 AM CDT   AUDREY Spine with Angelina Guerrier, PT   [facility] ([facility]  )    [address]  [facility] [address]   [phone]            Oct 18, 2018 11:00 AM CDT   AUDREY Spine with Angelina Guerrier, PT   AUDREY  [facility] ([facility]  )    [address]  [facility] [address]   [phone]            Oct 22, 2018 11:00 AM CDT   AUDREY Spine with Angelina Guerrier, PT   [facility] ([facility]  )    [address]  [facility] [address]   [phone]            Oct 25, 2018 11:00 AM CDT   AUDREY Spine with Angelina Guerrier, PT   [facility] ([facility]  )    [address]  [facility] [address]   [phone]            Oct 29, 2018 11:00 AM CDT   AUDREY Spine with Angelina Guerrier, PT   [facility] ([facility]  )    [address]  [facility] [address]   [phone]              Who to contact     If you have questions or need follow up information about today's clinic visit or your schedule please contact [facility] PHYSICAL THERAP[facility] directly at [phone].  Normal or non-critical lab and imaging results will be communicated to you by [employer], letter or phone within 4 business days after the clinic has received the results. If you do not hear from us within 7 days, please contact the clinic through [employer] or phone. If you have a critical or abnormal lab result, we will notify you by phone as soon as possible.  Submit refill requests through [employer] or call your pharmacy and they will forward the refill request to us. Please allow 3 business days for your refill to be completed.          Additional Information About Your Visit        [employer] Information     [employer] gives you secure access to your electronic health record. If you see a primary care provider, you can also send messages to your care team and make appointments. If you have questions, please call your primary care clinic.  If you do not have a primary care provider, please call [phone] and they will assist you.        Care EveryWhere ID     This  is your Care EveryWhere ID. This could be used by other organizations to access your [facility] medical records  [id number]         Blood Pressure from Last 3 Encounters:   09/24/18 117/78   09/19/18 110/72   08/23/18 120/70    Weight from Last 3 Encounters:   09/24/18 78 kg (172 lb)   09/19/18 78 kg (172 lb)   08/16/18 79.6 kg (175 lb 6.4 oz)              We Performed the Following     MANUAL THER TECH,1+REGIONS,EA 15 MIN     THERAPEUTIC EXERCISES          Today's Medication Changes          These changes are accurate as of 10/2/18  2:25 PM.  If you have any questions, ask your nurse or doctor.               These medicines have changed or have updated prescriptions.        Dose/Directions    nortriptyline 10 MG capsule   Commonly known as:  PAMELOR   This may have changed:  additional instructions   Used for:  Migraine without aura and without status migrainosus, not intractable        TAKE 3 CAPSULES AT BEDTIME   Quantity:  270 capsule   Refills:  3                Primary Care Provider Office Phone # Fax #    Evelina Morse [phone] [address]  [facility] [address]        Equal Access to Services     [facility] : Hadii aad ku hadasho Sojovanny, waaxda luqadaha, qaybta kaalmada adenilsayada, gely heller . So [facility] [phone].    ATENCIÓN: Si habla español, tiene a langford disposición servicios gratuitos de asistencia lingüística. Llame al [phone].    We comply with applicable federal civil rights laws and [place] laws. We do not discriminate on the basis of race, color, national origin, age, disability, sex, sexual orientation, or gender identity.            Thank you!     Thank you for choosing [facility] PHYSICAL TH[facility]AP[facility]  for your care. Our goal is always to provide you with excellent care. Hearing back from our patients is one way we can continue to improve our services. Please take a few minutes to complete the written survey  that you may receive in the mail after your visit with us. Thank you!             Your Updated Medication List - Protect others around you: Learn how to safely use, store and throw away your medicines at www.disposemymeds.org.          This list is accurate as of 10/2/18  2:25 PM.  Always use your most recent med list.                   Brand Name Dispense Instructions for use Diagnosis    acetaminophen 500 MG tablet    TYLENOL     Take 2 tablets (1,000 mg) by mouth 3 times daily    Closed displaced fracture of second cervical vertebra with routine healing, unspecified fracture morphology, subsequent encounter       adapalene 0.1 % gel    DIFFERIN    135 g    Apply to Face topically every day and evening shift for Acne prevention    Acne vulgaris       amantadine 100 MG capsule    SYMMETREL    270 capsule    Take 1 capsule (100 mg) by mouth 3 times daily 6am,11am and 4pm    PSP (progressive supranuclear palsy) (H)       amLODIPine 2.5 MG tablet    NORVASC     Take 1 tablet by mouth daily        BABY ASPIRIN 81 MG chewable tablet   Generic drug:  aspirin      Take 81 mg by mouth daily        BENICAR 20 MG tablet   Generic drug:  olmesartan      Take 1 tablet (20 mg) by mouth 2 times daily    PSP (progressive supranuclear palsy) (H)       bisacodyl 10 MG Suppository    DULCOLAX    30 suppository    Place 1 suppository (10 mg) rectally every other day If no BM in 3 days.    Other constipation       CLARITIN 10 MG capsule   Generic drug:  loratadine     30 capsule    Take 10 mg by mouth daily    Chronic seasonal allergic rhinitis, unspecified trigger       hydrochlorothiazide 12.5 MG Tabs tablet     90 tablet    Take 1 tablet (12.5 mg) by mouth daily    Essential hypertension       loperamide 2 MG capsule    IMODIUM     Take 2 mg by mouth every 4 hours as needed for diarrhea        methocarbamol 500 MG tablet    ROBAXIN    120 tablet    Take 1 tablet (500 mg) by mouth 4 times daily    Closed displaced fracture of second  cervical vertebra with routine healing, unspecified fracture morphology, subsequent encounter       nortriptyline 10 MG capsule    PAMELOR    270 capsule    TAKE 3 CAPSULES AT BEDTIME    Migraine without aura and without status migrainosus, not intractable       order for DME     1 each    Equipment being ordered: Nebulizer    Chronic cough       polyethylene glycol 0.4%- propylene glycol 0.3% 0.4-0.3 % Soln ophthalmic solution    SYSTANE ULTRA     Place 1 drop into both eyes 3 times daily        polyethylene glycol powder    MIRALAX/GLYCOLAX     Take 1 capful by mouth daily        rivastigmine 9.5 MG/24HR 24 hr patch    EXELON    90 patch    Place 1 patch onto the skin daily    PSP (progressive supranuclear palsy) (H)       senna-docusate 8.6-50 MG per tablet    SENOKOT-S;PERICOLACE    120 tablet    Take 2 tablets by mouth 2 times daily    Chronic constipation       sodium chloride 3 % Nebu neb solution     180 mL    Take 3 mLs by nebulization every 3 hours as needed for wheezing    Chronic cough       traMADol 50 MG tablet    ULTRAM    120 tablet    Take 0.5 tablets (25 mg) by mouth every 4 hours (while awake) May also take 0.5 tablet every 6 hours as needed.    Closed displaced fracture of second cervical vertebra with routine healing, unspecified fracture morphology, subsequent encounter       Vitamin D-3 Super Strength 2000 units tablet   Generic drug:  cholecalciferol      Take 1 tablet by mouth        ZOLMitriptan 5 MG tablet    ZOMIG    30 tablet    Take 1 tablet (5 mg) by mouth at onset of headache for migraine    PSP (progressive supranuclear palsy) (H), Migraine without aura and without status migrainosus, not intractable

## 2018-10-03 NOTE — PATIENT INSTRUCTIONS
Thanks for coming today.  Ortho/Sports Medicine Clinic  [address] [place] [address]    To schedule future appointments in Ortho Clinic, you may call [phone].    To schedule ordered imaging by your provider:   Call Central Imaging [phone]    To schedule an injection ordered by your provider:  Call [facility] Injection scheduling line: [phone]  [employer] available online at:  [email or web address]    Please call if any further questions or concerns ([phone]).  Clinic hours 8 am to 5 pm.    Return to clinic (call) if symptoms worsen or fail to improve.

## 2018-10-03 NOTE — NURSING NOTE
Jimmy Patrick's chief complaint for this visit includes:  Chief Complaint   Patient presents with     Consult     rtn pt new issues Back Pain     PCP: Evelina Morse    Referring Provider:  No referring provider defined for this encounter.    /76 (BP Location: Right arm, Patient Position: Sitting, Cuff Size: Adult Large)  Pulse 91  Resp 18  SpO2 96%  Data Unavailable     Do you need any medication refills at today's visit? No

## 2018-10-03 NOTE — LETTER
10/3/2018         RE: Jimmy Patrick  [address] [facility] [address]        Dear Colleague,    Thank you for referring your patient, Jimmy Patrick, to the [facility]. Please see a copy of my visit note below.    HISTORY OF PRESENT ILLNESS  Mr. Patrick is a pleasant 67 year old year old male following up with a C2 fracture.  Raymond is feeling a little bit better overall.  He continues to have neck pain, mostly on the right side in the region of the trapezius.  This is tolerable and is getting better with physical therapy.  Unfortunately he is having increased low back pain.  He points to the center of his low back.  This is worse as the day goes on, also worse when he sits in his chair for long periods of time.  He does not feel any radicular symptoms down his legs.  Raymond has had workup for  his low back in the past, he had radicular symptoms at some point in his life.  He has had corticosteroid injections in the back before.  Additional history: as documented      REVIEW OF SYSTEMS (10/3/2018)  10 point ROS of systems including Constitutional, Eyes, Respiratory, Cardiovascular, Gastroenterology, Genitourinary, Integumentary, Musculoskeletal, Psychiatric were all negative except for pertinent positives noted in my HPI.     PHYSICAL EXAM  Vitals:    10/03/18 0954   BP: 131/76   BP Location: Right arm   Patient Position: Sitting   Cuff Size: Adult Large   Pulse: 91   Resp: 18   SpO2: 96%     Raymond does not appear to be in pain today but his speech is notably deteriorating.  His wife states that this has been going on for the past few weeks.  He does not exhibit tenderness throughout the trapezius muscles although there is a palpable area in the mid belly right trapezius just superior to the spine of the scapula where there is a hard muscular knot.  His low back is somewhat tender about the bilateral paraspinal muscles in the lumbar spine.        ASSESSMENT & PLAN  Mr. Patrick  is a 67 year old year old male following up with a C2 fracture.  He also has chronic low back pain.    I am ordering a repeat CT of his cervical spine to assess healing.  If his C2 fracture is appropriately healed we can move forward with further physical therapy.    I am also ordering a CT of his lumbar spine to assess for discogenic or bony pathology.  Based upon the results I may refer him for a facet or lumbar epidural injection.    It was a pleasure seeing Raymond.        Alonzo Mohamud DO, [facility]          Again, thank you for allowing me to participate in the care of your patient.        Sincerely,        Alonzo Mohamud DO

## 2018-10-03 NOTE — PROGRESS NOTES
HISTORY OF PRESENT ILLNESS  Mr. Patrick is a pleasant 67 year old year old male following up with a C2 fracture.  Raymond is feeling a little bit better overall.  He continues to have neck pain, mostly on the right side in the region of the trapezius.  This is tolerable and is getting better with physical therapy.  Unfortunately he is having increased low back pain.  He points to the center of his low back.  This is worse as the day goes on, also worse when he sits in his chair for long periods of time.  He does not feel any radicular symptoms down his legs.  Raymond has had workup for  his low back in the past, he had radicular symptoms at some point in his life.  He has had corticosteroid injections in the back before.  Additional history: as documented      REVIEW OF SYSTEMS (10/3/2018)  10 point ROS of systems including Constitutional, Eyes, Respiratory, Cardiovascular, Gastroenterology, Genitourinary, Integumentary, Musculoskeletal, Psychiatric were all negative except for pertinent positives noted in my HPI.     PHYSICAL EXAM  Vitals:    10/03/18 0954   BP: 131/76   BP Location: Right arm   Patient Position: Sitting   Cuff Size: Adult Large   Pulse: 91   Resp: 18   SpO2: 96%     Raymond does not appear to be in pain today but his speech is notably deteriorating.  His wife states that this has been going on for the past few weeks.  He does not exhibit tenderness throughout the trapezius muscles although there is a palpable area in the mid belly right trapezius just superior to the spine of the scapula where there is a hard muscular knot.  His low back is somewhat tender about the bilateral paraspinal muscles in the lumbar spine.        ASSESSMENT & PLAN  Mr. Patrick is a 67 year old year old male following up with a C2 fracture.  He also has chronic low back pain.    I am ordering a repeat CT of his cervical spine to assess healing.  If his C2 fracture is appropriately healed we can move forward with further physical  therapy.    I am also ordering a CT of his lumbar spine to assess for discogenic or bony pathology.  Based upon the results I may refer him for a facet or lumbar epidural injection.    It was a pleasure seeing Raymond.        Alonzo Mohamud DO, [facility]

## 2018-10-03 NOTE — MR AVS SNAPSHOT
After Visit Summary   10/3/2018    Jimmy Patrick    MRN: [id number]           Patient Information     Date Of Birth          1950        Visit Information        Provider Department      10/3/2018 10:00 AM Alonzo Mohamud DO [facility]        Today's Diagnoses     Chronic bilateral low back pain, with sciatica presence unspecified    -  1    Other closed nondisplaced fracture of second cervical vertebra with routine healing, subsequent encounter          Care Instructions    Thanks for coming today.  [facility]  [address] [place] [address]    To schedule future appointments in Ortho Clinic, you may call [phone].    To schedule ordered imaging by your provider:   Call Central Imaging [phone]    To schedule an injection ordered by your provider:  Call [facility] Injection scheduling line: [phone]  Collaxhart available online at:  [email or web address]    Please call if any further questions or concerns ([phone]).  Clinic hours 8 am to 5 pm.    Return to clinic (call) if symptoms worsen or fail to improve.            Follow-ups after your visit        Your next 10 appointments already scheduled     Oct 04, 2018 10:30 AM CDT   (Arrive by 10:15 AM)   Return Movement Disorder with Paulo Saravia MD   [facility] Neurology ([facility])    [address] [place] [address] [phone]            Oct 05, 2018  1:30 PM CDT   AUDREY Spine with Shavonne Mon PTA   [facility] ([facility]  )    [address] [facility] [id number] [phone]            Oct 08, 2018  2:00 PM CDT   AUDREY Spine with Aneglina Guerrier, PT   [facility] ([facility]  )    [address] [facility] [id number] [phone]            Oct 12, 2018  2:00 PM CDT   AUDREY Spine with Angelina KAY  Fareed, PT   [facility] Therapy ([facility]  )    [address]  [facility] [address]   [phone]            Oct 15, 2018 10:20 AM CDT   AUDREY Spine with Angelina Guerrier, PT   [facility] ([facility]  )    [address]  [facility] [address]   [phone]            Oct 18, 2018 11:00 AM CDT   AUDREY Spine with Angelina Guerrier, PT   [facility] ([facility]  )    [address]  [facility] [address]   [phone]            Oct 22, 2018 11:00 AM CDT   AUDREY Spine with Angelina Guerrier, PT   [facility] ([facility]  )    [address]  [facility] [address] [phone]            Oct 25, 2018 11:00 AM CDT   AUDREY Spine with Angelina Guerrier, PT   [facility] ([facility]  )    [address] [facility] [address]   [phone]            Oct 29, 2018 11:00 AM CDT   AUDREY Spine with Angelina Guerrier, PT   [facility] Therapy ([facility]  )    [address] [facility] [address] [phone]            Nov 21, 2018 10:30 AM CST   Return Visit with Clint Gutiérrez MD   [facility] ([facility])    [address] [facility] [address] [phone]              Who to contact     If you have questions or need follow up information about today's clinic visit or your schedule please contact [facility] directly at [phone].  Normal or non-critical lab and imaging results will be communicated to you by MyChart, letter or phone within 4 business days after the clinic has received the results. If you do not hear from us within 7 days, please contact the clinic through MyChart or phone. If you have a critical or abnormal lab result, we will notify you by phone as  soon as possible.  Submit refill requests through [employer] or call your pharmacy and they will forward the refill request to us. Please allow 3 business days for your refill to be completed.          Additional Information About Your Visit        [employer] Information     [employer] gives you secure access to your electronic health record. If you see a primary care provider, you can also send messages to your care team and make appointments. If you have questions, please call your primary care clinic.  If you do not have a primary care provider, please call [phone] and they will assist you.      [employer] is an electronic gateway that provides easy, online access to your medical records. With [employer], you can request a clinic appointment, read your test results, renew a prescription or communicate with your care team.     To access your existing account, please contact your [facility] or call [phone] for assistance.        Care EveryWhere ID     This is your Care EveryWhere ID. This could be used by other organizations to access your [facility] medical records  [id number]        Your Vitals Were     Pulse Respirations Pulse Oximetry             91 18 96%          Blood Pressure from Last 3 Encounters:   10/03/18 131/76   09/24/18 117/78   09/19/18 110/72    Weight from Last 3 Encounters:   09/24/18 78 kg (172 lb)   09/19/18 78 kg (172 lb)   08/16/18 79.6 kg (175 lb 6.4 oz)                 Today's Medication Changes          These changes are accurate as of 10/3/18  2:13 PM.  If you have any questions, ask your nurse or doctor.               These medicines have changed or have updated prescriptions.        Dose/Directions    nortriptyline 10 MG capsule   Commonly known as:  PAMELOR   This may have changed:  additional instructions   Used for:  Migraine without aura and without status migrainosus, not intractable        TAKE 3 CAPSULES AT BEDTIME   Quantity:  270 capsule   Refills:  3                 Primary Care Provider Office Phone # Fax #    Evelina Morse [phone] [address]  [facility] [address]        Equal Access to Services     LEDY STEELE : Hadmikal robin ku saraho Sostacieali, waaxda luqadaha, qaybta kaalmada adeconner, gely jamessindhu lynch. So [facility] [phone].    ATENCIÓN: Si habla español, tiene a langford disposición servicios gratuitos de asistencia lingüística. Llame al [phone].    We comply with applicable federal civil rights laws and [place] laws. We do not discriminate on the basis of race, color, national origin, age, disability, sex, sexual orientation, or gender identity.            Thank you!     Thank you for choosing [facility]  for your care. Our goal is always to provide you with excellent care. Hearing back from our patients is one way we can continue to improve our services. Please take a few minutes to complete the written survey that you may receive in the mail after your visit with us. Thank you!             Your Updated Medication List - Protect others around you: Learn how to safely use, store and throw away your medicines at www.disposemymeds.org.          This list is accurate as of 10/3/18  2:13 PM.  Always use your most recent med list.                   Brand Name Dispense Instructions for use Diagnosis    acetaminophen 500 MG tablet    TYLENOL     Take 2 tablets (1,000 mg) by mouth 3 times daily    Closed displaced fracture of second cervical vertebra with routine healing, unspecified fracture morphology, subsequent encounter       adapalene 0.1 % gel    DIFFERIN    135 g    Apply to Face topically every day and evening shift for Acne prevention    Acne vulgaris       amantadine 100 MG capsule    SYMMETREL    270 capsule    Take 1 capsule (100 mg) by mouth 3 times daily 6am,11am and 4pm    PSP (progressive supranuclear palsy) (H)       amLODIPine 2.5 MG tablet    NORVASC     Take 1  tablet by mouth daily        BABY ASPIRIN 81 MG chewable tablet   Generic drug:  aspirin      Take 81 mg by mouth daily        BENICAR 20 MG tablet   Generic drug:  olmesartan      Take 1 tablet (20 mg) by mouth 2 times daily    PSP (progressive supranuclear palsy) (H)       bisacodyl 10 MG Suppository    DULCOLAX    30 suppository    Place 1 suppository (10 mg) rectally every other day If no BM in 3 days.    Other constipation       CLARITIN 10 MG capsule   Generic drug:  loratadine     30 capsule    Take 10 mg by mouth daily    Chronic seasonal allergic rhinitis, unspecified trigger       hydrochlorothiazide 12.5 MG Tabs tablet     90 tablet    Take 1 tablet (12.5 mg) by mouth daily    Essential hypertension       loperamide 2 MG capsule    IMODIUM     Take 2 mg by mouth every 4 hours as needed for diarrhea        methocarbamol 500 MG tablet    ROBAXIN    120 tablet    Take 1 tablet (500 mg) by mouth 4 times daily    Closed displaced fracture of second cervical vertebra with routine healing, unspecified fracture morphology, subsequent encounter       nortriptyline 10 MG capsule    PAMELOR    270 capsule    TAKE 3 CAPSULES AT BEDTIME    Migraine without aura and without status migrainosus, not intractable       order for DME     1 each    Equipment being ordered: Nebulizer    Chronic cough       polyethylene glycol 0.4%- propylene glycol 0.3% 0.4-0.3 % Soln ophthalmic solution    SYSTANE ULTRA     Place 1 drop into both eyes 3 times daily        polyethylene glycol powder    MIRALAX/GLYCOLAX     Take 1 capful by mouth daily        rivastigmine 9.5 MG/24HR 24 hr patch    EXELON    90 patch    Place 1 patch onto the skin daily    PSP (progressive supranuclear palsy) (H)       senna-docusate 8.6-50 MG per tablet    SENOKOT-S;PERICOLACE    120 tablet    Take 2 tablets by mouth 2 times daily    Chronic constipation       sodium chloride 3 % Nebu neb solution     180 mL    Take 3 mLs by nebulization every 3 hours as  needed for wheezing    Chronic cough       traMADol 50 MG tablet    ULTRAM    120 tablet    Take 0.5 tablets (25 mg) by mouth every 4 hours (while awake) May also take 0.5 tablet every 6 hours as needed.    Closed displaced fracture of second cervical vertebra with routine healing, unspecified fracture morphology, subsequent encounter       Vitamin D-3 Super Strength 2000 units tablet   Generic drug:  cholecalciferol      Take 1 tablet by mouth        ZOLMitriptan 5 MG tablet    ZOMIG    30 tablet    Take 1 tablet (5 mg) by mouth at onset of headache for migraine    PSP (progressive supranuclear palsy) (H), Migraine without aura and without status migrainosus, not intractable

## 2018-10-04 NOTE — PATIENT INSTRUCTIONS
: 1950    POLA: 2018    PSP  BLOOD pressure is better and under control. He is taking amlodipine, hydrochlorothiazide and olmesartan/benicar.  Constipation - ongoing problems and is taking senokot 2 tabs 2/day and miralax as needed  And using dulcolax as needed   Not using immodium    He remains on amantadine 100mg 3/day    He is using adapelene twice daily or once daily.   He uses Vaseline as needed topically.     2018  which was seen for and ear infection which was treated with amoxicillin capsules for 10 days.   Staring on the   Put on claritin at the time and remains on this.     Continues to have a cough    He is continuing on robaxin 500mg 4/day = to  Manage his neck and back pain.   After c2 fracture and his inability to do exercises his back pain has gottent worse    He continues on pamelor and has not had migraine for over year.    He uses systane for his eyes    Using 9.5mg exelon rivastigmine patch daily     He continues to uses vitamin D3    He is not on a mvi    He is on baby aspirin    He continues on acetaminophen 1000mg 3/day for back/neck pain.     He continues on tramadol and taking it scheduled.    He has not taken zomig - has not had migraines.     Still has ongoing numbness in his toes.     He has long term care person - living in   They don't need a nurse at this point to administer medications but the nurse is there to set up the medication    Nathen Martinez ([place]) is [facility]     He has a hospital bed    He has not had a pneumonia to date    The aide has 8 hours off to sleep.     Some of the costs are covered from their long term care insurance    Daily cost is 360 dollars per day but 1.5 on holidays  365/nurse/per month  They have 103.50 coverage from long term care  It is 8k out of pocket expenses.    They are using ice/heat for neck/back pain    They have seen Dr. Mohamud of sports medicine at [facility].      They have been trying therapy and chin tucks/back bends    They have not tried aspercreme    He has ongoing urinary incontinence  He is using a condom catheter at night.     The fall and break affected his mobility and independence eg toileting.  Since the break he had incontinence which is better but independence is less than before.     They have seen Dr. Oro who has been great and helpful.     They have looked at , [facility] and [facility].   The former is a nonprofit.   If they decide to hospice they won't be able to continue physical therapy.   The hospice would be done in home and the costs would not be entirely covered.     He is in bed from 10pm till 7am but may only sleep for 5  Hours  Question raised about seroquel to help him sleep longer.     Talked about hospice  Book from [facility]    Has wireless doorbells to use as a call device.

## 2018-10-04 NOTE — LETTER
10/4/2018      RE: Jimmy Patrick  [address] [facility] [address]  [facility] [address]       Diagnosis/Summary/Recommendations:    PATIENT: Jimmy Patrick  67 year old male     : 1950    POLA: 2018    PSP  BLOOD pressure is better and under control. He is taking amlodipine, hydrochlorothiazide and olmesartan/benicar.  Constipation - ongoing problems and is taking senokot 2 tabs 2/day and miralax as needed  And using dulcolax as needed   Not using immodium    He remains on amantadine 100mg 3/day    He is using adapelene twice daily or once daily.   He uses Vaseline as needed topically.     2018  which was seen for and ear infection which was treated with amoxicillin capsules for 10 days.   Staring on the   Put on claritin at the time and remains on this.     Continues to have a cough    He is continuing on robaxin 500mg 4/day = to  Manage his neck and back pain.   After c2 fracture and his inability to do exercises his back pain has gottent worse    He continues on pamelor and has not had migraine for over year.    He uses systane for his eyes    Using 9.5mg exelon rivastigmine patch daily     He continues to uses vitamin D3    He is not on a mvi    He is on baby aspirin    He continues on acetaminophen 1000mg 3/day for back/neck pain.     He continues on tramadol and taking it scheduled.    He has not taken zomig - has not had migraines.     Still has ongoing numbness in his toes.     He has long term care person - living in   They don't need a nurse at this point to administer medications but the nurse is there to set up the medication    Nathen (Montecristina) Michelle is the live in person from [employer].     He has a hospital bed    He has not had a pneumonia to date    The aide has 8 hours off to sleep.     Some of the costs are covered from their long term care insurance    Daily cost is 360 dollars per day but 1.5 on holidays  365/nurse/per month  They have 103.50  coverage from long term care  It is 8k out of pocket expenses.    They are using ice/heat for neck/back pain    They have seen Dr. Mohamud of sports medicine at [facility].     They have been trying therapy and chin tucks/back bends    They have not tried aspercreme    He has ongoing urinary incontinence  He is using a condom catheter at night.     The fall and break affected his mobility and independence eg toileting.  Since the break he had incontinence which is better but independence is less than before.     They have seen Dr. Oro who has been great and helpful.     They have looked at , [facility] hospice and [facility].   The former is a nonprofit.   If they decide to hospice they won't be able to continue physical therapy.   The hospice would be done in home and the costs would not be entirely covered.           Medications                                                                                                                                                History obtained from patient      Coding statement:   Duration of  Services: patient care and care coordination was 25 minutes  Greater than 50% of this visit was spent in counseling and coordination of care.     Paulo Saravia MD     ______________________________________    Last visit date and details:               ______________________________________      Patient was asked about 14 Review of systems including changes in vision (dry eyes, double vision), hearing, heart, lungs, musculoskeletal, depression, anxiety, snoring, RBD, insomnia, urinary frequency, urinary urgency, constipation, swallowing problems, hematological, ID, allergies, skin problems: seborrhea, endocrinological: thyroid, diabetes, cholesterol; balance, weight changes, and other neurological problems and these were not significant at this time except for   Patient Active Problem List   Diagnosis     Transient paralysis of limb     Migraine without aura     Speech disturbance      PSP (progressive supranuclear palsy) (H)     Fluttering heart     Leg swelling     Varicose veins     Headache     Memory loss     Urinary urgency     Blurred vision     Double vision     Tinnitus     Sinus disorder     Epistaxis     DISK (aka Displacement of intervertebral disc, site unspecified, without myelopathy)     Impairment of balance     Signs and symptoms involving cognition     Fatigue     Migraine without status migrainosus, not intractable     Palpitations     Parkinsonism (H)     Progressive supranuclear ophthalmoplegia (H)     Speech dysfunction     Cellular blue nevus     S/P ablation of atrial flutter     Migraine without aura and without status migrainosus, not intractable     Parkinsonism, unspecified Parkinsonism type (H)     Essential hypertension     CARDIOVASCULAR SCREENING; LDL GOAL LESS THAN 160     Cognitive complaints     ACP (advance care planning)     Closed fracture of second cervical vertebra (H)     Fall     Acute pain due to trauma     Acute pain due to injury     Closed displaced fracture of second cervical vertebra with routine healing, unspecified fracture morphology, subsequent encounter     Other constipation     At high risk for aspiration     Closed nondisplaced fracture of second cervical vertebra, unspecified fracture morphology, initial encounter (H)     Acute left-sided low back pain without sciatica          Allergies   Allergen Reactions     Other [Seasonal Allergies]      environmental     Codeine Other (See Comments) and Rash     GI upset  GI upset  GI upset     Past Surgical History:   Procedure Laterality Date     BIOPSY  2016    Dermatologist remoed skin mole (not cancerus)     CARDIAC SURGERY  2013 ablation    Successful in eliminatng heart flutter     COLONOSCOPY  2005?     H ABLATION ATRIAL FLUTTER       Past Medical History:   Diagnosis Date     Blurred vision 8/11/2014     Cancer (H) 1979; 1960's    Florin: father; grandparents (on father's side)      Depressive disorder April, 2014    When received diagnosis of PSP     DISK (aka Displacement of intervertebral disc, site unspecified, without myelopathy) 8/11/2014     Displacement of cervical intervertebral disc without myelopathy (HERNIATED DISK) 8/11/2014     Double vision 8/11/2014     Epistaxis 8/11/2014     Fluttering heart 8/11/2014     Headache 8/11/2014     Hypertension February, 2013     Leg swelling 8/11/2014     Memory loss 8/11/2014     Migraines 2007    Ramila: karen     PSP (progressive supranuclear palsy) (H) 8/11/2014     Sinus disorder 8/11/2014     Tinnitus 8/11/2014     Urinary urgency 8/11/2014     Varicose veins 8/11/2014     Social History     Social History     Marital status:      Spouse name: N/A     Number of children: N/A     Years of education: N/A     Occupational History     Not on file.     Social History Main Topics     Smoking status: Never Smoker     Smokeless tobacco: Never Used     Alcohol use No     Drug use: No     Sexual activity: Not Currently     Partners: Female     Other Topics Concern     Parent/Sibling W/ Cabg, Mi Or Angioplasty Before 65f 55m? 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  Medications and Vitals not listed above were documented in the cart and reviewed by me.     Current Outpatient Prescriptions   Medication Sig Dispense Refill     acetaminophen (TYLENOL) 500 MG tablet Take 2 tablets (1,000 mg) by mouth  3 times daily       adapalene (DIFFERIN) 0.1 % gel Apply to Face topically every day and evening shift for Acne prevention 135 g 3     amantadine (SYMMETREL) 100 MG capsule Take 1 capsule (100 mg) by mouth 3 times daily 6am,11am and 4pm 270 capsule 3     amLODIPine (NORVASC) 2.5 MG tablet Take 1 tablet by mouth daily       aspirin (BABY ASPIRIN) 81 MG chewable tablet Take 81 mg by mouth daily        bisacodyl (DULCOLAX) 10 MG Suppository Place 1 suppository (10 mg) rectally every other day If no BM in 3 days. 30 suppository 11     cholecalciferol (VITAMIN D-3 SUPER STRENGTH) 2000 UNITS tablet Take 1 tablet by mouth        hydrochlorothiazide 12.5 MG TABS tablet Take 1 tablet (12.5 mg) by mouth daily 90 tablet 3     loperamide (IMODIUM) 2 MG capsule Take 2 mg by mouth every 4 hours as needed for diarrhea       loratadine (CLARITIN) 10 MG capsule Take 10 mg by mouth daily 30 capsule      methocarbamol (ROBAXIN) 500 MG tablet Take 1 tablet (500 mg) by mouth 4 times daily 120 tablet 5     nortriptyline (PAMELOR) 10 MG capsule TAKE 3 CAPSULES AT BEDTIME (Patient taking differently: TAKE 3 CAPSULES AT BEDTIME) 270 capsule 3     olmesartan (BENICAR) 20 MG tablet Take 1 tablet (20 mg) by mouth 2 times daily       order for DME Equipment being ordered: Nebulizer 1 each 0     polyethylene glycol (MIRALAX/GLYCOLAX) powder Take 1 capful by mouth daily       polyethylene glycol 0.4%- propylene glycol 0.3% (SYSTANE ULTRA) 0.4-0.3 % SOLN ophthalmic solution Place 1 drop into both eyes 3 times daily       rivastigmine (EXELON) 9.5 MG/24HR 24 hr patch Place 1 patch onto the skin daily 90 patch 3     senna-docusate (SENOKOT-S;PERICOLACE) 8.6-50 MG per tablet Take 2 tablets by mouth 2 times daily 120 tablet 0     sodium chloride 3 % NEBU neb solution Take 3 mLs by nebulization every 3 hours as needed for wheezing 180 mL 3     traMADol (ULTRAM) 50 MG tablet Take 0.5 tablets (25 mg) by mouth every 4 hours (while awake) May also take 0.5  tablet every 6 hours as needed. 120 tablet 0     ZOLMitriptan (ZOMIG) 5 MG tablet Take 1 tablet (5 mg) by mouth at onset of headache for migraine 30 tablet 5         Paulo Saravia MD

## 2018-10-04 NOTE — LETTER
10/4/2018      RE: Jimmy Patrick  [address]  [place] [address]       Diagnosis/Summary/Recommendations:    PATIENT: Jimmy Patrick  67 year old male     : 1950    POLA: 2018    PSP  BLOOD pressure is better and under control. He is taking amlodipine, hydrochlorothiazide and olmesartan/benicar.  Constipation - ongoing problems and is taking senokot 2 tabs 2/day and miralax as needed  And using dulcolax as needed   Not using immodium    He remains on amantadine 100mg 3/day    He is using adapelene twice daily or once daily.   He uses Vaseline as needed topically.     2018  which was seen for and ear infection which was treated with amoxicillin capsules for 10 days.   Staring on the   Put on claritin at the time and remains on this.     Continues to have a cough    He is continuing on robaxin 500mg 4/day = to  Manage his neck and back pain.   After c2 fracture and his inability to do exercises his back pain has gottent worse    He continues on pamelor and has not had migraine for over year.    He uses systane for his eyes    Using 9.5mg exelon rivastigmine patch daily     He continues to uses vitamin D3    He is not on a mvi    He is on baby aspirin    He continues on acetaminophen 1000mg 3/day for back/neck pain.     He continues on tramadol and taking it scheduled.    He has not taken zomig - has not had migraines.     Still has ongoing numbness in his toes.     He has long term care person - living in   They don't need a nurse at this point to administer medications but the nurse is there to set up the medication    Nathen (Montecristina) Michelle is the live in person from [employer].     He has a hospital bed    He has not had a pneumonia to date    The aide has 8 hours off to sleep.     Some of the costs are covered from their long term care insurance    Daily cost is 360 dollars per day but 1.5 on holidays  365/nurse/per month  They have 103.50  coverage from long term care  It is 8k out of pocket expenses.    They are using ice/heat for neck/back pain    They have seen Dr. Mohamud of sports medicine at [facility].     They have been trying therapy and chin tucks/back bends    They have not tried aspercreme    He has ongoing urinary incontinence  He is using a condom catheter at night.     The fall and break affected his mobility and independence eg toileting.  Since the break he had incontinence which is better but independence is less than before.     They have seen Dr. Oro who has been great and helpful.     They have looked at , [facility] hospice and [facility].   The former is a nonprofit.   If they decide to hospice they won't be able to continue physical therapy.   The hospice would be done in home and the costs would not be entirely covered.     He is in bed from 10pm till 7am but may only sleep for 5  Hours  Question raised about seroquel to help him sleep longer.     Talked about hospice  Book from [facility]    Has wireless doorbells to use as a call device.       Medications                                                                                                                                                History obtained from patient      Coding statement:   Duration of  Services: patient care and care coordination was 25 minutes  Greater than 50% of this visit was spent in counseling and coordination of care.     Paulo Saravia MD     ______________________________________    Last visit date and details:               ______________________________________      Patient was asked about 14 Review of systems including changes in vision (dry eyes, double vision), hearing, heart, lungs, musculoskeletal, depression, anxiety, snoring, RBD, insomnia, urinary frequency, urinary urgency, constipation, swallowing problems, hematological, ID, allergies, skin problems: seborrhea, endocrinological: thyroid, diabetes, cholesterol; balance,  weight changes, and other neurological problems and these were not significant at this time except for   Patient Active Problem List   Diagnosis     Transient paralysis of limb     Migraine without aura     Speech disturbance     PSP (progressive supranuclear palsy) (H)     Fluttering heart     Leg swelling     Varicose veins     Headache     Memory loss     Urinary urgency     Blurred vision     Double vision     Tinnitus     Sinus disorder     Epistaxis     DISK (aka Displacement of intervertebral disc, site unspecified, without myelopathy)     Impairment of balance     Signs and symptoms involving cognition     Fatigue     Migraine without status migrainosus, not intractable     Palpitations     Parkinsonism (H)     Progressive supranuclear ophthalmoplegia (H)     Speech dysfunction     Cellular blue nevus     S/P ablation of atrial flutter     Migraine without aura and without status migrainosus, not intractable     Parkinsonism, unspecified Parkinsonism type (H)     Essential hypertension     CARDIOVASCULAR SCREENING; LDL GOAL LESS THAN 160     Cognitive complaints     ACP (advance care planning)     Closed fracture of second cervical vertebra (H)     Fall     Acute pain due to trauma     Acute pain due to injury     Closed displaced fracture of second cervical vertebra with routine healing, unspecified fracture morphology, subsequent encounter     Other constipation     At high risk for aspiration     Closed nondisplaced fracture of second cervical vertebra, unspecified fracture morphology, initial encounter (H)     Acute left-sided low back pain without sciatica          Allergies   Allergen Reactions     Other [Seasonal Allergies]      environmental     Codeine Other (See Comments) and Rash     GI upset  GI upset  GI upset     Past Surgical History:   Procedure Laterality Date     BIOPSY  2016    Dermatologist remoed skin mole (not cancerus)     CARDIAC SURGERY  2013 ablation    Successful in eliminatng  heart flutter     COLONOSCOPY  2005?     H ABLATION ATRIAL FLUTTER       Past Medical History:   Diagnosis Date     Blurred vision 8/11/2014     Cancer (H) 1979; 1960's    Florin: father; grandparents (on father's side)     Depressive disorder April, 2014    When received diagnosis of PSP     DISK (aka Displacement of intervertebral disc, site unspecified, without myelopathy) 8/11/2014     Displacement of cervical intervertebral disc without myelopathy (HERNIATED DISK) 8/11/2014     Double vision 8/11/2014     Epistaxis 8/11/2014     Fluttering heart 8/11/2014     Headache 8/11/2014     Hypertension February, 2013     Leg swelling 8/11/2014     Memory loss 8/11/2014     Migraines 2007    Ramila: karen     PSP (progressive supranuclear palsy) (H) 8/11/2014     Sinus disorder 8/11/2014     Tinnitus 8/11/2014     Urinary urgency 8/11/2014     Varicose veins 8/11/2014     Social History     Social History     Marital status:      Spouse name: N/A     Number of children: N/A     Years of education: N/A     Occupational History     Not on file.     Social History Main Topics     Smoking status: Never Smoker     Smokeless tobacco: Never Used     Alcohol use No     Drug use: No     Sexual activity: Not Currently     Partners: Female     Other Topics Concern     Parent/Sibling W/ Cabg, Mi Or Angioplasty Before 65f 55m? 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  Medications and Vitals not listed above were documented in the cart and reviewed by me.     Current Outpatient Prescriptions   Medication Sig Dispense Refill     acetaminophen (TYLENOL) 500 MG tablet Take 2 tablets (1,000 mg) by mouth 3 times daily       adapalene (DIFFERIN) 0.1 % gel Apply to Face topically every day and evening shift for Acne prevention 135 g 3     amantadine (SYMMETREL) 100 MG capsule Take 1 capsule (100 mg) by mouth 3 times daily 6am,11am and 4pm 270 capsule 3     amLODIPine (NORVASC) 2.5 MG tablet Take 1 tablet by mouth daily       aspirin (BABY ASPIRIN) 81 MG chewable tablet Take 81 mg by mouth daily        bisacodyl (DULCOLAX) 10 MG Suppository Place 1 suppository (10 mg) rectally every other day If no BM in 3 days. 30 suppository 11     cholecalciferol (VITAMIN D-3 SUPER STRENGTH) 2000 UNITS tablet Take 1 tablet by mouth        hydrochlorothiazide 12.5 MG TABS tablet Take 1 tablet (12.5 mg) by mouth daily 90 tablet 3     loperamide (IMODIUM) 2 MG capsule Take 2 mg by mouth every 4 hours as needed for diarrhea       loratadine (CLARITIN) 10 MG capsule Take 10 mg by mouth daily 30 capsule      methocarbamol (ROBAXIN) 500 MG tablet Take 1 tablet (500 mg) by mouth 4 times daily 120 tablet 5     nortriptyline (PAMELOR) 10 MG capsule TAKE 3 CAPSULES AT BEDTIME (Patient taking differently: TAKE 3 CAPSULES AT BEDTIME) 270 capsule 3     olmesartan (BENICAR) 20 MG tablet Take 1 tablet (20 mg) by mouth 2 times daily       order for DME Equipment being ordered: Nebulizer 1 each 0     polyethylene glycol (MIRALAX/GLYCOLAX) powder Take 1 capful by mouth daily       polyethylene glycol 0.4%- propylene glycol 0.3% (SYSTANE ULTRA) 0.4-0.3 % SOLN ophthalmic solution Place 1 drop into both eyes 3 times daily       rivastigmine (EXELON) 9.5 MG/24HR 24 hr patch Place 1 patch onto the skin daily 90 patch 3     senna-docusate (SENOKOT-S;PERICOLACE) 8.6-50 MG per tablet Take 2 tablets by mouth 2 times daily  120 tablet 0     sodium chloride 3 % NEBU neb solution Take 3 mLs by nebulization every 3 hours as needed for wheezing 180 mL 3     traMADol (ULTRAM) 50 MG tablet Take 0.5 tablets (25 mg) by mouth every 4 hours (while awake) May also take 0.5 tablet every 6 hours as needed. 120 tablet 0     ZOLMitriptan (ZOMIG) 5 MG tablet Take 1 tablet (5 mg) by mouth at onset of headache for migraine 30 tablet 5         Paulo Saravia MD

## 2018-10-04 NOTE — MR AVS SNAPSHOT
After Visit Summary   10/4/2018    Jimmy Patrick    MRN: [id number]           Patient Information     Date Of Birth          1950        Visit Information        Provider Department      10/4/2018 10:30 AM Paulo Saravia MD [facility] Neurology        Today's Diagnoses     PSP (progressive supranuclear palsy) (H)    -  1    Closed displaced fracture of second cervical vertebra with routine healing, unspecified fracture morphology, subsequent encounter        PSP (progressive supranuclear palsy)        Migraine without aura and without status migrainosus, not intractable          Care Instructions    : 1950    POLA: 2018    PSP  BLOOD pressure is better and under control. He is taking amlodipine, hydrochlorothiazide and olmesartan/benicar.  Constipation - ongoing problems and is taking senokot 2 tabs 2/day and miralax as needed  And using dulcolax as needed   Not using immodium    He remains on amantadine 100mg 3/day    He is using adapelene twice daily or once daily.   He uses Vaseline as needed topically.     2018  which was seen for and ear infection which was treated with amoxicillin capsules for 10 days.   Staring on the   Put on claritin at the time and remains on this.     Continues to have a cough    He is continuing on robaxin 500mg 4/day = to  Manage his neck and back pain.   After c2 fracture and his inability to do exercises his back pain has gottent worse    He continues on pamelor and has not had migraine for over year.    He uses systane for his eyes    Using 9.5mg exelon rivastigmine patch daily     He continues to uses vitamin D3    He is not on a mvi    He is on baby aspirin    He continues on acetaminophen 1000mg 3/day for back/neck pain.     He continues on tramadol and taking it scheduled.    He has not taken zomig - has not had migraines.     Still has ongoing numbness in his toes.     He has long term care person - living in    They don't need a nurse at this point to administer medications but the nurse is there to set up the medication    Nathen (Monteka) Michelle is the live in person from [employer].     He has a hospital bed    He has not had a pneumonia to date    The aide has 8 hours off to sleep.     Some of the costs are covered from their long term care insurance    Daily cost is 360 dollars per day but 1.5 on holidays  365/nurse/per month  They have 103.50 coverage from long term care  It is 8k out of pocket expenses.    They are using ice/heat for neck/back pain    They have seen Dr. Mohamud of sports medicine at [facility].     They have been trying therapy and chin tucks/back bends    They have not tried aspercreme    He has ongoing urinary incontinence  He is using a condom catheter at night.     The fall and break affected his mobility and independence eg toileting.  Since the break he had incontinence which is better but independence is less than before.     They have seen Dr. Oro who has been great and helpful.     They have looked at [facility] hospice and [facility].   The former is a nonprofit.   If they decide to hospice they won't be able to continue physical therapy.   The hospice would be done in home and the costs would not be entirely covered.     He is in bed from 10pm till 7am but may only sleep for 5  Hours  Question raised about seroquel to help him sleep longer.     Talked about hospice  Book from [facility]    Has wireless doorbells to use as a call device.             Follow-ups after your visit        Follow-up notes from your care team     Return in about 3 months (around 1/4/2019).      Your next 10 appointments already scheduled     Oct 05, 2018  1:30 PM CDT   [facility] with Shavonne Mon PTA   [facility] ([facility]  )    [address]  [facility] [address]   [phone]            Oct 08, 2018  2:00 PM CDT   [facility] with  Angelina Guerrier, PT   [facility] ([facility]  )    [address]  [facility] [address]   [phone]            Oct 12, 2018  2:00 PM CDT   [facility] with Angelina Guerrier, PT   [facility] ([facility]  )    [address]  [facility] [address]   [phone]            Oct 15, 2018 10:20 AM CDT   [facility] with Angelina Guerrier, PT   [facility] ([facility]  )    [address]  [facility] [address]   [phone]            Oct 18, 2018 11:00 AM CDT   [facility] with Angelina Guerrier, PT   [facility] ([facility]  )    [address]  [facility] [address]   [phone]            Oct 22, 2018 11:00 AM CDT   [facility] with Angelina Guerrier, PT   [facility] ([facility]  )    [address]  [facility] [address]   [phone]            Oct 25, 2018 11:00 AM CDT   [facility] with Angelina Guerrier, PT   [facility] ([facility]  )    [address]  [facility] [address]   [phone]            Oct 29, 2018 11:00 AM CDT   [facility] with Angelina Guerrier, PT   [facility] ([facility]  )    [address]  [facility] [address]   [phone]            Nov 21, 2018 10:30 AM CST   Return Visit with Clint Gutiérrez MD   [facility] ([facility])    [address]  [facility] [address]   [phone]            Nov 21, 2018  2:15 PM CST   Return Visit with Nishi Oro MD   [facility] ([facility])    [address] [place] [address] [phone]              Future tests that were ordered for you today     Open Future Orders        Priority Expected  Expires Ordered    XR Lumbar Epidural Injection Incl Imaging Routine 10/3/2018 10/3/2019 10/3/2018            Who to contact     Please call your clinic at [phone] to:    Ask questions about your health    Make or cancel appointments    Discuss your medicines    Learn about your test results    Speak to your doctor            Additional Information About Your Visit        [employer] Information     [employer] gives you secure access to your electronic health record. If you see a primary care provider, you can also send messages to your care team and make appointments. If you have questions, please call your primary care clinic.  If you do not have a primary care provider, please call [phone] and they will assist you.      [employer] is an electronic gateway that provides easy, online access to your medical records. With [employer], you can request a clinic appointment, read your test results, renew a prescription or communicate with your care team.     To access your existing account, please contact your [facility] or call [phone] for assistance.        Care EveryWhere ID     This is your Care EveryWhere ID. This could be used by other organizations to access your [facility] medical records  [id number]        Your Vitals Were     Pulse Temperature Respirations Pulse Oximetry          93 97.4  F (36.3  C) (Oral) 16 97%         Blood Pressure from Last 3 Encounters:   10/04/18 136/69   10/03/18 131/76   09/24/18 117/78    Weight from Last 3 Encounters:   09/24/18 78 kg (172 lb)   09/19/18 78 kg (172 lb)   08/16/18 79.6 kg (175 lb 6.4 oz)              Today, you had the following     No orders found for display         Today's Medication Changes          These changes are accurate as of 10/4/18 11:04 AM.  If you have any questions, ask your nurse or doctor.               Start taking these medicines.        Dose/Directions    ZOLMitriptan 5 MG tablet   Commonly known as:  ZOMIG   Used  for:  PSP (progressive supranuclear palsy) (H), Migraine without aura and without status migrainosus, not intractable   Started by:  Paulo Saravia MD        Dose:  5 mg   Take 1 tablet (5 mg) by mouth at onset of headache for migraine   Quantity:  30 tablet   Refills:  5         These medicines have changed or have updated prescriptions.        Dose/Directions    nortriptyline 10 MG capsule   Commonly known as:  PAMELOR   Indication:  taking 30 mg   This may have changed:  additional instructions   Used for:  Migraine without aura and without status migrainosus, not intractable        TAKE 3 CAPSULES AT BEDTIME   Quantity:  270 capsule   Refills:  3         Stop taking these medicines if you haven't already. Please contact your care team if you have questions.     loperamide 2 MG capsule   Commonly known as:  IMODIUM   Stopped by:  Paulo Saravia MD                Where to get your medicines      These medications were sent to [facility] [address] [address]     Phone:  [phone]     amantadine 100 MG capsule    nortriptyline 10 MG capsule    ZOLMitriptan 5 MG tablet               Information about OPIOIDS     PRESCRIPTION OPIOIDS: WHAT YOU NEED TO KNOW   We gave you an opioid (narcotic) pain medicine. It is important to manage your pain, but opioids are not always the best choice. You should first try all the other options your care team gave you. Take this medicine for as short a time (and as few doses) as possible.    Some activities can increase your pain, such as bandage changes or therapy sessions. It may help to take your pain medicine 30 to 60 minutes before these activities. Reduce your stress by getting enough sleep, working on hobbies you enjoy and practicing relaxation or meditation. Talk to your care team about ways to manage your pain beyond prescription opioids.    These medicines have risks:    DO NOT  drive when on new or higher doses of pain medicine. These medicines can affect your alertness and reaction times, and you could be arrested for driving under the influence (DUI). If you need to use opioids long-term, talk to your care team about driving.    DO NOT operate heavy machinery    DO NOT do any other dangerous activities while taking these medicines.    DO NOT drink any alcohol while taking these medicines.     If the opioid prescribed includes acetaminophen, DO NOT take with any other medicines that contain acetaminophen. Read all labels carefully. Look for the word  acetaminophen  or  Tylenol.  Ask your pharmacist if you have questions or are unsure.    You can get addicted to pain medicines, especially if you have a history of addiction (chemical, alcohol or substance dependence). Talk to your care team about ways to reduce this risk.    All opioids tend to cause constipation. Drink plenty of water and eat foods that have a lot of fiber, such as fruits, vegetables, prune juice, apple juice and high-fiber cereal. Take a laxative (Miralax, milk of magnesia, Colace, Senna) if you don t move your bowels at least every other day. Other side effects include upset stomach, sleepiness, dizziness, throwing up, tolerance (needing more of the medicine to have the same effect), physical dependence and slowed breathing.    Store your pills in a secure place, locked if possible. We will not replace any lost or stolen medicine. If you don t finish your medicine, please throw away (dispose) as directed by your pharmacist. The [place] [employer]ution [employer] has more information about safe disposal: [email or web address]         Primary Care Provider Office Phone # Fax #    Evelina Morse [phone] [address]  [facility] [address]        Equal Access to Services     LEDY STEELE AH: emerita Hansen qaybta  gely espinozanilsa heller ah. [facility] [phone].    ATENCIÓN: Si ruthy briceno, tiene a langford disposición servicios gratuitos de asistencia lingüística. Kiel al [phone].    We comply with applicable federal civil rights laws and [place] laws. We do not discriminate on the basis of race, color, national origin, age, disability, sex, sexual orientation, or gender identity.            Thank you!     Thank you for choosing [facility] NEUROLOGY  for your care. Our goal is always to provide you with excellent care. Hearing back from our patients is one way we can continue to improve our services. Please take a few minutes to complete the written survey that you may receive in the mail after your visit with us. Thank you!             Your Updated Medication List - Protect others around you: Learn how to safely use, store and throw away your medicines at www.disposemymeds.org.          This list is accurate as of 10/4/18 11:04 AM.  Always use your most recent med list.                   Brand Name Dispense Instructions for use Diagnosis    acetaminophen 500 MG tablet    TYLENOL     Take 2 tablets (1,000 mg) by mouth 3 times daily    Closed displaced fracture of second cervical vertebra with routine healing, unspecified fracture morphology, subsequent encounter       adapalene 0.1 % gel    DIFFERIN    135 g    Apply to Face topically every day and evening shift for Acne prevention    Acne vulgaris       amantadine 100 MG capsule    SYMMETREL    270 capsule    Take 1 capsule (100 mg) by mouth 3 times daily 6am,11am and 4pm    PSP (progressive supranuclear palsy) (H)       amLODIPine 2.5 MG tablet    NORVASC     Take 1 tablet by mouth daily        BABY ASPIRIN 81 MG chewable tablet   Generic drug:  aspirin      Take 81 mg by mouth daily        BENICAR 20 MG tablet   Generic drug:  olmesartan      Take 1 tablet (20 mg) by mouth 2 times daily    PSP (progressive supranuclear palsy) (H)        bisacodyl 10 MG Suppository    DULCOLAX    30 suppository    Place 1 suppository (10 mg) rectally every other day If no BM in 3 days.    Other constipation       CLARITIN 10 MG capsule   Generic drug:  loratadine     30 capsule    Take 10 mg by mouth daily    Chronic seasonal allergic rhinitis, unspecified trigger       hydrochlorothiazide 12.5 MG Tabs tablet     90 tablet    Take 1 tablet (12.5 mg) by mouth daily    Essential hypertension       methocarbamol 500 MG tablet    ROBAXIN    120 tablet    Take 1 tablet (500 mg) by mouth 4 times daily    Closed displaced fracture of second cervical vertebra with routine healing, unspecified fracture morphology, subsequent encounter       nortriptyline 10 MG capsule    PAMELOR    270 capsule    TAKE 3 CAPSULES AT BEDTIME    Migraine without aura and without status migrainosus, not intractable       order for DME     1 each    Equipment being ordered: Nebulizer    Chronic cough       polyethylene glycol 0.4%- propylene glycol 0.3% 0.4-0.3 % Soln ophthalmic solution    SYSTANE ULTRA     Place 1 drop into both eyes 3 times daily        * polyethylene glycol powder    MIRALAX/GLYCOLAX     Take 1 capful by mouth daily        * MIRALAX powder   Generic drug:  polyethylene glycol     510 g    Take 17 g (1 capful) by mouth daily as needed for constipation        rivastigmine 9.5 MG/24HR 24 hr patch    EXELON    90 patch    Place 1 patch onto the skin daily    PSP (progressive supranuclear palsy) (H)       senna-docusate 8.6-50 MG per tablet    SENOKOT-S;PERICOLACE    120 tablet    Take 2 tablets by mouth 2 times daily    Chronic constipation       sodium chloride 3 % Nebu neb solution     180 mL    Take 3 mLs by nebulization every 3 hours as needed for wheezing    Chronic cough       * traMADol 50 MG tablet    ULTRAM    120 tablet    Take 0.5 tablets (25 mg) by mouth every 4 hours (while awake) May also take 0.5 tablet every 6 hours as needed.    Closed displaced fracture of  second cervical vertebra with routine healing, unspecified fracture morphology, subsequent encounter       * traMADol 50 MG tablet    ULTRAM    150 tablet    1/2 of 50mg tab by mouth 4/day: 7am, noon, 5pm and 10pm    Closed displaced fracture of second cervical vertebra with routine healing, unspecified fracture morphology, subsequent encounter       Vitamin D-3 Super Strength 2000 units tablet   Generic drug:  cholecalciferol      Take 1 tablet by mouth        ZOLMitriptan 5 MG tablet    ZOMIG    30 tablet    Take 1 tablet (5 mg) by mouth at onset of headache for migraine    PSP (progressive supranuclear palsy) (H), Migraine without aura and without status migrainosus, not intractable       * Notice:  This list has 4 medication(s) that are the same as other medications prescribed for you. Read the directions carefully, and ask your doctor or other care provider to review them with you.

## 2018-10-04 NOTE — LETTER
10/4/2018      RE: Jimmy Patrick  [address]  [place] [address]       Diagnosis/Summary/Recommendations:    PATIENT: Jimmy Patrick  67 year old male     : 1950    POLA: 2018    PSP  BLOOD pressure is better and under control. He is taking amlodipine, hydrochlorothiazide and olmesartan/benicar.  Constipation - ongoing problems and is taking senokot 2 tabs 2/day and miralax as needed  And using dulcolax as needed   Not using immodium    He remains on amantadine 100mg 3/day    He is using adapelene twice daily or once daily.   He uses Vaseline as needed topically.     2018  which was seen for and ear infection which was treated with amoxicillin capsules for 10 days.   Staring on the   Put on claritin at the time and remains on this.     Continues to have a cough    He is continuing on robaxin 500mg 4/day = to  Manage his neck and back pain.   After c2 fracture and his inability to do exercises his back pain has gottent worse    He continues on pamelor and has not had migraine for over year.    He uses systane for his eyes    Using 9.5mg exelon rivastigmine patch daily     He continues to uses vitamin D3    He is not on a mvi    He is on baby aspirin    He continues on acetaminophen 1000mg 3/day for back/neck pain.     He continues on tramadol and taking it scheduled.    He has not taken zomig - has not had migraines.     Still has ongoing numbness in his toes.     He has long term care person - living in   They don't need a nurse at this point to administer medications but the nurse is there to set up the medication    Nathen (Montecristina) Michelle is the live in person from [employer].     He has a hospital bed    He has not had a pneumonia to date    The aide has 8 hours off to sleep.     Some of the costs are covered from their long term care insurance    Daily cost is 360 dollars per day but 1.5 on holidays  365/nurse/per month  They have 103.50  coverage from long term care  It is 8k out of pocket expenses.    They are using ice/heat for neck/back pain    They have seen Dr. Mohamud of sports medicine at [facility].     They have been trying therapy and chin tucks/back bends    They have not tried aspercreme    He has ongoing urinary incontinence  He is using a condom catheter at night.     The fall and break affected his mobility and independence eg toileting.  Since the break he had incontinence which is better but independence is less than before.     They have seen Dr. Oro who has been great and helpful.     They have looked at , [facility] hospice and [facility].   The former is a nonprofit.   If they decide to hospice they won't be able to continue physical therapy.   The hospice would be done in home and the costs would not be entirely covered.           Medications                                                                                                                                                History obtained from patient      Coding statement:   Duration of  Services: patient care and care coordination was 25 minutes  Greater than 50% of this visit was spent in counseling and coordination of care.     Paulo Saravia MD     ______________________________________    Last visit date and details:               ______________________________________      Patient was asked about 14 Review of systems including changes in vision (dry eyes, double vision), hearing, heart, lungs, musculoskeletal, depression, anxiety, snoring, RBD, insomnia, urinary frequency, urinary urgency, constipation, swallowing problems, hematological, ID, allergies, skin problems: seborrhea, endocrinological: thyroid, diabetes, cholesterol; balance, weight changes, and other neurological problems and these were not significant at this time except for   Patient Active Problem List   Diagnosis     Transient paralysis of limb     Migraine without aura     Speech disturbance      PSP (progressive supranuclear palsy) (H)     Fluttering heart     Leg swelling     Varicose veins     Headache     Memory loss     Urinary urgency     Blurred vision     Double vision     Tinnitus     Sinus disorder     Epistaxis     DISK (aka Displacement of intervertebral disc, site unspecified, without myelopathy)     Impairment of balance     Signs and symptoms involving cognition     Fatigue     Migraine without status migrainosus, not intractable     Palpitations     Parkinsonism (H)     Progressive supranuclear ophthalmoplegia (H)     Speech dysfunction     Cellular blue nevus     S/P ablation of atrial flutter     Migraine without aura and without status migrainosus, not intractable     Parkinsonism, unspecified Parkinsonism type (H)     Essential hypertension     CARDIOVASCULAR SCREENING; LDL GOAL LESS THAN 160     Cognitive complaints     ACP (advance care planning)     Closed fracture of second cervical vertebra (H)     Fall     Acute pain due to trauma     Acute pain due to injury     Closed displaced fracture of second cervical vertebra with routine healing, unspecified fracture morphology, subsequent encounter     Other constipation     At high risk for aspiration     Closed nondisplaced fracture of second cervical vertebra, unspecified fracture morphology, initial encounter (H)     Acute left-sided low back pain without sciatica          Allergies   Allergen Reactions     Other [Seasonal Allergies]      environmental     Codeine Other (See Comments) and Rash     GI upset  GI upset  GI upset     Past Surgical History:   Procedure Laterality Date     BIOPSY  2016    Dermatologist remoed skin mole (not cancerus)     CARDIAC SURGERY  2013 ablation    Successful in eliminatng heart flutter     COLONOSCOPY  2005?     H ABLATION ATRIAL FLUTTER       Past Medical History:   Diagnosis Date     Blurred vision 8/11/2014     Cancer (H) 1979; 1960's    Florin: father; grandparents (on father's side)      Depressive disorder April, 2014    When received diagnosis of PSP     DISK (aka Displacement of intervertebral disc, site unspecified, without myelopathy) 8/11/2014     Displacement of cervical intervertebral disc without myelopathy (HERNIATED DISK) 8/11/2014     Double vision 8/11/2014     Epistaxis 8/11/2014     Fluttering heart 8/11/2014     Headache 8/11/2014     Hypertension February, 2013     Leg swelling 8/11/2014     Memory loss 8/11/2014     Migraines 2007    Ramila: karen     PSP (progressive supranuclear palsy) (H) 8/11/2014     Sinus disorder 8/11/2014     Tinnitus 8/11/2014     Urinary urgency 8/11/2014     Varicose veins 8/11/2014     Social History     Social History     Marital status:      Spouse name: N/A     Number of children: N/A     Years of education: N/A     Occupational History     Not on file.     Social History Main Topics     Smoking status: Never Smoker     Smokeless tobacco: Never Used     Alcohol use No     Drug use: No     Sexual activity: Not Currently     Partners: Female     Other Topics Concern     Parent/Sibling W/ Cabg, Mi Or Angioplasty Before 65f 55m? 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  Medications and Vitals not listed above were documented in the cart and reviewed by me.     Current Outpatient Prescriptions   Medication Sig Dispense Refill     acetaminophen (TYLENOL) 500 MG tablet Take 2 tablets (1,000 mg) by mouth  3 times daily       adapalene (DIFFERIN) 0.1 % gel Apply to Face topically every day and evening shift for Acne prevention 135 g 3     amantadine (SYMMETREL) 100 MG capsule Take 1 capsule (100 mg) by mouth 3 times daily 6am,11am and 4pm 270 capsule 3     amLODIPine (NORVASC) 2.5 MG tablet Take 1 tablet by mouth daily       aspirin (BABY ASPIRIN) 81 MG chewable tablet Take 81 mg by mouth daily        bisacodyl (DULCOLAX) 10 MG Suppository Place 1 suppository (10 mg) rectally every other day If no BM in 3 days. 30 suppository 11     cholecalciferol (VITAMIN D-3 SUPER STRENGTH) 2000 UNITS tablet Take 1 tablet by mouth        hydrochlorothiazide 12.5 MG TABS tablet Take 1 tablet (12.5 mg) by mouth daily 90 tablet 3     loperamide (IMODIUM) 2 MG capsule Take 2 mg by mouth every 4 hours as needed for diarrhea       loratadine (CLARITIN) 10 MG capsule Take 10 mg by mouth daily 30 capsule      methocarbamol (ROBAXIN) 500 MG tablet Take 1 tablet (500 mg) by mouth 4 times daily 120 tablet 5     nortriptyline (PAMELOR) 10 MG capsule TAKE 3 CAPSULES AT BEDTIME (Patient taking differently: TAKE 3 CAPSULES AT BEDTIME) 270 capsule 3     olmesartan (BENICAR) 20 MG tablet Take 1 tablet (20 mg) by mouth 2 times daily       order for DME Equipment being ordered: Nebulizer 1 each 0     polyethylene glycol (MIRALAX/GLYCOLAX) powder Take 1 capful by mouth daily       polyethylene glycol 0.4%- propylene glycol 0.3% (SYSTANE ULTRA) 0.4-0.3 % SOLN ophthalmic solution Place 1 drop into both eyes 3 times daily       rivastigmine (EXELON) 9.5 MG/24HR 24 hr patch Place 1 patch onto the skin daily 90 patch 3     senna-docusate (SENOKOT-S;PERICOLACE) 8.6-50 MG per tablet Take 2 tablets by mouth 2 times daily 120 tablet 0     sodium chloride 3 % NEBU neb solution Take 3 mLs by nebulization every 3 hours as needed for wheezing 180 mL 3     traMADol (ULTRAM) 50 MG tablet Take 0.5 tablets (25 mg) by mouth every 4 hours (while awake) May also take 0.5  tablet every 6 hours as needed. 120 tablet 0     ZOLMitriptan (ZOMIG) 5 MG tablet Take 1 tablet (5 mg) by mouth at onset of headache for migraine 30 tablet 5         Paulo Saravia MD

## 2018-10-17 NOTE — PROGRESS NOTES
Thank you for your referral to  hospice for this patient. Hospice met with patient on 9/27/18. Our medical director has talked with Dr Oro about several aspects of pt care. Pt wanted to pursue out patient therapy for pain back and neck pain that would not be covered by hospice. It appears pt continues with therapy at this time. Pt also on rivastigmine that would not be covered by hospice and medical director would need to talk with Dr Klein about this and the amantadine.  Pt was also considering Clinical trial for [id number]:  This would be up to the trial if they would want to continue while patient was on hospice and family if they wanted to pursue that if not able to do while on hospice. At this time hospice is not pursuing admission and will wait to hear on progress of patient and decisions regarding treatment. Please feel free to reach out with additional questions or send new orders for hospice when questions have been addressed and patient is ready to pursue hospice.     Thank you for your care and assistance with this,    Verona Estrada RN [facility]  Hospice Day Triage RN    [phone]  [email or web address]

## 2018-10-23 NOTE — PROGRESS NOTES
Subjective:  HPI                    Objective:  System    Physical Exam    General     ROS    Assessment/Plan:    PROGRESS  REPORT    Progress reporting period is from 9-10-1-8 to 10-2-18.     SUBJECTIVE  Subjective: Pt reports neck and back both are more sore today. Did sit at dentist yesterday and was hard on neck to lay in the chair for a period of time. Does note he feels better after cervical retraction and when we help him back into better posture. Does feel like masssage to low back was also helpful in sitting but feels the need to find better positioning as it was uncomfortable for neck (worked to hard to sit forward in chair).       Initial Pain level:  (2/10 neck; 5/10 low back)   Changes in function: Yes, see goal flowsheet for details.    Adverse reactions: None;      OBJECTIVE    Today's Objective: 22 degrees B; Palable knot at R UT and L low back just superior and lateral to PSIS; 5/10 PL at beginning of session; 4/10 PL after manual work; improved posture in sitting after manual retraction by PTA      ASSESSMENT/PLAN  Updated problem list and treatment plan: Diagnosis 1:  Neck pain  Pain -  manual therapy, self management, education, directional preference exercise and home program  Decreased ROM/flexibility - manual therapy, therapeutic exercise and home program  Decreased joint mobility - manual therapy, therapeutic exercise and home program  Decreased function - therapeutic activities and home program  Impaired posture - neuro re-education and home program  Diagnosis 2:  Low back pain   Pain -  manual therapy, self management, education, directional preference exercise and home program  Decreased ROM/flexibility - manual therapy, therapeutic exercise and home program  Decreased joint mobility - manual therapy, therapeutic exercise and home program  Decreased function - therapeutic activities and home program  Impaired posture - neuro re-education and home program  STG/LTGs have been met or progress  has been made towards goals:  Yes (See Goal flow sheet completed today.)  Assessment of Progress: The patient's condition is slowly improving in regards to pain and ROM. No significant change in function.   Patient is slowly progressing towards short term goals and is progressing towards long term goals.  G codes could not be reported by the therapist as patient was seen by PTA today.   Self Management Plans:  Patient has been instructed in a home treatment program.  Patient  has been instructed in self management of symptoms.  I have discussed this patient with primary PT and find that the nature, scope, duration and intensity of the therapy is appropriate for the medical condition of the patient.  Jimmy continues to require the following intervention to meet STG and LTG's: PT      Recommendations:  This patient would benefit from continued therapy.     Frequency:  2 X week, once daily  Duration:  for 2 weeks tapering to 1 X a week over 7 weeks      The progress note/discharge summary was written in collaboration with and reviewed by the physical therapist.    Please refer to the daily flowsheet for treatment today, total treatment time and time spent performing 1:1 timed codes.

## 2018-10-24 NOTE — TELEPHONE ENCOUNTER
"Spoke to Candace who had seen patient today and follows patient weekly. She called with concern that patient has LW edema. Last week and in general, patient \"does not have much\" edema. She states that his right leg has 3+ pitting edema and the left has 1+ pitting edema. All VS WNL. Patient does not report any pain. Denies SOB. Patient is generally not very active though did walk on the treadmill for a short time yesterday. He stopped wearing compression stockings around memorial day as he had been started on a diuretic and did not need the stockings. He did put them back on today. They do not report an increase in sodium in their diet. Raymond does \"salt\" his food. Confirmed these symptoms with patient's wife Kriss. He has been taking the hydrochlorothiazide 12.5 mg daily without difficulty.     Advised that Raymond should be seen this week given concern for unilateral swelling. If provider feels that Raymond needs sooner follow up with Dr. Gutiérrez, at this time, he does have availability next week at [facility]. Transferred patient's wife to call center to schedule with PCP clinic.    Will route to Dr. Gutiérrez as an FYI and for any recommendations.     Lou Thomas, RN, BSN      "

## 2018-10-24 NOTE — TELEPHONE ENCOUNTER
Health Call Center    Phone Message    May a detailed message be left on voicemail: yes    Reason for Call: Other: Candace with [facility] is calling to report new symptoms that patient is experiencing.  He is having edema in bilateral ankles and feet.  The right is greater than the left, the right is +3 pitting and left is +1 pitting.  Patient is on a few heart meds and that is why she is reporting to the cardiologist.       Action Taken: Message routed to:  Adult Clinics: Cardiology p [id number]

## 2018-10-25 NOTE — PROGRESS NOTES
Srikanth Patrick,    Attached are your test results.  Lab negative for signs of congestive heart failure    Please contact us if you have any questions.    Caroline Mohamud, CNP

## 2018-10-25 NOTE — MR AVS SNAPSHOT
After Visit Summary   10/25/2018    Jimmy Patrick    MRN: [id number]           Patient Information     Date Of Birth          1950        Visit Information        Provider Department      10/25/2018 9:30 AM Caroline Mohamud APRN CNP [facility]        Today's Diagnoses     Bilateral leg edema    -  1      Care Instructions    PLAN:   1.   Symptomatic therapy suggested: increase hydrochlorothiazide to 25 mg a day for 3 days then go back to the 12.5 mg a day  Keep compression on while and get on before out of bed in the morning.  2.  Orders Placed This Encounter   Procedures     Basic metabolic panel     CBC with platelets     N terminal pro BNP outpatient       3. Patient needs to follow up in if no improvement,or sooner if worsening of symptoms or other symptoms develop.  Will follow up and/or notify patient of  results via My Chart to determine further need for followup    It was a pleasure seeing you today at the [facility]. Thank you for allowing us to care for you today. We truly hope we provided you with the excellent service you deserve. Please let us know if there is anything else we can do for you so we can be sure you are leaving completley satisfied with your care experience.       General information about your clinic   Clinic Hours Lab Hours (Appointments are required)   Mon-Thurs: 7:30 AM - 7 PM Mon-Thurs: 7:30 AM - 7 PM   Fri: 7:30 AM - 5 PM Fri: 7:30 AM - 5 PM        After Hours Nurse Advise & Appts:  Ale Nurse Advisors: [phone]  Ale On Call: to make appointments anytime: [phone] On Call Physician: call [phone] and answering service will page the on call physician.        For urgent appointments, please call [phone] and ask for the triage nurse or your care team clinic nurse.  How to contact my care team:  MyChart: [email or web address]   Phone: [phone]   Fax: [phone]        [facility]:   Phone: [phone]  Hours: 8:00 AM - 6:00 PM  Medication Refills:  Call your pharmacy and they will forward the refill to us. Please allow 3 business days for your refills to be completed.       Normal or non-critical lab and imaging results will be communicated to you by MyChart, letter or phone within 7 days.  If you do not hear from us within 10 days, please call the clinic. If you have a critical or abnormal lab result, we will notify you by phone as soon as possible.       We now have PWIC (Pediatric Walk in Care)  Monday-Friday from 7:30-4. Simply walk in and be seen for your urgent needs like cough, fever, rash, diarrhea or vomiting, pink eye, UTI. No appointments needed. Ask one of the team for more information      -Your Care Team:    Dr. Adi Croft - Internal Medicine/Pediatrics   Dr. Evelina Morse - Family Medicine  Dr. Maddi Christianson - Pediatrics  Dr. Monique Desir - Pediatrics  Caroline Mohamud CNP - Family Practice Nurse Practitioner                         Follow-ups after your visit        Your next 10 appointments already scheduled     Oct 25, 2018 11:00 AM CDT   AUDREY Spine with nAgelina Guerrier, PT   [facility] ([facility]  )    [address]  [facility] [address]   [phone]            Oct 29, 2018 11:00 AM CDT   AUDREY Spine with Angelina Guerrier, PT   [facility] ([facility]  )    [address]  [facility] [address]   [phone]            Nov 07, 2018  2:00 PM CST   AUDREY Spine with Angelina Guerrier, PT   [facility] ([facility]  )    [address]  [facility] [address]   [phone]            Nov 13, 2018  2:00 PM CST   AUDREY Spine with Angelina Guerrier, PT   [facility] ([facility]  )    [address]  [facility] [address]   [phone]            Nov 21, 2018  10:30 AM CST   [facility] with Clint Guti[facility]rez MD   [facility]    [address] [place] [id number] [phone]            Nov 21, 2018  2:15 PM CST   Return Visit with Nishi Oro MD   [facility]    [address] [place] [id number] [phone]            Apr 16, 2019 10:30 AM CDT   Return Visit with Rafi Ramirez MD   [facility]    [address] [place] [id number] [phone]              Who to contact     If you have questions or need follow up information about today's clinic visit or your schedule please contact [facility] directly at [phone].  Normal or non-critical lab and imaging results will be communicated to you by CloudFlarehart, letter or phone within 4 business days after the clinic has received the results. If you do not hear from us within 7 days, please contact the clinic through IT'SUGARt or phone. If you have a critical or abnormal lab result, we will notify you by phone as soon as possible.  Submit refill requests through ebridge or call your pharmacy and they will forward the refill request to us. Please allow 3 business days for your refill to be completed.          Additional Information About Your Visit        CloudFlarehart Information     ebridge gives you secure access to your electronic health record. If you see a primary care provider, you can also send messages to your care team and make appointments. If you have questions, please call your primary care clinic.  If you do not have a primary care provider, please call [phone] and they will assist you.      ebridge is an electronic gateway that provides easy, online access to your medical records. With ebridge, you can request a clinic appointment, read your test results, renew a prescription or communicate  with your care team.     To access your existing account, please contact your [facility] or call [phone] for assistance.        Care EveryWhere ID     This is your Care EveryWhere ID. This could be used by other organizations to access your [facility] medical records  [id number]        Your Vitals Were     Pulse Temperature Pulse Oximetry BMI (Body Mass Index)          88 98.1  F (36.7  C) (Temporal) 98% 23.94 kg/m2         Blood Pressure from Last 3 Encounters:   10/25/18 115/70   10/04/18 136/69   10/03/18 131/76    Weight from Last 3 Encounters:   10/25/18 176 lb 8 oz (80.1 kg)   09/24/18 172 lb (78 kg)   09/19/18 172 lb (78 kg)              We Performed the Following     Basic metabolic panel     CBC with platelets     N terminal pro BNP outpatient        Primary Care Provider Office Phone # Fax #    Evelina Morse [phone]       [address]  [facility] [address]        Equal Access to Services     [facility] : Hadii aad ku hadasho Soomaali, waaxda luqadaha, qaybta kaalmada adeegyada, waxfabby vivar haysindhu heller . So [facility] [phone].    ATENCIÓN: Si habla español, tiene a langford disposición servicios gratuitos de asistencia lingüística. Llame al [phone].    We comply with applicable federal civil rights laws and [place] laws. We do not discriminate on the basis of race, color, national origin, age, disability, sex, sexual orientation, or gender identity.            Thank you!     Thank you for choosing [facility]  for your care. Our goal is always to provide you with excellent care. Hearing back from our patients is one way we can continue to improve our services. Please take a few minutes to complete the written survey that you may receive in the mail after your visit with us. Thank you!             Your Updated Medication List - Protect others around you: Learn how to safely use, store and throw away your  medicines at www.disposemymeds.org.          This list is accurate as of 10/25/18 10:54 AM.  Always use your most recent med list.                   Brand Name Dispense Instructions for use Diagnosis    acetaminophen 500 MG tablet    TYLENOL     Take 2 tablets (1,000 mg) by mouth 3 times daily    Closed displaced fracture of second cervical vertebra with routine healing, unspecified fracture morphology, subsequent encounter       adapalene 0.1 % gel    DIFFERIN    135 g    Apply to Face topically every day and evening shift for Acne prevention    Acne vulgaris       amantadine 100 MG capsule    SYMMETREL    270 capsule    Take 1 capsule (100 mg) by mouth 3 times daily 6am,11am and 4pm    PSP (progressive supranuclear palsy) (H)       amLODIPine 2.5 MG tablet    NORVASC     Take 1 tablet by mouth daily        BABY ASPIRIN 81 MG chewable tablet   Generic drug:  aspirin      Take 81 mg by mouth daily        BENICAR 20 MG tablet   Generic drug:  olmesartan      Take 1 tablet (20 mg) by mouth 2 times daily    PSP (progressive supranuclear palsy) (H)       bisacodyl 10 MG Suppository    DULCOLAX    30 suppository    Place 1 suppository (10 mg) rectally every other day If no BM in 3 days.    Other constipation       CLARITIN 10 MG capsule   Generic drug:  loratadine     30 capsule    Take 10 mg by mouth daily    Chronic seasonal allergic rhinitis, unspecified trigger       hydrochlorothiazide 12.5 MG Tabs tablet     90 tablet    Take 1 tablet (12.5 mg) by mouth daily    Essential hypertension       methocarbamol 500 MG tablet    ROBAXIN    120 tablet    Take 1 tablet (500 mg) by mouth 4 times daily    Closed displaced fracture of second cervical vertebra with routine healing, unspecified fracture morphology, subsequent encounter       MIRALAX powder   Generic drug:  polyethylene glycol     510 g    Take 17 g (1 capful) by mouth daily as needed for constipation        nortriptyline 10 MG capsule    PAMELOR    270 capsule     TAKE 3 CAPSULES AT BEDTIME    Migraine without aura and without status migrainosus, not intractable       order for DME     1 each    Equipment being ordered: Nebulizer    Chronic cough       polyethylene glycol 0.4%- propylene glycol 0.3% 0.4-0.3 % Soln ophthalmic solution    SYSTANE ULTRA     Place 1 drop into both eyes 3 times daily        rivastigmine 9.5 MG/24HR 24 hr patch    EXELON    90 patch    Place 1 patch onto the skin daily    PSP (progressive supranuclear palsy) (H)       senna-docusate 8.6-50 MG per tablet    SENOKOT-S;PERICOLACE    120 tablet    Take 2 tablets by mouth 2 times daily    Chronic constipation       sodium chloride 3 % Nebu neb solution     180 mL    Take 3 mLs by nebulization every 3 hours as needed for wheezing    Chronic cough       * traMADol 50 MG tablet    ULTRAM    120 tablet    Take 0.5 tablets (25 mg) by mouth every 4 hours (while awake) May also take 0.5 tablet every 6 hours as needed.    Closed displaced fracture of second cervical vertebra with routine healing, unspecified fracture morphology, subsequent encounter       * traMADol 50 MG tablet    ULTRAM    150 tablet    1/2 of 50mg tab by mouth 4/day: 7am, noon, 5pm and 10pm    Closed displaced fracture of second cervical vertebra with routine healing, unspecified fracture morphology, subsequent encounter       Vitamin D-3 Super Strength 2000 units tablet   Generic drug:  cholecalciferol      Take 1 tablet by mouth        ZOLMitriptan 5 MG tablet    ZOMIG    30 tablet    Take 1 tablet (5 mg) by mouth at onset of headache for migraine    PSP (progressive supranuclear palsy) (H), Migraine without aura and without status migrainosus, not intractable       * Notice:  This list has 2 medication(s) that are the same as other medications prescribed for you. Read the directions carefully, and ask your doctor or other care provider to review them with you.

## 2018-10-25 NOTE — PROGRESS NOTES
SUBJECTIVE:   Jimmy Patrick is a 67 year old male who presents to clinic today for the following health issues:    Concern - swelling in bilateral ankles  Onset: noticed yesterday during home health care nurse    Description:   Home health nurse noted swelling in bilateral ankles, worst in the right side. No pain per patient. Has been using compression stockings for the swelling.    Intensity: moderate    Progression of Symptoms:  same    Accompanying Signs & Symptoms:  Swelling    Previous history of similar problem:   Last year    Precipitating factors:   Worsened by: none    Alleviating factors:  Improved by: none    Therapies Tried and outcome: compression stockings     Home health was out yesterday and they advised to go to clinic and have his legs looked at.   He lives home with 24hr supervision  Today with BLE edema- left side 1+, right side 2+  Noticed this on Monday, started wearing compression stocking then  Denies SOB, however not very active.  Denies pain and trauma.  Has been taking 12.5 hydrochlorothiazide as ordered  Wife states he has been urinating, but overall intake and output has decreased d/t aspiration   He is aspirating about 45% of the time, per pt-when he gets to 50%, he doyle stop eating, refusing feeding tube, cough is getting weaker  Patient is in palliative care, follows with Dr. Oro    He presents with his wife in a wheelchair. He is able to state 'yes' and 'no', using thumbs up for yes and points his finger for no. Is able to get on treadmill at times, but that is becoming less frequent.     Problem list and histories reviewed & adjusted, as indicated.  Additional history: as documented    Patient Active Problem List   Diagnosis     Transient paralysis of limb     Migraine without aura     Speech disturbance     Progressive supranuclear palsy (H)     Fluttering heart     Leg swelling     Varicose veins     Headache     Memory loss     Urinary urgency     Blurred vision      Double vision     Tinnitus     Sinus disorder     Epistaxis     DISK (aka Displacement of intervertebral disc, site unspecified, without myelopathy)     Impairment of balance     Signs and symptoms involving cognition     Fatigue     Migraine without status migrainosus, not intractable     Palpitations     Parkinsonism (H)     Progressive supranuclear ophthalmoplegia (H)     Speech dysfunction     Cellular blue nevus     S/P ablation of atrial flutter     Migraine without aura and without status migrainosus, not intractable     Parkinsonism, unspecified Parkinsonism type (H)     Essential hypertension     CARDIOVASCULAR SCREENING; LDL GOAL LESS THAN 160     Cognitive complaints     ACP (advance care planning)     Closed fracture of second cervical vertebra (H)     Fall     Acute pain due to trauma     Acute pain due to injury     Closed displaced fracture of second cervical vertebra with routine healing, unspecified fracture morphology, subsequent encounter     Other constipation     At high risk for aspiration     Closed nondisplaced fracture of second cervical vertebra, unspecified fracture morphology, initial encounter (H)     Acute left-sided low back pain without sciatica     Past Surgical History:   Procedure Laterality Date     BIOPSY      Dermatologist remoed skin mole (not cancerus)     CARDIAC SURGERY   ablation    Successful in eliminatng heart flutter     COLONOSCOPY  ?     H ABLATION ATRIAL FLUTTER         Social History   Substance Use Topics     Smoking status: Never Smoker     Smokeless tobacco: Never Used     Alcohol use No     Family History   Problem Relation Age of Onset     Cancer Father      Other Cancer Father      throat cancer,       Hypertension Mother      Osteoporosis Mother      Hypertension Sister      Osteoporosis Sister      Osteoporosis Sister          Current Outpatient Prescriptions   Medication Sig Dispense Refill     acetaminophen (TYLENOL) 500 MG tablet Take  2 tablets (1,000 mg) by mouth 3 times daily       adapalene (DIFFERIN) 0.1 % gel Apply to Face topically every day and evening shift for Acne prevention 135 g 3     amantadine (SYMMETREL) 100 MG capsule Take 1 capsule (100 mg) by mouth 3 times daily 6am,11am and 4pm 270 capsule 3     amLODIPine (NORVASC) 2.5 MG tablet Take 1 tablet by mouth daily       aspirin (BABY ASPIRIN) 81 MG chewable tablet Take 81 mg by mouth daily        bisacodyl (DULCOLAX) 10 MG Suppository Place 1 suppository (10 mg) rectally every other day If no BM in 3 days. 30 suppository 11     cholecalciferol (VITAMIN D-3 SUPER STRENGTH) 2000 UNITS tablet Take 1 tablet by mouth        hydrochlorothiazide 12.5 MG TABS tablet Take 1 tablet (12.5 mg) by mouth daily 90 tablet 3     loratadine (CLARITIN) 10 MG capsule Take 10 mg by mouth daily 30 capsule      methocarbamol (ROBAXIN) 500 MG tablet Take 1 tablet (500 mg) by mouth 4 times daily 120 tablet 5     nortriptyline (PAMELOR) 10 MG capsule TAKE 3 CAPSULES AT BEDTIME 270 capsule 3     olmesartan (BENICAR) 20 MG tablet Take 1 tablet (20 mg) by mouth 2 times daily       polyethylene glycol (MIRALAX) powder Take 17 g (1 capful) by mouth daily as needed for constipation 510 g 1     polyethylene glycol 0.4%- propylene glycol 0.3% (SYSTANE ULTRA) 0.4-0.3 % SOLN ophthalmic solution Place 1 drop into both eyes 3 times daily       rivastigmine (EXELON) 9.5 MG/24HR 24 hr patch Place 1 patch onto the skin daily 90 patch 3     senna-docusate (SENOKOT-S;PERICOLACE) 8.6-50 MG per tablet Take 2 tablets by mouth 2 times daily 120 tablet 0     traMADol (ULTRAM) 50 MG tablet 1/2 of 50mg tab by mouth 4/day: 7am, noon, 5pm and 10pm 150 tablet 0     ZOLMitriptan (ZOMIG) 5 MG tablet Take 1 tablet (5 mg) by mouth at onset of headache for migraine 30 tablet 5     order for DME Equipment being ordered: Nebulizer (Patient not taking: Reported on 10/25/2018) 1 each 0     sodium chloride 3 % NEBU neb solution Take 3 mLs by  nebulization every 3 hours as needed for wheezing (Patient not taking: Reported on 10/25/2018) 180 mL 3     traMADol (ULTRAM) 50 MG tablet Take 0.5 tablets (25 mg) by mouth every 4 hours (while awake) May also take 0.5 tablet every 6 hours as needed. 120 tablet 0     Allergies   Allergen Reactions     Other [Seasonal Allergies]      environmental     Codeine Other (See Comments) and Rash     GI upset  GI upset  GI upset     BP Readings from Last 3 Encounters:   10/25/18 115/70   10/04/18 136/69   10/03/18 131/76    Wt Readings from Last 3 Encounters:   10/25/18 176 lb 8 oz (80.1 kg)   18 172 lb (78 kg)   18 172 lb (78 kg)         Labs reviewed in EPIC    Reviewed and updated as needed this visit by clinical staff  Tobacco  Allergies  Meds  Med Hx  Surg Hx  Fam Hx  Soc Hx      Reviewed and updated as needed this visit by Provider       ROS:  CONSTITUTIONAL: POSITIVE  for fatigue,  appetite, weight gain. NEGATIVE for fever and chills  ENT/MOUTH: NEGATIVE for bleeding gums, fever, nasal congestion, sinus pressure and swollen glands  RESP:POSITIVE for cough-non productive, per wife--his cough is getting very weak and NEGATIVE for dyspnea on exertion, hemoptysis, SOB/dyspnea and wheezing  CV: POSITIVE for lower extremity swelling, Was wearing compression stockings, denies pain and trauma to legs and NEGATIVE for chest pain/chest pressure, irregular heart beat, orthopnea, palpitations   GI: POSITIVE for dysphagia, poor appetite and weight loss and NEGATIVE for constipation, diarrhea and heartburn or reflux  : Wife does assist patient with condom cath daily, negative for dysuria, hematuria, decreased urinary stream, erectile dysfunctio  MUSCULOSKELETAL: Pt in wheelchair, states he will occasionally walk on treadmill, but is becoming weaker, POSITIVE  for muscle weakness throughout entire body and NEGATIVE for joint stiffness, joint swelling, joint warmth, muscle spasm and neck  pain      OBJECTIVE:     /70 (BP Location: Right arm, Patient Position: Sitting, Cuff Size: Adult Regular)  Pulse 88  Temp 98.1  F (36.7  C) (Temporal)  Wt 176 lb 8 oz (80.1 kg)  SpO2 98%  BMI 23.94 kg/m2  Body mass index is 23.94 kg/(m^2).   Wt Readings from Last 4 Encounters:   10/25/18 176 lb 8 oz (80.1 kg)   09/24/18 172 lb (78 kg)   09/19/18 172 lb (78 kg)   08/16/18 175 lb 6.4 oz (79.6 kg)       GENERAL: alert, no distress, pale, frail and fatigued  HENT: normal cephalic/atraumatic, ear canals and TM's normal, nose and mouth without ulcers or lesions, oral mucous membranes moist and excessive saliva build in mouth d/t impaired swallow  NECK: no adenopathy, no asymmetry, masses, or scars, thyroid normal to palpation and no carotid bruits  RESP: lungs clear to auscultation - no rales, rhonchi or wheezes, no rhonchi, no wheezes   CV: bilateral compression stockings removed for exam, bilateral lower extremity edema, left 1+, right 2+, BLE's warm, 2+ pedal pulses, no erythema or signs of infection noted, regular rate and rhythm, normal S1 S2, no S3 or S4, no murmur, click or rub  PSYCH: mentation appears normal, affect normal/bright and judgement and insight intact    Diagnostic Test Results:  Results for orders placed or performed in visit on 10/25/18   Basic metabolic panel   Result Value Ref Range    Sodium 140 133 - 144 mmol/L    Potassium 4.0 3.4 - 5.3 mmol/L    Chloride 103 94 - 109 mmol/L    Carbon Dioxide 31 20 - 32 mmol/L    Anion Gap 6 3 - 14 mmol/L    Glucose 87 70 - 99 mg/dL    Urea Nitrogen 18 7 - 30 mg/dL    Creatinine 0.86 0.66 - 1.25 mg/dL    GFR Estimate 88 >60 mL/min/1.7m2    GFR Estimate If Black >90 >60 mL/min/1.7m2    Calcium 9.6 8.5 - 10.1 mg/dL   CBC with platelets   Result Value Ref Range    WBC 6.6 4.0 - 11.0 10e9/L    RBC Count 5.61 4.4 - 5.9 10e12/L    Hemoglobin 16.8 13.3 - 17.7 g/dL    Hematocrit 49.9 40.0 - 53.0 %    MCV 89 78 - 100 fl    MCH 29.9 26.5 - 33.0 pg    MCHC 33.7  31.5 - 36.5 g/dL    RDW 12.2 10.0 - 15.0 %    Platelet Count 224 150 - 450 10e9/L   N terminal pro BNP outpatient   Result Value Ref Range    N-Terminal Pro Bnp 71 0 - 125 pg/mL       ASSESSMENT/PLAN:       Jimmy was seen today for swelling.    Diagnoses and all orders for this visit:    Bilateral leg edema  -     Basic metabolic panel  -     CBC with platelets  -     N terminal pro BNP outpatient    Essential hypertension  HTN Plan:  1)  Medication: continue current medication regimen unchanged  2)  Dietary sodium restriction  3)  Regular aerobic exercise  4)  Recheck in 1 month, sooner should new symptoms or   problems arise.  5) See todays orders.    Patient Education: Reviewed risks of hypertension and principles of   treatment.      S/P ablation of atrial flutter    Progressive supranuclear palsy (H)    PLAN:   1.   Symptomatic therapy suggested: increase hydrochlorothiazide to 25 mg a day for 3 days then go back to the 12.5 mg a day  Keep compression on while and get on before out of bed in the morning.  2.  Orders Placed This Encounter   Procedures     Basic metabolic panel     CBC with platelets     N terminal pro BNP outpatient       3. Patient needs to follow up in if no improvement,or sooner if worsening of symptoms or other symptoms develop.  Will follow up and/or notify patient of  results via My Chart to determine further need for followup    See Patient Instructions    Lilian EMERY Student  I have examined the patient and agree with written evaluation. Assessment and plan presented by this provider.   FARSHAD Gimenez APRN CNP  [facility]

## 2018-10-25 NOTE — PATIENT INSTRUCTIONS
PLAN:   1.   Symptomatic therapy suggested: increase hydrochlorothiazide to 25 mg a day for 3 days then go back to the 12.5 mg a day  Keep compression on while and get on before out of bed in the morning.  2.  Orders Placed This Encounter   Procedures     Basic metabolic panel     CBC with platelets     N terminal pro BNP outpatient       3. Patient needs to follow up in if no improvement,or sooner if worsening of symptoms or other symptoms develop.  Will follow up and/or notify patient of  results via My Chart to determine further need for followup    It was a pleasure seeing you today at the [facility]. Thank you for allowing us to care for you today. We truly hope we provided you with the excellent service you deserve. Please let us know if there is anything else we can do for you so we can be sure you are leaving completley satisfied with your care experience.       General information about your clinic   Clinic Hours Lab Hours (Appointments are required)   Mon-Thurs: 7:30 AM - 7 PM Mon-Thurs: 7:30 AM - 7 PM   Fri: 7:30 AM - 5 PM Fri: 7:30 AM - 5 PM        After Hours Nurse Advise & Appts:  Ale Nurse Advisors: [phone]  Ale On Call: to make appointments anytime: [phone] On Call Physician: call [phone] and answering service will page the on call physician.        For urgent appointments, please call [phone] and ask for the triage nurse or your care team clinic nurse.  How to contact my care team:  [facility]: [email or web address]   Phone: [phone]   Fax: [phone]       [facility]:   Phone: [phone]  Hours: 8:00 AM - 6:00 PM  Medication Refills:  Call your pharmacy and they will forward the refill to us. Please allow 3 business days for your refills to be completed.       Normal or non-critical lab and imaging results will be communicated to you by MyChart, letter or phone within 7 days.  If you do not hear from us within 10 days,  please call the clinic. If you have a critical or abnormal lab result, we will notify you by phone as soon as possible.       We now have PWIC (Pediatric Walk in Care)  Monday-Friday from 7:30-4. Simply walk in and be seen for your urgent needs like cough, fever, rash, diarrhea or vomiting, pink eye, UTI. No appointments needed. Ask one of the team for more information      -Your Care Team:    Dr. Adi Croft - Internal Medicine/Pediatrics   Dr. Evelina Morse - Family Medicine  Dr. Maddi Christianson - Pediatrics  Dr. Monique Desir - Pediatrics  Caroline Mohamud CNP - Family Practice Nurse Practitioner

## 2018-10-25 NOTE — PROGRESS NOTES
Srikanth Patrick,    Attached are your test results.  -Kidney function is normal (Cr, GFR), Sodium is normal, Potassium is normal, Calcium is normal, Glucose is normal (diabetes screening test).   -Normal red blood cell (hgb) levels, normal white blood cell count and normal platelet levels.   Please contact us if you have any questions.    Caroline Mohamud, CNP

## 2018-10-25 NOTE — NURSING NOTE
Chief Complaint   Patient presents with     Swelling     home health nurse noted swelling in bilateral ankles, worst in the right side       Initial /70 (BP Location: Right arm, Patient Position: Sitting, Cuff Size: Adult Regular)  Pulse 88  Temp 98.1  F (36.7  C) (Temporal)  Wt 176 lb 8 oz (80.1 kg)  SpO2 98%  BMI 23.94 kg/m2 Estimated body mass index is 23.94 kg/(m^2) as calculated from the following:    Height as of 9/19/18: 6' (1.829 m).    Weight as of this encounter: 176 lb 8 oz (80.1 kg).  Medication Reconciliation: complete      RODRIGO Fox

## 2018-11-08 NOTE — MR AVS SNAPSHOT
After Visit Summary   2018    Jimmy Patrick    MRN: [id number]           Patient Information     Date Of Birth          1950        Visit Information        Provider Department      2018 8:40 AM Alonzo Mohamud,  [facility]        Today's Diagnoses     Other closed nondisplaced fracture of second cervical vertebra with routine healing, subsequent encounter    -  1    Cervicalgia          Care Instructions    Thanks for coming today.  Ortho/Sports Medicine Clinic  [address] [place] [address]    To schedule future appointments in Ortho Clinic, you may call [phone].    To schedule ordered imaging by your provider:   Call Central Imaging [phone]    To schedule an injection ordered by your provider:  Call Central Imaging Injection scheduling line: [phone]  PureWRX available online at:  [email or web address] ( [email or web address]    Please call if any further questions or concerns ([phone]).  Clinic hours 8 am to 5 pm.    Return to clinic (call) if symptoms worsen or fail to improve.            Follow-ups after your visit        Additional Services     PHYSICAL THERAPY REFERRAL       Please provide home massage services for cervicalgia            PHYSICAL THERAPY REFERRAL (Internal)       Physical Therapy Referral                  Your next 10 appointments already scheduled     2018  4:45 PM CST   (Arrive by 4:30 PM)   CT CERVICAL SPINE W/O CONTRAST with MGCT1   [facility]    [address] [place] [address] [phone]           How do I prepare for my exam? (Food and drink instructions) No Food and Drink Restrictions.  How do I prepare for my exam? (Other instructions) You do not need to do anything special to prepare for this exam. For a sinus scan: Use your nose spray (nasal decongestant spray) as directed.  What should I wear: Please wear loose clothing, such  as a sweat suit or jogging clothes. Avoid snaps, zippers and other metal. We may ask you to undress and put on a hospital gown.  How long does the exam take: Most scans take less than 20 minutes.  What should I bring: Please bring any scans or X-rays taken at other hospitals, if similar tests were done. Also bring a list of your medicines, including vitamins, minerals and over-the-counter drugs. It is safest to leave personal items at home.  Do I need a : No  is needed.  What do I need to tell my doctor? Be sure to tell your doctor: * If you have any allergies. * If there s any chance you are pregnant. * If you are breastfeeding.  What should I do after the exam: No restrictions, You may resume normal activities.  What is this test: A CT (computed tomography) scan is a series of pictures that allows us to look inside your body. The scanner creates images of the body in cross sections, much like slices of bread. This helps us see any problems more clearly.  Who should I call with questions: If you have any questions, please call the Imaging Department where you will have your exam. Directions, parking instructions, and other information is available on our website, [email or web address].            Nov 13, 2018  2:00 PM CST   AUDREY Spine with Angelina Guerrier, PT   [facility] ([facility]  )    [address] [facility] [address]   [phone]            Nov 19, 2018 12:00 PM CST   AUDREY Spine with Victorina Rosado, PT   [facility] ([facility]  )    [address] [facility] [address]   [phone]            Nov 21, 2018 10:30 AM CST   Return Visit with Clint Gutiérrez MD   [facility] ([facility])    [address] [facility] [address]   [phone]            Nov 21, 2018  2:15 PM CST   Return Visit with Nishi Oro MD   [facility]  [facility] ([facility])    [address] [facility] [address]   [phone]            Nov 28, 2018 11:40 AM CST   AUDREY Spine with Angelina uGerrier, PT   [facility] ([facility]  )    [address] [facility] [address]   [phone]            Dec 05, 2018  2:00 PM CST   AUDREY Spine with Angelina Guerrier, PT   [facility] ([facility]  )    [address] [facility] [address]   [phone]            Dec 10, 2018  2:00 PM CST   AUDREY Spine with Angelina Guerrier, PT   [facility] ([facility]  )    [address] [facility] [address]   [phone]            Apr 16, 2019 10:30 AM CDT   Return Visit with Rafi Ramirez MD   [facility] ([facility])    [address] [address]   [phone]              Future tests that were ordered for you today     Open Future Orders        Priority Expected Expires Ordered    PHYSICAL THERAPY REFERRAL Routine  11/8/2019 11/8/2018    CT Cervical Spine w/o Contrast Routine  11/8/2019 11/8/2018    PHYSICAL THERAPY REFERRAL (Internal) Routine  11/8/2019 11/8/2018            Who to contact     If you have questions or need follow up information about today's clinic visit or your schedule please contact [facility] directly at [phone].  Normal or non-critical lab and imaging results will be communicated to you by MyChart, letter or phone within 4 business days after the clinic has received the results. If you do not hear from us within 7 days, please contact the clinic through MyChart or phone. If you have a critical or abnormal lab result, we will notify you by phone as soon as possible.  Submit refill requests through [employer] or call your pharmacy and they will forward the refill request to us. Please allow 3  business days for your refill to be completed.          Additional Information About Your Visit        amiandohart Information     ThinkHR gives you secure access to your electronic health record. If you see a primary care provider, you can also send messages to your care team and make appointments. If you have questions, please call your primary care clinic.  If you do not have a primary care provider, please call [phone] and they will assist you.      ThinkHR is an electronic gateway that provides easy, online access to your medical records. With ThinkHR, you can request a clinic appointment, read your test results, renew a prescription or communicate with your care team.     To access your existing account, please contact your [facility] or call [phone] for assistance.        Care EveryWhere ID     This is your Care EveryWhere ID. This could be used by other organizations to access your [facility] medical records  [id number]        Your Vitals Were     Pulse                   88            Blood Pressure from Last 3 Encounters:   11/08/18 129/79   10/25/18 115/70   10/04/18 136/69    Weight from Last 3 Encounters:   10/25/18 80.1 kg (176 lb 8 oz)   09/24/18 78 kg (172 lb)   09/19/18 78 kg (172 lb)                 Today's Medication Changes          These changes are accurate as of 11/8/18  9:20 AM.  If you have any questions, ask your nurse or doctor.               Start taking these medicines.        Dose/Directions    order for DME   Used for:  Cervicalgia   Started by:  Alonzo Mohamud, DO        Please provide home seated massage chair   Quantity:  1 each   Refills:  0            Where to get your medicines      Some of these will need a paper prescription and others can be bought over the counter.  Ask your nurse if you have questions.     Bring a paper prescription for each of these medications     order for DME                Primary Care Provider Office Phone # Fax #     Evelina Morse [phone]       [address]  [facility] [address]        Equal Access to Services     STEPHANIEMATEO IVETTE : Hadmikal robin harper maikel Granado, wacésarda luqeaston, qaterezata kaalmada higinio, gely mooney laerikaharry cris. So [facility] [phone].    ATENCIÓN: Si habla español, tiene a langford disposición servicios gratuitos de asistencia lingüística. Llame al [phone].    We comply with applicable federal civil rights laws and [place] laws. We do not discriminate on the basis of race, color, national origin, age, disability, sex, sexual orientation, or gender identity.            Thank you!     Thank you for choosing [facility]  for your care. Our goal is always to provide you with excellent care. Hearing back from our patients is one way we can continue to improve our services. Please take a few minutes to complete the written survey that you may receive in the mail after your visit with us. Thank you!             Your Updated Medication List - Protect others around you: Learn how to safely use, store and throw away your medicines at www.disposemymeds.org.          This list is accurate as of 11/8/18  9:20 AM.  Always use your most recent med list.                   Brand Name Dispense Instructions for use Diagnosis    acetaminophen 500 MG tablet    TYLENOL     Take 2 tablets (1,000 mg) by mouth 3 times daily    Closed displaced fracture of second cervical vertebra with routine healing, unspecified fracture morphology, subsequent encounter       adapalene 0.1 % gel    DIFFERIN    135 g    Apply to Face topically every day and evening shift for Acne prevention    Acne vulgaris       amantadine 100 MG capsule    SYMMETREL    270 capsule    Take 1 capsule (100 mg) by mouth 3 times daily 6am,11am and 4pm    PSP (progressive supranuclear palsy) (H)       amLODIPine 2.5 MG tablet    NORVASC     Take 1 tablet by mouth daily        BABY ASPIRIN 81 MG chewable  tablet   Generic drug:  aspirin      Take 81 mg by mouth daily        BENICAR 20 MG tablet   Generic drug:  olmesartan      Take 1 tablet (20 mg) by mouth 2 times daily    PSP (progressive supranuclear palsy) (H)       bisacodyl 10 MG Suppository    DULCOLAX    30 suppository    Place 1 suppository (10 mg) rectally every other day If no BM in 3 days.    Other constipation       CLARITIN 10 MG capsule   Generic drug:  loratadine     30 capsule    Take 10 mg by mouth daily    Chronic seasonal allergic rhinitis, unspecified trigger       hydrochlorothiazide 12.5 MG Tabs tablet     90 tablet    Take 1 tablet (12.5 mg) by mouth daily    Essential hypertension       methocarbamol 500 MG tablet    ROBAXIN    120 tablet    Take 1 tablet (500 mg) by mouth 4 times daily    Closed displaced fracture of second cervical vertebra with routine healing, unspecified fracture morphology, subsequent encounter       MIRALAX powder   Generic drug:  polyethylene glycol     510 g    Take 17 g (1 capful) by mouth daily as needed for constipation        nortriptyline 10 MG capsule    PAMELOR    270 capsule    TAKE 3 CAPSULES AT BEDTIME    Migraine without aura and without status migrainosus, not intractable       order for DME     1 each    Equipment being ordered: Nebulizer    Chronic cough       order for DME     1 each    Please provide home seated massage chair    Cervicalgia       polyethylene glycol 0.4%- propylene glycol 0.3% 0.4-0.3 % Soln ophthalmic solution    SYSTANE ULTRA     Place 1 drop into both eyes 3 times daily        rivastigmine 9.5 MG/24HR 24 hr patch    EXELON    90 patch    Place 1 patch onto the skin daily    PSP (progressive supranuclear palsy) (H)       senna-docusate 8.6-50 MG per tablet    SENOKOT-S;PERICOLACE    120 tablet    Take 2 tablets by mouth 2 times daily    Chronic constipation       sodium chloride 3 % Nebu neb solution     180 mL    Take 3 mLs by nebulization every 3 hours as needed for wheezing     Chronic cough       * traMADol 50 MG tablet    ULTRAM    120 tablet    Take 0.5 tablets (25 mg) by mouth every 4 hours (while awake) May also take 0.5 tablet every 6 hours as needed.    Closed displaced fracture of second cervical vertebra with routine healing, unspecified fracture morphology, subsequent encounter       * traMADol 50 MG tablet    ULTRAM    150 tablet    1/2 of 50mg tab by mouth 4/day: 7am, noon, 5pm and 10pm    Closed displaced fracture of second cervical vertebra with routine healing, unspecified fracture morphology, subsequent encounter       Vitamin D-3 Super Strength 2000 units tablet   Generic drug:  cholecalciferol      Take 1 tablet by mouth        ZOLMitriptan 5 MG tablet    ZOMIG    30 tablet    Take 1 tablet (5 mg) by mouth at onset of headache for migraine    PSP (progressive supranuclear palsy) (H), Migraine without aura and without status migrainosus, not intractable       * Notice:  This list has 2 medication(s) that are the same as other medications prescribed for you. Read the directions carefully, and ask your doctor or other care provider to review them with you.

## 2018-11-08 NOTE — PATIENT INSTRUCTIONS
Thanks for coming today.  Ortho/Sports Medicine Clinic  [address] [place] [address]    To schedule future appointments in Ortho Clinic, you may call [phone].    To schedule ordered imaging by your provider:   Call Central Imaging [phone]    To schedule an injection ordered by your provider:  Call Central Imaging Injection scheduling line: [phone]  Podadd[employer]hart available online at:  [email or web address]    Please call if any further questions or concerns ([phone]).  Clinic hours 8 am to 5 pm.    Return to clinic (call) if symptoms worsen or fail to improve.

## 2018-11-08 NOTE — PROGRESS NOTES
HISTORY OF PRESENT ILLNESS  Mr. Patrick is a pleasant 67 year old year old male following up with a fracture of the C2 vertebrae.  Raymond was a physical therapy with last week when he reported a increase in his pain.  Raymond has trouble reporting his pain due to PSP, but this pain does seem to be in a different location for him.  His physical therapist, Angelina Guerrier, noted some increased paraspinal tightness over the trapezius and cervical musculature.  Additional history: as documented      REVIEW OF SYSTEMS (11/8/2018)  10 point ROS of systems including Constitutional, Eyes, Respiratory, Cardiovascular, Gastroenterology, Genitourinary, Integumentary, Musculoskeletal, Psychiatric were all negative except for pertinent positives noted in my HPI.     PHYSICAL EXAM  Vitals:    11/08/18 0843   BP: 129/79   Pulse: 88     Raymond does not appear to be in any acute distress in the exam room.  He does exhibit increased paraspinal tightness, specifically over the bilateral trapezius musculature.  He does have some localized tenderness over the superior cervical spine, directly over the spinous process of C2 through C4.  His reflexes are 2+ and symmetrical in his upper extremities.        ASSESSMENT & PLAN  Mr. Patrick is a 67 year old year old male following up with a fracture of the C2 vertebrae.    I do believe that Raymond's increase in pain is likely muscular, however, it is difficult to formally rule out a new or worsening fracture given his clinical state.  I discussed do this for some time with his wife.  Also, I do not think it would be unreasonable to document further healing with a CT, which would also rule out a new fracture.    I am ordering a CT, hopefully he can get this done fairly soon.  I am also referring him to physical therapy, given his increase in pain I do think this will benefit him.    Raymond also gets great benefit from massage, I am writing for a formal home program to bring him massage to his place as  it is extremely difficult for him to travel.  I am also writing for a massage chair that hopefully can be provided.    It was a pleasure seeing Raymond.        Alonzo Mohamud DO, CAQSM

## 2018-11-08 NOTE — PROGRESS NOTES
Subjective:  HPI                    Objective:  System    Physical Exam    General     ROS    Assessment/Plan:    PROGRESS  REPORT    Progress reporting period is from 10/2/2018 to 11/7/2018.       SUBJECTIVE  Patient relates that he has had some R sided sharp pain in the UT area.  Notes that when he was being positioned for bed, he felt a snap and the sharp pain began.  He has been working on his posture.and utilizes a harness type of brace to assist with his posture.  Prior to the snap in his neck, he had been relatively painfree for his neck.  He does require assistance with his neck exercises and has not been performing the neck exercises very often.    His lower back pain has been better, staying mainly central and will range from about  0/10 to a 5/10.  Notes that walking on the treadmill will consistently improve his back pain and he will be relatively painfree after walking.  Has not been consistent with prone lying for the lower back nor EIS as he does require assistance to do his exercises.  He does feel his neck and lower back have been better as he has been sleeping with a lower angle with his bed.  Massage and manual work do really seem to help with the pain and ROM.      Current pain level is 4/10 for the neck/R UT; 2/10 central low back.     Previous pain level was  2/10 neck, 5/10 low back, L of spine.   Initial Pain level:  2/10 neck; 5/10 low back.   Changes in function:  Yes (See Goal flowsheet attached for changes in current functional level)  Adverse reaction to treatment or activity: None    OBJECTIVE  Changes noted in objective findings:  Yes, improved CROM, improved sitting posture, improved   Objective:   CROM major loss of retraction but improved since evaluation, major loss of B SB, Rot R 50 deg, Rot L 40 deg.    Rot has improved from 22 deg B at last PN.  Repeated rotation to the R will decrease the tightness for the R UT.    Repeated L rotation will produce pain for the R UT and ROM for  L rotation did decrease with reps.    He has increased muscle tone for the paraspinals and B UT.    Improved muscle tone for the lumbar region.    Cervical and lumbar tightness continues for B paraspinals but will diminish with mobilization and manual work.    Patient will have a decrease in neck and lower back pain when prone lying with massage, manual work to both areas.    He can tolerate being prone for an hour; both the back and neck are better afterwards.    Patient will have a major loss of EIS when standing to do extension; ROM improves with reps.    Does require manual assistance with both neck and lower back exercises due to his progressive neurological disorder.       ASSESSMENT/PLAN  Updated problem list and treatment plan: Diagnosis 1:  Neck pain s/p C2 fracture    Pain -  manual therapy, self management, education, directional preference exercise and home program  Decreased ROM/flexibility - manual therapy, therapeutic exercise and home program  Decreased joint mobility - manual therapy, therapeutic exercise and home program  Decreased function - therapeutic activities and home program  Impaired posture - neuro re-education, therapeutic activities and home program  Diagnosis 2:  Low back pain     Pain -  manual therapy, self management, education, directional preference exercise and home program  Decreased ROM/flexibility - manual therapy, therapeutic exercise and home program  Decreased joint mobility - manual therapy, therapeutic exercise and home program  Decreased function - therapeutic activities and home program  Impaired posture - neuro re-education, therapeutic activities and home program  STG/LTGs have been met or progress has been made towards goals:  Yes (See Goal flow sheet completed today.)  Assessment of Progress: The patient's condition is improving.  The patient's condition has potential to improve.  Patient is meeting short term goals and is progressing towards long term goals.  Self  Management Plans:  Patient has been instructed in a home treatment program.  Patient  has been instructed in self management of symptoms.  I have re-evaluated this patient and find that the nature, scope, duration and intensity of the therapy is appropriate for the medical condition of the patient.  Jimmy continues to require the following intervention to meet STG and LTG's:  PT    Recommendations:  This patient would benefit from continued therapy.     Frequency:  1 X week, once daily  Duration:  for 6 weeks        Please refer to the daily flowsheet for treatment today, total treatment time and time spent performing 1:1 timed codes.

## 2018-11-08 NOTE — NURSING NOTE
Jimmy Patrick's chief complaint for this visit includes:  Chief Complaint   Patient presents with     RECHECK     right neck pain.      PCP: Evelina Morse    Referring Provider:  No referring provider defined for this encounter.    /79  Pulse 88  Data Unavailable     Do you need any medication refills at today's visit? No

## 2018-11-08 NOTE — LETTER
11/8/2018         RE: Jimmy Patrick  [address] [facility] [address]        Dear Colleague,    Thank you for referring your patient, Jimmy Patrick, to the [facility]. Please see a copy of my visit note below.    HISTORY OF PRESENT ILLNESS  Mr. Patrick is a pleasant 67 year old year old male following up with a fracture of the C2 vertebrae.  Raymond was a physical therapy with last week when he reported a increase in his pain.  Raymond has trouble reporting his pain due to PSP, but this pain does seem to be in a different location for him.  His physical therapist, Angelina Guerrier, noted some increased paraspinal tightness over the trapezius and cervical musculature.  Additional history: as documented      REVIEW OF SYSTEMS (11/8/2018)  10 point ROS of systems including Constitutional, Eyes, Respiratory, Cardiovascular, Gastroenterology, Genitourinary, Integumentary, Musculoskeletal, Psychiatric were all negative except for pertinent positives noted in my HPI.     PHYSICAL EXAM  Vitals:    11/08/18 0843   BP: 129/79   Pulse: 88     Raymond does not appear to be in any acute distress in the exam room.  He does exhibit increased paraspinal tightness, specifically over the bilateral trapezius musculature.  He does have some localized tenderness over the superior cervical spine, directly over the spinous process of C2 through C4.  His reflexes are 2+ and symmetrical in his upper extremities.        ASSESSMENT & PLAN  Mr. Patrick is a 67 year old year old male following up with a fracture of the C2 vertebrae.    I do believe that Raymond's increase in pain is likely muscular, however, it is difficult to formally rule out a new or worsening fracture given his clinical state.  I discussed do this for some time with his wife.  Also, I do not think it would be unreasonable to document further healing with a CT, which would also rule out a new fracture.    I am ordering a CT, hopefully he  can get this done fairly soon.  I am also referring him to physical therapy, given his increase in pain I do think this will benefit him.    Raymond also gets great benefit from massage, I am writing for a formal home program to bring him massage to his place as it is extremely difficult for him to travel.  I am also writing for a massage chair that hopefully can be provided.    It was a pleasure seeing Raymond.        Alonzo Mohamud DO, [facility]          Again, thank you for allowing me to participate in the care of your patient.        Sincerely,        Alonzo Mohamud DO

## 2018-11-21 NOTE — LETTER
11/21/2018         RE: Jimmy Patrick  [address] [facility] [address] [facility] [address]        Dear Colleague,    Thank you for referring your patient, Jimmy Patrick, to the [facility]. Please see a copy of my visit note below.    Palliative Care Outpatient Clinic Progress Note    This note was written using voice recognition. I did proof read, but might have missed some things. Please contact me for major errors.    Patient Name:  Jimmy Patrick  Primary Provider:  Evelina Morse    Chief Complaint: general decline    Interim History:  Jimmy Patrick 68 year old male with PSP returns to be seen by palliative care today.      Patient is here with his wife Kriss    Medical History/Summary:  - progressive supranuclear palsy diagnosed in 2014  - migraine headaches   - AFib, HTN                 - fall with C2 fracture May 2018, managed conservatively  - urinary incontinence since June 2018    He has declined further, and now has an even harder time communicating. His speech is faint and difficult to discern, complicated by his difficulties clearing secretions. This is very frustrating to both Raymond and Kriss. They feel their communication is sufficient to elicit and address his needs, but they can't discuss anything more complex and of more interest to them. Due to his poor vision and coordination he hasn't been able to use any of the available speech aides.   He also has difficulties coughing and blowing his nose. Eating has become slower and more difficult as well.     The pain in his neck and back are improving with physical therapy. He has decreased the tramadol to 25mg four times a day, occasionally skips a dose. Has only needed an additional prn dose once in the last month. He continues with methocarbamol qid and prn acetaminophen. Feels his pain is adequately controlled. He is interested in decreasing these doses further.     Today he reports intermittent left-sided abdominal  pain, which is unrelated to eating, bowel movements or urination. It tends to worsen when his torso is upright and improve after physical therapy. The first time he mentioned this to Kriss was yesterday evening. Today he reports this started when he was discharged months ago.     He continues struggling with constipation / infrequent bowel movements. He is taking Senna bid and 1/2 cap of miralax if he hasn't had a BM x48 hours. They feel senna is more helpful than miralax.       Coping:  The ongoing decline is understandably hard for them. They suffer especially from the loss of their communication.     Social History:  Pertinent changes to social history/social situation since last visit: none. Kriss is his main support. They have a live-in aide. Unfortunately they currently are in the process of establishing with a new aide, which has caused a lot of unrest/disquiet.   Their daughters visit almost weekly, which is support to them.     They have met with [facility] and [facility] and liked both agencies. They are planning to enroll Raymond once PT doesn't show benefit anymore.     He has been taken off the PSP study at the [facility].     Key support resources: wife Kriss    Advance Directive Status:  POLST completed 2018: DNR, selective treatments.      Social History   Substance Use Topics     Smoking status: Never Smoker     Smokeless tobacco: Never Used     Alcohol use No       Impression & Recommendations & Counseliny/o man with PSP complicated by fall with C-spine fracture past summer    Pain: much improved, still benefiting from PT, so plans to continue for now. Discussed options to gently decrease pain meds.   Since much of the interventions at PT are based on massage, will explore options for home massage therapy, which they'd be willing to pay for out of pocket  - decrease tramadol to tid, then methocarbamol to tid  - continue acetaminophen     Speech, nutrition: Raymond is following with  SLP. He continues to decline a feeding tube. Unfortunately, I don't think there are additional support measures available to improve communication    Cough, secretion management:   - albuterol and saline nebs prn  - will discuss further options for improvement of cough with pulmonology.   - recommended nasal aspirator    Constipation:   - recommended trial of increased Senna 3 tabs bid prn      Return to clinic in 4-6 weeks    Data / Chart Review:    Review of Systems:   ROS: 10 point ROS neg other than the symptoms noted above in the HPI          Physical Exam:   Vitals were reviewed  /81  Pulse 82  Resp 16  Ht 1.829 m (6')  Wt 77.7 kg (171 lb 6 oz)  SpO2 97%  BMI 23.24 kg/m2     CONSTIT: awake, appears comfortable  EENT: MMM, EOMI, no icterus  NECK: decreased ROM  RESP: reg, nl effort, voice very soft  GI: no tenderness to palpation over left side  MSK: in wheelchair, minimal spontaneous movement  SKIN:  warm, no rash, no obvious lesions  NEURO: alert, oriented x3    Allergies   Allergen Reactions     Other [Seasonal Allergies]      environmental     Codeine Other (See Comments) and Rash     GI upset  GI upset  GI upset       Current pertinent medications:  acetaminophen 1000mg tid  Tramadol 25mg q4h and q6h prn  methocarbamol 500mg qid      Nortriptyline 30mg HS       : ok. Last tramadol fill Sept 2018      Past Medical History:   Diagnosis Date     Blurred vision 8/11/2014     Cancer (H) 1979; 1960's    Florin: father; grandparents (on father's side)     Cellular blue nevus      Depressive disorder April, 2014    When received diagnosis of PSP     DISK (aka Displacement of intervertebral disc, site unspecified, without myelopathy) 8/11/2014     Displacement of cervical intervertebral disc without myelopathy (HERNIATED DISK) 8/11/2014     Double vision 8/11/2014     Epistaxis 8/11/2014     Fluttering heart 8/11/2014     Headache 8/11/2014     Hypertension February, 2013     Leg swelling 8/11/2014      Memory loss 8/11/2014     Migraines 2007    Ramila: karen     PSP (progressive supranuclear palsy) (H) 8/11/2014     Sinus disorder 8/11/2014     Tinnitus 8/11/2014     Urinary urgency 8/11/2014     Varicose veins 8/11/2014     Past Surgical History:   Procedure Laterality Date     BIOPSY  2016    Dermatologist remoed skin mole (not cancerus)     CARDIAC SURGERY  2013 ablation    Successful in eliminatng heart flutter     COLONOSCOPY  2005?     H ABLATION ATRIAL FLUTTER           Key Data Reviewed:  LABS:   GFR>90    IMAGING:   CT c-spine 11/8: Healed fractures of bilateral C2 lamina.       Nishi Oro MD  Palliative Medicine  Pager [phone]      Again, thank you for allowing me to participate in the care of your patient.        Sincerely,        Nishi Oro MD

## 2018-11-21 NOTE — NURSING NOTE
Jimmy Patrick's goals for this visit include: Return  He requests these members of his care team be copied on today's visit information:     PCP: Evelina Morse    Referring Provider:  Clint Gutiérrez MD  [address] [place] [address] [address]  [place] [address]    /81 (BP Location: Left arm, Patient Position: Chair, Cuff Size: Adult Regular)  Pulse 82  Wt 77.8 kg (171 lb 9.6 oz)  BMI 23.27 kg/m2    Do you need any medication refills at today's visit? Unsure    Carol Paz CMA on 11/21/2018 at 10:40 AM

## 2018-11-21 NOTE — PROGRESS NOTES
Clinical Cardiac Electrophysiology    Chief Complaint: atrial flutter    HPI: I was happy to see Mr. Patrick in consultation for the above at the request of Dr. Saravia.    Mr. Patrick recently moved to [place] from [place]. He reports a history of atrial flutter treated with ablation in 2013. He had palpitations associated with the atrial flutter. He reports no history of atrial fibrillation. He was on oral anticoagulation for approximately a year and then switched to an aspirin. He reports no palpitations since the ablation.     He takes diltiazem for hypertension and Benicar, primarily for his migraines.     [id number]: he reports no significant palpitations. He feels his PSP has gotten a little worse.    [id number]: I usually see Mr. Patrick in  but due to scheduling issues he was seen in [place] today. He reports doing well from a cardiac standpoint. He has had no palpitations. He denies any recent change in his exertional abilities, no chest pain/discomfort, no unusual dyspnea on exertion, no syncope, near syncope and no increase in ankle edema.    [id number]: He reports no palpitations. He has no heart concerns today except his BP. His resting BP measurements at home are below his goal of 140/90 but he is concerned that his BP goes up when he exercises. He does have some lightheadedness when he stands. He has not had syncope.     He denies any chest pain/discomfort, increased dyspnea on exertion or symptoms of heart failure.    [id number]: He had his BP machine checked with us today and it is measuring a bit lower than ours. The edema is about the same. His PSP is slowly progressing.    [id number]: His BP has been better controlled. He has not had any atrial fibrillation that he is aware of. His initial RA sat was low. However, repeat on room air, after he warmed up, was fine.    July 11, 2018: He had a fall recently which resulted in a cervical fracture.  He is currently in rehabilitation  facility.  He and his wife report that this is resulted in a significant decline in his functional status.  He is hopeful that with continued rehab he will regain his functional status.  He has not had any palpitations or flutter.  The fall was not related to the loss of consciousness.  It was not a syncopal episode.    November 21, 2018 Interval history: less able to talk, walking on treadmill several times a week, when out of house uses wheelchair, lowest BP in upper 100's, occasional high readings but for the most part good; no palpitations reported; reports no new concerns    Current Outpatient Prescriptions   Medication Sig Dispense Refill     acetaminophen (TYLENOL) 500 MG tablet Take 2 tablets (1,000 mg) by mouth 3 times daily       adapalene (DIFFERIN) 0.1 % gel Apply to Face topically every day and evening shift for Acne prevention 135 g 3     amantadine (SYMMETREL) 100 MG capsule Take 1 capsule (100 mg) by mouth 3 times daily 6am,11am and 4pm 270 capsule 3     amLODIPine (NORVASC) 2.5 MG tablet Take 1 tablet by mouth daily       aspirin (BABY ASPIRIN) 81 MG chewable tablet Take 81 mg by mouth daily        bisacodyl (DULCOLAX) 10 MG Suppository Place 1 suppository (10 mg) rectally every other day If no BM in 3 days. 30 suppository 11     cholecalciferol (VITAMIN D-3 SUPER STRENGTH) 2000 UNITS tablet Take 1 tablet by mouth        hydrochlorothiazide 12.5 MG TABS tablet Take 1 tablet (12.5 mg) by mouth daily 90 tablet 3     loratadine (CLARITIN) 10 MG capsule Take 10 mg by mouth daily 30 capsule      methocarbamol (ROBAXIN) 500 MG tablet Take 1 tablet (500 mg) by mouth 4 times daily 120 tablet 5     nortriptyline (PAMELOR) 10 MG capsule TAKE 3 CAPSULES AT BEDTIME 270 capsule 3     olmesartan (BENICAR) 20 MG tablet Take 1 tablet (20 mg) by mouth 2 times daily       order for DME Please provide home seated massage chair 1 each 0     order for DME Equipment being ordered: Nebulizer 1 each 0     polyethylene  glycol (MIRALAX) powder Take 17 g (1 capful) by mouth daily as needed for constipation 510 g 1     polyethylene glycol 0.4%- propylene glycol 0.3% (SYSTANE ULTRA) 0.4-0.3 % SOLN ophthalmic solution Place 1 drop into both eyes 3 times daily       rivastigmine (EXELON) 9.5 MG/24HR 24 hr patch Place 1 patch onto the skin daily 90 patch 3     senna-docusate (SENOKOT-S;PERICOLACE) 8.6-50 MG per tablet Take 2 tablets by mouth 2 times daily 120 tablet 0     sodium chloride 3 % NEBU neb solution Take 3 mLs by nebulization every 3 hours as needed for wheezing 180 mL 3     traMADol (ULTRAM) 50 MG tablet 1/2 of 50mg tab by mouth 4/day: 7am, noon, 5pm and 10pm 150 tablet 0     traMADol (ULTRAM) 50 MG tablet Take 0.5 tablets (25 mg) by mouth every 4 hours (while awake) May also take 0.5 tablet every 6 hours as needed. 120 tablet 0     ZOLMitriptan (ZOMIG) 5 MG tablet Take 1 tablet (5 mg) by mouth at onset of headache for migraine 30 tablet 5       Past Medical History:   Diagnosis Date     Blurred vision 8/11/2014     Cancer (H) 1979; 1960's    Florin: father; grandparents (on father's side)     Cellular blue nevus      Depressive disorder April, 2014    When received diagnosis of PSP     DISK (aka Displacement of intervertebral disc, site unspecified, without myelopathy) 8/11/2014     Displacement of cervical intervertebral disc without myelopathy (HERNIATED DISK) 8/11/2014     Double vision 8/11/2014     Epistaxis 8/11/2014     Fluttering heart 8/11/2014     Headache 8/11/2014     Hypertension February, 2013     Leg swelling 8/11/2014     Memory loss 8/11/2014     Migraines 2007    Ramila: karen     PSP (progressive supranuclear palsy) (H) 8/11/2014     Sinus disorder 8/11/2014     Tinnitus 8/11/2014     Urinary urgency 8/11/2014     Varicose veins 8/11/2014       Past Surgical History:   Procedure Laterality Date     BIOPSY  2016    Dermatologist remoed skin mole (not cancerus)     CARDIAC SURGERY  2013 ablation    Successful  in eliminatng heart flutter     COLONOSCOPY  ?     H ABLATION ATRIAL FLUTTER         Family History   Problem Relation Age of Onset     Cancer Father      Other Cancer Father      throat cancer,       Hypertension Mother      Osteoporosis Mother      Hypertension Sister      Osteoporosis Sister      Osteoporosis Sister        Social History   Substance Use Topics     Smoking status: Never Smoker     Smokeless tobacco: Never Used     Alcohol use No       Allergies   Allergen Reactions     Other [Seasonal Allergies]      environmental     Codeine Other (See Comments) and Rash     GI upset  GI upset  GI upset         ROS:  he has not had any recent falls, he is much less active than in the past. 2018/woa    Physical Examination:  Vitals: /81 (BP Location: Left arm, Patient Position: Chair, Cuff Size: Adult Regular)  Pulse 82  Wt 77.8 kg (171 lb 9.6 oz)  BMI 23.27 kg/m2  BMI= Body mass index is 23.27 kg/(m^2).    GENERAL APPEARANCE: thin, alert and no distress  HEENT: no icterus  NECK: JVP is not visible  CHEST: lungs clear to auscultation  CARDIOVASCULAR: regular rhythm, normal S1S2, no murmur or gallop, precordium quiet  EXTREMITIES: mild ankle edema      Laboratory and diagnostic data reviewed 2018:    Results for KARUNA NELSON (MRN [id number]) as of 2018 10:58   Ref. Range 10/25/2018 10:28   Sodium Latest Ref Range: 133 - 144 mmol/L 140   Potassium Latest Ref Range: 3.4 - 5.3 mmol/L 4.0   Chloride Latest Ref Range: 94 - 109 mmol/L 103   Carbon Dioxide Latest Ref Range: 20 - 32 mmol/L 31   Urea Nitrogen Latest Ref Range: 7 - 30 mg/dL 18   Creatinine Latest Ref Range: 0.66 - 1.25 mg/dL 0.86   GFR Estimate Latest Ref Range: >60 mL/min/1.7m2 88     Results for KARUNA NELSON (MRN [id number]) as of 2018 10:58   Ref. Range 10/25/2018 10:28   WBC Latest Ref Range: 4.0 - 11.0 10e9/L 6.6   Hemoglobin Latest Ref Range: 13.3 - 17.7 g/dL 16.8   Hematocrit  Latest Ref Range: 40.0 - 53.0 % 49.9   Platelet Count Latest Ref Range: 150 - 450 10e9/L 224     Previous studies reviewed 2018:    [facility]  Echocardiography [facility]  [address]  [place] [address]        Name: KARUNA NELSON  MRN: [id number]  : 1950  Study Date: 2016 03:08 PM  Age: 65 yrs  Gender: Male  Patient Location: [facility]  Reason For Study: , Essential (primary) hypertension, Edema, unspecified  Ordering Physician: SARTHAK BYRD  Referring Physician: SARTHAK BYRD  Performed By: Lakisha Bunn     BSA: 2.1 m2  Height: 72 in  Weight: 188 lb  HR: 79  BP: 132/82 mmHg  ______________________________________________________________________________        Procedure  Echocardiogram with two-dimensional, color and spectral Doppler performed.  ______________________________________________________________________________     Interpretation Summary     Global and regional left ventricular function is normal with an EF of 55-60%.  Global right ventricular function is normal.    Assessment and recommendations:    1) S/P ablation for atrial flutter - no clinical recurrence    2) Hypertension - mild hypotension, his recent event was related to a fall not loss of consciousness.  However, his blood pressure is on the low side at this point.  If need be we can begin to cut back on his antihypertensives.  No falls reported. No very low readings.     - monitor for now    3) Ankle edema -    - elevation when possible  - Compression stockings as you are doing    4) Fall - not due to loss of consciousness    Portions of this note were dictated using speech recognition software. The note has been proofread but errors in the text may have been overlooked. Please contact me if there are any concerns regarding the accuracy of the dictation.     I appreciate the chance to help with Mr. Dodsonrichardchapitovance's care.

## 2018-11-21 NOTE — PROGRESS NOTES
Palliative Care Outpatient Clinic Progress Note    This note was written using voice recognition. I did proof read, but might have missed some things. Please contact me for major errors.    Patient Name:  Jimmy Patrick  Primary Provider:  Evelina Morse    Chief Complaint: general decline    Interim History:  Jimmy Patrick 68 year old male with PSP returns to be seen by palliative care today.      Patient is here with his wife Kriss    Medical History/Summary:  - progressive supranuclear palsy diagnosed in 2014  - migraine headaches   - AFib, HTN                 - fall with C2 fracture May 2018, managed conservatively  - urinary incontinence since June 2018    He has declined further, and now has an even harder time communicating. His speech is faint and difficult to discern, complicated by his difficulties clearing secretions. This is very frustrating to both Raymond and Kriss. They feel their communication is sufficient to elicit and address his needs, but they can't discuss anything more complex and of more interest to them. Due to his poor vision and coordination he hasn't been able to use any of the available speech aides.   He also has difficulties coughing and blowing his nose. Eating has become slower and more difficult as well.     The pain in his neck and back are improving with physical therapy. He has decreased the tramadol to 25mg four times a day, occasionally skips a dose. Has only needed an additional prn dose once in the last month. He continues with methocarbamol qid and prn acetaminophen. Feels his pain is adequately controlled. He is interested in decreasing these doses further.     Today he reports intermittent left-sided abdominal pain, which is unrelated to eating, bowel movements or urination. It tends to worsen when his torso is upright and improve after physical therapy. The first time he mentioned this to Kriss was yesterday evening. Today he reports this started when he was  discharged months ago.     He continues struggling with constipation / infrequent bowel movements. He is taking Senna bid and 1/2 cap of miralax if he hasn't had a BM x48 hours. They feel senna is more helpful than miralax.       Coping:  The ongoing decline is understandably hard for them. They suffer especially from the loss of their communication.     Social History:  Pertinent changes to social history/social situation since last visit: none. Kriss is his main support. They have a live-in aide. Unfortunately they currently are in the process of establishing with a new aide, which has caused a lot of unrest/disquiet.   Their daughters visit almost weekly, which is support to them.     They have met with [facility] and [facility] and liked both agencies. They are planning to enroll Raymond once PT doesn't show benefit anymore.     He has been taken off the PSP study at the [place].     Key support resources: wife Kriss    Advance Directive Status:  POLST completed 2018: DNR, selective treatments.      Social History   Substance Use Topics     Smoking status: Never Smoker     Smokeless tobacco: Never Used     Alcohol use No       Impression & Recommendations & Counseliny/o man with PSP complicated by fall with C-spine fracture past summer    Pain: much improved, still benefiting from PT, so plans to continue for now. Discussed options to gently decrease pain meds.   Since much of the interventions at PT are based on massage, will explore options for home massage therapy, which they'd be willing to pay for out of pocket  - decrease tramadol to tid, then methocarbamol to tid  - continue acetaminophen     Speech, nutrition: Raymond is following with SLP. He continues to decline a feeding tube. Unfortunately, I don't think there are additional support measures available to improve communication    Cough, secretion management:   - added albuterol and saline nebs prn, this requires a nebulizer  compressor  - will discuss further options for improvement of cough with pulmonology.   - recommended nasal aspirator    Constipation:   - recommended trial of increased Senna 3 tabs bid prn      Return to clinic in 4-6 weeks    Data / Chart Review:    Review of Systems:   ROS: 10 point ROS neg other than the symptoms noted above in the HPI          Physical Exam:   Vitals were reviewed  /81  Pulse 82  Resp 16  Ht 1.829 m (6')  Wt 77.7 kg (171 lb 6 oz)  SpO2 97%  BMI 23.24 kg/m2     CONSTIT: awake, appears comfortable  EENT: MMM, EOMI, no icterus  NECK: decreased ROM  RESP: reg, nl effort, voice very soft  GI: no tenderness to palpation over left side  MSK: in wheelchair, minimal spontaneous movement  SKIN:  warm, no rash, no obvious lesions  NEURO: alert, oriented x3    Allergies   Allergen Reactions     Other [Seasonal Allergies]      environmental     Codeine Other (See Comments) and Rash     GI upset  GI upset  GI upset       Current pertinent medications:  acetaminophen 1000mg tid  Tramadol 25mg q4h and q6h prn  methocarbamol 500mg qid      Nortriptyline 30mg HS       : ok. Last tramadol fill Sept 2018      Past Medical History:   Diagnosis Date     Blurred vision 8/11/2014     Cancer (H) 1979; 1960's    Florin: father; grandparents (on father's side)     Cellular blue nevus      Depressive disorder April, 2014    When received diagnosis of PSP     DISK (aka Displacement of intervertebral disc, site unspecified, without myelopathy) 8/11/2014     Displacement of cervical intervertebral disc without myelopathy (HERNIATED DISK) 8/11/2014     Double vision 8/11/2014     Epistaxis 8/11/2014     Fluttering heart 8/11/2014     Headache 8/11/2014     Hypertension February, 2013     Leg swelling 8/11/2014     Memory loss 8/11/2014     Migraines 2007    Ramila: karen     PSP (progressive supranuclear palsy) (H) 8/11/2014     Sinus disorder 8/11/2014     Tinnitus 8/11/2014     Urinary urgency 8/11/2014      Varicose veins 8/11/2014     Past Surgical History:   Procedure Laterality Date     BIOPSY  2016    Dermatologist remoed skin mole (not cancerus)     CARDIAC SURGERY  2013 ablation    Successful in eliminatng heart flutter     COLONOSCOPY  2005?     H ABLATION ATRIAL FLUTTER           Key Data Reviewed:  LABS:   GFR>90    IMAGING:   CT c-spine 11/8: Healed fractures of bilateral C2 lamina.       Nishi Oro MD  Palliative Medicine  Pager [phone]

## 2018-11-21 NOTE — NURSING NOTE
Oncology Rooming Note    November 21, 2018 2:12 PM   Jimmy Patrick is a 68 year old male who presents for:    Chief Complaint   Patient presents with     Palliative     Follow Up     Initial Vitals: /81  Pulse 82  Resp 16  Ht 1.829 m (6')  Wt 77.7 kg (171 lb 6 oz)  SpO2 97%  BMI 23.24 kg/m2 Estimated body mass index is 23.24 kg/(m^2) as calculated from the following:    Height as of this encounter: 1.829 m (6').    Weight as of this encounter: 77.7 kg (171 lb 6 oz). Body surface area is 1.99 meters squared.  Mild Pain (2) Comment: Data Unavailable   No LMP for male patient.  Allergies reviewed: Yes  Medications reviewed: Yes    Medications: Medication refills not needed today.  Pharmacy name entered into [facility]:    [id number] - [facility] [address] [facility] [place] [address]  [facility] - [place], [place] - [facility]      5 minutes for nursing intake (face to face time)     Jeniffer Nixon LPN

## 2018-11-21 NOTE — MR AVS SNAPSHOT
After Visit Summary   11/21/2018    Jimmy Patrick    MRN: [id number]           Patient Information     Date Of Birth          1950        Visit Information        Provider Department      11/21/2018 2:15 PM Nishi Oro MD [facility]        Today's Diagnoses     Cough    -  1    Chronic cough          Care Instructions    Patient Instructions      Expand All Collapse All    Thank you for coming into the Palliative Care Clinic today.     1. Pain:   -  Decrease tramadol to 25mg three times a day  - any time 2-3 days after that you can decrease methocarbamol to three times a day as well.   - if you return to the four times a day dosing, that is ok. Do let us know, please    2. Cough:  - add albuterol and saline nebs to loosen secretions  - I will reach out to pulmonology to explore further supportive options    3. Constipation:  - you can take up to 4 tablets of Senna twice a day    Call if your symptoms change and the medications we have prescribed are not working. Do not take your medications at any other dose or frequently than how they are prescribed.     Call us at least a week in advance if you need a medication refill    Please bring all your pill bottles to your next appointment.     Failure to follow these instructions may result in the palliative care team not being able to prescribe your medications.    Return to clinic in 1-2 months for a follow up.     You can reach the Palliative Care Team during business hours at the following number:    - [phone]    To reach the Palliative Care Provider on-call After-hours or on holidays and weekends, call: [phone].  Please note that we are not able to provide pain medication refills on evenings or weekends.                     Follow-ups after your visit        Your next 10 appointments already scheduled     Nov 28, 2018 11:40 AM CST   AUDREY Spine with Angelina Guerrier, PT   [facility]  [facility] ([facility]  )    [address] [facility] [address]   [phone]            Dec 05, 2018  2:00 PM CST   AUDREY Spine with Angelina Guerrier, PT   [facility] ([facility]  )    [address] [facility] [address]   [phone]            Dec 10, 2018  2:00 PM CST   AUDREY Spine with Angelina Guerrier, PT   [facility] ([facility]  )    [address] [facility] [address]   [phone]            Jan 14, 2019 11:00 AM CST   [facility] Visit with Paulo Saravia MD   [facility]    [address] [place] [address]   [phone]            Jan 16, 2019 10:45 AM CST   Return Visit with Nishi Oro MD   [facility] ([facility])    [address] [place] [address]   [phone]            Apr 17, 2019 11:00 AM CDT   Return Visit with Rafi Ramirez MD   [facility] ([facility])    [address] [place] [address] [phone]            Apr 17, 2019 11:30 AM CDT   Return Visit with Clint Gutiérrez MD   [facility])    [address] [place] [address] [phone]              Future tests that were ordered for you today     Open Future Orders        Priority Expected Expires Ordered    Follow-Up with Electrophysiologist - 6 Months Routine 4/1/2019 8/18/2019 11/21/2018            Who to contact     If you have questions or need follow up information about today's clinic visit or your schedule please contact [facility] directly at [phone].  Normal or non-critical lab and imaging results will be communicated to you by MyChart, letter or phone within 4 business days after the clinic has received the results. If you do  not hear from us within 7 days, please contact the clinic through [employer] or phone. If you have a critical or abnormal lab result, we will notify you by phone as soon as possible.  Submit refill requests through [employer] or call your pharmacy and they will forward the refill request to us. Please allow 3 business days for your refill to be completed.          Additional Information About Your Visit        [employer] Information     [employer] gives you secure access to your electronic health record. If you see a primary care provider, you can also send messages to your care team and make appointments. If you have questions, please call your primary care clinic.  If you do not have a primary care provider, please call [phone] and they will assist you.      [employer] is an electronic gateway that provides easy, online access to your medical records. With [employer], you can request a clinic appointment, read your test results, renew a prescription or communicate with your care team.     To access your existing account, please contact your [facility] or call [phone] for assistance.        Care EveryWhere ID     This is your Care EveryWhere ID. This could be used by other organizations to access your [facility] medical records  [id number]        Your Vitals Were     Pulse Respirations Height Pulse Oximetry BMI (Body Mass Index)       82 16 1.829 m (6') 97% 23.24 kg/m2        Blood Pressure from Last 3 Encounters:   11/21/18 121/81   11/21/18 121/81   11/08/18 129/79    Weight from Last 3 Encounters:   11/21/18 77.7 kg (171 lb 6 oz)   11/21/18 77.8 kg (171 lb 9.6 oz)   10/25/18 80.1 kg (176 lb 8 oz)              Today, you had the following     No orders found for display         Today's Medication Changes          These changes are accurate as of 11/21/18  3:17 PM.  If you have any questions, ask your nurse or doctor.               Start taking these medicines.        Dose/Directions     albuterol (2.5 MG/3ML) 0.083% neb solution   Used for:  Cough   Started by:  Nishi Oro MD        Dose:  2.5 mg   Take 1 vial (2.5 mg) by nebulization every 6 hours as needed (cough)   Quantity:  360 mL   Refills:  1       order for DME   Used for:  Cough   Started by:  Nishi Oro MD        Equipment being ordered: Nebulizer   Quantity:  1 each   Refills:  0            Where to get your medicines      These medications were sent to [facility] - [address]  [address] [place] [address]     Phone:  [phone]     albuterol (2.5 MG/3ML) 0.083% neb solution    hydrochlorothiazide 12.5 MG Tabs tablet    sodium chloride 3 % Nebu neb solution         Some of these will need a paper prescription and others can be bought over the counter.  Ask your nurse if you have questions.     Bring a paper prescription for each of these medications     order for DME                Primary Care Provider Office Phone # Fax #    Evelina Morse [phone]       [address] [facility] [address]        Equal Access to Services     MATEO STEELE : Hadii robin harper hadasho Soomaali, waaxda luqadaha, qaybta kaalmada adeegyada, gely lynch. So [facility] [phone].    ATENCIÓN: Si habla español, tiene a langford disposición servicios gratuitos de asistencia lingüística. Llame al [phone].    We comply with applicable federal civil rights laws and [place] laws. We do not discriminate on the basis of race, color, national origin, age, disability, sex, sexual orientation, or gender identity.            Thank you!     Thank you for choosing [facility]  for your care. Our goal is always to provide you with excellent care. Hearing back from our patients is one way we can continue to improve our services. Please take a few minutes to complete the written survey that you may receive in the mail  after your visit with us. Thank you!             Your Updated Medication List - Protect others around you: Learn how to safely use, store and throw away your medicines at www.disposemymeds.org.          This list is accurate as of 11/21/18  3:17 PM.  Always use your most recent med list.                   Brand Name Dispense Instructions for use Diagnosis    acetaminophen 500 MG tablet    TYLENOL     Take 2 tablets (1,000 mg) by mouth 3 times daily    Closed displaced fracture of second cervical vertebra with routine healing, unspecified fracture morphology, subsequent encounter       adapalene 0.1 % gel    DIFFERIN    135 g    Apply to Face topically every day and evening shift for Acne prevention    Acne vulgaris       albuterol (2.5 MG/3ML) 0.083% neb solution     360 mL    Take 1 vial (2.5 mg) by nebulization every 6 hours as needed (cough)    Cough       amantadine 100 MG capsule    SYMMETREL    270 capsule    Take 1 capsule (100 mg) by mouth 3 times daily 6am,11am and 4pm    PSP (progressive supranuclear palsy) (H)       amLODIPine 2.5 MG tablet    NORVASC     Take 1 tablet by mouth daily        BABY ASPIRIN 81 MG chewable tablet   Generic drug:  aspirin      Take 81 mg by mouth daily        BENICAR 20 MG tablet   Generic drug:  olmesartan      Take 1 tablet (20 mg) by mouth 2 times daily    PSP (progressive supranuclear palsy) (H)       bisacodyl 10 MG Suppository    DULCOLAX    30 suppository    Place 1 suppository (10 mg) rectally every other day If no BM in 3 days.    Other constipation       CLARITIN 10 MG capsule   Generic drug:  loratadine     30 capsule    Take 10 mg by mouth daily    Chronic seasonal allergic rhinitis, unspecified trigger       hydrochlorothiazide 12.5 MG Tabs tablet     90 tablet    Take 1 tablet (12.5 mg) by mouth daily    Essential hypertension       methocarbamol 500 MG tablet    ROBAXIN    120 tablet    Take 1 tablet (500 mg) by mouth 4 times daily    Closed displaced fracture of  second cervical vertebra with routine healing, unspecified fracture morphology, subsequent encounter       MIRALAX powder   Generic drug:  polyethylene glycol     510 g    Take 17 g (1 capful) by mouth daily as needed for constipation        nortriptyline 10 MG capsule    PAMELOR    270 capsule    TAKE 3 CAPSULES AT BEDTIME    Migraine without aura and without status migrainosus, not intractable       order for DME     1 each    Equipment being ordered: Nebulizer    Chronic cough       order for DME     1 each    Please provide home seated massage chair    Cervicalgia       order for DME     1 each    Equipment being ordered: Nebulizer    Cough       polyethylene glycol 0.4%- propylene glycol 0.3% 0.4-0.3 % Soln ophthalmic solution    SYSTANE ULTRA     Place 1 drop into both eyes 3 times daily        rivastigmine 9.5 MG/24HR 24 hr patch    EXELON    90 patch    Place 1 patch onto the skin daily    PSP (progressive supranuclear palsy) (H)       senna-docusate 8.6-50 MG per tablet    SENOKOT-S;PERICOLACE    120 tablet    Take 2 tablets by mouth 2 times daily    Chronic constipation       sodium chloride 3 % Nebu neb solution     180 mL    Take 3 mLs by nebulization every 3 hours as needed for wheezing    Chronic cough       * traMADol 50 MG tablet    ULTRAM    120 tablet    Take 0.5 tablets (25 mg) by mouth every 4 hours (while awake) May also take 0.5 tablet every 6 hours as needed.    Closed displaced fracture of second cervical vertebra with routine healing, unspecified fracture morphology, subsequent encounter       * traMADol 50 MG tablet    ULTRAM    150 tablet    1/2 of 50mg tab by mouth 4/day: 7am, noon, 5pm and 10pm    Closed displaced fracture of second cervical vertebra with routine healing, unspecified fracture morphology, subsequent encounter       Vitamin D-3 Super Strength 2000 units tablet   Generic drug:  cholecalciferol      Take 1 tablet by mouth        ZOLMitriptan 5 MG tablet    ZOMIG    30 tablet     Take 1 tablet (5 mg) by mouth at onset of headache for migraine    PSP (progressive supranuclear palsy) (H), Migraine without aura and without status migrainosus, not intractable       * Notice:  This list has 2 medication(s) that are the same as other medications prescribed for you. Read the directions carefully, and ask your doctor or other care provider to review them with you.

## 2018-11-21 NOTE — MR AVS SNAPSHOT
After Visit Summary   11/21/2018    Jimmy Patrick    MRN: [id number]           Patient Information     Date Of Birth          1950        Visit Information        Provider Department      11/21/2018 10:30 AM Clint Gutiérrez MD [facility]        Today's Diagnoses     Essential hypertension        PSP (progressive supranuclear palsy)        S/P ablation of atrial flutter        Neurogenic orthostatic hypotension (H)           Follow-ups after your visit        Additional Services     Follow-Up with Electrophysiologist - 6 Months                 Your next 10 appointments already scheduled     Nov 21, 2018  2:15 PM CST   Return Visit with Nishi Oro MD   [facility] ([facility]    [address] [facility] [address]   [phone]            Nov 28, 2018 11:40 AM CST   AUDREY Spine with Angelina Guerrier, PT   [facility] ([facility]  )    [address] [facility] [address]   [phone]            Dec 05, 2018  2:00 PM CST   AUDREY Spine with Angelina Guerrier, PT   [facility] ([facility]  )    [address] [facility] [address]   [phone]            Dec 10, 2018  2:00 PM CST   AUDREY Spine with Angelina Guerrier, PT   [facility] ([facility]  )    [address] [facility] [address]   [phone]            Jan 14, 2019 11:00 AM CST   Return Visit with Paulo Saravia MD   [facility] ([facility])    [address] [facility] [address]   [phone]            Apr 17, 2019 11:00 AM CDT   Return Visit with Rafi Ramirez MD   [facility] ([facility])    [address] [facility] [address]   [phone]            Apr 17, 2019 11:30 AM CDT   Return Visit  with Clint Gutiérrez MD   [facility] ([facility])    [address] [place] [address] [phone]              Future tests that were ordered for you today     Open Future Orders        Priority Expected Expires Ordered    Follow-Up with Electrophysiologist - 6 Months Routine 4/1/2019 8/18/2019 11/21/2018            Who to contact     If you have questions or need follow up information about today's clinic visit or your schedule please contact [facility] directly at [phone].  Normal or non-critical lab and imaging results will be communicated to you by MyChart, letter or phone within 4 business days after the clinic has received the results. If you do not hear from us within 7 days, please contact the clinic through [employer] or phone. If you have a critical or abnormal lab result, we will notify you by phone as soon as possible.  Submit refill requests through [employer] or call your pharmacy and they will forward the refill request to us. Please allow 3 business days for your refill to be completed.          Additional Information About Your Visit        [employer] Information     [employer] gives you secure access to your electronic health record. If you see a primary care provider, you can also send messages to your care team and make appointments. If you have questions, please call your primary care clinic.  If you do not have a primary care provider, please call [phone] and they will assist you.      [employer] is an electronic gateway that provides easy, online access to your medical records. With [employer], you can request a clinic appointment, read your test results, renew a prescription or communicate with your care team.     To access your existing account, please contact your [facility] or call [phone] for assistance.        Care EveryWhere ID     This is your Care EveryWhere ID. This could be used  by other organizations to access your [facility] medical records  [id number]        Your Vitals Were     Pulse BMI (Body Mass Index)                82 23.27 kg/m2           Blood Pressure from Last 3 Encounters:   11/21/18 121/81   11/08/18 129/79   10/25/18 115/70    Weight from Last 3 Encounters:   11/21/18 77.8 kg (171 lb 9.6 oz)   10/25/18 80.1 kg (176 lb 8 oz)   09/24/18 78 kg (172 lb)              We Performed the Following     Follow-Up with Electrophysiologist - 3 Months          Where to get your medicines      These medications were sent to [facility] - [place], [place] - [address] [place] [address]  [address] [place] [address]     Phone:  [phone]     hydrochlorothiazide 12.5 MG Tabs tablet          Primary Care Provider Office Phone # Fax #    Evelina Morse [phone] [address] [facility] [address]        Equal Access to Services     [facility] : Hadii aad ku hadasho Soomaali, waaxda luqadaha, qaybta kaalmada adeegyada, waxay bonnyin haysindhu heller . So [facility] [phone].    ATENCIÓN: Si habla español, tiene a langford disposición servicios gratuitos de asistencia lingüística. Llame al [phone].    We comply with applicable federal civil rights laws and [place] laws. We do not discriminate on the basis of race, color, national origin, age, disability, sex, sexual orientation, or gender identity.            Thank you!     Thank you for choosing [facility]  for your care. Our goal is always to provide you with excellent care. Hearing back from our patients is one way we can continue to improve our services. Please take a few minutes to complete the written survey that you may receive in the mail after your visit with us. Thank you!             Your Updated Medication List - Protect others around you: Learn how to safely use, store and throw away your medicines at www.disposemymeds.org.          This list is  accurate as of 11/21/18 11:12 AM.  Always use your most recent med list.                   Brand Name Dispense Instructions for use Diagnosis    acetaminophen 500 MG tablet    TYLENOL     Take 2 tablets (1,000 mg) by mouth 3 times daily    Closed displaced fracture of second cervical vertebra with routine healing, unspecified fracture morphology, subsequent encounter       adapalene 0.1 % gel    DIFFERIN    135 g    Apply to Face topically every day and evening shift for Acne prevention    Acne vulgaris       amantadine 100 MG capsule    SYMMETREL    270 capsule    Take 1 capsule (100 mg) by mouth 3 times daily 6am,11am and 4pm    PSP (progressive supranuclear palsy) (H)       amLODIPine 2.5 MG tablet    NORVASC     Take 1 tablet by mouth daily        BABY ASPIRIN 81 MG chewable tablet   Generic drug:  aspirin      Take 81 mg by mouth daily        BENICAR 20 MG tablet   Generic drug:  olmesartan      Take 1 tablet (20 mg) by mouth 2 times daily    PSP (progressive supranuclear palsy) (H)       bisacodyl 10 MG Suppository    DULCOLAX    30 suppository    Place 1 suppository (10 mg) rectally every other day If no BM in 3 days.    Other constipation       CLARITIN 10 MG capsule   Generic drug:  loratadine     30 capsule    Take 10 mg by mouth daily    Chronic seasonal allergic rhinitis, unspecified trigger       hydrochlorothiazide 12.5 MG Tabs tablet     90 tablet    Take 1 tablet (12.5 mg) by mouth daily    Essential hypertension       methocarbamol 500 MG tablet    ROBAXIN    120 tablet    Take 1 tablet (500 mg) by mouth 4 times daily    Closed displaced fracture of second cervical vertebra with routine healing, unspecified fracture morphology, subsequent encounter       MIRALAX powder   Generic drug:  polyethylene glycol     510 g    Take 17 g (1 capful) by mouth daily as needed for constipation        nortriptyline 10 MG capsule    PAMELOR    270 capsule    TAKE 3 CAPSULES AT BEDTIME    Migraine without aura and  without status migrainosus, not intractable       order for DME     1 each    Equipment being ordered: Nebulizer    Chronic cough       order for DME     1 each    Please provide home seated massage chair    Cervicalgia       polyethylene glycol 0.4%- propylene glycol 0.3% 0.4-0.3 % Soln ophthalmic solution    SYSTANE ULTRA     Place 1 drop into both eyes 3 times daily        rivastigmine 9.5 MG/24HR 24 hr patch    EXELON    90 patch    Place 1 patch onto the skin daily    PSP (progressive supranuclear palsy) (H)       senna-docusate 8.6-50 MG per tablet    SENOKOT-S;PERICOLACE    120 tablet    Take 2 tablets by mouth 2 times daily    Chronic constipation       sodium chloride 3 % Nebu neb solution     180 mL    Take 3 mLs by nebulization every 3 hours as needed for wheezing    Chronic cough       * traMADol 50 MG tablet    ULTRAM    120 tablet    Take 0.5 tablets (25 mg) by mouth every 4 hours (while awake) May also take 0.5 tablet every 6 hours as needed.    Closed displaced fracture of second cervical vertebra with routine healing, unspecified fracture morphology, subsequent encounter       * traMADol 50 MG tablet    ULTRAM    150 tablet    1/2 of 50mg tab by mouth 4/day: 7am, noon, 5pm and 10pm    Closed displaced fracture of second cervical vertebra with routine healing, unspecified fracture morphology, subsequent encounter       Vitamin D-3 Super Strength 2000 units tablet   Generic drug:  cholecalciferol      Take 1 tablet by mouth        ZOLMitriptan 5 MG tablet    ZOMIG    30 tablet    Take 1 tablet (5 mg) by mouth at onset of headache for migraine    PSP (progressive supranuclear palsy) (H), Migraine without aura and without status migrainosus, not intractable       * Notice:  This list has 2 medication(s) that are the same as other medications prescribed for you. Read the directions carefully, and ask your doctor or other care provider to review them with you.

## 2018-11-21 NOTE — PATIENT INSTRUCTIONS
Patient Instructions      Expand All Collapse All    Thank you for coming into the Palliative Care Clinic today.     1. Pain:   -  Decrease tramadol to 25mg three times a day  - any time 2-3 days after that you can decrease methocarbamol to three times a day as well.   - if you return to the four times a day dosing, that is ok. Do let us know, please    2. Cough:  - add albuterol and saline nebs to loosen secretions  - I will reach out to pulmonology to explore further supportive options    3. Constipation:  - you can take up to 4 tablets of Senna twice a day    Call if your symptoms change and the medications we have prescribed are not working. Do not take your medications at any other dose or frequently than how they are prescribed.     Call us at least a week in advance if you need a medication refill    Please bring all your pill bottles to your next appointment.     Failure to follow these instructions may result in the palliative care team not being able to prescribe your medications.    Return to clinic in 1-2 months for a follow up.     You can reach the Palliative Care Team during business hours at the following number:    - [phone]    To reach the Palliative Care Provider on-call After-hours or on holidays and weekends, call: [phone].  Please note that we are not able to provide pain medication refills on evenings or weekends.

## 2018-11-23 NOTE — TELEPHONE ENCOUNTER
Dr Oro paged at 09:13 as sodium nebulizer she ordered on 11/21/18 does not come in the size ordered. [facility] states that it comes in a 4 ml size. They need clarification before filling.     Invoice # [phone]    [facility] phone # [phone]    Esther Duvall RN

## 2018-12-24 NOTE — TELEPHONE ENCOUNTER
[facility] Call Center    Phone Message    May a detailed message be left on voicemail: yes    Reason for Call: Other: Kiesha from [facility] is calling for verbal orders for hospice.  Will Dr. Morse continue to be primary doctor and will she sign death certificate.  Please advise.     Action Taken: Message routed to:  Primary Care p [phone]

## 2018-12-26 NOTE — TELEPHONE ENCOUNTER
Called [facility] and discussed the message below. Kiesha states they are needing an order or agreance from the provider that the patient is eligible for hospice, after that, they can resume all cares.    Patient was last seen by Dr. Morse in July, however, he had a recent hospital admission on 12/20/18, r/t a fall. Please see care everywhere for more information.    Routing to provider for review and approval on hospice eligibility.    Bessy Schofield RN   [facility]

## 2018-12-26 NOTE — TELEPHONE ENCOUNTER
Please give verbal orders for agreeing with hospice care.  (I also reviewed from the chart that Raymond sees Dr. Nishi Oro for palliative care)

## 2019-01-02 ENCOUNTER — MEDICAL CORRESPONDENCE (OUTPATIENT)
Dept: HEALTH INFORMATION MANAGEMENT | Facility: CLINIC | Age: 69
End: 2019-01-02

## 2019-01-03 ENCOUNTER — MEDICAL CORRESPONDENCE (OUTPATIENT)
Dept: HEALTH INFORMATION MANAGEMENT | Facility: CLINIC | Age: 69
End: 2019-01-03

## 2019-05-13 NOTE — NURSING NOTE
Jimmy Patrick's goals for this visit include:   Chief Complaint   Patient presents with     RECHECK     6 month follow up       He requests these members of his care team be copied on today's visit information: PCP    PCP: Evelina Morse    Referring Provider:  No referring provider defined for this encounter.    /71 (BP Location: Right arm, Patient Position: Chair, Cuff Size: Adult Regular)  Pulse 86  Wt 79.4 kg (175 lb)  SpO2 98%  BMI 23.73 kg/m2    Do you need any medication refills at today's visit? None      Mandi Mayen, [facility]         Patient needs to bring in a neuropsych evaluation prior to any ministration of this medication from his clinic  Patient had completed her neuropsych eval today and will follow-up to discuss

## 2019-12-19 PROBLEM — M54.50 ACUTE LEFT-SIDED LOW BACK PAIN WITHOUT SCIATICA: Status: RESOLVED | Noted: 2018-01-01 | Resolved: 2019-12-19

## 2019-12-19 PROBLEM — S12.101A CLOSED NONDISPLACED FRACTURE OF SECOND CERVICAL VERTEBRA, UNSPECIFIED FRACTURE MORPHOLOGY, INITIAL ENCOUNTER (H): Status: RESOLVED | Noted: 2018-01-01 | Resolved: 2019-12-19

## 2019-12-19 NOTE — PROGRESS NOTES
Discharge Note    Progress reporting period is from last progress note on 11/07/18 to Dec 10, 2018.    Please see information below for last relevant information on current status.  Patient seen for 18 visits.    SUBJECTIVE  Subjective changes noted by patient:  Patient relates that he is better today.  Symptoms have been mainly in the neck, less into the R UT. No c/o lower back pain.  Consistent with walking on TM and has been doing some prone lying.  Has not been working on his neck retraction.    .  Current pain level is  .     Previous pain level was  (prior to PT: 5/10 neck; 0/10 low back).   Changes in function:  Yes (See Goal flowsheet attached for changes in current functional level)  Adverse reaction to treatment or activity: None    OBJECTIVE  Changes noted in objective findings: CROM rotation R 40 deg, L rot 20 deg.  Able to assist in upright posture.  Muscle tone does improve after prone lying and with manual retraction in sitting.      ASSESSMENT/PLAN      Updated problem list and treatment plan:   Pain - HEP  Decreased ROM/flexibility - HEP  Decreased function - HEP  STG/LTGs have been met or progress has been made towards goals:  Yes, please see goal flowsheet for most current information  Assessment of Progress: current status is unknown.    Last current status: Pt is progressing as expected(remains painfree for low back, neck pain only, w/o UT)   Self Management Plans:  HEP  I have re-evaluated this patient and find that the nature, scope, duration and intensity of the therapy is appropriate for the medical condition of the patient.  Jimmy continues to require the following intervention to meet STG and LTG's:  HEP.    Recommendations:  Discharge with current home program.  Patient to follow up with MD as needed.    Please refer to the daily flowsheet for treatment today, total treatment time and time spent performing 1:1 timed codes.

## 2021-06-17 NOTE — PROGRESS NOTES
Patient Instructions by Yara Mcdonald CNP at 12/31/2019  1:00 PM     Author: Yara Mcdonald CNP Service: -- Author Type: Nurse Practitioner    Filed: 12/31/2019  1:34 PM Encounter Date: 12/31/2019 Status: Signed    : Yara Mcdonald CNP (Nurse Practitioner)       Patient Education     Allergic Rhinitis (Child)  Allergic rhinitis is an allergic reaction that affects the nose, and often the eyes. Its often known as nasal allergies. Nasal allergies are often due to things in the environment that are breathed in. Depending what the child is sensitive to, nasal allergies may occur only during certain seasons. Or they may occur year round. Common indoor allergens include house dust mites, mold, cockroaches, and pet dander. Outdoor allergens include pollen from trees, grasses, and weeds.   Symptoms include a drippy, stuffy, and itchy nose. They also include sneezing, red and itchy eyes, and dark circles (allergic shiners) under the eyes. The child may be irritable and tired. Severe allergies may also affect the child's breathing and trigger a condition called asthma.   Tests can be done to see what allergens are affecting your child. Your child may be referred to an allergy specialist for testing and evaluation.  Home care  The healthcare provider may prescribe medicines to help relieve allergy symptoms. These include oral medicines, nasal sprays, or eye drops. Follow instructions when giving these medicines to your child.  Ask the provider for advice on how to avoid substances that your child is allergic to. Below are a few tips for each type of allergen.    Pet dander:  ? Do not have pets with fur and feathers.  ? If you cannot avoid having a pet, keep it out of alaina bedroom and off upholstered furniture.    Pollen:  ? Change the alaina clothes after outdoor play.  ? Wash and dry the child's hair each night.    House dust mites:  ? Wash bedding every week in warm water and detergent or dry on  Research followup                        To whom it may concern;             Jimmy is a 66 yo man with psp Due to complex medical condition it is              recommended that he/she receives a semielectric hospital bed. This              will allow him/her to have frequent changes in body position             to prevent skin breakdown and to help alleviate contractures, and to              allow easier             transfer from bed to wheelchair. He/She also requires the head of              the bed to be             elevated in order for her/him to stay in a healthy state by being              able to handle his/her             secretions and to prevent aspiration. The patient is able to work              the controls             himself/herself.    Associated Diagnoses      G23.1 PSP (progressive supranuclear palsy) (H)  Patient merits a hospital bed due to his parkinson  My patient requires body positioning not feasible in an ordinary hospital bed to alleviate pain and requires head of bed to be elevated more than 30 degrees due to his diagnosis of parkisnon as well as to facilitate transfers from bed to chair.     Paulo Saravia MD  NPI [id number]  Duration of need: 99; > 1 yrs  Date today  November 30, 2017     a hot setting.  ? Cover the mattress, box spring, and pillows with allergy covers.   ? If possible, have your child sleep in a room with no carpet, curtains, or upholstered furniture.    Cockroaches:  ? Store food in sealed containers.  ? Remove garbage from the home promptly.  ? Fix water leaks    Mold:  ? Keep humidity low by using a dehumidifier or air conditioner. Keep the dehumidifier and air conditioner clean and free of mold.  ? Clean moldy areas with bleach and water.    In general:  ? Vacuum once or twice a week. If possible, use a vacuum with a high-efficiency particulate air (HEPA) filter.  ? Do not smoke near your child. Keep your child away from cigarette smoke. Cigarette smoke is an irritant that can make symptoms worse.  Follow-up care  Follow up with your child's healthcare provider, or as advised. If your child was referred to an allergy specialist, make this appointment promptly.  When to seek medical advice  Call your child's healthcare provider right away if the following occur:    Coughing or wheezing    Fever of 100.4 F (38 C) or higher, or as directed by the healthcare provider    Hives (raised red bumps)    Continuing symptoms, new symptoms, or worsening symptoms  Call 911  Call 911 if your child has:    Trouble breathing    Severe swelling of the face or severe itching of the eyes or mouth  Date Last Reviewed: 3/1/2017    1046-1367 [employer]. [address] [place] [address], [place] [address]. All rights reserved. This information is not intended as a substitute for professional medical care. Always follow your healthcare professional's instructions.

## 2023-02-26 NOTE — PROGRESS NOTES
Research visit      Numbness in toes  Blood work ordered  emg future study placed.    FAMILY HISTORY:  FH: heart disease, father

## 2023-08-07 NOTE — PROGRESS NOTES
Urology Consult History and Physical    Name: Jimmy Patrick    MRN: [id number]   YOB: 1950       We were asked to see Jimmy Patrick at the request of Dr. Morse for evaluation and treatment of urinary urgency.        Chief Complaint:   Urinary urgency    History is obtained from the patient and his wife            History of Present Illness:   Jimmy Patrick is a 67 year old male with a complex history including Progressive supranuclear ophthalmoplegia with recent fall in early June with cervical fracture from which he is still recovering. His wife reports that prior to this accident he would void normally with no real LUTS. Since that time he has had worsening urgency and sensation of incomplete emptying. He currently has had some urge incontinence and uses a condom catheter at night. The urgency has limited his ability to travel outside the house. No UTIs, retention, hematuria, or frequency.             Past Medical History:     Past Medical History:   Diagnosis Date     Blurred vision 8/11/2014     Cancer (H) 1979; 1960's    Florin: father; grandparents (on father's side)     Depressive disorder April, 2014    When received diagnosis of PSP     DISK (aka Displacement of intervertebral disc, site unspecified, without myelopathy) 8/11/2014     Displacement of cervical intervertebral disc without myelopathy (HERNIATED DISK) 8/11/2014     Double vision 8/11/2014     Epistaxis 8/11/2014     Fluttering heart 8/11/2014     Headache 8/11/2014     Hypertension February, 2013     Leg swelling 8/11/2014     Memory loss 8/11/2014     Migraines 2007    Ramila: karen     PSP (progressive supranuclear palsy) (H) 8/11/2014     Sinus disorder 8/11/2014     Tinnitus 8/11/2014     Urinary urgency 8/11/2014     Varicose veins 8/11/2014            Past Surgical History:     Past Surgical History:   Procedure Laterality Date     BIOPSY  2016    Dermatologist remoed skin mole (not cancerus)     CARDIAC  SURGERY  2013 ablation    Successful in eliminatng heart flutter     COLONOSCOPY  2005?     H ABLATION ATRIAL FLUTTER              Social History:     Social History   Substance Use Topics     Smoking status: Never Smoker     Smokeless tobacco: Never Used     Alcohol use No       History   Smoking Status     Never Smoker   Smokeless Tobacco     Never Used            Family History:     Family History   Problem Relation Age of Onset     Cancer Father      Other Cancer Father      throat cancer,       Hypertension Mother      Osteoporosis Mother      Hypertension Sister      Osteoporosis Sister      Osteoporosis Sister               Allergies:     Allergies   Allergen Reactions     Other [Seasonal Allergies]      environmental     Codeine Other (See Comments) and Rash     GI upset  GI upset  GI upset            Medications:     Current Outpatient Prescriptions   Medication Sig     acetaminophen (TYLENOL) 500 MG tablet Take 2 tablets (1,000 mg) by mouth 3 times daily     adapalene (DIFFERIN) 0.1 % gel Apply to Face topically every day and evening shift for Acne prevention     amantadine (SYMMETREL) 100 MG capsule Take 1 capsule (100 mg) by mouth 3 times daily 6am,11am and 4pm     amLODIPine (NORVASC) 2.5 MG tablet Take 1 tablet by mouth daily     amoxicillin (AMOXIL) 500 MG capsule Take 1 capsule (500 mg) by mouth 3 times daily     aspirin (BABY ASPIRIN) 81 MG chewable tablet Take 81 mg by mouth daily      bisacodyl (DULCOLAX) 10 MG Suppository Place 1 suppository (10 mg) rectally every other day If no BM in 3 days.     cholecalciferol (VITAMIN D-3 SUPER STRENGTH) 2000 UNITS tablet Take 1 tablet by mouth      hydrochlorothiazide 12.5 MG TABS tablet Take 1 tablet (12.5 mg) by mouth daily     loperamide (IMODIUM) 2 MG capsule Take 2 mg by mouth every 4 hours as needed for diarrhea     loratadine (CLARITIN) 10 MG capsule Take 10 mg by mouth daily     methocarbamol (ROBAXIN) 500 MG tablet Take 1 tablet (500 mg) by  mouth 4 times daily     nortriptyline (PAMELOR) 10 MG capsule TAKE 3 CAPSULES AT BEDTIME (Patient taking differently: TAKE 3 CAPSULES AT BEDTIME)     olmesartan (BENICAR) 20 MG tablet Take 1 tablet (20 mg) by mouth 2 times daily     order for DME Equipment being ordered: Nebulizer     polyethylene glycol 0.4%- propylene glycol 0.3% (SYSTANE ULTRA) 0.4-0.3 % SOLN ophthalmic solution Place 1 drop into both eyes 3 times daily     rivastigmine (EXELON) 9.5 MG/24HR 24 hr patch Place 1 patch onto the skin daily     senna-docusate (SENOKOT-S;PERICOLACE) 8.6-50 MG per tablet Take 2 tablets by mouth 2 times daily     sodium chloride 3 % NEBU neb solution Take 3 mLs by nebulization every 3 hours as needed for wheezing     traMADol (ULTRAM) 50 MG tablet Take 0.5 tablets (25 mg) by mouth every 4 hours (while awake) May also take 0.5 tablet every 6 hours as needed.     ZOLMitriptan (ZOMIG) 5 MG tablet Take 1 tablet (5 mg) by mouth at onset of headache for migraine     No current facility-administered medications for this visit.              Review of Systems:     Skin: negative  Eyes: negative  Ears/Nose/Throat: negative  Respiratory: cough  Cardiovascular: negative  Gastrointestinal: difficulty swallowing  Genitourinary: as above  Musculoskeletal: as above  Neurologic: as above  Psychiatric: negative  Hematologic/Lymphatic/Immunologic: negative  Endocrine: negative          Physical Exam:   No data found.    There is no height or weight on file to calculate BMI.     General: frail appearing  HEENT: Head AT/NC, EOMI  Lungs: cough  Heart: No obvious jugular venous distension present  Back: no bony midline tenderness, no CVAT bilaterally.  Abdomen: soft, non-distended, non-tender. No organomegaly  Lymph: no palpable inguinal lymphadenopathy.  LE: no edema.   Musculoskeltal: extremities normal, no peripheral edema  Skin: no suspicious lesions or rashes  Psych: blunted affect          Data:   All laboratory data reviewed:    Lab  Results   Component Value Date    CR 0.98 07/24/2018      PVR: 130cc (unable to void in office, voided at home 1 hour prior)         Impression and Plan:   Impression:   68 y/o man with Progressive supranuclear ophthalmoplegia (palsy) who suffered a recent fall and C2 fx in June from which he is still recovering. Voiding issues worsened following fall and now having urgency, sensation of incomplete emptying. No evidence of urinary retention presently and SCr normal. No UTIs or concern for NORTH. Etiology likely a mix of PSP and spinal shock following C2 injury. Given complexity of the neurologic history we discussed obtaining further work up prior to any medical intervention.       Plan:   Urinary urgency  - Video urodynamics at first available  - Follow up with me following to discuss results and any treatment options     Thank you for the kind consultation.    Time spent: 45 minutes of which >50% was spent counseling.    Amandeep Plasencia MD   Urology  [facility]  [facility] Phone: [phone]  [facility] Phone: [phone]        0 (no pain/absence of nonverbal indicators of pain)

## 2024-09-27 NOTE — LETTER
1/29/2018       RE: Jimmy Patrick  [address]  [place] [address]     Dear Colleague,    Thank you for referring your patient, Jimmy Patrick, to the [facility] NEUROLOG[facility] at [facility]. Please see a copy of my visit note below.    Research appointment.     Again, thank you for allowing me to participate in the care of your patient.      Sincerely,    Elissa Varela MD       Plan: Location: face\\nDuration: finishing 5th starting 6th\\nPrescribed: Absorica 80mg PO QD x 30 days\\nPharmacy: [facility]\\niPledge: [phone]\\n\\n9/27/24\\nPhotos taken\\n\\nPt comes in today for a follow up on his accutane\\nPt is finishing his 5th month and starting his 6th month and his back and chest are remaining mostly clear\\nPt states that he has not experienced any major flares this past month and that he occasionally will get one pimple on his back or chest\\nPt states that he has one full box left at home, we have sent 9 boxes until today\\nPt is taking 2capsule a day with a fatty meal and is able to manage the dryness \\nPt does not complain of any other side effects besides dryness\\nPt is here for further evaluation and treatment \\n\\nInformed the pt his skin is improving month to month and will continue with treatment \\nHe continues to have erythematous inflamed papules and comedonal acne that he finds very bothersome and embarrassing.\\nDiscussed with him that we will continue him to 80mg dose, reiterated the usage of Cerave MC and ointment during therapy to help with dryness\\nDiscussed with pt that NO MORE blood work is needed\\nDiscussed with pt side effects to be aware of: headaches, joint pain, dryness, nose bleeds, mood changes.\\nDuring therapy, avoid donating blood while on treatment.\\nUse sunscreen with zinc oxide to prevent sun damage and block UV light.\\nDiscussed with pt that we will prescribe Absorica 80mg po qd x 30 days.\\nDiscussed with pt to take with a fatty meal.\\n\\nIPLEDGE program and consent form discussed with patient. The side effects and warnings for the use of Accutane were discussed with the patient. He understands he can not donate blood for 1 month post tx and can not have laser tx, cosmetic surgery, waxing or peels for 6 months post treatment.\\nDiscussed musculoskeletal SE’s in detail. Discussed HA’s, dry skin, and depression in detail. Patient denies depression. Denies personal or family H/O Crohns, IBS, IBD, or bloody stools.\\nUnderstand the need to fill Rx in 6 days and have monthly labs and OV.\\n\\nDiscussed with patient that due to him not being completely clear and starting his 6th month of treatment, we will probably need to continue treatment past 6 months.\\n\\nF/u in 1 month Detail Level: Zone